# Patient Record
Sex: MALE | Race: WHITE | NOT HISPANIC OR LATINO | Employment: OTHER | URBAN - METROPOLITAN AREA
[De-identification: names, ages, dates, MRNs, and addresses within clinical notes are randomized per-mention and may not be internally consistent; named-entity substitution may affect disease eponyms.]

---

## 2019-03-22 ENCOUNTER — HISTORICAL (OUTPATIENT)
Dept: LAB | Facility: HOSPITAL | Age: 59
End: 2019-03-22

## 2020-03-13 ENCOUNTER — HISTORICAL (OUTPATIENT)
Dept: LAB | Facility: HOSPITAL | Age: 60
End: 2020-03-13

## 2020-03-13 ENCOUNTER — HISTORICAL (OUTPATIENT)
Dept: ADMINISTRATIVE | Facility: HOSPITAL | Age: 60
End: 2020-03-13

## 2020-03-17 ENCOUNTER — HISTORICAL (OUTPATIENT)
Dept: ADMINISTRATIVE | Facility: HOSPITAL | Age: 60
End: 2020-03-17

## 2020-03-23 ENCOUNTER — HISTORICAL (OUTPATIENT)
Dept: HEMATOLOGY/ONCOLOGY | Facility: CLINIC | Age: 60
End: 2020-03-23

## 2020-03-23 ENCOUNTER — HISTORICAL (OUTPATIENT)
Dept: ADMINISTRATIVE | Facility: HOSPITAL | Age: 60
End: 2020-03-23

## 2020-03-26 ENCOUNTER — HISTORICAL (OUTPATIENT)
Dept: LAB | Facility: HOSPITAL | Age: 60
End: 2020-03-26

## 2020-03-26 ENCOUNTER — TELEPHONE (OUTPATIENT)
Dept: HEMATOLOGY/ONCOLOGY | Facility: CLINIC | Age: 60
End: 2020-03-26

## 2020-03-26 NOTE — TELEPHONE ENCOUNTER
From: Nehemias Christianson <[email or web address]>   Sent: Wednesday, April 1, 2020 2:55 PM  To: Kaylee Greco     Keanu is treating at [facility] we just found out he is inpatient and has received 2 or 3 rounds of treatment.    Nehemias Christianson LCSW, [facility]-CG   Patient Nurse Navigator  Tel: [phone]      ==================================  From: Nehemias Christianson <[email or web address]>   Sent: Wednesday, April 1, 2020 10:50 AM  To: Kaylee Page  I tried to call as well as Dr. Carrillo and we are unsuccessful in getting him also.    Nehemias Christianson LCSW, [facility]-   Patient Nurse Navigator  Tel: [phone]    =====================================    From: Kaylee Greco   Sent: Wednesday, April 01, 2020 8:48 AM  To: Nehemias Christianson <[email or web address]>    Update:  I left messages for Mr Porter Friday, yesterday and today.   No callback.          -  Kaylee    =====================================  From: Kaylee Greco   Sent: Friday, March 27, 2020 10:25 AM  To: Nehemias Christianson <[email or web address]>    Nehemias, I got them via the fax.  Thanks!        -  Kaylee      =====================================  From: Nehemias Christianson <[email or web address]>   Sent: Friday, March 27, 2020 10:02 AM  To: Kaylee Greco <[email or web address]>    Please let me know if you received my email with labs and bone marrow results.  I am having trouble with some of my faxes going through.    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 He is stable to be seen as outpatient.    Can we try to get records  and a virtual consult set up ASAP?     Patient info:  Kvng Porter  : 1960  Phone: [phone]

## 2020-03-26 NOTE — LETTER
April 1, 2020    Rigobertohao MORALES Robert  [address]  [facility] [address]             [facility] Hematology Oncology  [address]  [place] [address]  Phone: [phone] Dear  Mr. Jones:    We have been unable to reach you by phone.  Please contact us so we can schedule your appointment.  This is per request of your physician, Dr Carrillo.      Sincerely,        Kaylee Greco RN   Ph: [phone]  Email: [email or web address]

## 2020-04-23 ENCOUNTER — TELEPHONE (OUTPATIENT)
Dept: HEMATOLOGY/ONCOLOGY | Facility: CLINIC | Age: 60
End: 2020-04-23

## 2020-04-23 DIAGNOSIS — C92.10 CML (CHRONIC MYELOCYTIC LEUKEMIA): Primary | ICD-10-CM

## 2020-04-23 PROCEDURE — 88325 CONSLTJ COMPRE RVW REC REPRT: CPT | Mod: 59 | Performed by: PATHOLOGY

## 2020-04-23 PROCEDURE — 88321 CONSLTJ&REPRT SLD PREP ELSWR: CPT | Performed by: PATHOLOGY

## 2020-04-23 PROCEDURE — 88323 CONSLTJ&REPRT MATRL PREP SLD: CPT | Performed by: PATHOLOGY

## 2020-04-23 NOTE — TELEPHONE ENCOUNTER
Pt had switched care to Dr Richard JUNG.  Per Care Everywhere, pt admitted on 3/30 to start chemo.  Started daily ponatinib on 4/4.  Had surveillance BMBx on 4/14/20.  Currently in hospital. CML in blast phase.  Developed pancytopenia & infections.    Dr Hodges has been in discussion with Dr Ramos. Received request to schedule virtual visit on 5/4.  Will contact wife.    ----- from Elaine Gunn sent at 4/3/2020  7:11AM-----  Regarding: RE: BMT transplant benefit  Morning     MEDICAL AND TRANSPLANT BENEFITS VERIFIED     ----- Message -----  From: Kaylee Greco RN  Sent: 4/1/2020   3:45 PM CDT  To: Elaine Gunn  Subject: BMT transplant benefit                           Please complete benefit check for patient. Medical benefit check and transplant benefit check for Chucky Zurita only.    Thanks,  Kaylee

## 2020-04-23 NOTE — LETTER
Fax Transmission                                                                                                                                                       Date: 2020    - - Slide Request- - for Dr Hodges   To:          PGL From:   Kaylee Greco RN               BMT Navigator   Fax:       [phone] Fax:         [phone]   Phone:  [phone] Phone:   [phone] (Navigator)                 [phone] (Pathology lab)     Patient:     SONA ABEL.O.B.   1960    Specimen:  Bone Marrow Biopsy  Date Collected:  2020         Please send blocks/slides, pathology reports (to include results of cytogenetic, flow cytometry, FISH and other tests, if any) to:     [facility]     Pathology Department, 4th [place] [address]     Fed Acct code:  [id number]                               Thank you        IF THERE ARE ANY PROBLEMS WITH THIS TRANSMISSION, PLEASE CALL IMMEDIATELY. THANK YOU    CONFIDENTIALITY NOTICE: The accompanying facsimile is intended solely for the use of the recipient designated above. Document(s) transmitted herewith may contain information that is confidential and privileged. Delivery, distribution of dissemination of this communication other than to the intended recipient is strictly prohibited. If you are another healthcare provider and have received this facsimile in error, please properly dispose and notify the sender. If you are NOT a healthcare provider and have received this facsimile in error, please notify [facility]s Compliance & Privacy Department immediately by email at [email or web address]

## 2020-04-23 NOTE — LETTER
Fax Transmission                                                                                                                                                       Date: 2020    - - Slide Request - - for Dr Hodges     To:                 PAPS From:   Kaylee Greco RN               BMT  Navigator    Fax:                              [phone] Fax:      [phone]   Phone:                         [phone] Phone:  [phone] (Navigator)               [phone] (Pathology lab)         Patient:     SONA JONESO.B.   1960    Specimen ID: [id number]   Date Collected:  3-         Please send blocks/slides, pathology reports (to include results of cytogenetic, flow cytometry, FISH and other tests, if any) to:     [facility]     Pathology Department, 4th Floor     [address]  [address]     FedEx Acct code:  [id number]                               Thank you        IF THERE ARE ANY PROBLEMS WITH THIS TRANSMISSION, PLEASE CALL IMMEDIATELY. THANK YOU    CONFIDENTIALITY NOTICE: The accompanying facsimile is intended solely for the use of the recipient designated above. Document(s) transmitted herewith may contain information that is confidential and privileged. Delivery, distribution of dissemination of this communication other than to the intended recipient is strictly prohibited. If you are another healthcare provider and have received this facsimile in error, please properly dispose and notify the sender. If you are NOT a healthcare provider and have received this facsimile in error, please notify [facility]s Compliance & Privacy Department immediately by email at [email or web address]

## 2020-05-01 ENCOUNTER — TELEPHONE (OUTPATIENT)
Dept: HEMATOLOGY/ONCOLOGY | Facility: CLINIC | Age: 60
End: 2020-05-01

## 2020-05-04 ENCOUNTER — OFFICE VISIT (OUTPATIENT)
Dept: HEMATOLOGY/ONCOLOGY | Facility: CLINIC | Age: 60
End: 2020-05-04
Payer: COMMERCIAL

## 2020-05-04 DIAGNOSIS — C92.10 CML (CHRONIC MYELOCYTIC LEUKEMIA): Primary | ICD-10-CM

## 2020-05-04 PROCEDURE — 99205 PR OFFICE/OUTPT VISIT, NEW, LEVL V, 60-74 MIN: ICD-10-PCS | Mod: 95,,, | Performed by: INTERNAL MEDICINE

## 2020-05-04 PROCEDURE — 99205 OFFICE O/P NEW HI 60 MIN: CPT | Mod: 95,,, | Performed by: INTERNAL MEDICINE

## 2020-05-04 NOTE — LETTER
May 4, 2020      Chase Ramos MD  [address]  [address]  [facility] [address]           [facility]  [address] [facility] [address]  Phone: [phone]          Patient: Kvng Jones   MR Number: [id number]   YOB: 1960   Date of Visit: 5/4/2020       Dear Dr. Chase Ramos:    Thank you for referring Kvng Jones to me for evaluation. Attached you will find relevant portions of my assessment and plan of care.    If you have questions, please do not hesitate to call me. I look forward to following Kvng Jones along with you.    Sincerely,    Cheryl Hodges MD    Enclosure  CC:  No Recipients    If you would like to receive this communication electronically, please contact [email or web address] or [phone] to request more information on [employer] Link access.    For providers and/or their staff who would like to refer a patient to [facility], please contact us through our one-stop-shop provider referral line, [facility], at [phone].    If you feel you have received this communication in error or would no longer like to receive these types of communications, please e-mail [email or web address]

## 2020-05-04 NOTE — PROGRESS NOTES
Subjective:   The patient location is: home  The chief complaint leading to consultation is: CML, Blast Crisis  Visit type: audiovisual  Total time spent with patient: 35 minutes  Each patient to whom he or she provides medical services by telemedicine is:  (1) informed of the relationship between the physician and patient and the respective role of any other health care provider with respect to management of the patient; and (2) notified that he or she may decline to receive medical services by telemedicine and may withdraw from such care at any time.    Notes: See Below       Patient ID: Kvng Jones is a 59 y.o. male.    Chief Complaint: No chief complaint on file.    Referring Physician: Chase Ramos MD    QUITA Colunga was recently admitted to [facility] for CML presenting with blast crisis and lewis disease. He was admitted for evaluation and induction chemotherapy with FLAG-Addis. Complications of therapy include epididymal orchitis with negative testicular ultrasound, neutropenic fever, dyspnea, and GGO of bilateral lungs on CT chest. Fever and chest findings were covered with broad spectrum antimicrobials. He did have hallucinations and vivid dreams with posaconazole. Chemotherapy was administered by left arm PICC line that was removed during hospital stay for MSSA infection treated with vancomycin. Hospital admission was 3/30/2020 to 5/1/2020 achieving morphologic CR with induction chemotherapy. He is now on Ponatinib 30mg daily.     Studies performed during his hospital stay include pre-chemotherapy ECHO with normal ejection fraction of 60-65%. HIV, HBV, and HCV tests were negative. CBC at admission was WBC 38.6, Hgb 14.6, and platelet 416K. CT of the head without contrast was normal 4/20/2020. US of abdomen performed for abdominal distention and neutropenic fever had hepatic steatosis, liver 19cm, and spleen 11 cm. He does have a history of alcohol use and pancreatitis.    Review of  Systems   Constitutional: Positive for fatigue.   HENT: Negative.    Eyes: Negative.    Respiratory: Negative.    Cardiovascular: Negative.    Gastrointestinal: Negative.    Endocrine: Negative.    Genitourinary: Negative.    Skin: Negative.    Allergic/Immunologic: Negative for environmental allergies, food allergies and immunocompromised state.   Neurological: Negative.    Hematological: Negative for adenopathy. Does not bruise/bleed easily.   Psychiatric/Behavioral: Negative.        Objective:    No exam due to telehealth visit secondary to COVID19  Physical Exam    Assessment:       1. CML (chronic myelocytic leukemia)        Plan:       CML presenting in blast crisis, with lewis disease.  Recommendation for curative allogeneic stem cell transplant for high risk CML (NCCN Guidelines version 3.2020; advanced CML)    Keanu has 3 siblings available for allogeneic stem cell transplant; all three sisters have been contacted by transplant coordinators to arrange donor testing.  We discussed additional donor options are children who will be haploidentical matches and international donor bank searches.  Once a donor is identified and availability confirmed we will initiated the patient evaluation starting with pre-transplant bone marrow biopsy, vital organ testing, and transplant team consultations.    We discussed the process of allogeneic stem cell transplant as a procedure to remove the patients bone marrow with high dose chemotherapy that requires replacement with a donor's stem cells to recover the immune and blood system and to create a graft versus cancer effect. We discussed that this process is cure for CML and Acute Leukemias and may induce long-term remission or disease control. We discussed use of intense chemotherapy, total body irradiation, inpatient hospital stay for about 3-4  Weeks. We discussed pre-transplant staging and vital organ evaluation. We also discussed consultation with all supportive  services. We discussed that a caregiver will be needed 24/7 for the 2 months after hospital discharge and they will be required to stay in the vicinity of [facility] during these 2 months granted this stay in [place] is dependent on the coronavirus pandemic situation. We also have the ability to follow-up by virtual visits or at the [facility] in [place]. Our transplant education book will be mailed to the patient. The patient will be contacted by transplant coordinators, currently Ro Hanson RN.

## 2020-05-07 ENCOUNTER — TELEPHONE (OUTPATIENT)
Dept: HEMATOLOGY/ONCOLOGY | Facility: CLINIC | Age: 60
End: 2020-05-07

## 2020-05-07 NOTE — TELEPHONE ENCOUNTER
Patient returning call to Ro, transplant coordinator, to discuss having labs drawn for transplant. Call forwarded to Ro.

## 2020-05-07 NOTE — TELEPHONE ENCOUNTER
"----- Message from Juan Dre sent at 5/7/2020 10:34 AM CDT -----  Contact: Marimar Hatfield   Consult/Advisory:    Name Of Caller: Marimar   Provider Name: Dr. Hodges   Does patient feel the need to be seen today? No   Relationship to the Pt?: Self   Contact Preference?: [phone]  What is the nature of the call?:  Patient daughter Marimar request a callback to discuss testing    Additional Notes:  "Thank you for all that you do for our patients'"      "

## 2020-05-11 DIAGNOSIS — C92.10 CML (CHRONIC MYELOCYTIC LEUKEMIA): ICD-10-CM

## 2020-05-11 DIAGNOSIS — Z76.82 STEM CELL TRANSPLANT CANDIDATE: Primary | ICD-10-CM

## 2020-05-13 ENCOUNTER — LAB VISIT (OUTPATIENT)
Dept: LAB | Facility: HOSPITAL | Age: 60
End: 2020-05-13
Attending: INTERNAL MEDICINE
Payer: COMMERCIAL

## 2020-05-13 DIAGNOSIS — C92.10 CML (CHRONIC MYELOCYTIC LEUKEMIA): ICD-10-CM

## 2020-05-13 DIAGNOSIS — Z76.82 STEM CELL TRANSPLANT CANDIDATE: ICD-10-CM

## 2020-05-13 PROCEDURE — 81376 HLA II TYPING 1 LOCUS LR: CPT | Mod: 59

## 2020-05-13 PROCEDURE — 81373 HLA I TYPING 1 LOCUS LR: CPT | Mod: 91

## 2020-05-13 PROCEDURE — 81376 HLA II TYPING 1 LOCUS LR: CPT

## 2020-05-13 PROCEDURE — 81373 HLA I TYPING 1 LOCUS LR: CPT

## 2020-05-13 PROCEDURE — 81376 HLA II TYPING 1 LOCUS LR: CPT | Mod: 91

## 2020-05-22 LAB
HLA DQA1 1: NORMAL
HLA DQA1 2: NORMAL
HLA DRB4 1: NORMAL
HLA-A 1 SERO. EQUIV: 2
HLA-A 1: NORMAL
HLA-A 2 SERO. EQUIV: 32
HLA-A 2: NORMAL
HLA-B 1 SERO. EQUIV: 18
HLA-B 1: NORMAL
HLA-B 2 SERO. EQUIV: 35
HLA-B 2: NORMAL
HLA-BW 1 SERO. EQUIV: 6
HLA-BW 2 SERO. EQUIV: NORMAL
HLA-C 1: NORMAL
HLA-C 2: NORMAL
HLA-CW 1 SERO. EQUIV: 4
HLA-CW 2 SERO. EQUIV: 5
HLA-DPA1 1: NORMAL
HLA-DPA1 2: NORMAL
HLA-DPB1 1: NORMAL
HLA-DPB1 2: NORMAL
HLA-DQ 1 SERO. EQUIV: 2
HLA-DQ 2 SERO. EQUIV: 5
HLA-DQB1 1: NORMAL
HLA-DQB1 2: NORMAL
HLA-DRB1 1 SERO. EQUIV: 17
HLA-DRB1 1: NORMAL
HLA-DRB1 2 SERO. EQUIV: 14
HLA-DRB1 2: NORMAL
HLA-DRB3 1: NORMAL
HLA-DRB3 2: NORMAL
HLA-DRB345 1 SERO. EQUIV: 52
HLA-DRB345 2 SERO. EQUIV: NORMAL
HLA-DRB4 2: NORMAL
HLA-DRB5 1: NORMAL
HLA-DRB5 2: NORMAL
SSALL INTERPRETATION: NORMAL
SSALL TESTING DATE: NORMAL
SSDPA TESTING DATE: NORMAL
SSDPB TESTING DATE: NORMAL
SSDQA TESTING DATE: NORMAL
SSDQB TESTING DATE: NORMAL
SSDRB TESTING DATE: NORMAL
SSOA TESTING DATE: NORMAL
SSOB TESTING DATE: NORMAL
SSOC TESTING DATE: NORMAL
SSODR TESTING DATE: NORMAL

## 2020-05-29 LAB — HLATY INTERPRETATION: NORMAL

## 2020-06-04 ENCOUNTER — TELEPHONE (OUTPATIENT)
Dept: HEMATOLOGY/ONCOLOGY | Facility: CLINIC | Age: 60
End: 2020-06-04

## 2020-06-04 NOTE — TELEPHONE ENCOUNTER
----- Message from Hunter Marroquin sent at 6/4/2020 12:51 PM CDT -----  Contact: pt (wife)  Calling to speak with Dr or nurse regarding pt      Call back: [phone]

## 2020-06-04 NOTE — TELEPHONE ENCOUNTER
Spoke to patient.  Informed him we would like to proceed with scheduling his eval for transplant.  States would like to see local oncologist prior to scheduling.  Will call back after 6/10 to make appointments.

## 2020-06-10 ENCOUNTER — TELEPHONE (OUTPATIENT)
Dept: HEMATOLOGY/ONCOLOGY | Facility: CLINIC | Age: 60
End: 2020-06-10

## 2020-06-10 NOTE — TELEPHONE ENCOUNTER
"----- Message from Radha Finnegan sent at 6/10/2020  3:17 PM CDT -----  Contact: Son/ Donor   Patient Assist    Name of caller: Keanu   Provider name: Carroll Luna   Contact Preference:  [phone]  Is this regarding current patient or new patient?: current   What is the nature of the call?    - marrow donor asking to speak with RN regarding results.   The other donor, will have to make arrangements and discuss   the plan moving forward. Please call to address the concern   and other questions.    Additional Notes:   "Thank you for all that you do for our patients'"     "

## 2020-06-16 ENCOUNTER — DOCUMENTATION ONLY (OUTPATIENT)
Dept: TRANSFUSION MEDICINE | Facility: HOSPITAL | Age: 60
End: 2020-06-16
Payer: COMMERCIAL

## 2020-06-16 DIAGNOSIS — Z76.82 BONE MARROW TRANSPLANT CANDIDATE: ICD-10-CM

## 2020-06-16 PROCEDURE — 80502 PR  LAB PATHOLOGY CONSULT-COMPLETE: CPT | Mod: ,,, | Performed by: PATHOLOGY

## 2020-06-16 PROCEDURE — 80502 PR  LAB PATHOLOGY CONSULT-COMPLETE: ICD-10-PCS | Mod: ,,, | Performed by: PATHOLOGY

## 2020-06-16 NOTE — PROGRESS NOTES
The HLA typing for Kvng Jones Jr ([id number]) have been completed (potential related donor for Kvng Jones, Sr [id number]). They are haplomatched.  The low and high resolution results for the donor are concordant and the samples were collected at different times, as required.    AILEEN Davis MD, TANVI  Histocompatability and Immunogenetics Lab  Section of Transfusion Medicine & Histocompatibility  Department of Pathology and Laboratory Medicine  [facility]  06/16/2020

## 2020-06-19 DIAGNOSIS — Z76.82 STEM CELL TRANSPLANT CANDIDATE: Primary | ICD-10-CM

## 2020-06-19 DIAGNOSIS — C92.10 CML (CHRONIC MYELOCYTIC LEUKEMIA): ICD-10-CM

## 2020-06-22 DIAGNOSIS — Z76.82 STEM CELL TRANSPLANT CANDIDATE: Primary | ICD-10-CM

## 2020-06-22 DIAGNOSIS — C92.10 CML (CHRONIC MYELOCYTIC LEUKEMIA): ICD-10-CM

## 2020-06-23 DIAGNOSIS — C92.10 CML (CHRONIC MYELOCYTIC LEUKEMIA): ICD-10-CM

## 2020-06-23 DIAGNOSIS — Z76.82 STEM CELL TRANSPLANT CANDIDATE: Primary | ICD-10-CM

## 2020-06-24 DIAGNOSIS — Z76.82 STEM CELL TRANSPLANT CANDIDATE: Primary | ICD-10-CM

## 2020-06-24 DIAGNOSIS — C92.10 CML (CHRONIC MYELOCYTIC LEUKEMIA): ICD-10-CM

## 2020-06-26 NOTE — PROGRESS NOTES
Date: 07/01/2020    Referring provider: Cheryl Hodges MD    Radiation Oncology Consultation    Reason: Consideration of TBI for high risk CML     HPI: Mr. Jones is a 59 year old man with high risk CML, presenting today for initial consultation regarding TBI. He was diagnosed in 3/20 after noting rapid development of cervical and submental LN. Additional work-up including bone marrow aspirate and biopsy demonstrated CML, high risk, BCR/ABL positive. He subsequently was admitted for induction systemic therapy (FLAG-Addis) at [facility] in late March with post-therapy course complicated by neutropenic fever.     Post-induction therapy bone marrow biopsy on 4/14/20 demonstrated complete response and he was discharged on 5/1/20. He was started on consolidation ponatinib on 5/1/20. He was then referred to Dr. Hodges at [facility] who recommended allogeneic SCT. The patient was subsequently referred to our department for additional discussion regarding conditioning TBI prior to SCT.    Today, he is overall doing well, noting mild nausea, intermittent headaches, and neck pain. He denies fevers, chills, vision changes, diarrhea, or weakness.    Prior Radiation History: Denies    Past Medical History:  CML  Depression    Past Surgical History:  Cholecystectomy  Hemorrhoid surgery 1993  Hernia repair  Multiple bone marrow biopsies 2020  Spine fusion    Social History     Tobacco Use    Smoking status: Not on file   Substance Use Topics    Alcohol use: Not on file    Drug use: Not on file       Cancer-related family history is not on file.    No current outpatient medications on file prior to visit.     No current facility-administered medications on file prior to visit.        Review of patient's allergies indicates:  No Known Allergies    Review of Systems   Constitutional: Negative for activity change, chills, fatigue and fever.   HENT: Negative for congestion, nosebleeds and trouble swallowing.    Eyes: Negative for visual disturbance.    Respiratory: Negative for apnea, cough, shortness of breath and wheezing.    Cardiovascular: Negative for chest pain.   Gastrointestinal: Positive for nausea. Negative for diarrhea and vomiting.   Genitourinary: Negative for difficulty urinating, hematuria and urgency.   Musculoskeletal: Positive for neck pain. Negative for back pain.   Neurological: Positive for headaches. Negative for weakness.   Hematological: Negative for adenopathy.   Psychiatric/Behavioral: Negative for decreased concentration. The patient is not nervous/anxious.         Vital Signs:   Vitals:    07/01/20 1313   BP: (!) 203/97   Pulse: 91   Resp: 18   Temp: 98.1 °F (36.7 °C)       ECOG Performance Status: 0 - Fully Active    Physical exam:    Constitutional:  Well-developed, well-nourished and in no acute distress.  Head: Normocephalic, atraumatic.    Eyes: Sclerae are non-icteric.  Pupils are equal and round.  Extraocular movements are intact.  ENMT: Oral cavity and oropharynx are without lesions. Mucous membranes are moist.  Neck: Supple.  No palpable cervical or supraclavicular adenopathy.   Pulmonary: Clear to auscultation, bilaterally.  No rhonchi, rales or wheezes.  Cardiovascular: Regular rate and rhythm.   No murmurs heard.  No edema.  GI: Soft, nontender and nondistended.  No palpable masses or hepatosplenomegaly.  Musculoskeletal: No palpable spinous or paraspinous tenderness.  Range of motion appears normal in all 4 extremities.  Lymphatics: No palpable cervical, supraclavicular, axillary adenopathy.  Extremities: No clubbing, cyanosis, or edema.    Skin: No visible lesions.  Neurologic: CN II-XII are grossly intact.  Motor strength is 5/5 in bilateral upper and lower extremities and symmetric.    Sensory exam is grossly intact to touch.  Gait is normal.    Psychiatric: Patient is alert and oriented x 3.  Normal mood and affect.    Labs: I have personally reviewed the patient's available labs and reports and summarized pertinent  findings above in HPI.     Imaging: I have personally reviewed the patient's available images and reports and summarized pertinent findings above in HPI.     Pathology: I have personally reviewed the patient's available pathology and summarized pertinent findings above in HPI.     Assessment: Mr. Jones is a 59 year old man with high risk CML with complete response after induction systemic therapy, now on consolidation ponatinib, presenting today for consideration of conditioning TBI prior to stem cell transplantation.    Plan:  I agree with Dr. Hodges's recommendations for conditioning TBI (prior to allogeneic stem cell transplantation. His CT simulation will be scheduled soon and single TBI treatment schedule will be coordinated with medical oncology.    I reviewed the rationale and goal of the proposed treatment course. The radiotherapeutic procedure with associated side effects and potential complications were explained. Kvng Jones had the opportunity to ask questions which were answered to the best of my knowledge. The patient voiced understanding of the above and has elected to proceed with treatment. Consent form was signed.    I spent approximately 60 minutes reviewing the available records and evaluating the patient, out of which over 50% of the time was spent face to face with the patient in counseling and coordinating this patient's care.    Thank you for allowing me to participate in the care of this patient. Please feel free to contact me with any questions or concerns.    Hermes Fernandes MD  Radiation Oncology  [facility]

## 2020-06-28 NOTE — PROGRESS NOTES
59 y.o. CML Patient presented at BMT clinic for pre transplant staging bone marrow biopsy. Tolerated procedure well. Not in any distress.  Patient may need antianxiety medication prior procedure.    Aundrea Jackson NP  Hematology/Oncology/BMT

## 2020-07-01 ENCOUNTER — INITIAL CONSULT (OUTPATIENT)
Dept: RADIATION ONCOLOGY | Facility: CLINIC | Age: 60
End: 2020-07-01
Payer: COMMERCIAL

## 2020-07-01 ENCOUNTER — INITIAL CONSULT (OUTPATIENT)
Dept: PSYCHIATRY | Facility: CLINIC | Age: 60
End: 2020-07-01
Payer: COMMERCIAL

## 2020-07-01 ENCOUNTER — HOSPITAL ENCOUNTER (OUTPATIENT)
Dept: RADIATION THERAPY | Facility: HOSPITAL | Age: 60
Discharge: HOME | End: 2020-07-01
Attending: RADIOLOGY
Payer: COMMERCIAL

## 2020-07-01 ENCOUNTER — HOSPITAL ENCOUNTER (OUTPATIENT)
Dept: CARDIOLOGY | Facility: CLINIC | Age: 60
Discharge: HOME | End: 2020-07-01
Payer: COMMERCIAL

## 2020-07-01 ENCOUNTER — HOSPITAL ENCOUNTER (OUTPATIENT)
Dept: PULMONOLOGY | Facility: CLINIC | Age: 60
Discharge: HOME | End: 2020-07-01
Payer: COMMERCIAL

## 2020-07-01 ENCOUNTER — HOSPITAL ENCOUNTER (OUTPATIENT)
Dept: CARDIOLOGY | Facility: HOSPITAL | Age: 60
Discharge: HOME | End: 2020-07-01
Attending: INTERNAL MEDICINE
Payer: COMMERCIAL

## 2020-07-01 ENCOUNTER — CLINICAL SUPPORT (OUTPATIENT)
Dept: HEMATOLOGY/ONCOLOGY | Facility: CLINIC | Age: 60
End: 2020-07-01
Payer: COMMERCIAL

## 2020-07-01 ENCOUNTER — HOSPITAL ENCOUNTER (OUTPATIENT)
Dept: RADIOLOGY | Facility: HOSPITAL | Age: 60
Discharge: HOME | End: 2020-07-01
Attending: INTERNAL MEDICINE
Payer: COMMERCIAL

## 2020-07-01 ENCOUNTER — OFFICE VISIT (OUTPATIENT)
Dept: HEMATOLOGY/ONCOLOGY | Facility: CLINIC | Age: 60
End: 2020-07-01
Payer: COMMERCIAL

## 2020-07-01 VITALS
SYSTOLIC BLOOD PRESSURE: 175 MMHG | RESPIRATION RATE: 16 BRPM | HEART RATE: 90 BPM | OXYGEN SATURATION: 98 % | DIASTOLIC BLOOD PRESSURE: 81 MMHG | TEMPERATURE: 98 F

## 2020-07-01 VITALS
BODY MASS INDEX: 33.07 KG/M2 | HEART RATE: 91 BPM | HEIGHT: 66 IN | DIASTOLIC BLOOD PRESSURE: 97 MMHG | RESPIRATION RATE: 18 BRPM | WEIGHT: 205.81 LBS | TEMPERATURE: 98 F | SYSTOLIC BLOOD PRESSURE: 203 MMHG

## 2020-07-01 VITALS
DIASTOLIC BLOOD PRESSURE: 80 MMHG | HEIGHT: 66 IN | WEIGHT: 200 LBS | SYSTOLIC BLOOD PRESSURE: 150 MMHG | HEART RATE: 86 BPM | BODY MASS INDEX: 32.14 KG/M2

## 2020-07-01 DIAGNOSIS — Z76.82 STEM CELL TRANSPLANT CANDIDATE: ICD-10-CM

## 2020-07-01 DIAGNOSIS — C92.10 CML (CHRONIC MYELOCYTIC LEUKEMIA): ICD-10-CM

## 2020-07-01 DIAGNOSIS — C92.10 CML (CHRONIC MYELOCYTIC LEUKEMIA): Primary | ICD-10-CM

## 2020-07-01 DIAGNOSIS — Z01.818 PRE-TRANSPLANT EVALUATION FOR STEM CELL TRANSPLANT: Primary | ICD-10-CM

## 2020-07-01 DIAGNOSIS — Z76.82 STEM CELL TRANSPLANT CANDIDATE: Primary | ICD-10-CM

## 2020-07-01 DIAGNOSIS — Z13.9 SCREENING FOR UNSPECIFIED CONDITION: ICD-10-CM

## 2020-07-01 LAB
ASCENDING AORTA: 4.06 CM
AV INDEX (PROSTH): 0.75
AV MEAN GRADIENT: 4 MMHG
AV PEAK GRADIENT: 7 MMHG
AV VALVE AREA: 3.45 CM2
AV VELOCITY RATIO: 0.77
BSA FOR ECHO PROCEDURE: 2.06 M2
CV ECHO LV RWT: 0.41 CM
DLCO ADJ PRE: 24.01 ML/(MIN*MMHG) (ref 18.78–32.63)
DLCO SINGLE BREATH LLN: 18.78
DLCO SINGLE BREATH PRE REF: 93.6 %
DLCO SINGLE BREATH REF: 25.71
DLCOC SBVA LLN: 2.73
DLCOC SBVA PRE REF: 102.2 %
DLCOC SBVA REF: 4.05
DLCOC SINGLE BREATH LLN: 18.78
DLCOC SINGLE BREATH PRE REF: 93.4 %
DLCOC SINGLE BREATH REF: 25.71
DLCOCSBVAULN: 5.37
DLCOCSINGLEBREATHULN: 32.63
DLCOSINGLEBREATHULN: 32.63
DLCOVA LLN: 2.73
DLCOVA PRE REF: 102.5 %
DLCOVA PRE: 4.15 ML/(MIN*MMHG*L) (ref 2.73–5.37)
DLCOVA REF: 4.05
DLCOVAULN: 5.37
DLVAADJ PRE: 4.14 ML/(MIN*MMHG*L) (ref 2.73–5.37)
DOP CALC AO PEAK VEL: 1.34 M/S
DOP CALC AO VTI: 22.63 CM
DOP CALC LVOT AREA: 4.6 CM2
DOP CALC LVOT DIAMETER: 2.42 CM
DOP CALC LVOT PEAK VEL: 1.03 M/S
DOP CALC LVOT STROKE VOLUME: 77.97 CM3
DOP CALCLVOT PEAK VEL VTI: 16.96 CM
E WAVE DECELERATION TIME: 250.5 MSEC
E/A RATIO: 0.69
E/E' RATIO: 10.71 M/S
ECHO LV POSTERIOR WALL: 0.98 CM (ref 0.6–1.1)
FEF 25 75 LLN: 1.26
FEF 25 75 PRE REF: 43.6 %
FEF 25 75 REF: 2.61
FEV05 LLN: 1.3
FEV05 REF: 2.43
FEV1 FVC LLN: 66
FEV1 FVC PRE REF: 84.5 %
FEV1 FVC REF: 78
FEV1 LLN: 2.32
FEV1 PRE REF: 72.2 %
FEV1 REF: 3.1
FRACTIONAL SHORTENING: 28 % (ref 28–44)
FVC LLN: 3.02
FVC PRE REF: 85.4 %
FVC REF: 3.98
INTERVENTRICULAR SEPTUM: 0.99 CM (ref 0.6–1.1)
IVC PRE: 3.33 L (ref 3.02–4.94)
IVC SINGLE BREATH LLN: 3.02
IVC SINGLE BREATH PRE REF: 83.7 %
IVC SINGLE BREATH REF: 3.98
IVCSINGLEBREATHULN: 4.94
LA MAJOR: 5.5 CM
LA MINOR: 5.83 CM
LA WIDTH: 3.95 CM
LEFT ATRIUM SIZE: 3.27 CM
LEFT ATRIUM VOLUME INDEX MOD: 20.5 ML/M2
LEFT ATRIUM VOLUME INDEX: 31.1 ML/M2
LEFT ATRIUM VOLUME MOD: 41 CM3
LEFT ATRIUM VOLUME: 62.14 CM3
LEFT INTERNAL DIMENSION IN SYSTOLE: 3.44 CM (ref 2.1–4)
LEFT VENTRICLE DIASTOLIC VOLUME INDEX: 54.09 ML/M2
LEFT VENTRICLE DIASTOLIC VOLUME: 108.18 ML
LEFT VENTRICLE MASS INDEX: 84 G/M2
LEFT VENTRICLE SYSTOLIC VOLUME INDEX: 24.4 ML/M2
LEFT VENTRICLE SYSTOLIC VOLUME: 48.83 ML
LEFT VENTRICULAR INTERNAL DIMENSION IN DIASTOLE: 4.81 CM (ref 3.5–6)
LEFT VENTRICULAR MASS: 167.32 G
LV LATERAL E/E' RATIO: 10.71 M/S
LV SEPTAL E/E' RATIO: 10.71 M/S
MV PEAK A VEL: 1.08 M/S
MV PEAK E VEL: 0.75 M/S
MV STENOSIS PRESSURE HALF TIME: 72.64 MS
MV VALVE AREA P 1/2 METHOD: 3.03 CM2
PEF LLN: 6.23
PEF PRE REF: 78.8 %
PEF REF: 8.3
PHYSICIAN COMMENT: ABNORMAL
PISA TR MAX VEL: 2.37 M/S
PRE DLCO: 24.07 ML/(MIN*MMHG) (ref 18.78–32.63)
PRE FEF 25 75: 1.14 L/S (ref 1.26–3.97)
PRE FET 100: 7.21 SEC
PRE FEV05 REF: 68.6 %
PRE FEV1 FVC: 65.82 % (ref 65.52–90.25)
PRE FEV1: 2.24 L (ref 2.32–3.88)
PRE FEV5: 1.67 L (ref 1.3–3.57)
PRE FVC: 3.4 L (ref 3.02–4.94)
PRE PEF: 6.54 L/S (ref 6.23–10.37)
RA MAJOR: 4.31 CM
RA PRESSURE: 3 MMHG
RA WIDTH: 3.1 CM
RETIRED EF AND QEF - SEE NOTES: 56 %
RIGHT VENTRICULAR END-DIASTOLIC DIMENSION: 3.44 CM
SARS-COV-2 RDRP RESP QL NAA+PROBE: NEGATIVE
SINUS: 3.88 CM
STJ: 3.49 CM
TDI LATERAL: 0.07 M/S
TDI SEPTAL: 0.07 M/S
TDI: 0.07 M/S
TR MAX PG: 22 MMHG
TRICUSPID ANNULAR PLANE SYSTOLIC EXCURSION: 2.05 CM
TV REST PULMONARY ARTERY PRESSURE: 25 MMHG
VA PRE: 5.8 L (ref 6.19–6.19)
VA SINGLE BREATH LLN: 6.19
VA SINGLE BREATH PRE REF: 93.6 %
VA SINGLE BREATH REF: 6.19
VASINGLEBREATHULN: 6.19

## 2020-07-01 PROCEDURE — 88264 CHROMOSOME ANALYSIS 20-25: CPT

## 2020-07-01 PROCEDURE — 99999 PR PBB SHADOW E&M-EST. PATIENT-LVL IV: CPT | Mod: PBBFAC,,, | Performed by: RADIOLOGY

## 2020-07-01 PROCEDURE — 85097 BONE MARROW INTERPRETATION: CPT | Mod: ,,, | Performed by: PATHOLOGY

## 2020-07-01 PROCEDURE — 99499 UNLISTED E&M SERVICE: CPT | Mod: S$GLB,,, | Performed by: NURSE PRACTITIONER

## 2020-07-01 PROCEDURE — 3008F BODY MASS INDEX DOCD: CPT | Mod: CPTII,S$GLB,, | Performed by: RADIOLOGY

## 2020-07-01 PROCEDURE — 99205 PR OFFICE/OUTPT VISIT, NEW, LEVL V, 60-74 MIN: ICD-10-PCS | Mod: S$GLB,,, | Performed by: RADIOLOGY

## 2020-07-01 PROCEDURE — 88189 PR  FLOWCYTOMETRY/READ, 16 & > MARKERS: ICD-10-PCS | Mod: ,,, | Performed by: PATHOLOGY

## 2020-07-01 PROCEDURE — 88313 PR  SPECIAL STAINS,GROUP II: ICD-10-PCS | Mod: 26,,, | Performed by: PATHOLOGY

## 2020-07-01 PROCEDURE — 94010 BREATHING CAPACITY TEST: ICD-10-PCS | Mod: S$GLB,,, | Performed by: INTERNAL MEDICINE

## 2020-07-01 PROCEDURE — 93010 ELECTROCARDIOGRAM REPORT: CPT | Mod: S$GLB,,, | Performed by: INTERNAL MEDICINE

## 2020-07-01 PROCEDURE — 94010 BREATHING CAPACITY TEST: CPT | Mod: S$GLB,,, | Performed by: INTERNAL MEDICINE

## 2020-07-01 PROCEDURE — U0002 COVID-19 LAB TEST NON-CDC: HCPCS

## 2020-07-01 PROCEDURE — 99205 OFFICE O/P NEW HI 60 MIN: CPT | Mod: S$GLB,,, | Performed by: RADIOLOGY

## 2020-07-01 PROCEDURE — 88185 FLOWCYTOMETRY/TC ADD-ON: CPT | Mod: 59 | Performed by: PATHOLOGY

## 2020-07-01 PROCEDURE — 85097 PR  BONE MARROW,SMEAR INTERPRETATION: ICD-10-PCS | Mod: ,,, | Performed by: PATHOLOGY

## 2020-07-01 PROCEDURE — 88311 PR  DECALCIFY TISSUE: ICD-10-PCS | Mod: 26,,, | Performed by: PATHOLOGY

## 2020-07-01 PROCEDURE — 88299 UNLISTED CYTOGENETIC STUDY: CPT

## 2020-07-01 PROCEDURE — 88341 IMHCHEM/IMCYTCHM EA ADD ANTB: CPT | Mod: 26,59,, | Performed by: PATHOLOGY

## 2020-07-01 PROCEDURE — 88342 IMHCHEM/IMCYTCHM 1ST ANTB: CPT | Mod: 26,59,, | Performed by: PATHOLOGY

## 2020-07-01 PROCEDURE — 88313 SPECIAL STAINS GROUP 2: CPT | Mod: 26,,, | Performed by: PATHOLOGY

## 2020-07-01 PROCEDURE — 93005 ELECTROCARDIOGRAM TRACING: CPT | Mod: S$GLB,,, | Performed by: INTERNAL MEDICINE

## 2020-07-01 PROCEDURE — 88311 DECALCIFY TISSUE: CPT | Mod: 26,,, | Performed by: PATHOLOGY

## 2020-07-01 PROCEDURE — 93306 TTE W/DOPPLER COMPLETE: CPT

## 2020-07-01 PROCEDURE — 88305 TISSUE EXAM BY PATHOLOGIST: CPT | Mod: 26,,, | Performed by: PATHOLOGY

## 2020-07-01 PROCEDURE — 90791 PSYCH DIAGNOSTIC EVALUATION: CPT | Mod: S$GLB,ICN,, | Performed by: PSYCHOLOGIST

## 2020-07-01 PROCEDURE — 88237 TISSUE CULTURE BONE MARROW: CPT

## 2020-07-01 PROCEDURE — 99999 PR PBB SHADOW E&M-EST. PATIENT-LVL IV: ICD-10-PCS | Mod: PBBFAC,,, | Performed by: RADIOLOGY

## 2020-07-01 PROCEDURE — 93005 EKG 12-LEAD: ICD-10-PCS | Mod: S$GLB,,, | Performed by: INTERNAL MEDICINE

## 2020-07-01 PROCEDURE — 3008F PR BODY MASS INDEX (BMI) DOCUMENTED: ICD-10-PCS | Mod: CPTII,S$GLB,, | Performed by: RADIOLOGY

## 2020-07-01 PROCEDURE — 88189 FLOWCYTOMETRY/READ 16 & >: CPT | Mod: ,,, | Performed by: PATHOLOGY

## 2020-07-01 PROCEDURE — 99999 PR PBB SHADOW E&M-EST. PATIENT-LVL II: ICD-10-PCS | Mod: PBBFAC,,, | Performed by: PSYCHOLOGIST

## 2020-07-01 PROCEDURE — 88342 IMHCHEM/IMCYTCHM 1ST ANTB: CPT | Mod: 59 | Performed by: PATHOLOGY

## 2020-07-01 PROCEDURE — 90791 PR PSYCHIATRIC DIAGNOSTIC EVALUATION: ICD-10-PCS | Mod: S$GLB,ICN,, | Performed by: PSYCHOLOGIST

## 2020-07-01 PROCEDURE — 38222 DX BONE MARROW BX & ASPIR: CPT | Mod: LT,S$GLB,, | Performed by: NURSE PRACTITIONER

## 2020-07-01 PROCEDURE — 88342 CHG IMMUNOCYTOCHEMISTRY: ICD-10-PCS | Mod: 26,59,, | Performed by: PATHOLOGY

## 2020-07-01 PROCEDURE — 71046 X-RAY EXAM CHEST 2 VIEWS: CPT | Mod: 26,,, | Performed by: RADIOLOGY

## 2020-07-01 PROCEDURE — 88341 PR IHC OR ICC EACH ADD'L SINGLE ANTIBODY  STAINPR: ICD-10-PCS | Mod: 26,59,, | Performed by: PATHOLOGY

## 2020-07-01 PROCEDURE — 88305 TISSUE EXAM BY PATHOLOGIST: CPT | Performed by: PATHOLOGY

## 2020-07-01 PROCEDURE — 93010 EKG 12-LEAD: ICD-10-PCS | Mod: S$GLB,,, | Performed by: INTERNAL MEDICINE

## 2020-07-01 PROCEDURE — 88305 TISSUE EXAM BY PATHOLOGIST: ICD-10-PCS | Mod: 26,,, | Performed by: PATHOLOGY

## 2020-07-01 PROCEDURE — 71046 X-RAY EXAM CHEST 2 VIEWS: CPT | Mod: TC,FY

## 2020-07-01 PROCEDURE — 93306 TTE W/DOPPLER COMPLETE: CPT | Mod: 26,,, | Performed by: INTERNAL MEDICINE

## 2020-07-01 PROCEDURE — 88184 FLOWCYTOMETRY/ TC 1 MARKER: CPT | Performed by: PATHOLOGY

## 2020-07-01 PROCEDURE — 96136 PR PSYCH/NEUROPSYCH TEST ADMIN/SCORING, 2+ TESTS, 1ST 30 MIN: ICD-10-PCS | Mod: S$GLB,,, | Performed by: PSYCHOLOGIST

## 2020-07-01 PROCEDURE — 93306 ECHO (CUPID ONLY): ICD-10-PCS | Mod: 26,,, | Performed by: INTERNAL MEDICINE

## 2020-07-01 PROCEDURE — 99499 NO LOS: ICD-10-PCS | Mod: S$GLB,,, | Performed by: NURSE PRACTITIONER

## 2020-07-01 PROCEDURE — 99999 PR PBB SHADOW E&M-EST. PATIENT-LVL II: CPT | Mod: PBBFAC,,, | Performed by: PSYCHOLOGIST

## 2020-07-01 PROCEDURE — 96136 PSYCL/NRPSYC TST PHY/QHP 1ST: CPT | Mod: S$GLB,,, | Performed by: PSYCHOLOGIST

## 2020-07-01 PROCEDURE — 99999 PR PBB SHADOW E&M-EST. PATIENT-LVL I: ICD-10-PCS | Mod: PBBFAC,,,

## 2020-07-01 PROCEDURE — 94729 PR C02/MEMBANE DIFFUSE CAPACITY: ICD-10-PCS | Mod: S$GLB,,, | Performed by: INTERNAL MEDICINE

## 2020-07-01 PROCEDURE — 38222 PR BONE MARROW BIOPSY(IES) W/ASPIRATION(S); DIAGNOSTIC: ICD-10-PCS | Mod: LT,S$GLB,, | Performed by: NURSE PRACTITIONER

## 2020-07-01 PROCEDURE — 88313 SPECIAL STAINS GROUP 2: CPT | Mod: 59 | Performed by: PATHOLOGY

## 2020-07-01 PROCEDURE — 99999 PR PBB SHADOW E&M-EST. PATIENT-LVL III: ICD-10-PCS | Mod: PBBFAC,,, | Performed by: NURSE PRACTITIONER

## 2020-07-01 PROCEDURE — 99999 PR PBB SHADOW E&M-EST. PATIENT-LVL III: CPT | Mod: PBBFAC,,, | Performed by: NURSE PRACTITIONER

## 2020-07-01 PROCEDURE — 88341 IMHCHEM/IMCYTCHM EA ADD ANTB: CPT | Performed by: PATHOLOGY

## 2020-07-01 PROCEDURE — 99999 PR PBB SHADOW E&M-EST. PATIENT-LVL I: CPT | Mod: PBBFAC,,,

## 2020-07-01 PROCEDURE — 71046 XR CHEST PA AND LATERAL: ICD-10-PCS | Mod: 26,,, | Performed by: RADIOLOGY

## 2020-07-01 PROCEDURE — 88311 DECALCIFY TISSUE: CPT | Performed by: PATHOLOGY

## 2020-07-01 PROCEDURE — 94729 DIFFUSING CAPACITY: CPT | Mod: S$GLB,,, | Performed by: INTERNAL MEDICINE

## 2020-07-01 PROCEDURE — 81206 BCR/ABL1 GENE MAJOR BP: CPT

## 2020-07-01 RX ORDER — OXYCODONE HYDROCHLORIDE AND ACETAMINOPHEN 10; 325 MG/1; MG/1
1 TABLET ORAL EVERY 6 HOURS PRN
COMMUNITY
Start: 2020-03-27 | End: 2020-07-21

## 2020-07-01 RX ORDER — VARENICLINE TARTRATE 0.5 (11)-1
KIT ORAL
COMMUNITY
Start: 2020-05-04 | End: 2020-07-01 | Stop reason: SDUPTHER

## 2020-07-01 RX ORDER — ONDANSETRON 4 MG/1
4 TABLET, FILM COATED ORAL EVERY 8 HOURS PRN
Status: ON HOLD | COMMUNITY
Start: 2020-06-06 | End: 2020-08-12 | Stop reason: HOSPADM

## 2020-07-01 RX ORDER — TRAMADOL HYDROCHLORIDE 50 MG/1
1 TABLET ORAL EVERY 6 HOURS PRN
COMMUNITY
End: 2020-08-14

## 2020-07-01 RX ORDER — PONATINIB HYDROCHLORIDE 15 MG/1
30 TABLET, FILM COATED ORAL DAILY
COMMUNITY
Start: 2020-06-29 | End: 2020-07-21

## 2020-07-01 RX ORDER — LEVOFLOXACIN 500 MG/1
500 TABLET, FILM COATED ORAL DAILY
Status: ON HOLD | COMMUNITY
Start: 2020-06-26 | End: 2020-08-12 | Stop reason: HOSPADM

## 2020-07-01 RX ORDER — FLUCONAZOLE 200 MG/1
200 TABLET ORAL DAILY
Status: ON HOLD | COMMUNITY
Start: 2020-06-26 | End: 2020-08-11 | Stop reason: HOSPADM

## 2020-07-01 RX ORDER — DIPHENHYDRAMINE HCL 25 MG
25 TABLET ORAL
COMMUNITY
Start: 2020-03-20 | End: 2020-07-06 | Stop reason: SDUPTHER

## 2020-07-01 RX ORDER — VARENICLINE TARTRATE 1 MG/1
TABLET, FILM COATED ORAL
COMMUNITY
Start: 2020-06-25 | End: 2020-07-01 | Stop reason: SDUPTHER

## 2020-07-01 RX ORDER — DIAZEPAM 5 MG/1
TABLET ORAL
COMMUNITY
Start: 2020-03-10 | End: 2020-07-06

## 2020-07-01 RX ORDER — VALACYCLOVIR HYDROCHLORIDE 500 MG/1
500 TABLET, FILM COATED ORAL 2 TIMES DAILY
Status: ON HOLD | COMMUNITY
Start: 2020-06-26 | End: 2020-08-12 | Stop reason: HOSPADM

## 2020-07-01 RX ORDER — CYCLOBENZAPRINE HCL 10 MG
10 TABLET ORAL 3 TIMES DAILY PRN
Status: ON HOLD | COMMUNITY
Start: 2020-05-26 | End: 2020-08-12 | Stop reason: HOSPADM

## 2020-07-01 RX ORDER — VARENICLINE TARTRATE 1 MG/1
1 TABLET, FILM COATED ORAL 2 TIMES DAILY
Status: ON HOLD | COMMUNITY
Start: 2020-06-08 | End: 2020-08-12 | Stop reason: HOSPADM

## 2020-07-01 NOTE — LETTER
July 1, 2020        Cheryl Hodges MD  [address]  [facility] [address]             [facility]  [address] [facility] [address]  Phone: [phone]  Fax: [phone]   Patient: Kvng Jones Sr.   MR Number: [id number]   YOB: 1960   Date of Visit: 7/1/2020       Dear Dr. Hodges:    Thank you for referring Kvng Jones to me for evaluation. Below are the relevant portions of my assessment and plan of care.       Kvng Jones Sr. is a  59 y.o. male referred by Dr. Cheryl Hodges for psychological evaluation prior to stem cell transplantation.   The patient appears absent of disabling psychopathology or disabilities which would prevent understanding and compliance with medical treatment.  There is no evidence of suicidality.    The patient has adequate knowledge about HSCT, but unclear expectations for health and illness following transplantation, inadequate understanding of the possible risks and complications of this treatment option, and minimal knowledge about lifestyle changes and health adaptations which will be required of him. Basic information was provided to him today, as he had not yet met with all team members. He wishes to have additional discussion with Dr. Hodges about outcomes and prognosis.   He is aware of the 100 day 1 hour residence requirement, but thought that closeness to [facility] would suffice. He wishes to discuss the suitability of his son's residence in [facility] with team members.    He reports adequate compliance with previous medical treatment.   Kvng Jones Sr. has excellent social support from his wife and other family members. Caregivers are engaged and aware of post-HSCT demands.   The patient exhibits a high degree of social stability.   The patient acknowledges no stressors expected to limit his ability to cope with the demands of HSCT and recovery, other than post-treatment lodging  decisions.   The patient reports recent discontinuation of smoking, moderate to excessive caffeine intake, and daily use of medical marijuana tincture.  He uses more ETOH than is generally accepted as safe and moderate, but he and his wife deny any social, occupational or functional impairment. He has never had withdrawal symptoms and went 30 days without ETOH during his initial hospitalization without difficulty.   He demonstrates adequate health literacy.        Impressions:  Kvng Jones Sr. will be an acceptable HSCT candidate from a psychological perspective once he is provided with additional education. He wishes to discuss with Dr. Hodges his procedure-related mortality risk, his post-transplant prognosis, and his risks without treatment.  He does not fully understand procedural risks other than infection. He wishes to discuss his post-transplant living arrangements with the BMT team (patient wishes to stay with his son in [place]).      There are no overt psychological contraindications for proceeding with the procedure.He has no significant mental health history, and reports no current psychiatric problems or major adjustment issues. The patient has reasonable expectations for the procedure, good social support, and has already begun making appropriate life plans in anticipation of the procedure. The patient has verbalized appropriate awareness and commitment to the necessary behavioral changes associated with HSCTand appears willing to adjust to long-term lifestyle challenges and medical follow-up. There are no recommendations for psychological treatment at this time. The patient and family are aware of resources available should their psychological needs change in the future.     Due to Mr. Jones's ETOH consumption patterns, he should be monitored for potential withdrawal symptoms upon admission.     If you have questions, please do not hesitate to call me. I look forward to following  Kvng along with you.    Sincerely,      Orlando Velarde, PhD           CC  Ro Hanson, BREANNE Clark, [facility]

## 2020-07-01 NOTE — LETTER
July 1, 2020      Cheryl Hodges MD  [address]  [facility] [address]           [facility]  [address]  [facility] [address]  Phone: [phone]          Patient: Kvng Jones Sr.   MR Number: [id number]   YOB: 1960   Date of Visit: 7/1/2020       Dear Dr. Cheryl Hodges:    Thank you for referring Kvng Jones to me for evaluation. Attached you will find relevant portions of my assessment and plan of care.    If you have questions, please do not hesitate to call me. I look forward to following Kvng Jones along with you.    Sincerely,    Alex Fernandes MD    Enclosure  CC:  No Recipients    If you would like to receive this communication electronically, please contact [email or web address] or [phone] to request more information on [employer] access.    For providers and/or their staff who would like to refer a patient to [facility], please contact us through our one-stop-shop provider referral line, [facility], at [phone].    If you feel you have received this communication in error or would no longer like to receive these types of communications, please e-mail [email or web address]

## 2020-07-01 NOTE — PROCEDURES
Bone marrow    Date/Time: 7/1/2020 2:00 PM  Performed by: Aundrea Jackson NP  Authorized by: Aundrea Jackson NP     Aspiration?: Yes    Biopsy?: Yes      PROCEDURE NOTE:  Date of Procedure: 07/01/2020  Bone Marrow Biopsy and Aspiration  Indication: CML  Consent: Informed consent was obtained from patient.  Timeout: Done and documented.  Position: Prone  Site: Left posterior illiac crest.  Prep: Betadine.  Needle used: 11 gauge Jamshidi needle.  Anesthetic: 2% lidocaine 15 cc.  Biopsy: The biopsy needle was introduced into the marrow cavity,P210,DNA/RNA hold and an aspirate was obtained without complications and sent for flow cytometry and cytogenetics. Core biopsy obtained without difficulty and sent for routine histologic examination.  Complications: None.  Disposition: Left patient with primary nurse,instructed to lay on back for 20 minutes. Advised not to take shower and keep dressing clean, dry & intact for 24 hours.  Blood loss: Minimal.     Aundrea Jackson NP  Hematology/Oncology/BMT

## 2020-07-01 NOTE — LETTER
July 1, 2020      Aundrea Jackson NP  [address] [facility] [address]           [facility]  [address]  [place] [address]  Phone: [phone]          Patient: Kvng Jones Sr.   MR Number: [id number]   YOB: 1960   Date of Visit: 7/1/2020       Dear Aundrae Jackson:    Thank you for referring Kvng Jones to me for evaluation. Attached you will find relevant portions of my assessment and plan of care.    If you have questions, please do not hesitate to call me. I look forward to following Kvng Jones along with you.    Sincerely,    Marimar Brothers NP    Enclosure  CC:  No Recipients    If you would like to receive this communication electronically, please contact [email or web address] or [phone] to request more information on [employer] Link access.    For providers and/or their staff who would like to refer a patient to [facility], please contact us through our one-stop-shop provider referral line, [facility], at [phone].    If you feel you have received this communication in error or would no longer like to receive these types of communications, please e-mail [email or web address]

## 2020-07-02 ENCOUNTER — SOCIAL WORK (OUTPATIENT)
Dept: HEMATOLOGY/ONCOLOGY | Facility: CLINIC | Age: 60
End: 2020-07-02

## 2020-07-02 ENCOUNTER — CLINICAL SUPPORT (OUTPATIENT)
Dept: HEMATOLOGY/ONCOLOGY | Facility: CLINIC | Age: 60
End: 2020-07-02
Payer: COMMERCIAL

## 2020-07-02 VITALS — HEIGHT: 66 IN | WEIGHT: 205 LBS | BODY MASS INDEX: 32.95 KG/M2

## 2020-07-02 DIAGNOSIS — Z71.3 NUTRITIONAL COUNSELING: Primary | ICD-10-CM

## 2020-07-02 DIAGNOSIS — C92.10 CML (CHRONIC MYELOCYTIC LEUKEMIA): ICD-10-CM

## 2020-07-02 DIAGNOSIS — Z71.3 NUTRITIONAL COUNSELING: ICD-10-CM

## 2020-07-02 DIAGNOSIS — Z76.82 STEM CELL TRANSPLANT CANDIDATE: ICD-10-CM

## 2020-07-02 DIAGNOSIS — Z76.82 STEM CELL TRANSPLANT CANDIDATE: Primary | ICD-10-CM

## 2020-07-02 PROCEDURE — 97802 PR MED NUTR THER, 1ST, INDIV, EA 15 MIN: ICD-10-PCS | Mod: 95,,, | Performed by: DIETITIAN, REGISTERED

## 2020-07-02 PROCEDURE — 97802 MEDICAL NUTRITION INDIV IN: CPT | Mod: 95,,, | Performed by: DIETITIAN, REGISTERED
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
## 2020-07-02 NOTE — PROGRESS NOTES
"The patient location is: home  The chief complaint leading to consultation is: SCT eval     Visit type: audiovisual    Face to Face time with patient: 23 minutes   35 minutes of total time spent on the encounter, which includes face to face time and non-face to face time preparing to see the patient (eg, review of tests), Obtaining and/or reviewing separately obtained history, Documenting clinical information in the electronic or other health record, Independently interpreting results (not separately reported) and communicating results to the patient/family/caregiver, or Care coordination (not separately reported).     Each patient to whom he or she provides medical services by telemedicine is:  (1) informed of the relationship between the physician and patient and the respective role of any other health care provider with respect to management of the patient; and (2) notified that he or she may decline to receive medical services by telemedicine and may withdraw from such care at any time.    Oncology Nutrition Assessment for Medical Nutrition Therapy  Initial Visit    Kvng Jones Sr.   1960    Referring Provider:  No ref. provider found      Reason for Visit: Pt in for education and nutrition counseling     PMHx:   Past Medical History:   Diagnosis Date    CML (chronic myelocytic leukemia)     Hx of psychiatric care     valium, ativan    Melanoma in situ of external ear, right        Nutrition Assessment    This is a 59 y.o.male with a medical diagnosis of CML. Referred to nutrition for SCT eval and education.   He reports he lost 20-25lb initially during chemotherapy. Gained it back plus 5 additional pounds. Currently has no problems with appetite or PO intake.   He takes a multivitamin but denies any other OTC vitamin or herbal supplements. His wife does a lot of cooking and will be his primary caregiver.     Weight:93 kg (205 lb)  Height:5' 6" (1.676 m)  BMI:Body mass index is 33.09 kg/m².   IBW: " Ideal body weight: 63.8 kg (140 lb 10.5 oz)  Adjusted ideal body weight: 75.5 kg (166 lb 6.3 oz)    Usual BW: 200lb  Weight Change:increased 5lb     Nutrition focused physical findings: unable to assess    Allergies: Codeine, Iodine, and Iodinated contrast media    Current Medications:    Current Outpatient Medications:     cyclobenzaprine (FLEXERIL) 10 MG tablet, 10 mg., Disp: , Rfl:     diazePAM (VALIUM) 5 MG tablet, TK 1 T PO TID PRA, Disp: , Rfl:     diphenhydrAMINE (BANOPHEN) 50 MG capsule, TK 1 C PO 1 HOUR PRIOR TO SCAN., Disp: , Rfl:     fluconazole (DIFLUCAN) 200 MG Tab, , Disp: , Rfl:     ICLUSIG 15 mg Tab, , Disp: , Rfl:     levoFLOXacin (LEVAQUIN) 500 MG tablet, , Disp: , Rfl:     multivit-min-FA-lycopen-lutein 300-600-300 mcg Tab, , Disp: , Rfl:     ondansetron (ZOFRAN) 4 MG tablet, , Disp: , Rfl:     PERCOCET  mg per tablet, , Disp: , Rfl:     traMADoL 300 mg MP17, 1 tablet as needed, Disp: , Rfl:     valACYclovir (VALTREX) 500 MG tablet, , Disp: , Rfl:     varenicline (CHANTIX) 1 mg Tab, , Disp: , Rfl:     Food/medication interactions noted: none    Vitamins/Supplements: centrum multivitamin     Labs: Reviewed -no concerning nutrition labs     Nutrition Diagnosis    Problem: nutrition related knowledge deficit   Etiology (related to): lack of prior need for nutrition education  Signs/Symptoms (as evidenced by): referral for BMT evaluation     Nutrition Intervention    Nutrition Prescription   2325 Kcals (25kcal/kg)  93 g protein (1g/kg)   2325 mL fluid (25mL/kg)    Recommendations: Educated patient on food safety and healthy diet pre and post transplant.  Answered all nutrition related questions.    Handouts provided & reviewed: [facility] Nutrition Therapy for Bone Marrow Transplant Patients.     Nutrition Monitoring and Evaluation    Monitor: diet education needs     Goals: maintain weight within 5-10% following transplant     Motivation to change/anticipated barriers: no barriers  identified     Follow up Patient provided with dietitian contact number and advised to call with questions or make future appointment if further intervention is needed.    Communication to referring provider/care team: note available in chart     Cheryl Damian, MPH, RD, , LDN, FAND   [phone]

## 2020-07-04 LAB
BODY SITE - BONE MARROW: NORMAL
CLINICAL DIAGNOSIS - BONE MARROW: NORMAL
FLOW CYTOMETRY ANTIBODIES ANALYZED - BONE MARROW: NORMAL
FLOW CYTOMETRY COMMENT - BONE MARROW: NORMAL
FLOW CYTOMETRY INTERPRETATION - BONE MARROW: NORMAL

## 2020-07-06 ENCOUNTER — CLINICAL SUPPORT (OUTPATIENT)
Dept: HEMATOLOGY/ONCOLOGY | Facility: CLINIC | Age: 60
End: 2020-07-06
Payer: COMMERCIAL

## 2020-07-06 DIAGNOSIS — Z76.82 STEM CELL TRANSPLANT CANDIDATE: Primary | ICD-10-CM

## 2020-07-06 DIAGNOSIS — C92.10 CML (CHRONIC MYELOCYTIC LEUKEMIA): ICD-10-CM

## 2020-07-06 LAB
DNA/RNA EXTRACT AND HOLD RESULT: NORMAL
DNA/RNA EXTRACTION: NORMAL
EXHR SPECIMEN TYPE: NORMAL

## 2020-07-06 RX ORDER — HYDROGEN PEROXIDE 3 %
20 SOLUTION, NON-ORAL MISCELLANEOUS
COMMUNITY
End: 2020-10-05 | Stop reason: SDUPTHER

## 2020-07-06 RX ORDER — LORATADINE 10 MG/1
10 TABLET ORAL DAILY
COMMUNITY
End: 2022-08-02

## 2020-07-06 NOTE — PROGRESS NOTES
BMT Pharmacist Evaluation      Current Outpatient Medications:     cyclobenzaprine (FLEXERIL) 10 MG tablet, Take 10 mg by mouth 3 (three) times daily as needed for Muscle spasms. , Disp: , Rfl:     esomeprazole (NEXIUM) 20 MG capsule, Take 20 mg by mouth before breakfast., Disp: , Rfl:     fluconazole (DIFLUCAN) 200 MG Tab, Take 200 mg by mouth once daily. , Disp: , Rfl:     ICLUSIG 15 mg Tab, Take 30 mg by mouth once daily ., Disp: , Rfl:     levoFLOXacin (LEVAQUIN) 500 MG tablet, Take 500 mg by mouth once daily. , Disp: , Rfl:     loratadine (CLARITIN) 10 mg tablet, Take 10 mg by mouth once daily., Disp: , Rfl:     multivit-min-FA-lycopen-lutein 300-600-300 mcg Tab, Take 1 tablet by mouth once daily. , Disp: , Rfl:     ondansetron (ZOFRAN) 4 MG tablet, Take 4 mg by mouth every 8 (eight) hours as needed for Nausea. , Disp: , Rfl:     valACYclovir (VALTREX) 500 MG tablet, Take 500 mg by mouth 2 (two) times daily. , Disp: , Rfl:     varenicline (CHANTIX) 1 mg Tab, Take 1 mg by mouth 2 (two) times daily. , Disp: , Rfl:     PERCOCET  mg per tablet, Take 1 tablet by mouth every 6 (six) hours as needed for Pain. , Disp: , Rfl:     traMADoL (ULTRAM) 50 mg tablet, Take 1 tablet by mouth every 6 (six) hours as needed (pain). , Disp: , Rfl:   THC 300mg tincture twice a day as needed for nausea    Review of patient's allergies indicates:   Allergen Reactions    Codeine Hives    Iodine Hives and Rash    Iodinated contrast media Hives         Estimated Creatinine Clearance: 103.6 mL/min (based on SCr of 0.8 mg/dL).       Medication adherence: good; memory  Medication-related problems:   - vancomycin caused drug fever/riggors  - had hallucinations with an antifungal previously (not fluconazole)  - excess fluids gave scrotal edema   - gets wired when given benadryl   - oxycodone/hydrocodone taken more than once a day gives him hives     Planned conditioning regimen:  Fludarabine on Day -6, -5, -4, -3, and  -2  Cyclophosphamide on Day -6 and -5  Total Body Irradiation on Day -1     Planned  GVHD Prophylaxis:  Cyclophosphamide on Day +3 and +4  Tacrolimus starting on Day +5  Mycophenolate starting on Day +5    Antimicrobial Prophylaxis:  Acyclovir starting on Day -6  Levofloxacin starting on Day -1  Fluconazole starting on Day -1  Bactrim starting on Day +30    SOS/VOD Prophylaxis:  Ursodiol on Day -6 through Day +30    Growth Factor Support:  Neupogen starting on Day +7      Caregiver: wife  Post-transplant discharge plans[facility]     Notes:  Reviewed and reconciled the medication list with the patient. Patient was able to confirm all medications he currently takes including schedule and indication. Patient demonstrates excellent medication adherence and understands the importance of this through the transplant process.    - He takes flexeril every night for muscle relaxation to help him sleep  - He rarely takes oxycodone because pf the risk of hives. Also states that tramadol does not work well for him.  - He is currently on Chantix but plans to not continue while in the hospital  - He takes THC tincture to help with nausea that he attributes to the ponatinib     Reviewed the planned high-dose chemotherapy regimen, including schedule and possible side effects. Provided the patient with drug information handouts for chemotherapy and GVHD prophylaxis. Reviewed possible side effects of transplant including: neutropenia, thrombocytopenia, anemia, infection, infusion reactions, rash, nausea/vomiting, mucositis, loss of appetite, diarrhea, neuropathy, hair loss, cough, bladder irritation, liver and/or renal dysfunction. Reviewed prophylactic antimicrobials, as well as prophylactic and as needed antiemetics. Encouraged the patient to report all possible side effects/new symptoms and to ask for supportive care medications if needed. Patient verbalized understanding and all questions were answered.     Proposed  recommendations:  - Due to his reaction to benadryl, the patient requests to avoid benadryl when possible and understands the need to prevent reaction to stem cells and ok to use it to treat hives.   - Consider a lower IV fluid maintenance rate to prevent making his scrotal edema worse.  - No need to continue chantix while in the hospital per patient request.  - We discussed that his THC tincture is not allowed to be used while he is in the hospital and he agrees to follow this policy.    The patient demonstrates good medication adherence and understanding of the chemotherapy and transplant plan. BMT/Hematology Oncology PharmD will continue to follow the patient while admitted to the inpatient unit.     Allie Soliman, PharmD, BCPS, BCOP  Clinical Pharmacy Specialist   BMT/Hematology Oncology  SpectraLink: [id number]

## 2020-07-07 LAB
BCR/ABL,P210 RESULT: NORMAL
PATH REPORT.FINAL DX SPEC: NORMAL
SPECIMEN TYPE: NORMAL

## 2020-07-08 LAB
CHROM BANDING METHOD: NORMAL
CHROMOSOME ANALYSIS BM ADDITIONAL INFORMATION: NORMAL
CHROMOSOME ANALYSIS BM RELEASED BY: NORMAL
CHROMOSOME ANALYSIS BM RESULT SUMMARY: NORMAL
CLINICAL CYTOGENETICIST REVIEW: NORMAL
KARYOTYP MAR: NORMAL
REASON FOR REFERRAL (NARRATIVE): NORMAL
REF LAB TEST METHOD: NORMAL
SPECIMEN SOURCE: NORMAL
SPECIMEN: NORMAL

## 2020-07-09 ENCOUNTER — OFFICE VISIT (OUTPATIENT)
Dept: HEMATOLOGY/ONCOLOGY | Facility: CLINIC | Age: 60
End: 2020-07-09
Payer: COMMERCIAL

## 2020-07-09 DIAGNOSIS — C92.10 CML (CHRONIC MYELOCYTIC LEUKEMIA): Primary | ICD-10-CM

## 2020-07-09 DIAGNOSIS — Z76.82 STEM CELL TRANSPLANT CANDIDATE: ICD-10-CM

## 2020-07-09 LAB
COMMENT: NORMAL
FINAL PATHOLOGIC DIAGNOSIS: NORMAL
GROSS: NORMAL
Lab: NORMAL
MICROSCOPIC EXAM: NORMAL
SUPPLEMENTAL DIAGNOSIS: NORMAL

## 2020-07-09 PROCEDURE — 99999 PR PBB SHADOW E&M-EST. PATIENT-LVL II: ICD-10-PCS | Mod: PBBFAC,,, | Performed by: INTERNAL MEDICINE

## 2020-07-09 PROCEDURE — 77290 THER RAD SIMULAJ FIELD CPLX: CPT | Mod: TC | Performed by: RADIOLOGY

## 2020-07-09 PROCEDURE — 99215 PR OFFICE/OUTPT VISIT, EST, LEVL V, 40-54 MIN: ICD-10-PCS | Mod: S$GLB,,, | Performed by: INTERNAL MEDICINE

## 2020-07-09 PROCEDURE — 99215 OFFICE O/P EST HI 40 MIN: CPT | Mod: S$GLB,,, | Performed by: INTERNAL MEDICINE

## 2020-07-09 PROCEDURE — 77332 RADIATION TREATMENT AID(S): CPT | Mod: TC | Performed by: RADIOLOGY

## 2020-07-09 PROCEDURE — 99999 PR PBB SHADOW E&M-EST. PATIENT-LVL II: CPT | Mod: PBBFAC,,, | Performed by: INTERNAL MEDICINE

## 2020-07-09 NOTE — PROGRESS NOTES
Subjective:    Patient ID: Kvng Jones Sr. is a 59 y.o. male.    Chief Complaint: Leukemia    Referring Physician: Chase Ramos MD    HPI   Keanu was recently admitted [facility] for CML presenting with blast crisis and lewis disease. He was admitted for evaluation and induction chemotherapy with FLAG-Addis. Complications of therapy include epididymal orchitis with negative testicular ultrasound, neutropenic fever, dyspnea, and GGO of bilateral lungs on CT chest. Fever and chest findings were covered with broad spectrum antimicrobials. He did have hallucinations and vivid dreams with posaconazole. Chemotherapy was administered by left arm PICC line that was removed during hospital stay for MSSA infection treated with vancomycin. Hospital admission was 3/30/2020 to 5/1/2020 achieving morphologic CR with induction chemotherapy. He is now on Ponatinib 30mg daily.     Studies performed during his hospital stay include pre-chemotherapy ECHO with normal ejection fraction of 60-65%. HIV, HBV, and HCV tests were negative. CBC at admission was WBC 38.6, Hgb 14.6, and platelet 416K. CT of the head without contrast was normal 4/20/2020. US of abdomen performed for abdominal distention and neutropenic fever had hepatic steatosis, liver 19cm, and spleen 11 cm. He does have a history of alcohol use and pancreatitis.    Keanu is a . He is  to wife Guillermina who will be a caregiver post transplant. He has a 45 pack year smoking history. He quit 6/1/2020, now on Chantix. He drinks 3-4 alcohol beverages nightly. He has 3 children. He has several family members in the [facility] area.    Return visit 7/9/2020  Consent signing  Discussion today regarding transplant process, side effects  Long discussion regarding prognosis   Long discussion regarding graft versus host disease  Long discussion regarding side effects of transplant    Review of Systems   Constitutional: Positive for fatigue.    HENT: Negative.    Eyes: Negative.    Respiratory: Negative.    Cardiovascular: Negative.    Gastrointestinal: Negative.    Endocrine: Negative.    Genitourinary: Negative.    Skin: Negative.    Allergic/Immunologic: Negative for environmental allergies, food allergies and immunocompromised state.   Neurological: Negative.    Hematological: Negative for adenopathy. Does not bruise/bleed easily.   Psychiatric/Behavioral: Negative.        Objective:    No exam due to telehealth visit secondary to COVID19  Physical Exam    Assessment:       No diagnosis found.    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  Recommendation for curative allogeneic stem cell transplant for high risk CML (NCCN Guidelines version 3.2020; advanced CML)    Consent signing for haploidentical allogeneic stem cell transplant with fludarabine/cytoxan/TBI chemotherapy completed today. Consent signing was witnessed by the patient's wife and our transplant coordinator Ro Hanson. We discussed the risk of chemotherapy and prolonged pancytopenia including anemia, bleeding and infection, alopecia, mucositis and diarrhea, fevers, skin changes, neuropathy, and vital organ injury. We discussed the risk and signs/symptoms of graft versus host disease. We discussed potential late effects of heart failure and skin cancers. We discussed treatment with blood and platelet transfusions as needed and transfusion consent was completed. We discussed the chemotherapy side effects and risk of death is about 30% and chemotherapy consent was completed. We discussed the transplant process in its entirety including the risks and benefits including the potential cure of AML. Cure may be seen in 30% without transplant complications and about 35% with short or long term transplant complications. We discussed the 3-4 week hospital stay and 3 month minimal outpatient follow-up. The allogeneic transplant consent was completed and  signed. We then discussed and completed the storage agreement and [facility] research consents. The patient was also made aware of his donor's satisfactory eligibility for donation.    Post-visit review of lab/imaging results notes pulmonary lung nodule on chest x-ray  Will request non-contrast CT of chest for prior smoker.    Visit 1 hour, >50% counseling

## 2020-07-10 DIAGNOSIS — I10 HYPERTENSION, UNSPECIFIED TYPE: Primary | ICD-10-CM

## 2020-07-10 DIAGNOSIS — Z76.82 STEM CELL TRANSPLANT CANDIDATE: ICD-10-CM

## 2020-07-10 DIAGNOSIS — C92.10 CML (CHRONIC MYELOCYTIC LEUKEMIA): Primary | ICD-10-CM

## 2020-07-10 DIAGNOSIS — R91.1 SOLITARY PULMONARY NODULE: ICD-10-CM

## 2020-07-13 RX ORDER — AMLODIPINE BESYLATE 5 MG/1
5 TABLET ORAL DAILY
Qty: 60 TABLET | Refills: 5 | Status: ON HOLD | OUTPATIENT
Start: 2020-07-13 | End: 2020-08-12 | Stop reason: HOSPADM

## 2020-07-14 DIAGNOSIS — Z76.82 STEM CELL TRANSPLANT CANDIDATE: ICD-10-CM

## 2020-07-14 DIAGNOSIS — C92.10 CML (CHRONIC MYELOCYTIC LEUKEMIA): Primary | ICD-10-CM

## 2020-07-14 PROCEDURE — 80502 PR  LAB PATHOLOGY CONSULT-COMPLETE: CPT | Mod: ,,, | Performed by: PATHOLOGY

## 2020-07-14 PROCEDURE — 80502 PR  LAB PATHOLOGY CONSULT-COMPLETE: ICD-10-PCS | Mod: ,,, | Performed by: PATHOLOGY

## 2020-07-14 NOTE — PROGRESS NOTES
The HLA typing for Kvng Jones Sr ([id number])   and his potential donor, Kvng Jones Jr ([id number]) has been completed.  The low and high resolution results are concordant and the samples were collected at different times, as required. The mismatch at DP is permissive.    AILEEN Davis MD, TANVI  Histocompatability and Immunogenetics Lab  Section of Transfusion Medicine & Histocompatibility  Department of Pathology and Laboratory Medicine  [facility]  07/14/2020

## 2020-07-15 PROCEDURE — 77334 RADIATION TREATMENT AID(S): CPT | Mod: TC | Performed by: RADIOLOGY

## 2020-07-15 PROCEDURE — 77321 SPECIAL TELETX PORT PLAN: CPT | Mod: TC | Performed by: RADIOLOGY

## 2020-07-15 PROCEDURE — 77321 SPECIAL TELETX PORT PLAN: CPT | Mod: 26,BMT,, | Performed by: RADIOLOGY

## 2020-07-15 PROCEDURE — 77321 PR  TELETHER ISO-PORT PLAN: ICD-10-PCS | Mod: 26,BMT,, | Performed by: RADIOLOGY

## 2020-07-15 PROCEDURE — 77334 RADIATION TREATMENT AID(S): CPT | Mod: 26,BMT,, | Performed by: RADIOLOGY

## 2020-07-15 PROCEDURE — 77334 PR  RADN TREATMENT AID(S) COMPLX: ICD-10-PCS | Mod: 26,BMT,, | Performed by: RADIOLOGY

## 2020-07-20 ENCOUNTER — ANESTHESIA EVENT (OUTPATIENT)
Dept: SURGERY | Facility: HOSPITAL | Age: 60
DRG: 014 | End: 2020-07-20
Payer: COMMERCIAL

## 2020-07-20 ENCOUNTER — TELEPHONE (OUTPATIENT)
Dept: SURGERY | Facility: CLINIC | Age: 60
End: 2020-07-20

## 2020-07-20 NOTE — ANESTHESIA PREPROCEDURE EVALUATION
[facility]  Anesthesia Pre-Operative Evaluation         Patient Name: Kvng Jones Sr.  YOB: 1960  MRN: [id number]    SUBJECTIVE:     Pre-operative evaluation for Procedure(s) (LRB):  Insertion,catheter,tunneled without port, TRIPLE LUMEN, right poss left (Right)     07/20/2020    Kvng Jones Sr. is a 59 y.o. male w/ a significant PMHx of CML. Patient is a BMT candidate and Stem cell transplant candidate     Patient now presents for the above procedure(s).      LDA: None documented.       Prev airway: None documented.    Drips: None documented.      Patient Active Problem List   Diagnosis    Bone marrow transplant candidate    Stem cell transplant candidate    CML (chronic myelocytic leukemia)       Review of patient's allergies indicates:   Allergen Reactions    Unclassified drug Other (See Comments)     Pt reports that he has an allergic reaction to an Antifungal medication, but is unsure of the name of the drug. Will obtain name prior to arrival in am and notify Pre-op RN.    Vancomycin analogues Other (See Comments)     Pt reports he had rigors    Codeine Hives    Iodine Hives and Rash    Iodinated contrast media Hives       Current Inpatient Medications:      No current facility-administered medications on file prior to encounter.      Current Outpatient Medications on File Prior to Encounter   Medication Sig Dispense Refill    cyclobenzaprine (FLEXERIL) 10 MG tablet Take 10 mg by mouth 3 (three) times daily as needed for Muscle spasms.       esomeprazole (NEXIUM) 20 MG capsule Take 20 mg by mouth before breakfast.      ICLUSIG 15 mg Tab Take 30 mg by mouth once daily .      levoFLOXacin (LEVAQUIN) 500 MG tablet Take 500 mg by mouth once daily.       loratadine (CLARITIN) 10 mg tablet Take 10 mg by mouth once daily.      multivit-min-FA-lycopen-lutein 300-600-300 mcg Tab Take 1 tablet by mouth once daily.       tamsulosin (FLOMAX) 0.4 mg Cap 1 capsule  30 minutes after the same meal each day      traMADoL (ULTRAM) 50 mg tablet Take 1 tablet by mouth every 6 (six) hours as needed (pain).       valACYclovir (VALTREX) 500 MG tablet Take 500 mg by mouth 2 (two) times daily.       varenicline (CHANTIX) 1 mg Tab Take 1 mg by mouth 2 (two) times daily.       chlorhexidine (PERIDEX) 0.12 % solution RINSE WITH 15 MLS PO FOR 30 SECONDS BID      fluconazole (DIFLUCAN) 200 MG Tab Take 200 mg by mouth once daily.       HYDROcodone-acetaminophen (NORCO) 7.5-325 mg per tablet       ondansetron (ZOFRAN) 4 MG tablet Take 4 mg by mouth every 8 (eight) hours as needed for Nausea.       ondansetron (ZOFRAN-ODT) 4 MG TbDL       PERCOCET  mg per tablet Take 1 tablet by mouth every 6 (six) hours as needed for Pain.          Past Surgical History:   Procedure Laterality Date    CHOLECYSTECTOMY      NECK SURGERY  2015    TONSILLECTOMY         Social History     Socioeconomic History    Marital status:      Spouse name: Lauren    Number of children: 3    Years of education: Not on file    Highest education level: Not on file   Occupational History    Not on file   Social Needs    Financial resource strain: Not on file    Food insecurity     Worry: Not on file     Inability: Not on file    Transportation needs     Medical: Not on file     Non-medical: Not on file   Tobacco Use    Smoking status: Former Smoker     Packs/day: 1.00     Years: 35.00     Pack years: 35.00     Start date: 1985     Quit date: 2020     Years since quittin.1    Smokeless tobacco: Never Used   Substance and Sexual Activity    Alcohol use: Yes     Alcohol/week: 12.0 standard drinks     Types: 4 Glasses of wine, 4 Cans of beer, 4 Shots of liquor per week    Drug use: Yes     Types: Marijuana     Comment: medical marijuana    Sexual activity: Yes     Partners: Female   Lifestyle    Physical activity     Days per week: Not on file     Minutes per session: Not on file     Stress: Not on file   Relationships    Social connections     Talks on phone: Not on file     Gets together: Not on file     Attends Baptism service: Not on file     Active member of club or organization: Not on file     Attends meetings of clubs or organizations: Not on file     Relationship status: Not on file   Other Topics Concern    Patient feels they ought to cut down on drinking/drug use Not Asked    Patient annoyed by others criticizing their drinking/drug use Not Asked    Patient has felt bad or guilty about drinking/drug use Not Asked    Patient has had a drink/used drugs as an eye opener in the AM Not Asked   Social History Narrative     (Guillermina)    3 children (Michelle Estrada, Qasim Estrada, [facility])           OBJECTIVE:     Vital Signs Range (Last 24H):         Significant Labs:  Lab Results   Component Value Date    WBC 6.36 07/01/2020    HGB 14.7 07/01/2020    HCT 43.3 07/01/2020     07/01/2020    ALT 33 07/01/2020    AST 23 07/01/2020     07/01/2020    K 4.0 07/01/2020     07/01/2020    CREATININE 0.8 07/01/2020    BUN 17 07/01/2020    CO2 23 07/01/2020    PSA 0.96 07/01/2020    INR 0.9 07/01/2020       Diagnostic Studies: No relevant studies.    EKG:   Results for orders placed or performed during the hospital encounter of 07/01/20   EKG 12-lead    Collection Time: 07/01/20 11:46 AM    Narrative    Test Reason : Z76.82,C92.10,    Vent. Rate : 085 BPM     Atrial Rate : 085 BPM     P-R Int : 132 ms          QRS Dur : 096 ms      QT Int : 366 ms       P-R-T Axes : -12 -46 037 degrees     QTc Int : 435 ms    Sinus rhythm with occasional Premature ventricular complexes  Left axis deviation  Right ventricular conduction delay    No previous ECGs available  Confirmed by RAJINDER JOHNSON MD ([id number]) on 7/1/2020 12:03:30 PM    Referred By: KIN MCDONALD           Confirmed By:RAJINDER JOHNSON MD       2D ECHO:  TTE:  Results for orders placed or performed during the  hospital encounter of 07/01/20   Echo Color Flow Doppler? Yes   Result Value Ref Range    BSA 2.06 m2    TDI SEPTAL 0.07 m/s    LV LATERAL E/E' RATIO 10.71 m/s    LV SEPTAL E/E' RATIO 10.71 m/s    LA WIDTH 3.95 cm    TDI LATERAL 0.07 m/s    LVIDD 4.81 3.5 - 6.0 cm    IVS 0.99 0.6 - 1.1 cm    PW 0.98 0.6 - 1.1 cm    LVIDS 3.44 2.1 - 4.0 cm    FS 28 28 - 44 %    LA volume 62.14 cm3    Sinus 3.88 cm    STJ 3.49 cm    Ascending aorta 4.06 cm    LV mass 167.32 g    LA size 3.27 cm    RVDD 3.44 cm    TAPSE 2.05 cm    Left Ventricle Relative Wall Thickness 0.41 cm    AV mean gradient 4 mmHg    AV valve area 3.45 cm2    AV Velocity Ratio 0.77     AV index (prosthetic) 0.75     MV valve area p 1/2 method 3.03 cm2    E/A ratio 0.69     Mean e' 0.07 m/s    E wave decelartion time 250.50 msec    LVOT diameter 2.42 cm    LVOT area 4.6 cm2    LVOT peak jb 1.03 m/s    LVOT peak VTI 16.96 cm    Ao peak jb 1.34 m/s    Ao VTI 22.63 cm    LVOT stroke volume 77.97 cm3    AV peak gradient 7 mmHg    E/E' ratio 10.71 m/s    MV Peak E Jb 0.75 m/s    TR Max Jb 2.37 m/s    MV stenosis pressure 1/2 time 72.64 ms    MV Peak A Jb 1.08 m/s    LV Systolic Volume 48.83 mL    LV Systolic Volume Index 24.4 mL/m2    LV Diastolic Volume 108.18 mL    LV Diastolic Volume Index 54.09 mL/m2    LA Volume Index 31.1 mL/m2    LV Mass Index 84 g/m2    RA Major Axis 4.31 cm    Left Atrium Minor Axis 5.83 cm    Left Atrium Major Axis 5.50 cm    Triscuspid Valve Regurgitation Peak Gradient 22 mmHg    RA Width 3.10 cm    Right Atrial Pressure (from IVC) 3 mmHg    QEF 56 %    TV rest pulmonary artery pressure 25 mmHg    LA Volume Index (Mod) 20.5 mL/m2    LA volume (mod) 41.00 cm3    Narrative    · Normal left ventricular systolic function. The estimated ejection   fraction is 60%. The quantitatively dervived ejection fraction is 56%.  · Normal LV diastolic function.  · Normal right ventricular systolic function.  · The estimated PA systolic pressure is 25  mmHg.  · Normal central venous pressure (3 mmHg).  · The ascending aorta is mildly dilated (4.1 cm)          BRITTANI:  No results found for this or any previous visit.    ASSESSMENT/PLAN:   \      Anesthesia Evaluation    I have reviewed the Patient Summary Reports.    I have reviewed the Nursing Notes.    I have reviewed the Medications.     Review of Systems  Anesthesia Hx:  No problems with previous Anesthesia  History of prior surgery of interest to airway management or planning: Denies Family Hx of Anesthesia complications.   Denies Personal Hx of Anesthesia complications.   Social:  Former Smoker    Hematology/Oncology:  Hematology Normal      Current/Recent Cancer. Other (see Oncology comments)   EENT/Dental:EENT/Dental Normal   Cardiovascular:  Cardiovascular Normal Exercise tolerance: good   Denies Angina.    Pulmonary:  Pulmonary Normal    Renal/:  Renal/ Normal     Hepatic/GI:   Nausea due to chemo, no vomiting, had ondansetron this morning and feeling better   Musculoskeletal:  Musculoskeletal Normal S/p C3-C6 neck fusion, somewhat limited range of motion   Neurological:  Neurology Normal    Endocrine:  Endocrine Normal    Psych:  Psychiatric Normal           Physical Exam  General:  Well nourished    Airway/Jaw/Neck:  Airway Findings: Mouth Opening: Normal Tongue: Normal  General Airway Assessment: Adult  Mallampati: II  TM Distance: Normal, at least 6 cm      Dental:  Dental Findings:    Chest/Lungs:  Chest/Lungs Findings: Normal Respiratory Rate     Heart/Vascular:  Heart Findings: Rate: Normal        Mental Status:  Mental Status Findings:  Alert and Oriented         Anesthesia Plan  Type of Anesthesia, risks & benefits discussed:  Anesthesia Type:  MAC, general  Patient's Preference: MAC  Intra-op Monitoring Plan: standard ASA monitors  Intra-op Monitoring Plan Comments:   Post Op Pain Control Plan: multimodal analgesia, IV/PO Opioids PRN and per primary service following discharge from PACU  Post  Op Pain Control Plan Comments:   Induction:   IV  Beta Blocker:         Informed Consent:    ASA Score: 3     Day of Surgery Review of History & Physical: I have interviewed and examined the patient. I have reviewed the patient's H&P dated:    H&P update referred to the surgeon.         Ready For Surgery From Anesthesia Perspective.

## 2020-07-21 ENCOUNTER — ANESTHESIA (OUTPATIENT)
Dept: SURGERY | Facility: HOSPITAL | Age: 60
DRG: 014 | End: 2020-07-21
Payer: COMMERCIAL

## 2020-07-21 ENCOUNTER — OFFICE VISIT (OUTPATIENT)
Dept: HEMATOLOGY/ONCOLOGY | Facility: CLINIC | Age: 60
End: 2020-07-21
Payer: COMMERCIAL

## 2020-07-21 ENCOUNTER — CLINICAL SUPPORT (OUTPATIENT)
Dept: HEMATOLOGY/ONCOLOGY | Facility: CLINIC | Age: 60
End: 2020-07-21
Payer: COMMERCIAL

## 2020-07-21 ENCOUNTER — HOSPITAL ENCOUNTER (INPATIENT)
Facility: HOSPITAL | Age: 60
LOS: 22 days | Discharge: HOME | DRG: 014 | End: 2020-08-12
Attending: INTERNAL MEDICINE | Admitting: INTERNAL MEDICINE
Payer: COMMERCIAL

## 2020-07-21 VITALS
RESPIRATION RATE: 18 BRPM | OXYGEN SATURATION: 97 % | BODY MASS INDEX: 32.47 KG/M2 | DIASTOLIC BLOOD PRESSURE: 94 MMHG | TEMPERATURE: 99 F | WEIGHT: 202 LBS | HEIGHT: 66 IN | HEART RATE: 99 BPM | SYSTOLIC BLOOD PRESSURE: 159 MMHG

## 2020-07-21 DIAGNOSIS — C92.10 CML (CHRONIC MYELOCYTIC LEUKEMIA): ICD-10-CM

## 2020-07-21 DIAGNOSIS — Z94.84 HISTORY OF ALLOGENEIC STEM CELL TRANSPLANT: ICD-10-CM

## 2020-07-21 DIAGNOSIS — T45.1X5A CINV (CHEMOTHERAPY-INDUCED NAUSEA AND VOMITING): ICD-10-CM

## 2020-07-21 DIAGNOSIS — I10 ESSENTIAL HYPERTENSION: ICD-10-CM

## 2020-07-21 DIAGNOSIS — Z76.82 STEM CELL TRANSPLANT CANDIDATE: ICD-10-CM

## 2020-07-21 DIAGNOSIS — R11.2 CINV (CHEMOTHERAPY-INDUCED NAUSEA AND VOMITING): ICD-10-CM

## 2020-07-21 DIAGNOSIS — G89.29 CHRONIC INTRACTABLE HEADACHE, UNSPECIFIED HEADACHE TYPE: ICD-10-CM

## 2020-07-21 DIAGNOSIS — Z76.82 STEM CELL TRANSPLANT CANDIDATE: Primary | ICD-10-CM

## 2020-07-21 DIAGNOSIS — C92.10 CML (CHRONIC MYELOCYTIC LEUKEMIA): Primary | ICD-10-CM

## 2020-07-21 DIAGNOSIS — C92.01 ACUTE MYELOID LEUKEMIA IN REMISSION: ICD-10-CM

## 2020-07-21 DIAGNOSIS — M54.2 NECK PAIN: ICD-10-CM

## 2020-07-21 DIAGNOSIS — R07.9 CHEST PAIN: ICD-10-CM

## 2020-07-21 DIAGNOSIS — K21.9 GASTROESOPHAGEAL REFLUX DISEASE WITHOUT ESOPHAGITIS: ICD-10-CM

## 2020-07-21 DIAGNOSIS — M48.02 CERVICAL STENOSIS OF SPINE: ICD-10-CM

## 2020-07-21 DIAGNOSIS — Z76.82 BONE MARROW TRANSPLANT CANDIDATE: ICD-10-CM

## 2020-07-21 DIAGNOSIS — C92.11 CML IN REMISSION: Primary | ICD-10-CM

## 2020-07-21 DIAGNOSIS — N40.0 BENIGN PROSTATIC HYPERPLASIA WITHOUT LOWER URINARY TRACT SYMPTOMS: ICD-10-CM

## 2020-07-21 DIAGNOSIS — R51.9 CHRONIC INTRACTABLE HEADACHE, UNSPECIFIED HEADACHE TYPE: ICD-10-CM

## 2020-07-21 DIAGNOSIS — Z87.891 FORMER SMOKER: ICD-10-CM

## 2020-07-21 DIAGNOSIS — Z78.9 DAILY CONSUMPTION OF ALCOHOL: ICD-10-CM

## 2020-07-21 PROBLEM — M62.838 MUSCLE SPASM: Status: ACTIVE | Noted: 2020-07-21

## 2020-07-21 PROBLEM — F17.201 TOBACCO ABUSE, IN REMISSION: Status: ACTIVE | Noted: 2020-07-21

## 2020-07-21 LAB — SARS-COV-2 RDRP RESP QL NAA+PROBE: NEGATIVE

## 2020-07-21 PROCEDURE — 63600175 PHARM REV CODE 636 W HCPCS: Performed by: STUDENT IN AN ORGANIZED HEALTH CARE EDUCATION/TRAINING PROGRAM

## 2020-07-21 PROCEDURE — 99215 OFFICE O/P EST HI 40 MIN: CPT | Mod: BMT,S$GLB,, | Performed by: NURSE PRACTITIONER

## 2020-07-21 PROCEDURE — 99999 PR PBB SHADOW E&M-EST. PATIENT-LVL V: ICD-10-PCS | Mod: PBBFAC,,, | Performed by: NURSE PRACTITIONER

## 2020-07-21 PROCEDURE — 25000003 PHARM REV CODE 250: Performed by: STUDENT IN AN ORGANIZED HEALTH CARE EDUCATION/TRAINING PROGRAM

## 2020-07-21 PROCEDURE — 20600001 HC STEP DOWN PRIVATE ROOM

## 2020-07-21 PROCEDURE — 25000003 PHARM REV CODE 250: Performed by: SURGERY

## 2020-07-21 PROCEDURE — U0002 COVID-19 LAB TEST NON-CDC: HCPCS

## 2020-07-21 PROCEDURE — 99223 PR INITIAL HOSPITAL CARE,LEVL III: ICD-10-PCS | Mod: BMT,,, | Performed by: INTERNAL MEDICINE

## 2020-07-21 PROCEDURE — 3008F BODY MASS INDEX DOCD: CPT | Mod: BMT,CPTII,S$GLB, | Performed by: NURSE PRACTITIONER

## 2020-07-21 PROCEDURE — D9220A PRA ANESTHESIA: Mod: BMT,,, | Performed by: ANESTHESIOLOGY

## 2020-07-21 PROCEDURE — D9220A PRA ANESTHESIA: ICD-10-PCS | Mod: BMT,,, | Performed by: ANESTHESIOLOGY

## 2020-07-21 PROCEDURE — 99223 1ST HOSP IP/OBS HIGH 75: CPT | Mod: BMT,,, | Performed by: INTERNAL MEDICINE

## 2020-07-21 PROCEDURE — 63600175 PHARM REV CODE 636 W HCPCS: Performed by: INTERNAL MEDICINE

## 2020-07-21 PROCEDURE — 3008F PR BODY MASS INDEX (BMI) DOCUMENTED: ICD-10-PCS | Mod: BMT,CPTII,S$GLB, | Performed by: NURSE PRACTITIONER

## 2020-07-21 PROCEDURE — 99999 PR PBB SHADOW E&M-EST. PATIENT-LVL V: CPT | Mod: PBBFAC,,, | Performed by: NURSE PRACTITIONER

## 2020-07-21 PROCEDURE — 99215 PR OFFICE/OUTPT VISIT, EST, LEVL V, 40-54 MIN: ICD-10-PCS | Mod: BMT,S$GLB,, | Performed by: NURSE PRACTITIONER

## 2020-07-21 RX ORDER — ONDANSETRON 4 MG/1
8 TABLET, ORALLY DISINTEGRATING ORAL EVERY 8 HOURS PRN
Status: CANCELLED | OUTPATIENT
Start: 2020-07-21

## 2020-07-21 RX ORDER — ONDANSETRON 8 MG/1
16 TABLET, ORALLY DISINTEGRATING ORAL
Status: COMPLETED | OUTPATIENT
Start: 2020-07-22 | End: 2020-07-28

## 2020-07-21 RX ORDER — URSODIOL 300 MG/1
300 CAPSULE ORAL 2 TIMES DAILY
Status: DISCONTINUED | OUTPATIENT
Start: 2020-07-22 | End: 2020-08-12 | Stop reason: HOSPADM

## 2020-07-21 RX ORDER — HEPARIN 100 UNIT/ML
300 SYRINGE INTRAVENOUS
Status: CANCELLED | OUTPATIENT
Start: 2020-07-21

## 2020-07-21 RX ORDER — LEVOFLOXACIN 500 MG/1
500 TABLET, FILM COATED ORAL DAILY
Status: DISCONTINUED | OUTPATIENT
Start: 2020-07-27 | End: 2020-08-10

## 2020-07-21 RX ORDER — TACROLIMUS 1 MG/1
2 CAPSULE ORAL 2 TIMES DAILY
Status: DISCONTINUED | OUTPATIENT
Start: 2020-08-02 | End: 2020-08-05

## 2020-07-21 RX ORDER — SODIUM CHLORIDE 0.9 % (FLUSH) 0.9 %
10 SYRINGE (ML) INJECTION
Status: DISCONTINUED | OUTPATIENT
Start: 2020-07-21 | End: 2020-08-12 | Stop reason: HOSPADM

## 2020-07-21 RX ORDER — PROPOFOL 10 MG/ML
VIAL (ML) INTRAVENOUS CONTINUOUS PRN
Status: DISCONTINUED | OUTPATIENT
Start: 2020-07-21 | End: 2020-07-21

## 2020-07-21 RX ORDER — LANOLIN ALCOHOL/MO/W.PET/CERES
400 CREAM (GRAM) TOPICAL EVERY 4 HOURS PRN
Status: CANCELLED | OUTPATIENT
Start: 2020-07-21

## 2020-07-21 RX ORDER — FLUCONAZOLE 200 MG/1
400 TABLET ORAL DAILY
Status: DISCONTINUED | OUTPATIENT
Start: 2020-07-27 | End: 2020-08-12 | Stop reason: HOSPADM

## 2020-07-21 RX ORDER — LORAZEPAM 0.5 MG/1
1 TABLET ORAL
Status: COMPLETED | OUTPATIENT
Start: 2020-07-27 | End: 2020-07-27

## 2020-07-21 RX ORDER — LORAZEPAM 2 MG/ML
1 INJECTION INTRAMUSCULAR EVERY 6 HOURS PRN
Status: DISCONTINUED | OUTPATIENT
Start: 2020-07-21 | End: 2020-08-11

## 2020-07-21 RX ORDER — POTASSIUM CHLORIDE 20 MEQ/1
20 TABLET, EXTENDED RELEASE ORAL
Status: CANCELLED | OUTPATIENT
Start: 2020-07-21

## 2020-07-21 RX ORDER — SODIUM,POTASSIUM PHOSPHATES 280-250MG
2 POWDER IN PACKET (EA) ORAL EVERY 4 HOURS PRN
Status: CANCELLED | OUTPATIENT
Start: 2020-07-21

## 2020-07-21 RX ORDER — FENTANYL CITRATE 50 UG/ML
INJECTION, SOLUTION INTRAMUSCULAR; INTRAVENOUS
Status: DISCONTINUED | OUTPATIENT
Start: 2020-07-21 | End: 2020-07-21

## 2020-07-21 RX ORDER — FLUCONAZOLE 50 MG/1
400 TABLET ORAL DAILY
Status: CANCELLED | OUTPATIENT
Start: 2020-07-27

## 2020-07-21 RX ORDER — MYCOPHENOLATE MOFETIL 250 MG/1
1000 CAPSULE ORAL 3 TIMES DAILY
Status: CANCELLED | OUTPATIENT
Start: 2020-08-02

## 2020-07-21 RX ORDER — DIPHENHYDRAMINE HYDROCHLORIDE 50 MG/ML
50 INJECTION INTRAMUSCULAR; INTRAVENOUS
Status: CANCELLED | OUTPATIENT
Start: 2020-07-21

## 2020-07-21 RX ORDER — SULFAMETHOXAZOLE AND TRIMETHOPRIM 800; 160 MG/1; MG/1
1 TABLET ORAL
Status: CANCELLED | OUTPATIENT
Start: 2020-08-27

## 2020-07-21 RX ORDER — SODIUM CHLORIDE 9 MG/ML
INJECTION, SOLUTION INTRAVENOUS CONTINUOUS PRN
Status: DISCONTINUED | OUTPATIENT
Start: 2020-07-21 | End: 2020-07-21

## 2020-07-21 RX ORDER — LORAZEPAM 1 MG/1
1 TABLET ORAL
Status: CANCELLED | OUTPATIENT
Start: 2020-07-27

## 2020-07-21 RX ORDER — MYCOPHENOLATE MOFETIL 250 MG/1
1000 CAPSULE ORAL 3 TIMES DAILY
Status: DISCONTINUED | OUTPATIENT
Start: 2020-08-02 | End: 2020-08-12 | Stop reason: HOSPADM

## 2020-07-21 RX ORDER — PROCHLORPERAZINE EDISYLATE 5 MG/ML
10 INJECTION INTRAMUSCULAR; INTRAVENOUS EVERY 6 HOURS PRN
Status: DISCONTINUED | OUTPATIENT
Start: 2020-07-21 | End: 2020-07-24

## 2020-07-21 RX ORDER — LANOLIN ALCOHOL/MO/W.PET/CERES
800 CREAM (GRAM) TOPICAL EVERY 4 HOURS PRN
Status: CANCELLED | OUTPATIENT
Start: 2020-07-21

## 2020-07-21 RX ORDER — LEVOFLOXACIN 500 MG/1
500 TABLET, FILM COATED ORAL DAILY
Status: CANCELLED | OUTPATIENT
Start: 2020-07-27

## 2020-07-21 RX ORDER — SULFAMETHOXAZOLE AND TRIMETHOPRIM 800; 160 MG/1; MG/1
1 TABLET ORAL
Status: DISCONTINUED | OUTPATIENT
Start: 2020-08-27 | End: 2020-08-12 | Stop reason: HOSPADM

## 2020-07-21 RX ORDER — MIDAZOLAM HYDROCHLORIDE 1 MG/ML
INJECTION, SOLUTION INTRAMUSCULAR; INTRAVENOUS
Status: DISCONTINUED | OUTPATIENT
Start: 2020-07-21 | End: 2020-07-21

## 2020-07-21 RX ORDER — ACYCLOVIR 800 MG/1
800 TABLET ORAL 2 TIMES DAILY
Status: CANCELLED | OUTPATIENT
Start: 2020-07-22

## 2020-07-21 RX ORDER — PROCHLORPERAZINE EDISYLATE 5 MG/ML
10 INJECTION INTRAMUSCULAR; INTRAVENOUS EVERY 6 HOURS PRN
Status: CANCELLED | OUTPATIENT
Start: 2020-07-21

## 2020-07-21 RX ORDER — OXYCODONE HYDROCHLORIDE 5 MG/1
5 TABLET ORAL EVERY 6 HOURS PRN
Status: DISCONTINUED | OUTPATIENT
Start: 2020-07-21 | End: 2020-08-01

## 2020-07-21 RX ORDER — LOPERAMIDE HYDROCHLORIDE 2 MG/1
2 CAPSULE ORAL EVERY 4 HOURS PRN
Status: CANCELLED | OUTPATIENT
Start: 2020-07-21

## 2020-07-21 RX ORDER — ONDANSETRON 8 MG/1
8 TABLET, ORALLY DISINTEGRATING ORAL EVERY 8 HOURS PRN
Status: DISCONTINUED | OUTPATIENT
Start: 2020-07-21 | End: 2020-08-01

## 2020-07-21 RX ORDER — SODIUM CHLORIDE 0.9 % (FLUSH) 0.9 %
10 SYRINGE (ML) INJECTION
Status: CANCELLED | OUTPATIENT
Start: 2020-07-21

## 2020-07-21 RX ORDER — ONDANSETRON HCL IN 0.9 % NACL 8 MG/50 ML
8 INTRAVENOUS SOLUTION, PIGGYBACK (ML) INTRAVENOUS
Status: COMPLETED | OUTPATIENT
Start: 2020-07-31 | End: 2020-08-03

## 2020-07-21 RX ORDER — ACETAMINOPHEN 500 MG
1000 TABLET ORAL EVERY 8 HOURS PRN
Status: DISCONTINUED | OUTPATIENT
Start: 2020-07-21 | End: 2020-07-23

## 2020-07-21 RX ORDER — LORAZEPAM 2 MG/ML
1 INJECTION INTRAMUSCULAR EVERY 6 HOURS PRN
Status: CANCELLED | OUTPATIENT
Start: 2020-07-21

## 2020-07-21 RX ORDER — SODIUM,POTASSIUM PHOSPHATES 280-250MG
1 POWDER IN PACKET (EA) ORAL EVERY 4 HOURS PRN
Status: CANCELLED | OUTPATIENT
Start: 2020-07-21

## 2020-07-21 RX ORDER — HEPARIN 100 UNIT/ML
300 SYRINGE INTRAVENOUS
Status: DISCONTINUED | OUTPATIENT
Start: 2020-07-21 | End: 2020-08-12 | Stop reason: HOSPADM

## 2020-07-21 RX ORDER — URSODIOL 300 MG/1
300 CAPSULE ORAL 2 TIMES DAILY
Status: CANCELLED | OUTPATIENT
Start: 2020-07-22

## 2020-07-21 RX ORDER — KETAMINE HCL IN 0.9 % NACL 50 MG/5 ML
SYRINGE (ML) INTRAVENOUS
Status: DISCONTINUED | OUTPATIENT
Start: 2020-07-21 | End: 2020-07-21

## 2020-07-21 RX ORDER — DEXAMETHASONE 4 MG/1
12 TABLET ORAL
Status: CANCELLED | OUTPATIENT
Start: 2020-07-22

## 2020-07-21 RX ORDER — ACYCLOVIR 800 MG/1
800 TABLET ORAL 2 TIMES DAILY
Status: DISCONTINUED | OUTPATIENT
Start: 2020-07-22 | End: 2020-08-12 | Stop reason: HOSPADM

## 2020-07-21 RX ORDER — SODIUM CHLORIDE AND POTASSIUM CHLORIDE 150; 900 MG/100ML; MG/100ML
INJECTION, SOLUTION INTRAVENOUS CONTINUOUS
Status: DISCONTINUED | OUTPATIENT
Start: 2020-07-21 | End: 2020-07-24

## 2020-07-21 RX ORDER — ACETAMINOPHEN 325 MG/1
650 TABLET ORAL EVERY 4 HOURS PRN
Status: CANCELLED | OUTPATIENT
Start: 2020-07-21

## 2020-07-21 RX ORDER — SODIUM CHLORIDE AND POTASSIUM CHLORIDE 150; 900 MG/100ML; MG/100ML
INJECTION, SOLUTION INTRAVENOUS CONTINUOUS
Status: CANCELLED | OUTPATIENT
Start: 2020-07-21

## 2020-07-21 RX ORDER — DEXAMETHASONE 4 MG/1
12 TABLET ORAL
Status: COMPLETED | OUTPATIENT
Start: 2020-07-22 | End: 2020-07-23

## 2020-07-21 RX ORDER — TACROLIMUS 1 MG/1
2 CAPSULE ORAL 2 TIMES DAILY
Status: CANCELLED | OUTPATIENT
Start: 2020-08-02

## 2020-07-21 RX ORDER — ONDANSETRON 2 MG/ML
INJECTION INTRAMUSCULAR; INTRAVENOUS
Status: DISCONTINUED | OUTPATIENT
Start: 2020-07-21 | End: 2020-07-21

## 2020-07-21 RX ORDER — DEXMEDETOMIDINE HYDROCHLORIDE 100 UG/ML
INJECTION, SOLUTION INTRAVENOUS
Status: DISCONTINUED | OUTPATIENT
Start: 2020-07-21 | End: 2020-07-21

## 2020-07-21 RX ORDER — ONDANSETRON 4 MG/1
16 TABLET, ORALLY DISINTEGRATING ORAL
Status: CANCELLED | OUTPATIENT
Start: 2020-07-22

## 2020-07-21 RX ORDER — PANTOPRAZOLE SODIUM 40 MG/1
40 TABLET, DELAYED RELEASE ORAL DAILY
Status: DISCONTINUED | OUTPATIENT
Start: 2020-07-22 | End: 2020-07-21

## 2020-07-21 RX ADMIN — FENTANYL CITRATE 50 MCG: 50 INJECTION, SOLUTION INTRAMUSCULAR; INTRAVENOUS at 12:07

## 2020-07-21 RX ADMIN — DEXMEDETOMIDINE HYDROCHLORIDE 4 MCG: 100 INJECTION, SOLUTION INTRAVENOUS at 01:07

## 2020-07-21 RX ADMIN — POTASSIUM CHLORIDE AND SODIUM CHLORIDE: 900; 150 INJECTION, SOLUTION INTRAVENOUS at 10:07

## 2020-07-21 RX ADMIN — ONDANSETRON 4 MG: 2 INJECTION, SOLUTION INTRAMUSCULAR; INTRAVENOUS at 01:07

## 2020-07-21 RX ADMIN — Medication 20 MG: at 01:07

## 2020-07-21 RX ADMIN — PROPOFOL 150 MCG/KG/MIN: 10 INJECTION, EMULSION INTRAVENOUS at 12:07

## 2020-07-21 RX ADMIN — SODIUM CHLORIDE: 9 INJECTION, SOLUTION INTRAVENOUS at 12:07

## 2020-07-21 RX ADMIN — Medication 10 MG: at 01:07

## 2020-07-21 RX ADMIN — CEFAZOLIN 2 G: 330 INJECTION, POWDER, FOR SOLUTION INTRAMUSCULAR; INTRAVENOUS at 01:07

## 2020-07-21 RX ADMIN — MIDAZOLAM HYDROCHLORIDE 2 MG: 1 INJECTION, SOLUTION INTRAMUSCULAR; INTRAVENOUS at 12:07

## 2020-07-21 RX ADMIN — FENTANYL CITRATE 50 MCG: 50 INJECTION, SOLUTION INTRAMUSCULAR; INTRAVENOUS at 01:07

## 2020-07-21 RX ADMIN — SODIUM CHLORIDE: 0.9 INJECTION, SOLUTION INTRAVENOUS at 12:07

## 2020-07-21 NOTE — TRANSFER OF CARE
"Anesthesia Transfer of Care Note    Patient: Kvng Jones Sr.    Procedure(s) Performed: Procedure(s) (LRB):  Insertion,catheter,tunneled without port, TRIPLE LUMEN, right poss left (Right)    Patient location: PACU    Anesthesia Type: MAC    Transport from OR: Transported from OR on 6-10 L/min O2 by face mask with adequate spontaneous ventilation    Post pain: adequate analgesia    Post assessment: no apparent anesthetic complications    Post vital signs: stable    Level of consciousness: awake    Nausea/Vomiting: no nausea/vomiting    Complications: none    Transfer of care protocol was followed      Last vitals:   Visit Vitals  BP (!) 146/98 (BP Location: Left arm, Patient Position: Lying)   Pulse 86   Temp 37.3 °C (99.1 °F) (Oral)   Resp 18   Ht 5' 6" (1.676 m)   Wt 93 kg (205 lb)   SpO2 99%   BMI 33.09 kg/m²     "

## 2020-07-21 NOTE — HPI
"Mr. Kvng Jones Sr. is a 60-y-o male patient of Dr. 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 His son will be his donor, and his wife will be his caregiver post transplant. A Dalal catheter was placed per general surgery today,  prior to admission. On initial bedside interview, he reports isolated soreness at the catheter with intermittent radiation up the right lateral neck, associated with tenderness to palpation. However, he denies associated pain with inspiration, SOB, or cough. He reports on-going intermittent nausea and "tingling" of the bilateral feet since initiating chemotherapy that has not progressed. Of note, he does reports daily consumption of alcohol. His last drink was on evening of 07/20 and he denies history of requiring treatment for ETOH withdrawal and current cravings, tremors, diaphoresis, or hallucinations. He also reports a prior history of smoking with estimated quit date on 06/01/2020; he is currently using CHANTIX and is not interested in use of nicotine patches.   "

## 2020-07-21 NOTE — ASSESSMENT & PLAN NOTE
Presented to [facility] with CMP in blast crisis and with nodular disease on 3/30/2020  Induced with FLAG-Addis. Achieved morphologic CR  Started on daily Ponatinib 30 mg daily, which he continues to take outpatient. Will hold Ponatinib on admission.  Referred to Dr. Hodges by Dr. Ramos for SCT consideration  [place] [facility] [facility] [place] showing no morphologic or immunophenotypic evidence of CML  Admitted 7/21/20 for planned Flu/Cy/TBI haplo allo SCT  See SCT candidate

## 2020-07-21 NOTE — ASSESSMENT & PLAN NOTE
35 pack year history  Quit smoking 6/1/2020 using Chantix  Patient does not want to continue Chantix inpatient and declined Nicotine patch.

## 2020-07-21 NOTE — SUBJECTIVE & OBJECTIVE
Patient information was obtained from patient and past medical records.     Oncology History: Keanu was recently admitted to [facility] for CML presenting with blast crisis and lewis disease. He was admitted for evaluation and induction chemotherapy with FLAG-Addis. Complications of therapy include epididymal orchitis with negative testicular ultrasound, neutropenic fever, dyspnea, and GGO of bilateral lungs on CT chest. Fever and chest findings were covered with broad spectrum antimicrobials. He did have hallucinations and vivid dreams with posaconazole. Chemotherapy was administered by left arm PICC line that was removed during hospital stay for MSSA infection treated with vancomycin. Hospital admission was 3/30/2020 to 5/1/2020 achieving morphologic CR with induction chemotherapy. He is now on Ponatinib 30mg daily.      Studies performed during his hospital stay include pre-chemotherapy ECHO with normal ejection fraction of 60-65%. HIV, HBV, and HCV tests were negative. CBC at admission was WBC 38.6, Hgb 14.6, and platelet 416K. CT of the head without contrast was normal 4/20/2020. US of abdomen performed for abdominal distention and neutropenic fever had hepatic steatosis, liver 19cm, and spleen 11 cm. He does have a history of alcohol use and pancreatitis.     Keanu is a . He is  to wife Guillermina who will be a caregiver post transplant. He has a 45 pack year smoking history. He quit 6/1/2020, now on Chantix. He drinks 3-4 alcohol beverages nightly. He has 3 children. He has several family members in the [facility] area.     Medications Prior to Admission   Medication Sig Dispense Refill Last Dose    amLODIPine (NORVASC) 5 MG tablet Take 1 tablet (5 mg total) by mouth once daily. 60 tablet 5     chlorhexidine (PERIDEX) 0.12 % solution RINSE WITH 15 MLS PO FOR 30 SECONDS BID       cyclobenzaprine (FLEXERIL) 10 MG tablet Take 10 mg by mouth 3 (three) times daily as needed for  Muscle spasms.        esomeprazole (NEXIUM) 20 MG capsule Take 20 mg by mouth before breakfast.       fluconazole (DIFLUCAN) 200 MG Tab Take 200 mg by mouth once daily.        HYDROcodone-acetaminophen (NORCO) 7.5-325 mg per tablet        ICLUSIG 15 mg Tab Take 30 mg by mouth once daily .       levoFLOXacin (LEVAQUIN) 500 MG tablet Take 500 mg by mouth once daily.        loratadine (CLARITIN) 10 mg tablet Take 10 mg by mouth once daily.       multivit-min-FA-lycopen-lutein 300-600-300 mcg Tab Take 1 tablet by mouth once daily.        ondansetron (ZOFRAN) 4 MG tablet Take 4 mg by mouth every 8 (eight) hours as needed for Nausea.        tamsulosin (FLOMAX) 0.4 mg Cap 1 capsule 30 minutes after the same meal each day       traMADoL (ULTRAM) 50 mg tablet Take 1 tablet by mouth every 6 (six) hours as needed (pain).        valACYclovir (VALTREX) 500 MG tablet Take 500 mg by mouth 2 (two) times daily.        varenicline (CHANTIX) 1 mg Tab Take 1 mg by mouth 2 (two) times daily.        ondansetron (ZOFRAN-ODT) 4 MG TbDL        PERCOCET  mg per tablet Take 1 tablet by mouth every 6 (six) hours as needed for Pain.           Posaconazole, Unclassified drug, Vancomycin analogues, Codeine, Iodine, and Iodinated contrast media     Past Medical History:   Diagnosis Date    CML (chronic myelocytic leukemia)     Hx of psychiatric care     valium, ativan    Melanoma in situ of external ear, right      Past Surgical History:   Procedure Laterality Date    CHOLECYSTECTOMY      NECK SURGERY  2015    TONSILLECTOMY       Family History     None        Tobacco Use    Smoking status: Former Smoker     Packs/day: 1.00     Years: 35.00     Pack years: 35.00     Start date: 1985     Quit date: 2020     Years since quittin.1    Smokeless tobacco: Never Used   Substance and Sexual Activity    Alcohol use: Yes     Alcohol/week: 12.0 standard drinks     Types: 4 Glasses of wine, 4 Cans of beer, 4 Shots of  liquor per week    Drug use: Yes     Types: Marijuana     Comment: medical marijuana    Sexual activity: Yes     Partners: Female       Review of Systems   Constitutional: Negative for activity change, appetite change, chills, fatigue and fever.   HENT: Negative for congestion, mouth sores, nosebleeds, rhinorrhea, sinus pressure, sinus pain, sneezing and sore throat.    Eyes: Negative for photophobia, pain, discharge, redness, itching and visual disturbance.   Respiratory: Negative for cough, chest tightness, shortness of breath and wheezing.    Cardiovascular: Negative for chest pain, palpitations and leg swelling.   Gastrointestinal: Positive for nausea. Negative for abdominal distention, abdominal pain, blood in stool, constipation, diarrhea and vomiting.   Endocrine: Negative for cold intolerance, heat intolerance, polydipsia, polyphagia and polyuria.   Genitourinary: Negative for difficulty urinating, frequency, hematuria and urgency.   Musculoskeletal: Positive for myalgias. Negative for arthralgias, back pain, neck pain and neck stiffness.   Skin: Negative for pallor, rash and wound.   Allergic/Immunologic: Negative for environmental allergies, food allergies and immunocompromised state.   Neurological: Positive for numbness. Negative for dizziness, tremors, seizures, syncope, speech difficulty, weakness, light-headedness and headaches.   Hematological: Negative for adenopathy. Does not bruise/bleed easily.   Psychiatric/Behavioral: Negative for agitation, confusion, hallucinations, sleep disturbance and suicidal ideas. The patient is not nervous/anxious.      Objective:     Vital Signs (Most Recent):    Vital Signs (24h Range):  Temp:  [98.1 °F (36.7 °C)-99.1 °F (37.3 °C)] 98.1 °F (36.7 °C)  Pulse:  [77-86] 77  Resp:  [17-19] 19  SpO2:  [98 %-99 %] 99 %  BP: (146-151)/(78-98) 150/81        There is no height or weight on file to calculate BMI.  There is no height or weight on file to calculate BSA.    ECOG  SCORE         [unfilled]    Lines/Drains/Airways     Central Venous Catheter Line            Tunneled Central Line Insertion/Assessment - Triple Lumen  07/21/20 1332 right subclavian less than 1 day          Peripheral Intravenous Line                 Peripheral IV - Single Lumen 07/21/20 1017 20 G Left Hand less than 1 day                Physical Exam  Constitutional:       Appearance: He is well-developed.   HENT:      Head: Normocephalic and atraumatic.      Mouth/Throat:      Pharynx: No oropharyngeal exudate.   Eyes:      General: No scleral icterus.        Right eye: No discharge.         Left eye: No discharge.      Conjunctiva/sclera: Conjunctivae normal.      Pupils: Pupils are equal, round, and reactive to light.   Neck:      Musculoskeletal: Normal range of motion and neck supple.   Cardiovascular:      Rate and Rhythm: Normal rate and regular rhythm.      Heart sounds: Normal heart sounds. No murmur.   Pulmonary:      Effort: Pulmonary effort is normal. No respiratory distress.      Breath sounds: Normal breath sounds. No wheezing or rales.   Chest:      Comments: Catheter inserted in right upper chest wall with clean, dry, and intact dressing and minimal tenderness to palpation.   Abdominal:      General: Bowel sounds are normal. There is no distension.      Palpations: Abdomen is soft.      Tenderness: There is no abdominal tenderness.   Musculoskeletal: Normal range of motion.         General: No deformity.   Skin:     General: Skin is warm and dry.      Findings: No erythema or rash.      Comments: Right chest Dalal. Dressing is c/d/i with no sign of infection noted.   Neurological:      Mental Status: He is alert and oriented to person, place, and time.   Psychiatric:         Behavior: Behavior normal.         Thought Content: Thought content normal.         Judgment: Judgment normal.       Significant Labs:   CBC:   Recent Labs   Lab 07/21/20  1017   WBC 6.79   HGB 14.7   HCT 43.1       and  CMP:   Recent Labs   Lab 07/21/20  1017      K 4.3      CO2 19*   GLU 91   BUN 17   CREATININE 0.8   CALCIUM 9.7   PROT 7.7   ALBUMIN 4.5   BILITOT 0.2   ALKPHOS 63   AST 30   ALT 36   ANIONGAP 15   EGFRNONAA >60.0       Diagnostic Results:  I have reviewed all pertinent imaging results/findings within the past 24 hours.

## 2020-07-21 NOTE — ASSESSMENT & PLAN NOTE
Takes Nexium at home  Will give Protonix while inpatient as Nexium is not on hospital formulary  Can resume Nexium at discharge

## 2020-07-21 NOTE — PROGRESS NOTES
Subjective:    Patient ID: Kvng Jones Sr. is a 60 y.o. male.    Chief Complaint: CML (chronic myelocytic leukemia)      HPI   60 year old male admitted to [facility] for CML presenting with blast crisis and lewis disease. He was admitted for evaluation and induction chemotherapy with FLAG-Addis. Complications of therapy include epididymal orchitis with negative testicular ultrasound, neutropenic fever, dyspnea, and GGO of bilateral lungs on CT chest. Fever and chest findings were covered with broad spectrum antimicrobials. He did have hallucinations and vivid dreams with posaconazole. Chemotherapy was administered by left arm PICC line that was removed during hospital stay for MSSA infection treated with vancomycin. Hospital admission was 3/30/2020 to 5/1/2020 achieving morphologic CR with induction chemotherapy. He then started Ponatinib 30mg daily.     Studies performed during his hospital stay include pre-chemotherapy ECHO with normal ejection fraction of 60-65%. HIV, HBV, and HCV tests were negative. CBC at admission was WBC 38.6, Hgb 14.6, and platelet 416K. CT of the head without contrast was normal 4/20/2020. US of abdomen performed for abdominal distention and neutropenic fever had hepatic steatosis, liver 19cm, and spleen 11 cm. He does have a history of alcohol use and pancreatitis.    Patient is a . He is  to wife Guillermina who will be a caregiver post transplant. He has a 45 pack year smoking history. He quit 6/1/2020, now on Chantix. He drinks 3-4 alcohol beverages nightly. He has 3 children. He has several family members in the [facility] area.    Today:  Patient presents to clinic today for admission for Flu/Cy/TBI allogeneic stem cell transplant. He had a Dalal line placed today. His main complaint today is pain to the right neck from the Dalal placement. He reports he has been having bad headaches for the past 10 days, relieved with Ultram. He denies  vision changes or dizziness. He reports nausea with the headaches. Today he denies CP, SOB, nausea, diarrhea, fever, chills or cough. COVID test today was negative.     Review of Systems   Constitutional: Negative for activity change, appetite change, chills, diaphoresis, fatigue, fever and unexpected weight change.   HENT: Negative.  Negative for congestion, dental problem, mouth sores, nosebleeds, postnasal drip, rhinorrhea, sinus pressure, sore throat and trouble swallowing.    Eyes: Negative.  Negative for photophobia and visual disturbance.   Respiratory: Negative.  Negative for cough and shortness of breath.    Cardiovascular: Negative.  Negative for chest pain, palpitations and leg swelling.   Gastrointestinal: Positive for nausea. Negative for abdominal distention, abdominal pain, blood in stool, constipation, diarrhea and vomiting.   Endocrine: Negative.    Genitourinary: Negative.  Negative for dysuria, frequency, hematuria and urgency.   Musculoskeletal: Positive for neck pain. Negative for arthralgias, back pain and myalgias.        From line placement, history of cervical spinal stenosis   Skin: Negative.  Negative for pallor and rash.   Allergic/Immunologic: Negative for environmental allergies, food allergies and immunocompromised state.   Neurological: Positive for headaches. Negative for dizziness, syncope, weakness and numbness.   Hematological: Negative for adenopathy. Does not bruise/bleed easily.   Psychiatric/Behavioral: Negative.  Negative for confusion. The patient is not nervous/anxious.        Objective:     Physical Exam  Vitals signs reviewed.   Constitutional:       General: He is not in acute distress.     Appearance: He is well-developed.   HENT:      Head: Normocephalic.      Mouth/Throat:      Pharynx: No oropharyngeal exudate.   Eyes:      General: No scleral icterus.     Conjunctiva/sclera: Conjunctivae normal.      Pupils: Pupils are equal, round, and reactive to light.   Neck:       Musculoskeletal: Normal range of motion and neck supple. Normal range of motion.      Thyroid: No thyromegaly.   Cardiovascular:      Rate and Rhythm: Normal rate and regular rhythm.      Heart sounds: Normal heart sounds.   Pulmonary:      Effort: Pulmonary effort is normal. No respiratory distress.      Breath sounds: Normal breath sounds.   Abdominal:      General: Bowel sounds are normal. There is no distension.      Palpations: Abdomen is soft.      Tenderness: There is no abdominal tenderness.   Musculoskeletal: Normal range of motion.         General: No tenderness.   Skin:     General: Skin is warm and dry.      Coloration: Skin is not pale.      Findings: No petechiae or rash.      Comments: Dalal intact to right CW with no redness or drainage, steri strips intact to right neck   Neurological:      Mental Status: He is alert and oriented to person, place, and time.      Cranial Nerves: No cranial nerve deficit.      Coordination: Coordination normal.   Psychiatric:         Thought Content: Thought content normal.         Assessment:       1. Stem cell transplant candidate    2. CML (chronic myelocytic leukemia)    3. Essential hypertension    4. Gastroesophageal reflux disease without esophagitis    5. Benign prostatic hyperplasia without lower urinary tract symptoms    6. Former smoker    7. Daily consumption of alcohol    8. Cervical stenosis of spine        Plan:       Admitted 7/21/20 for planned Flu/Cy/TBI haplo allo SCT. Son is the donor. Today is Day -7.    Stem cell transplant candidate  Planned conditioning regimen:  Fludarabine on Day -6, -5, -4, -3, and -2  Cyclophosphamide on Day -6 and -5  Total Body Irradiation on Day -1     Planned  GVHD Prophylaxis:  Cyclophosphamide on Day +3 and +4  Tacrolimus starting on Day +5  Mycophenolate starting on Day +5     Antimicrobial Prophylaxis:  Acyclovir starting on Day -6  Levofloxacin starting on Day -1  Fluconazole starting on Day -1  Bactrim  starting on Day +30     SOS/VOD Prophylaxis:  Ursodiol on Day -6 through Day +30     Growth Factor Support:  Neupogen starting on Day +7     CML  Presented to [facility] with CMP in blast crisis and with nodular disease on 3/30/2020  Induced with FLAG-Addis. Achieved morphologic CR  Started on daily Ponatinib 30 mg daily, which he continues to take outpatient. Will hold Ponatinib on admission.  Referred to Dr. Hodges by Dr. Ramos for SCT consideration  [place] [facility] showing no morphologic or immunophenotypic evidence of CML  Admit today for for planned Flu/Cy/TBI haplo allo SCT  See SCT candidate    HTN  Continue home amlodipine    GERD  Takes Nexium at home  Will give Protonix while inpatient as Nexium is not on hospital formulary  Can resume Nexium at discharge    BPH  Continue home Flomax    Daily consumption of alcohol  Drinks 3-4 alcoholic beverages nightly  Will need to monitor closely for s/s of alcohol withdrawal while inpatient  Will consider consulting addiction medicine    Former smoker  35 pack year history  Quit smoking 6/1/2020 using Chantix  Patient does not want to continue Chantix inpatient   Will offer nicotine patch  Post-visit review of lab/imaging results notes pulmonary lung nodule on chest x-ray  Serial CT chest done at OSH shows improvement and repeat CXR from today with no mention of possible RUL pulmonary nodule.     Cervical stenosis  Continue Flexeril  States he is no longer taking Norco or Percocet  Currently talking Ultram    Disposition: admit to inpatient    Yomaira Little NP  Hematology/BMT

## 2020-07-21 NOTE — ASSESSMENT & PLAN NOTE
Admitted 7/21/20 for planned Flu/Cy/TBI haplo allo SCT. Son is the donor. Today is Day -7.    Planned conditioning regimen:  Fludarabine on Day -6, -5, -4, -3, and -2  Cyclophosphamide on Day -6 and -5  Total Body Irradiation on Day -1     Planned  GVHD Prophylaxis:  Cyclophosphamide on Day +3 and +4  Tacrolimus starting on Day +5  Mycophenolate starting on Day +5     Antimicrobial Prophylaxis:  Acyclovir starting on Day -6  Levofloxacin starting on Day -1  Fluconazole starting on Day -1  Bactrim starting on Day +30     SOS/VOD Prophylaxis:  Ursodiol on Day -6 through Day +30     Growth Factor Support:  Neupogen starting on Day +7

## 2020-07-21 NOTE — HOSPITAL COURSE
07/21/2020: Mr. Jones is admitted for a Flu/Cy/TBI haplo allo SCT. Today is Day -7. He is a daily drinker. Monitor closely for s/s of withdrawal.  07/22/2020: Day - 6 from Flu/Cy/TBI haplo allo SCT for CML. Afebrile. BP elevated as high as 180s/110s. Increased amlodipine dose to 10 mg daily. Patient reporting headaches. May be r/t hypertension, but unsure of whether it is causing headaches or if headaches are causing hypertension. Questioning whether headaches are 2/2 nicotine withdrawal. Onset seems to correspond to time that patient quit smoking. Patient not thrombocytopenic. Ibuprofen 800 mg x 1 dose ordered. Had candid discussion with patient about home drinking behavior. Explained that withdrawal could cause seizures. He states that he has 3-4 beers, 3-4 glasses of wine, or 3-4 whiskeys nightly. Was drinking nightly up to night prior to admission. States that when he was in [facility] from 3/20-5/20, he did not experience withdrawal symptoms. Will monitor very closely for s/s of withdrawal. Dalal placed to right chest wall yesterday prior to admission. Reports tenderness to insertion site. No sign of infection noted. Likely 2/2 trauma.  07/23/2020: Day -5 from Flu/Cy/TBI haplo allo SCT for CML. Continues to have elevated BP and headaches. HTN and headaches do not seem to co-occur. Amlodipine dose increased and prn hydralazine ordered. Headache onset starts during sleep and lasts for a few hours after awakening. Although not described as unilateral, questioning whether may be cluster headaches--especially given the fact that he is male, a previous smoker, and a current drinker. Headache onset occurred several weeks ago and seems to coincide with quitting smoking and initiation of ponatinib. Has been off ponatinib for a few days now, so less likely to be causing headaches. Will try O2 with next headache. Will also consider trying imitrex. BMT Pharm D checked and determined that imitrex is not contraindicated with  chemo regimen. If headaches persist, will consider imaging.  07/24/2020: Day - 4 from Flu/Cy/TBI haplo allo SCT for CML. BP improving with increased amlodipine dose and prn hydralazine. Decreased IVF rate as patient is no longer receiving compazine. Weight up from discharge and net + of I/O. +1 edema to feet/ankles. 20 mg IV lasix ordered C/o chest pain this morning. Troponin normal and EKG essentially normal. Cxr neg. Patient believed that pain was GERD. Takes nexium daily at home and had not yet received protonix. TUMs prn ordered. Received tramadol for headache over night with mild relief. Reports nausea. States compazine provides best relief. Compazine scheduled. Reports loose stool x 1 this morning. Will r/o c-diff with increased frequency.   07/25/2020: Day - 3 from Flu/Cy/TBI haplo allo SCT for CMP. Continues to complain of headache. Yesterday around noon felt great until around 11:30PM after taking some Hydralazine. At that point his same headache returned which he has had throughout the night. Received Ultram, oxycodone, flexeril which did not provide relief. Felt the oxygen may have helped but developed hot flash and removed it. Denies visual complaints, focal weakness, confusion. Denies any other complaints this morning.  07/26/2020: Day - 2 from Flu/Cy/TBI haplo allo SCT for CMP. Headache significantly improved after starting imitrex and has completely resolved this morning. Pt unsure if the imitrex is what helped, he also had a large bowel movement last night which seemed to provide significant relief. His only concern this morning is a slight swelling in his left lower frontal gums which isn't bothering him. He's not sure if it is normal or not, just noticed because he was told to remain aware of mouth sores. Deneis any other complaints.  07/27/2020: Day -1 from Flu/Cy/TBI haplo allo SCT for CML. Will receive TBI today. Continues to be afebrile. VSS. BP improved. Headaches still recurrent but seem to  "have responded to imitrex. Patient states that he feel that headaches my be sinus in nature. Maxillary tenderness noted. Takes Claritin at home. Not on hospital formulary. Zyrtec on formulary, but patient states that he cannot take Zyrtec due to SE and "addiction" to it. Will start Flonase. Okay to take home Claritin if someone can bring it from home. Reports increased stool frequency, but denies liquid stools.   07/28/2020: Day 0 from Flu/Cy/TBI haplo allo SCT for CML. Received 2 bags and a total CD34 dose of 3.10 x10^6/kg today. Tolerated well. Afebrile. VSS. Blood pressure improving. No headaches over night.  07/29/2020 Today is Day +1 s/p Flu/Cy/TBI Haplo SCT for CML. Tolerated transplant yesterday without incident. Pt with headache again this morning. Also with increased nausea. Reports gas pain and a new rash to his upper back and chest with pruritis.   07/30/2020: Day +2 from Flu/Cy/TBI haplo SCT for CML. Continues to experience headaches. Headaches seem to respond to imitrex and caffeine. Concerned for rebound headaches 2/2 Imitrex. C/o blurry vision today. Ophtho consulted. Pruritic rash to chest and bilat shoulders. Do not suspect GVHD this early. Started topical hydrocortisone. ANC down to 1600 today.  07/31/2020: Day +3 from Flu/Cy/TBI haplo SCT for CML. Continues to be afebrile. VSS. Headache persist. Mild relief with current treatments. Neuro consulted for recs and will see patient today. 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 Rash to chest and bilat shoulder improving with hydrocortisone cream. Seen by ophtho yesterday regarding right blurry vision. Both eyes dilated and examined. Fundal exam neg. Patient had been on laptop for ~ 2 hours prior to experiencing blurry vision. Blurry vision thought to be due to dry eyes. Artificial tears and warm compresses recommended and ordered. Can escalate art tears to ointment if insufficient relief. ANC down to 1200 today.   08/01/2020: Day +4 from " "Flu/Cy/TBI haplo SCT for CML. Complaining of refractory HA and nausea, no emesis. Continues to have OK appetite. Has mild skin rash on chest, last day of Cytoxan today.   08/02/2020: Day +5 from Flu/Cy/TBI haplo SCT for CML. Says HA persistent but nausea improved with IV mag o/n. Had flushing this morning which he said is a recurrent problem. Neuro on board, recommend IV mag for HA and s/o. Overall pt feels better now that Cytoxan is done.   08/03/2020 Day +6 from Flu/Cy/TBI haplo identical SCT for CML. Continues to complain of a HA though increased dose of oxy make it "bearable". Positive fluid status (+11.6L). Still pancytopenic, . Electrolytes wnl. Diarrhea improving.   08/04/2020 Day +7 from Flu/Cy/TBI haplo alloSCT for AML/CML. NEON. Improvement in HA symptoms. No signs of aGVHD. Continues to be pancytopenic. Scheduled antiemetics working. No diarrhea.   08/05/2020 Day +8 from Flu/Cy/TBI haplo alloSCT for AML/CML. NEON. Appears well this AM. Refused scheduled antiemetics and will make them prn today. Has very mild mucositis and will add duke's. Neutropenic and thrombocytopenic. No signs of aGVHD. Still markedly positive fluid balance.   08/06/2020 Day +9 from Flu/Cy/TBI haplo alloSCT for CML. NEON. HA this morning that kept him up overnight. Tac 2 this AM. No signs of aGVHD. Denies NVDC.   08/07/2020 Day +10 from funmilayo/Cy/TBI haplo allo SCT for AML/CML. Has neck pain this morning that is MSK in nature. HA improved after 2g IV mag yesterday though returned some hours later. Has ongoing nausea and would like zofran scheduled today. Tac pending this AM after subtherapeutic last 2 mornings requiring dose increases. No evidence of aGVHD.   8/8/2020 Day +11 Flu/Cy/TBI haplo alloSCT for AML/CML. Complaining of mild headache, and itching over the hands. Tac 4.5 today.    8/9/2020 Day +12 Flu/Cy/TBI haplo alloSCT for AML/CML. Had some chills yesterday after platelets transfusion. Complainng of back pain. His Tacro " levels 4.5 yesterday. Tacro increased to 4 mg BID   08/10/2020 Day +13 Flu/Cy/TBI haplo alloSCT for CML. HDS/VSS overnight. Neck and back pain this morning. Also complaining of palm itching though no rash present and thinks it is from his opioid pain medication. Tac within limits this AM. Mag 1.5 and will get 2g IV. ANC 1281 this AM.   08/11/2020 Day +14 Flu/Cuy/TBI haplo alloSCT for AML. Stable. Ongoing neck pain, barely relieved by dilaudid. No other complaints and improving appetite. Engrafted with ANC of 3606. Tac 6.6.  Itching in hands has improved.    08/12/2020 Day +15 Flu/Cy/TBI haplo allo SCT for AML/CML. NEON. Has ongoing neck pain, not completely improved with current regimen of pain control with 300 mg TID of gabapentin, 10 mg TID of flexeril and 15 mg q4 hr prn of morphine. Out patient consult to pain management, PT and OT placed. No signs of GVHD though does have some miild hand itching for which he will take triamcinolone cream. Has f/u with Ivon CALDERON on 8/14 with labs prior.

## 2020-07-21 NOTE — ASSESSMENT & PLAN NOTE
Drinks 3-4 alcoholic beverages nightly  Will need to monitor closely for s/s of alcohol withdrawal while inpatient  Will consider consulting addiction medicine

## 2020-07-22 PROBLEM — C92.11 CML IN REMISSION: Status: RESOLVED | Noted: 2020-07-21 | Resolved: 2020-07-22

## 2020-07-22 PROBLEM — M48.02 CERVICAL STENOSIS OF SPINE: Status: ACTIVE | Noted: 2020-07-21

## 2020-07-22 LAB
ABO + RH BLD: NORMAL
ALBUMIN SERPL BCP-MCNC: 4.1 G/DL (ref 3.5–5.2)
ALP SERPL-CCNC: 61 U/L (ref 55–135)
ALT SERPL W/O P-5'-P-CCNC: 25 U/L (ref 10–44)
ANION GAP SERPL CALC-SCNC: 10 MMOL/L (ref 8–16)
AST SERPL-CCNC: 18 U/L (ref 10–40)
BASOPHILS # BLD AUTO: 0.06 K/UL (ref 0–0.2)
BASOPHILS NFR BLD: 0.9 % (ref 0–1.9)
BILIRUB SERPL-MCNC: 0.2 MG/DL (ref 0.1–1)
BLD GP AB SCN CELLS X3 SERPL QL: NORMAL
BUN SERPL-MCNC: 16 MG/DL (ref 8–23)
CALCIUM SERPL-MCNC: 9.3 MG/DL (ref 8.7–10.5)
CHLORIDE SERPL-SCNC: 106 MMOL/L (ref 95–110)
CO2 SERPL-SCNC: 25 MMOL/L (ref 23–29)
CREAT SERPL-MCNC: 0.8 MG/DL (ref 0.5–1.4)
DIFFERENTIAL METHOD: ABNORMAL
EOSINOPHIL # BLD AUTO: 0.2 K/UL (ref 0–0.5)
EOSINOPHIL NFR BLD: 2.9 % (ref 0–8)
ERYTHROCYTE [DISTWIDTH] IN BLOOD BY AUTOMATED COUNT: 13 % (ref 11.5–14.5)
EST. GFR  (AFRICAN AMERICAN): >60 ML/MIN/1.73 M^2
EST. GFR  (NON AFRICAN AMERICAN): >60 ML/MIN/1.73 M^2
GLUCOSE SERPL-MCNC: 98 MG/DL (ref 70–110)
HCT VFR BLD AUTO: 40.2 % (ref 40–54)
HGB BLD-MCNC: 13.4 G/DL (ref 14–18)
IMM GRANULOCYTES # BLD AUTO: 0.03 K/UL (ref 0–0.04)
IMM GRANULOCYTES NFR BLD AUTO: 0.4 % (ref 0–0.5)
LYMPHOCYTES # BLD AUTO: 2.2 K/UL (ref 1–4.8)
LYMPHOCYTES NFR BLD: 31.9 % (ref 18–48)
MAGNESIUM SERPL-MCNC: 1.9 MG/DL (ref 1.6–2.6)
MCH RBC QN AUTO: 33.6 PG (ref 27–31)
MCHC RBC AUTO-ENTMCNC: 33.3 G/DL (ref 32–36)
MCV RBC AUTO: 101 FL (ref 82–98)
MONOCYTES # BLD AUTO: 0.8 K/UL (ref 0.3–1)
MONOCYTES NFR BLD: 12.1 % (ref 4–15)
NEUTROPHILS # BLD AUTO: 3.6 K/UL (ref 1.8–7.7)
NEUTROPHILS NFR BLD: 51.8 % (ref 38–73)
NRBC BLD-RTO: 0 /100 WBC
PHOSPHATE SERPL-MCNC: 4.3 MG/DL (ref 2.7–4.5)
PLATELET # BLD AUTO: 213 K/UL (ref 150–350)
PMV BLD AUTO: 9.4 FL (ref 9.2–12.9)
POTASSIUM SERPL-SCNC: 3.8 MMOL/L (ref 3.5–5.1)
PROT SERPL-MCNC: 6.9 G/DL (ref 6–8.4)
RBC # BLD AUTO: 3.99 M/UL (ref 4.6–6.2)
SODIUM SERPL-SCNC: 141 MMOL/L (ref 136–145)
WBC # BLD AUTO: 6.95 K/UL (ref 3.9–12.7)

## 2020-07-22 PROCEDURE — 25000003 PHARM REV CODE 250: Performed by: STUDENT IN AN ORGANIZED HEALTH CARE EDUCATION/TRAINING PROGRAM

## 2020-07-22 PROCEDURE — 99233 SBSQ HOSP IP/OBS HIGH 50: CPT | Mod: BMT,,, | Performed by: INTERNAL MEDICINE

## 2020-07-22 PROCEDURE — 86850 RBC ANTIBODY SCREEN: CPT

## 2020-07-22 PROCEDURE — 94761 N-INVAS EAR/PLS OXIMETRY MLT: CPT

## 2020-07-22 PROCEDURE — 84100 ASSAY OF PHOSPHORUS: CPT

## 2020-07-22 PROCEDURE — 80053 COMPREHEN METABOLIC PANEL: CPT

## 2020-07-22 PROCEDURE — 20600001 HC STEP DOWN PRIVATE ROOM

## 2020-07-22 PROCEDURE — 25000003 PHARM REV CODE 250: Performed by: NURSE PRACTITIONER

## 2020-07-22 PROCEDURE — 83735 ASSAY OF MAGNESIUM: CPT

## 2020-07-22 PROCEDURE — 99233 PR SUBSEQUENT HOSPITAL CARE,LEVL III: ICD-10-PCS | Mod: BMT,,, | Performed by: INTERNAL MEDICINE

## 2020-07-22 PROCEDURE — A9155 ARTIFICIAL SALIVA: HCPCS | Performed by: INTERNAL MEDICINE

## 2020-07-22 PROCEDURE — 25000003 PHARM REV CODE 250: Performed by: INTERNAL MEDICINE

## 2020-07-22 PROCEDURE — 85025 COMPLETE CBC W/AUTO DIFF WBC: CPT

## 2020-07-22 PROCEDURE — 63600175 PHARM REV CODE 636 W HCPCS: Performed by: INTERNAL MEDICINE

## 2020-07-22 RX ORDER — AMLODIPINE BESYLATE 5 MG/1
5 TABLET ORAL DAILY
Status: DISCONTINUED | OUTPATIENT
Start: 2020-07-22 | End: 2020-07-22

## 2020-07-22 RX ORDER — AMLODIPINE BESYLATE 10 MG/1
10 TABLET ORAL DAILY
Status: DISCONTINUED | OUTPATIENT
Start: 2020-07-23 | End: 2020-08-12 | Stop reason: HOSPADM

## 2020-07-22 RX ORDER — TRAMADOL HYDROCHLORIDE 50 MG/1
50 TABLET ORAL EVERY 6 HOURS PRN
Status: DISCONTINUED | OUTPATIENT
Start: 2020-07-22 | End: 2020-08-11

## 2020-07-22 RX ORDER — CETIRIZINE HYDROCHLORIDE 5 MG/1
10 TABLET ORAL DAILY
Status: DISCONTINUED | OUTPATIENT
Start: 2020-07-22 | End: 2020-07-27

## 2020-07-22 RX ORDER — HYDRALAZINE HYDROCHLORIDE 25 MG/1
25 TABLET, FILM COATED ORAL EVERY 8 HOURS PRN
Status: DISCONTINUED | OUTPATIENT
Start: 2020-07-22 | End: 2020-07-23

## 2020-07-22 RX ORDER — TAMSULOSIN HYDROCHLORIDE 0.4 MG/1
0.4 CAPSULE ORAL DAILY
Status: DISCONTINUED | OUTPATIENT
Start: 2020-07-22 | End: 2020-08-12 | Stop reason: HOSPADM

## 2020-07-22 RX ORDER — IBUPROFEN 400 MG/1
800 TABLET ORAL ONCE
Status: COMPLETED | OUTPATIENT
Start: 2020-07-22 | End: 2020-07-22

## 2020-07-22 RX ORDER — PANTOPRAZOLE SODIUM 40 MG/1
40 TABLET, DELAYED RELEASE ORAL DAILY
Status: DISCONTINUED | OUTPATIENT
Start: 2020-07-22 | End: 2020-08-12 | Stop reason: HOSPADM

## 2020-07-22 RX ORDER — VALACYCLOVIR HYDROCHLORIDE 500 MG/1
500 TABLET, FILM COATED ORAL 2 TIMES DAILY
Status: DISCONTINUED | OUTPATIENT
Start: 2020-07-22 | End: 2020-07-22

## 2020-07-22 RX ORDER — CYCLOBENZAPRINE HCL 5 MG
10 TABLET ORAL 3 TIMES DAILY PRN
Status: DISCONTINUED | OUTPATIENT
Start: 2020-07-22 | End: 2020-08-10

## 2020-07-22 RX ADMIN — HYDRALAZINE HYDROCHLORIDE 25 MG: 25 TABLET, FILM COATED ORAL at 05:07

## 2020-07-22 RX ADMIN — Medication 30 ML: at 07:07

## 2020-07-22 RX ADMIN — Medication 30 ML: at 04:07

## 2020-07-22 RX ADMIN — AMLODIPINE BESYLATE 5 MG: 5 TABLET ORAL at 07:07

## 2020-07-22 RX ADMIN — TRAMADOL HYDROCHLORIDE 50 MG: 50 TABLET, FILM COATED ORAL at 04:07

## 2020-07-22 RX ADMIN — ONDANSETRON 16 MG: 8 TABLET, ORALLY DISINTEGRATING ORAL at 07:07

## 2020-07-22 RX ADMIN — TAMSULOSIN HYDROCHLORIDE 0.4 MG: 0.4 CAPSULE ORAL at 09:07

## 2020-07-22 RX ADMIN — Medication 30 ML: at 01:07

## 2020-07-22 RX ADMIN — ACYCLOVIR 800 MG: 800 TABLET ORAL at 09:07

## 2020-07-22 RX ADMIN — PROCHLORPERAZINE EDISYLATE 10 MG: 5 INJECTION INTRAMUSCULAR; INTRAVENOUS at 09:07

## 2020-07-22 RX ADMIN — PANTOPRAZOLE SODIUM 40 MG: 40 TABLET, DELAYED RELEASE ORAL at 09:07

## 2020-07-22 RX ADMIN — Medication 30 ML: at 08:07

## 2020-07-22 RX ADMIN — DEXAMETHASONE 12 MG: 4 TABLET ORAL at 07:07

## 2020-07-22 RX ADMIN — OXYCODONE HYDROCHLORIDE 5 MG: 5 TABLET ORAL at 04:07

## 2020-07-22 RX ADMIN — ACYCLOVIR 800 MG: 800 TABLET ORAL at 05:07

## 2020-07-22 RX ADMIN — LORAZEPAM 1 MG: 2 INJECTION INTRAMUSCULAR; INTRAVENOUS at 09:07

## 2020-07-22 RX ADMIN — POTASSIUM CHLORIDE AND SODIUM CHLORIDE: 900; 150 INJECTION, SOLUTION INTRAVENOUS at 04:07

## 2020-07-22 RX ADMIN — PROCHLORPERAZINE EDISYLATE 10 MG: 5 INJECTION INTRAMUSCULAR; INTRAVENOUS at 04:07

## 2020-07-22 RX ADMIN — URSODIOL 300 MG: 300 CAPSULE ORAL at 09:07

## 2020-07-22 RX ADMIN — IBUPROFEN 800 MG: 400 TABLET, FILM COATED ORAL at 09:07

## 2020-07-22 RX ADMIN — URSODIOL 300 MG: 300 CAPSULE ORAL at 08:07

## 2020-07-22 RX ADMIN — FLUDARABINE PHOSPHATE 60 MG: 25 INJECTION, SOLUTION INTRAVENOUS at 11:07

## 2020-07-22 RX ADMIN — OXYCODONE HYDROCHLORIDE 5 MG: 5 TABLET ORAL at 09:07

## 2020-07-22 RX ADMIN — CETIRIZINE HYDROCHLORIDE 10 MG: 5 TABLET ORAL at 07:07

## 2020-07-22 RX ADMIN — POTASSIUM CHLORIDE AND SODIUM CHLORIDE: 900; 150 INJECTION, SOLUTION INTRAVENOUS at 07:07

## 2020-07-22 RX ADMIN — POTASSIUM CHLORIDE AND SODIUM CHLORIDE: 900; 150 INJECTION, SOLUTION INTRAVENOUS at 01:07

## 2020-07-22 NOTE — PROGRESS NOTES
07/22/20 1624   Vital Signs   Temp 98.3 °F (36.8 °C)   Temp src Oral   Pulse 103   Heart Rate Source Monitor;Intermittent   Resp 17   SpO2 97 %   O2 Device (Oxygen Therapy) room air   BP (!) 197/94   MAP (mmHg) 132   BP Location Right arm   BP Method Automatic   Patient Position Lying     BP elevated and pt c/o headache and nausea.  PRN compazine and tramadol admin at this time; will continue to monitor and recheck BP.

## 2020-07-22 NOTE — H&P
"[facility]  Hematology  Bone Marrow Transplant  H&P    Subjective:     Principal Problem: Stem cell transplant candidate    HPI: Mr. Kvng Jones Sr. is a 60-y-o male patient of Dr. 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 His son will be his donor, and his wife will be his caregiver post transplant. A Dalal catheter was placed per general surgery today,  prior to admission. On initial bedside interview, he reports isolated soreness at the catheter with intermittent radiation up the right lateral neck, associated with tenderness to palpation. However, he denies associated pain with inspiration, SOB, or cough. He reports on-going intermittent nausea and "tingling" of the bilateral feet since initiating chemotherapy that has not progressed. Of note, he does reports daily consumption of alcohol. His last drink was on evening of 07/20 and he denies history of requiring treatment for ETOH withdrawal and current cravings, tremors, diaphoresis, or hallucinations. He also reports a prior history of smoking with estimated quit date on 06/01/2020; he is currently using CHANTIX and is not interested in use of nicotine patches.     Patient information was obtained from patient and past medical records.     Oncology History: Keanu was recently admitted to [facility] for CML presenting with blast crisis and lewis disease. He was admitted for evaluation and induction chemotherapy with FLAG-Addis. Complications of therapy include epididymal orchitis with negative testicular ultrasound, neutropenic fever, dyspnea, and GGO of bilateral lungs on CT chest. Fever and chest findings were covered with broad spectrum antimicrobials. He did have hallucinations and vivid dreams with posaconazole. Chemotherapy was administered by left arm PICC line that was removed during hospital stay for MSSA infection treated with vancomycin. Hospital " admission was 3/30/2020 to 5/1/2020 achieving morphologic CR with induction chemotherapy. He is now on Ponatinib 30mg daily.      Studies performed during his hospital stay include pre-chemotherapy ECHO with normal ejection fraction of 60-65%. HIV, HBV, and HCV tests were negative. CBC at admission was WBC 38.6, Hgb 14.6, and platelet 416K. CT of the head without contrast was normal 4/20/2020. US of abdomen performed for abdominal distention and neutropenic fever had hepatic steatosis, liver 19cm, and spleen 11 cm. He does have a history of alcohol use and pancreatitis.     Keanu is a . He is  to wife Guillermina who will be a caregiver post transplant. He has a 45 pack year smoking history. He quit 6/1/2020, now on Chantix. He drinks 3-4 alcohol beverages nightly. He has 3 children. He has several family members in the [facility] area.     Medications Prior to Admission   Medication Sig Dispense Refill Last Dose    amLODIPine (NORVASC) 5 MG tablet Take 1 tablet (5 mg total) by mouth once daily. 60 tablet 5     chlorhexidine (PERIDEX) 0.12 % solution RINSE WITH 15 MLS PO FOR 30 SECONDS BID       cyclobenzaprine (FLEXERIL) 10 MG tablet Take 10 mg by mouth 3 (three) times daily as needed for Muscle spasms.        esomeprazole (NEXIUM) 20 MG capsule Take 20 mg by mouth before breakfast.       fluconazole (DIFLUCAN) 200 MG Tab Take 200 mg by mouth once daily.        HYDROcodone-acetaminophen (NORCO) 7.5-325 mg per tablet        ICLUSIG 15 mg Tab Take 30 mg by mouth once daily .       levoFLOXacin (LEVAQUIN) 500 MG tablet Take 500 mg by mouth once daily.        loratadine (CLARITIN) 10 mg tablet Take 10 mg by mouth once daily.       multivit-min-FA-lycopen-lutein 300-600-300 mcg Tab Take 1 tablet by mouth once daily.        ondansetron (ZOFRAN) 4 MG tablet Take 4 mg by mouth every 8 (eight) hours as needed for Nausea.        tamsulosin (FLOMAX) 0.4 mg Cap 1 capsule 30 minutes after the same  meal each day       traMADoL (ULTRAM) 50 mg tablet Take 1 tablet by mouth every 6 (six) hours as needed (pain).        valACYclovir (VALTREX) 500 MG tablet Take 500 mg by mouth 2 (two) times daily.        varenicline (CHANTIX) 1 mg Tab Take 1 mg by mouth 2 (two) times daily.        ondansetron (ZOFRAN-ODT) 4 MG TbDL        PERCOCET  mg per tablet Take 1 tablet by mouth every 6 (six) hours as needed for Pain.           Posaconazole, Unclassified drug, Vancomycin analogues, Codeine, Iodine, and Iodinated contrast media     Past Medical History:   Diagnosis Date    CML (chronic myelocytic leukemia)     Hx of psychiatric care     valium, ativan    Melanoma in situ of external ear, right      Past Surgical History:   Procedure Laterality Date    CHOLECYSTECTOMY      NECK SURGERY  2015    TONSILLECTOMY       Family History     None        Tobacco Use    Smoking status: Former Smoker     Packs/day: 1.00     Years: 35.00     Pack years: 35.00     Start date: 1985     Quit date: 2020     Years since quittin.1    Smokeless tobacco: Never Used   Substance and Sexual Activity    Alcohol use: Yes     Alcohol/week: 12.0 standard drinks     Types: 4 Glasses of wine, 4 Cans of beer, 4 Shots of liquor per week    Drug use: Yes     Types: Marijuana     Comment: medical marijuana    Sexual activity: Yes     Partners: Female       Review of Systems   Constitutional: Negative for activity change, appetite change, chills, fatigue and fever.   HENT: Negative for congestion, mouth sores, nosebleeds, rhinorrhea, sinus pressure, sinus pain, sneezing and sore throat.    Eyes: Negative for photophobia, pain, discharge, redness, itching and visual disturbance.   Respiratory: Negative for cough, chest tightness, shortness of breath and wheezing.    Cardiovascular: Negative for chest pain, palpitations and leg swelling.   Gastrointestinal: Positive for nausea. Negative for abdominal distention, abdominal pain,  blood in stool, constipation, diarrhea and vomiting.   Endocrine: Negative for cold intolerance, heat intolerance, polydipsia, polyphagia and polyuria.   Genitourinary: Negative for difficulty urinating, frequency, hematuria and urgency.   Musculoskeletal: Positive for myalgias. Negative for arthralgias, back pain, neck pain and neck stiffness.   Skin: Negative for pallor, rash and wound.   Allergic/Immunologic: Negative for environmental allergies, food allergies and immunocompromised state.   Neurological: Positive for numbness. Negative for dizziness, tremors, seizures, syncope, speech difficulty, weakness, light-headedness and headaches.   Hematological: Negative for adenopathy. Does not bruise/bleed easily.   Psychiatric/Behavioral: Negative for agitation, confusion, hallucinations, sleep disturbance and suicidal ideas. The patient is not nervous/anxious.      Objective:     Vital Signs (Most Recent):    Vital Signs (24h Range):  Temp:  [98.1 °F (36.7 °C)-99.1 °F (37.3 °C)] 98.1 °F (36.7 °C)  Pulse:  [77-86] 77  Resp:  [17-19] 19  SpO2:  [98 %-99 %] 99 %  BP: (146-151)/(78-98) 150/81        There is no height or weight on file to calculate BMI.  There is no height or weight on file to calculate BSA.    ECOG SCORE         Lines/Drains/Airways     Central Venous Catheter Line            Tunneled Central Line Insertion/Assessment - Triple Lumen  07/21/20 1332 right subclavian less than 1 day          Peripheral Intravenous Line                 Peripheral IV - Single Lumen 07/21/20 1017 20 G Left Hand less than 1 day                Physical Exam  Constitutional:       Appearance: He is well-developed.   HENT:      Head: Normocephalic and atraumatic.      Mouth/Throat:      Pharynx: No oropharyngeal exudate.   Eyes:      General: No scleral icterus.        Right eye: No discharge.         Left eye: No discharge.      Conjunctiva/sclera: Conjunctivae normal.      Pupils: Pupils are equal, round, and reactive to  light.   Neck:      Musculoskeletal: Normal range of motion and neck supple.   Cardiovascular:      Rate and Rhythm: Normal rate and regular rhythm.      Heart sounds: Normal heart sounds. No murmur.   Pulmonary:      Effort: Pulmonary effort is normal. No respiratory distress.      Breath sounds: Normal breath sounds. No wheezing or rales.   Chest:      Comments: Catheter inserted in right upper chest wall with clean, dry, and intact dressing and minimal tenderness to palpation.   Abdominal:      General: Bowel sounds are normal. There is no distension.      Palpations: Abdomen is soft.      Tenderness: There is no abdominal tenderness.   Musculoskeletal: Normal range of motion.         General: No deformity.   Skin:     General: Skin is warm and dry.      Findings: No erythema or rash.      Comments: Right chest Dalal. Dressing is c/d/i with no sign of infection noted.   Neurological:      Mental Status: He is alert and oriented to person, place, and time.   Psychiatric:         Behavior: Behavior normal.         Thought Content: Thought content normal.         Judgment: Judgment normal.       Significant Labs:   CBC:   Recent Labs   Lab 07/21/20  1017   WBC 6.79   HGB 14.7   HCT 43.1       and CMP:   Recent Labs   Lab 07/21/20  1017      K 4.3      CO2 19*   GLU 91   BUN 17   CREATININE 0.8   CALCIUM 9.7   PROT 7.7   ALBUMIN 4.5   BILITOT 0.2   ALKPHOS 63   AST 30   ALT 36   ANIONGAP 15   EGFRNONAA >60.0       Diagnostic Results:  I have reviewed all pertinent imaging results/findings within the past 24 hours.    Assessment/Plan:     * Stem cell transplant candidate  Admitted 7/21/20 for planned Flu/Cy/TBI haplo allo SCT. Son is the donor. Today is Day -7.    Planned conditioning regimen:  Fludarabine on Day -6, -5, -4, -3, and -2  Cyclophosphamide on Day -6 and -5  Total Body Irradiation on Day -1     Planned  GVHD Prophylaxis:  Cyclophosphamide on Day +3 and +4  Tacrolimus starting on Day  +5  Mycophenolate starting on Day +5     Antimicrobial Prophylaxis:  Acyclovir starting on Day -6  Levofloxacin starting on Day -1  Fluconazole starting on Day -1  Bactrim starting on Day +30     SOS/VOD Prophylaxis:  Ursodiol on Day -6 through Day +30     Growth Factor Support:  Neupogen starting on Day +7     Muscle spasm  Continue home Flexeril    Tobacco abuse, in remission  35 pack year history  Quit smoking 6/1/2020 using Chantix  Patient does not want to continue Chantix inpatient and declined Nicotine patch.     Daily consumption of alcohol  Drinks 3-4 alcoholic beverages nightly  Will need to monitor closely for s/s of alcohol withdrawal while inpatient  Will consider consulting addiction medicine    BPH (benign prostatic hyperplasia)  Continue home Flomax    GERD (gastroesophageal reflux disease)  Takes Nexium at home  Will give Protonix while inpatient as Nexium is not on hospital formulary  Can resume Nexium at discharge    Essential hypertension  Continue home amlodipine    CML (chronic myelocytic leukemia)  Presented to [facility] with CMP in blast crisis and with nodular disease on 3/30/2020  Induced with FLAG-Addis. Achieved morphologic CR  Started on daily Ponatinib 30 mg daily, which he continues to take outpatient. Will hold Ponatinib on admission.  Referred to Dr. Hodges by Dr. Ramos for SCT consideration  [place] [facility] [place] showing no morphologic or immunophenotypic evidence of CML  Admitted 7/21/20 for planned Flu/Cy/TBI haplo allo SCT  See SCT candidate        VTE Risk Mitigation (From admission, onward)         Ordered     heparin, porcine (PF) 100 unit/mL injection flush 300 Units  As needed (PRN)      07/21/20 1920                Disposition: RANULFO Altamirano MD  Bone Marrow Transplant  Hematology  [facility]

## 2020-07-22 NOTE — ASSESSMENT & PLAN NOTE
Drinks 3-4 alcoholic beverages nightly  Will need to monitor closely for s/s of alcohol withdrawal while inpatient  Will consider consulting addiction medicine if indicated

## 2020-07-22 NOTE — ASSESSMENT & PLAN NOTE
Continued home amlodipine 5 mg daily at admission  BPs as high as 180s/110s  Increased amlodipine to 10 mg daily

## 2020-07-22 NOTE — SUBJECTIVE & OBJECTIVE
Subjective:     Interval History: Day - 6 from Flu/Cy/TBI haplo allo SCT for CML. Afebrile. BP elevated as high as 180s/110s. Increased amlodipine dose to 10 mg daily. Patient reporting headaches. May be r/t hypertension, but unsure of whether it is causing headaches or if headaches are causing hypertension. Questioning whether headaches are 2/2 nicotine withdrawal. Onset seems to correspond to time that patient quit smoking. Patient not thrombocytopenic. Ibuprofen 800 mg x 1 dose ordered. Had candid discussion with patient about home drinking behavior. Explained that withdrawal could cause seizures. He states that he has 3-4 beers, 3-4 glasses of wine, or 3-4 whiskeys nightly. Was drinking nightly up to night prior to admission. States that when he was in [facility] from 3/20-5/20, he did not experience withdrawal symptoms. Will monitor very closely for s/s of withdrawal. Dalal placed to right chest wall yesterday prior to admission. Reports tenderness to insertion site. No sign of infection noted. Likely 2/2 trauma.    Objective:     Vital Signs (Most Recent):  Temp: 97.8 °F (36.6 °C) (07/22/20 1055)  Pulse: 87 (07/22/20 1055)  Resp: 15 (07/22/20 1055)  BP: (!) 145/98 (07/22/20 1055)  SpO2: 98 % (07/22/20 1055) Vital Signs (24h Range):  Temp:  [97.6 °F (36.4 °C)-98.7 °F (37.1 °C)] 97.8 °F (36.6 °C)  Pulse:  [77-99] 87  Resp:  [15-19] 15  SpO2:  [91 %-99 %] 98 %  BP: (131-185)/() 145/98     Weight: 90.5 kg (199 lb 8.3 oz)  Body mass index is 32.2 kg/m².  Body surface area is 2.05 meters squared.    ECOG SCORE         [unfilled]    Intake/Output - Last 3 Shifts       07/20 0700 - 07/21 0659 07/21 0700 - 07/22 0659 07/22 0700 - 07/23 0659    P.O.   480    I.V. (mL/kg)  887 (9.8)     IV Piggyback   350    Total Intake(mL/kg)  887 (9.8) 830 (9.2)    Urine (mL/kg/hr)  450 650 (1.3)    Total Output  450 650    Net  +437 +180                 Physical Exam  Constitutional:       Appearance: He is well-developed.   HENT:       Head: Normocephalic and atraumatic.      Mouth/Throat:      Pharynx: No oropharyngeal exudate.   Eyes:      General:         Right eye: No discharge.         Left eye: No discharge.      Conjunctiva/sclera: Conjunctivae normal.      Pupils: Pupils are equal, round, and reactive to light.   Neck:      Musculoskeletal: Normal range of motion and neck supple.   Cardiovascular:      Rate and Rhythm: Normal rate and regular rhythm.      Heart sounds: Normal heart sounds. No murmur.   Pulmonary:      Effort: Pulmonary effort is normal. No respiratory distress.      Breath sounds: Normal breath sounds. No wheezing or rales.   Abdominal:      General: Bowel sounds are normal. There is no distension.      Palpations: Abdomen is soft.      Tenderness: There is no abdominal tenderness.   Musculoskeletal: Normal range of motion.         General: No deformity.   Skin:     General: Skin is warm and dry.      Findings: No erythema or rash.      Comments: Dalal to right chest wall. Dressing c/d/i with no sign of infection noted.   Neurological:      Mental Status: He is alert and oriented to person, place, and time.   Psychiatric:         Behavior: Behavior normal.         Thought Content: Thought content normal.         Judgment: Judgment normal.         Significant Labs:   CBC:   Recent Labs   Lab 07/21/20  1017 07/22/20  0325   WBC 6.79 6.95   HGB 14.7 13.4*   HCT 43.1 40.2    213    and CMP:   Recent Labs   Lab 07/21/20  1017 07/22/20  0325    141   K 4.3 3.8    106   CO2 19* 25   GLU 91 98   BUN 17 16   CREATININE 0.8 0.8   CALCIUM 9.7 9.3   PROT 7.7 6.9   ALBUMIN 4.5 4.1   BILITOT 0.2 0.2   ALKPHOS 63 61   AST 30 18   ALT 36 25   ANIONGAP 15 10   EGFRNONAA >60.0 >60.0       Diagnostic Results:  I have reviewed all pertinent imaging results/findings within the past 24 hours.

## 2020-07-22 NOTE — PLAN OF CARE
Pt is day - 6 for allo transplant. POC discussed with pt and all questions answered. Pt started on IVF. pt hypertensive, and call placed to Dr. Hodges.  C/o headache, and oxy 5mg given. Plan for pt to receive Fludarabine and cytarabine this am.

## 2020-07-22 NOTE — ASSESSMENT & PLAN NOTE
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 Achieved morphologic CR  Started on daily Ponatinib 30 mg daily, which he continues to take outpatient. Will hold Ponatinib on admission.  Referred to Dr. Hodges by Dr. Ramos for SCT consideration  Had BMBx at [facility] on 7/1/2020 showing no morphologic or immunophenotypic evidence of CML  Admitted 7/21/20 for planned Flu/Cy/TBI haplo allo SCT  See SCT candidate

## 2020-07-22 NOTE — PLAN OF CARE
Pt involved in plan of care and communicating needs throughout shift.  Wife at bedside and involved in care.  Day -6 for Flu/Cy/TBI haplo allo SCT.  Pt up in room independently with steady gait.  Pt c/o headache this am; 1 x dose ibuprofen ordered and admin with relief noted.  Pt c/o nausea; scheduled zofran admin prior to chemo; pt stated no relief; prn compazine admin with good relief noted.  Pt is tolerating regular diet, voiding without difficulty.  IVF infusing throughout shift as ordered.  IV cyclophosphamide and fludarabine infused this am as ordered; pt tolerated without complication.  All VSS; no acute events so far this shift.  Pt remaining free from falls or injury throughout shift; bed in lowest position; call light within reach.  Pt instructed to call for assistance as needed.  Q1H rounding done on pt.

## 2020-07-22 NOTE — PROGRESS NOTES
[facility]  Hematology  Bone Marrow Transplant  Progress Note    Patient Name: Kvng Jones Sr.  Admission Date: 7/21/2020  Hospital Length of Stay: 1 days  Code Status: Full Code    Subjective:     Interval History: Day - 6 from Flu/Cy/TBI haplo allo SCT for CML. Afebrile. BP elevated as high as 180s/110s. Increased amlodipine dose to 10 mg daily. Patient reporting headaches. May be r/t hypertension, but unsure of whether it is causing headaches or if headaches are causing hypertension. Questioning whether headaches are 2/2 nicotine withdrawal. Onset seems to correspond to time that patient quit smoking. Patient not thrombocytopenic. Ibuprofen 800 mg x 1 dose ordered. Had candid discussion with patient about home drinking behavior. Explained that withdrawal could cause seizures. He states that he has 3-4 beers, 3-4 glasses of wine, or 3-4 whiskeys nightly. Was drinking nightly up to night prior to admission. States that when he was in [facility] from 3/20-5/20, he did not experience withdrawal symptoms. Will monitor very closely for s/s of withdrawal. Dalal placed to right chest wall yesterday prior to admission. Reports tenderness to insertion site. No sign of infection noted. Likely 2/2 trauma.    Objective:     Vital Signs (Most Recent):  Temp: 97.8 °F (36.6 °C) (07/22/20 1055)  Pulse: 87 (07/22/20 1055)  Resp: 15 (07/22/20 1055)  BP: (!) 145/98 (07/22/20 1055)  SpO2: 98 % (07/22/20 1055) Vital Signs (24h Range):  Temp:  [97.6 °F (36.4 °C)-98.7 °F (37.1 °C)] 97.8 °F (36.6 °C)  Pulse:  [77-99] 87  Resp:  [15-19] 15  SpO2:  [91 %-99 %] 98 %  BP: (131-185)/() 145/98     Weight: 90.5 kg (199 lb 8.3 oz)  Body mass index is 32.2 kg/m².  Body surface area is 2.05 meters squared.    ECOG SCORE         [unfilled]    Intake/Output - Last 3 Shifts       07/20 0700 - 07/21 0659 07/21 0700 - 07/22 0659 07/22 0700 - 07/23 0659    P.O.   480    I.V. (mL/kg)  887 (9.8)     IV Piggyback   350    Total  Intake(mL/kg)  887 (9.8) 830 (9.2)    Urine (mL/kg/hr)  450 650 (1.3)    Total Output  450 650    Net  +437 +180                 Physical Exam  Constitutional:       Appearance: He is well-developed.   HENT:      Head: Normocephalic and atraumatic.      Mouth/Throat:      Pharynx: No oropharyngeal exudate.   Eyes:      General:         Right eye: No discharge.         Left eye: No discharge.      Conjunctiva/sclera: Conjunctivae normal.      Pupils: Pupils are equal, round, and reactive to light.   Neck:      Musculoskeletal: Normal range of motion and neck supple.   Cardiovascular:      Rate and Rhythm: Normal rate and regular rhythm.      Heart sounds: Normal heart sounds. No murmur.   Pulmonary:      Effort: Pulmonary effort is normal. No respiratory distress.      Breath sounds: Normal breath sounds. No wheezing or rales.   Abdominal:      General: Bowel sounds are normal. There is no distension.      Palpations: Abdomen is soft.      Tenderness: There is no abdominal tenderness.   Musculoskeletal: Normal range of motion.         General: No deformity.   Skin:     General: Skin is warm and dry.      Findings: No erythema or rash.      Comments: Dalal to right chest wall. Dressing c/d/i with no sign of infection noted.   Neurological:      Mental Status: He is alert and oriented to person, place, and time.   Psychiatric:         Behavior: Behavior normal.         Thought Content: Thought content normal.         Judgment: Judgment normal.         Significant Labs:   CBC:   Recent Labs   Lab 07/21/20  1017 07/22/20  0325   WBC 6.79 6.95   HGB 14.7 13.4*   HCT 43.1 40.2    213    and CMP:   Recent Labs   Lab 07/21/20  1017 07/22/20  0325    141   K 4.3 3.8    106   CO2 19* 25   GLU 91 98   BUN 17 16   CREATININE 0.8 0.8   CALCIUM 9.7 9.3   PROT 7.7 6.9   ALBUMIN 4.5 4.1   BILITOT 0.2 0.2   ALKPHOS 63 61   AST 30 18   ALT 36 25   ANIONGAP 15 10   EGFRNONAA >60.0 >60.0       Diagnostic Results:  I  have reviewed all pertinent imaging results/findings within the past 24 hours.    Assessment/Plan:     * Stem cell transplant candidate  Admitted 7/21/20 for planned Flu/Cy/TBI haplo allo SCT. Son is the donor. Today is Day - 6.  Will receive fludarabine and cyclophosphamide today.    Planned conditioning regimen:  Fludarabine on Day -6, -5, -4, -3, and -2  Cyclophosphamide on Day -6 and -5  Total Body Irradiation on Day -1     Planned  GVHD Prophylaxis:  Cyclophosphamide on Day +3 and +4  Tacrolimus starting on Day +5  Mycophenolate starting on Day +5     Antimicrobial Prophylaxis:  Acyclovir starting on Day -6  Levofloxacin starting on Day -1  Fluconazole starting on Day -1  Bactrim starting on Day +30     SOS/VOD Prophylaxis:  Ursodiol on Day -6 through Day +30     Growth Factor Support:  Neupogen starting on Day +7     CML in remission  Presented to [facility] with CMP in blast crisis and with nodular disease on 3/30/2020  Induced with FLAG-Addis. Achieved morphologic CR  Started on daily Ponatinib 30 mg daily, which he continues to take outpatient. Will hold Ponatinib on admission.  Referred to Dr. Hodges by Dr. Ramos for SCT consideration  Had [place] [facility] on 7/1/2020 showing no morphologic or immunophenotypic evidence of CML  Admitted 7/21/20 for planned Flu/Cy/TBI haplo allo SCT  See SCT candidate    Cervical stenosis of spine  Continue home Flexeril and ultram  PRN oxy IR added for additional relief    Tobacco abuse, in remission  35 pack year history  Quit smoking 6/1/2020 using Chantix  Patient completed course of Chantix and denies need for Nicotine patch.     Daily consumption of alcohol  Drinks 3-4 alcoholic beverages nightly  Will need to monitor closely for s/s of alcohol withdrawal while inpatient  Will consider consulting addiction medicine if indicated    BPH (benign prostatic hyperplasia)  Continue home Flomax    GERD (gastroesophageal reflux disease)  Takes Nexium at home  Will give Protonix while  inpatient as Nexium is not on hospital formulary  Can resume Nexium at discharge    Essential hypertension  Continued home amlodipine 5 mg daily at admission  BPs as high as 180s/110s  Increased amlodipine to 10 mg daily        VTE Risk Mitigation (From admission, onward)         Ordered     heparin, porcine (PF) 100 unit/mL injection flush 300 Units  As needed (PRN)      07/21/20 1920                Disposition: Inpatient for allo SCT.    Ivon Morrow, NP  Bone Marrow Transplant  [facility]

## 2020-07-22 NOTE — PROGRESS NOTES
cyclophosphamide (CYTOXAN) 1,000 mg in sodium chloride 0.9% 250 mL chemo infusion       Cytoxan infusing as ordered at this time.  Chemo consent and CAR in chart; dose and BSA independently verified by 2 chemo-certified RNs per protocol.  Pt premedicated with zofran and dexamethasone prior to start of chemo infusion.  R CW Dalal flushed with normal saline prior to start of chemo infusion with good blood return noted.  Cytoxan checked against order and patient at bedside by 2 RNs per protocol.  Pt tolerating well; no distress noted.  Will continue to monitor; chemotherapy precautions maintained.

## 2020-07-22 NOTE — ASSESSMENT & PLAN NOTE
35 pack year history  Quit smoking 6/1/2020 using Chantix  Patient completed course of Chantix and denies need for Nicotine patch.

## 2020-07-22 NOTE — PROGRESS NOTES
07/22/20 1720   Vital Signs   Pulse 104   Heart Rate Source Monitor   SpO2 96 %   O2 Device (Oxygen Therapy) room air   BP (!) 196/84   MAP (mmHg) 121   BP Location Right arm   BP Method Automatic   Patient Position Lying       BP remains elevated; pt states nausea has resolved; headache improved after tramadol but still present.  Ivon Morrow, NP aware; PRN hydralazine ordered and admin at this time.  Will continue to monitor; will recheck BP 1 hour post hydralazine.

## 2020-07-22 NOTE — ASSESSMENT & PLAN NOTE
Admitted 7/21/20 for planned Flu/Cy/TBI haplo allo SCT. Son is the donor. Today is Day - 6.  Will receive fludarabine and cyclophosphamide today.    Planned conditioning regimen:  Fludarabine on Day -6, -5, -4, -3, and -2  Cyclophosphamide on Day -6 and -5  Total Body Irradiation on Day -1     Planned  GVHD Prophylaxis:  Cyclophosphamide on Day +3 and +4  Tacrolimus starting on Day +5  Mycophenolate starting on Day +5     Antimicrobial Prophylaxis:  Acyclovir starting on Day -6  Levofloxacin starting on Day -1  Fluconazole starting on Day -1  Bactrim starting on Day +30     SOS/VOD Prophylaxis:  Ursodiol on Day -6 through Day +30     Growth Factor Support:  Neupogen starting on Day +7

## 2020-07-22 NOTE — PROGRESS NOTES
[facility]   Bone Marrow Transplant Psychosocial Assessment   Date: 2020       Demographic Information     Name: Kvng Jones    : 1960    Age: 60 y.o.    Sex: male    Race: White    Marital Status:    SS #:     Phone Number(s): [phone] (home)     Home Address: [address] [place] [address]    Mailing Address: [address] [place] [address]    Are you a [place] Citizen? Yes   If no, please explain: n/a       Contact Information     Next of Kin: Guillermina Jones   Relationship: Spouse   Phone Number(s): [phone]   Emergency Contact: Extended Emergency Contact Information  Primary Emergency Contact: RobertGuillermina Morelos  Mobile Phone: [phone]  Relation: Spouse      Living Arrangements   Household Composition:  Patient currently resides with: Spouse   If patient resides with spouse, please explain the marital relationship: Very good.  Report a supportive relationship and 9 year legal marriage.   Does the patient currently own his/her own home? Yes   Does the patient currently rent home/apartment: No   Are current living arrangements permanent? Yes   If no, please explain: n/a     Children's Names  Adult children 6 (3 biological and  3 step children from marriage)     Name Sex Age                              Who will be the primary caregiver for the children when patient is admitted to the hospital? n/a   Name: n/a   Phone Number:  n/a     Support System   Primary Caregiver:   Name: Guillermina Jones   Relationship: Spouse   Cell #: [phone]   Address: [address]   Home #: n/a   City: [place]   Street: [place]   ZIP: [id number]     Secondary Caregiver:   Name: Nando Jones   Relationship: Son   Cell #: [phone]   Address: [address]   Home #: n/a   City: [place]   Street: [place]   ZIP: [id number]     Will patient's caregiver be available full-time? Yes   What is the patient's Pentecostal? Sikh   Is patient currently practicing or  non-practicing? Non-practicing       Does patient have any other sources for support? Yes      If yes, please explain: Spiritual, prayer, Family, friends     Post BMT Plans      Does patient have full understanding of recovery from BMT? Yes   Does patient understand risk associated with BMT? Yes   What are patient's housing plans post BMT? Relatives home   Does patient have a Living Will? No   Does patient have a Power of ?  No   If yes, please give name of POA:  Name: n/a    Phone #: n/a     Employment Information     Is patient currently employed? Yes   Employer: [employer]   Phone #: [phone]   Position:    Full Time/Part Time? Full Time   YRS: 17     Secondary Employment Information     Employer: n/a   Phone #: n/a   Position: n/a   Full Time/Part Time? n/a   YRS: n/a     Significant Other Employment Information     Employer: n/a   Phone #: n/a   Position: n/a   Full Time/Part Time? n/a   YRS: n/a     Financial Information     Monthly Income: $12.083   Yearly Income: $145,000   Source of Income:  Salary   Do you have any financial concerns? No   If yes, please explain:  N/a     Insurance Information      Do you have health insurance?  Yes   Insurance [facility] [employer]   Policy #: [id number]   Group #: [id number]   Policy [place]: Self   Medicare: No   Medicare Part D: No   Medicaid: No   Do you have Disability Insurance? No      Do you have a Cancer Policy? No   Do you have medication/prescription coverage? Yes   Are you a ? No     Medical Information     Diagnosis: Chronic Myelocytic Leukemia   Date of Diagnosis: 03/27/2020   Is this a new diagnosis? Yes         If no, please explain: n/a   Past Medical History: Past Medical History:   Diagnosis Date    CML (chronic myelocytic leukemia)     Hx of psychiatric care     valium ativan    Melanoma in situ of external ear, right       Infusion Services: None   Home Health: None   Durable Medical Equipment: Crutches and a  walker   Activities of Daily Living: Independent some fatigue   Patient's Family Cancer History: Two sisters having breast cancer *personal history of melanoma.     Cognitive Functioning     Cognitive State: alert oriented   Does patient have any concerns that may affect medical follow up and full understanding of treatment? No   Does patient have any concerns that may impact medication compliance? No   Education Level: college graduate   Does the patient have any learning disabilities? No   If yes, please explain: n/a   Can the patient read English? Yes   Can the patient write in English? Yes      Is the patient Literate? Yes   What is the patient's primary language? English   Does the patient need interpretation services? No        Psychosocial History     Does patient have any emotional issues? Denies   If, yes please explain:  n/a   Does patient have a psychiatric history? Yes   If, yes please explain:  Anxiety   Is patient currently taking any psychiatric medication? Yes   If yes, please list medications: Ativan, Valium   Is patient currently in therapy or attending support groups? No   Where is the patient currently in therapy? n/a   Therapist/Counselor Name: n/a   Therapist/Counselor Phone #: n/a       Alcohol/Drug Use/Abuse History     Alcohol Use: Social History     Substance and Sexual Activity   Alcohol Use Yes    Alcohol/week: 12.0 standard drinks    Types: 4 Glasses of wine, 4 Cans of beer, 4 Shots of liquor per week      Tobacco Use: Social History     Tobacco Use   Smoking Status Former Smoker    Packs/day: 1.00    Years: 35.00    Pack years: 35.00    Start date: 1985    Quit date: 2020    Years since quittin.1   Smokeless Tobacco Never Used      Drug Use: Social History     Substance and Sexual Activity   Drug Use Yes    Types: Marijuana    Comment: medical marijuana          Coping Skills     How is the patient currently coping with their diagnosis? Appropriately.  Reports that  he enjoys and relaxes when reading, listening to audio books and while watching movies. Participates in exercise by walking.  He also enjoys spending time with his family and friends.   Is the patient open/receptive to psychosocial intervention? Yes   Has the patient experienced any significant losses in his/her life? No      If yes, please explain: n/a   What are the patient's identified needs? Good support from medical team and support of family/friends.     Goals: Successful SCT and resumption of prior activities/lifestyle.   Interview Behavior: Appropriate   Suitability for Transplant: Appears to be an adequate SCT candidate.   Additional Comments: Mr. Jones presented for this telephonic previously scheduled visit with his spouse also present during the call.  Mr. Jones appears to be an adequate candidate for SCT. He has identified caregiver support as his wife who will be available per SCT guidelines will his primary caregiver.  He does have additional caregivers identified if needed.  Mr. Jones also has a significant lodging and travel benefit under his health plan and he has chosen to utilize the [facility] for his post SCT hospital stay.  He reports an adequate income, health insurance coverage and stable housing.  He does report a positive history of alcohol use.  He notes a history of mental health problems and a history of actively seeking positive ways of coping when needed.  He does take antianxiety medication if warranted.  He is also open and receptive to psychosocial intervention.

## 2020-07-22 NOTE — PROGRESS NOTES
fludarabine (FLUDARA) 60 mg in sodium chloride 0.9% 100 mL chemo infusion       Fludarabine infusing as ordered at this time.  Chemo consent and CAR in chart; dose and BSA independently verified by 2 chemo-certified RNs per protocol.  Pt premedicated with zofran and  dexamethasone prior to start of prior chemo infusion.  R CW Dalal flushed with normal saline prior to start of chemo infusion with good blood return noted.  Fludarabine checked against order and patient at bedside by 2 RNs per protocol.  Pt tolerating well; no distress noted.  Will continue to monitor; chemotherapy precautions maintained.

## 2020-07-23 PROBLEM — R51.9 HEADACHE: Status: ACTIVE | Noted: 2020-07-23

## 2020-07-23 LAB
ALBUMIN SERPL BCP-MCNC: 3.8 G/DL (ref 3.5–5.2)
ALP SERPL-CCNC: 54 U/L (ref 55–135)
ALT SERPL W/O P-5'-P-CCNC: 21 U/L (ref 10–44)
ANION GAP SERPL CALC-SCNC: 11 MMOL/L (ref 8–16)
AST SERPL-CCNC: 17 U/L (ref 10–40)
BASOPHILS # BLD AUTO: 0.01 K/UL (ref 0–0.2)
BASOPHILS NFR BLD: 0.1 % (ref 0–1.9)
BILIRUB SERPL-MCNC: 0.2 MG/DL (ref 0.1–1)
BUN SERPL-MCNC: 9 MG/DL (ref 8–23)
CALCIUM SERPL-MCNC: 9.1 MG/DL (ref 8.7–10.5)
CHLORIDE SERPL-SCNC: 110 MMOL/L (ref 95–110)
CO2 SERPL-SCNC: 20 MMOL/L (ref 23–29)
CREAT SERPL-MCNC: 0.7 MG/DL (ref 0.5–1.4)
DIFFERENTIAL METHOD: ABNORMAL
EOSINOPHIL # BLD AUTO: 0 K/UL (ref 0–0.5)
EOSINOPHIL NFR BLD: 0.1 % (ref 0–8)
ERYTHROCYTE [DISTWIDTH] IN BLOOD BY AUTOMATED COUNT: 12.9 % (ref 11.5–14.5)
EST. GFR  (AFRICAN AMERICAN): >60 ML/MIN/1.73 M^2
EST. GFR  (NON AFRICAN AMERICAN): >60 ML/MIN/1.73 M^2
GLUCOSE SERPL-MCNC: 125 MG/DL (ref 70–110)
HCT VFR BLD AUTO: 35.9 % (ref 40–54)
HGB BLD-MCNC: 12.2 G/DL (ref 14–18)
IMM GRANULOCYTES # BLD AUTO: 0.05 K/UL (ref 0–0.04)
IMM GRANULOCYTES NFR BLD AUTO: 0.6 % (ref 0–0.5)
LYMPHOCYTES # BLD AUTO: 1.2 K/UL (ref 1–4.8)
LYMPHOCYTES NFR BLD: 13.6 % (ref 18–48)
MAGNESIUM SERPL-MCNC: 1.6 MG/DL (ref 1.6–2.6)
MCH RBC QN AUTO: 33.7 PG (ref 27–31)
MCHC RBC AUTO-ENTMCNC: 34 G/DL (ref 32–36)
MCV RBC AUTO: 99 FL (ref 82–98)
MONOCYTES # BLD AUTO: 0.9 K/UL (ref 0.3–1)
MONOCYTES NFR BLD: 10.2 % (ref 4–15)
NEUTROPHILS # BLD AUTO: 6.4 K/UL (ref 1.8–7.7)
NEUTROPHILS NFR BLD: 75.4 % (ref 38–73)
NRBC BLD-RTO: 0 /100 WBC
PHOSPHATE SERPL-MCNC: 3 MG/DL (ref 2.7–4.5)
PLATELET # BLD AUTO: 212 K/UL (ref 150–350)
PMV BLD AUTO: 9.8 FL (ref 9.2–12.9)
POTASSIUM SERPL-SCNC: 3.8 MMOL/L (ref 3.5–5.1)
PROT SERPL-MCNC: 6.6 G/DL (ref 6–8.4)
RBC # BLD AUTO: 3.62 M/UL (ref 4.6–6.2)
SODIUM SERPL-SCNC: 141 MMOL/L (ref 136–145)
WBC # BLD AUTO: 8.51 K/UL (ref 3.9–12.7)

## 2020-07-23 PROCEDURE — 83735 ASSAY OF MAGNESIUM: CPT

## 2020-07-23 PROCEDURE — 94761 N-INVAS EAR/PLS OXIMETRY MLT: CPT

## 2020-07-23 PROCEDURE — 80053 COMPREHEN METABOLIC PANEL: CPT

## 2020-07-23 PROCEDURE — 20600001 HC STEP DOWN PRIVATE ROOM

## 2020-07-23 PROCEDURE — 99233 SBSQ HOSP IP/OBS HIGH 50: CPT | Mod: BMT,,, | Performed by: INTERNAL MEDICINE

## 2020-07-23 PROCEDURE — 25000003 PHARM REV CODE 250: Performed by: STUDENT IN AN ORGANIZED HEALTH CARE EDUCATION/TRAINING PROGRAM

## 2020-07-23 PROCEDURE — 99233 PR SUBSEQUENT HOSPITAL CARE,LEVL III: ICD-10-PCS | Mod: BMT,,, | Performed by: INTERNAL MEDICINE

## 2020-07-23 PROCEDURE — 84100 ASSAY OF PHOSPHORUS: CPT

## 2020-07-23 PROCEDURE — 25000003 PHARM REV CODE 250: Performed by: INTERNAL MEDICINE

## 2020-07-23 PROCEDURE — 25000003 PHARM REV CODE 250: Performed by: NURSE PRACTITIONER

## 2020-07-23 PROCEDURE — A9155 ARTIFICIAL SALIVA: HCPCS | Performed by: INTERNAL MEDICINE

## 2020-07-23 PROCEDURE — 63600175 PHARM REV CODE 636 W HCPCS: Performed by: INTERNAL MEDICINE

## 2020-07-23 PROCEDURE — 85025 COMPLETE CBC W/AUTO DIFF WBC: CPT

## 2020-07-23 RX ORDER — LANOLIN ALCOHOL/MO/W.PET/CERES
800 CREAM (GRAM) TOPICAL EVERY 4 HOURS PRN
Status: DISCONTINUED | OUTPATIENT
Start: 2020-07-23 | End: 2020-08-12 | Stop reason: HOSPADM

## 2020-07-23 RX ORDER — POTASSIUM CHLORIDE 20 MEQ/1
20 TABLET, EXTENDED RELEASE ORAL
Status: DISCONTINUED | OUTPATIENT
Start: 2020-07-23 | End: 2020-08-12 | Stop reason: HOSPADM

## 2020-07-23 RX ORDER — ACETAMINOPHEN 325 MG/1
650 TABLET ORAL EVERY 6 HOURS PRN
Status: DISCONTINUED | OUTPATIENT
Start: 2020-07-23 | End: 2020-08-12 | Stop reason: HOSPADM

## 2020-07-23 RX ORDER — SODIUM,POTASSIUM PHOSPHATES 280-250MG
2 POWDER IN PACKET (EA) ORAL EVERY 4 HOURS PRN
Status: DISCONTINUED | OUTPATIENT
Start: 2020-07-23 | End: 2020-08-12 | Stop reason: HOSPADM

## 2020-07-23 RX ORDER — HYDRALAZINE HYDROCHLORIDE 25 MG/1
50 TABLET, FILM COATED ORAL EVERY 6 HOURS PRN
Status: DISCONTINUED | OUTPATIENT
Start: 2020-07-23 | End: 2020-08-12 | Stop reason: HOSPADM

## 2020-07-23 RX ORDER — SODIUM,POTASSIUM PHOSPHATES 280-250MG
1 POWDER IN PACKET (EA) ORAL EVERY 4 HOURS PRN
Status: DISCONTINUED | OUTPATIENT
Start: 2020-07-23 | End: 2020-08-12 | Stop reason: HOSPADM

## 2020-07-23 RX ORDER — LANOLIN ALCOHOL/MO/W.PET/CERES
400 CREAM (GRAM) TOPICAL EVERY 4 HOURS PRN
Status: DISCONTINUED | OUTPATIENT
Start: 2020-07-23 | End: 2020-08-12 | Stop reason: HOSPADM

## 2020-07-23 RX ADMIN — LORAZEPAM 1 MG: 2 INJECTION INTRAMUSCULAR; INTRAVENOUS at 10:07

## 2020-07-23 RX ADMIN — Medication 400 MG: at 08:07

## 2020-07-23 RX ADMIN — URSODIOL 300 MG: 300 CAPSULE ORAL at 09:07

## 2020-07-23 RX ADMIN — POTASSIUM CHLORIDE AND SODIUM CHLORIDE: 900; 150 INJECTION, SOLUTION INTRAVENOUS at 08:07

## 2020-07-23 RX ADMIN — Medication 30 ML: at 01:07

## 2020-07-23 RX ADMIN — POTASSIUM CHLORIDE AND SODIUM CHLORIDE: 900; 150 INJECTION, SOLUTION INTRAVENOUS at 05:07

## 2020-07-23 RX ADMIN — Medication 400 MG: at 05:07

## 2020-07-23 RX ADMIN — HYDRALAZINE HYDROCHLORIDE 25 MG: 25 TABLET, FILM COATED ORAL at 01:07

## 2020-07-23 RX ADMIN — PROCHLORPERAZINE EDISYLATE 10 MG: 5 INJECTION INTRAMUSCULAR; INTRAVENOUS at 08:07

## 2020-07-23 RX ADMIN — Medication 30 ML: at 08:07

## 2020-07-23 RX ADMIN — ONDANSETRON 16 MG: 8 TABLET, ORALLY DISINTEGRATING ORAL at 09:07

## 2020-07-23 RX ADMIN — Medication 30 ML: at 09:07

## 2020-07-23 RX ADMIN — HYDRALAZINE HYDROCHLORIDE 50 MG: 25 TABLET, FILM COATED ORAL at 05:07

## 2020-07-23 RX ADMIN — AMLODIPINE BESYLATE 10 MG: 10 TABLET ORAL at 09:07

## 2020-07-23 RX ADMIN — POTASSIUM CHLORIDE 20 MEQ: 1500 TABLET, EXTENDED RELEASE ORAL at 12:07

## 2020-07-23 RX ADMIN — ACYCLOVIR 800 MG: 800 TABLET ORAL at 05:07

## 2020-07-23 RX ADMIN — CETIRIZINE HYDROCHLORIDE 10 MG: 5 TABLET ORAL at 09:07

## 2020-07-23 RX ADMIN — ACYCLOVIR 800 MG: 800 TABLET ORAL at 09:07

## 2020-07-23 RX ADMIN — DEXAMETHASONE 12 MG: 4 TABLET ORAL at 09:07

## 2020-07-23 RX ADMIN — OXYCODONE HYDROCHLORIDE 5 MG: 5 TABLET ORAL at 08:07

## 2020-07-23 RX ADMIN — FLUDARABINE PHOSPHATE 60 MG: 25 INJECTION, SOLUTION INTRAVENOUS at 11:07

## 2020-07-23 RX ADMIN — Medication 30 ML: at 05:07

## 2020-07-23 RX ADMIN — POTASSIUM CHLORIDE AND SODIUM CHLORIDE: 900; 150 INJECTION, SOLUTION INTRAVENOUS at 10:07

## 2020-07-23 RX ADMIN — CYCLOPHOSPHAMIDE 1000 MG: 1 INJECTION, POWDER, FOR SOLUTION INTRAVENOUS; ORAL at 10:07

## 2020-07-23 RX ADMIN — LORAZEPAM 1 MG: 2 INJECTION INTRAMUSCULAR; INTRAVENOUS at 11:07

## 2020-07-23 RX ADMIN — PANTOPRAZOLE SODIUM 40 MG: 40 TABLET, DELAYED RELEASE ORAL at 09:07

## 2020-07-23 RX ADMIN — Medication 400 MG: at 12:07

## 2020-07-23 RX ADMIN — TAMSULOSIN HYDROCHLORIDE 0.4 MG: 0.4 CAPSULE ORAL at 09:07

## 2020-07-23 RX ADMIN — URSODIOL 300 MG: 300 CAPSULE ORAL at 08:07

## 2020-07-23 RX ADMIN — TRAMADOL HYDROCHLORIDE 50 MG: 50 TABLET, FILM COATED ORAL at 05:07

## 2020-07-23 NOTE — PLAN OF CARE
Pt involved in plan of care and communicating needs throughout shift.  Wife at bedside and involved in care.  Day -5 for Flu/Cy/TBI haplo allo SCT.  Pt up in room independently with steady gait.  Pt c/o headache and nausea this am; prn oxycodone and compazine admin with some relief noted.  Nausea returned this afternoon; prn ativan admin with relief noted.  Pt is tolerating regular diet, voiding without difficulty.  IVF infusing throughout shift as ordered.  IV cyclophosphamide and fludarabine infused this am as ordered; pt tolerated without complication.  Pt has remained hypertensive throughout shift (see VS flowsheet); scheduled amlodipine and prn hydralazine admin with temporary effect.  PRN hydralazine dose increased.  No acute events so far this shift.  Pt remaining free from falls or injury throughout shift; bed in lowest position; call light within reach.  Pt instructed to call for assistance as needed.  Q1H rounding done on pt.

## 2020-07-23 NOTE — SUBJECTIVE & OBJECTIVE
Subjective:     Interval History: Day -5 from Flu/Cy/TBI haplo allo SCT for CML. Continues to have elevated BP and headaches. HTN and headaches do not seem to co-occur. Amlodipine dose increased and prn hydralazine ordered. Headache onset starts during sleep and lasts for a few hours after awakening. Although not described as unilateral, questioning whether may be cluster headaches--especially given the fact that he is male, a previous smoker, and a current drinker. Headache onset occurred several weeks ago and seems to coincide with quitting smoking and initiation of ponatinib. Has been off ponatinib for a few days now, so less likely to be causing headaches. Will try O2 with next headache. Will also consider trying imitrex. BMT Pharm D checked and determined that imitrex is not contraindicated with chemo regimen. If headaches persist, will image.    Objective:     Vital Signs (Most Recent):  Temp: 97.6 °F (36.4 °C) (07/23/20 1128)  Pulse: 88 (07/23/20 1128)  Resp: 18 (07/23/20 1128)  BP: (!) 167/81 (07/23/20 1258)  SpO2: 96 % (07/23/20 1128) Vital Signs (24h Range):  Temp:  [97.6 °F (36.4 °C)-98.4 °F (36.9 °C)] 97.6 °F (36.4 °C)  Pulse:  [] 88  Resp:  [16-19] 18  SpO2:  [93 %-97 %] 96 %  BP: (141-197)/(75-94) 167/81     Weight: 92.1 kg (203 lb 2.5 oz)  Body mass index is 32.79 kg/m².  Body surface area is 2.07 meters squared.    ECOG SCORE         [unfilled]    Intake/Output - Last 3 Shifts       07/21 0700 - 07/22 0659 07/22 0700 - 07/23 0659 07/23 0700 - 07/24 0659    P.O.  1320 480    I.V. (mL/kg) 887 (9.8) 3203.5 (34.8) 1045 (11.3)    IV Piggyback  350 350    Total Intake(mL/kg) 887 (9.8) 4873.5 (52.9) 1875 (20.4)    Urine (mL/kg/hr) 450 2575 (1.2) 575 (0.9)    Total Output 450 2575 575    Net +437 +2298.5 +1300                 Physical Exam  Constitutional:       Appearance: He is well-developed.   HENT:      Head: Normocephalic and atraumatic.      Mouth/Throat:      Pharynx: No oropharyngeal exudate.    Eyes:      General:         Right eye: No discharge.         Left eye: No discharge.      Conjunctiva/sclera: Conjunctivae normal.      Pupils: Pupils are equal, round, and reactive to light.   Neck:      Musculoskeletal: Normal range of motion and neck supple.   Cardiovascular:      Rate and Rhythm: Normal rate and regular rhythm.      Heart sounds: Normal heart sounds. No murmur.   Pulmonary:      Effort: Pulmonary effort is normal. No respiratory distress.      Breath sounds: Normal breath sounds. No wheezing or rales.   Abdominal:      General: Bowel sounds are normal. There is no distension.      Palpations: Abdomen is soft.      Tenderness: There is no abdominal tenderness.   Musculoskeletal: Normal range of motion.         General: No deformity.   Skin:     General: Skin is warm and dry.      Findings: No erythema or rash.      Comments: Dalal to right chest wall. Dressing c/d/i with no sign of infection noted.   Neurological:      Mental Status: He is alert and oriented to person, place, and time.   Psychiatric:         Behavior: Behavior normal.         Thought Content: Thought content normal.         Judgment: Judgment normal.         Significant Labs:   CBC:   Recent Labs   Lab 07/22/20  0325 07/23/20  0357   WBC 6.95 8.51   HGB 13.4* 12.2*   HCT 40.2 35.9*    212    and CMP:   Recent Labs   Lab 07/22/20  0325 07/23/20  0357    141   K 3.8 3.8    110   CO2 25 20*   GLU 98 125*   BUN 16 9   CREATININE 0.8 0.7   CALCIUM 9.3 9.1   PROT 6.9 6.6   ALBUMIN 4.1 3.8   BILITOT 0.2 0.2   ALKPHOS 61 54*   AST 18 17   ALT 25 21   ANIONGAP 10 11   EGFRNONAA >60.0 >60.0       Diagnostic Results:  I have reviewed all pertinent imaging results/findings within the past 24 hours.

## 2020-07-23 NOTE — ASSESSMENT & PLAN NOTE
Drinks 3-4 alcoholic beverages nightly  Will need to monitor closely for s/s of alcohol withdrawal while inpatient  Will consider consulting addiction medicine if indicated  Prn ativan ordered

## 2020-07-23 NOTE — ASSESSMENT & PLAN NOTE
Started having headaches several weeks ago. Onset seems to correspond to when patient quit smoking and when he started ponatinib  Has been off of ponatinib for a few days but fevers have persisted  Pattern is like cluster headaches (onset during sleep), but not unilateral.  Will try O2 via non-rebreather at 12 L over 15 minutes with next headache  Can try imitrex if O2 is ineffective. Not contraindicated per pharm D.  Description of headache sounds like pressure headache--throbbing with pressure across crown of head  Has flexiril ordered. Can also try this

## 2020-07-23 NOTE — ASSESSMENT & PLAN NOTE
Admitted 7/21/20 for planned Flu/Cy/TBI haplo allo SCT. Son is the donor. Today is Day - 5.  Will receive fludarabine and cyclophosphamide today.    Planned conditioning regimen:  Fludarabine on Day -6, -5, -4, -3, and -2  Cyclophosphamide on Day -6 and -5  Total Body Irradiation on Day -1     Planned  GVHD Prophylaxis:  Cyclophosphamide on Day +3 and +4  Tacrolimus starting on Day +5  Mycophenolate starting on Day +5     Antimicrobial Prophylaxis:  Acyclovir starting on Day -6  Levofloxacin starting on Day -1  Fluconazole starting on Day -1  Bactrim starting on Day +30     SOS/VOD Prophylaxis:  Ursodiol on Day -6 through Day +30     Growth Factor Support:  Neupogen starting on Day +7

## 2020-07-23 NOTE — ASSESSMENT & PLAN NOTE
Continued home amlodipine 5 mg daily at admission  BPs as high as 180s/110s  Increased amlodipine to 10 mg daily\  Started prn hydralazine

## 2020-07-23 NOTE — PROGRESS NOTES
[facility]  Hematology  Bone Marrow Transplant  Progress Note    Patient Name: Kvng Jones Sr.  Admission Date: 7/21/2020  Hospital Length of Stay: 2 days  Code Status: Full Code    Subjective:     Interval History: Day -5 from Flu/Cy/TBI haplo allo SCT for CML. Continues to have elevated BP and headaches. HTN and headaches do not seem to co-occur. Amlodipine dose increased and prn hydralazine ordered. Headache onset starts during sleep and lasts for a few hours after awakening. Although not described as unilateral, questioning whether may be cluster headaches--especially given the fact that he is male, a previous smoker, and a current drinker. Headache onset occurred several weeks ago and seems to coincide with quitting smoking and initiation of ponatinib. Has been off ponatinib for a few days now, so less likely to be causing headaches. Will try O2 with next headache. Will also consider trying imitrex. BMT Pharm D checked and determined that imitrex is not contraindicated with chemo regimen. If headaches persist, will consider imaging.    Objective:     Vital Signs (Most Recent):  Temp: 97.6 °F (36.4 °C) (07/23/20 1128)  Pulse: 88 (07/23/20 1128)  Resp: 18 (07/23/20 1128)  BP: (!) 167/81 (07/23/20 1258)  SpO2: 96 % (07/23/20 1128) Vital Signs (24h Range):  Temp:  [97.6 °F (36.4 °C)-98.4 °F (36.9 °C)] 97.6 °F (36.4 °C)  Pulse:  [] 88  Resp:  [16-19] 18  SpO2:  [93 %-97 %] 96 %  BP: (141-197)/(75-94) 167/81     Weight: 92.1 kg (203 lb 2.5 oz)  Body mass index is 32.79 kg/m².  Body surface area is 2.07 meters squared.    ECOG SCORE         [unfilled]    Intake/Output - Last 3 Shifts       07/21 0700 - 07/22 0659 07/22 0700 - 07/23 0659 07/23 0700 - 07/24 0659    P.O.  1320 480    I.V. (mL/kg) 887 (9.8) 3203.5 (34.8) 1045 (11.3)    IV Piggyback  350 350    Total Intake(mL/kg) 887 (9.8) 4873.5 (52.9) 1875 (20.4)    Urine (mL/kg/hr) 450 2575 (1.2) 575 (0.9)    Total Output 450 2575 575     Net +437 +2298.5 +1300                 Physical Exam  Constitutional:       Appearance: He is well-developed.   HENT:      Head: Normocephalic and atraumatic.      Mouth/Throat:      Pharynx: No oropharyngeal exudate.   Eyes:      General:         Right eye: No discharge.         Left eye: No discharge.      Conjunctiva/sclera: Conjunctivae normal.      Pupils: Pupils are equal, round, and reactive to light.   Neck:      Musculoskeletal: Normal range of motion and neck supple.   Cardiovascular:      Rate and Rhythm: Normal rate and regular rhythm.      Heart sounds: Normal heart sounds. No murmur.   Pulmonary:      Effort: Pulmonary effort is normal. No respiratory distress.      Breath sounds: Normal breath sounds. No wheezing or rales.   Abdominal:      General: Bowel sounds are normal. There is no distension.      Palpations: Abdomen is soft.      Tenderness: There is no abdominal tenderness.   Musculoskeletal: Normal range of motion.         General: No deformity.   Skin:     General: Skin is warm and dry.      Findings: No erythema or rash.      Comments: Dalal to right chest wall. Dressing c/d/i with no sign of infection noted.   Neurological:      Mental Status: He is alert and oriented to person, place, and time.   Psychiatric:         Behavior: Behavior normal.         Thought Content: Thought content normal.         Judgment: Judgment normal.         Significant Labs:   CBC:   Recent Labs   Lab 07/22/20  0325 07/23/20  0357   WBC 6.95 8.51   HGB 13.4* 12.2*   HCT 40.2 35.9*    212    and CMP:   Recent Labs   Lab 07/22/20  0325 07/23/20  0357    141   K 3.8 3.8    110   CO2 25 20*   GLU 98 125*   BUN 16 9   CREATININE 0.8 0.7   CALCIUM 9.3 9.1   PROT 6.9 6.6   ALBUMIN 4.1 3.8   BILITOT 0.2 0.2   ALKPHOS 61 54*   AST 18 17   ALT 25 21   ANIONGAP 10 11   EGFRNONAA >60.0 >60.0       Diagnostic Results:  I have reviewed all pertinent imaging results/findings within the past 24  hours.    Assessment/Plan:     * Stem cell transplant candidate  Admitted 7/21/20 for planned Flu/Cy/TBI haplo allo SCT. Son is the donor. Today is Day - 5.  Will receive fludarabine and cyclophosphamide today.    Planned conditioning regimen:  Fludarabine on Day -6, -5, -4, -3, and -2  Cyclophosphamide on Day -6 and -5  Total Body Irradiation on Day -1     Planned  GVHD Prophylaxis:  Cyclophosphamide on Day +3 and +4  Tacrolimus starting on Day +5  Mycophenolate starting on Day +5     Antimicrobial Prophylaxis:  Acyclovir starting on Day -6  Levofloxacin starting on Day -1  Fluconazole starting on Day -1  Bactrim starting on Day +30     SOS/VOD Prophylaxis:  Ursodiol on Day -6 through Day +30     Growth Factor Support:  Neupogen starting on Day +7     CML in remission  Presented to [facility] with CMP in blast crisis and with nodular disease on 3/30/2020  Induced with FLAG-Addis. Achieved morphologic CR  Started on daily Ponatinib 30 mg daily, which he continues to take outpatient. Will hold Ponatinib on admission.  Referred to Dr. Hodges by Dr. Ramos for SCT consideration  [place] [facility] [place] showing no morphologic or immunophenotypic evidence of CML  Admitted 7/21/20 for planned Flu/Cy/TBI haplo allo SCT  See SCT candidate    Headache  Started having headaches several weeks ago. Onset seems to correspond to when patient quit smoking and when he started ponatinib  Has been off of ponatinib for a few days but fevers have persisted  Pattern is like cluster headaches (onset during sleep), but not unilateral.  Will try O2 via non-rebreather at 12 L over 15 minutes with next headache  Can try imitrex if O2 is ineffective. Not contraindicated per pharm D.  Description of headache sounds like pressure headache--throbbing with pressure across crown of head  Has flexiril ordered. Can also try this    Cervical stenosis of spine  Continue home Flexeril and ultram  PRN oxy IR added for additional relief    Tobacco abuse, in  remission  35 pack year history  Quit smoking 6/1/2020 using Chantix  Patient completed course of Chantix and denies need for Nicotine patch.     Daily consumption of alcohol  Drinks 3-4 alcoholic beverages nightly  Will need to monitor closely for s/s of alcohol withdrawal while inpatient  Will consider consulting addiction medicine if indicated  Prn ativan ordered    BPH (benign prostatic hyperplasia)  Continue home Flomax    GERD (gastroesophageal reflux disease)  Takes Nexium at home  Will give Protonix while inpatient as Nexium is not on hospital formulary  Can resume Nexium at discharge    Essential hypertension  Continued home amlodipine 5 mg daily at admission  BPs as high as 180s/110s  Increased amlodipine to 10 mg daily\  Started prn hydralazine        VTE Risk Mitigation (From admission, onward)         Ordered     heparin, porcine (PF) 100 unit/mL injection flush 300 Units  As needed (PRN)      07/21/20 1920                Disposition: Inpatient for allo SCT.    Ivon Morrow, NP  Bone Marrow Transplant  [facility]

## 2020-07-23 NOTE — ASSESSMENT & PLAN NOTE
Started having headaches several weeks ago. Onset seems to correspond to when patient quit smoking and when he started ponatinib  Has been off of ponatinib for a few days but fevers have persisted  Pattern is like cluster headaches (onset during sleep), but not unilateral.  Will try O2 via non-rebreather at 12 L over 15 minutes with next headache  Can try imitrex if O2 is ineffective. Not contraindicated per pharm D.  Description of headache sounds like tension headache--throbbing with pressure across crown of head  Has flexiril ordered. Can also try this

## 2020-07-23 NOTE — PROGRESS NOTES
Admit Assessment    Patient Identification  Kvng Jones Sr.   :  1960  Admit Date:  2020  Attending Provider:  Cheryl Hodges MD              Referral:   Pt was admitted to  with a diagnosis of Stem cell transplant candidate, and was admitted this hospital stay due to Chronic myeloid leukemia, BCR/ABL-positive, not having achieved remission [C92.10]  CML in remission [C92.11].   is involved was referred to the Social Work Department via (Referral).  Patient presents as a 60 y.o. year old  male.    Living Situation:      Resides at [address] [place] [address], phone: [phone] (home).      Current or Past Agencies and Description of Services/Supplies    DME  Agency Name:   Agency Phone Number:     Home Health  Agency Name:   Agency Phone Number:   Services:   Denies history of home health services.    IV Infusion  Agency Name:   Agency Phone Number:     Nutrition: Oral    Outpatient Pharmacy:     [id number] [place], [place] - [address] AT [facility] [address]  [address] [facility] [address]  Phone: [phone] Fax: [phone]    Patient Preference of agencies include To be determined as appropriate.    Patient/Caregiver informed of right to choose providers or agencies.  Patient provides permission to release any necessary information to [facility] and to [facility] agencies as needed to facilitate patient care, treatment planning, and patient discharge planning.  Written and verbal resources provided.    Coping  Appropriate.    Pt not feeling well today experiencing n/v  Nurse at bedside assisting.  Pt has the support  Of his wife and family.  HL  coupon left bedside    Adjustment to Diagnosis and Treatment  Ongoing process.  Reports actively seeking   Support from spouse and family as needed.    Emotional/Behavioral/Cognitive Issues  No deficits noted during visit.  Pt does  report  A history of anxiety/depression.    History/Current Symptoms of Anxiety/Depression: Yes  History/Current Substance Use:   Social History     Tobacco Use    Smoking status: Former Smoker     Packs/day: 1.00     Years: 35.00     Pack years: 35.00     Start date: 1985     Quit date: 2020     Years since quittin.1    Smokeless tobacco: Never Used   Substance and Sexual Activity    Alcohol use: Yes     Alcohol/week: 12.0 standard drinks     Types: 4 Glasses of wine, 4 Cans of beer, 4 Shots of liquor per week    Drug use: Yes     Types: Marijuana     Comment: medical marijuana    Sexual activity: Yes     Partners: Female       Indications of Abuse/Neglect: No:   Abuse Screen (yes response referral indicated)  Feels Unsafe at Home or Work/School: no    Financial:  Payor/Plan Subscr  Sex Relation Sub. Ins. ID Effective Group Num   1. [facility]* SONA SCHWARTZ* 1960 Male  [id number] 20 [id number]                                   [address]   2. [facility]* SONA SCHWARTZ* 1960 Male Self [id number] 20 [id number]                                   [address] [address]        Other identified concerns/needs: Provided HL coupon for  lodging stay per pt request.  Business card including contact information for the SWer provided.  Pt agreed to contact SW as needed for support.      Plan:    Interventions/Referrals: TBD  Patient/caregiver engaged in treatment planning process.     providing psychosocial and supportive counseling, resources, education, assistance and discharge planning as appropriate.  Patient/caregiver state understanding of  available resources,  following, remains available.

## 2020-07-23 NOTE — ASSESSMENT & PLAN NOTE
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 Achieved morphologic CR  Started on daily Ponatinib 30 mg daily, which he continues to take outpatient. Will hold Ponatinib on admission.  Referred to Dr. Hodges by Dr. Ramos for SCT consideration  Had [facility] at [facility] on 7/1/2020 showing no morphologic or immunophenotypic evidence of CML  Admitted 7/21/20 for planned Flu/Cy/TBI haplo allo SCT  See SCT candidate

## 2020-07-23 NOTE — PLAN OF CARE
Plan of care reviewed with patient. Day -5 of Allo Tx. Afebrile. Free from falls or injury. Oxy IR given once for headache pain. Ativan IV given at bedtime. NS20K infusing at 150. Up independently. Bed locked in lowest position, non skid socks on, call light within reach. Pt instructed to call if any assistance is needed. Vitals stable. Will cont to colton pt.

## 2020-07-23 NOTE — ANESTHESIA POSTPROCEDURE EVALUATION
Anesthesia Post Evaluation    Patient: Kvng Jones SrCamron    Procedure(s) Performed: Procedure(s) (LRB):  Insertion,catheter,tunneled without port, TRIPLE LUMEN, right poss left (Right)    Final Anesthesia Type: general    Patient location during evaluation: PACU  Patient participation: Yes- Able to Participate  Level of consciousness: awake and alert  Post-procedure vital signs: reviewed and stable  Pain management: adequate  Airway patency: patent    PONV status at discharge: No PONV  Anesthetic complications: no      Cardiovascular status: blood pressure returned to baseline  Respiratory status: unassisted, room air and spontaneous ventilation  Hydration status: euvolemic  Follow-up not needed.          Vitals Value Taken Time   /75 07/21/20 1530   Temp 36.6 °C (97.9 °F) 07/21/20 1530   Pulse 83 07/21/20 1530   Resp 18 07/21/20 1500   SpO2 96 % 07/21/20 1530         Event Time   Out of Recovery 14:15:00         Pain/Kenyon Score: Pain Rating Prior to Med Admin: 7 (7/23/2020  8:26 AM)  Pain Rating Post Med Admin: 3 (7/23/2020  9:26 AM)

## 2020-07-24 LAB
ALBUMIN SERPL BCP-MCNC: 3.9 G/DL (ref 3.5–5.2)
ALP SERPL-CCNC: 47 U/L (ref 55–135)
ALT SERPL W/O P-5'-P-CCNC: 23 U/L (ref 10–44)
ANION GAP SERPL CALC-SCNC: 9 MMOL/L (ref 8–16)
AST SERPL-CCNC: 20 U/L (ref 10–40)
BASOPHILS # BLD AUTO: 0.01 K/UL (ref 0–0.2)
BASOPHILS NFR BLD: 0.1 % (ref 0–1.9)
BILIRUB SERPL-MCNC: 0.2 MG/DL (ref 0.1–1)
BUN SERPL-MCNC: 8 MG/DL (ref 8–23)
CALCIUM SERPL-MCNC: 8.8 MG/DL (ref 8.7–10.5)
CHLORIDE SERPL-SCNC: 109 MMOL/L (ref 95–110)
CO2 SERPL-SCNC: 22 MMOL/L (ref 23–29)
CREAT SERPL-MCNC: 0.7 MG/DL (ref 0.5–1.4)
DIFFERENTIAL METHOD: ABNORMAL
EOSINOPHIL # BLD AUTO: 0 K/UL (ref 0–0.5)
EOSINOPHIL NFR BLD: 0.1 % (ref 0–8)
ERYTHROCYTE [DISTWIDTH] IN BLOOD BY AUTOMATED COUNT: 13.1 % (ref 11.5–14.5)
EST. GFR  (AFRICAN AMERICAN): >60 ML/MIN/1.73 M^2
EST. GFR  (NON AFRICAN AMERICAN): >60 ML/MIN/1.73 M^2
GLUCOSE SERPL-MCNC: 120 MG/DL (ref 70–110)
HCT VFR BLD AUTO: 36 % (ref 40–54)
HGB BLD-MCNC: 12 G/DL (ref 14–18)
IMM GRANULOCYTES # BLD AUTO: 0.03 K/UL (ref 0–0.04)
IMM GRANULOCYTES NFR BLD AUTO: 0.4 % (ref 0–0.5)
LYMPHOCYTES # BLD AUTO: 0.7 K/UL (ref 1–4.8)
LYMPHOCYTES NFR BLD: 9.9 % (ref 18–48)
MAGNESIUM SERPL-MCNC: 1.8 MG/DL (ref 1.6–2.6)
MCH RBC QN AUTO: 33.4 PG (ref 27–31)
MCHC RBC AUTO-ENTMCNC: 33.3 G/DL (ref 32–36)
MCV RBC AUTO: 100 FL (ref 82–98)
MONOCYTES # BLD AUTO: 0.5 K/UL (ref 0.3–1)
MONOCYTES NFR BLD: 7.2 % (ref 4–15)
NEUTROPHILS # BLD AUTO: 6.2 K/UL (ref 1.8–7.7)
NEUTROPHILS NFR BLD: 82.3 % (ref 38–73)
NRBC BLD-RTO: 0 /100 WBC
PHOSPHATE SERPL-MCNC: 3.2 MG/DL (ref 2.7–4.5)
PLATELET # BLD AUTO: 211 K/UL (ref 150–350)
PMV BLD AUTO: 9.6 FL (ref 9.2–12.9)
POTASSIUM SERPL-SCNC: 3.6 MMOL/L (ref 3.5–5.1)
PROT SERPL-MCNC: 6.6 G/DL (ref 6–8.4)
RBC # BLD AUTO: 3.59 M/UL (ref 4.6–6.2)
SODIUM SERPL-SCNC: 140 MMOL/L (ref 136–145)
TROPONIN I SERPL DL<=0.01 NG/ML-MCNC: <0.006 NG/ML (ref 0–0.03)
WBC # BLD AUTO: 7.51 K/UL (ref 3.9–12.7)

## 2020-07-24 PROCEDURE — 93010 EKG 12-LEAD: ICD-10-PCS | Mod: BMT,,, | Performed by: INTERNAL MEDICINE

## 2020-07-24 PROCEDURE — 63600175 PHARM REV CODE 636 W HCPCS: Performed by: NURSE PRACTITIONER

## 2020-07-24 PROCEDURE — 84100 ASSAY OF PHOSPHORUS: CPT

## 2020-07-24 PROCEDURE — 84484 ASSAY OF TROPONIN QUANT: CPT

## 2020-07-24 PROCEDURE — 63600175 PHARM REV CODE 636 W HCPCS: Performed by: INTERNAL MEDICINE

## 2020-07-24 PROCEDURE — 93010 ELECTROCARDIOGRAM REPORT: CPT | Mod: BMT,,, | Performed by: INTERNAL MEDICINE

## 2020-07-24 PROCEDURE — 80053 COMPREHEN METABOLIC PANEL: CPT

## 2020-07-24 PROCEDURE — 85025 COMPLETE CBC W/AUTO DIFF WBC: CPT

## 2020-07-24 PROCEDURE — 83735 ASSAY OF MAGNESIUM: CPT

## 2020-07-24 PROCEDURE — A9155 ARTIFICIAL SALIVA: HCPCS | Performed by: INTERNAL MEDICINE

## 2020-07-24 PROCEDURE — 25000003 PHARM REV CODE 250: Performed by: NURSE PRACTITIONER

## 2020-07-24 PROCEDURE — 25000003 PHARM REV CODE 250: Performed by: STUDENT IN AN ORGANIZED HEALTH CARE EDUCATION/TRAINING PROGRAM

## 2020-07-24 PROCEDURE — 94761 N-INVAS EAR/PLS OXIMETRY MLT: CPT

## 2020-07-24 PROCEDURE — 99233 PR SUBSEQUENT HOSPITAL CARE,LEVL III: ICD-10-PCS | Mod: BMT,,, | Performed by: INTERNAL MEDICINE

## 2020-07-24 PROCEDURE — 20600001 HC STEP DOWN PRIVATE ROOM

## 2020-07-24 PROCEDURE — 99233 SBSQ HOSP IP/OBS HIGH 50: CPT | Mod: BMT,,, | Performed by: INTERNAL MEDICINE

## 2020-07-24 PROCEDURE — 25000003 PHARM REV CODE 250: Performed by: INTERNAL MEDICINE

## 2020-07-24 PROCEDURE — 93005 ELECTROCARDIOGRAM TRACING: CPT

## 2020-07-24 RX ORDER — DIPHENHYDRAMINE HYDROCHLORIDE 50 MG/ML
50 INJECTION INTRAMUSCULAR; INTRAVENOUS ONCE
Status: COMPLETED | OUTPATIENT
Start: 2020-07-28 | End: 2020-07-28

## 2020-07-24 RX ORDER — EPINEPHRINE 1 MG/ML
0.5 INJECTION, SOLUTION INTRACARDIAC; INTRAMUSCULAR; INTRAVENOUS; SUBCUTANEOUS ONCE AS NEEDED
Status: COMPLETED | OUTPATIENT
Start: 2020-07-28 | End: 2020-07-28

## 2020-07-24 RX ORDER — FUROSEMIDE 10 MG/ML
20 INJECTION INTRAMUSCULAR; INTRAVENOUS ONCE
Status: COMPLETED | OUTPATIENT
Start: 2020-07-24 | End: 2020-07-24

## 2020-07-24 RX ORDER — NITROGLYCERIN 0.4 MG/1
0.4 TABLET SUBLINGUAL ONCE AS NEEDED
Status: COMPLETED | OUTPATIENT
Start: 2020-07-28 | End: 2020-07-28

## 2020-07-24 RX ORDER — CALCIUM CARBONATE 200(500)MG
500 TABLET,CHEWABLE ORAL DAILY PRN
Status: DISCONTINUED | OUTPATIENT
Start: 2020-07-24 | End: 2020-08-12 | Stop reason: HOSPADM

## 2020-07-24 RX ORDER — SODIUM CHLORIDE AND POTASSIUM CHLORIDE 150; 900 MG/100ML; MG/100ML
INJECTION, SOLUTION INTRAVENOUS CONTINUOUS
Status: DISCONTINUED | OUTPATIENT
Start: 2020-07-24 | End: 2020-08-04

## 2020-07-24 RX ORDER — CLONIDINE HYDROCHLORIDE 0.1 MG/1
0.1 TABLET ORAL ONCE AS NEEDED
Status: COMPLETED | OUTPATIENT
Start: 2020-07-28 | End: 2020-07-28

## 2020-07-24 RX ORDER — GABAPENTIN 300 MG/1
300 CAPSULE ORAL NIGHTLY
Status: DISCONTINUED | OUTPATIENT
Start: 2020-07-24 | End: 2020-08-10

## 2020-07-24 RX ORDER — PROCHLORPERAZINE MALEATE 5 MG
10 TABLET ORAL 4 TIMES DAILY
Status: DISCONTINUED | OUTPATIENT
Start: 2020-07-24 | End: 2020-08-05

## 2020-07-24 RX ORDER — FUROSEMIDE 10 MG/ML
100 INJECTION INTRAMUSCULAR; INTRAVENOUS ONCE AS NEEDED
Status: COMPLETED | OUTPATIENT
Start: 2020-07-28 | End: 2020-07-28

## 2020-07-24 RX ORDER — LORAZEPAM 2 MG/ML
1 INJECTION INTRAMUSCULAR ONCE
Status: COMPLETED | OUTPATIENT
Start: 2020-07-28 | End: 2020-07-28

## 2020-07-24 RX ORDER — HYDROCODONE BITARTRATE AND ACETAMINOPHEN 500; 5 MG/1; MG/1
TABLET ORAL
Status: DISCONTINUED | OUTPATIENT
Start: 2020-07-28 | End: 2020-07-26

## 2020-07-24 RX ORDER — SODIUM CHLORIDE AND POTASSIUM CHLORIDE 150; 900 MG/100ML; MG/100ML
INJECTION, SOLUTION INTRAVENOUS CONTINUOUS
Status: DISCONTINUED | OUTPATIENT
Start: 2020-07-28 | End: 2020-07-28

## 2020-07-24 RX ORDER — MEPERIDINE HYDROCHLORIDE 50 MG/ML
50 INJECTION INTRAMUSCULAR; INTRAVENOUS; SUBCUTANEOUS ONCE AS NEEDED
Status: COMPLETED | OUTPATIENT
Start: 2020-07-28 | End: 2020-07-28

## 2020-07-24 RX ORDER — DIPHENHYDRAMINE HYDROCHLORIDE 50 MG/ML
50 INJECTION INTRAMUSCULAR; INTRAVENOUS ONCE AS NEEDED
Status: COMPLETED | OUTPATIENT
Start: 2020-07-28 | End: 2020-07-28

## 2020-07-24 RX ADMIN — ACYCLOVIR 800 MG: 800 TABLET ORAL at 05:07

## 2020-07-24 RX ADMIN — Medication 30 ML: at 12:07

## 2020-07-24 RX ADMIN — CETIRIZINE HYDROCHLORIDE 10 MG: 5 TABLET ORAL at 08:07

## 2020-07-24 RX ADMIN — OXYCODONE HYDROCHLORIDE 5 MG: 5 TABLET ORAL at 09:07

## 2020-07-24 RX ADMIN — Medication 30 ML: at 05:07

## 2020-07-24 RX ADMIN — URSODIOL 300 MG: 300 CAPSULE ORAL at 09:07

## 2020-07-24 RX ADMIN — HYDRALAZINE HYDROCHLORIDE 50 MG: 25 TABLET, FILM COATED ORAL at 09:07

## 2020-07-24 RX ADMIN — PROCHLORPERAZINE MALEATE 10 MG: 5 TABLET, FILM COATED ORAL at 05:07

## 2020-07-24 RX ADMIN — PROCHLORPERAZINE MALEATE 10 MG: 5 TABLET, FILM COATED ORAL at 09:07

## 2020-07-24 RX ADMIN — POTASSIUM CHLORIDE 20 MEQ: 1500 TABLET, EXTENDED RELEASE ORAL at 08:07

## 2020-07-24 RX ADMIN — Medication 400 MG: at 08:07

## 2020-07-24 RX ADMIN — ONDANSETRON 16 MG: 8 TABLET, ORALLY DISINTEGRATING ORAL at 08:07

## 2020-07-24 RX ADMIN — Medication 30 ML: at 09:07

## 2020-07-24 RX ADMIN — GABAPENTIN 300 MG: 300 CAPSULE ORAL at 09:07

## 2020-07-24 RX ADMIN — AMLODIPINE BESYLATE 10 MG: 10 TABLET ORAL at 08:07

## 2020-07-24 RX ADMIN — SODIUM CHLORIDE AND POTASSIUM CHLORIDE: 9; 1.49 INJECTION, SOLUTION INTRAVENOUS at 09:07

## 2020-07-24 RX ADMIN — SODIUM CHLORIDE AND POTASSIUM CHLORIDE: 9; 1.49 INJECTION, SOLUTION INTRAVENOUS at 11:07

## 2020-07-24 RX ADMIN — PROCHLORPERAZINE MALEATE 10 MG: 5 TABLET, FILM COATED ORAL at 12:07

## 2020-07-24 RX ADMIN — URSODIOL 300 MG: 300 CAPSULE ORAL at 08:07

## 2020-07-24 RX ADMIN — ACYCLOVIR 800 MG: 800 TABLET ORAL at 08:07

## 2020-07-24 RX ADMIN — Medication 30 ML: at 08:07

## 2020-07-24 RX ADMIN — FUROSEMIDE 20 MG: 10 INJECTION, SOLUTION INTRAMUSCULAR; INTRAVENOUS at 09:07

## 2020-07-24 RX ADMIN — TRAMADOL HYDROCHLORIDE 50 MG: 50 TABLET, FILM COATED ORAL at 03:07

## 2020-07-24 RX ADMIN — TAMSULOSIN HYDROCHLORIDE 0.4 MG: 0.4 CAPSULE ORAL at 08:07

## 2020-07-24 RX ADMIN — PANTOPRAZOLE SODIUM 40 MG: 40 TABLET, DELAYED RELEASE ORAL at 08:07

## 2020-07-24 RX ADMIN — OXYCODONE HYDROCHLORIDE 5 MG: 5 TABLET ORAL at 12:07

## 2020-07-24 RX ADMIN — HYDRALAZINE HYDROCHLORIDE 50 MG: 25 TABLET, FILM COATED ORAL at 11:07

## 2020-07-24 RX ADMIN — FLUDARABINE PHOSPHATE 60 MG: 25 INJECTION, SOLUTION INTRAVENOUS at 10:07

## 2020-07-24 RX ADMIN — Medication 400 MG: at 12:07

## 2020-07-24 NOTE — ASSESSMENT & PLAN NOTE
increased home amlodipine from 5 mg to 10 mg daily  BPs as high as 180s/110s  Started prn hydralazine  Decreased IVF rate

## 2020-07-24 NOTE — ASSESSMENT & PLAN NOTE
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 Achieved morphologic CR  Started on daily Ponatinib 30 mg daily, which he continues to take outpatient. Will hold Ponatinib on admission.  Referred to Dr. Hodges by Dr. Ramos for SCT consideration  [facility] showing no morphologic or immunophenotypic evidence of CML  Admitted 7/21/20 for planned Flu/Cy/TBI haplo allo SCT  See SCT candidate

## 2020-07-24 NOTE — PROGRESS NOTES
[facility]  Hematology  Bone Marrow Transplant  Progress Note    Patient Name: Kvng Jones Sr.  Admission Date: 7/21/2020  Hospital Length of Stay: 3 days  Code Status: Full Code    Subjective:     Interval History: Day - 4 from Flu/Cy/TBI haplo allo SCT for CMP. BP improving with increased amlodipine dose and prn hydralazine. Decreased IVF rate as patient is no longer receiving compazine. Weight up from discharge and net + of I/O. +1 edema to feet/ankles. 20 mg IV lasix ordered C/o chest pain this morning. Troponin normal and EKG essentially normal. Cxr neg. Patient believed that pain was GERD. Takes nexium daily at home and had not yet received protonix. TUMs prn ordered. Received tramadol for headache over night with mild relief. Reports nausea. States compazine provides best relief. Compazine scheduled. Reports loose stool x 1 this morning. Will r/o c-diff with increased frequency.     Objective:     Vital Signs (Most Recent):  Temp: 97.6 °F (36.4 °C) (07/24/20 1126)  Pulse: 82 (07/24/20 1126)  Resp: 18 (07/24/20 1237)  BP: (!) 167/89(nurse was notified) (07/24/20 1126)  SpO2: 97 % (07/24/20 1126) Vital Signs (24h Range):  Temp:  [97.6 °F (36.4 °C)-98.5 °F (36.9 °C)] 97.6 °F (36.4 °C)  Pulse:  [71-96] 82  Resp:  [17-20] 18  SpO2:  [96 %-99 %] 97 %  BP: (133-188)/(76-91) 167/89     Weight: 92 kg (202 lb 11.4 oz)  Body mass index is 32.72 kg/m².  Body surface area is 2.07 meters squared.    ECOG SCORE         [unfilled]    Intake/Output - Last 3 Shifts       07/22 0700 - 07/23 0659 07/23 0700 - 07/24 0659 07/24 0700 - 07/25 0659    P.O. 1320 1680 360    I.V. (mL/kg) 3203.5 (34.8) 3540 (38.5) 383.8 (4.2)    IV Piggyback 350 350 100    Total Intake(mL/kg) 4873.5 (52.9) 5570 (60.6) 843.8 (9.2)    Urine (mL/kg/hr) 2575 (1.2) 3400 (1.5) 2175 (2.9)    Stool  0     Total Output 2575 3400 2175    Net +2298.5 +2170 -1331.3           Stool Occurrence  0 x           Physical Exam  Constitutional:        Appearance: He is well-developed.   HENT:      Head: Normocephalic and atraumatic.      Mouth/Throat:      Pharynx: No oropharyngeal exudate.   Eyes:      General:         Right eye: No discharge.         Left eye: No discharge.      Conjunctiva/sclera: Conjunctivae normal.      Pupils: Pupils are equal, round, and reactive to light.   Neck:      Musculoskeletal: Normal range of motion and neck supple.   Cardiovascular:      Rate and Rhythm: Normal rate and regular rhythm.      Heart sounds: Normal heart sounds. No murmur.   Pulmonary:      Effort: Pulmonary effort is normal. No respiratory distress.      Breath sounds: Normal breath sounds. No wheezing or rales.   Abdominal:      General: Bowel sounds are normal. There is no distension.      Palpations: Abdomen is soft.      Tenderness: There is no abdominal tenderness.   Musculoskeletal: Normal range of motion.         General: No deformity.   Skin:     General: Skin is warm and dry.      Findings: No erythema or rash.      Comments: Dalal to right chest wall. Dressing c/d/i with no sign of infection noted.   Neurological:      Mental Status: He is alert and oriented to person, place, and time.   Psychiatric:         Behavior: Behavior normal.         Thought Content: Thought content normal.         Judgment: Judgment normal.         Significant Labs:   CBC:   Recent Labs   Lab 07/23/20  0357 07/24/20  0332   WBC 8.51 7.51   HGB 12.2* 12.0*   HCT 35.9* 36.0*    211    and CMP:   Recent Labs   Lab 07/23/20  0357 07/24/20  0332    140   K 3.8 3.6    109   CO2 20* 22*   * 120*   BUN 9 8   CREATININE 0.7 0.7   CALCIUM 9.1 8.8   PROT 6.6 6.6   ALBUMIN 3.8 3.9   BILITOT 0.2 0.2   ALKPHOS 54* 47*   AST 17 20   ALT 21 23   ANIONGAP 11 9   EGFRNONAA >60.0 >60.0       Diagnostic Results:  I have reviewed all pertinent imaging results/findings within the past 24 hours.    Assessment/Plan:     * Stem cell transplant candidate  Admitted 7/21/20  for planned Flu/Cy/TBI haplo allo SCT. Son is the donor. Today is Day - 4.  Will receive fludarabine today.    Planned conditioning regimen:  Fludarabine on Day -6, -5, -4, -3, and -2  Cyclophosphamide on Day -6 and -5  Total Body Irradiation on Day -1     Planned  GVHD Prophylaxis:  Cyclophosphamide on Day +3 and +4  Tacrolimus starting on Day +5  Mycophenolate starting on Day +5     Antimicrobial Prophylaxis:  Acyclovir starting on Day -6  Levofloxacin starting on Day -1  Fluconazole starting on Day -1  Bactrim starting on Day +30     SOS/VOD Prophylaxis:  Ursodiol on Day -6 through Day +30     Growth Factor Support:  Neupogen starting on Day +7     CML in remission  Presented to [facility] with CMP in blast crisis and with nodular disease on 3/30/2020  Induced with FLAG-Addis. Achieved morphologic CR  Started on daily Ponatinib 30 mg daily, which he continues to take outpatient. Will hold Ponatinib on admission.  Referred to Dr. Hodges by Dr. Ramos for SCT consideration  Had [facility]x at [facility] on 7/1/2020 showing no morphologic or immunophenotypic evidence of CML  Admitted 7/21/20 for planned Flu/Cy/TBI haplo allo SCT  See SCT candidate    Headache  Started having headaches several weeks ago. Onset seems to correspond to when patient quit smoking and when he started ponatinib  Has been off of ponatinib for a few days but fevers have persisted  Pattern is like cluster headaches (onset during sleep), but not unilateral.  Will try O2 via non-rebreather at 12 L over 15 minutes with next headache  Can try imitrex if O2 is ineffective. Not contraindicated per pharm D.  Description of headache sounds like tension headache--throbbing with pressure across crown of head  Has flexiril ordered. Can also try this    Cervical stenosis of spine  Continue home Flexeril and ultram  PRN oxy IR added for additional relief    Tobacco abuse, in remission  35 pack year history  Quit smoking 6/1/2020 using Chantix  Patient completed course of Chantix  and denies need for Nicotine patch.     Daily consumption of alcohol  Drinks 3-4 alcoholic beverages nightly  Will need to monitor closely for s/s of alcohol withdrawal while inpatient  Will consider consulting addiction medicine if indicated  Prn ativan ordered    BPH (benign prostatic hyperplasia)  Continue home Flomax    GERD (gastroesophageal reflux disease)  Takes Nexium at home  Giving Protonix while inpatient as Nexium is not on hospital formulary  Can resume Nexium at discharge  Had CP this am that is believed to be due to GERD (troponin and EKG unremarkable). Relieved after protonix administration  PRN TUMs ordered.    Essential hypertension  increased home amlodipine from 5 mg to 10 mg daily  BPs as high as 180s/110s  Started prn hydralazine  Decreased IVF rate        VTE Risk Mitigation (From admission, onward)         Ordered     heparin, porcine (PF) 100 unit/mL injection flush 300 Units  As needed (PRN)      07/21/20 1920                Disposition: Inpatient for allo SCT.    Ivon Morrow, NP  Bone Marrow Transplant  [facility]

## 2020-07-24 NOTE — PROGRESS NOTES
07/24/20 0840   Vital Signs   Pulse 71   SpO2 99 %   O2 Device (Oxygen Therapy) room air   BP (!) 188/91   MAP (mmHg) 127   Called to patient's room. Patient reported sudden onset of right sided chest pain. YUMIKO Underwood notified. NP to order 12-lead. Placed on cardiac monitoring at bedside.

## 2020-07-24 NOTE — ASSESSMENT & PLAN NOTE
Takes Nexium at home  Giving Protonix while inpatient as Nexium is not on hospital formulary  Can resume Nexium at discharge  Had CP this am that is believed to be due to GERD (troponin and EKG unremarkable). Relieved after protonix administration  PRN TUMs ordered.

## 2020-07-24 NOTE — ASSESSMENT & PLAN NOTE
Admitted 7/21/20 for planned Flu/Cy/TBI haplo allo SCT. Son is the donor. Today is Day - 4.  Will receive fludarabine today.    Planned conditioning regimen:  Fludarabine on Day -6, -5, -4, -3, and -2  Cyclophosphamide on Day -6 and -5  Total Body Irradiation on Day -1     Planned  GVHD Prophylaxis:  Cyclophosphamide on Day +3 and +4  Tacrolimus starting on Day +5  Mycophenolate starting on Day +5     Antimicrobial Prophylaxis:  Acyclovir starting on Day -6  Levofloxacin starting on Day -1  Fluconazole starting on Day -1  Bactrim starting on Day +30     SOS/VOD Prophylaxis:  Ursodiol on Day -6 through Day +30     Growth Factor Support:  Neupogen starting on Day +7

## 2020-07-24 NOTE — PLAN OF CARE
Day -4 allo SCT. Plan of care reviewed with patient this shift. Fludarabine administered this AM. Troponin and EKG done after new onset of chest pain. Patient reported relief of pain after receiving protonix. 20mg IV lasix administered. VS stable. Maintaining saturations on room air. K and Mg replaced. All questions and concerns addressed. Will continue to monitor.

## 2020-07-24 NOTE — PLAN OF CARE
Plan of care reviewed with patient. Day -4 of Allo Tx. Afebrile. Free from falls or injury. Oxy IR and Tramadol given once for a headache. Compazine given once for nausea. Ativan IV given at bedtime. NS20K infusing at 150. Up independently. Bed locked in lowest position, non skid socks on, call light within reach. Pt instructed to call if any assistance is needed. Vitals stable. Will cont to colton pt.

## 2020-07-24 NOTE — SUBJECTIVE & OBJECTIVE
Subjective:     Interval History: Day - 4 from Flu/Cy/TBI haplo allo SCT for CMP. BP improving with increased amlodipine dose and prn hydralazine. Decreased IVF rate as patient is no longer receiving compazine. Weight up from discharge and net + of I/O. +1 edema to feet/ankles. 20 mg IV lasix ordered C/o chest pain this morning. Troponin normal and EKG essentially normal. Cxr neg. Patient believed that pain was GERD. Takes nexium daily at home and had not yet received protonix. TUMs prn ordered. Received tramadol for headache over night with mild relief. Reports nausea. States compazine provides best relief. Compazine scheduled.     Objective:     Vital Signs (Most Recent):  Temp: 97.6 °F (36.4 °C) (07/24/20 1126)  Pulse: 82 (07/24/20 1126)  Resp: 18 (07/24/20 1237)  BP: (!) 167/89(nurse was notified) (07/24/20 1126)  SpO2: 97 % (07/24/20 1126) Vital Signs (24h Range):  Temp:  [97.6 °F (36.4 °C)-98.5 °F (36.9 °C)] 97.6 °F (36.4 °C)  Pulse:  [71-96] 82  Resp:  [17-20] 18  SpO2:  [96 %-99 %] 97 %  BP: (133-188)/(76-91) 167/89     Weight: 92 kg (202 lb 11.4 oz)  Body mass index is 32.72 kg/m².  Body surface area is 2.07 meters squared.    ECOG SCORE         [unfilled]    Intake/Output - Last 3 Shifts       07/22 0700 - 07/23 0659 07/23 0700 - 07/24 0659 07/24 0700 - 07/25 0659    P.O. 1320 1680 360    I.V. (mL/kg) 3203.5 (34.8) 3540 (38.5) 383.8 (4.2)    IV Piggyback 350 350 100    Total Intake(mL/kg) 4873.5 (52.9) 5570 (60.6) 843.8 (9.2)    Urine (mL/kg/hr) 2575 (1.2) 3400 (1.5) 2175 (2.9)    Stool  0     Total Output 2575 3400 2175    Net +2298.5 +2170 -1331.3           Stool Occurrence  0 x           Physical Exam  Constitutional:       Appearance: He is well-developed.   HENT:      Head: Normocephalic and atraumatic.      Mouth/Throat:      Pharynx: No oropharyngeal exudate.   Eyes:      General:         Right eye: No discharge.         Left eye: No discharge.      Conjunctiva/sclera: Conjunctivae normal.       Pupils: Pupils are equal, round, and reactive to light.   Neck:      Musculoskeletal: Normal range of motion and neck supple.   Cardiovascular:      Rate and Rhythm: Normal rate and regular rhythm.      Heart sounds: Normal heart sounds. No murmur.   Pulmonary:      Effort: Pulmonary effort is normal. No respiratory distress.      Breath sounds: Normal breath sounds. No wheezing or rales.   Abdominal:      General: Bowel sounds are normal. There is no distension.      Palpations: Abdomen is soft.      Tenderness: There is no abdominal tenderness.   Musculoskeletal: Normal range of motion.         General: No deformity.   Skin:     General: Skin is warm and dry.      Findings: No erythema or rash.      Comments: Dalal to right chest wall. Dressing c/d/i with no sign of infection noted.   Neurological:      Mental Status: He is alert and oriented to person, place, and time.   Psychiatric:         Behavior: Behavior normal.         Thought Content: Thought content normal.         Judgment: Judgment normal.         Significant Labs:   CBC:   Recent Labs   Lab 07/23/20  0357 07/24/20  0332   WBC 8.51 7.51   HGB 12.2* 12.0*   HCT 35.9* 36.0*    211    and CMP:   Recent Labs   Lab 07/23/20  0357 07/24/20  0332    140   K 3.8 3.6    109   CO2 20* 22*   * 120*   BUN 9 8   CREATININE 0.7 0.7   CALCIUM 9.1 8.8   PROT 6.6 6.6   ALBUMIN 3.8 3.9   BILITOT 0.2 0.2   ALKPHOS 54* 47*   AST 17 20   ALT 21 23   ANIONGAP 11 9   EGFRNONAA >60.0 >60.0       Diagnostic Results:  I have reviewed all pertinent imaging results/findings within the past 24 hours.

## 2020-07-25 LAB
ABO + RH BLD: NORMAL
ALBUMIN SERPL BCP-MCNC: 4.5 G/DL (ref 3.5–5.2)
ALP SERPL-CCNC: 58 U/L (ref 55–135)
ALT SERPL W/O P-5'-P-CCNC: 33 U/L (ref 10–44)
ANION GAP SERPL CALC-SCNC: 8 MMOL/L (ref 8–16)
AST SERPL-CCNC: 26 U/L (ref 10–40)
BASOPHILS # BLD AUTO: 0.05 K/UL (ref 0–0.2)
BASOPHILS NFR BLD: 1.1 % (ref 0–1.9)
BILIRUB SERPL-MCNC: 0.4 MG/DL (ref 0.1–1)
BLD GP AB SCN CELLS X3 SERPL QL: NORMAL
BUN SERPL-MCNC: 12 MG/DL (ref 8–23)
CALCIUM SERPL-MCNC: 9.4 MG/DL (ref 8.7–10.5)
CHLORIDE SERPL-SCNC: 104 MMOL/L (ref 95–110)
CO2 SERPL-SCNC: 26 MMOL/L (ref 23–29)
CREAT SERPL-MCNC: 0.8 MG/DL (ref 0.5–1.4)
DIFFERENTIAL METHOD: ABNORMAL
EOSINOPHIL # BLD AUTO: 0.1 K/UL (ref 0–0.5)
EOSINOPHIL NFR BLD: 1.8 % (ref 0–8)
ERYTHROCYTE [DISTWIDTH] IN BLOOD BY AUTOMATED COUNT: 13.1 % (ref 11.5–14.5)
EST. GFR  (AFRICAN AMERICAN): >60 ML/MIN/1.73 M^2
EST. GFR  (NON AFRICAN AMERICAN): >60 ML/MIN/1.73 M^2
GLUCOSE SERPL-MCNC: 99 MG/DL (ref 70–110)
HCT VFR BLD AUTO: 42.5 % (ref 40–54)
HGB BLD-MCNC: 14.3 G/DL (ref 14–18)
IMM GRANULOCYTES # BLD AUTO: 0.01 K/UL (ref 0–0.04)
IMM GRANULOCYTES NFR BLD AUTO: 0.2 % (ref 0–0.5)
LYMPHOCYTES # BLD AUTO: 0.4 K/UL (ref 1–4.8)
LYMPHOCYTES NFR BLD: 9.1 % (ref 18–48)
MAGNESIUM SERPL-MCNC: 1.9 MG/DL (ref 1.6–2.6)
MCH RBC QN AUTO: 33.9 PG (ref 27–31)
MCHC RBC AUTO-ENTMCNC: 33.6 G/DL (ref 32–36)
MCV RBC AUTO: 101 FL (ref 82–98)
MONOCYTES # BLD AUTO: 0.3 K/UL (ref 0.3–1)
MONOCYTES NFR BLD: 7.6 % (ref 4–15)
NEUTROPHILS # BLD AUTO: 3.6 K/UL (ref 1.8–7.7)
NEUTROPHILS NFR BLD: 80.2 % (ref 38–73)
NRBC BLD-RTO: 0 /100 WBC
PHOSPHATE SERPL-MCNC: 4.1 MG/DL (ref 2.7–4.5)
PLATELET # BLD AUTO: 238 K/UL (ref 150–350)
PMV BLD AUTO: 9.1 FL (ref 9.2–12.9)
POTASSIUM SERPL-SCNC: 3.8 MMOL/L (ref 3.5–5.1)
PROT SERPL-MCNC: 7.5 G/DL (ref 6–8.4)
RBC # BLD AUTO: 4.22 M/UL (ref 4.6–6.2)
SODIUM SERPL-SCNC: 138 MMOL/L (ref 136–145)
WBC # BLD AUTO: 4.5 K/UL (ref 3.9–12.7)

## 2020-07-25 PROCEDURE — 25000003 PHARM REV CODE 250: Performed by: NURSE PRACTITIONER

## 2020-07-25 PROCEDURE — 25000003 PHARM REV CODE 250: Performed by: INTERNAL MEDICINE

## 2020-07-25 PROCEDURE — 99233 PR SUBSEQUENT HOSPITAL CARE,LEVL III: ICD-10-PCS | Mod: BMT,,, | Performed by: INTERNAL MEDICINE

## 2020-07-25 PROCEDURE — 83735 ASSAY OF MAGNESIUM: CPT

## 2020-07-25 PROCEDURE — 84100 ASSAY OF PHOSPHORUS: CPT

## 2020-07-25 PROCEDURE — 63600175 PHARM REV CODE 636 W HCPCS: Performed by: NURSE PRACTITIONER

## 2020-07-25 PROCEDURE — 80053 COMPREHEN METABOLIC PANEL: CPT

## 2020-07-25 PROCEDURE — 20600001 HC STEP DOWN PRIVATE ROOM

## 2020-07-25 PROCEDURE — 86850 RBC ANTIBODY SCREEN: CPT

## 2020-07-25 PROCEDURE — 99233 SBSQ HOSP IP/OBS HIGH 50: CPT | Mod: BMT,,, | Performed by: INTERNAL MEDICINE

## 2020-07-25 PROCEDURE — 85025 COMPLETE CBC W/AUTO DIFF WBC: CPT

## 2020-07-25 PROCEDURE — A9155 ARTIFICIAL SALIVA: HCPCS | Performed by: INTERNAL MEDICINE

## 2020-07-25 PROCEDURE — 25000003 PHARM REV CODE 250: Performed by: STUDENT IN AN ORGANIZED HEALTH CARE EDUCATION/TRAINING PROGRAM

## 2020-07-25 PROCEDURE — 63600175 PHARM REV CODE 636 W HCPCS: Performed by: INTERNAL MEDICINE

## 2020-07-25 RX ORDER — SUMATRIPTAN 50 MG/1
50 TABLET, FILM COATED ORAL
Status: DISCONTINUED | OUTPATIENT
Start: 2020-07-25 | End: 2020-08-12 | Stop reason: HOSPADM

## 2020-07-25 RX ADMIN — Medication 400 MG: at 09:07

## 2020-07-25 RX ADMIN — PROCHLORPERAZINE MALEATE 10 MG: 5 TABLET, FILM COATED ORAL at 08:07

## 2020-07-25 RX ADMIN — Medication 30 ML: at 09:07

## 2020-07-25 RX ADMIN — ACYCLOVIR 800 MG: 800 TABLET ORAL at 05:07

## 2020-07-25 RX ADMIN — SUMATRIPTAN SUCCINATE 50 MG: 50 TABLET ORAL at 10:07

## 2020-07-25 RX ADMIN — PROCHLORPERAZINE MALEATE 10 MG: 5 TABLET, FILM COATED ORAL at 12:07

## 2020-07-25 RX ADMIN — CETIRIZINE HYDROCHLORIDE 10 MG: 5 TABLET ORAL at 09:07

## 2020-07-25 RX ADMIN — TAMSULOSIN HYDROCHLORIDE 0.4 MG: 0.4 CAPSULE ORAL at 09:07

## 2020-07-25 RX ADMIN — PROCHLORPERAZINE MALEATE 10 MG: 5 TABLET, FILM COATED ORAL at 05:07

## 2020-07-25 RX ADMIN — TRAMADOL HYDROCHLORIDE 50 MG: 50 TABLET, FILM COATED ORAL at 02:07

## 2020-07-25 RX ADMIN — PROCHLORPERAZINE MALEATE 10 MG: 5 TABLET, FILM COATED ORAL at 09:07

## 2020-07-25 RX ADMIN — LORAZEPAM 1 MG: 2 INJECTION INTRAMUSCULAR; INTRAVENOUS at 12:07

## 2020-07-25 RX ADMIN — ONDANSETRON 16 MG: 8 TABLET, ORALLY DISINTEGRATING ORAL at 08:07

## 2020-07-25 RX ADMIN — GABAPENTIN 300 MG: 300 CAPSULE ORAL at 09:07

## 2020-07-25 RX ADMIN — SODIUM CHLORIDE AND POTASSIUM CHLORIDE: 9; 1.49 INJECTION, SOLUTION INTRAVENOUS at 12:07

## 2020-07-25 RX ADMIN — SODIUM CHLORIDE AND POTASSIUM CHLORIDE: 9; 1.49 INJECTION, SOLUTION INTRAVENOUS at 09:07

## 2020-07-25 RX ADMIN — PANTOPRAZOLE SODIUM 40 MG: 40 TABLET, DELAYED RELEASE ORAL at 09:07

## 2020-07-25 RX ADMIN — LORAZEPAM 1 MG: 2 INJECTION INTRAMUSCULAR; INTRAVENOUS at 10:07

## 2020-07-25 RX ADMIN — CYCLOBENZAPRINE HYDROCHLORIDE 10 MG: 5 TABLET, FILM COATED ORAL at 05:07

## 2020-07-25 RX ADMIN — ACYCLOVIR 800 MG: 800 TABLET ORAL at 09:07

## 2020-07-25 RX ADMIN — URSODIOL 300 MG: 300 CAPSULE ORAL at 09:07

## 2020-07-25 RX ADMIN — POTASSIUM CHLORIDE 20 MEQ: 1500 TABLET, EXTENDED RELEASE ORAL at 09:07

## 2020-07-25 RX ADMIN — Medication 400 MG: at 12:07

## 2020-07-25 RX ADMIN — FLUDARABINE PHOSPHATE 60 MG: 25 INJECTION, SOLUTION INTRAVENOUS at 10:07

## 2020-07-25 RX ADMIN — Medication 30 ML: at 05:07

## 2020-07-25 RX ADMIN — Medication 30 ML: at 12:07

## 2020-07-25 RX ADMIN — SUMATRIPTAN SUCCINATE 50 MG: 50 TABLET ORAL at 12:07

## 2020-07-25 RX ADMIN — AMLODIPINE BESYLATE 10 MG: 10 TABLET ORAL at 09:07

## 2020-07-25 RX ADMIN — OXYCODONE HYDROCHLORIDE 5 MG: 5 TABLET ORAL at 04:07

## 2020-07-25 NOTE — SUBJECTIVE & OBJECTIVE
Subjective:     Interval History: Day - 3 from Flu/Cy/TBI haplo allo SCT for CMP. Continues to complain of headache. Yesterday around noon felt great until around 11:30PM after taking some Hydralazine. At that point his same headache returned which he has had throughout the night. Received Ultram, oxycodone, flexeril which did not provide relief. Felt the oxygen may have helped but developed hot flash and removed it. Denies visual complaints, focal weakness, confusion. Denies any other complaints this morning.    Objective:     Vital Signs (Most Recent):  Temp: 98 °F (36.7 °C) (07/25/20 0742)  Pulse: 75 (07/25/20 0742)  Resp: 18 (07/25/20 0742)  BP: (!) 162/88 (07/25/20 0847)  SpO2: 97 % (07/25/20 0742) Vital Signs (24h Range):  Temp:  [97.6 °F (36.4 °C)-98.7 °F (37.1 °C)] 98 °F (36.7 °C)  Pulse:  [75-91] 75  Resp:  [16-20] 18  SpO2:  [95 %-98 %] 97 %  BP: (150-185)/() 162/88     Weight: 89.8 kg (197 lb 15.6 oz)  Body mass index is 31.95 kg/m².  Body surface area is 2.04 meters squared.    ECOG SCORE         [unfilled]    Intake/Output - Last 3 Shifts       07/23 0700 - 07/24 0659 07/24 0700 - 07/25 0659 07/25 0700 - 07/26 0659    P.O. 1680 360 240    I.V. (mL/kg) 3540 (38.5) 1441.3 (16) 420 (4.7)    IV Piggyback 350 100 100    Total Intake(mL/kg) 5570 (60.6) 1901.3 (21.2) 760 (8.5)    Urine (mL/kg/hr) 3400 (1.5) 4325 (2) 425 (1.1)    Stool 0 0 0    Total Output 3400 4325 425    Net +2170 -2423.8 +335           Stool Occurrence 0 x 1 x 1 x          Physical Exam  Constitutional:       Appearance: He is well-developed.   HENT:      Head: Normocephalic and atraumatic.      Mouth/Throat:      Pharynx: No oropharyngeal exudate.   Eyes:      General:         Right eye: No discharge.         Left eye: No discharge.      Extraocular Movements: Extraocular movements intact.      Conjunctiva/sclera: Conjunctivae normal.      Pupils: Pupils are equal, round, and reactive to light.   Neck:      Musculoskeletal: Normal  range of motion and neck supple.   Cardiovascular:      Rate and Rhythm: Normal rate and regular rhythm.      Heart sounds: Normal heart sounds. No murmur.   Pulmonary:      Effort: Pulmonary effort is normal. No respiratory distress.      Breath sounds: Normal breath sounds. No wheezing or rales.   Abdominal:      General: Bowel sounds are normal. There is no distension.      Palpations: Abdomen is soft.      Tenderness: There is no abdominal tenderness.   Musculoskeletal: Normal range of motion.         General: No deformity.   Skin:     General: Skin is warm and dry.      Findings: No erythema or rash.      Comments: Dalal to right chest wall. Dressing c/d/i with no sign of infection noted.   Neurological:      Mental Status: He is alert and oriented to person, place, and time.   Psychiatric:         Behavior: Behavior normal.         Thought Content: Thought content normal.         Judgment: Judgment normal.         Significant Labs:   CBC:   Recent Labs   Lab 07/24/20 0332 07/25/20 0429   WBC 7.51 4.50   HGB 12.0* 14.3   HCT 36.0* 42.5    238    and CMP:   Recent Labs   Lab 07/24/20 0332 07/25/20  0429    138   K 3.6 3.8    104   CO2 22* 26   * 99   BUN 8 12   CREATININE 0.7 0.8   CALCIUM 8.8 9.4   PROT 6.6 7.5   ALBUMIN 3.9 4.5   BILITOT 0.2 0.4   ALKPHOS 47* 58   AST 20 26   ALT 23 33   ANIONGAP 9 8   EGFRNONAA >60.0 >60.0       Diagnostic Results:  I have reviewed all pertinent imaging results/findings within the past 24 hours.

## 2020-07-25 NOTE — ASSESSMENT & PLAN NOTE
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 Achieved morphologic CR  Started on daily Ponatinib 30 mg daily, which he continues to take outpatient. Will hold Ponatinib on admission.  Referred to Dr. Hodges by Dr. Ramos for SCT consideration  [facility] on 7/1/2020 showing no morphologic or immunophenotypic evidence of CML  Admitted 7/21/20 for planned Flu/Cy/TBI haplo allo SCT  See SCT candidate

## 2020-07-25 NOTE — ASSESSMENT & PLAN NOTE
increased home amlodipine from 5 mg to 10 mg daily  BPs as high as 180s/110s  Started prn hydralazine - possibly worsened headache  Decreased IVF rate  Will hold off on further BP meds for the time being as relative hypotension may be contributing to headaches

## 2020-07-25 NOTE — PROGRESS NOTES
[facility]  Hematology  Bone Marrow Transplant  Progress Note    Patient Name: Kvng Jones Sr.  Admission Date: 7/21/2020  Hospital Length of Stay: 4 days  Code Status: Full Code    Subjective:     Interval History: Day - 3 from Flu/Cy/TBI haplo allo SCT for CMP. Continues to complain of headache. Yesterday around noon felt great until around 11:30PM after taking some Hydralazine. At that point his same headache returned which he has had throughout the night. Received Ultram, oxycodone, flexeril which did not provide relief. Felt the oxygen may have helped but developed hot flash and removed it. Denies visual complaints, focal weakness, confusion. Denies any other complaints this morning.    Objective:     Vital Signs (Most Recent):  Temp: 98 °F (36.7 °C) (07/25/20 0742)  Pulse: 75 (07/25/20 0742)  Resp: 18 (07/25/20 0742)  BP: (!) 162/88 (07/25/20 0847)  SpO2: 97 % (07/25/20 0742) Vital Signs (24h Range):  Temp:  [97.6 °F (36.4 °C)-98.7 °F (37.1 °C)] 98 °F (36.7 °C)  Pulse:  [75-91] 75  Resp:  [16-20] 18  SpO2:  [95 %-98 %] 97 %  BP: (150-185)/() 162/88     Weight: 89.8 kg (197 lb 15.6 oz)  Body mass index is 31.95 kg/m².  Body surface area is 2.04 meters squared.      Intake/Output - Last 3 Shifts       07/23 0700 - 07/24 0659 07/24 0700 - 07/25 0659 07/25 0700 - 07/26 0659    P.O. 1680 360 240    I.V. (mL/kg) 3540 (38.5) 1441.3 (16) 420 (4.7)    IV Piggyback 350 100 100    Total Intake(mL/kg) 5570 (60.6) 1901.3 (21.2) 760 (8.5)    Urine (mL/kg/hr) 3400 (1.5) 4325 (2) 425 (1.1)    Stool 0 0 0    Total Output 3400 4325 425    Net +2170 -2423.8 +335           Stool Occurrence 0 x 1 x 1 x          Physical Exam  Constitutional:       Appearance: He is well-developed.   HENT:      Head: Normocephalic and atraumatic.      Mouth/Throat:      Pharynx: No oropharyngeal exudate.   Eyes:      General:         Right eye: No discharge.         Left eye: No discharge.      Extraocular  Movements: Extraocular movements intact.      Conjunctiva/sclera: Conjunctivae normal.      Pupils: Pupils are equal, round, and reactive to light.   Neck:      Musculoskeletal: Normal range of motion and neck supple.   Cardiovascular:      Rate and Rhythm: Normal rate and regular rhythm.      Heart sounds: Normal heart sounds. No murmur.   Pulmonary:      Effort: Pulmonary effort is normal. No respiratory distress.      Breath sounds: Normal breath sounds. No wheezing or rales.   Abdominal:      General: Bowel sounds are normal. There is no distension.      Palpations: Abdomen is soft.      Tenderness: There is no abdominal tenderness.   Musculoskeletal: Normal range of motion.         General: No deformity.   Skin:     General: Skin is warm and dry.      Findings: No erythema or rash.      Comments: Dalal to right chest wall. Dressing c/d/i with no sign of infection noted.   Neurological:      Mental Status: He is alert and oriented to person, place, and time.   Psychiatric:         Behavior: Behavior normal.         Thought Content: Thought content normal.         Judgment: Judgment normal.         Significant Labs:   CBC:   Recent Labs   Lab 07/24/20  0332 07/25/20  0429   WBC 7.51 4.50   HGB 12.0* 14.3   HCT 36.0* 42.5    238    and CMP:   Recent Labs   Lab 07/24/20  0332 07/25/20  0429    138   K 3.6 3.8    104   CO2 22* 26   * 99   BUN 8 12   CREATININE 0.7 0.8   CALCIUM 8.8 9.4   PROT 6.6 7.5   ALBUMIN 3.9 4.5   BILITOT 0.2 0.4   ALKPHOS 47* 58   AST 20 26   ALT 23 33   ANIONGAP 9 8   EGFRNONAA >60.0 >60.0       Diagnostic Results:  I have reviewed all pertinent imaging results/findings within the past 24 hours.    Assessment/Plan:     * Stem cell transplant candidate  Admitted 7/21/20 for planned Flu/Cy/TBI haplo allo SCT. Son is the donor. Today is Day - 3.  Will receive fludarabine today.    Planned conditioning regimen:  Fludarabine on Day -6, -5, -4, -3, and  -2  Cyclophosphamide on Day -6 and -5  Total Body Irradiation on Day -1     Planned  GVHD Prophylaxis:  Cyclophosphamide on Day +3 and +4  Tacrolimus starting on Day +5  Mycophenolate starting on Day +5     Antimicrobial Prophylaxis:  Acyclovir starting on Day -6  Levofloxacin starting on Day -1  Fluconazole starting on Day -1  Bactrim starting on Day +30     SOS/VOD Prophylaxis:  Ursodiol on Day -6 through Day +30     Growth Factor Support:  Neupogen starting on Day +7     CML in remission  Presented to [facility] with CMP in blast crisis and with nodular disease on 3/30/2020  Induced with FLAG-Addis. Achieved morphologic CR  Started on daily Ponatinib 30 mg daily, which he continues to take outpatient. Will hold Ponatinib on admission.  Referred to Dr. Hodges by Dr. Ramos for SCT consideration  Had BMBx at [facility] on 7/1/2020 showing no morphologic or immunophenotypic evidence of CML  Admitted 7/21/20 for planned Flu/Cy/TBI haplo allo SCT  See SCT candidate    Headache  Started having headaches several weeks ago. Onset seems to correspond to when patient quit smoking and when he started ponatinib  Has been off of ponatinib for a few days but fevers have persisted  Pattern is like cluster headaches (onset during sleep), but not unilateral.  O2 via non-rebreather at 12 L over 15 minutes seemed to help but developed hot flush so pt stopped  Ultram, Flexeril, Oxycodone not helping  Imitrex PRN q2hr (max 200mg in 24hrs). Not contraindicated per pharm D.  Description of headache sounds like tension headache--throbbing with pressure across crown of head  No focal neurological deficits      Cervical stenosis of spine  Continue home Flexeril and ultram  PRN oxy IR added for additional relief    Essential hypertension  increased home amlodipine from 5 mg to 10 mg daily  BPs as high as 180s/110s  Started prn hydralazine - possibly worsened headache  Decreased IVF rate  Will hold off on further BP meds for the time being as relative  hypotension may be contributing to headaches    Tobacco abuse, in remission  35 pack year history  Quit smoking 6/1/2020 using Chantix  Patient completed course of Chantix and denies need for Nicotine patch.     Daily consumption of alcohol  Drinks 3-4 alcoholic beverages nightly  Will need to monitor closely for s/s of alcohol withdrawal while inpatient  Will consider consulting addiction medicine if indicated  Prn ativan ordered    BPH (benign prostatic hyperplasia)  Continue home Flomax    GERD (gastroesophageal reflux disease)  Takes Nexium at home  Giving Protonix while inpatient as Nexium is not on hospital formulary  Can resume Nexium at discharge  Had CP this am that is believed to be due to GERD (troponin and EKG unremarkable). Relieved after protonix administration  PRN TUMs ordered.        VTE Risk Mitigation (From admission, onward)         Ordered     heparin, porcine (PF) 100 unit/mL injection flush 300 Units  As needed (PRN)      07/21/20 1920                Disposition: Continue with inpatient care    Eric Apple MD  Bone Marrow Transplant  [facility]

## 2020-07-25 NOTE — PLAN OF CARE
Pt. Day -3 for HaploSCT.  Pt. with nonskid footwear on with bed in lowest position and locked with bed rails up x2.  Pt. ambulates independently and instructed to call if assistance is needed.  Pt. with call light within reach.  Pt. With electrolytes replaced PRN.  Pt. with c/o a headache with imitrex started PRN.  Pt. With c/o nausea with compazine and zofran given scheduled and ativan PRN.  Pt. With a decreased appetite.  Pt. with wife at bedside.  Will continue to monitor pt.

## 2020-07-25 NOTE — PROGRESS NOTES
Chemo Note: Chemotherapy consent in chart. Chemo verified with Pérez JONES RN.  Zofran 16 mg ODT given prior to administration.  Right chest tricath patent and positive for blood return. Fludarabine (FLUDARA) 60 mg in sodium chloride 0.9% 100 mL started @ 200 mL/h to infuse over 30 minutes. Chemo precautions in place. Will continue to monitor pt.

## 2020-07-25 NOTE — PLAN OF CARE
Pt is day -3 for a allo tx. On ivf at 75ml/hr. Hypertensive, with BP in the / range. Hydralazine x 1 given. Pt stated that after hydralazine administered, he got a headache. Ultram given, followed by oxy, then flexeril with oxygen therapy. On scheduled meds for nausea/vomiting. Up ad maddi and independent.    Problem: Adult Inpatient Plan of Care  Goal: Absence of Hospital-Acquired Illness or Injury  Intervention: Identify and Manage Fall Risk  Flowsheets (Taken 7/25/2020 0529)  Safety Promotion/Fall Prevention:   assistive device/personal item within reach   side rails raised x 2   lighting adjusted     Problem: Adult Inpatient Plan of Care  Goal: Absence of Hospital-Acquired Illness or Injury  Intervention: Prevent VTE (venous thromboembolism)  Flowsheets (Taken 7/25/2020 0529)  VTE Prevention/Management:   ambulation promoted   bleeding risk factor(s) identified, provider notified     Problem: Adult Inpatient Plan of Care  Goal: Optimal Comfort and Wellbeing  7/25/2020 0529 by Shivani Encarnacion RN  Outcome: Ongoing, Progressing  7/25/2020 0529 by Shivani Encarnacion RN  Outcome: Ongoing, Progressing  Intervention: Provide Person-Centered Care  Flowsheets (Taken 7/25/2020 0529)  Trust Relationship/Rapport:   care explained   choices provided   questions answered     Problem: Infection  Goal: Infection Symptom Resolution  7/25/2020 0529 by Shivani Encarnacion RN  Outcome: Ongoing, Progressing  7/25/2020 0529 by Shivani Encarnacion RN  Outcome: Ongoing, Progressing  Intervention: Prevent or Manage Infection  Flowsheets (Taken 7/25/2020 0529)  Fever Reduction/Comfort Measures:   lightweight clothing   lightweight bedding  Infection Management: aseptic technique maintained  Isolation Precautions: protective environment maintained     Problem: Adult Inpatient Plan of Care  Goal: Plan of Care Review  7/25/2020 0529 by Shivani Encarnacion RN  Outcome: Ongoing, Progressing  Flowsheets (Taken 7/25/2020 0529)  Plan of Care Reviewed  With: patient  7/25/2020 0529 by Shivani Encarnacion RN  Outcome: Ongoing, Progressing  Goal: Patient-Specific Goal (Individualization)  Outcome: Ongoing, Progressing  Goal: Absence of Hospital-Acquired Illness or Injury  7/25/2020 0529 by Shivani Encarnacion RN  Outcome: Ongoing, Progressing  7/25/2020 0529 by Shivani Encarnacoin RN  Outcome: Ongoing, Progressing  Intervention: Identify and Manage Fall Risk  Flowsheets (Taken 7/25/2020 0529)  Safety Promotion/Fall Prevention:   assistive device/personal item within reach   side rails raised x 2   lighting adjusted  Intervention: Prevent VTE (venous thromboembolism)  Flowsheets (Taken 7/25/2020 0529)  VTE Prevention/Management:   ambulation promoted   bleeding risk factor(s) identified, provider notified  Goal: Optimal Comfort and Wellbeing  7/25/2020 0529 by Shivani Encarnacion RN  Outcome: Ongoing, Progressing  7/25/2020 0529 by Shivani Encarnacion RN  Outcome: Ongoing, Progressing  Intervention: Provide Person-Centered Care  Flowsheets (Taken 7/25/2020 0529)  Trust Relationship/Rapport:   care explained   choices provided   questions answered  Goal: Readiness for Transition of Care  Outcome: Ongoing, Progressing  Goal: Rounds/Family Conference  Outcome: Ongoing, Progressing

## 2020-07-25 NOTE — ASSESSMENT & PLAN NOTE
Admitted 7/21/20 for planned Flu/Cy/TBI haplo allo SCT. Son is the donor. Today is Day - 3.  Will receive fludarabine today.    Planned conditioning regimen:  Fludarabine on Day -6, -5, -4, -3, and -2  Cyclophosphamide on Day -6 and -5  Total Body Irradiation on Day -1     Planned  GVHD Prophylaxis:  Cyclophosphamide on Day +3 and +4  Tacrolimus starting on Day +5  Mycophenolate starting on Day +5     Antimicrobial Prophylaxis:  Acyclovir starting on Day -6  Levofloxacin starting on Day -1  Fluconazole starting on Day -1  Bactrim starting on Day +30     SOS/VOD Prophylaxis:  Ursodiol on Day -6 through Day +30     Growth Factor Support:  Neupogen starting on Day +7

## 2020-07-25 NOTE — ASSESSMENT & PLAN NOTE
Started having headaches several weeks ago. Onset seems to correspond to when patient quit smoking and when he started ponatinib  Has been off of ponatinib for a few days but fevers have persisted  Pattern is like cluster headaches (onset during sleep), but not unilateral.  O2 via non-rebreather at 12 L over 15 minutes seemed to help but developed hot flush so pt stopped  Ultram, Flexeril, Oxycodone not helping  Imitrex PRN q2hr (max 200mg in 24hrs). Not contraindicated per pharm D.  Description of headache sounds like tension headache--throbbing with pressure across crown of head  No focal neurological deficits

## 2020-07-26 LAB
ALBUMIN SERPL BCP-MCNC: 4.2 G/DL (ref 3.5–5.2)
ALP SERPL-CCNC: 57 U/L (ref 55–135)
ALT SERPL W/O P-5'-P-CCNC: 34 U/L (ref 10–44)
ANION GAP SERPL CALC-SCNC: 8 MMOL/L (ref 8–16)
AST SERPL-CCNC: 25 U/L (ref 10–40)
BASOPHILS # BLD AUTO: 0.02 K/UL (ref 0–0.2)
BASOPHILS NFR BLD: 0.6 % (ref 0–1.9)
BILIRUB SERPL-MCNC: 0.5 MG/DL (ref 0.1–1)
BUN SERPL-MCNC: 11 MG/DL (ref 8–23)
CALCIUM SERPL-MCNC: 9.7 MG/DL (ref 8.7–10.5)
CHLORIDE SERPL-SCNC: 107 MMOL/L (ref 95–110)
CO2 SERPL-SCNC: 24 MMOL/L (ref 23–29)
CREAT SERPL-MCNC: 0.7 MG/DL (ref 0.5–1.4)
DIFFERENTIAL METHOD: ABNORMAL
EOSINOPHIL # BLD AUTO: 0.1 K/UL (ref 0–0.5)
EOSINOPHIL NFR BLD: 3.9 % (ref 0–8)
ERYTHROCYTE [DISTWIDTH] IN BLOOD BY AUTOMATED COUNT: 12.8 % (ref 11.5–14.5)
EST. GFR  (AFRICAN AMERICAN): >60 ML/MIN/1.73 M^2
EST. GFR  (NON AFRICAN AMERICAN): >60 ML/MIN/1.73 M^2
GLUCOSE SERPL-MCNC: 100 MG/DL (ref 70–110)
HCT VFR BLD AUTO: 44.5 % (ref 40–54)
HGB BLD-MCNC: 15 G/DL (ref 14–18)
IMM GRANULOCYTES # BLD AUTO: 0.02 K/UL (ref 0–0.04)
IMM GRANULOCYTES NFR BLD AUTO: 0.6 % (ref 0–0.5)
LYMPHOCYTES # BLD AUTO: 0.3 K/UL (ref 1–4.8)
LYMPHOCYTES NFR BLD: 7.8 % (ref 18–48)
MAGNESIUM SERPL-MCNC: 2 MG/DL (ref 1.6–2.6)
MCH RBC QN AUTO: 33.5 PG (ref 27–31)
MCHC RBC AUTO-ENTMCNC: 33.7 G/DL (ref 32–36)
MCV RBC AUTO: 99 FL (ref 82–98)
MONOCYTES # BLD AUTO: 0.2 K/UL (ref 0.3–1)
MONOCYTES NFR BLD: 5.9 % (ref 4–15)
NEUTROPHILS # BLD AUTO: 2.9 K/UL (ref 1.8–7.7)
NEUTROPHILS NFR BLD: 81.2 % (ref 38–73)
NRBC BLD-RTO: 0 /100 WBC
PHOSPHATE SERPL-MCNC: 4 MG/DL (ref 2.7–4.5)
PLATELET # BLD AUTO: 255 K/UL (ref 150–350)
PMV BLD AUTO: 9.1 FL (ref 9.2–12.9)
POTASSIUM SERPL-SCNC: 4.1 MMOL/L (ref 3.5–5.1)
PROT SERPL-MCNC: 7.3 G/DL (ref 6–8.4)
RBC # BLD AUTO: 4.48 M/UL (ref 4.6–6.2)
SODIUM SERPL-SCNC: 139 MMOL/L (ref 136–145)
WBC # BLD AUTO: 3.57 K/UL (ref 3.9–12.7)

## 2020-07-26 PROCEDURE — 63600175 PHARM REV CODE 636 W HCPCS: Performed by: INTERNAL MEDICINE

## 2020-07-26 PROCEDURE — 84100 ASSAY OF PHOSPHORUS: CPT

## 2020-07-26 PROCEDURE — 25000003 PHARM REV CODE 250: Performed by: INTERNAL MEDICINE

## 2020-07-26 PROCEDURE — 63600175 PHARM REV CODE 636 W HCPCS: Performed by: NURSE PRACTITIONER

## 2020-07-26 PROCEDURE — 99233 PR SUBSEQUENT HOSPITAL CARE,LEVL III: ICD-10-PCS | Mod: BMT,,, | Performed by: INTERNAL MEDICINE

## 2020-07-26 PROCEDURE — 85025 COMPLETE CBC W/AUTO DIFF WBC: CPT

## 2020-07-26 PROCEDURE — 25000003 PHARM REV CODE 250: Performed by: NURSE PRACTITIONER

## 2020-07-26 PROCEDURE — 20600001 HC STEP DOWN PRIVATE ROOM

## 2020-07-26 PROCEDURE — 80053 COMPREHEN METABOLIC PANEL: CPT

## 2020-07-26 PROCEDURE — 83735 ASSAY OF MAGNESIUM: CPT

## 2020-07-26 PROCEDURE — A9155 ARTIFICIAL SALIVA: HCPCS | Performed by: INTERNAL MEDICINE

## 2020-07-26 PROCEDURE — 99233 SBSQ HOSP IP/OBS HIGH 50: CPT | Mod: BMT,,, | Performed by: INTERNAL MEDICINE

## 2020-07-26 RX ORDER — GUAIFENESIN 600 MG/1
600 TABLET, EXTENDED RELEASE ORAL EVERY 12 HOURS PRN
Status: DISCONTINUED | OUTPATIENT
Start: 2020-07-26 | End: 2020-08-12 | Stop reason: HOSPADM

## 2020-07-26 RX ADMIN — PANTOPRAZOLE SODIUM 40 MG: 40 TABLET, DELAYED RELEASE ORAL at 09:07

## 2020-07-26 RX ADMIN — PROCHLORPERAZINE MALEATE 10 MG: 5 TABLET, FILM COATED ORAL at 09:07

## 2020-07-26 RX ADMIN — Medication 30 ML: at 09:07

## 2020-07-26 RX ADMIN — URSODIOL 300 MG: 300 CAPSULE ORAL at 09:07

## 2020-07-26 RX ADMIN — LORAZEPAM 1 MG: 2 INJECTION INTRAMUSCULAR; INTRAVENOUS at 09:07

## 2020-07-26 RX ADMIN — SUMATRIPTAN SUCCINATE 50 MG: 50 TABLET ORAL at 04:07

## 2020-07-26 RX ADMIN — ACYCLOVIR 800 MG: 800 TABLET ORAL at 08:07

## 2020-07-26 RX ADMIN — CETIRIZINE HYDROCHLORIDE 10 MG: 5 TABLET ORAL at 09:07

## 2020-07-26 RX ADMIN — Medication 30 ML: at 05:07

## 2020-07-26 RX ADMIN — ONDANSETRON 8 MG: 8 TABLET, ORALLY DISINTEGRATING ORAL at 04:07

## 2020-07-26 RX ADMIN — PROCHLORPERAZINE MALEATE 10 MG: 5 TABLET, FILM COATED ORAL at 12:07

## 2020-07-26 RX ADMIN — PROCHLORPERAZINE MALEATE 10 MG: 5 TABLET, FILM COATED ORAL at 05:07

## 2020-07-26 RX ADMIN — ONDANSETRON 16 MG: 8 TABLET, ORALLY DISINTEGRATING ORAL at 08:07

## 2020-07-26 RX ADMIN — GABAPENTIN 300 MG: 300 CAPSULE ORAL at 09:07

## 2020-07-26 RX ADMIN — FLUDARABINE PHOSPHATE 60 MG: 25 INJECTION, SOLUTION INTRAVENOUS at 10:07

## 2020-07-26 RX ADMIN — GUAIFENESIN 600 MG: 600 TABLET, EXTENDED RELEASE ORAL at 05:07

## 2020-07-26 RX ADMIN — Medication 30 ML: at 12:07

## 2020-07-26 RX ADMIN — ACYCLOVIR 800 MG: 800 TABLET ORAL at 05:07

## 2020-07-26 RX ADMIN — SUMATRIPTAN SUCCINATE 50 MG: 50 TABLET ORAL at 05:07

## 2020-07-26 RX ADMIN — SODIUM CHLORIDE AND POTASSIUM CHLORIDE: 9; 1.49 INJECTION, SOLUTION INTRAVENOUS at 12:07

## 2020-07-26 RX ADMIN — AMLODIPINE BESYLATE 10 MG: 10 TABLET ORAL at 09:07

## 2020-07-26 RX ADMIN — TAMSULOSIN HYDROCHLORIDE 0.4 MG: 0.4 CAPSULE ORAL at 09:07

## 2020-07-26 NOTE — ASSESSMENT & PLAN NOTE
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 Achieved morphologic CR  Started on daily Ponatinib 30 mg daily, which he continues to take outpatient. Will hold Ponatinib on admission.  Referred to Dr. Hodges by Dr. Ramos for SCT consideration  Had [place]MBx at [facility] on 7/1/2020 showing no morphologic or immunophenotypic evidence of CML  Admitted 7/21/20 for planned Flu/Cy/TBI haplo allo SCT  See SCT candidate

## 2020-07-26 NOTE — PROGRESS NOTES
[facility]  Hematology  Bone Marrow Transplant  Progress Note    Patient Name: Kvng Jones Sr.  Admission Date: 7/21/2020  Hospital Length of Stay: 5 days  Code Status: Full Code    Subjective:     Interval History:  Day - 2 from Flu/Cy/TBI haplo allo SCT for CMP. Headache significantly improved after starting imitrex and has completely resolved this morning. Pt unsure if the imitrex is what helped, he also had a large bowel movement last night which seemed to provide significant relief. His only concern this morning is a slight swelling in his left lower frontal gums which isn't bothering him. He's not sure if it is normal or not, just noticed because he was told to remain aware of mouth sores. Deneis any other complaints.    Objective:     Vital Signs (Most Recent):  Temp: 97.7 °F (36.5 °C) (07/26/20 1221)  Pulse: 86 (07/26/20 1221)  Resp: 18 (07/26/20 0729)  BP: (!) 144/95 (07/26/20 1221)  SpO2: 97 % (07/26/20 1221) Vital Signs (24h Range):  Temp:  [97.5 °F (36.4 °C)-98.7 °F (37.1 °C)] 97.7 °F (36.5 °C)  Pulse:  [84-88] 86  Resp:  [16-20] 18  SpO2:  [96 %-98 %] 97 %  BP: (136-173)/() 144/95     Weight: 88.7 kg (195 lb 10.5 oz)  Body mass index is 31.58 kg/m².  Body surface area is 2.03 meters squared.    ECOG SCORE           Intake/Output - Last 3 Shifts       07/24 0700 - 07/25 0659 07/25 0700 - 07/26 0659 07/26 0700 - 07/27 0659    P.O. 360 480 360    I.V. (mL/kg) 1441.3 (16) 1666 (18.8) 405 (4.6)    IV Piggyback 100 100 100    Total Intake(mL/kg) 1901.3 (21.2) 2246 (25.3) 865 (9.8)    Urine (mL/kg/hr) 4325 (2) 2325 (1.1)     Stool 0 0     Total Output 4325 2325     Net -2423.8 -79 +865           Urine Occurrence  1 x 3 x    Stool Occurrence 1 x 2 x 2 x          Physical Exam  Constitutional:       Appearance: He is well-developed.   HENT:      Head: Normocephalic and atraumatic.      Mouth/Throat:      Pharynx: No oropharyngeal exudate.   Eyes:      General:         Right eye:  No discharge.         Left eye: No discharge.      Extraocular Movements: Extraocular movements intact.      Conjunctiva/sclera: Conjunctivae normal.      Pupils: Pupils are equal, round, and reactive to light.   Neck:      Musculoskeletal: Normal range of motion and neck supple.   Cardiovascular:      Rate and Rhythm: Normal rate and regular rhythm.      Heart sounds: Normal heart sounds. No murmur.   Pulmonary:      Effort: Pulmonary effort is normal. No respiratory distress.      Breath sounds: Normal breath sounds. No wheezing or rales.   Abdominal:      General: Bowel sounds are normal. There is no distension.      Palpations: Abdomen is soft.      Tenderness: There is no abdominal tenderness.   Musculoskeletal: Normal range of motion.         General: No deformity.   Skin:     General: Skin is warm and dry.      Findings: No erythema or rash.      Comments: Dalal to right chest wall. Dressing c/d/i with no sign of infection noted.   Neurological:      Mental Status: He is alert and oriented to person, place, and time.   Psychiatric:         Behavior: Behavior normal.         Thought Content: Thought content normal.         Judgment: Judgment normal.         Significant Labs:   CBC:   Recent Labs   Lab 07/25/20 0429 07/26/20  0459   WBC 4.50 3.57*   HGB 14.3 15.0   HCT 42.5 44.5    255    and CMP:   Recent Labs   Lab 07/25/20 0429 07/26/20  0459    139   K 3.8 4.1    107   CO2 26 24   GLU 99 100   BUN 12 11   CREATININE 0.8 0.7   CALCIUM 9.4 9.7   PROT 7.5 7.3   ALBUMIN 4.5 4.2   BILITOT 0.4 0.5   ALKPHOS 58 57   AST 26 25   ALT 33 34   ANIONGAP 8 8   EGFRNONAA >60.0 >60.0       Diagnostic Results:  I have reviewed all pertinent imaging results/findings within the past 24 hours.    Assessment/Plan:     * Stem cell transplant candidate  Admitted 7/21/20 for planned Flu/Cy/TBI haplo allo SCT. Son is the donor. Today is Day - 2.  Will receive fludarabine today.    Planned conditioning  regimen:  Fludarabine on Day -6, -5, -4, -3, and -2  Cyclophosphamide on Day -6 and -5  Total Body Irradiation on Day -1     Planned  GVHD Prophylaxis:  Cyclophosphamide on Day +3 and +4  Tacrolimus starting on Day +5  Mycophenolate starting on Day +5     Antimicrobial Prophylaxis:  Acyclovir starting on Day -6  Levofloxacin starting on Day -1  Fluconazole starting on Day -1  Bactrim starting on Day +30     SOS/VOD Prophylaxis:  Ursodiol on Day -6 through Day +30     Growth Factor Support:  Neupogen starting on Day +7     CML in remission  Presented to [facility] with CMP in blast crisis and with nodular disease on 3/30/2020  Induced with FLAG-Addis. Achieved morphologic CR  Started on daily Ponatinib 30 mg daily, which he continues to take outpatient. Will hold Ponatinib on admission.  Referred to Dr. Hodges by Dr. Ramos for SCT consideration  Had BMBx at [facility] on 7/1/2020 showing no morphologic or immunophenotypic evidence of CML  Admitted 7/21/20 for planned Flu/Cy/TBI haplo allo SCT  See SCT candidate    Headache  Started having headaches several weeks ago. Onset seems to correspond to when patient quit smoking and when he started ponatinib  Has been off of ponatinib for a few days but fevers have persisted  Pattern is like cluster headaches (onset during sleep), but not unilateral.  O2 via non-rebreather at 12 L over 15 minutes seemed to help but developed hot flush so pt stopped  Ultram, Flexeril, Oxycodone not helping  Imitrex PRN q2hr (max 200mg in 24hrs). Not contraindicated per pharm D.  Description of headache sounds like tension headache--throbbing with pressure across crown of head  No focal neurological deficits  Headache has completely resolved this morning, will continue to monitor      Cervical stenosis of spine  Continue home Flexeril and ultram  PRN oxy IR added for additional relief    Essential hypertension  increased home amlodipine from 5 mg to 10 mg daily  BPs as high as 180s/110s  Started prn  hydralazine - possibly worsened headache  Decreased IVF rate  Will hold off on further BP meds for the time being as relative hypotension may be contributing to headaches    Tobacco abuse, in remission  35 pack year history  Quit smoking 6/1/2020 using Chantix  Patient completed course of Chantix and denies need for Nicotine patch.     Daily consumption of alcohol  Drinks 3-4 alcoholic beverages nightly  Will need to monitor closely for s/s of alcohol withdrawal while inpatient  Will consider consulting addiction medicine if indicated  Prn ativan ordered    BPH (benign prostatic hyperplasia)  Continue home Flomax    GERD (gastroesophageal reflux disease)  Takes Nexium at home  Giving Protonix while inpatient as Nexium is not on hospital formulary  Can resume Nexium at discharge  Had CP this am that is believed to be due to GERD (troponin and EKG unremarkable). Relieved after protonix administration  PRN TUMs ordered.        VTE Risk Mitigation (From admission, onward)         Ordered     heparin, porcine (PF) 100 unit/mL injection flush 300 Units  As needed (PRN)      07/21/20 1920                Disposition: Continue with inpatient management    Eric Apple MD  Bone Marrow Transplant  [facility]

## 2020-07-26 NOTE — ASSESSMENT & PLAN NOTE
Started having headaches several weeks ago. Onset seems to correspond to when patient quit smoking and when he started ponatinib  Has been off of ponatinib for a few days but fevers have persisted  Pattern is like cluster headaches (onset during sleep), but not unilateral.  O2 via non-rebreather at 12 L over 15 minutes seemed to help but developed hot flush so pt stopped  Ultram, Flexeril, Oxycodone not helping  Imitrex PRN q2hr (max 200mg in 24hrs). Not contraindicated per pharm D.  Description of headache sounds like tension headache--throbbing with pressure across crown of head  No focal neurological deficits  Headache has completely resolved this morning, will continue to monitor

## 2020-07-26 NOTE — PLAN OF CARE
Pt. Day -2 for HaploSCT.  Pt. with nonskid footwear on with bed in lowest position and locked with bed rails up x2.  Pt. ambulates independently and instructed to call if assistance is needed.  Pt. with call light within reach.  Pt. with c/o a headache this afternoon with imitrex given PRN and started mucinex PRN as well.  Pt. With a decreased appetite.  Pt. with son at bedside.  Will continue to monitor pt.

## 2020-07-26 NOTE — ASSESSMENT & PLAN NOTE
Admitted 7/21/20 for planned Flu/Cy/TBI haplo allo SCT. Son is the donor. Today is Day - 2.  Will receive fludarabine today.    Planned conditioning regimen:  Fludarabine on Day -6, -5, -4, -3, and -2  Cyclophosphamide on Day -6 and -5  Total Body Irradiation on Day -1     Planned  GVHD Prophylaxis:  Cyclophosphamide on Day +3 and +4  Tacrolimus starting on Day +5  Mycophenolate starting on Day +5     Antimicrobial Prophylaxis:  Acyclovir starting on Day -6  Levofloxacin starting on Day -1  Fluconazole starting on Day -1  Bactrim starting on Day +30     SOS/VOD Prophylaxis:  Ursodiol on Day -6 through Day +30     Growth Factor Support:  Neupogen starting on Day +7

## 2020-07-26 NOTE — SUBJECTIVE & OBJECTIVE
Subjective:     Interval History:  Day - 2 from Flu/Cy/TBI haplo allo SCT for CMP. Headache significantly improved after starting imitrex and has completely resolved this morning. Pt unsure if the imitrex is what helped, he also had a large bowel movement last night which seemed to provide significant relief. His only concern this morning is a slight swelling in his left lower frontal gums which isn't bothering him. He's not sure if it is normal or not, just noticed because he was told to remain aware of mouth sores. Deneis any other complaints.    Objective:     Vital Signs (Most Recent):  Temp: 97.7 °F (36.5 °C) (07/26/20 1221)  Pulse: 86 (07/26/20 1221)  Resp: 18 (07/26/20 0729)  BP: (!) 144/95 (07/26/20 1221)  SpO2: 97 % (07/26/20 1221) Vital Signs (24h Range):  Temp:  [97.5 °F (36.4 °C)-98.7 °F (37.1 °C)] 97.7 °F (36.5 °C)  Pulse:  [84-88] 86  Resp:  [16-20] 18  SpO2:  [96 %-98 %] 97 %  BP: (136-173)/() 144/95     Weight: 88.7 kg (195 lb 10.5 oz)  Body mass index is 31.58 kg/m².  Body surface area is 2.03 meters squared.    ECOG SCORE           Intake/Output - Last 3 Shifts       07/24 0700 - 07/25 0659 07/25 0700 - 07/26 0659 07/26 0700 - 07/27 0659    P.O. 360 480 360    I.V. (mL/kg) 1441.3 (16) 1666 (18.8) 405 (4.6)    IV Piggyback 100 100 100    Total Intake(mL/kg) 1901.3 (21.2) 2246 (25.3) 865 (9.8)    Urine (mL/kg/hr) 4325 (2) 2325 (1.1)     Stool 0 0     Total Output 4325 2325     Net -2423.8 -79 +865           Urine Occurrence  1 x 3 x    Stool Occurrence 1 x 2 x 2 x          Physical Exam  Constitutional:       Appearance: He is well-developed.   HENT:      Head: Normocephalic and atraumatic.      Mouth/Throat:      Pharynx: No oropharyngeal exudate.   Eyes:      General:         Right eye: No discharge.         Left eye: No discharge.      Extraocular Movements: Extraocular movements intact.      Conjunctiva/sclera: Conjunctivae normal.      Pupils: Pupils are equal, round, and reactive to  light.   Neck:      Musculoskeletal: Normal range of motion and neck supple.   Cardiovascular:      Rate and Rhythm: Normal rate and regular rhythm.      Heart sounds: Normal heart sounds. No murmur.   Pulmonary:      Effort: Pulmonary effort is normal. No respiratory distress.      Breath sounds: Normal breath sounds. No wheezing or rales.   Abdominal:      General: Bowel sounds are normal. There is no distension.      Palpations: Abdomen is soft.      Tenderness: There is no abdominal tenderness.   Musculoskeletal: Normal range of motion.         General: No deformity.   Skin:     General: Skin is warm and dry.      Findings: No erythema or rash.      Comments: Dalal to right chest wall. Dressing c/d/i with no sign of infection noted.   Neurological:      Mental Status: He is alert and oriented to person, place, and time.   Psychiatric:         Behavior: Behavior normal.         Thought Content: Thought content normal.         Judgment: Judgment normal.         Significant Labs:   CBC:   Recent Labs   Lab 07/25/20 0429 07/26/20 0459   WBC 4.50 3.57*   HGB 14.3 15.0   HCT 42.5 44.5    255    and CMP:   Recent Labs   Lab 07/25/20 0429 07/26/20 0459    139   K 3.8 4.1    107   CO2 26 24   GLU 99 100   BUN 12 11   CREATININE 0.8 0.7   CALCIUM 9.4 9.7   PROT 7.5 7.3   ALBUMIN 4.5 4.2   BILITOT 0.4 0.5   ALKPHOS 58 57   AST 26 25   ALT 33 34   ANIONGAP 8 8   EGFRNONAA >60.0 >60.0       Diagnostic Results:  I have reviewed all pertinent imaging results/findings within the past 24 hours.

## 2020-07-27 LAB
ALBUMIN SERPL BCP-MCNC: 4.1 G/DL (ref 3.5–5.2)
ALP SERPL-CCNC: 54 U/L (ref 55–135)
ALT SERPL W/O P-5'-P-CCNC: 34 U/L (ref 10–44)
ANION GAP SERPL CALC-SCNC: 9 MMOL/L (ref 8–16)
AST SERPL-CCNC: 21 U/L (ref 10–40)
BASOPHILS # BLD AUTO: 0.02 K/UL (ref 0–0.2)
BASOPHILS NFR BLD: 0.7 % (ref 0–1.9)
BILIRUB SERPL-MCNC: 0.4 MG/DL (ref 0.1–1)
BUN SERPL-MCNC: 9 MG/DL (ref 8–23)
CALCIUM SERPL-MCNC: 9 MG/DL (ref 8.7–10.5)
CHLORIDE SERPL-SCNC: 108 MMOL/L (ref 95–110)
CO2 SERPL-SCNC: 22 MMOL/L (ref 23–29)
CREAT SERPL-MCNC: 0.8 MG/DL (ref 0.5–1.4)
DIFFERENTIAL METHOD: ABNORMAL
EOSINOPHIL # BLD AUTO: 0.2 K/UL (ref 0–0.5)
EOSINOPHIL NFR BLD: 5.9 % (ref 0–8)
ERYTHROCYTE [DISTWIDTH] IN BLOOD BY AUTOMATED COUNT: 12.7 % (ref 11.5–14.5)
EST. GFR  (AFRICAN AMERICAN): >60 ML/MIN/1.73 M^2
EST. GFR  (NON AFRICAN AMERICAN): >60 ML/MIN/1.73 M^2
GLUCOSE SERPL-MCNC: 106 MG/DL (ref 70–110)
HCT VFR BLD AUTO: 43.6 % (ref 40–54)
HGB BLD-MCNC: 14.6 G/DL (ref 14–18)
IMM GRANULOCYTES # BLD AUTO: 0.01 K/UL (ref 0–0.04)
IMM GRANULOCYTES NFR BLD AUTO: 0.4 % (ref 0–0.5)
LYMPHOCYTES # BLD AUTO: 0.3 K/UL (ref 1–4.8)
LYMPHOCYTES NFR BLD: 9.7 % (ref 18–48)
MAGNESIUM SERPL-MCNC: 1.9 MG/DL (ref 1.6–2.6)
MCH RBC QN AUTO: 33.4 PG (ref 27–31)
MCHC RBC AUTO-ENTMCNC: 33.5 G/DL (ref 32–36)
MCV RBC AUTO: 100 FL (ref 82–98)
MONOCYTES # BLD AUTO: 0.1 K/UL (ref 0.3–1)
MONOCYTES NFR BLD: 4.1 % (ref 4–15)
NEUTROPHILS # BLD AUTO: 2.1 K/UL (ref 1.8–7.7)
NEUTROPHILS NFR BLD: 79.2 % (ref 38–73)
NRBC BLD-RTO: 0 /100 WBC
PHOSPHATE SERPL-MCNC: 3.9 MG/DL (ref 2.7–4.5)
PLATELET # BLD AUTO: 242 K/UL (ref 150–350)
PMV BLD AUTO: 9.3 FL (ref 9.2–12.9)
POTASSIUM SERPL-SCNC: 4 MMOL/L (ref 3.5–5.1)
PROT SERPL-MCNC: 7 G/DL (ref 6–8.4)
RBC # BLD AUTO: 4.37 M/UL (ref 4.6–6.2)
SODIUM SERPL-SCNC: 139 MMOL/L (ref 136–145)
WBC # BLD AUTO: 2.69 K/UL (ref 3.9–12.7)

## 2020-07-27 PROCEDURE — 77370 RADIATION PHYSICS CONSULT: CPT | Performed by: RADIOLOGY

## 2020-07-27 PROCEDURE — 77470 SPECIAL RADIATION TREATMENT: CPT | Mod: 26,22,BMT, | Performed by: RADIOLOGY

## 2020-07-27 PROCEDURE — 84100 ASSAY OF PHOSPHORUS: CPT

## 2020-07-27 PROCEDURE — 63600175 PHARM REV CODE 636 W HCPCS: Performed by: NURSE PRACTITIONER

## 2020-07-27 PROCEDURE — 85025 COMPLETE CBC W/AUTO DIFF WBC: CPT

## 2020-07-27 PROCEDURE — 77470 PR  SPECIAL RADIATION TREATMENT: ICD-10-PCS | Mod: 26,22,BMT, | Performed by: RADIOLOGY

## 2020-07-27 PROCEDURE — 77336 RADIATION PHYSICS CONSULT: CPT | Performed by: RADIOLOGY

## 2020-07-27 PROCEDURE — 99233 SBSQ HOSP IP/OBS HIGH 50: CPT | Mod: BMT,,, | Performed by: INTERNAL MEDICINE

## 2020-07-27 PROCEDURE — 25000003 PHARM REV CODE 250: Performed by: NURSE PRACTITIONER

## 2020-07-27 PROCEDURE — 80053 COMPREHEN METABOLIC PANEL: CPT

## 2020-07-27 PROCEDURE — 25000003 PHARM REV CODE 250: Performed by: INTERNAL MEDICINE

## 2020-07-27 PROCEDURE — 63600175 PHARM REV CODE 636 W HCPCS: Performed by: INTERNAL MEDICINE

## 2020-07-27 PROCEDURE — 20600001 HC STEP DOWN PRIVATE ROOM

## 2020-07-27 PROCEDURE — A9155 ARTIFICIAL SALIVA: HCPCS | Performed by: INTERNAL MEDICINE

## 2020-07-27 PROCEDURE — 99233 PR SUBSEQUENT HOSPITAL CARE,LEVL III: ICD-10-PCS | Mod: BMT,,, | Performed by: INTERNAL MEDICINE

## 2020-07-27 PROCEDURE — 77470 SPECIAL RADIATION TREATMENT: CPT | Mod: 22,TC | Performed by: RADIOLOGY

## 2020-07-27 PROCEDURE — 83735 ASSAY OF MAGNESIUM: CPT

## 2020-07-27 PROCEDURE — 77412 RADIATION TX DELIVERY LVL 3: CPT | Performed by: RADIOLOGY

## 2020-07-27 PROCEDURE — 25000242 PHARM REV CODE 250 ALT 637 W/ HCPCS: Performed by: NURSE PRACTITIONER

## 2020-07-27 RX ORDER — FLUTICASONE PROPIONATE 50 MCG
2 SPRAY, SUSPENSION (ML) NASAL DAILY
Status: DISCONTINUED | OUTPATIENT
Start: 2020-07-27 | End: 2020-08-12 | Stop reason: HOSPADM

## 2020-07-27 RX ADMIN — Medication 30 ML: at 08:07

## 2020-07-27 RX ADMIN — FLUTICASONE PROPIONATE 100 MCG: 50 SPRAY, METERED NASAL at 12:07

## 2020-07-27 RX ADMIN — ACYCLOVIR 800 MG: 800 TABLET ORAL at 05:07

## 2020-07-27 RX ADMIN — PANTOPRAZOLE SODIUM 40 MG: 40 TABLET, DELAYED RELEASE ORAL at 08:07

## 2020-07-27 RX ADMIN — PROCHLORPERAZINE MALEATE 10 MG: 5 TABLET, FILM COATED ORAL at 08:07

## 2020-07-27 RX ADMIN — LORAZEPAM 1 MG: 2 INJECTION INTRAMUSCULAR; INTRAVENOUS at 03:07

## 2020-07-27 RX ADMIN — URSODIOL 300 MG: 300 CAPSULE ORAL at 08:07

## 2020-07-27 RX ADMIN — Medication 400 MG: at 06:07

## 2020-07-27 RX ADMIN — TAMSULOSIN HYDROCHLORIDE 0.4 MG: 0.4 CAPSULE ORAL at 08:07

## 2020-07-27 RX ADMIN — Medication 30 ML: at 05:07

## 2020-07-27 RX ADMIN — LORAZEPAM 1 MG: 0.5 TABLET ORAL at 11:07

## 2020-07-27 RX ADMIN — LORAZEPAM 1 MG: 2 INJECTION INTRAMUSCULAR; INTRAVENOUS at 08:07

## 2020-07-27 RX ADMIN — GABAPENTIN 300 MG: 300 CAPSULE ORAL at 08:07

## 2020-07-27 RX ADMIN — ONDANSETRON 16 MG: 8 TABLET, ORALLY DISINTEGRATING ORAL at 11:07

## 2020-07-27 RX ADMIN — FLUCONAZOLE 400 MG: 200 TABLET ORAL at 08:07

## 2020-07-27 RX ADMIN — ACYCLOVIR 800 MG: 800 TABLET ORAL at 08:07

## 2020-07-27 RX ADMIN — LEVOFLOXACIN 500 MG: 500 TABLET, FILM COATED ORAL at 08:07

## 2020-07-27 RX ADMIN — PROCHLORPERAZINE MALEATE 10 MG: 5 TABLET, FILM COATED ORAL at 12:07

## 2020-07-27 RX ADMIN — AMLODIPINE BESYLATE 10 MG: 10 TABLET ORAL at 08:07

## 2020-07-27 RX ADMIN — SODIUM CHLORIDE AND POTASSIUM CHLORIDE: 9; 1.49 INJECTION, SOLUTION INTRAVENOUS at 12:07

## 2020-07-27 RX ADMIN — Medication 30 ML: at 12:07

## 2020-07-27 RX ADMIN — PROCHLORPERAZINE MALEATE 10 MG: 5 TABLET, FILM COATED ORAL at 05:07

## 2020-07-27 RX ADMIN — LORAZEPAM 1 MG: 2 INJECTION INTRAMUSCULAR; INTRAVENOUS at 04:07

## 2020-07-27 RX ADMIN — Medication 400 MG: at 11:07

## 2020-07-27 NOTE — PROGRESS NOTES
"[facility]  Hematology  Bone Marrow Transplant  Progress Note    Patient Name: Kvng Jones Sr.  Admission Date: 7/21/2020  Hospital Length of Stay: 6 days  Code Status: Full Code    Subjective:     Interval History: Day -1 from Flu/Cy/TBI haplo allo SCT for CML. Will receive TBI today. Continues to be afebrile. VSS. BP improved. Headaches still recurrent but seem to have responded to imitrex. Patient states that he feel that headaches my be sinus in nature. Maxillary tenderness noted. Takes Claritin at home. Not on hospital formulary. Zyrtec on formulary, but patient states that he cannot take Zyrtec due to SE and "addiction" to it. Will start Flonase. Okay to take home Claritin if someone can bring it from home. Reports increased stool frequency, but denies liquid stools.     Objective:     Vital Signs (Most Recent):  Temp: 98.3 °F (36.8 °C) (07/27/20 0802)  Pulse: 99 (07/27/20 0802)  Resp: 17 (07/27/20 0802)  BP: (!) 158/92 (07/27/20 0802)  SpO2: 97 % (07/27/20 0802) Vital Signs (24h Range):  Temp:  [97.7 °F (36.5 °C)-98.5 °F (36.9 °C)] 98.3 °F (36.8 °C)  Pulse:  [81-99] 99  Resp:  [16-18] 17  SpO2:  [95 %-99 %] 97 %  BP: (131-158)/(73-95) 158/92     Weight: 88 kg (194 lb 1.8 oz)  Body mass index is 31.33 kg/m².  Body surface area is 2.02 meters squared.    ECOG SCORE         [unfilled]    Intake/Output - Last 3 Shifts       07/25 0700 - 07/26 0659 07/26 0700 - 07/27 0659 07/27 0700 - 07/28 0659    P.O. 480 2520 480    I.V. (mL/kg) 1666 (18.8) 1691.8 (19.2) 493.8 (5.6)    IV Piggyback 100 100     Total Intake(mL/kg) 2246 (25.3) 4311.8 (49) 973.8 (11.1)    Urine (mL/kg/hr) 2325 (1.1) 1200 (0.6)     Stool 0 0     Total Output 2325 1200     Net -79 +3111.8 +973.8           Urine Occurrence 1 x 5 x     Stool Occurrence 2 x 5 x           Physical Exam  Constitutional:       Appearance: He is well-developed.   HENT:      Head: Normocephalic and atraumatic.      Mouth/Throat:      Pharynx: No " oropharyngeal exudate.   Eyes:      General:         Right eye: No discharge.         Left eye: No discharge.      Conjunctiva/sclera: Conjunctivae normal.      Pupils: Pupils are equal, round, and reactive to light.   Neck:      Musculoskeletal: Normal range of motion and neck supple.   Cardiovascular:      Rate and Rhythm: Normal rate and regular rhythm.      Heart sounds: Normal heart sounds. No murmur.   Pulmonary:      Effort: Pulmonary effort is normal. No respiratory distress.      Breath sounds: Normal breath sounds. No wheezing or rales.   Abdominal:      General: Bowel sounds are normal. There is no distension.      Palpations: Abdomen is soft.      Tenderness: There is no abdominal tenderness.   Musculoskeletal: Normal range of motion.         General: No deformity.   Skin:     General: Skin is warm and dry.      Findings: No erythema or rash.      Comments: Dalal to right chest wall. Dressing c/d/i with no sign of infection noted.   Neurological:      Mental Status: He is alert and oriented to person, place, and time.   Psychiatric:         Behavior: Behavior normal.         Thought Content: Thought content normal.         Judgment: Judgment normal.         Significant Labs:   CBC:   Recent Labs   Lab 07/26/20  0459 07/27/20  0328   WBC 3.57* 2.69*   HGB 15.0 14.6   HCT 44.5 43.6    242    and CMP:   Recent Labs   Lab 07/26/20  0459 07/27/20  0328    139   K 4.1 4.0    108   CO2 24 22*    106   BUN 11 9   CREATININE 0.7 0.8   CALCIUM 9.7 9.0   PROT 7.3 7.0   ALBUMIN 4.2 4.1   BILITOT 0.5 0.4   ALKPHOS 57 54*   AST 25 21   ALT 34 34   ANIONGAP 8 9   EGFRNONAA >60.0 >60.0       Diagnostic Results:  I have reviewed all pertinent imaging results/findings within the past 24 hours.    Assessment/Plan:     * Stem cell transplant candidate  Admitted 7/21/20 for planned Flu/Cy/TBI haplo allo SCT. Son is the donor. Today is Day - 1.  Will receiveTBI today.  Will receive 2 bags and a  "total CD34 dose of 3.10 x10^6/kg on 7/28/20    Planned conditioning regimen:  Fludarabine on Day -6, -5, -4, -3, and -2  Cyclophosphamide on Day -6 and -5  Total Body Irradiation on Day -1     Planned  GVHD Prophylaxis:  Cyclophosphamide on Day +3 and +4  Tacrolimus starting on Day +5  Mycophenolate starting on Day +5     Antimicrobial Prophylaxis:  Acyclovir starting on Day -6  Levofloxacin starting on Day -1  Fluconazole starting on Day -1  Bactrim starting on Day +30     SOS/VOD Prophylaxis:  Ursodiol on Day -6 through Day +30     Growth Factor Support:  Neupogen starting on Day +7     CML in remission  Presented to [facility] with CMP in blast crisis and with nodular disease on 3/30/2020  Induced with FLAG-Addis. Achieved morphologic CR  Started on daily Ponatinib 30 mg daily, which he continues to take outpatient. Will hold Ponatinib on admission.  Referred to Dr. Hodges by Dr. Ramos for SCT consideration  Had BMBx at [facility] on 7/1/2020 showing no morphologic or immunophenotypic evidence of CML  Admitted 7/21/20 for planned Flu/Cy/TBI haplo allo SCT  See SCT candidate    Headache  Started having headaches several weeks ago. Onset seems to correspond to when patient quit smoking and when he started ponatinib  Has been off of ponatinib for a few days but fevers have persisted  Pattern is like cluster headaches (onset during sleep), but not unilateral.  O2 via non-rebreather at 12 L over 15 minutes seemed to help but developed hot flush so pt stopped  Ultram, Flexeril, Oxycodone not helping  Imitrex PRN q2hr (max 200mg in 24hrs). Not contraindicated per pharm D.  Description of headache sounds like tension headache--throbbing with pressure across crown of head  No focal neurological deficits  Patient reporting that headache may be sinus in nature. Maxillary tenderness noted.  Patient takes Claritin at home. States that he cannot take Zyrtec (which is on formulary) due to SE and "addiction". States that it was hard for him to " ween himself off of Zyrtec.  Starting Flonase. Okay to take home Claritin if someone can bring it to him. Pharmacy will have to verify med.        Essential hypertension  increased home amlodipine from 5 mg to 10 mg daily  BPs as high as 180s/110s  Started prn hydralazine - possibly worsened headache  Decreased IVF rate  BP improving     Cervical stenosis of spine  Continue home Flexeril and ultram  PRN oxy IR added for additional relief    Tobacco abuse, in remission  35 pack year history  Quit smoking 6/1/2020 using Chantix  Patient completed course of Chantix and denies need for Nicotine patch.     Daily consumption of alcohol  Drinks 3-4 alcoholic beverages nightly  Will need to monitor closely for s/s of alcohol withdrawal while inpatient  Will consider consulting addiction medicine if indicated  Prn ativan ordered    BPH (benign prostatic hyperplasia)  Continue home Flomax    GERD (gastroesophageal reflux disease)  Takes Nexium at home  Giving Protonix while inpatient as Nexium is not on hospital formulary  Can resume Nexium at discharge  Had CP this am that is believed to be due to GERD (troponin and EKG unremarkable). Relieved after protonix administration  PRN TUMs ordered.        VTE Risk Mitigation (From admission, onward)         Ordered     heparin, porcine (PF) 100 unit/mL injection flush 300 Units  As needed (PRN)      07/21/20 1920                Disposition: Inpatient for allo SCT.    Ivon Morrow, NP  Bone Marrow Transplant  [facility]

## 2020-07-27 NOTE — ASSESSMENT & PLAN NOTE
increased home amlodipine from 5 mg to 10 mg daily  BPs as high as 180s/110s  Started prn hydralazine - possibly worsened headache  Decreased IVF rate  BP improving

## 2020-07-27 NOTE — SUBJECTIVE & OBJECTIVE
"  Subjective:     Interval History: Day -1 from Flu/Cy/TBI haplo allo SCT for CML. Will receive TBI today. Continues to be afebrile. VSS. BP improved. Headaches still recurrent but seem to have responded to imitrex. Patient states that he feel that headaches my be sinus in nature. Maxillary tenderness noted. Takes Claritin at home. Not on hospital formulary. Zyrtec on formulary, but patient states that he cannot take Zyrtec due to SE and "addiction" to it. Will start Flonase. Okay to take home Claritin if someone can bring it from home. Reports increased stool frequency, but denies liquid stools.     Objective:     Vital Signs (Most Recent):  Temp: 98.3 °F (36.8 °C) (07/27/20 0802)  Pulse: 99 (07/27/20 0802)  Resp: 17 (07/27/20 0802)  BP: (!) 158/92 (07/27/20 0802)  SpO2: 97 % (07/27/20 0802) Vital Signs (24h Range):  Temp:  [97.7 °F (36.5 °C)-98.5 °F (36.9 °C)] 98.3 °F (36.8 °C)  Pulse:  [81-99] 99  Resp:  [16-18] 17  SpO2:  [95 %-99 %] 97 %  BP: (131-158)/(73-95) 158/92     Weight: 88 kg (194 lb 1.8 oz)  Body mass index is 31.33 kg/m².  Body surface area is 2.02 meters squared.    ECOG SCORE         [unfilled]    Intake/Output - Last 3 Shifts       07/25 0700 - 07/26 0659 07/26 0700 - 07/27 0659 07/27 0700 - 07/28 0659    P.O. 480 2520 480    I.V. (mL/kg) 1666 (18.8) 1691.8 (19.2) 493.8 (5.6)    IV Piggyback 100 100     Total Intake(mL/kg) 2246 (25.3) 4311.8 (49) 973.8 (11.1)    Urine (mL/kg/hr) 2325 (1.1) 1200 (0.6)     Stool 0 0     Total Output 2325 1200     Net -79 +3111.8 +973.8           Urine Occurrence 1 x 5 x     Stool Occurrence 2 x 5 x           Physical Exam  Constitutional:       Appearance: He is well-developed.   HENT:      Head: Normocephalic and atraumatic.      Mouth/Throat:      Pharynx: No oropharyngeal exudate.   Eyes:      General:         Right eye: No discharge.         Left eye: No discharge.      Conjunctiva/sclera: Conjunctivae normal.      Pupils: Pupils are equal, round, and reactive to " light.   Neck:      Musculoskeletal: Normal range of motion and neck supple.   Cardiovascular:      Rate and Rhythm: Normal rate and regular rhythm.      Heart sounds: Normal heart sounds. No murmur.   Pulmonary:      Effort: Pulmonary effort is normal. No respiratory distress.      Breath sounds: Normal breath sounds. No wheezing or rales.   Abdominal:      General: Bowel sounds are normal. There is no distension.      Palpations: Abdomen is soft.      Tenderness: There is no abdominal tenderness.   Musculoskeletal: Normal range of motion.         General: No deformity.   Skin:     General: Skin is warm and dry.      Findings: No erythema or rash.      Comments: Dalal to right chest wall. Dressing c/d/i with no sign of infection noted.   Neurological:      Mental Status: He is alert and oriented to person, place, and time.   Psychiatric:         Behavior: Behavior normal.         Thought Content: Thought content normal.         Judgment: Judgment normal.         Significant Labs:   CBC:   Recent Labs   Lab 07/26/20 0459 07/27/20  0328   WBC 3.57* 2.69*   HGB 15.0 14.6   HCT 44.5 43.6    242    and CMP:   Recent Labs   Lab 07/26/20 0459 07/27/20  0328    139   K 4.1 4.0    108   CO2 24 22*    106   BUN 11 9   CREATININE 0.7 0.8   CALCIUM 9.7 9.0   PROT 7.3 7.0   ALBUMIN 4.2 4.1   BILITOT 0.5 0.4   ALKPHOS 57 54*   AST 25 21   ALT 34 34   ANIONGAP 8 9   EGFRNONAA >60.0 >60.0       Diagnostic Results:  I have reviewed all pertinent imaging results/findings within the past 24 hours.

## 2020-07-27 NOTE — ASSESSMENT & PLAN NOTE
"Started having headaches several weeks ago. Onset seems to correspond to when patient quit smoking and when he started ponatinib  Has been off of ponatinib for a few days but fevers have persisted  Pattern is like cluster headaches (onset during sleep), but not unilateral.  O2 via non-rebreather at 12 L over 15 minutes seemed to help but developed hot flush so pt stopped  Ultram, Flexeril, Oxycodone not helping  Imitrex PRN q2hr (max 200mg in 24hrs). Not contraindicated per pharm D.  Description of headache sounds like tension headache--throbbing with pressure across crown of head  No focal neurological deficits  Patient reporting that headache may be sinus in nature. Maxillary tenderness noted.  Patient takes Claritin at home. States that he cannot take Zyrtec (which is on formulary) due to SE and "addiction". States that it was hard for him to ween himself off of Zyrtec.  Starting Flonase. Okay to take home Claritin if someone can bring it to him. Pharmacy will have to verify med.      "

## 2020-07-27 NOTE — ASSESSMENT & PLAN NOTE
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 Achieved morphologic CR  Started on daily Ponatinib 30 mg daily, which he continues to take outpatient. Will hold Ponatinib on admission.  Referred to Dr. Hodges by Dr. Ramos for SCT consideration  Had BMBx at [facility] on 7/1/2020 showing no morphologic or immunophenotypic evidence of CML  Admitted 7/21/20 for planned Flu/Cy/TBI haplo allo SCT  See SCT candidate

## 2020-07-27 NOTE — PLAN OF CARE
Plan of care reviewed with the patient at the beginning of the shift. Pt is day -1 of Haplo. Pt complains of a headache, Ativan given prn. Nausea managed with PO Compazine. Multiple Bms yesterday but pt states he does not have diarrhea. IVF infusing. PO intake encouraged. Fall precautions maintained. Pt remained free from falls and injury this shift. Bed locked in lowest position, side rails up x2, call light within reach. Instructed pt to call for assistance as needed. Pt verbalized understanding. Vitals stable. Pt afebrile overnight. Neutropenic precautions maintained. No acute issues overnight. Will continue to monitor.

## 2020-07-27 NOTE — ASSESSMENT & PLAN NOTE
Admitted 7/21/20 for planned Flu/Cy/TBI haplo allo SCT. Son is the donor. Today is Day - 1.  Will receiveTBI today.  Will receive 2 bags and a total CD34 dose of 3.10 x10^6/kg on 7/28/20    Planned conditioning regimen:  Fludarabine on Day -6, -5, -4, -3, and -2  Cyclophosphamide on Day -6 and -5  Total Body Irradiation on Day -1     Planned  GVHD Prophylaxis:  Cyclophosphamide on Day +3 and +4  Tacrolimus starting on Day +5  Mycophenolate starting on Day +5     Antimicrobial Prophylaxis:  Acyclovir starting on Day -6  Levofloxacin starting on Day -1  Fluconazole starting on Day -1  Bactrim starting on Day +30     SOS/VOD Prophylaxis:  Ursodiol on Day -6 through Day +30     Growth Factor Support:  Neupogen starting on Day +7

## 2020-07-27 NOTE — PLAN OF CARE
Pt involved in plan of care and communicating needs throughout shift.  Wife at bedside and involved in care.  Day -1 for haplo allo stem cell transplant. Pt up in room independently and ambulating with steady gait.  No c/o pain today so far; pt denies headache so far this shift.  Pt continues to c/o intermittent nausea; scheduled compazine admin as ordered with relief noted.  Pt had TBI this am as ordered; tolerated without complication. Tolerating regular diet, voiding without difficulty.  IVF infusing throughout shift as ordered.  All VSS; no acute events so far this shift.  Pt remaining free from falls or injury throughout shift; bed in lowest position; call light within reach.  Pt instructed to call for assistance as needed.  Q1H rounding done on pt.

## 2020-07-28 LAB
ALBUMIN SERPL BCP-MCNC: 3.9 G/DL (ref 3.5–5.2)
ALP SERPL-CCNC: 48 U/L (ref 55–135)
ALT SERPL W/O P-5'-P-CCNC: 29 U/L (ref 10–44)
ANION GAP SERPL CALC-SCNC: 10 MMOL/L (ref 8–16)
AST SERPL-CCNC: 18 U/L (ref 10–40)
BASOPHILS # BLD AUTO: 0.01 K/UL (ref 0–0.2)
BASOPHILS NFR BLD: 0.5 % (ref 0–1.9)
BILIRUB SERPL-MCNC: 0.4 MG/DL (ref 0.1–1)
BLD PROD TYP BPU: NORMAL
BLD PROD TYP BPU: NORMAL
BLOOD UNIT EXPIRATION DATE: NORMAL
BLOOD UNIT EXPIRATION DATE: NORMAL
BLOOD UNIT TYPE CODE: 600
BLOOD UNIT TYPE CODE: 600
BLOOD UNIT TYPE: NORMAL
BLOOD UNIT TYPE: NORMAL
BUN SERPL-MCNC: 11 MG/DL (ref 8–23)
CALCIUM SERPL-MCNC: 9 MG/DL (ref 8.7–10.5)
CHLORIDE SERPL-SCNC: 110 MMOL/L (ref 95–110)
CO2 SERPL-SCNC: 21 MMOL/L (ref 23–29)
CODING SYSTEM: NORMAL
CODING SYSTEM: NORMAL
CREAT SERPL-MCNC: 0.7 MG/DL (ref 0.5–1.4)
DIFFERENTIAL METHOD: ABNORMAL
DISPENSE STATUS: NORMAL
DISPENSE STATUS: NORMAL
EOSINOPHIL # BLD AUTO: 0.1 K/UL (ref 0–0.5)
EOSINOPHIL NFR BLD: 5.2 % (ref 0–8)
ERYTHROCYTE [DISTWIDTH] IN BLOOD BY AUTOMATED COUNT: 12.7 % (ref 11.5–14.5)
EST. GFR  (AFRICAN AMERICAN): >60 ML/MIN/1.73 M^2
EST. GFR  (NON AFRICAN AMERICAN): >60 ML/MIN/1.73 M^2
GLUCOSE SERPL-MCNC: 100 MG/DL (ref 70–110)
HCT VFR BLD AUTO: 39.8 % (ref 40–54)
HGB BLD-MCNC: 13.4 G/DL (ref 14–18)
IMM GRANULOCYTES # BLD AUTO: 0.01 K/UL (ref 0–0.04)
IMM GRANULOCYTES NFR BLD AUTO: 0.5 % (ref 0–0.5)
LYMPHOCYTES # BLD AUTO: 0.1 K/UL (ref 1–4.8)
LYMPHOCYTES NFR BLD: 6.2 % (ref 18–48)
MAGNESIUM SERPL-MCNC: 1.8 MG/DL (ref 1.6–2.6)
MCH RBC QN AUTO: 33.8 PG (ref 27–31)
MCHC RBC AUTO-ENTMCNC: 33.7 G/DL (ref 32–36)
MCV RBC AUTO: 101 FL (ref 82–98)
MONOCYTES # BLD AUTO: 0.1 K/UL (ref 0.3–1)
MONOCYTES NFR BLD: 4.8 % (ref 4–15)
NEUTROPHILS # BLD AUTO: 1.7 K/UL (ref 1.8–7.7)
NEUTROPHILS NFR BLD: 82.8 % (ref 38–73)
NRBC BLD-RTO: 0 /100 WBC
PHOSPHATE SERPL-MCNC: 4.4 MG/DL (ref 2.7–4.5)
PLATELET # BLD AUTO: 198 K/UL (ref 150–350)
PMV BLD AUTO: 8.9 FL (ref 9.2–12.9)
POTASSIUM SERPL-SCNC: 4 MMOL/L (ref 3.5–5.1)
PROT SERPL-MCNC: 6.4 G/DL (ref 6–8.4)
RBC # BLD AUTO: 3.96 M/UL (ref 4.6–6.2)
SODIUM SERPL-SCNC: 141 MMOL/L (ref 136–145)
UNIT NUMBER: NORMAL
UNIT NUMBER: NORMAL
WBC # BLD AUTO: 2.1 K/UL (ref 3.9–12.7)

## 2020-07-28 PROCEDURE — 38208 THAW PRESERVED STEM CELLS: CPT

## 2020-07-28 PROCEDURE — 85025 COMPLETE CBC W/AUTO DIFF WBC: CPT

## 2020-07-28 PROCEDURE — 25000003 PHARM REV CODE 250: Performed by: NURSE PRACTITIONER

## 2020-07-28 PROCEDURE — 80053 COMPREHEN METABOLIC PANEL: CPT

## 2020-07-28 PROCEDURE — 38241 TRANSPLT AUTOL HCT/DONOR: CPT

## 2020-07-28 PROCEDURE — 25000003 PHARM REV CODE 250: Performed by: INTERNAL MEDICINE

## 2020-07-28 PROCEDURE — 63600175 PHARM REV CODE 636 W HCPCS: Performed by: NURSE PRACTITIONER

## 2020-07-28 PROCEDURE — A9155 ARTIFICIAL SALIVA: HCPCS | Performed by: INTERNAL MEDICINE

## 2020-07-28 PROCEDURE — 38240 TRANSPLT ALLO HCT/DONOR: CPT

## 2020-07-28 PROCEDURE — 99233 SBSQ HOSP IP/OBS HIGH 50: CPT | Mod: BMT,,, | Performed by: INTERNAL MEDICINE

## 2020-07-28 PROCEDURE — 63600175 PHARM REV CODE 636 W HCPCS: Performed by: INTERNAL MEDICINE

## 2020-07-28 PROCEDURE — 99233 PR SUBSEQUENT HOSPITAL CARE,LEVL III: ICD-10-PCS | Mod: BMT,,, | Performed by: INTERNAL MEDICINE

## 2020-07-28 PROCEDURE — 84100 ASSAY OF PHOSPHORUS: CPT

## 2020-07-28 PROCEDURE — 83735 ASSAY OF MAGNESIUM: CPT

## 2020-07-28 PROCEDURE — 77336 RADIATION PHYSICS CONSULT: CPT | Performed by: RADIOLOGY

## 2020-07-28 PROCEDURE — 20600001 HC STEP DOWN PRIVATE ROOM

## 2020-07-28 RX ADMIN — POTASSIUM CHLORIDE AND SODIUM CHLORIDE: 900; 150 INJECTION, SOLUTION INTRAVENOUS at 10:07

## 2020-07-28 RX ADMIN — Medication 30 ML: at 12:07

## 2020-07-28 RX ADMIN — PROCHLORPERAZINE MALEATE 10 MG: 5 TABLET, FILM COATED ORAL at 08:07

## 2020-07-28 RX ADMIN — LORAZEPAM 1 MG: 2 INJECTION INTRAMUSCULAR; INTRAVENOUS at 08:07

## 2020-07-28 RX ADMIN — TAMSULOSIN HYDROCHLORIDE 0.4 MG: 0.4 CAPSULE ORAL at 12:07

## 2020-07-28 RX ADMIN — LORAZEPAM 1 MG: 2 INJECTION INTRAMUSCULAR; INTRAVENOUS at 10:07

## 2020-07-28 RX ADMIN — Medication 30 ML: at 08:07

## 2020-07-28 RX ADMIN — ONDANSETRON 16 MG: 8 TABLET, ORALLY DISINTEGRATING ORAL at 07:07

## 2020-07-28 RX ADMIN — FLUTICASONE PROPIONATE 100 MCG: 50 SPRAY, METERED NASAL at 08:07

## 2020-07-28 RX ADMIN — SUMATRIPTAN SUCCINATE 50 MG: 50 TABLET ORAL at 04:07

## 2020-07-28 RX ADMIN — AMLODIPINE BESYLATE 10 MG: 10 TABLET ORAL at 12:07

## 2020-07-28 RX ADMIN — DIPHENHYDRAMINE HYDROCHLORIDE 50 MG: 50 INJECTION, SOLUTION INTRAMUSCULAR; INTRAVENOUS at 10:07

## 2020-07-28 RX ADMIN — GABAPENTIN 300 MG: 300 CAPSULE ORAL at 08:07

## 2020-07-28 RX ADMIN — Medication 400 MG: at 08:07

## 2020-07-28 RX ADMIN — PANTOPRAZOLE SODIUM 40 MG: 40 TABLET, DELAYED RELEASE ORAL at 08:07

## 2020-07-28 RX ADMIN — SODIUM CHLORIDE AND POTASSIUM CHLORIDE: 9; 1.49 INJECTION, SOLUTION INTRAVENOUS at 06:07

## 2020-07-28 RX ADMIN — Medication 400 MG: at 05:07

## 2020-07-28 RX ADMIN — FLUCONAZOLE 400 MG: 200 TABLET ORAL at 08:07

## 2020-07-28 RX ADMIN — PROCHLORPERAZINE MALEATE 10 MG: 5 TABLET, FILM COATED ORAL at 12:07

## 2020-07-28 RX ADMIN — LORAZEPAM 1 MG: 2 INJECTION INTRAMUSCULAR; INTRAVENOUS at 05:07

## 2020-07-28 RX ADMIN — SODIUM CHLORIDE AND POTASSIUM CHLORIDE: 9; 1.49 INJECTION, SOLUTION INTRAVENOUS at 05:07

## 2020-07-28 RX ADMIN — POTASSIUM CHLORIDE AND SODIUM CHLORIDE: 900; 150 INJECTION, SOLUTION INTRAVENOUS at 08:07

## 2020-07-28 RX ADMIN — URSODIOL 300 MG: 300 CAPSULE ORAL at 08:07

## 2020-07-28 RX ADMIN — ACYCLOVIR 800 MG: 800 TABLET ORAL at 08:07

## 2020-07-28 RX ADMIN — SUMATRIPTAN SUCCINATE 50 MG: 50 TABLET ORAL at 08:07

## 2020-07-28 RX ADMIN — LEVOFLOXACIN 500 MG: 500 TABLET, FILM COATED ORAL at 08:07

## 2020-07-28 RX ADMIN — Medication 30 ML: at 05:07

## 2020-07-28 RX ADMIN — Medication 400 MG: at 12:07

## 2020-07-28 RX ADMIN — PROCHLORPERAZINE MALEATE 10 MG: 5 TABLET, FILM COATED ORAL at 05:07

## 2020-07-28 RX ADMIN — ACYCLOVIR 800 MG: 800 TABLET ORAL at 05:07

## 2020-07-28 NOTE — SUBJECTIVE & OBJECTIVE
Subjective:     Interval History: Day 0 from Flu/Cy/TBI haplo allo SCT for CML. Received 2 bags and a total CD34 dose of 3.10 x10^6/kg today. Tolerated well. Afebrile. VSS. Blood pressure improving. No headaches over night.    Objective:     Vital Signs (Most Recent):  Temp: 97.5 °F (36.4 °C) (07/28/20 1100)  Pulse: 82 (07/28/20 1100)  Resp: (!) 23 (07/28/20 1100)  BP: (!) 147/89 (07/28/20 1100)  SpO2: 97 % (07/28/20 1100) Vital Signs (24h Range):  Temp:  [97.5 °F (36.4 °C)-98.4 °F (36.9 °C)] 97.5 °F (36.4 °C)  Pulse:  [81-99] 82  Resp:  [15-23] 23  SpO2:  [96 %-99 %] 97 %  BP: (115-148)/(66-97) 147/89     Weight: 88.3 kg (194 lb 10.7 oz)  Body mass index is 31.42 kg/m².  Body surface area is 2.03 meters squared.    ECOG SCORE         [unfilled]    Intake/Output - Last 3 Shifts       07/26 0700 - 07/27 0659 07/27 0700 - 07/28 0659 07/28 0700 - 07/29 0659    P.O. 2520 2180 360    I.V. (mL/kg) 1691.8 (19.2) 1835 (20.8) 839.2 (9.5)    Blood   120    IV Piggyback 100      Total Intake(mL/kg) 4311.8 (49) 4015 (45.5) 1319.2 (14.9)    Urine (mL/kg/hr) 1200 (0.6) 2175 (1) 100 (0.2)    Stool 0      Total Output 1200 2175 100    Net +3111.8 +1840 +1219.2           Urine Occurrence 5 x      Stool Occurrence 5 x            Physical Exam  Constitutional:       Appearance: He is well-developed.   HENT:      Head: Normocephalic and atraumatic.      Mouth/Throat:      Pharynx: No oropharyngeal exudate.   Eyes:      General:         Right eye: No discharge.         Left eye: No discharge.      Conjunctiva/sclera: Conjunctivae normal.      Pupils: Pupils are equal, round, and reactive to light.   Neck:      Musculoskeletal: Normal range of motion and neck supple.   Cardiovascular:      Rate and Rhythm: Normal rate and regular rhythm.      Heart sounds: Normal heart sounds. No murmur.   Pulmonary:      Effort: Pulmonary effort is normal. No respiratory distress.      Breath sounds: Normal breath sounds. No wheezing or rales.    Abdominal:      General: Bowel sounds are normal. There is no distension.      Palpations: Abdomen is soft.      Tenderness: There is no abdominal tenderness.   Musculoskeletal: Normal range of motion.         General: No deformity.   Skin:     General: Skin is warm and dry.      Findings: No erythema or rash.      Comments: Dalal to right chest wall. Dressing c/d/i with no sign of infection noted.   Neurological:      Mental Status: He is alert and oriented to person, place, and time.   Psychiatric:         Behavior: Behavior normal.         Thought Content: Thought content normal.         Judgment: Judgment normal.         Significant Labs:   CBC:   Recent Labs   Lab 07/27/20 0328 07/28/20  0347   WBC 2.69* 2.10*   HGB 14.6 13.4*   HCT 43.6 39.8*    198    and CMP:   Recent Labs   Lab 07/27/20 0328 07/28/20 0347    141   K 4.0 4.0    110   CO2 22* 21*    100   BUN 9 11   CREATININE 0.8 0.7   CALCIUM 9.0 9.0   PROT 7.0 6.4   ALBUMIN 4.1 3.9   BILITOT 0.4 0.4   ALKPHOS 54* 48*   AST 21 18   ALT 34 29   ANIONGAP 9 10   EGFRNONAA >60.0 >60.0       Diagnostic Results:  I have reviewed all pertinent imaging results/findings within the past 24 hours.

## 2020-07-28 NOTE — ASSESSMENT & PLAN NOTE
"Started having headaches several weeks ago. Onset seems to correspond to when patient quit smoking and when he started ponatinib  Has been off of ponatinib for a few days but fevers have persisted  Pattern is like cluster headaches (onset during sleep), but not unilateral.  O2 via non-rebreather at 12 L over 15 minutes seemed to help but developed hot flush so pt stopped  Ultram, Flexeril, Oxycodone not helping  Imitrex PRN q2hr (max 200mg in 24hrs). Not contraindicated per pharm D.  Description of headache sounds like tension headache--throbbing with pressure across crown of head  No focal neurological deficits  Patient reporting that headache may be sinus in nature. Maxillary tenderness noted.  Patient takes Claritin at home. States that he cannot take Zyrtec (which is on formulary) due to SE and "addiction". States that it was hard for him to ween himself off of Zyrtec.  Stared Flonase 7/27/20. Started home Claritin 7/27/20b (not on formulary)  No headaches over night      "

## 2020-07-28 NOTE — PLAN OF CARE
Pt involved in plan of care and communicating needs throughout shift.  Wife at bedside and involved in care.  Day 0 of haplo allo stem cell transplant. Pt up in room independently and ambulating with steady gait.  No c/o pain today so far; pt denies headache so far this shift.  Pt continues to c/o intermittent nausea; scheduled compazine admin as ordered with relief noted.  Stem cell transplant done this am and pt tolerated without complication. Pt is tolerating regular diet, voiding without difficulty.  IVF infusing throughout shift as ordered.  All VSS; no acute events so far this shift.  Pt remaining free from falls or injury throughout shift; bed in lowest position; call light within reach.  Pt instructed to call for assistance as needed.  Q1H rounding done on pt.

## 2020-07-28 NOTE — PROGRESS NOTES
[facility]  Hematology  Bone Marrow Transplant  Progress Note    Patient Name: Kvng Jones Sr.  Admission Date: 7/21/2020  Hospital Length of Stay: 7 days  Code Status: Full Code    Subjective:     Interval History: Day 0 from Flu/Cy/TBI haplo allo SCT for CML. Received 2 bags and a total CD34 dose of 3.10 x10^6/kg today. Tolerated well. Afebrile. VSS. Blood pressure improving. No headaches over night.    Objective:     Vital Signs (Most Recent):  Temp: 97.5 °F (36.4 °C) (07/28/20 1100)  Pulse: 82 (07/28/20 1100)  Resp: (!) 23 (07/28/20 1100)  BP: (!) 147/89 (07/28/20 1100)  SpO2: 97 % (07/28/20 1100) Vital Signs (24h Range):  Temp:  [97.5 °F (36.4 °C)-98.4 °F (36.9 °C)] 97.5 °F (36.4 °C)  Pulse:  [81-99] 82  Resp:  [15-23] 23  SpO2:  [96 %-99 %] 97 %  BP: (115-148)/(66-97) 147/89     Weight: 88.3 kg (194 lb 10.7 oz)  Body mass index is 31.42 kg/m².  Body surface area is 2.03 meters squared.    ECOG SCORE         [unfilled]    Intake/Output - Last 3 Shifts       07/26 0700 - 07/27 0659 07/27 0700 - 07/28 0659 07/28 0700 - 07/29 0659    P.O. 2520 2180 360    I.V. (mL/kg) 1691.8 (19.2) 1835 (20.8) 839.2 (9.5)    Blood   120    IV Piggyback 100      Total Intake(mL/kg) 4311.8 (49) 4015 (45.5) 1319.2 (14.9)    Urine (mL/kg/hr) 1200 (0.6) 2175 (1) 100 (0.2)    Stool 0      Total Output 1200 2175 100    Net +3111.8 +1840 +1219.2           Urine Occurrence 5 x      Stool Occurrence 5 x            Physical Exam  Constitutional:       Appearance: He is well-developed.   HENT:      Head: Normocephalic and atraumatic.      Mouth/Throat:      Pharynx: No oropharyngeal exudate.   Eyes:      General:         Right eye: No discharge.         Left eye: No discharge.      Conjunctiva/sclera: Conjunctivae normal.      Pupils: Pupils are equal, round, and reactive to light.   Neck:      Musculoskeletal: Normal range of motion and neck supple.   Cardiovascular:      Rate and Rhythm: Normal rate and regular  rhythm.      Heart sounds: Normal heart sounds. No murmur.   Pulmonary:      Effort: Pulmonary effort is normal. No respiratory distress.      Breath sounds: Normal breath sounds. No wheezing or rales.   Abdominal:      General: Bowel sounds are normal. There is no distension.      Palpations: Abdomen is soft.      Tenderness: There is no abdominal tenderness.   Musculoskeletal: Normal range of motion.         General: No deformity.   Skin:     General: Skin is warm and dry.      Findings: No erythema or rash.      Comments: Dalal to right chest wall. Dressing c/d/i with no sign of infection noted.   Neurological:      Mental Status: He is alert and oriented to person, place, and time.   Psychiatric:         Behavior: Behavior normal.         Thought Content: Thought content normal.         Judgment: Judgment normal.         Significant Labs:   CBC:   Recent Labs   Lab 07/27/20 0328 07/28/20  0347   WBC 2.69* 2.10*   HGB 14.6 13.4*   HCT 43.6 39.8*    198    and CMP:   Recent Labs   Lab 07/27/20 0328 07/28/20 0347    141   K 4.0 4.0    110   CO2 22* 21*    100   BUN 9 11   CREATININE 0.8 0.7   CALCIUM 9.0 9.0   PROT 7.0 6.4   ALBUMIN 4.1 3.9   BILITOT 0.4 0.4   ALKPHOS 54* 48*   AST 21 18   ALT 34 29   ANIONGAP 9 10   EGFRNONAA >60.0 >60.0       Diagnostic Results:  I have reviewed all pertinent imaging results/findings within the past 24 hours.    Assessment/Plan:     * Stem cell transplant candidate  Admitted 7/21/20 for planned Flu/Cy/TBI haplo allo SCT. Son is the donor. Today is Day 0.  Receive 2 bags and a total CD34 dose of 3.10 x10^6/kg today (7/28/20)    Planned conditioning regimen:  Fludarabine on Day -6, -5, -4, -3, and -2  Cyclophosphamide on Day -6 and -5  Total Body Irradiation on Day -1     Planned  GVHD Prophylaxis:  Cyclophosphamide on Day +3 and +4  Tacrolimus starting on Day +5  Mycophenolate starting on Day +5     Antimicrobial Prophylaxis:  Acyclovir starting on  "Day -6  Levofloxacin starting on Day -1  Fluconazole starting on Day -1  Bactrim starting on Day +30     SOS/VOD Prophylaxis:  Ursodiol on Day -6 through Day +30     Growth Factor Support:  Neupogen starting on Day +7     CML in remission  Presented to [facility] with CMP in blast crisis and with nodular disease on 3/30/2020  Induced with FLAG-Addis. Achieved morphologic CR  Started on daily Ponatinib 30 mg daily, which he continues to take outpatient. Will hold Ponatinib on admission.  Referred to Dr. Hodges by Dr. Ramos for SCT consideration  [place] [facility] [place] on [place] showing no morphologic or immunophenotypic evidence of CML  Admitted 7/21/20 for planned Flu/Cy/TBI haplo allo SCT  See SCT candidate    Headache  Started having headaches several weeks ago. Onset seems to correspond to when patient quit smoking and when he started ponatinib  Has been off of ponatinib for a few days but fevers have persisted  Pattern is like cluster headaches (onset during sleep), but not unilateral.  O2 via non-rebreather at 12 L over 15 minutes seemed to help but developed hot flush so pt stopped  Ultram, Flexeril, Oxycodone not helping  Imitrex PRN q2hr (max 200mg in 24hrs). Not contraindicated per pharm D.  Description of headache sounds like tension headache--throbbing with pressure across crown of head  No focal neurological deficits  Patient reporting that headache may be sinus in nature. Maxillary tenderness noted.  Patient takes Claritin at home. States that he cannot take Zyrtec (which is on formulary) due to SE and "addiction". States that it was hard for him to ween himself off of Zyrtec.  Stared Flonase 7/27/20. Started home Claritin 7/27/20b (not on formulary)  No headaches over night        Essential hypertension  increased home amlodipine from 5 mg to 10 mg daily  BPs as high as 180s/110s  Started prn hydralazine - possibly worsened headache  Decreased IVF rate  BP improving     Cervical stenosis of spine  Continue home " Flexeril and ultram  PRN oxy IR added for additional relief    Tobacco abuse, in remission  35 pack year history  Quit smoking 6/1/2020 using Chantix  Patient completed course of Chantix and denies need for Nicotine patch.     Daily consumption of alcohol  Drinks 3-4 alcoholic beverages nightly  Will need to monitor closely for s/s of alcohol withdrawal while inpatient  Will consider consulting addiction medicine if indicated  Prn ativan ordered    BPH (benign prostatic hyperplasia)  Continue home Flomax    GERD (gastroesophageal reflux disease)  Takes Nexium at home  Giving Protonix while inpatient as Nexium is not on hospital formulary  Can resume Nexium at discharge  Had CP this am that is believed to be due to GERD (troponin and EKG unremarkable). Relieved after protonix administration  PRN TUMs ordered.        VTE Risk Mitigation (From admission, onward)         Ordered     heparin, porcine (PF) 100 unit/mL injection flush 300 Units  As needed (PRN)      07/21/20 1920                Disposition: Inpatient for allo SCT.    Ivon Morrow, NP  Bone Marrow Transplant  [facility]

## 2020-07-28 NOTE — PROGRESS NOTES
Patient received allogenic stem cell transplant without complication. Premedicated the patient with 50mg benadryl and 1mg ativan as ordered.  Patient received 2 bags of stem cells. Consents in chart.  Stem cells checked off with Rosita Field RN. Vitals taken prior to start and at completion of each infusion; all VSS throughout. Pre and post IV hydration as ordered.  Will continue to monitor vital signs every thirty minutes for 2 hours as ordered; continue to monitor the patient.

## 2020-07-29 PROBLEM — R21 RASH AND NONSPECIFIC SKIN ERUPTION: Status: ACTIVE | Noted: 2020-07-29

## 2020-07-29 PROBLEM — R14.1 ABDOMINAL GAS PAIN: Status: ACTIVE | Noted: 2020-07-29

## 2020-07-29 PROBLEM — T45.1X5A LEUKOPENIA DUE TO ANTINEOPLASTIC CHEMOTHERAPY: Status: ACTIVE | Noted: 2020-07-29

## 2020-07-29 PROBLEM — D70.1 LEUKOPENIA DUE TO ANTINEOPLASTIC CHEMOTHERAPY: Status: ACTIVE | Noted: 2020-07-29

## 2020-07-29 LAB
ABO + RH BLD: NORMAL
ALBUMIN SERPL BCP-MCNC: 4 G/DL (ref 3.5–5.2)
ALP SERPL-CCNC: 56 U/L (ref 55–135)
ALT SERPL W/O P-5'-P-CCNC: 27 U/L (ref 10–44)
ANION GAP SERPL CALC-SCNC: 7 MMOL/L (ref 8–16)
ANISOCYTOSIS BLD QL SMEAR: SLIGHT
AST SERPL-CCNC: 21 U/L (ref 10–40)
BASOPHILS # BLD AUTO: ABNORMAL K/UL (ref 0–0.2)
BASOPHILS NFR BLD: 0 % (ref 0–1.9)
BILIRUB SERPL-MCNC: 0.3 MG/DL (ref 0.1–1)
BLD GP AB SCN CELLS X3 SERPL QL: NORMAL
BUN SERPL-MCNC: 10 MG/DL (ref 8–23)
CALCIUM SERPL-MCNC: 9.2 MG/DL (ref 8.7–10.5)
CHLORIDE SERPL-SCNC: 108 MMOL/L (ref 95–110)
CO2 SERPL-SCNC: 24 MMOL/L (ref 23–29)
CREAT SERPL-MCNC: 0.7 MG/DL (ref 0.5–1.4)
DIFFERENTIAL METHOD: ABNORMAL
EOSINOPHIL # BLD AUTO: ABNORMAL K/UL (ref 0–0.5)
EOSINOPHIL NFR BLD: 6 % (ref 0–8)
ERYTHROCYTE [DISTWIDTH] IN BLOOD BY AUTOMATED COUNT: 12.7 % (ref 11.5–14.5)
EST. GFR  (AFRICAN AMERICAN): >60 ML/MIN/1.73 M^2
EST. GFR  (NON AFRICAN AMERICAN): >60 ML/MIN/1.73 M^2
GLUCOSE SERPL-MCNC: 105 MG/DL (ref 70–110)
HCT VFR BLD AUTO: 38.7 % (ref 40–54)
HGB BLD-MCNC: 12.9 G/DL (ref 14–18)
IMM GRANULOCYTES # BLD AUTO: ABNORMAL K/UL (ref 0–0.04)
IMM GRANULOCYTES NFR BLD AUTO: ABNORMAL % (ref 0–0.5)
LYMPHOCYTES # BLD AUTO: ABNORMAL K/UL (ref 1–4.8)
LYMPHOCYTES NFR BLD: 3 % (ref 18–48)
MAGNESIUM SERPL-MCNC: 1.8 MG/DL (ref 1.6–2.6)
MCH RBC QN AUTO: 33.3 PG (ref 27–31)
MCHC RBC AUTO-ENTMCNC: 33.3 G/DL (ref 32–36)
MCV RBC AUTO: 100 FL (ref 82–98)
MONOCYTES # BLD AUTO: ABNORMAL K/UL (ref 0.3–1)
MONOCYTES NFR BLD: 5 % (ref 4–15)
NEUTROPHILS NFR BLD: 86 % (ref 38–73)
NRBC BLD-RTO: 0 /100 WBC
OVALOCYTES BLD QL SMEAR: ABNORMAL
PHOSPHATE SERPL-MCNC: 4 MG/DL (ref 2.7–4.5)
PLATELET # BLD AUTO: 179 K/UL (ref 150–350)
PLATELET BLD QL SMEAR: ABNORMAL
PMV BLD AUTO: 9.2 FL (ref 9.2–12.9)
POIKILOCYTOSIS BLD QL SMEAR: SLIGHT
POTASSIUM SERPL-SCNC: 4 MMOL/L (ref 3.5–5.1)
PROT SERPL-MCNC: 6.8 G/DL (ref 6–8.4)
RBC # BLD AUTO: 3.87 M/UL (ref 4.6–6.2)
SODIUM SERPL-SCNC: 139 MMOL/L (ref 136–145)
WBC # BLD AUTO: 1.89 K/UL (ref 3.9–12.7)

## 2020-07-29 PROCEDURE — 94761 N-INVAS EAR/PLS OXIMETRY MLT: CPT

## 2020-07-29 PROCEDURE — 99233 SBSQ HOSP IP/OBS HIGH 50: CPT | Mod: ,,, | Performed by: INTERNAL MEDICINE

## 2020-07-29 PROCEDURE — 84100 ASSAY OF PHOSPHORUS: CPT

## 2020-07-29 PROCEDURE — 25000003 PHARM REV CODE 250: Performed by: NURSE PRACTITIONER

## 2020-07-29 PROCEDURE — 63600175 PHARM REV CODE 636 W HCPCS: Performed by: INTERNAL MEDICINE

## 2020-07-29 PROCEDURE — 80053 COMPREHEN METABOLIC PANEL: CPT

## 2020-07-29 PROCEDURE — 86901 BLOOD TYPING SEROLOGIC RH(D): CPT

## 2020-07-29 PROCEDURE — 25000003 PHARM REV CODE 250: Performed by: INTERNAL MEDICINE

## 2020-07-29 PROCEDURE — 99233 PR SUBSEQUENT HOSPITAL CARE,LEVL III: ICD-10-PCS | Mod: ,,, | Performed by: INTERNAL MEDICINE

## 2020-07-29 PROCEDURE — 85007 BL SMEAR W/DIFF WBC COUNT: CPT

## 2020-07-29 PROCEDURE — 63600175 PHARM REV CODE 636 W HCPCS: Performed by: NURSE PRACTITIONER

## 2020-07-29 PROCEDURE — 85027 COMPLETE CBC AUTOMATED: CPT

## 2020-07-29 PROCEDURE — 83735 ASSAY OF MAGNESIUM: CPT

## 2020-07-29 PROCEDURE — A9155 ARTIFICIAL SALIVA: HCPCS | Performed by: INTERNAL MEDICINE

## 2020-07-29 PROCEDURE — 20600001 HC STEP DOWN PRIVATE ROOM

## 2020-07-29 RX ORDER — DIPHENHYDRAMINE HCL 25 MG
25 CAPSULE ORAL EVERY 6 HOURS PRN
Status: DISCONTINUED | OUTPATIENT
Start: 2020-07-29 | End: 2020-08-12 | Stop reason: HOSPADM

## 2020-07-29 RX ORDER — SIMETHICONE 80 MG
1 TABLET,CHEWABLE ORAL 3 TIMES DAILY PRN
Status: DISCONTINUED | OUTPATIENT
Start: 2020-07-29 | End: 2020-08-12 | Stop reason: HOSPADM

## 2020-07-29 RX ADMIN — FLUTICASONE PROPIONATE 100 MCG: 50 SPRAY, METERED NASAL at 08:07

## 2020-07-29 RX ADMIN — SUMATRIPTAN SUCCINATE 50 MG: 50 TABLET ORAL at 08:07

## 2020-07-29 RX ADMIN — PROCHLORPERAZINE MALEATE 10 MG: 5 TABLET, FILM COATED ORAL at 04:07

## 2020-07-29 RX ADMIN — TAMSULOSIN HYDROCHLORIDE 0.4 MG: 0.4 CAPSULE ORAL at 08:07

## 2020-07-29 RX ADMIN — LORAZEPAM 1 MG: 2 INJECTION INTRAMUSCULAR; INTRAVENOUS at 02:07

## 2020-07-29 RX ADMIN — Medication 30 ML: at 05:07

## 2020-07-29 RX ADMIN — LORAZEPAM 1 MG: 2 INJECTION INTRAMUSCULAR; INTRAVENOUS at 09:07

## 2020-07-29 RX ADMIN — FLUCONAZOLE 400 MG: 200 TABLET ORAL at 08:07

## 2020-07-29 RX ADMIN — PANTOPRAZOLE SODIUM 40 MG: 40 TABLET, DELAYED RELEASE ORAL at 08:07

## 2020-07-29 RX ADMIN — PROCHLORPERAZINE MALEATE 10 MG: 5 TABLET, FILM COATED ORAL at 08:07

## 2020-07-29 RX ADMIN — SIMETHICONE CHEW TAB 80 MG 80 MG: 80 TABLET ORAL at 11:07

## 2020-07-29 RX ADMIN — Medication 400 MG: at 06:07

## 2020-07-29 RX ADMIN — DIPHENHYDRAMINE HYDROCHLORIDE 25 MG: 25 CAPSULE ORAL at 09:07

## 2020-07-29 RX ADMIN — PROCHLORPERAZINE MALEATE 10 MG: 5 TABLET, FILM COATED ORAL at 12:07

## 2020-07-29 RX ADMIN — ACYCLOVIR 800 MG: 800 TABLET ORAL at 05:07

## 2020-07-29 RX ADMIN — URSODIOL 300 MG: 300 CAPSULE ORAL at 08:07

## 2020-07-29 RX ADMIN — Medication 30 ML: at 12:07

## 2020-07-29 RX ADMIN — AMLODIPINE BESYLATE 10 MG: 10 TABLET ORAL at 08:07

## 2020-07-29 RX ADMIN — Medication 400 MG: at 11:07

## 2020-07-29 RX ADMIN — ACYCLOVIR 800 MG: 800 TABLET ORAL at 08:07

## 2020-07-29 RX ADMIN — Medication 30 ML: at 08:07

## 2020-07-29 RX ADMIN — SODIUM CHLORIDE AND POTASSIUM CHLORIDE: 9; 1.49 INJECTION, SOLUTION INTRAVENOUS at 06:07

## 2020-07-29 RX ADMIN — DIPHENHYDRAMINE HYDROCHLORIDE 25 MG: 25 CAPSULE ORAL at 03:07

## 2020-07-29 RX ADMIN — LEVOFLOXACIN 500 MG: 500 TABLET, FILM COATED ORAL at 08:07

## 2020-07-29 RX ADMIN — ONDANSETRON 8 MG: 8 TABLET, ORALLY DISINTEGRATING ORAL at 06:07

## 2020-07-29 RX ADMIN — DIPHENHYDRAMINE HYDROCHLORIDE, ZINC ACETATE: 2; .1 CREAM TOPICAL at 12:07

## 2020-07-29 RX ADMIN — GABAPENTIN 300 MG: 300 CAPSULE ORAL at 08:07

## 2020-07-29 NOTE — SUBJECTIVE & OBJECTIVE
Subjective:     Interval History: Today is Day +1 s/p Flu/Cy/TBI Haplo SCT for CML. Tolerated transplant yesterday without incident. Pt with headache again this morning. Also with increased nausea. Reports gas pain and a new rash to his upper back and chest with pruritis.     Objective:     Vital Signs (Most Recent):  Temp: 98.7 °F (37.1 °C) (07/29/20 0801)  Pulse: 84 (07/29/20 0801)  Resp: 17 (07/29/20 0801)  BP: 122/76 (07/29/20 0801)  SpO2: 97 % (07/29/20 0801) Vital Signs (24h Range):  Temp:  [97.5 °F (36.4 °C)-98.7 °F (37.1 °C)] 98.7 °F (37.1 °C)  Pulse:  [81-90] 84  Resp:  [16-23] 17  SpO2:  [96 %-99 %] 97 %  BP: (122-147)/(60-89) 122/76     Weight: 88.3 kg (194 lb 10.7 oz)  Body mass index is 31.42 kg/m².  Body surface area is 2.03 meters squared.      Intake/Output - Last 3 Shifts       07/27 0700 - 07/28 0659 07/28 0700 - 07/29 0659 07/29 0700 - 07/30 0659    P.O. 2180 1320 240    I.V. (mL/kg) 1835 (20.8) 2563.3 (29) 258.8 (2.9)    Blood  120     IV Piggyback       Total Intake(mL/kg) 4015 (45.5) 4003.3 (45.3) 498.8 (5.6)    Urine (mL/kg/hr) 2175 (1) 3350 (1.6)     Stool       Total Output 2175 3350     Net +1840 +653.3 +498.8           Stool Occurrence   3 x          Physical Exam  Vitals signs and nursing note reviewed.   Constitutional:       Appearance: He is well-developed.   HENT:      Head: Normocephalic and atraumatic.      Mouth/Throat:      Mouth: Mucous membranes are dry.      Pharynx: Oropharynx is clear. No oropharyngeal exudate.   Eyes:      General:         Right eye: No discharge.         Left eye: No discharge.      Extraocular Movements: Extraocular movements intact.      Conjunctiva/sclera: Conjunctivae normal.   Neck:      Musculoskeletal: Normal range of motion and neck supple.   Cardiovascular:      Rate and Rhythm: Normal rate and regular rhythm.      Heart sounds: Normal heart sounds. No murmur.   Pulmonary:      Effort: Pulmonary effort is normal. No respiratory distress.       Breath sounds: Normal breath sounds. No wheezing or rales.   Abdominal:      General: Bowel sounds are normal. There is no distension.      Palpations: Abdomen is soft.      Tenderness: There is no abdominal tenderness.   Musculoskeletal: Normal range of motion.         General: No deformity.   Skin:     General: Skin is warm and dry.      Findings: Rash (upper back and chest) present. No erythema.      Comments: Dalal to right chest wall. Dressing c/d/i with no sign of infection noted.   Neurological:      Mental Status: He is alert and oriented to person, place, and time.   Psychiatric:         Mood and Affect: Mood normal.         Behavior: Behavior normal.         Thought Content: Thought content normal.         Judgment: Judgment normal.         Significant Labs:   CBC:   Recent Labs   Lab 07/28/20 0347 07/29/20  0308   WBC 2.10* 1.89*   HGB 13.4* 12.9*   HCT 39.8* 38.7*    179    and CMP:   Recent Labs   Lab 07/28/20 0347 07/29/20  0308    139   K 4.0 4.0    108   CO2 21* 24    105   BUN 11 10   CREATININE 0.7 0.7   CALCIUM 9.0 9.2   PROT 6.4 6.8   ALBUMIN 3.9 4.0   BILITOT 0.4 0.3   ALKPHOS 48* 56   AST 18 21   ALT 29 27   ANIONGAP 10 7*   EGFRNONAA >60.0 >60.0       Diagnostic Results:  None

## 2020-07-29 NOTE — ASSESSMENT & PLAN NOTE
Newly noted macular rash to upper back and chest on 7/29  Intermittently pruritic  Likely drug reaction, thought maybe to be from DMSO from stem cell transplant  Continue to monitor closely  Started on PRN PO benadryl and topical benadryl cream

## 2020-07-29 NOTE — PROGRESS NOTES
[facility]  Hematology  Bone Marrow Transplant  Progress Note    Patient Name: Kvng Jones SrCamron  Admission Date: 7/21/2020  Hospital Length of Stay: 8 days  Code Status: Full Code    Subjective:     Interval History: Today is Day +1 s/p Flu/Cy/TBI Haplo SCT for CML. Tolerated transplant yesterday without incident. Pt with headache again this morning. Also with increased nausea. Reports gas pain and a new rash to his upper back and chest with pruritis.     Objective:     Vital Signs (Most Recent):  Temp: 98.7 °F (37.1 °C) (07/29/20 0801)  Pulse: 84 (07/29/20 0801)  Resp: 17 (07/29/20 0801)  BP: 122/76 (07/29/20 0801)  SpO2: 97 % (07/29/20 0801) Vital Signs (24h Range):  Temp:  [97.5 °F (36.4 °C)-98.7 °F (37.1 °C)] 98.7 °F (37.1 °C)  Pulse:  [81-90] 84  Resp:  [16-23] 17  SpO2:  [96 %-99 %] 97 %  BP: (122-147)/(60-89) 122/76     Weight: 88.3 kg (194 lb 10.7 oz)  Body mass index is 31.42 kg/m².  Body surface area is 2.03 meters squared.      Intake/Output - Last 3 Shifts       07/27 0700 - 07/28 0659 07/28 0700 - 07/29 0659 07/29 0700 - 07/30 0659    P.O. 2180 1320 240    I.V. (mL/kg) 1835 (20.8) 2563.3 (29) 258.8 (2.9)    Blood  120     IV Piggyback       Total Intake(mL/kg) 4015 (45.5) 4003.3 (45.3) 498.8 (5.6)    Urine (mL/kg/hr) 2175 (1) 3350 (1.6)     Stool       Total Output 2175 3350     Net +1840 +653.3 +498.8           Stool Occurrence   3 x          Physical Exam  Vitals signs and nursing note reviewed.   Constitutional:       Appearance: He is well-developed.   HENT:      Head: Normocephalic and atraumatic.      Mouth/Throat:      Mouth: Mucous membranes are dry.      Pharynx: Oropharynx is clear. No oropharyngeal exudate.   Eyes:      General:         Right eye: No discharge.         Left eye: No discharge.      Extraocular Movements: Extraocular movements intact.      Conjunctiva/sclera: Conjunctivae normal.   Neck:      Musculoskeletal: Normal range of motion and neck supple.    Cardiovascular:      Rate and Rhythm: Normal rate and regular rhythm.      Heart sounds: Normal heart sounds. No murmur.   Pulmonary:      Effort: Pulmonary effort is normal. No respiratory distress.      Breath sounds: Normal breath sounds. No wheezing or rales.   Abdominal:      General: Bowel sounds are normal. There is no distension.      Palpations: Abdomen is soft.      Tenderness: There is no abdominal tenderness.   Musculoskeletal: Normal range of motion.         General: No deformity.   Skin:     General: Skin is warm and dry.      Findings: Rash (upper back and chest) present. No erythema.      Comments: Dalal to right chest wall. Dressing c/d/i with no sign of infection noted.   Neurological:      Mental Status: He is alert and oriented to person, place, and time.   Psychiatric:         Mood and Affect: Mood normal.         Behavior: Behavior normal.         Thought Content: Thought content normal.         Judgment: Judgment normal.         Significant Labs:   CBC:   Recent Labs   Lab 07/28/20  0347 07/29/20  0308   WBC 2.10* 1.89*   HGB 13.4* 12.9*   HCT 39.8* 38.7*    179    and CMP:   Recent Labs   Lab 07/28/20  0347 07/29/20  0308    139   K 4.0 4.0    108   CO2 21* 24    105   BUN 11 10   CREATININE 0.7 0.7   CALCIUM 9.0 9.2   PROT 6.4 6.8   ALBUMIN 3.9 4.0   BILITOT 0.4 0.3   ALKPHOS 48* 56   AST 18 21   ALT 29 27   ANIONGAP 10 7*   EGFRNONAA >60.0 >60.0       Diagnostic Results:  None    Assessment/Plan:     * History of allogeneic stem cell transplant  Admitted 7/21/20 for planned Flu/Cy/TBI haplo allo SCT. Son is the donor. Today is Day +1.  Receive 2 bags and a total CD34 dose of 3.10 x10^6/kg on 7/28/20  Will receive post transplant cyclophosphamide on Day +3 and +4  Of note, patient is to not receive any steroids until 24hrs after completion of post tx CTX. This will be 8/5 @1130    Planned conditioning regimen:  Fludarabine on Day -6, -5, -4, -3, and  -2  Cyclophosphamide on Day -6 and -5  Total Body Irradiation on Day -1     Planned  GVHD Prophylaxis:  Cyclophosphamide on Day +3 and +4  Tacrolimus starting on Day +5  Mycophenolate starting on Day +5     Antimicrobial Prophylaxis:  Acyclovir starting on Day -6  Levofloxacin starting on Day -1  Fluconazole starting on Day -1  Bactrim starting on Day +30     SOS/VOD Prophylaxis:  Ursodiol on Day -6 through Day +30     Growth Factor Support:  Neupogen starting on Day +7     CML in remission  Presented to [facility] with CMP in blast crisis and with nodular disease on 3/30/2020  Induced with FLAG-Addis. Achieved morphologic CR  Started on daily Ponatinib 30 mg daily, which he continues to take outpatient. Will hold Ponatinib on admission.  Referred to Dr. Hodges by Dr. Ramos for SCT consideration  Had BMBx at [facility] [place] on 7/1/2020 showing no morphologic or immunophenotypic evidence of CML  Admitted 7/21/20 for planned Flu/Cy/TBI haplo allo SCT  See SCT candidate    Leukopenia due to antineoplastic chemotherapy  Expected with ongoing chemotherapy  Patient will likely have pancytopenia in the next few days  Continue to monitor daily CBC  Transfusing for Hgb <7, Plt <10K    Rash and nonspecific skin eruption  Newly noted macular rash to upper back and chest on 7/29  Intermittently pruritic  Likely drug reaction, thought maybe to be from DMSO from stem cell transplant  Continue to monitor closely  Started on PRN PO benadryl and topical benadryl cream    Abdominal gas pain  Started on PRN simethicone    Headache  Started having headaches several weeks ago. Onset seems to correspond to when patient quit smoking and when he started ponatinib  Has been off of ponatinib for a few days but fevers have persisted  Pattern is like cluster headaches (onset during sleep), but not unilateral.  O2 via non-rebreather at 12 L over 15 minutes seemed to help but developed hot flush so pt stopped  Ultram, Flexeril, Oxycodone not helping  Imitrex PRN  "q2hr (max 200mg in 24hrs). Not contraindicated per pharm D.  Description of headache sounds like tension headache--throbbing with pressure across crown of head  No focal neurological deficits  Patient reporting that headache may be sinus in nature. Maxillary tenderness noted.  Patient takes Claritin at home. States that he cannot take Zyrtec (which is on formulary) due to SE and "addiction". States that it was hard for him to ween himself off of Zyrtec.  Stared Flonase 7/27/20. Started home Claritin 7/27/20b (not on formulary)  Continue to monitor closely; especially with expected thrombocytopenia        Cervical stenosis of spine  Continue home Flexeril and ultram  PRN oxy IR added for additional relief    Tobacco abuse, in remission  35 pack year history  Quit smoking 6/1/2020 using Chantix  Patient completed course of Chantix and denies need for Nicotine patch.     Daily consumption of alcohol  Drinks 3-4 alcoholic beverages nightly  Will need to monitor closely for s/s of alcohol withdrawal while inpatient  Will consider consulting addiction medicine if indicated  Prn ativan ordered    BPH (benign prostatic hyperplasia)  Continue home Flomax    GERD (gastroesophageal reflux disease)  Takes Nexium at home  Giving Protonix while inpatient as Nexium is not on hospital formulary  Can resume Nexium at discharge  Had CP this am that is believed to be due to GERD (troponin and EKG unremarkable). Relieved after protonix administration  PRN TUMs ordered.    Essential hypertension  increased home amlodipine from 5 mg to 10 mg daily  BPs as high as 180s/110s  Started prn hydralazine - possibly worsened headache  Decreased IVF rate  BP improving         VTE Risk Mitigation (From admission, onward)         Ordered     heparin, porcine (PF) 100 unit/mL injection flush 300 Units  As needed (PRN)      07/21/20 1920                Disposition: Remains inpatient following SCT    Marimar Brothers NP  Bone Marrow " Transplant  [facility]

## 2020-07-29 NOTE — ASSESSMENT & PLAN NOTE
Admitted 7/21/20 for planned Flu/Cy/TBI haplo allo SCT. Son is the donor. Today is Day +1.  Receive 2 bags and a total CD34 dose of 3.10 x10^6/kg on 7/28/20  Will receive post transplant cyclophosphamide on Day +3 and +4  Of note, patient is to not receive any steroids until 24hrs after completion of post tx CTX. This will be 8/5 @1130    Planned conditioning regimen:  Fludarabine on Day -6, -5, -4, -3, and -2  Cyclophosphamide on Day -6 and -5  Total Body Irradiation on Day -1     Planned  GVHD Prophylaxis:  Cyclophosphamide on Day +3 and +4  Tacrolimus starting on Day +5  Mycophenolate starting on Day +5     Antimicrobial Prophylaxis:  Acyclovir starting on Day -6  Levofloxacin starting on Day -1  Fluconazole starting on Day -1  Bactrim starting on Day +30     SOS/VOD Prophylaxis:  Ursodiol on Day -6 through Day +30     Growth Factor Support:  Neupogen starting on Day +7

## 2020-07-29 NOTE — ASSESSMENT & PLAN NOTE
Expected with ongoing chemotherapy  Patient will likely have pancytopenia in the next few days  Continue to monitor daily CBC  Transfusing for Hgb <7, Plt <10K

## 2020-07-29 NOTE — ASSESSMENT & PLAN NOTE
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 Achieved morphologic CR  Started on daily Ponatinib 30 mg daily, which he continues to take outpatient. Will hold Ponatinib on admission.  Referred to Dr. Hodges by Dr. Ramos for SCT consideration  Had BMBx at [facility] [place] [place] on 7/1/2020 showing no morphologic or immunophenotypic evidence of CML  Admitted 7/21/20 for planned Flu/Cy/TBI haplo allo SCT  See SCT candidate

## 2020-07-29 NOTE — PLAN OF CARE
Transfer of care given to BREANNE Valenzuela. Report given prior to transfer of care.    Pt involved in plan of care and communicating needs throughout shift. Day +1 Flu/Cy/TBI Haplo SCT for CML.  Pt c/o of some nausea which has been well controlled with scheduled compazine. Pt continues to c/o of 2/10 headache; pt refused PRN. Continuous fluids @ 75 ml/hr. Pt also c/o of some abdominal cramps/gas with mild relief from PRNs. Pt noticed rash to upper chest and shoulders; PRN Benadryl cream administered. Linens changed and pt showered this afternoon. Up in room and to bathroom independently. Tolerating diet, voiding without difficulty. VSS; no acute events so far this shift.  Pt remaining free from falls or injury throughout shift; bed locked and in lowest position; call light within reach.  Pt instructed to call for assistance as needed.  Q1H rounding done on pt.

## 2020-07-29 NOTE — PLAN OF CARE
Plan of care reviewed with the pt at the beginning of the shift. Pt has remained free from injury and falls. Pt ambulating independently without difficulty. Pt reports have generalized fatigue but does not require assistance with ambulation. Fall precautions maintained. Bed locked in lowest position, side rails up x2, non skid footwear on, personal belongings and call light within reach. Instructed pt to call for assistance as needed. Pt has remained afebrile at this time.  Pt c/o itching to neck, face mask remove and lotion applied with relief.

## 2020-07-29 NOTE — ASSESSMENT & PLAN NOTE
"Started having headaches several weeks ago. Onset seems to correspond to when patient quit smoking and when he started ponatinib  Has been off of ponatinib for a few days but fevers have persisted  Pattern is like cluster headaches (onset during sleep), but not unilateral.  O2 via non-rebreather at 12 L over 15 minutes seemed to help but developed hot flush so pt stopped  Ultram, Flexeril, Oxycodone not helping  Imitrex PRN q2hr (max 200mg in 24hrs). Not contraindicated per pharm D.  Description of headache sounds like tension headache--throbbing with pressure across crown of head  No focal neurological deficits  Patient reporting that headache may be sinus in nature. Maxillary tenderness noted.  Patient takes Claritin at home. States that he cannot take Zyrtec (which is on formulary) due to SE and "addiction". States that it was hard for him to ween himself off of Zyrtec.  Stared Flonase 7/27/20. Started home Claritin 7/27/20b (not on formulary)  Continue to monitor closely; especially with expected thrombocytopenia      "

## 2020-07-30 PROBLEM — H53.8 BLURRY VISION: Status: ACTIVE | Noted: 2020-07-30

## 2020-07-30 PROBLEM — T45.1X5A ANTINEOPLASTIC CHEMOTHERAPY INDUCED ANEMIA: Status: ACTIVE | Noted: 2020-07-30

## 2020-07-30 PROBLEM — D64.81 ANTINEOPLASTIC CHEMOTHERAPY INDUCED ANEMIA: Status: ACTIVE | Noted: 2020-07-30

## 2020-07-30 LAB
ALBUMIN SERPL BCP-MCNC: 3.7 G/DL (ref 3.5–5.2)
ALP SERPL-CCNC: 51 U/L (ref 55–135)
ALT SERPL W/O P-5'-P-CCNC: 20 U/L (ref 10–44)
ANION GAP SERPL CALC-SCNC: 9 MMOL/L (ref 8–16)
ANISOCYTOSIS BLD QL SMEAR: SLIGHT
AST SERPL-CCNC: 14 U/L (ref 10–40)
BASOPHILS # BLD AUTO: 0 K/UL (ref 0–0.2)
BASOPHILS NFR BLD: 0 % (ref 0–1.9)
BILIRUB SERPL-MCNC: 0.3 MG/DL (ref 0.1–1)
BUN SERPL-MCNC: 10 MG/DL (ref 8–23)
CALCIUM SERPL-MCNC: 8.5 MG/DL (ref 8.7–10.5)
CHLORIDE SERPL-SCNC: 108 MMOL/L (ref 95–110)
CO2 SERPL-SCNC: 22 MMOL/L (ref 23–29)
CREAT SERPL-MCNC: 0.7 MG/DL (ref 0.5–1.4)
DIFFERENTIAL METHOD: ABNORMAL
EOSINOPHIL # BLD AUTO: 0.1 K/UL (ref 0–0.5)
EOSINOPHIL NFR BLD: 4.2 % (ref 0–8)
ERYTHROCYTE [DISTWIDTH] IN BLOOD BY AUTOMATED COUNT: 12.9 % (ref 11.5–14.5)
EST. GFR  (AFRICAN AMERICAN): >60 ML/MIN/1.73 M^2
EST. GFR  (NON AFRICAN AMERICAN): >60 ML/MIN/1.73 M^2
GLUCOSE SERPL-MCNC: 102 MG/DL (ref 70–110)
HCT VFR BLD AUTO: 35 % (ref 40–54)
HGB BLD-MCNC: 12.2 G/DL (ref 14–18)
IMM GRANULOCYTES # BLD AUTO: 0.02 K/UL (ref 0–0.04)
IMM GRANULOCYTES NFR BLD AUTO: 1 % (ref 0–0.5)
LYMPHOCYTES # BLD AUTO: 0.1 K/UL (ref 1–4.8)
LYMPHOCYTES NFR BLD: 3.1 % (ref 18–48)
MAGNESIUM SERPL-MCNC: 1.8 MG/DL (ref 1.6–2.6)
MCH RBC QN AUTO: 34 PG (ref 27–31)
MCHC RBC AUTO-ENTMCNC: 34.9 G/DL (ref 32–36)
MCV RBC AUTO: 98 FL (ref 82–98)
MONOCYTES # BLD AUTO: 0.1 K/UL (ref 0.3–1)
MONOCYTES NFR BLD: 5.8 % (ref 4–15)
NEUTROPHILS # BLD AUTO: 1.6 K/UL (ref 1.8–7.7)
NEUTROPHILS NFR BLD: 85.9 % (ref 38–73)
NRBC BLD-RTO: 0 /100 WBC
PHOSPHATE SERPL-MCNC: 3.6 MG/DL (ref 2.7–4.5)
PLATELET # BLD AUTO: 157 K/UL (ref 150–350)
PLATELET BLD QL SMEAR: ABNORMAL
PMV BLD AUTO: 9.6 FL (ref 9.2–12.9)
POIKILOCYTOSIS BLD QL SMEAR: SLIGHT
POTASSIUM SERPL-SCNC: 4 MMOL/L (ref 3.5–5.1)
PROT SERPL-MCNC: 6.4 G/DL (ref 6–8.4)
RBC # BLD AUTO: 3.59 M/UL (ref 4.6–6.2)
SODIUM SERPL-SCNC: 139 MMOL/L (ref 136–145)
WBC # BLD AUTO: 1.91 K/UL (ref 3.9–12.7)

## 2020-07-30 PROCEDURE — 80053 COMPREHEN METABOLIC PANEL: CPT

## 2020-07-30 PROCEDURE — 20600001 HC STEP DOWN PRIVATE ROOM

## 2020-07-30 PROCEDURE — 63600175 PHARM REV CODE 636 W HCPCS: Performed by: INTERNAL MEDICINE

## 2020-07-30 PROCEDURE — 25000003 PHARM REV CODE 250: Performed by: NURSE PRACTITIONER

## 2020-07-30 PROCEDURE — 85025 COMPLETE CBC W/AUTO DIFF WBC: CPT

## 2020-07-30 PROCEDURE — 25000003 PHARM REV CODE 250: Performed by: INTERNAL MEDICINE

## 2020-07-30 PROCEDURE — 63600175 PHARM REV CODE 636 W HCPCS: Performed by: NURSE PRACTITIONER

## 2020-07-30 PROCEDURE — 99233 PR SUBSEQUENT HOSPITAL CARE,LEVL III: ICD-10-PCS | Mod: ,,, | Performed by: INTERNAL MEDICINE

## 2020-07-30 PROCEDURE — 99233 SBSQ HOSP IP/OBS HIGH 50: CPT | Mod: ,,, | Performed by: INTERNAL MEDICINE

## 2020-07-30 PROCEDURE — 84100 ASSAY OF PHOSPHORUS: CPT

## 2020-07-30 PROCEDURE — 83735 ASSAY OF MAGNESIUM: CPT

## 2020-07-30 PROCEDURE — 25000003 PHARM REV CODE 250: Performed by: STUDENT IN AN ORGANIZED HEALTH CARE EDUCATION/TRAINING PROGRAM

## 2020-07-30 PROCEDURE — A9155 ARTIFICIAL SALIVA: HCPCS | Performed by: INTERNAL MEDICINE

## 2020-07-30 RX ORDER — HYDROCORTISONE 1 %
CREAM (GRAM) TOPICAL 2 TIMES DAILY
Status: DISCONTINUED | OUTPATIENT
Start: 2020-07-30 | End: 2020-08-12 | Stop reason: HOSPADM

## 2020-07-30 RX ORDER — SODIUM CHLORIDE 0.9 % (FLUSH) 0.9 %
10 SYRINGE (ML) INJECTION EVERY 6 HOURS
Status: DISCONTINUED | OUTPATIENT
Start: 2020-07-31 | End: 2020-08-12 | Stop reason: HOSPADM

## 2020-07-30 RX ORDER — HYDROXYZINE HYDROCHLORIDE 25 MG/1
25 TABLET, FILM COATED ORAL 3 TIMES DAILY PRN
Status: DISCONTINUED | OUTPATIENT
Start: 2020-07-30 | End: 2020-08-12 | Stop reason: HOSPADM

## 2020-07-30 RX ADMIN — URSODIOL 300 MG: 300 CAPSULE ORAL at 09:07

## 2020-07-30 RX ADMIN — Medication 30 ML: at 01:07

## 2020-07-30 RX ADMIN — LORAZEPAM 1 MG: 2 INJECTION INTRAMUSCULAR; INTRAVENOUS at 10:07

## 2020-07-30 RX ADMIN — TAMSULOSIN HYDROCHLORIDE 0.4 MG: 0.4 CAPSULE ORAL at 09:07

## 2020-07-30 RX ADMIN — FLUCONAZOLE 400 MG: 200 TABLET ORAL at 09:07

## 2020-07-30 RX ADMIN — LORAZEPAM 1 MG: 2 INJECTION INTRAMUSCULAR; INTRAVENOUS at 01:07

## 2020-07-30 RX ADMIN — SODIUM CHLORIDE AND POTASSIUM CHLORIDE: 9; 1.49 INJECTION, SOLUTION INTRAVENOUS at 10:07

## 2020-07-30 RX ADMIN — SUMATRIPTAN SUCCINATE 50 MG: 50 TABLET ORAL at 03:07

## 2020-07-30 RX ADMIN — LEVOFLOXACIN 500 MG: 500 TABLET, FILM COATED ORAL at 09:07

## 2020-07-30 RX ADMIN — Medication 30 ML: at 09:07

## 2020-07-30 RX ADMIN — SODIUM CHLORIDE AND POTASSIUM CHLORIDE: 9; 1.49 INJECTION, SOLUTION INTRAVENOUS at 09:07

## 2020-07-30 RX ADMIN — HYDROCORTISONE: 10 CREAM TOPICAL at 10:07

## 2020-07-30 RX ADMIN — HYDROCORTISONE: 10 CREAM TOPICAL at 09:07

## 2020-07-30 RX ADMIN — DIPHENHYDRAMINE HYDROCHLORIDE, ZINC ACETATE: 2; .1 CREAM TOPICAL at 09:07

## 2020-07-30 RX ADMIN — DIPHENHYDRAMINE HYDROCHLORIDE 25 MG: 25 CAPSULE ORAL at 03:07

## 2020-07-30 RX ADMIN — PROCHLORPERAZINE MALEATE 10 MG: 5 TABLET, FILM COATED ORAL at 09:07

## 2020-07-30 RX ADMIN — ACYCLOVIR 800 MG: 800 TABLET ORAL at 05:07

## 2020-07-30 RX ADMIN — OXYCODONE HYDROCHLORIDE 5 MG: 5 TABLET ORAL at 06:07

## 2020-07-30 RX ADMIN — SUMATRIPTAN SUCCINATE 50 MG: 50 TABLET ORAL at 01:07

## 2020-07-30 RX ADMIN — Medication 400 MG: at 09:07

## 2020-07-30 RX ADMIN — LORAZEPAM 1 MG: 2 INJECTION INTRAMUSCULAR; INTRAVENOUS at 04:07

## 2020-07-30 RX ADMIN — PROCHLORPERAZINE MALEATE 10 MG: 5 TABLET, FILM COATED ORAL at 05:07

## 2020-07-30 RX ADMIN — PROCHLORPERAZINE MALEATE 10 MG: 5 TABLET, FILM COATED ORAL at 01:07

## 2020-07-30 RX ADMIN — Medication 30 ML: at 05:07

## 2020-07-30 RX ADMIN — GABAPENTIN 300 MG: 300 CAPSULE ORAL at 09:07

## 2020-07-30 RX ADMIN — PANTOPRAZOLE SODIUM 40 MG: 40 TABLET, DELAYED RELEASE ORAL at 09:07

## 2020-07-30 RX ADMIN — SUMATRIPTAN SUCCINATE 50 MG: 50 TABLET ORAL at 10:07

## 2020-07-30 RX ADMIN — AMLODIPINE BESYLATE 10 MG: 10 TABLET ORAL at 09:07

## 2020-07-30 RX ADMIN — Medication 400 MG: at 06:07

## 2020-07-30 RX ADMIN — HYDROXYZINE HYDROCHLORIDE 25 MG: 25 TABLET, FILM COATED ORAL at 09:07

## 2020-07-30 RX ADMIN — ACYCLOVIR 800 MG: 800 TABLET ORAL at 09:07

## 2020-07-30 RX ADMIN — FLUTICASONE PROPIONATE 100 MCG: 50 SPRAY, METERED NASAL at 09:07

## 2020-07-30 NOTE — PROGRESS NOTES
[facility]  Hematology  Bone Marrow Transplant  Progress Note    Patient Name: Kvng Jones Sr.  Admission Date: 7/21/2020  Hospital Length of Stay: 9 days  Code Status: Full Code    Subjective:     Interval History: Day +2 from Flu/Cy/TBI haplo SCT for CMP. Continues to experience headaches. Headaches seem to respond to imitrex and caffeine. Concerned for rebound headaches 2/2 Imitrex. C/o blurry vision today. Ophtho consulted. Pruritic rash to chest and bilat shoulders. Do not suspect GVHD this early. Started topical hydrocortisone. ANC down to 1600 today.    Objective:     Vital Signs (Most Recent):  Temp: 97.7 °F (36.5 °C) (07/30/20 1113)  Pulse: 89 (07/30/20 1113)  Resp: 18 (07/30/20 1113)  BP: 134/67 (07/30/20 1113)  SpO2: 97 % (07/30/20 1113) Vital Signs (24h Range):  Temp:  [97.7 °F (36.5 °C)-99 °F (37.2 °C)] 97.7 °F (36.5 °C)  Pulse:  [88-98] 89  Resp:  [16-18] 18  SpO2:  [94 %-97 %] 97 %  BP: (129-140)/(58-82) 134/67     Weight: 89.8 kg (197 lb 15.6 oz)  Body mass index is 31.95 kg/m².  Body surface area is 2.04 meters squared.    ECOG SCORE         [unfilled]    Intake/Output - Last 3 Shifts       07/28 0700 - 07/29 0659 07/29 0700 - 07/30 0659 07/30 0700 - 07/31 0659    P.O. 1320 1100 360    I.V. (mL/kg) 2563.3 (29) 1558.3 (17.4) 461.5 (5.1)    Blood 120      Total Intake(mL/kg) 4003.3 (45.3) 2658.3 (29.6) 821.5 (9.1)    Urine (mL/kg/hr) 3350 (1.6) 2225 (1)     Stool  0     Total Output 3350 2225     Net +653.3 +433.3 +821.5           Stool Occurrence  3 x           Physical Exam  Constitutional:       Appearance: He is well-developed.   HENT:      Head: Normocephalic and atraumatic.      Mouth/Throat:      Pharynx: No oropharyngeal exudate.   Eyes:      General:         Right eye: No discharge.         Left eye: No discharge.      Conjunctiva/sclera: Conjunctivae normal.      Pupils: Pupils are equal, round, and reactive to light.   Neck:      Musculoskeletal: Normal range of  motion and neck supple.   Cardiovascular:      Rate and Rhythm: Normal rate and regular rhythm.      Heart sounds: Normal heart sounds. No murmur.   Pulmonary:      Effort: Pulmonary effort is normal. No respiratory distress.      Breath sounds: Normal breath sounds. No wheezing or rales.   Abdominal:      General: Bowel sounds are normal. There is no distension.      Palpations: Abdomen is soft.      Tenderness: There is no abdominal tenderness.   Musculoskeletal: Normal range of motion.         General: No deformity.   Skin:     General: Skin is warm and dry.      Findings: No erythema or rash.      Comments: Dalal to right chest wall. Dressing c/d/i with no sign of infection noted.  Pruritic rash to chest and bilat shoulders.   Neurological:      Mental Status: He is alert and oriented to person, place, and time.   Psychiatric:         Behavior: Behavior normal.         Thought Content: Thought content normal.         Judgment: Judgment normal.         Significant Labs:   CBC:   Recent Labs   Lab 07/29/20  0308 07/30/20  0326   WBC 1.89* 1.91*   HGB 12.9* 12.2*   HCT 38.7* 35.0*    157    and CMP:   Recent Labs   Lab 07/29/20  0308 07/30/20  0326    139   K 4.0 4.0    108   CO2 24 22*    102   BUN 10 10   CREATININE 0.7 0.7   CALCIUM 9.2 8.5*   PROT 6.8 6.4   ALBUMIN 4.0 3.7   BILITOT 0.3 0.3   ALKPHOS 56 51*   AST 21 14   ALT 27 20   ANIONGAP 7* 9   EGFRNONAA >60.0 >60.0       Diagnostic Results:  I have reviewed all pertinent imaging results/findings within the past 24 hours.    Assessment/Plan:     * History of allogeneic stem cell transplant  Admitted 7/21/20 for planned Flu/Cy/TBI haplo allo SCT. Son is the donor. Today is Day +2.  Receive 2 bags and a total CD34 dose of 3.10 x10^6/kg on 7/28/20  Will receive post transplant cyclophosphamide on Day +3 and +4  Of note, patient is to not receive any steroids until 24hrs after completion of post tx CTX. This will be 8/5  "@1130    Planned conditioning regimen:  Fludarabine on Day -6, -5, -4, -3, and -2  Cyclophosphamide on Day -6 and -5  Total Body Irradiation on Day -1     Planned  GVHD Prophylaxis:  Cyclophosphamide on Day +3 and +4  Tacrolimus starting on Day +5  Mycophenolate starting on Day +5     Antimicrobial Prophylaxis:  Acyclovir starting on Day -6  Levofloxacin starting on Day -1  Fluconazole starting on Day -1  Bactrim starting on Day +30     SOS/VOD Prophylaxis:  Ursodiol on Day -6 through Day +30     Growth Factor Support:  Neupogen starting on Day +7     CML in remission  Presented to [facility] with CMP in blast crisis and with nodular disease on 3/30/2020  Induced with FLAG-Addis. Achieved morphologic CR  Started on daily Ponatinib 30 mg daily, which he continues to take outpatient. Will hold Ponatinib on admission.  Referred to Dr. Hodges by Dr. Ramos for SCT consideration  Had BMBx at [facility] on 7/1/2020 showing no morphologic or immunophenotypic evidence of CML  Admitted 7/21/20 for planned Flu/Cy/TBI haplo allo SCT  See SCT candidate    Headache  Started having headaches several weeks ago. Onset seems to correspond to when patient quit smoking and when he started ponatinib  Has been off of ponatinib for a few days but fevers have persisted  Pattern is like cluster headaches (onset during sleep), but not unilateral.  O2 via non-rebreather at 12 L over 15 minutes seemed to help but developed hot flush so pt stopped  Ultram, Flexeril, Oxycodone not helping  Imitrex PRN q2hr (max 200mg in 24hrs). Not contraindicated per pharm D.  Description of headache sounds like tension headache--throbbing with pressure across crown of head  No focal neurological deficits  Patient reporting that headache may be sinus in nature. Maxillary tenderness noted.  Patient takes Claritin at home. States that he cannot take Zyrtec (which is on formulary) due to SE and "addiction". States that it was hard for him to ween himself off of Zyrtec.  Stared " Flonase 7/27/20. Started home Claritin 7/27/20b (not on formulary)  Responding to caffeine and Imitrex  Now c/o blurry vision, which may also be contributing to headaches  Continue to monitor closely; especially with expected thrombocytopenia        Essential hypertension  increased home amlodipine from 5 mg to 10 mg daily  BPs as high as 180s/110s  Started prn hydralazine - possibly worsened headache  Decreased IVF rate  BP stable at this time    Antineoplastic chemotherapy induced anemia  Expected due to chemo  Transfuse for hgb < 7  Daily CBC  hgb 12.2 today      Blurry vision  C/o worsening blurry vision 7/30/20  May be contributing to headaches  Ophtho consulted    Leukopenia due to antineoplastic chemotherapy  Expected with ongoing chemotherapy  Patient will likely have pancytopenia in the next few days  Continue to monitor daily CBC  Transfusing for Hgb <7, Plt <10K    Rash and nonspecific skin eruption  Newly noted macular rash to upper back and chest on 7/29  Intermittently pruritic  GVHD no suspected this soon after transplant  Likely drug reaction, thought maybe to be from DMSO from stem cell transplant  Continue to monitor closely  Started on PRN PO benadryl, topical benadryl cream, and topical hydrocortisone     Abdominal gas pain  Started on PRN simethicone    Cervical stenosis of spine  Continue home Flexeril and ultram  PRN oxy IR added for additional relief    Tobacco abuse, in remission  35 pack year history  Quit smoking 6/1/2020 using Chantix  Patient completed course of Chantix and denies need for Nicotine patch.     Daily consumption of alcohol  Drinks 3-4 alcoholic beverages nightly  Will need to monitor closely for s/s of alcohol withdrawal while inpatient  Will consider consulting addiction medicine if indicated  Prn ativan available    BPH (benign prostatic hyperplasia)  Continue home Flomax    GERD (gastroesophageal reflux disease)  Takes Nexium at home  Giving Protonix while inpatient as  Nexium is not on hospital formulary  Can resume Nexium at discharge  Had CP this am that is believed to be due to GERD (troponin and EKG unremarkable). Relieved after protonix administration  PRN TUMs ordered.        VTE Risk Mitigation (From admission, onward)         Ordered     heparin, porcine (PF) 100 unit/mL injection flush 300 Units  As needed (PRN)      07/21/20 1920                Disposition: Inpatient for SCT. Discharge pending engraftment and count recovery.    Ivon Morrow, NP  Bone Marrow Transplant  [facility]

## 2020-07-30 NOTE — ASSESSMENT & PLAN NOTE
increased home amlodipine from 5 mg to 10 mg daily  BPs as high as 180s/110s  Started prn hydralazine - possibly worsened headache  Decreased IVF rate  BP stable at this time

## 2020-07-30 NOTE — SUBJECTIVE & OBJECTIVE
Subjective:     Interval History: Day +2 from Flu/Cy/TBI haplo SCT for CMP. Continues to experience headaches. Headaches seem to respond to imitrex and caffeine. Concerned for rebound headaches 2/2 Imitrex. C/o blurry vision today. Ophtho consulted. Pruritic rash to chest and bilat shoulders. Do not suspect GVHD this early. Started topical hydrocortisone. ANC down to 1600 today.    Objective:     Vital Signs (Most Recent):  Temp: 97.7 °F (36.5 °C) (07/30/20 1113)  Pulse: 89 (07/30/20 1113)  Resp: 18 (07/30/20 1113)  BP: 134/67 (07/30/20 1113)  SpO2: 97 % (07/30/20 1113) Vital Signs (24h Range):  Temp:  [97.7 °F (36.5 °C)-99 °F (37.2 °C)] 97.7 °F (36.5 °C)  Pulse:  [88-98] 89  Resp:  [16-18] 18  SpO2:  [94 %-97 %] 97 %  BP: (129-140)/(58-82) 134/67     Weight: 89.8 kg (197 lb 15.6 oz)  Body mass index is 31.95 kg/m².  Body surface area is 2.04 meters squared.    ECOG SCORE         [unfilled]    Intake/Output - Last 3 Shifts       07/28 0700 - 07/29 0659 07/29 0700 - 07/30 0659 07/30 0700 - 07/31 0659    P.O. 1320 1100 360    I.V. (mL/kg) 2563.3 (29) 1558.3 (17.4) 461.5 (5.1)    Blood 120      Total Intake(mL/kg) 4003.3 (45.3) 2658.3 (29.6) 821.5 (9.1)    Urine (mL/kg/hr) 3350 (1.6) 2225 (1)     Stool  0     Total Output 3350 2225     Net +653.3 +433.3 +821.5           Stool Occurrence  3 x           Physical Exam  Constitutional:       Appearance: He is well-developed.   HENT:      Head: Normocephalic and atraumatic.      Mouth/Throat:      Pharynx: No oropharyngeal exudate.   Eyes:      General:         Right eye: No discharge.         Left eye: No discharge.      Conjunctiva/sclera: Conjunctivae normal.      Pupils: Pupils are equal, round, and reactive to light.   Neck:      Musculoskeletal: Normal range of motion and neck supple.   Cardiovascular:      Rate and Rhythm: Normal rate and regular rhythm.      Heart sounds: Normal heart sounds. No murmur.   Pulmonary:      Effort: Pulmonary effort is normal. No  respiratory distress.      Breath sounds: Normal breath sounds. No wheezing or rales.   Abdominal:      General: Bowel sounds are normal. There is no distension.      Palpations: Abdomen is soft.      Tenderness: There is no abdominal tenderness.   Musculoskeletal: Normal range of motion.         General: No deformity.   Skin:     General: Skin is warm and dry.      Findings: No erythema or rash.      Comments: Dalal to right chest wall. Dressing c/d/i with no sign of infection noted.  Pruritic rash to chest and bilat shoulders.   Neurological:      Mental Status: He is alert and oriented to person, place, and time.   Psychiatric:         Behavior: Behavior normal.         Thought Content: Thought content normal.         Judgment: Judgment normal.         Significant Labs:   CBC:   Recent Labs   Lab 07/29/20 0308 07/30/20  0326   WBC 1.89* 1.91*   HGB 12.9* 12.2*   HCT 38.7* 35.0*    157    and CMP:   Recent Labs   Lab 07/29/20 0308 07/30/20  0326    139   K 4.0 4.0    108   CO2 24 22*    102   BUN 10 10   CREATININE 0.7 0.7   CALCIUM 9.2 8.5*   PROT 6.8 6.4   ALBUMIN 4.0 3.7   BILITOT 0.3 0.3   ALKPHOS 56 51*   AST 21 14   ALT 27 20   ANIONGAP 7* 9   EGFRNONAA >60.0 >60.0       Diagnostic Results:  I have reviewed all pertinent imaging results/findings within the past 24 hours.

## 2020-07-30 NOTE — ASSESSMENT & PLAN NOTE
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 Achieved morphologic CR  Started on daily Ponatinib 30 mg daily, which he continues to take outpatient. Will hold Ponatinib on admission.  Referred to Dr. Hodges by Dr. Ramos for SCT consideration  Had [place] [facility] on 7/1/2020 showing no morphologic or immunophenotypic evidence of CML  Admitted 7/21/20 for planned Flu/Cy/TBI haplo allo SCT  See SCT candidate

## 2020-07-30 NOTE — CONSULTS
Chief complaint/Reason for Consult: Blurry vision, right eye     History of Present Illness: Kvng Jones Sr. is a 60 y.o. male with history of CML who is admitted for stem cell transplant. He is now post-transplant day +2. He states he had blurry vision of right eye for 2 hours last night. It started while he was working on the computer. He also reports a right sided headache at the same time with associated tearing of the right eye that he thought may be due to straining his eyes. He went to sleep and woke up this morning with the blurriness and headache resolved.     Per primary team, patient started having HA several weeks ago with pressure sensation across crown of head.    Patient has no acute visual complaints on exam today.  Patient denies eye pain, redness, itchiness, discharge, blurry vision, visual disturbances, No new floaters.  No flashes.  No curtain or veil across vision.      Past Ocular Hx: no past ocular surgeries, wears glasses  Follows with Dr. Isaac in [place] - last seen 2 years ago. No family history of blindness.    Current eye gtts: none      PMHx:  has a past medical history of CML (chronic myelocytic leukemia), psychiatric care, and Melanoma in situ of external ear, right.     PSurgHx:  has a past surgical history that includes Tonsillectomy; Neck surgery (2015); and Cholecystectomy.     Home Medications:   Prior to Admission medications    Medication Sig Start Date End Date Taking? Authorizing Provider   amLODIPine (NORVASC) 5 MG tablet Take 1 tablet (5 mg total) by mouth once daily. 7/13/20 7/13/21 Yes Cheryl Hodges MD   cyclobenzaprine (FLEXERIL) 10 MG tablet Take 10 mg by mouth 3 (three) times daily as needed for Muscle spasms.  5/26/20  Yes Historical Provider, MD   esomeprazole (NEXIUM) 20 MG capsule Take 20 mg by mouth before breakfast.   Yes Historical Provider, MD   levoFLOXacin (LEVAQUIN) 500 MG tablet Take 500 mg by mouth once daily.  6/26/20  Yes Historical Provider,  MD   loratadine (CLARITIN) 10 mg tablet Take 10 mg by mouth once daily.   Yes Historical Provider, MD   multivit-min-FA-lycopen-lutein 300-600-300 mcg Tab Take 1 tablet by mouth once daily.  5/3/20  Yes Historical Provider, MD   ondansetron (ZOFRAN) 4 MG tablet Take 4 mg by mouth every 8 (eight) hours as needed for Nausea.  6/6/20  Yes Historical Provider, MD   ondansetron (ZOFRAN-ODT) 4 MG TbDL  7/6/20  Yes Historical Provider, MD   tamsulosin (FLOMAX) 0.4 mg Cap 1 capsule 30 minutes after the same meal each day 6/25/15  Yes Historical Provider, MD   valACYclovir (VALTREX) 500 MG tablet Take 500 mg by mouth 2 (two) times daily.  6/26/20  Yes Historical Provider, MD   varenicline (CHANTIX) 1 mg Tab Take 1 mg by mouth 2 (two) times daily.  6/8/20  Yes Historical Provider, MD   chlorhexidine (PERIDEX) 0.12 % solution RINSE WITH 15 MLS PO FOR 30 SECONDS BID 7/6/20   Historical Provider, MD   fluconazole (DIFLUCAN) 200 MG Tab Take 200 mg by mouth once daily.  6/26/20   Historical Provider, MD   traMADoL (ULTRAM) 50 mg tablet Take 1 tablet by mouth every 6 (six) hours as needed (pain).     Historical Provider, MD        Medications this encounter:    acyclovir  800 mg Oral BID    amLODIPine  10 mg Oral Daily    [START ON 7/31/2020] aprepitant  130 mg Intravenous Q24H    [START ON 7/31/2020] cyclophosphamide (CYTOXAN) chemo infusion  3,500 mg Intravenous Q24H    [START ON 8/2/2020] filgrastim  480 mcg Subcutaneous Daily    fluconazole  400 mg Oral Daily    fluticasone propionate  2 spray Each Nostril Daily    gabapentin  300 mg Oral QHS    hydrocortisone   Topical (Top) BID    levoFLOXacin  500 mg Oral Daily    Loratidine 10 mg tab  10 mg Oral Daily PM    [START ON 7/31/2020] mesna (MESNEX) infusion  10 mg/kg (Order-Specific) Intravenous Q24H    [START ON 7/31/2020] mesna (MESNEX) infusion  10 mg/kg (Order-Specific) Intravenous Q24H    [START ON 7/31/2020] mesna (MESNEX) infusion  10 mg/kg (Order-Specific)  Intravenous Q24H    [START ON 7/31/2020] mesna (MESNEX) infusion  10 mg/kg (Order-Specific) Intravenous Q24H    [START ON 8/2/2020] mycophenolate  1,000 mg Oral TID    [START ON 7/31/2020] ondansetron (ZOFRAN) IVPB  8 mg Intravenous Q24H    pantoprazole  40 mg Oral Daily    prochlorperazine  10 mg Oral QID    saliva substitute combo no.2  30 mL Swish & Spit QID    [START ON 7/31/2020] sodium chloride 0.9%  500 mL Intravenous Q24H    [START ON 7/31/2020] sodium chloride 0.9%  500 mL Intravenous Q24H    [START ON 8/27/2020] sulfamethoxazole-trimethoprim 800-160mg  1 tablet Oral Every Mon, Wed, Fri    [START ON 8/2/2020] tacrolimus  2 mg Oral BID    tamsulosin  0.4 mg Oral Daily    ursodioL  300 mg Oral BID       Allergies: is allergic to corticosteroids (glucocorticoids); posaconazole; unclassified drug; vancomycin analogues; codeine; iodine; and iodinated contrast media.     Social Hx:  reports that he quit smoking about 8 weeks ago. He started smoking about 35 years ago. He has a 35.00 pack-year smoking history. He has never used smokeless tobacco. He reports current alcohol use of about 12.0 standard drinks of alcohol per week. He reports current drug use. Drug: Marijuana.     Family Hx: No family history of glaucoma. family history is not on file.     ROS: Negative x 10 except for complaints as described in HPI; negative for fever, chills, weight loss, nausea, vomiting, diarrhea, shortness of breath, nasal discharge, cough, abdominal pain, dyspnea, difficulty moving arms and legs, confusion, dysuria, palpitations, or chest pain     Ocular examination/Dilated fundus examination:  Base Eye Exam     Visual Acuity (Snellen - Linear)       Right Left    Near cc 20/20 20/20    Correction: Glasses          Tonometry (Applanation, 10:38 AM)       Right Left    Pressure 15 14          Pupils       Pupils Dark Light Shape React APD    Right PERRL 4 2 Round Brisk None    Left PERRL 4 2 Round Brisk None           Visual Fields       Right Left     Full Full          Extraocular Movement       Right Left     Full, Ortho Full, Ortho          Neuro/Psych     Oriented x3: Yes          Dilation     Both eyes: 1% Mydriacyl, 2.5% Phenylephrine @ 10:38 AM            Slit Lamp and Fundus Exam     External Exam       Right Left    External Normal Normal          Pen Light Exam       Right Left    Lids/Lashes Normal Normal    Conjunctiva/Sclera White and quiet White and quiet    Cornea Clear Clear    Anterior Chamber Deep and formed Deep and formed    Iris Round and reactive Round and reactive    Lens NSC NSC    Vitreous Normal Normal          Fundus Exam       Right Left    Disc Normal, pink, sharp Normal, pink, sharp    C/D Ratio 0.35 0.35    Macula Normal Normal    Vessels Normal Normal    Periphery Normal, few small drusen Normal, few small drusen            Mild tenderness along left temporal region of scalp with palpable vessel.       Assessment/Plan:   1. Blurry Vision, OD - resolved  - episode lasted approximately 2 hours last night while working on the computer, with mild to moderate blurriness diffusely, no blacking or diana out of vision. Fundus exam with no evidence of disc edema or hemorrhage. No vitritis. No retinal tears, holes or breaks.  - suspect etiology is dry eye    2. BILL OU  - Recommended ATs 4x/day and PRN  - Can try regular ATs, but switch to preservative free if this is causing irritation  - Warm compresses and lid scrubs BID and PRN  - If still symptomatic, start artificial tear ointment nightly      Discussed patient's history, physical, assessment and plan with the attending.      Patrice Crump MD  [employer] Ophthalmology PGY-2

## 2020-07-30 NOTE — ASSESSMENT & PLAN NOTE
"Started having headaches several weeks ago. Onset seems to correspond to when patient quit smoking and when he started ponatinib  Has been off of ponatinib for a few days but fevers have persisted  Pattern is like cluster headaches (onset during sleep), but not unilateral.  O2 via non-rebreather at 12 L over 15 minutes seemed to help but developed hot flush so pt stopped  Ultram, Flexeril, Oxycodone not helping  Imitrex PRN q2hr (max 200mg in 24hrs). Not contraindicated per pharm D.  Description of headache sounds like tension headache--throbbing with pressure across crown of head  No focal neurological deficits  Patient reporting that headache may be sinus in nature. Maxillary tenderness noted.  Patient takes Claritin at home. States that he cannot take Zyrtec (which is on formulary) due to SE and "addiction". States that it was hard for him to ween himself off of Zyrtec.  Stared Flonase 7/27/20. Started home Claritin 7/27/20b (not on formulary)  Responding to caffeine and Imitrex  Now c/o blurry vision, which may also be contributing to headaches  Continue to monitor closely; especially with expected thrombocytopenia      "

## 2020-07-30 NOTE — ASSESSMENT & PLAN NOTE
Admitted 7/21/20 for planned Flu/Cy/TBI haplo allo SCT. Son is the donor. Today is Day +2.  Receive 2 bags and a total CD34 dose of 3.10 x10^6/kg on 7/28/20  Will receive post transplant cyclophosphamide on Day +3 and +4  Of note, patient is to not receive any steroids until 24hrs after completion of post tx CTX. This will be 8/5 @1130    Planned conditioning regimen:  Fludarabine on Day -6, -5, -4, -3, and -2  Cyclophosphamide on Day -6 and -5  Total Body Irradiation on Day -1     Planned  GVHD Prophylaxis:  Cyclophosphamide on Day +3 and +4  Tacrolimus starting on Day +5  Mycophenolate starting on Day +5     Antimicrobial Prophylaxis:  Acyclovir starting on Day -6  Levofloxacin starting on Day -1  Fluconazole starting on Day -1  Bactrim starting on Day +30     SOS/VOD Prophylaxis:  Ursodiol on Day -6 through Day +30     Growth Factor Support:  Neupogen starting on Day +7

## 2020-07-30 NOTE — PLAN OF CARE
Plan of care reviewed with pt at the beginning of shift. Verbalized understanding. Did not have any questions or concerns at this time. Pt remained free of falls today. Fall precautions maintained. Decrease PO intake - encouraged intake of boost. Nausea this afternoon- promethazine ATC administered and ativan PRN with full relief. Imitrex given for migraine. Up to sofa x1. Ambulated without any difficulty. IVF maintained. Steroid cream ordered for rash and atarax PRN. Wife at bedside to visit patient. Magnesium replaced.

## 2020-07-30 NOTE — PLAN OF CARE
Pt is day +2 post haplo transplant. POC discussed with pt all all questions answered. Pt complained of recurring headache, itching on his chest, and nausea. Imitrex, benadryl, and ativan given x 2 overnight. IVF continued at 75ml/hr. Pt up ad maddi and independent. VSS, afebrile.

## 2020-07-30 NOTE — ASSESSMENT & PLAN NOTE
Newly noted macular rash to upper back and chest on 7/29  Intermittently pruritic  GVHD no suspected this soon after transplant  Likely drug reaction, thought maybe to be from DMSO from stem cell transplant  Continue to monitor closely  Started on PRN PO benadryl, topical benadryl cream, and topical hydrocortisone   Patient states that benadryl causes him to be jittery. Ordered prn atarax instead.

## 2020-07-30 NOTE — ASSESSMENT & PLAN NOTE
Drinks 3-4 alcoholic beverages nightly  Will need to monitor closely for s/s of alcohol withdrawal while inpatient  Will consider consulting addiction medicine if indicated  Prn ativan available

## 2020-07-30 NOTE — ASSESSMENT & PLAN NOTE
Newly noted macular rash to upper back and chest on 7/29  Intermittently pruritic  GVHD no suspected this soon after transplant  Likely drug reaction, thought maybe to be from DMSO from stem cell transplant  Continue to monitor closely  Started on PRN PO benadryl, topical benadryl cream, and topical hydrocortisone

## 2020-07-31 PROBLEM — D64.81 ANTINEOPLASTIC CHEMOTHERAPY INDUCED ANEMIA: Status: RESOLVED | Noted: 2020-07-30 | Resolved: 2020-07-31

## 2020-07-31 PROBLEM — D61.818 PANCYTOPENIA: Status: ACTIVE | Noted: 2020-07-29

## 2020-07-31 PROBLEM — D61.810 ANTINEOPLASTIC CHEMOTHERAPY INDUCED PANCYTOPENIA: Status: ACTIVE | Noted: 2020-07-29

## 2020-07-31 PROBLEM — T45.1X5A ANTINEOPLASTIC CHEMOTHERAPY INDUCED ANEMIA: Status: RESOLVED | Noted: 2020-07-30 | Resolved: 2020-07-31

## 2020-07-31 LAB
ALBUMIN SERPL BCP-MCNC: 3.7 G/DL (ref 3.5–5.2)
ALP SERPL-CCNC: 50 U/L (ref 55–135)
ALT SERPL W/O P-5'-P-CCNC: 21 U/L (ref 10–44)
ANION GAP SERPL CALC-SCNC: 7 MMOL/L (ref 8–16)
ANISOCYTOSIS BLD QL SMEAR: SLIGHT
AST SERPL-CCNC: 16 U/L (ref 10–40)
BASOPHILS # BLD AUTO: 0.01 K/UL (ref 0–0.2)
BASOPHILS NFR BLD: 0.7 % (ref 0–1.9)
BILIRUB SERPL-MCNC: 0.3 MG/DL (ref 0.1–1)
BUN SERPL-MCNC: 8 MG/DL (ref 8–23)
CALCIUM SERPL-MCNC: 9 MG/DL (ref 8.7–10.5)
CHLORIDE SERPL-SCNC: 108 MMOL/L (ref 95–110)
CO2 SERPL-SCNC: 23 MMOL/L (ref 23–29)
CREAT SERPL-MCNC: 0.7 MG/DL (ref 0.5–1.4)
DIFFERENTIAL METHOD: ABNORMAL
EOSINOPHIL # BLD AUTO: 0.1 K/UL (ref 0–0.5)
EOSINOPHIL NFR BLD: 4.8 % (ref 0–8)
ERYTHROCYTE [DISTWIDTH] IN BLOOD BY AUTOMATED COUNT: 12.6 % (ref 11.5–14.5)
EST. GFR  (AFRICAN AMERICAN): >60 ML/MIN/1.73 M^2
EST. GFR  (NON AFRICAN AMERICAN): >60 ML/MIN/1.73 M^2
GLUCOSE SERPL-MCNC: 100 MG/DL (ref 70–110)
HCT VFR BLD AUTO: 36.6 % (ref 40–54)
HGB BLD-MCNC: 12.3 G/DL (ref 14–18)
IMM GRANULOCYTES # BLD AUTO: 0.02 K/UL (ref 0–0.04)
IMM GRANULOCYTES NFR BLD AUTO: 1.4 % (ref 0–0.5)
LYMPHOCYTES # BLD AUTO: 0.1 K/UL (ref 1–4.8)
LYMPHOCYTES NFR BLD: 3.4 % (ref 18–48)
MAGNESIUM SERPL-MCNC: 1.8 MG/DL (ref 1.6–2.6)
MCH RBC QN AUTO: 33.9 PG (ref 27–31)
MCHC RBC AUTO-ENTMCNC: 33.6 G/DL (ref 32–36)
MCV RBC AUTO: 101 FL (ref 82–98)
MONOCYTES # BLD AUTO: 0.1 K/UL (ref 0.3–1)
MONOCYTES NFR BLD: 5.4 % (ref 4–15)
NEUTROPHILS # BLD AUTO: 1.2 K/UL (ref 1.8–7.7)
NEUTROPHILS NFR BLD: 84.3 % (ref 38–73)
NRBC BLD-RTO: 0 /100 WBC
PHOSPHATE SERPL-MCNC: 3.8 MG/DL (ref 2.7–4.5)
PLATELET # BLD AUTO: 127 K/UL (ref 150–350)
PLATELET BLD QL SMEAR: ABNORMAL
PMV BLD AUTO: 8.7 FL (ref 9.2–12.9)
POTASSIUM SERPL-SCNC: 3.9 MMOL/L (ref 3.5–5.1)
PROT SERPL-MCNC: 6.5 G/DL (ref 6–8.4)
RBC # BLD AUTO: 3.63 M/UL (ref 4.6–6.2)
SODIUM SERPL-SCNC: 138 MMOL/L (ref 136–145)
WBC # BLD AUTO: 1.47 K/UL (ref 3.9–12.7)

## 2020-07-31 PROCEDURE — 25000003 PHARM REV CODE 250: Performed by: STUDENT IN AN ORGANIZED HEALTH CARE EDUCATION/TRAINING PROGRAM

## 2020-07-31 PROCEDURE — 80053 COMPREHEN METABOLIC PANEL: CPT

## 2020-07-31 PROCEDURE — A9155 ARTIFICIAL SALIVA: HCPCS | Performed by: INTERNAL MEDICINE

## 2020-07-31 PROCEDURE — 85025 COMPLETE CBC W/AUTO DIFF WBC: CPT

## 2020-07-31 PROCEDURE — 63600175 PHARM REV CODE 636 W HCPCS: Performed by: INTERNAL MEDICINE

## 2020-07-31 PROCEDURE — 25000003 PHARM REV CODE 250: Performed by: INTERNAL MEDICINE

## 2020-07-31 PROCEDURE — 99233 SBSQ HOSP IP/OBS HIGH 50: CPT | Mod: ,,, | Performed by: INTERNAL MEDICINE

## 2020-07-31 PROCEDURE — 83735 ASSAY OF MAGNESIUM: CPT

## 2020-07-31 PROCEDURE — A4216 STERILE WATER/SALINE, 10 ML: HCPCS | Performed by: NURSE PRACTITIONER

## 2020-07-31 PROCEDURE — 99233 PR SUBSEQUENT HOSPITAL CARE,LEVL III: ICD-10-PCS | Mod: ,,, | Performed by: INTERNAL MEDICINE

## 2020-07-31 PROCEDURE — 84100 ASSAY OF PHOSPHORUS: CPT

## 2020-07-31 PROCEDURE — 25000003 PHARM REV CODE 250: Performed by: NURSE PRACTITIONER

## 2020-07-31 PROCEDURE — 97802 MEDICAL NUTRITION INDIV IN: CPT

## 2020-07-31 PROCEDURE — 94761 N-INVAS EAR/PLS OXIMETRY MLT: CPT

## 2020-07-31 PROCEDURE — 63600175 PHARM REV CODE 636 W HCPCS: Performed by: NURSE PRACTITIONER

## 2020-07-31 PROCEDURE — 20600001 HC STEP DOWN PRIVATE ROOM

## 2020-07-31 RX ADMIN — FLUTICASONE PROPIONATE 100 MCG: 50 SPRAY, METERED NASAL at 09:07

## 2020-07-31 RX ADMIN — HYDROCORTISONE: 10 CREAM TOPICAL at 09:07

## 2020-07-31 RX ADMIN — HYDROXYZINE HYDROCHLORIDE 25 MG: 25 TABLET, FILM COATED ORAL at 05:07

## 2020-07-31 RX ADMIN — Medication 30 ML: at 09:07

## 2020-07-31 RX ADMIN — Medication 10 ML: at 12:07

## 2020-07-31 RX ADMIN — MESNA 708 MG: 100 INJECTION, SOLUTION INTRAVENOUS at 01:07

## 2020-07-31 RX ADMIN — Medication 30 ML: at 04:07

## 2020-07-31 RX ADMIN — PROCHLORPERAZINE MALEATE 10 MG: 5 TABLET, FILM COATED ORAL at 04:07

## 2020-07-31 RX ADMIN — Medication 10 ML: at 06:07

## 2020-07-31 RX ADMIN — MESNA 708 MG: 100 INJECTION, SOLUTION INTRAVENOUS at 10:07

## 2020-07-31 RX ADMIN — ONDANSETRON HYDROCHLORIDE 8 MG: 2 INJECTION INTRAMUSCULAR; INTRAVENOUS at 10:07

## 2020-07-31 RX ADMIN — Medication 400 MG: at 05:07

## 2020-07-31 RX ADMIN — APREPITANT 130 MG: 130 INJECTION, EMULSION INTRAVENOUS at 10:07

## 2020-07-31 RX ADMIN — HYDROXYZINE HYDROCHLORIDE 25 MG: 25 TABLET, FILM COATED ORAL at 01:07

## 2020-07-31 RX ADMIN — Medication 30 ML: at 12:07

## 2020-07-31 RX ADMIN — TAMSULOSIN HYDROCHLORIDE 0.4 MG: 0.4 CAPSULE ORAL at 09:07

## 2020-07-31 RX ADMIN — SODIUM CHLORIDE 500 ML: 0.9 INJECTION, SOLUTION INTRAVENOUS at 08:07

## 2020-07-31 RX ADMIN — LORAZEPAM 1 MG: 2 INJECTION INTRAMUSCULAR; INTRAVENOUS at 11:07

## 2020-07-31 RX ADMIN — AMLODIPINE BESYLATE 10 MG: 10 TABLET ORAL at 09:07

## 2020-07-31 RX ADMIN — SODIUM CHLORIDE AND POTASSIUM CHLORIDE: 9; 1.49 INJECTION, SOLUTION INTRAVENOUS at 09:07

## 2020-07-31 RX ADMIN — MESNA 708 MG: 100 INJECTION, SOLUTION INTRAVENOUS at 04:07

## 2020-07-31 RX ADMIN — ACYCLOVIR 800 MG: 800 TABLET ORAL at 08:07

## 2020-07-31 RX ADMIN — LORAZEPAM 1 MG: 2 INJECTION INTRAMUSCULAR; INTRAVENOUS at 05:07

## 2020-07-31 RX ADMIN — CYCLOPHOSPHAMIDE 3500 MG: 2 INJECTION, POWDER, FOR SOLUTION INTRAVENOUS; ORAL at 11:07

## 2020-07-31 RX ADMIN — GABAPENTIN 300 MG: 300 CAPSULE ORAL at 09:07

## 2020-07-31 RX ADMIN — SODIUM CHLORIDE AND POTASSIUM CHLORIDE: 9; 1.49 INJECTION, SOLUTION INTRAVENOUS at 10:07

## 2020-07-31 RX ADMIN — FLUCONAZOLE 400 MG: 200 TABLET ORAL at 09:07

## 2020-07-31 RX ADMIN — OXYCODONE HYDROCHLORIDE 5 MG: 5 TABLET ORAL at 09:07

## 2020-07-31 RX ADMIN — PROCHLORPERAZINE MALEATE 10 MG: 5 TABLET, FILM COATED ORAL at 09:07

## 2020-07-31 RX ADMIN — TRAMADOL HYDROCHLORIDE 50 MG: 50 TABLET, FILM COATED ORAL at 04:07

## 2020-07-31 RX ADMIN — PANTOPRAZOLE SODIUM 40 MG: 40 TABLET, DELAYED RELEASE ORAL at 09:07

## 2020-07-31 RX ADMIN — LEVOFLOXACIN 500 MG: 500 TABLET, FILM COATED ORAL at 09:07

## 2020-07-31 RX ADMIN — Medication 400 MG: at 10:07

## 2020-07-31 RX ADMIN — URSODIOL 300 MG: 300 CAPSULE ORAL at 09:07

## 2020-07-31 RX ADMIN — URSODIOL 300 MG: 300 CAPSULE ORAL at 10:07

## 2020-07-31 RX ADMIN — GUAIFENESIN 600 MG: 600 TABLET, EXTENDED RELEASE ORAL at 12:07

## 2020-07-31 RX ADMIN — HYDROXYZINE HYDROCHLORIDE 25 MG: 25 TABLET, FILM COATED ORAL at 09:07

## 2020-07-31 RX ADMIN — PROCHLORPERAZINE MALEATE 10 MG: 5 TABLET, FILM COATED ORAL at 12:07

## 2020-07-31 RX ADMIN — SUMATRIPTAN SUCCINATE 50 MG: 50 TABLET ORAL at 11:07

## 2020-07-31 RX ADMIN — ACYCLOVIR 800 MG: 800 TABLET ORAL at 06:07

## 2020-07-31 RX ADMIN — Medication 10 ML: at 08:07

## 2020-07-31 RX ADMIN — SODIUM CHLORIDE 500 ML: 0.9 INJECTION, SOLUTION INTRAVENOUS at 12:07

## 2020-07-31 RX ADMIN — OXYCODONE HYDROCHLORIDE 5 MG: 5 TABLET ORAL at 12:07

## 2020-07-31 RX ADMIN — MESNA 708 MG: 100 INJECTION, SOLUTION INTRAVENOUS at 06:07

## 2020-07-31 NOTE — PLAN OF CARE
Side rails up x2; call bell in place; bed in lowest, locked position; skid proof socks on; no evidence of skin breakdown; care plan explained to patient; pt remains free of injury.  Pt is day +3 of a Haplo SCT. Pt received premedications, hydration fluid, mesna and cyclophosphamide without incident.Chemo verified with 2 certified nurses, uri with good blood return, all connection secured, chemo precautions maintained.  Pt with c/o HA, oxy IR and tramalol given. Pt tolerated po, voids, BM x 1, ambulates, denies n/v or diarrhea. Pt with c/o  HA. Pt also given mucinex for congestion and atarex for itching. VSS and afebrile. Pt instructed to call with any concerns or needs.

## 2020-07-31 NOTE — PROGRESS NOTES
"[facility]  Adult Nutrition  Progress Note    SUMMARY       Recommendations    Recommendation:   1. Continue regular diet, encourage good PO intake at meals  2. Add boost plus TID to increase intake   3. Diet education completed   RD to monitor and follow up    Goals: pt to tolerate >85% of EEN/EPN by RD follow up  Nutrition Goal Status: new  Communication of RD Recs: other (comment)(POC)    Reason for Assessment    Reason For Assessment: length of stay  Diagnosis: (CML in remission)  Relevant Medical History: CML; melanoma of external ear  Interdisciplinary Rounds: attended  General Information Comments: Pt day +3 Allo SCT. Pt and family in room, reported intake fair at this time, PO ~25-50% of bfst and lunch, 0% of dinner meals. Drinking boost high protein daily. Encouraged ONS TID, pt agrees and snacks between meals. Discussed high calorie high protein and BMT nutrition therapy. Pt with some wt loss since admit, UBW ~200 lbs per pt. Unable to complete NFPE at this time, will complete on follow up. Pt at risk for malnutrition due to decreased PO and wt loss.  Nutrition Discharge Planning: adequate PO intake    Nutrition Risk Screen    Nutrition Risk Screen: no indicators present    Nutrition/Diet History    Food Allergies: NKFA  Factors Affecting Nutritional Intake: decreased appetite, nausea/vomiting, early satiety    Anthropometrics    Temp: 97.5 °F (36.4 °C)  Height Method: Stated  Height: 5' 6" (167.6 cm)  Height (inches): 66 in  Weight Method: Standard Scale  Weight: 89.1 kg (196 lb 8.6 oz)  Weight (lb): 196.54 lb  Ideal Body Weight (IBW), Male: 142 lb  % Ideal Body Weight, Male (lb): 140.51 %  BMI (Calculated): 31.7  BMI Grade: 30 - 34.9- obesity - grade I       Lab/Procedures/Meds    Pertinent Labs Reviewed: reviewed  Pertinent Labs Comments: WNL  Pertinent Medications Reviewed: reviewed  Pertinent Medications Comments: acyclovir; filgrastim; gabapentin; tacrolimus; " pantoprazole    Estimated/Assessed Needs    Weight Used For Calorie Calculations: 89.1 kg (196 lb 6.9 oz)  Energy Calorie Requirements (kcal): 6987-2285 kcal/d  Energy Need Method: Kcal/kg(25-30 kcal/d)  Protein Requirements:  g/day(1.0-1.4 g/kg)  Weight Used For Protein Calculations: 89.1 kg (196 lb 6.9 oz)  Fluid Requirements (mL): 1 mL/kcal or per MD  RDA Method (mL): 2227    Nutrition Prescription Ordered    Current Diet Order: Regular diet  Oral Nutrition Supplement: boost plus    Evaluation of Received Nutrient/Fluid Intake    I/O: +8.7 L since admit  Energy Calories Required: not meeting needs  Protein Required: not meeting needs  Fluid Required: meeting needs  Comments: LBM 7/30  Tolerance: tolerating  % Intake of Estimated Energy Needs: 25 - 50 %  % Meal Intake: 25 - 50 %    Nutrition Risk    Level of Risk/Frequency of Follow-up: low     Assessment and Plan    Nutrition Problem  increased nutrient needs    Related to (etiology):   Physiological needs 2/2 CML    Signs and Symptoms (as evidenced by):   Pt s/p Allo SCT     Interventions (treatment strategy):  Collaboration of care with other providers  Commercial beverage    Nutrition Diagnosis Status:   New    Monitor and Evaluation    Food and Nutrient Intake: energy intake, food and beverage intake  Food and Nutrient Adminstration: diet order  Knowledge/Beliefs/Attitudes: food and nutrition knowledge/skill  Anthropometric Measurements: weight, weight change  Biochemical Data, Medical Tests and Procedures: electrolyte and renal panel, gastrointestinal profile, glucose/endocrine profile, inflammatory profile, lipid profile  Nutrition-Focused Physical Findings: overall appearance     Nutrition Follow-Up    RD Follow-up?: Yes

## 2020-07-31 NOTE — ASSESSMENT & PLAN NOTE
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 Achieved morphologic CR  Started on daily Ponatinib 30 mg daily, which he continues to take outpatient. Will hold Ponatinib on admission.  Referred to Dr. Hodges by Dr. Ramos for SCT consideration  Had [place] [facility] on [place]1/2020 showing no morphologic or immunophenotypic evidence of CML  Admitted 7/21/20 for planned Flu/Cy/TBI haplo allo SCT  See SCT candidate

## 2020-07-31 NOTE — ASSESSMENT & PLAN NOTE
"Started having headaches several weeks ago. Onset seems to correspond to when patient quit smoking and when he started ponatinib  Has been off of ponatinib for a few days but fevers have persisted  Pattern is like cluster headaches (onset during sleep), but not unilateral.  O2 via non-rebreather at 12 L over 15 minutes seemed to help but developed hot flush so pt stopped  Ultram, Flexeril, Oxycodone not helping  Imitrex PRN q2hr (max 200mg in 24hrs). Not contraindicated per pharm D.  Description of headache sounds like tension headache--throbbing with pressure across crown of head  No focal neurological deficits  Patient reporting that headache may be sinus in nature. Maxillary tenderness noted.  Patient takes Claritin at home. States that he cannot take Zyrtec (which is on formulary) due to SE and "addiction". States that it was hard for him to ween himself off of Zyrtec.  Stared Flonase 7/27/20. Started home Claritin 7/27/20b (not on formulary)  Somewhat responding to caffeine, Imitrex, tramadol, oxy, and ativan  Now c/o blurry vision, which may also be contributing to headaches. Seen by ophtho for this  Continue to monitor closely; especially with expected thrombocytopenia      "

## 2020-07-31 NOTE — ASSESSMENT & PLAN NOTE
Admitted 7/21/20 for planned Flu/Cy/TBI haplo allo SCT. Son is the donor. Today is Day +3.  Receive 2 bags and a total CD34 dose of 3.10 x10^6/kg on 7/28/20  Will receive post transplant cyclophosphamide on Day +3 and +4.  Of note, patient is to not receive any steroids until 24hrs after completion of post tx CTX. This will be 8/5 @1130    Planned conditioning regimen:  Fludarabine on Day -6, -5, -4, -3, and -2  Cyclophosphamide on Day -6 and -5  Total Body Irradiation on Day -1     Planned  GVHD Prophylaxis:  Cyclophosphamide on Day +3 and +4  Tacrolimus starting on Day +5  Mycophenolate starting on Day +5     Antimicrobial Prophylaxis:  Acyclovir starting on Day -6  Levofloxacin starting on Day -1  Fluconazole starting on Day -1  Bactrim starting on Day +30     SOS/VOD Prophylaxis:  Ursodiol on Day -6 through Day +30     Growth Factor Support:  Neupogen starting on Day +7

## 2020-07-31 NOTE — PLAN OF CARE
Problem: Oral Intake Inadequate  Goal: Improved Oral Intake  Outcome: Ongoing, Progressing   Recommendations    Recommendation:   1. Continue regular diet, encourage good PO intake at meals  2. Add boost plus TID to increase intake   3. Diet education completed   RD to monitor and follow up    Goals: pt to tolerate >85% of EEN/EPN by RD follow up  Nutrition Goal Status: new  Communication of RD Recs: other (comment)(POC)

## 2020-07-31 NOTE — ASSESSMENT & PLAN NOTE
Expected with ongoing chemotherapy  Continue to monitor daily CBC  WBC 1.47; ANC 1200; hgb 12.3; hgb 127K  Transfuse for Hgb <7, Plt <10K

## 2020-07-31 NOTE — ASSESSMENT & PLAN NOTE
C/o worsening blurry vision 7/30/20  May be contributing to headaches  Ophtho consulted and saw patient 7/30  Ophtho dilated pupils and performed fundal exam. Fundal exam neg.  Patient had been viewing laptop screen for ~ 2 hours prior to experiencing blurry vision  Per ophtho, blurry vision due to dry eyes. rec'd artificial tears QID prn and warm compresses BID prn. Can escalate artificial tears to ointment if needed.

## 2020-07-31 NOTE — SUBJECTIVE & OBJECTIVE
Subjective:     Interval History: Day +3 from Flu/Cy/TBI haplo SCT for CML. Continues to be afebrile. VSS. Headache persist. Mild relief with current treatments. Neuro consulted for recs and will see patient today. 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 Rash to chest and bilat shoulder improving with hydrocortisone cream. Seen by ophtho yesterday regarding right blurry vision. Both eyes dilated and examined. Fundal exam neg. Patient had been on laptop for ~ 2 hours prior to experiencing blurry vision. Blurry vision thought to be due to dry eyes. Artificial tears and warm compresses recommended and ordered. Can escalate art tears to ointment if insufficient relief. ANC down to 1200 today.    Objective:     Vital Signs (Most Recent):  Temp: 97.5 °F (36.4 °C) (07/31/20 1245)  Pulse: 90 (07/31/20 1245)  Resp: 16 (07/31/20 1252)  BP: 128/72 (07/31/20 1245)  SpO2: 98 % (07/31/20 1245) Vital Signs (24h Range):  Temp:  [97.5 °F (36.4 °C)-98.8 °F (37.1 °C)] 97.5 °F (36.4 °C)  Pulse:  [78-93] 90  Resp:  [14-16] 16  SpO2:  [95 %-99 %] 98 %  BP: (114-143)/(60-85) 128/72     Weight: 89.1 kg (196 lb 8.6 oz)  Body mass index is 31.72 kg/m².  Body surface area is 2.04 meters squared.    ECOG SCORE         [unfilled]    Intake/Output - Last 3 Shifts       07/29 0700 - 07/30 0659 07/30 0700 - 07/31 0659 07/31 0700 - 08/01 0659    P.O. 1100 1080 1260    I.V. (mL/kg) 1558.3 (17.4) 1880.8 (21.1) 1000 (11.2)    Blood       Total Intake(mL/kg) 2658.3 (29.6) 2960.8 (33.2) 2260 (25.4)    Urine (mL/kg/hr) 2225 (1) 2675 (1.3) 645 (1)    Stool 0  0    Total Output 2225 2675 645    Net +433.3 +285.8 +1615           Stool Occurrence 3 x  1 x          Physical Exam  Constitutional:       Appearance: He is well-developed.   HENT:      Head: Normocephalic and atraumatic.      Mouth/Throat:      Pharynx: No oropharyngeal exudate.   Eyes:      General:         Right eye: No discharge.         Left eye: No discharge.       Conjunctiva/sclera: Conjunctivae normal.      Pupils: Pupils are equal, round, and reactive to light.   Neck:      Musculoskeletal: Normal range of motion and neck supple.   Cardiovascular:      Rate and Rhythm: Normal rate and regular rhythm.      Heart sounds: Normal heart sounds. No murmur.   Pulmonary:      Effort: Pulmonary effort is normal. No respiratory distress.      Breath sounds: Normal breath sounds. No wheezing or rales.   Abdominal:      General: Bowel sounds are normal. There is no distension.      Palpations: Abdomen is soft.      Tenderness: There is no abdominal tenderness.   Musculoskeletal: Normal range of motion.         General: No deformity.   Skin:     General: Skin is warm and dry.      Findings: No erythema or rash.      Comments: Dalal to right chest wall. Dressing c/d/i with no sign of infection noted.  Pruritic rash to chest and bilat shoulders.   Neurological:      Mental Status: He is alert and oriented to person, place, and time.   Psychiatric:         Behavior: Behavior normal.         Thought Content: Thought content normal.         Judgment: Judgment normal.         Significant Labs:   CBC:   Recent Labs   Lab 07/30/20  0326 07/31/20  0436   WBC 1.91* 1.47*   HGB 12.2* 12.3*   HCT 35.0* 36.6*    127*    and CMP:   Recent Labs   Lab 07/30/20  0326 07/31/20  0436    138   K 4.0 3.9    108   CO2 22* 23    100   BUN 10 8   CREATININE 0.7 0.7   CALCIUM 8.5* 9.0   PROT 6.4 6.5   ALBUMIN 3.7 3.7   BILITOT 0.3 0.3   ALKPHOS 51* 50*   AST 14 16   ALT 20 21   ANIONGAP 9 7*   EGFRNONAA >60.0 >60.0       Diagnostic Results:  I have reviewed all pertinent imaging results/findings within the past 24 hours.

## 2020-07-31 NOTE — PROGRESS NOTES
[facility]  Hematology  Bone Marrow Transplant  Progress Note    Patient Name: Kvng Jones Sr.  Admission Date: 7/21/2020  Hospital Length of Stay: 10 days  Code Status: Full Code    Subjective:     Interval History: Day +3 from Flu/Cy/TBI haplo SCT for CML. Continues to be afebrile. VSS. Headache persist. Mild relief with current treatments. Neuro consulted for recs and will see patient today. 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 Rash to chest and bilat shoulder improving with hydrocortisone cream. Seen by ophtho yesterday regarding right blurry vision. Both eyes dilated and examined. Fundal exam neg. Patient had been on laptop for ~ 2 hours prior to experiencing blurry vision. Blurry vision thought to be due to dry eyes. Artificial tears and warm compresses recommended and ordered. Can escalate art tears to ointment if insufficient relief. ANC down to 1200 today.    Objective:     Vital Signs (Most Recent):  Temp: 97.5 °F (36.4 °C) (07/31/20 1245)  Pulse: 90 (07/31/20 1245)  Resp: 16 (07/31/20 1252)  BP: 128/72 (07/31/20 1245)  SpO2: 98 % (07/31/20 1245) Vital Signs (24h Range):  Temp:  [97.5 °F (36.4 °C)-98.8 °F (37.1 °C)] 97.5 °F (36.4 °C)  Pulse:  [78-93] 90  Resp:  [14-16] 16  SpO2:  [95 %-99 %] 98 %  BP: (114-143)/(60-85) 128/72     Weight: 89.1 kg (196 lb 8.6 oz)  Body mass index is 31.72 kg/m².  Body surface area is 2.04 meters squared.    ECOG SCORE         [unfilled]    Intake/Output - Last 3 Shifts       07/29 0700 - 07/30 0659 07/30 0700 - 07/31 0659 07/31 0700 - 08/01 0659    P.O. 1100 1080 1260    I.V. (mL/kg) 1558.3 (17.4) 1880.8 (21.1) 1000 (11.2)    Blood       Total Intake(mL/kg) 2658.3 (29.6) 2960.8 (33.2) 2260 (25.4)    Urine (mL/kg/hr) 2225 (1) 2675 (1.3) 645 (1)    Stool 0  0    Total Output 2225 2675 645    Net +433.3 +285.8 +1615           Stool Occurrence 3 x  1 x          Physical Exam  Constitutional:       Appearance: He is well-developed.    HENT:      Head: Normocephalic and atraumatic.      Mouth/Throat:      Pharynx: No oropharyngeal exudate.   Eyes:      General:         Right eye: No discharge.         Left eye: No discharge.      Conjunctiva/sclera: Conjunctivae normal.      Pupils: Pupils are equal, round, and reactive to light.   Neck:      Musculoskeletal: Normal range of motion and neck supple.   Cardiovascular:      Rate and Rhythm: Normal rate and regular rhythm.      Heart sounds: Normal heart sounds. No murmur.   Pulmonary:      Effort: Pulmonary effort is normal. No respiratory distress.      Breath sounds: Normal breath sounds. No wheezing or rales.   Abdominal:      General: Bowel sounds are normal. There is no distension.      Palpations: Abdomen is soft.      Tenderness: There is no abdominal tenderness.   Musculoskeletal: Normal range of motion.         General: No deformity.   Skin:     General: Skin is warm and dry.      Findings: No erythema or rash.      Comments: Dalal to right chest wall. Dressing c/d/i with no sign of infection noted.  Pruritic rash to chest and bilat shoulders.   Neurological:      Mental Status: He is alert and oriented to person, place, and time.   Psychiatric:         Behavior: Behavior normal.         Thought Content: Thought content normal.         Judgment: Judgment normal.         Significant Labs:   CBC:   Recent Labs   Lab 07/30/20  0326 07/31/20  0436   WBC 1.91* 1.47*   HGB 12.2* 12.3*   HCT 35.0* 36.6*    127*    and CMP:   Recent Labs   Lab 07/30/20  0326 07/31/20  0436    138   K 4.0 3.9    108   CO2 22* 23    100   BUN 10 8   CREATININE 0.7 0.7   CALCIUM 8.5* 9.0   PROT 6.4 6.5   ALBUMIN 3.7 3.7   BILITOT 0.3 0.3   ALKPHOS 51* 50*   AST 14 16   ALT 20 21   ANIONGAP 9 7*   EGFRNONAA >60.0 >60.0       Diagnostic Results:  I have reviewed all pertinent imaging results/findings within the past 24 hours.    Assessment/Plan:     * History of allogeneic stem cell  transplant  Admitted 7/21/20 for planned Flu/Cy/TBI haplo allo SCT. Son is the donor. Today is Day +3.  Receive 2 bags and a total CD34 dose of 3.10 x10^6/kg on 7/28/20  Will receive post transplant cyclophosphamide on Day +3 and +4.  Of note, patient is to not receive any steroids until 24hrs after completion of post tx CTX. This will be 8/5 @1130    Planned conditioning regimen:  Fludarabine on Day -6, -5, -4, -3, and -2  Cyclophosphamide on Day -6 and -5  Total Body Irradiation on Day -1     Planned  GVHD Prophylaxis:  Cyclophosphamide on Day +3 and +4  Tacrolimus starting on Day +5  Mycophenolate starting on Day +5     Antimicrobial Prophylaxis:  Acyclovir starting on Day -6  Levofloxacin starting on Day -1  Fluconazole starting on Day -1  Bactrim starting on Day +30     SOS/VOD Prophylaxis:  Ursodiol on Day -6 through Day +30     Growth Factor Support:  Neupogen starting on Day +7     CML in remission  Presented to [facility] with CMP in blast crisis and with nodular disease on 3/30/2020  Induced with FLAG-Addis. Achieved morphologic CR  Started on daily Ponatinib 30 mg daily, which he continues to take outpatient. Will hold Ponatinib on admission.  Referred to Dr. Hodges by Dr. Ramos for SCT consideration  Had BMBx at [facility] on 7/1/2020 showing no morphologic or immunophenotypic evidence of CML  Admitted 7/21/20 for planned Flu/Cy/TBI haplo allo SCT  See SCT candidate    Headache  Started having headaches several weeks ago. Onset seems to correspond to when patient quit smoking and when he started ponatinib  Has been off of ponatinib for a few days but fevers have persisted  Pattern is like cluster headaches (onset during sleep), but not unilateral.  O2 via non-rebreather at 12 L over 15 minutes seemed to help but developed hot flush so pt stopped  Ultram, Flexeril, Oxycodone not helping  Imitrex PRN q2hr (max 200mg in 24hrs). Not contraindicated per pharm D.  Description of headache sounds like tension  "headache--throbbing with pressure across crown of head  No focal neurological deficits  Patient reporting that headache may be sinus in nature. Maxillary tenderness noted.  Patient takes Claritin at home. States that he cannot take Zyrtec (which is on formulary) due to SE and "addiction". States that it was hard for him to ween himself off of Zyrtec.  Stared Flonase 7/27/20. Started home Claritin 7/27/20b (not on formulary)  Somewhat responding to caffeine, Imitrex, tramadol, oxy, and ativan  Now c/o blurry vision, which may also be contributing to headaches. Seen by ophtho for this  Continue to monitor closely; especially with expected thrombocytopenia        Essential hypertension  increased home amlodipine from 5 mg to 10 mg daily  BPs as high as 180s/110s  Started prn hydralazine - possibly worsened headache  Decreased IVF rate  BP stable at this time    Antineoplastic chemotherapy induced pancytopenia  Expected with ongoing chemotherapy  Continue to monitor daily CBC  WBC 1.47; ANC 1200; hgb 12.3; hgb 127K  Transfuse for Hgb <7, Plt <10K    Acute myeloid leukemia in remission  See CML    Blurry vision  C/o worsening blurry vision 7/30/20  May be contributing to headaches  Ophtho consulted and saw patient 7/30  Ophtho dilated pupils and performed fundal exam. Fundal exam neg.  Patient had been viewing laptop screen for ~ 2 hours prior to experiencing blurry vision  Per ophtho, blurry vision due to dry eyes. rec'd artificial tears QID prn and warm compresses BID prn. Can escalate artificial tears to ointment if needed.    Rash and nonspecific skin eruption  Newly noted macular rash to upper back and chest on 7/29  Intermittently pruritic  GVHD no suspected this soon after transplant  Likely drug reaction, thought maybe to be from DMSO from stem cell transplant  Continue to monitor closely  Started on PRN PO benadryl, topical benadryl cream, and topical hydrocortisone   Patient states that benadryl causes him to " be jittery. Ordered prn atarax instead.    Abdominal gas pain  Started on PRN simethicone    Cervical stenosis of spine  Continue home Flexeril and ultram  PRN oxy IR added for additional relief    Tobacco abuse, in remission  35 pack year history  Quit smoking 6/1/2020 using Chantix  Patient completed course of Chantix and denies need for Nicotine patch.     Daily consumption of alcohol  Drinks 3-4 alcoholic beverages nightly  Will need to monitor closely for s/s of alcohol withdrawal while inpatient  Will consider consulting addiction medicine if indicated  Prn ativan available    BPH (benign prostatic hyperplasia)  Continue home Flomax    GERD (gastroesophageal reflux disease)  Takes Nexium at home  Giving Protonix while inpatient as Nexium is not on hospital formulary  Can resume Nexium at discharge  Had CP this am that is believed to be due to GERD (troponin and EKG unremarkable). Relieved after protonix administration  PRN TUMs ordered.        VTE Risk Mitigation (From admission, onward)         Ordered     heparin, porcine (PF) 100 unit/mL injection flush 300 Units  As needed (PRN)      07/21/20 1920                Disposition: Inpatient for haplo SCT. Discharge pending engraftment and count recovery.    Ivon Morrow, NP  Bone Marrow Transplant  [facility]

## 2020-08-01 LAB
ABO + RH BLD: NORMAL
ALBUMIN SERPL BCP-MCNC: 3.5 G/DL (ref 3.5–5.2)
ALP SERPL-CCNC: 49 U/L (ref 55–135)
ALT SERPL W/O P-5'-P-CCNC: 23 U/L (ref 10–44)
ANION GAP SERPL CALC-SCNC: 7 MMOL/L (ref 8–16)
ANISOCYTOSIS BLD QL SMEAR: SLIGHT
AST SERPL-CCNC: 15 U/L (ref 10–40)
BASOPHILS # BLD AUTO: 0 K/UL (ref 0–0.2)
BASOPHILS NFR BLD: 0 % (ref 0–1.9)
BILIRUB SERPL-MCNC: 0.3 MG/DL (ref 0.1–1)
BLD GP AB SCN CELLS X3 SERPL QL: NORMAL
BUN SERPL-MCNC: 7 MG/DL (ref 8–23)
CALCIUM SERPL-MCNC: 9.1 MG/DL (ref 8.7–10.5)
CHLORIDE SERPL-SCNC: 104 MMOL/L (ref 95–110)
CO2 SERPL-SCNC: 24 MMOL/L (ref 23–29)
CREAT SERPL-MCNC: 0.7 MG/DL (ref 0.5–1.4)
DIFFERENTIAL METHOD: ABNORMAL
EOSINOPHIL # BLD AUTO: 0.1 K/UL (ref 0–0.5)
EOSINOPHIL NFR BLD: 5.8 % (ref 0–8)
ERYTHROCYTE [DISTWIDTH] IN BLOOD BY AUTOMATED COUNT: 12.6 % (ref 11.5–14.5)
EST. GFR  (AFRICAN AMERICAN): >60 ML/MIN/1.73 M^2
EST. GFR  (NON AFRICAN AMERICAN): >60 ML/MIN/1.73 M^2
GLUCOSE SERPL-MCNC: 105 MG/DL (ref 70–110)
HCT VFR BLD AUTO: 34.4 % (ref 40–54)
HGB BLD-MCNC: 11.5 G/DL (ref 14–18)
IMM GRANULOCYTES # BLD AUTO: 0.01 K/UL (ref 0–0.04)
IMM GRANULOCYTES NFR BLD AUTO: 1 % (ref 0–0.5)
LYMPHOCYTES # BLD AUTO: 0.1 K/UL (ref 1–4.8)
LYMPHOCYTES NFR BLD: 4.8 % (ref 18–48)
MAGNESIUM SERPL-MCNC: 1.6 MG/DL (ref 1.6–2.6)
MCH RBC QN AUTO: 33.1 PG (ref 27–31)
MCHC RBC AUTO-ENTMCNC: 33.4 G/DL (ref 32–36)
MCV RBC AUTO: 99 FL (ref 82–98)
MONOCYTES # BLD AUTO: 0.1 K/UL (ref 0.3–1)
MONOCYTES NFR BLD: 9.6 % (ref 4–15)
NEUTROPHILS # BLD AUTO: 0.8 K/UL (ref 1.8–7.7)
NEUTROPHILS NFR BLD: 78.8 % (ref 38–73)
NRBC BLD-RTO: 0 /100 WBC
OVALOCYTES BLD QL SMEAR: ABNORMAL
PHOSPHATE SERPL-MCNC: 3.5 MG/DL (ref 2.7–4.5)
PLATELET # BLD AUTO: 108 K/UL (ref 150–350)
PLATELET BLD QL SMEAR: ABNORMAL
PMV BLD AUTO: 9 FL (ref 9.2–12.9)
POIKILOCYTOSIS BLD QL SMEAR: SLIGHT
POTASSIUM SERPL-SCNC: 4 MMOL/L (ref 3.5–5.1)
PROT SERPL-MCNC: 6.4 G/DL (ref 6–8.4)
RBC # BLD AUTO: 3.47 M/UL (ref 4.6–6.2)
SODIUM SERPL-SCNC: 135 MMOL/L (ref 136–145)
WBC # BLD AUTO: 1.04 K/UL (ref 3.9–12.7)

## 2020-08-01 PROCEDURE — 99223 1ST HOSP IP/OBS HIGH 75: CPT | Mod: ,,, | Performed by: PSYCHIATRY & NEUROLOGY

## 2020-08-01 PROCEDURE — 25000003 PHARM REV CODE 250: Performed by: INTERNAL MEDICINE

## 2020-08-01 PROCEDURE — 86901 BLOOD TYPING SEROLOGIC RH(D): CPT

## 2020-08-01 PROCEDURE — 63600175 PHARM REV CODE 636 W HCPCS: Performed by: STUDENT IN AN ORGANIZED HEALTH CARE EDUCATION/TRAINING PROGRAM

## 2020-08-01 PROCEDURE — 99233 SBSQ HOSP IP/OBS HIGH 50: CPT | Mod: ,,, | Performed by: INTERNAL MEDICINE

## 2020-08-01 PROCEDURE — 25000003 PHARM REV CODE 250: Performed by: NURSE PRACTITIONER

## 2020-08-01 PROCEDURE — 94761 N-INVAS EAR/PLS OXIMETRY MLT: CPT

## 2020-08-01 PROCEDURE — 25000003 PHARM REV CODE 250: Performed by: STUDENT IN AN ORGANIZED HEALTH CARE EDUCATION/TRAINING PROGRAM

## 2020-08-01 PROCEDURE — 99223 PR INITIAL HOSPITAL CARE,LEVL III: ICD-10-PCS | Mod: ,,, | Performed by: PSYCHIATRY & NEUROLOGY

## 2020-08-01 PROCEDURE — 63600175 PHARM REV CODE 636 W HCPCS: Performed by: NURSE PRACTITIONER

## 2020-08-01 PROCEDURE — 85025 COMPLETE CBC W/AUTO DIFF WBC: CPT

## 2020-08-01 PROCEDURE — 84100 ASSAY OF PHOSPHORUS: CPT

## 2020-08-01 PROCEDURE — 99233 PR SUBSEQUENT HOSPITAL CARE,LEVL III: ICD-10-PCS | Mod: ,,, | Performed by: INTERNAL MEDICINE

## 2020-08-01 PROCEDURE — A9155 ARTIFICIAL SALIVA: HCPCS | Performed by: INTERNAL MEDICINE

## 2020-08-01 PROCEDURE — 83735 ASSAY OF MAGNESIUM: CPT

## 2020-08-01 PROCEDURE — 20600001 HC STEP DOWN PRIVATE ROOM

## 2020-08-01 PROCEDURE — 63600175 PHARM REV CODE 636 W HCPCS: Mod: JG | Performed by: INTERNAL MEDICINE

## 2020-08-01 PROCEDURE — A4216 STERILE WATER/SALINE, 10 ML: HCPCS | Performed by: NURSE PRACTITIONER

## 2020-08-01 PROCEDURE — 80053 COMPREHEN METABOLIC PANEL: CPT

## 2020-08-01 RX ORDER — OXYCODONE HYDROCHLORIDE 5 MG/1
10 TABLET ORAL EVERY 6 HOURS PRN
Status: DISCONTINUED | OUTPATIENT
Start: 2020-08-01 | End: 2020-08-11

## 2020-08-01 RX ORDER — ONDANSETRON 4 MG/1
8 TABLET, FILM COATED ORAL EVERY 6 HOURS
Status: DISCONTINUED | OUTPATIENT
Start: 2020-08-01 | End: 2020-08-03

## 2020-08-01 RX ORDER — MAGNESIUM SULFATE HEPTAHYDRATE 40 MG/ML
2 INJECTION, SOLUTION INTRAVENOUS ONCE
Status: COMPLETED | OUTPATIENT
Start: 2020-08-01 | End: 2020-08-01

## 2020-08-01 RX ADMIN — ACYCLOVIR 800 MG: 800 TABLET ORAL at 05:08

## 2020-08-01 RX ADMIN — MAGNESIUM SULFATE IN WATER 2 G: 40 INJECTION, SOLUTION INTRAVENOUS at 08:08

## 2020-08-01 RX ADMIN — HYDROCORTISONE: 10 CREAM TOPICAL at 08:08

## 2020-08-01 RX ADMIN — MESNA 708 MG: 100 INJECTION, SOLUTION INTRAVENOUS at 04:08

## 2020-08-01 RX ADMIN — CYCLOPHOSPHAMIDE 3500 MG: 2 INJECTION, POWDER, FOR SOLUTION INTRAVENOUS; ORAL at 10:08

## 2020-08-01 RX ADMIN — PROCHLORPERAZINE MALEATE 10 MG: 5 TABLET, FILM COATED ORAL at 08:08

## 2020-08-01 RX ADMIN — LORAZEPAM 1 MG: 2 INJECTION INTRAMUSCULAR; INTRAVENOUS at 06:08

## 2020-08-01 RX ADMIN — Medication 10 ML: at 05:08

## 2020-08-01 RX ADMIN — PROCHLORPERAZINE MALEATE 10 MG: 5 TABLET, FILM COATED ORAL at 05:08

## 2020-08-01 RX ADMIN — GABAPENTIN 300 MG: 300 CAPSULE ORAL at 08:08

## 2020-08-01 RX ADMIN — Medication 400 MG: at 02:08

## 2020-08-01 RX ADMIN — OXYCODONE HYDROCHLORIDE 10 MG: 5 TABLET ORAL at 08:08

## 2020-08-01 RX ADMIN — FLUTICASONE PROPIONATE 100 MCG: 50 SPRAY, METERED NASAL at 08:08

## 2020-08-01 RX ADMIN — OXYCODONE HYDROCHLORIDE 10 MG: 5 TABLET ORAL at 01:08

## 2020-08-01 RX ADMIN — ONDANSETRON HYDROCHLORIDE 8 MG: 4 TABLET, FILM COATED ORAL at 02:08

## 2020-08-01 RX ADMIN — TAMSULOSIN HYDROCHLORIDE 0.4 MG: 0.4 CAPSULE ORAL at 08:08

## 2020-08-01 RX ADMIN — SODIUM CHLORIDE AND POTASSIUM CHLORIDE: 9; 1.49 INJECTION, SOLUTION INTRAVENOUS at 02:08

## 2020-08-01 RX ADMIN — FLUCONAZOLE 400 MG: 200 TABLET ORAL at 08:08

## 2020-08-01 RX ADMIN — ONDANSETRON HYDROCHLORIDE 8 MG: 4 TABLET, FILM COATED ORAL at 11:08

## 2020-08-01 RX ADMIN — Medication 400 MG: at 06:08

## 2020-08-01 RX ADMIN — PROCHLORPERAZINE MALEATE 10 MG: 5 TABLET, FILM COATED ORAL at 01:08

## 2020-08-01 RX ADMIN — URSODIOL 300 MG: 300 CAPSULE ORAL at 08:08

## 2020-08-01 RX ADMIN — AMLODIPINE BESYLATE 10 MG: 10 TABLET ORAL at 08:08

## 2020-08-01 RX ADMIN — SODIUM CHLORIDE 500 ML: 0.9 INJECTION, SOLUTION INTRAVENOUS at 12:08

## 2020-08-01 RX ADMIN — MESNA 708 MG: 100 INJECTION, SOLUTION INTRAVENOUS at 01:08

## 2020-08-01 RX ADMIN — PANTOPRAZOLE SODIUM 40 MG: 40 TABLET, DELAYED RELEASE ORAL at 08:08

## 2020-08-01 RX ADMIN — HYDROXYZINE HYDROCHLORIDE 25 MG: 25 TABLET, FILM COATED ORAL at 01:08

## 2020-08-01 RX ADMIN — Medication 30 ML: at 05:08

## 2020-08-01 RX ADMIN — Medication 30 ML: at 01:08

## 2020-08-01 RX ADMIN — Medication 10 ML: at 12:08

## 2020-08-01 RX ADMIN — ACYCLOVIR 800 MG: 800 TABLET ORAL at 08:08

## 2020-08-01 RX ADMIN — MESNA 708 MG: 100 INJECTION, SOLUTION INTRAVENOUS at 06:08

## 2020-08-01 RX ADMIN — LORAZEPAM 1 MG: 2 INJECTION INTRAMUSCULAR; INTRAVENOUS at 09:08

## 2020-08-01 RX ADMIN — LEVOFLOXACIN 500 MG: 500 TABLET, FILM COATED ORAL at 08:08

## 2020-08-01 RX ADMIN — Medication 30 ML: at 08:08

## 2020-08-01 RX ADMIN — MESNA 708 MG: 100 INJECTION, SOLUTION INTRAVENOUS at 09:08

## 2020-08-01 RX ADMIN — SODIUM CHLORIDE 500 ML: 0.9 INJECTION, SOLUTION INTRAVENOUS at 08:08

## 2020-08-01 RX ADMIN — OXYCODONE HYDROCHLORIDE 5 MG: 5 TABLET ORAL at 06:08

## 2020-08-01 RX ADMIN — ONDANSETRON HYDROCHLORIDE 8 MG: 2 INJECTION INTRAMUSCULAR; INTRAVENOUS at 08:08

## 2020-08-01 RX ADMIN — HYDROXYZINE HYDROCHLORIDE 25 MG: 25 TABLET, FILM COATED ORAL at 08:08

## 2020-08-01 RX ADMIN — Medication 10 ML: at 06:08

## 2020-08-01 RX ADMIN — Medication 400 MG: at 10:08

## 2020-08-01 NOTE — ASSESSMENT & PLAN NOTE
Admitted 7/21/20 for planned Flu/Cy/TBI haplo allo SCT. Son is the donor. Today is Day +4.  Receive 2 bags and a total CD34 dose of 3.10 x10^6/kg on 7/28/20  Will receive post transplant cyclophosphamide on Day +3 and +4.  Of note, patient is to not receive any steroids until 24hrs after completion of post tx CTX. This will be 8/5 @1130    Planned conditioning regimen:  Fludarabine on Day -6, -5, -4, -3, and -2  Cyclophosphamide on Day -6 and -5  Total Body Irradiation on Day -1     Planned  GVHD Prophylaxis:  Cyclophosphamide on Day +3 and +4  Tacrolimus starting on Day +5  Mycophenolate starting on Day +5     Antimicrobial Prophylaxis:  Acyclovir starting on Day -6  Levofloxacin starting on Day -1  Fluconazole starting on Day -1  Bactrim starting on Day +30     SOS/VOD Prophylaxis:  Ursodiol on Day -6 through Day +30     Growth Factor Support:  Neupogen starting on Day +7

## 2020-08-01 NOTE — PLAN OF CARE
POC discussed with pt and all questions answered. Pt is day +4 for haplo allo transplant. Complained of headache, nausea, and itching: oxycodone, imitrex, ativan, and atarax given. Continues with blurred vision. On IVF at 75ml/hr. Up ad maddi and ambulatory.

## 2020-08-01 NOTE — ASSESSMENT & PLAN NOTE
Kvng Jones Sr. is a 60 y.o. 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 He is now post-transplant day +4. We were consulted for headaches. Patient refers he has been having headaches since end of May but yesterday they got worse. Headaches are usually 4/10, bifrontal, last around 8-10 hours and are not accompanied by any other symptoms (patient denied auras, palpitations, SOB, chest pain). He usually takes Tylenol, tramadol or oxycodone but non seem to really help. Yesterdays headache was different because intensity went up to 7/10 and there was apparently some blurry vision too. Ophthalmology was consulted but no abnormalities were seen and they attributed more to dry eyes. Today we checked on the patient and confirmed his headaches characteristics, doesnt sound like migraines (sister has history of migraines). There were no focal or abnormal findings on examination. We are thinking this more to be a drug related adverse effect considering the patient is on chemo and multiple drug regimens. We spoke with primary team and for now our recommendation is IV magnesium.     Assessment and Plan:  -- Possible drug related adverse effect considering the patient is on chemo and multiple drug regimens.  -- no imaging for now since neuro exam is normal   -- IV magnesium for headaches   -- Neurology will continue to follow up  -- thank you for your consult

## 2020-08-01 NOTE — HPI
Kvng Jones Sr. is a 60 y.o. 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 He is now post-transplant day +4. We were consulted for headaches. Patient refers he has been having headaches since end of May but yesterday they got worse. Headaches are usually 4/10, bifrontal, last around 8-10 hours and are not accompanied by any other symptoms (patient denied auras, palpitations, SOB, chest pain). He usually takes Tylenol, tramadol or oxycodone but non seem to really help. Yesterdays headache was different because intensity went up to 7/10 and there was apparently some blurry vision too. Ophthalmology was consulted but no abnormalities were seen and they attributed more to dry eyes. Today we checked on the patient and confirmed his headaches characteristics, doesnt sound like migraines (sister has history of migraines). There were no focal or abnormal findings on examination. We are thinking this more to be a drug related adverse effect considering the patient is on chemo and multiple drug regimens. We spoke with primary team and for now our recommendation is IV magnesium.

## 2020-08-01 NOTE — ASSESSMENT & PLAN NOTE
"Started having headaches several weeks ago. Onset seems to correspond to when patient quit smoking and when he started ponatinib  Has been off of ponatinib for a few days but fevers have persisted  Pattern is like cluster headaches (onset during sleep), but not unilateral.  O2 via non-rebreather at 12 L over 15 minutes seemed to help but developed hot flush so pt stopped  Ultram, Flexeril, Oxycodone not helping  Imitrex PRN q2hr (max 200mg in 24hrs). Not contraindicated per pharm D.  Description of headache sounds like tension headache--throbbing with pressure across crown of head  No focal neurological deficits  Patient reporting that headache may be sinus in nature. Maxillary tenderness noted.  Patient takes Claritin at home. States that he cannot take Zyrtec (which is on formulary) due to SE and "addiction". States that it was hard for him to ween himself off of Zyrtec.  Stared Flonase 7/27/20. Started home Claritin 7/27/20b (not on formulary)  Somewhat responding to caffeine, Imitrex, tramadol, oxy, and ativan  Now c/o blurry vision, which may also be contributing to headaches. Seen by ophtho for this  Continue to monitor closely; especially with expected thrombocytopenia  Neuro on consult - trial IV mag. Appreciate input    "

## 2020-08-01 NOTE — CONSULTS
[facility]  Neurology  Consult Note    Patient Name: Kvng Jones Sr.  MRN: [id number]  Admission Date: 7/21/2020  Hospital Length of Stay: 11 days  Code Status: Full Code   Attending Provider: Cheryl Hodges MD   Consulting Provider: Esdras Bentley MD  Primary Care Physician: Primary Doctor No  Principal Problem:History of allogeneic stem cell transplant    Inpatient consult to Neurology  Consult performed by: Esdras Bentley MD  Consult ordered by: Viv Veronica MD         Subjective:     Chief Complaint:  headaches     HPI:   Kvng Jones Sr. is a 60 y.o. 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 He is now post-transplant day +4. We were consulted for headaches. Patient refers he has been having headaches since end of May but yesterday they got worse. Headaches are usually 4/10, bifrontal, last around 8-10 hours and are not accompanied by any other symptoms (patient denied auras, palpitations, SOB, chest pain). He usually takes Tylenol, tramadol or oxycodone but non seem to really help. Yesterdays headache was different because intensity went up to 7/10 and there was apparently some blurry vision too. Ophthalmology was consulted but no abnormalities were seen and they attributed more to dry eyes. Today we checked on the patient and confirmed his headaches characteristics, doesnt sound like migraines (sister has history of migraines). There were no focal or abnormal findings on examination. We are thinking this more to be a drug related adverse effect considering the patient is on chemo and multiple drug regimens. We spoke with primary team and for now our recommendation is IV magnesium.      Past Medical History:   Diagnosis Date    CML (chronic myelocytic leukemia)     Hx of psychiatric care     valium, ativan    Melanoma in situ of external ear, right        Past Surgical History:   Procedure Laterality Date    CHOLECYSTECTOMY      NECK SURGERY  2015     TONSILLECTOMY         Review of patient's allergies indicates:   Allergen Reactions    Corticosteroids (glucocorticoids)      Do not give any steroids until Day +5 (must be 24hours after completion of post transplant cyclophosphamide; okay to give after 8/2 @1130)    Posaconazole Hallucinations    Unclassified drug Other (See Comments)     Pt reports that he has an allergic reaction to an Antifungal medication, but is unsure of the name of the drug. Will obtain name prior to arrival in am and notify Pre-op RN.    Vancomycin analogues Other (See Comments)     Pt reports he had rigors    Codeine Hives    Iodine Hives and Rash    Iodinated contrast media Hives       Current Neurological Medications: na    No current facility-administered medications on file prior to encounter.      Current Outpatient Medications on File Prior to Encounter   Medication Sig    cyclobenzaprine (FLEXERIL) 10 MG tablet Take 10 mg by mouth 3 (three) times daily as needed for Muscle spasms.     esomeprazole (NEXIUM) 20 MG capsule Take 20 mg by mouth before breakfast.    levoFLOXacin (LEVAQUIN) 500 MG tablet Take 500 mg by mouth once daily.     loratadine (CLARITIN) 10 mg tablet Take 10 mg by mouth once daily.    multivit-min-FA-lycopen-lutein 300-600-300 mcg Tab Take 1 tablet by mouth once daily.     ondansetron (ZOFRAN) 4 MG tablet Take 4 mg by mouth every 8 (eight) hours as needed for Nausea.     valACYclovir (VALTREX) 500 MG tablet Take 500 mg by mouth 2 (two) times daily.     varenicline (CHANTIX) 1 mg Tab Take 1 mg by mouth 2 (two) times daily.     fluconazole (DIFLUCAN) 200 MG Tab Take 200 mg by mouth once daily.     traMADoL (ULTRAM) 50 mg tablet Take 1 tablet by mouth every 6 (six) hours as needed (pain).      Family History     None        Tobacco Use    Smoking status: Former Smoker     Packs/day: 1.00     Years: 35.00     Pack years: 35.00     Start date: 7/1/1985     Quit date: 6/1/2020     Years since  quittin.1    Smokeless tobacco: Never Used   Substance and Sexual Activity    Alcohol use: Yes     Alcohol/week: 12.0 standard drinks     Types: 4 Glasses of wine, 4 Cans of beer, 4 Shots of liquor per week    Drug use: Yes     Types: Marijuana     Comment: medical marijuana    Sexual activity: Yes     Partners: Female     Review of Systems   Constitutional: Negative for activity change, appetite change, chills, fatigue and fever.   HENT: Negative for congestion, mouth sores, nosebleeds, rhinorrhea, sinus pressure, sinus pain, sneezing and sore throat.    Eyes: Negative for photophobia, pain, discharge, redness, itching and visual disturbance.   Respiratory: Negative for cough, chest tightness, shortness of breath and wheezing.    Cardiovascular: Negative for chest pain, palpitations and leg swelling.   Gastrointestinal: Positive for nausea. Negative for abdominal distention, abdominal pain, blood in stool, constipation, diarrhea and vomiting.   Endocrine: Negative for cold intolerance, heat intolerance, polydipsia, polyphagia and polyuria.   Genitourinary: Negative for difficulty urinating, frequency, hematuria and urgency.   Musculoskeletal: Positive for myalgias. Negative for arthralgias, back pain, neck pain and neck stiffness.   Skin: Negative for pallor, rash and wound.   Allergic/Immunologic: Negative for environmental allergies, food allergies and immunocompromised state.   Neurological: Negative for dizziness, tremors, seizures, syncope, speech difficulty, weakness, light-headedness and headaches.   Hematological: Negative for adenopathy. Does not bruise/bleed easily.   Psychiatric/Behavioral: Negative for agitation, confusion, hallucinations, sleep disturbance and suicidal ideas. The patient is not nervous/anxious.      Objective:     Vital Signs (Most Recent):  Temp: 98.2 °F (36.8 °C) (20 1200)  Pulse: 88 (20 1200)  Resp: 16 (20 1308)  BP: (!) 148/82 (20 1200)  SpO2: 96 %  (08/01/20 1200) Vital Signs (24h Range):  Temp:  [97.6 °F (36.4 °C)-99.1 °F (37.3 °C)] 98.2 °F (36.8 °C)  Pulse:  [88-99] 88  Resp:  [16-18] 16  SpO2:  [94 %-96 %] 96 %  BP: (114-169)/(68-82) 148/82     Weight: 90.5 kg (199 lb 8.3 oz)  Body mass index is 32.2 kg/m².    Physical Exam  Constitutional:       Appearance: He is well-developed.   HENT:      Head: Normocephalic and atraumatic.      Mouth/Throat:      Mouth: Mucous membranes are moist.      Pharynx: No oropharyngeal exudate.   Eyes:      General:         Right eye: No discharge.         Left eye: No discharge.      Extraocular Movements: EOM normal.      Conjunctiva/sclera: Conjunctivae normal.      Pupils: Pupils are equal, round, and reactive to light.   Neck:      Musculoskeletal: Normal range of motion and neck supple.   Cardiovascular:      Rate and Rhythm: Normal rate and regular rhythm.      Heart sounds: Normal heart sounds. No murmur.   Pulmonary:      Effort: Pulmonary effort is normal. No respiratory distress.      Breath sounds: Normal breath sounds. No wheezing or rales.   Abdominal:      General: Bowel sounds are normal. There is no distension.      Palpations: Abdomen is soft.      Tenderness: There is no abdominal tenderness.   Musculoskeletal: Normal range of motion.         General: No deformity.   Skin:     General: Skin is dry.      Findings: No erythema or rash.      Comments: Dalal to right chest wall. Dressing c/d/i with no sign of infection noted.  Pruritic rash to chest and bilat shoulders.   Neurological:      Mental Status: He is alert and oriented to person, place, and time.      Deep Tendon Reflexes:      Reflex Scores:       Tricep reflexes are 2+ on the right side and 2+ on the left side.       Bicep reflexes are 2+ on the right side and 2+ on the left side.       Brachioradialis reflexes are 2+ on the right side and 2+ on the left side.       Patellar reflexes are 2+ on the right side and 2+ on the left side.       Achilles  reflexes are 2+ on the right side and 2+ on the left side.  Psychiatric:         Behavior: Behavior normal.         Thought Content: Thought content normal.         Judgment: Judgment normal.         NEUROLOGICAL EXAMINATION:     MENTAL STATUS   Oriented to person, place, and time.   Registration: recalls 1 of 3 objects. Recall at 5 minutes: recalls 1 of 3 objects. Follows 2 step commands.   Attention: normal. Concentration: normal.   Level of consciousness: alert  Knowledge: good.   Able to name object.     CRANIAL NERVES     CN II   Visual fields full to confrontation.     CN III, IV, VI   Pupils are equal, round, and reactive to light.  Extraocular motions are normal.     CN V   Facial sensation intact.     CN VII   Facial expression full, symmetric.     CN VIII   CN VIII normal.     CN IX, X   CN IX normal.     CN XI   CN XI normal.     CN XII   CN XII normal.     MOTOR EXAM   Muscle bulk: normal  Overall muscle tone: normal  Right arm tone: normal  Left arm tone: normal  Right arm pronator drift: absent  Left arm pronator drift: absent  Right leg tone: normal  Left leg tone: normal    Strength   Right neck flexion: 5/5  Left neck flexion: 5/5  Right neck extension: 5/5  Left neck extension: 5/5  Right deltoid: 5/5  Left deltoid: 5/5  Right biceps: 5/5  Left biceps: 5/5  Right triceps: 5/5  Left triceps: 5/5  Right wrist flexion: 5/5  Left wrist flexion: 5/5  Right wrist extension: 5/5  Left wrist extension: 5/5  Right interossei: 5/5  Left interossei: 5/5  Right abdominals: 5/5  Left abdominals: 5/5  Right iliopsoas: 5/5  Left iliopsoas: 5/5  Right quadriceps: 5/5  Left quadriceps: 5/5  Right hamstrin/5  Left hamstrin/5  Right glutei: 5/5  Left glutei: 5/5  Right anterior tibial: 5/5  Left anterior tibial: 5/5  Right posterior tibial: 5/5  Left posterior tibial: 5/5  Right peroneal: 5/5  Left peroneal: 5/5  Right gastroc: 5/5  Left gastroc: 5/5    REFLEXES     Reflexes   Right brachioradialis: 2+  Left  brachioradialis: 2+  Right biceps: 2+  Left biceps: 2+  Right triceps: 2+  Left triceps: 2+  Right patellar: 2+  Left patellar: 2+  Right achilles: 2+  Left achilles: 2+  Right : 2+  Left : 2+    SENSORY EXAM   Light touch normal.   Vibration normal.   Proprioception normal.   Pinprick normal.       Significant Labs: All pertinent lab results from the past 24 hours have been reviewed.    Significant Imaging: I have reviewed and interpreted all pertinent imaging results/findings within the past 24 hours.    Assessment and Plan:     Joe Jones Sr. is a 60 y.o. 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 He is now post-transplant day +4. We were consulted for headaches. Patient refers he has been having headaches since end of May but yesterday they got worse. Headaches are usually 4/10, bifrontal, last around 8-10 hours and are not accompanied by any other symptoms (patient denied auras, palpitations, SOB, chest pain). He usually takes Tylenol, tramadol or oxycodone but non seem to really help. Yesterdays headache was different because intensity went up to 7/10 and there was apparently some blurry vision too. Ophthalmology was consulted but no abnormalities were seen and they attributed more to dry eyes. Today we checked on the patient and confirmed his headaches characteristics, doesnt sound like migraines (sister has history of migraines). There were no focal or abnormal findings on examination. We are thinking this more to be a drug related adverse effect considering the patient is on chemo and multiple drug regimens. We spoke with primary team and for now our recommendation is IV magnesium.     Assessment and Plan:  -- Possible drug related adverse effect considering the patient is on chemo and multiple drug regimens.  -- no imaging for now since neuro exam is normal   -- IV magnesium for headaches   -- Neurology will continue to follow up  -- thank you for your  consult           VTE Risk Mitigation (From admission, onward)         Ordered     heparin, porcine (PF) 100 unit/mL injection flush 300 Units  As needed (PRN)      07/21/20 1920                Thank you for your consult. I will follow-up with patient. Please contact us if you have any additional questions.    Esdras Bentley MD  Neurology  [facility]

## 2020-08-01 NOTE — SUBJECTIVE & OBJECTIVE
Past Medical History:   Diagnosis Date    CML (chronic myelocytic leukemia)     Hx of psychiatric care     valium, ativan    Melanoma in situ of external ear, right        Past Surgical History:   Procedure Laterality Date    CHOLECYSTECTOMY      NECK SURGERY  2015    TONSILLECTOMY         Review of patient's allergies indicates:   Allergen Reactions    Corticosteroids (glucocorticoids)      Do not give any steroids until Day +5 (must be 24hours after completion of post transplant cyclophosphamide; okay to give after 8/2 @1130)    Posaconazole Hallucinations    Unclassified drug Other (See Comments)     Pt reports that he has an allergic reaction to an Antifungal medication, but is unsure of the name of the drug. Will obtain name prior to arrival in am and notify Pre-op RN.    Vancomycin analogues Other (See Comments)     Pt reports he had rigors    Codeine Hives    Iodine Hives and Rash    Iodinated contrast media Hives       Current Neurological Medications: na    No current facility-administered medications on file prior to encounter.      Current Outpatient Medications on File Prior to Encounter   Medication Sig    cyclobenzaprine (FLEXERIL) 10 MG tablet Take 10 mg by mouth 3 (three) times daily as needed for Muscle spasms.     esomeprazole (NEXIUM) 20 MG capsule Take 20 mg by mouth before breakfast.    levoFLOXacin (LEVAQUIN) 500 MG tablet Take 500 mg by mouth once daily.     loratadine (CLARITIN) 10 mg tablet Take 10 mg by mouth once daily.    multivit-min-FA-lycopen-lutein 300-600-300 mcg Tab Take 1 tablet by mouth once daily.     ondansetron (ZOFRAN) 4 MG tablet Take 4 mg by mouth every 8 (eight) hours as needed for Nausea.     valACYclovir (VALTREX) 500 MG tablet Take 500 mg by mouth 2 (two) times daily.     varenicline (CHANTIX) 1 mg Tab Take 1 mg by mouth 2 (two) times daily.     fluconazole (DIFLUCAN) 200 MG Tab Take 200 mg by mouth once daily.     traMADoL (ULTRAM) 50 mg tablet  Take 1 tablet by mouth every 6 (six) hours as needed (pain).      Family History     None        Tobacco Use    Smoking status: Former Smoker     Packs/day: 1.00     Years: 35.00     Pack years: 35.00     Start date: 1985     Quit date: 2020     Years since quittin.1    Smokeless tobacco: Never Used   Substance and Sexual Activity    Alcohol use: Yes     Alcohol/week: 12.0 standard drinks     Types: 4 Glasses of wine, 4 Cans of beer, 4 Shots of liquor per week    Drug use: Yes     Types: Marijuana     Comment: medical marijuana    Sexual activity: Yes     Partners: Female     Review of Systems   Constitutional: Negative for activity change, appetite change, chills, fatigue and fever.   HENT: Negative for congestion, mouth sores, nosebleeds, rhinorrhea, sinus pressure, sinus pain, sneezing and sore throat.    Eyes: Negative for photophobia, pain, discharge, redness, itching and visual disturbance.   Respiratory: Negative for cough, chest tightness, shortness of breath and wheezing.    Cardiovascular: Negative for chest pain, palpitations and leg swelling.   Gastrointestinal: Positive for nausea. Negative for abdominal distention, abdominal pain, blood in stool, constipation, diarrhea and vomiting.   Endocrine: Negative for cold intolerance, heat intolerance, polydipsia, polyphagia and polyuria.   Genitourinary: Negative for difficulty urinating, frequency, hematuria and urgency.   Musculoskeletal: Positive for myalgias. Negative for arthralgias, back pain, neck pain and neck stiffness.   Skin: Negative for pallor, rash and wound.   Allergic/Immunologic: Negative for environmental allergies, food allergies and immunocompromised state.   Neurological: Negative for dizziness, tremors, seizures, syncope, speech difficulty, weakness, light-headedness and headaches.   Hematological: Negative for adenopathy. Does not bruise/bleed easily.   Psychiatric/Behavioral: Negative for agitation, confusion,  hallucinations, sleep disturbance and suicidal ideas. The patient is not nervous/anxious.      Objective:     Vital Signs (Most Recent):  Temp: 98.2 °F (36.8 °C) (08/01/20 1200)  Pulse: 88 (08/01/20 1200)  Resp: 16 (08/01/20 1308)  BP: (!) 148/82 (08/01/20 1200)  SpO2: 96 % (08/01/20 1200) Vital Signs (24h Range):  Temp:  [97.6 °F (36.4 °C)-99.1 °F (37.3 °C)] 98.2 °F (36.8 °C)  Pulse:  [88-99] 88  Resp:  [16-18] 16  SpO2:  [94 %-96 %] 96 %  BP: (114-169)/(68-82) 148/82     Weight: 90.5 kg (199 lb 8.3 oz)  Body mass index is 32.2 kg/m².    Physical Exam  Constitutional:       Appearance: He is well-developed.   HENT:      Head: Normocephalic and atraumatic.      Mouth/Throat:      Mouth: Mucous membranes are moist.      Pharynx: No oropharyngeal exudate.   Eyes:      General:         Right eye: No discharge.         Left eye: No discharge.      Extraocular Movements: EOM normal.      Conjunctiva/sclera: Conjunctivae normal.      Pupils: Pupils are equal, round, and reactive to light.   Neck:      Musculoskeletal: Normal range of motion and neck supple.   Cardiovascular:      Rate and Rhythm: Normal rate and regular rhythm.      Heart sounds: Normal heart sounds. No murmur.   Pulmonary:      Effort: Pulmonary effort is normal. No respiratory distress.      Breath sounds: Normal breath sounds. No wheezing or rales.   Abdominal:      General: Bowel sounds are normal. There is no distension.      Palpations: Abdomen is soft.      Tenderness: There is no abdominal tenderness.   Musculoskeletal: Normal range of motion.         General: No deformity.   Skin:     General: Skin is dry.      Findings: No erythema or rash.      Comments: Dalal to right chest wall. Dressing c/d/i with no sign of infection noted.  Pruritic rash to chest and bilat shoulders.   Neurological:      Mental Status: He is alert and oriented to person, place, and time.      Deep Tendon Reflexes:      Reflex Scores:       Tricep reflexes are 2+ on the  right side and 2+ on the left side.       Bicep reflexes are 2+ on the right side and 2+ on the left side.       Brachioradialis reflexes are 2+ on the right side and 2+ on the left side.       Patellar reflexes are 2+ on the right side and 2+ on the left side.       Achilles reflexes are 2+ on the right side and 2+ on the left side.  Psychiatric:         Behavior: Behavior normal.         Thought Content: Thought content normal.         Judgment: Judgment normal.         NEUROLOGICAL EXAMINATION:     MENTAL STATUS   Oriented to person, place, and time.   Registration: recalls 1 of 3 objects. Recall at 5 minutes: recalls 1 of 3 objects. Follows 2 step commands.   Attention: normal. Concentration: normal.   Level of consciousness: alert  Knowledge: good.   Able to name object.     CRANIAL NERVES     CN II   Visual fields full to confrontation.     CN III, IV, VI   Pupils are equal, round, and reactive to light.  Extraocular motions are normal.     CN V   Facial sensation intact.     CN VII   Facial expression full, symmetric.     CN VIII   CN VIII normal.     CN IX, X   CN IX normal.     CN XI   CN XI normal.     CN XII   CN XII normal.     MOTOR EXAM   Muscle bulk: normal  Overall muscle tone: normal  Right arm tone: normal  Left arm tone: normal  Right arm pronator drift: absent  Left arm pronator drift: absent  Right leg tone: normal  Left leg tone: normal    Strength   Right neck flexion: 5/5  Left neck flexion: 5/5  Right neck extension: 5/5  Left neck extension: 5/5  Right deltoid: 5/5  Left deltoid: 5/5  Right biceps: 5/5  Left biceps: 5/5  Right triceps: 5/5  Left triceps: 5/5  Right wrist flexion: 5/5  Left wrist flexion: 5/5  Right wrist extension: 5/5  Left wrist extension: 5/5  Right interossei: 5/5  Left interossei: 5/5  Right abdominals: 5/5  Left abdominals: 5/5  Right iliopsoas: 5/5  Left iliopsoas: 5/5  Right quadriceps: 5/5  Left quadriceps: 5/5  Right hamstrin/5  Left hamstrin/5  Right  glutei: 5/5  Left glutei: 5/5  Right anterior tibial: 5/5  Left anterior tibial: 5/5  Right posterior tibial: 5/5  Left posterior tibial: 5/5  Right peroneal: 5/5  Left peroneal: 5/5  Right gastroc: 5/5  Left gastroc: 5/5    REFLEXES     Reflexes   Right brachioradialis: 2+  Left brachioradialis: 2+  Right biceps: 2+  Left biceps: 2+  Right triceps: 2+  Left triceps: 2+  Right patellar: 2+  Left patellar: 2+  Right achilles: 2+  Left achilles: 2+  Right : 2+  Left : 2+    SENSORY EXAM   Light touch normal.   Vibration normal.   Proprioception normal.   Pinprick normal.       Significant Labs: All pertinent lab results from the past 24 hours have been reviewed.    Significant Imaging: I have reviewed and interpreted all pertinent imaging results/findings within the past 24 hours.

## 2020-08-01 NOTE — SUBJECTIVE & OBJECTIVE
Subjective:     Interval History: 08/01/2020: Day +4 from Flu/Cy/TBI haplo SCT for CML. Complaining of refractory HA and nausea, no emesis. Continues to have OK appetite. Has mild skin rash on chest, last day of Cytoxan today.     Objective:     Vital Signs (Most Recent):  Temp: 98.3 °F (36.8 °C) (08/01/20 1620)  Pulse: 90 (08/01/20 1626)  Resp: 16 (08/01/20 1626)  BP: 131/76 (08/01/20 1626)  SpO2: (!) 93 % (08/01/20 1626) Vital Signs (24h Range):  Temp:  [98.2 °F (36.8 °C)-99.1 °F (37.3 °C)] 98.3 °F (36.8 °C)  Pulse:  [88-99] 90  Resp:  [16-18] 16  SpO2:  [93 %-96 %] 93 %  BP: (122-169)/(73-82) 131/76     Weight: 90.5 kg (199 lb 8.3 oz)  Body mass index is 32.2 kg/m².  Body surface area is 2.05 meters squared.    ECOG SCORE         [unfilled]    Intake/Output - Last 3 Shifts       07/30 0700 - 07/31 0659 07/31 0700 - 08/01 0659 08/01 0700 - 08/02 0659    P.O. 1080 2340 720    I.V. (mL/kg) 1880.8 (21.1) 2699.3 (29.8)     Total Intake(mL/kg) 2960.8 (33.2) 5039.3 (55.7) 720 (8)    Urine (mL/kg/hr) 2675 (1.3) 2975 (1.4) 675 (0.6)    Stool  0 0    Total Output 2675 2975 675    Net +285.8 +2064.3 +45           Urine Occurrence   3 x    Stool Occurrence  1 x 3 x          Physical Exam  Constitutional:       Appearance: He is well-developed.   HENT:      Head: Normocephalic and atraumatic.      Mouth/Throat:      Pharynx: No oropharyngeal exudate.   Eyes:      General:         Right eye: No discharge.         Left eye: No discharge.      Conjunctiva/sclera: Conjunctivae normal.      Pupils: Pupils are equal, round, and reactive to light.   Neck:      Musculoskeletal: Normal range of motion and neck supple.   Cardiovascular:      Rate and Rhythm: Normal rate and regular rhythm.      Heart sounds: Normal heart sounds. No murmur.   Pulmonary:      Effort: Pulmonary effort is normal. No respiratory distress.      Breath sounds: Normal breath sounds. No wheezing or rales.   Abdominal:      General: Bowel sounds are normal.  There is no distension.      Palpations: Abdomen is soft.      Tenderness: There is no abdominal tenderness.   Musculoskeletal: Normal range of motion.         General: No deformity.   Skin:     General: Skin is warm and dry.      Findings: No erythema or rash.      Comments: Dalal to right chest wall. Dressing c/d/i with no sign of infection noted.  Pruritic rash to chest and bilat shoulders.   Neurological:      Mental Status: He is alert and oriented to person, place, and time.   Psychiatric:         Behavior: Behavior normal.         Thought Content: Thought content normal.         Judgment: Judgment normal.         Significant Labs:   CBC:   Recent Labs   Lab 07/31/20 0436 08/01/20 0246   WBC 1.47* 1.04*   HGB 12.3* 11.5*   HCT 36.6* 34.4*   * 108*    and CMP:   Recent Labs   Lab 07/31/20 0436 08/01/20 0246    135*   K 3.9 4.0    104   CO2 23 24    105   BUN 8 7*   CREATININE 0.7 0.7   CALCIUM 9.0 9.1   PROT 6.5 6.4   ALBUMIN 3.7 3.5   BILITOT 0.3 0.3   ALKPHOS 50* 49*   AST 16 15   ALT 21 23   ANIONGAP 7* 7*   EGFRNONAA >60.0 >60.0       Diagnostic Results:  I have reviewed all pertinent imaging results/findings within the past 24 hours.

## 2020-08-01 NOTE — PLAN OF CARE
Side rails up x2; call bell in place; bed in lowest, locked position; skid proof socks on; no evidence of skin breakdown; care plan explained to patient; pt remains free of injury.  Pt is day +4 of a Haplo SCT. Pt received premedications, hydration fluid, mesna and cyclophosphamide without incident.Chemo verified with 2 certified nurses, uri with good blood return, all connection secured, chemo precautions maintained.  Pt with c/o HA, oxy IR given. Pt tolerated po, voids, BM x 1, ambulates, c/o nausea compazine, zofrran given. Pt with c/o itching atarex given. Pt stated he had relief. VSS and afebrile. Pt instructed to call with any concerns or needs. Dr Aponte and team aware of pt's c/o HA and nausea. New order for neurology consult.

## 2020-08-01 NOTE — PROGRESS NOTES
[facility]  Hematology  Bone Marrow Transplant  Progress Note    Patient Name: Kvng Jones Sr.  Admission Date: 7/21/2020  Hospital Length of Stay: 11 days  Code Status: Full Code    Subjective:     Interval History: 08/01/2020: Day +4 from Flu/Cy/TBI haplo SCT for CML. Complaining of refractory HA and nausea, no emesis. Continues to have OK appetite. Has mild skin rash on chest, last day of Cytoxan today.     Objective:     Vital Signs (Most Recent):  Temp: 98.3 °F (36.8 °C) (08/01/20 1620)  Pulse: 90 (08/01/20 1626)  Resp: 16 (08/01/20 1626)  BP: 131/76 (08/01/20 1626)  SpO2: (!) 93 % (08/01/20 1626) Vital Signs (24h Range):  Temp:  [98.2 °F (36.8 °C)-99.1 °F (37.3 °C)] 98.3 °F (36.8 °C)  Pulse:  [88-99] 90  Resp:  [16-18] 16  SpO2:  [93 %-96 %] 93 %  BP: (122-169)/(73-82) 131/76     Weight: 90.5 kg (199 lb 8.3 oz)  Body mass index is 32.2 kg/m².  Body surface area is 2.05 meters squared.    ECOG SCORE         [unfilled]    Intake/Output - Last 3 Shifts       07/30 0700 - 07/31 0659 07/31 0700 - 08/01 0659 08/01 0700 - 08/02 0659    P.O. 1080 2340 720    I.V. (mL/kg) 1880.8 (21.1) 2699.3 (29.8)     Total Intake(mL/kg) 2960.8 (33.2) 5039.3 (55.7) 720 (8)    Urine (mL/kg/hr) 2675 (1.3) 2975 (1.4) 675 (0.6)    Stool  0 0    Total Output 2675 2975 675    Net +285.8 +2064.3 +45           Urine Occurrence   3 x    Stool Occurrence  1 x 3 x          Physical Exam  Constitutional:       Appearance: He is well-developed.   HENT:      Head: Normocephalic and atraumatic.      Mouth/Throat:      Pharynx: No oropharyngeal exudate.   Eyes:      General:         Right eye: No discharge.         Left eye: No discharge.      Conjunctiva/sclera: Conjunctivae normal.      Pupils: Pupils are equal, round, and reactive to light.   Neck:      Musculoskeletal: Normal range of motion and neck supple.   Cardiovascular:      Rate and Rhythm: Normal rate and regular rhythm.      Heart sounds: Normal heart sounds. No  murmur.   Pulmonary:      Effort: Pulmonary effort is normal. No respiratory distress.      Breath sounds: Normal breath sounds. No wheezing or rales.   Abdominal:      General: Bowel sounds are normal. There is no distension.      Palpations: Abdomen is soft.      Tenderness: There is no abdominal tenderness.   Musculoskeletal: Normal range of motion.         General: No deformity.   Skin:     General: Skin is warm and dry.      Findings: No erythema or rash.      Comments: Dalal to right chest wall. Dressing c/d/i with no sign of infection noted.  Pruritic rash to chest and bilat shoulders.   Neurological:      Mental Status: He is alert and oriented to person, place, and time.   Psychiatric:         Behavior: Behavior normal.         Thought Content: Thought content normal.         Judgment: Judgment normal.         Significant Labs:   CBC:   Recent Labs   Lab 07/31/20 0436 08/01/20  0246   WBC 1.47* 1.04*   HGB 12.3* 11.5*   HCT 36.6* 34.4*   * 108*    and CMP:   Recent Labs   Lab 07/31/20 0436 08/01/20  0246    135*   K 3.9 4.0    104   CO2 23 24    105   BUN 8 7*   CREATININE 0.7 0.7   CALCIUM 9.0 9.1   PROT 6.5 6.4   ALBUMIN 3.7 3.5   BILITOT 0.3 0.3   ALKPHOS 50* 49*   AST 16 15   ALT 21 23   ANIONGAP 7* 7*   EGFRNONAA >60.0 >60.0       Diagnostic Results:  I have reviewed all pertinent imaging results/findings within the past 24 hours.    Assessment/Plan:     * History of allogeneic stem cell transplant  Admitted 7/21/20 for planned Flu/Cy/TBI haplo allo SCT. Son is the donor. Today is Day +4.  Receive 2 bags and a total CD34 dose of 3.10 x10^6/kg on 7/28/20  Will receive post transplant cyclophosphamide on Day +3 and +4.  Of note, patient is to not receive any steroids until 24hrs after completion of post tx CTX. This will be 8/5 @1130    Planned conditioning regimen:  Fludarabine on Day -6, -5, -4, -3, and -2  Cyclophosphamide on Day -6 and -5  Total Body Irradiation on Day  -1     Planned  GVHD Prophylaxis:  Cyclophosphamide on Day +3 and +4  Tacrolimus starting on Day +5  Mycophenolate starting on Day +5     Antimicrobial Prophylaxis:  Acyclovir starting on Day -6  Levofloxacin starting on Day -1  Fluconazole starting on Day -1  Bactrim starting on Day +30     SOS/VOD Prophylaxis:  Ursodiol on Day -6 through Day +30     Growth Factor Support:  Neupogen starting on Day +7     Acute myeloid leukemia in remission  See CML    Blurry vision  C/o worsening blurry vision 7/30/20  May be contributing to headaches  Ophtho consulted and saw patient 7/30  Ophtho dilated pupils and performed fundal exam. Fundal exam neg.  Patient had been viewing laptop screen for ~ 2 hours prior to experiencing blurry vision  Per ophtho, blurry vision due to dry eyes. rec'd artificial tears QID prn and warm compresses BID prn. Can escalate artificial tears to ointment if needed.    Antineoplastic chemotherapy induced pancytopenia  Expected with ongoing chemotherapy  Continue to monitor daily CBC  WBC 1.47; ANC 1200; hgb 12.3; hgb 127K  Transfuse for Hgb <7, Plt <10K    Rash and nonspecific skin eruption  Newly noted macular rash to upper back and chest on 7/29  Intermittently pruritic  GVHD no suspected this soon after transplant  Likely drug reaction, thought maybe to be from DMSO from stem cell transplant  Continue to monitor closely  Started on PRN PO benadryl, topical benadryl cream, and topical hydrocortisone   Patient states that benadryl causes him to be jittery. Ordered prn atarax instead.    Abdominal gas pain  Started on PRN simethicone    Headache  Started having headaches several weeks ago. Onset seems to correspond to when patient quit smoking and when he started ponatinib  Has been off of ponatinib for a few days but fevers have persisted  Pattern is like cluster headaches (onset during sleep), but not unilateral.  O2 via non-rebreather at 12 L over 15 minutes seemed to help but developed hot flush  "so pt stopped  Ultram, Flexeril, Oxycodone not helping  Imitrex PRN q2hr (max 200mg in 24hrs). Not contraindicated per pharm D.  Description of headache sounds like tension headache--throbbing with pressure across crown of head  No focal neurological deficits  Patient reporting that headache may be sinus in nature. Maxillary tenderness noted.  Patient takes Claritin at home. States that he cannot take Zyrtec (which is on formulary) due to SE and "addiction". States that it was hard for him to ween himself off of Zyrtec.  Stared Flonase 7/27/20. Started home Claritin 7/27/20b (not on formulary)  Somewhat responding to caffeine, Imitrex, tramadol, oxy, and ativan  Now c/o blurry vision, which may also be contributing to headaches. Seen by ophtho for this  Continue to monitor closely; especially with expected thrombocytopenia  Neuro on consult - trial IV mag. Appreciate input      Cervical stenosis of spine  Continue home Flexeril and ultram  PRN oxy IR added for additional relief    Tobacco abuse, in remission  35 pack year history  Quit smoking 6/1/2020 using Chantix  Patient completed course of Chantix and denies need for Nicotine patch.     Daily consumption of alcohol  Drinks 3-4 alcoholic beverages nightly  Will need to monitor closely for s/s of alcohol withdrawal while inpatient  Will consider consulting addiction medicine if indicated  Prn ativan available    BPH (benign prostatic hyperplasia)  Continue home Flomax    GERD (gastroesophageal reflux disease)  Takes Nexium at home  Giving Protonix while inpatient as Nexium is not on hospital formulary  Can resume Nexium at discharge  Had CP this am that is believed to be due to GERD (troponin and EKG unremarkable). Relieved after protonix administration  PRN TUMs ordered.    Essential hypertension  increased home amlodipine from 5 mg to 10 mg daily  BPs as high as 180s/110s  Started prn hydralazine - possibly worsened headache  Decreased IVF rate  BP stable at " this time    CML in remission  Presented to [facility] with CMP in blast crisis and with nodular disease on 3/30/2020  Induced with FLAG-Addis. Achieved morphologic CR  Started on daily Ponatinib 30 mg daily, which he continues to take outpatient. Will hold Ponatinib on admission.  Referred to Dr. Hodges by Dr. Ramos for SCT consideration  Had [facility]x at [facility] on 7/1/2020 showing no morphologic or immunophenotypic evidence of CML  Admitted 7/21/20 for planned Flu/Cy/TBI haplo allo SCT  See SCT candidate        VTE Risk Mitigation (From admission, onward)         Ordered     heparin, porcine (PF) 100 unit/mL injection flush 300 Units  As needed (PRN)      07/21/20 1920                Disposition: Inpatient    Viv Veronica MD  Bone Marrow Transplant  [facility]

## 2020-08-02 LAB
ALBUMIN SERPL BCP-MCNC: 3.8 G/DL (ref 3.5–5.2)
ALP SERPL-CCNC: 67 U/L (ref 55–135)
ALT SERPL W/O P-5'-P-CCNC: 31 U/L (ref 10–44)
ANION GAP SERPL CALC-SCNC: 9 MMOL/L (ref 8–16)
AST SERPL-CCNC: 21 U/L (ref 10–40)
BASOPHILS # BLD AUTO: 0 K/UL (ref 0–0.2)
BASOPHILS NFR BLD: 0 % (ref 0–1.9)
BILIRUB SERPL-MCNC: 0.3 MG/DL (ref 0.1–1)
BUN SERPL-MCNC: 9 MG/DL (ref 8–23)
C DIFF GDH STL QL: NEGATIVE
C DIFF TOX A+B STL QL IA: NEGATIVE
CALCIUM SERPL-MCNC: 8.8 MG/DL (ref 8.7–10.5)
CHLORIDE SERPL-SCNC: 105 MMOL/L (ref 95–110)
CO2 SERPL-SCNC: 23 MMOL/L (ref 23–29)
CREAT SERPL-MCNC: 0.7 MG/DL (ref 0.5–1.4)
DIFFERENTIAL METHOD: ABNORMAL
EOSINOPHIL # BLD AUTO: 0 K/UL (ref 0–0.5)
EOSINOPHIL NFR BLD: 8.5 % (ref 0–8)
ERYTHROCYTE [DISTWIDTH] IN BLOOD BY AUTOMATED COUNT: 12.7 % (ref 11.5–14.5)
EST. GFR  (AFRICAN AMERICAN): >60 ML/MIN/1.73 M^2
EST. GFR  (NON AFRICAN AMERICAN): >60 ML/MIN/1.73 M^2
GLUCOSE SERPL-MCNC: 113 MG/DL (ref 70–110)
HCT VFR BLD AUTO: 35.9 % (ref 40–54)
HGB BLD-MCNC: 12.1 G/DL (ref 14–18)
IMM GRANULOCYTES # BLD AUTO: 0.01 K/UL (ref 0–0.04)
IMM GRANULOCYTES NFR BLD AUTO: 2.1 % (ref 0–0.5)
LYMPHOCYTES # BLD AUTO: 0.1 K/UL (ref 1–4.8)
LYMPHOCYTES NFR BLD: 12.8 % (ref 18–48)
MAGNESIUM SERPL-MCNC: 2.3 MG/DL (ref 1.6–2.6)
MCH RBC QN AUTO: 33.2 PG (ref 27–31)
MCHC RBC AUTO-ENTMCNC: 33.7 G/DL (ref 32–36)
MCV RBC AUTO: 99 FL (ref 82–98)
MONOCYTES # BLD AUTO: 0.1 K/UL (ref 0.3–1)
MONOCYTES NFR BLD: 17 % (ref 4–15)
NEUTROPHILS # BLD AUTO: 0.3 K/UL (ref 1.8–7.7)
NEUTROPHILS NFR BLD: 59.6 % (ref 38–73)
NRBC BLD-RTO: 0 /100 WBC
PHOSPHATE SERPL-MCNC: 3 MG/DL (ref 2.7–4.5)
PLATELET # BLD AUTO: 98 K/UL (ref 150–350)
PLATELET BLD QL SMEAR: ABNORMAL
PMV BLD AUTO: 9.1 FL (ref 9.2–12.9)
POTASSIUM SERPL-SCNC: 4.1 MMOL/L (ref 3.5–5.1)
PROT SERPL-MCNC: 6.9 G/DL (ref 6–8.4)
RBC # BLD AUTO: 3.64 M/UL (ref 4.6–6.2)
SODIUM SERPL-SCNC: 137 MMOL/L (ref 136–145)
WBC # BLD AUTO: 0.47 K/UL (ref 3.9–12.7)

## 2020-08-02 PROCEDURE — 25000003 PHARM REV CODE 250: Performed by: NURSE PRACTITIONER

## 2020-08-02 PROCEDURE — 84100 ASSAY OF PHOSPHORUS: CPT

## 2020-08-02 PROCEDURE — 25000003 PHARM REV CODE 250: Performed by: STUDENT IN AN ORGANIZED HEALTH CARE EDUCATION/TRAINING PROGRAM

## 2020-08-02 PROCEDURE — A4216 STERILE WATER/SALINE, 10 ML: HCPCS | Performed by: NURSE PRACTITIONER

## 2020-08-02 PROCEDURE — 63600175 PHARM REV CODE 636 W HCPCS: Performed by: NURSE PRACTITIONER

## 2020-08-02 PROCEDURE — 83735 ASSAY OF MAGNESIUM: CPT

## 2020-08-02 PROCEDURE — 25000003 PHARM REV CODE 250: Performed by: INTERNAL MEDICINE

## 2020-08-02 PROCEDURE — 99233 SBSQ HOSP IP/OBS HIGH 50: CPT | Mod: ,,, | Performed by: INTERNAL MEDICINE

## 2020-08-02 PROCEDURE — 99233 PR SUBSEQUENT HOSPITAL CARE,LEVL III: ICD-10-PCS | Mod: ,,, | Performed by: INTERNAL MEDICINE

## 2020-08-02 PROCEDURE — 63600175 PHARM REV CODE 636 W HCPCS: Performed by: INTERNAL MEDICINE

## 2020-08-02 PROCEDURE — 80053 COMPREHEN METABOLIC PANEL: CPT

## 2020-08-02 PROCEDURE — A9155 ARTIFICIAL SALIVA: HCPCS | Performed by: INTERNAL MEDICINE

## 2020-08-02 PROCEDURE — 20600001 HC STEP DOWN PRIVATE ROOM

## 2020-08-02 PROCEDURE — 85025 COMPLETE CBC W/AUTO DIFF WBC: CPT

## 2020-08-02 PROCEDURE — 87324 CLOSTRIDIUM AG IA: CPT

## 2020-08-02 PROCEDURE — 87449 NOS EACH ORGANISM AG IA: CPT

## 2020-08-02 RX ORDER — LOPERAMIDE HYDROCHLORIDE 2 MG/1
2 CAPSULE ORAL 4 TIMES DAILY PRN
Status: DISCONTINUED | OUTPATIENT
Start: 2020-08-02 | End: 2020-08-12 | Stop reason: HOSPADM

## 2020-08-02 RX ADMIN — PROCHLORPERAZINE MALEATE 10 MG: 5 TABLET, FILM COATED ORAL at 08:08

## 2020-08-02 RX ADMIN — Medication 10 ML: at 05:08

## 2020-08-02 RX ADMIN — PROCHLORPERAZINE MALEATE 10 MG: 5 TABLET, FILM COATED ORAL at 12:08

## 2020-08-02 RX ADMIN — TACROLIMUS 2 MG: 1 CAPSULE ORAL at 10:08

## 2020-08-02 RX ADMIN — TACROLIMUS 2 MG: 1 CAPSULE ORAL at 05:08

## 2020-08-02 RX ADMIN — URSODIOL 300 MG: 300 CAPSULE ORAL at 08:08

## 2020-08-02 RX ADMIN — Medication 30 ML: at 08:08

## 2020-08-02 RX ADMIN — LOPERAMIDE HYDROCHLORIDE 2 MG: 2 CAPSULE ORAL at 05:08

## 2020-08-02 RX ADMIN — OXYCODONE HYDROCHLORIDE 10 MG: 5 TABLET ORAL at 08:08

## 2020-08-02 RX ADMIN — Medication 10 ML: at 12:08

## 2020-08-02 RX ADMIN — FLUTICASONE PROPIONATE 100 MCG: 50 SPRAY, METERED NASAL at 08:08

## 2020-08-02 RX ADMIN — Medication 30 ML: at 05:08

## 2020-08-02 RX ADMIN — GABAPENTIN 300 MG: 300 CAPSULE ORAL at 08:08

## 2020-08-02 RX ADMIN — Medication 30 ML: at 09:08

## 2020-08-02 RX ADMIN — FLUCONAZOLE 400 MG: 200 TABLET ORAL at 08:08

## 2020-08-02 RX ADMIN — MYCOPHENOLATE MOFETIL 1000 MG: 250 CAPSULE ORAL at 08:08

## 2020-08-02 RX ADMIN — ACYCLOVIR 800 MG: 800 TABLET ORAL at 05:08

## 2020-08-02 RX ADMIN — ONDANSETRON HYDROCHLORIDE 8 MG: 2 INJECTION INTRAMUSCULAR; INTRAVENOUS at 09:08

## 2020-08-02 RX ADMIN — MYCOPHENOLATE MOFETIL 1000 MG: 250 CAPSULE ORAL at 10:08

## 2020-08-02 RX ADMIN — AMLODIPINE BESYLATE 10 MG: 10 TABLET ORAL at 08:08

## 2020-08-02 RX ADMIN — FILGRASTIM 480 MCG: 480 INJECTION, SOLUTION INTRAVENOUS; SUBCUTANEOUS at 02:08

## 2020-08-02 RX ADMIN — HYDROXYZINE HYDROCHLORIDE 25 MG: 25 TABLET, FILM COATED ORAL at 02:08

## 2020-08-02 RX ADMIN — SODIUM CHLORIDE AND POTASSIUM CHLORIDE: 9; 1.49 INJECTION, SOLUTION INTRAVENOUS at 10:08

## 2020-08-02 RX ADMIN — TAMSULOSIN HYDROCHLORIDE 0.4 MG: 0.4 CAPSULE ORAL at 09:08

## 2020-08-02 RX ADMIN — PANTOPRAZOLE SODIUM 40 MG: 40 TABLET, DELAYED RELEASE ORAL at 08:08

## 2020-08-02 RX ADMIN — ONDANSETRON HYDROCHLORIDE 8 MG: 4 TABLET, FILM COATED ORAL at 12:08

## 2020-08-02 RX ADMIN — MYCOPHENOLATE MOFETIL 1000 MG: 250 CAPSULE ORAL at 02:08

## 2020-08-02 RX ADMIN — LORAZEPAM 1 MG: 2 INJECTION INTRAMUSCULAR; INTRAVENOUS at 08:08

## 2020-08-02 RX ADMIN — ONDANSETRON HYDROCHLORIDE 8 MG: 4 TABLET, FILM COATED ORAL at 05:08

## 2020-08-02 RX ADMIN — PROCHLORPERAZINE MALEATE 10 MG: 5 TABLET, FILM COATED ORAL at 05:08

## 2020-08-02 RX ADMIN — OXYCODONE HYDROCHLORIDE 10 MG: 5 TABLET ORAL at 02:08

## 2020-08-02 RX ADMIN — LEVOFLOXACIN 500 MG: 500 TABLET, FILM COATED ORAL at 08:08

## 2020-08-02 RX ADMIN — Medication 30 ML: at 12:08

## 2020-08-02 RX ADMIN — SODIUM CHLORIDE AND POTASSIUM CHLORIDE: 9; 1.49 INJECTION, SOLUTION INTRAVENOUS at 05:08

## 2020-08-02 RX ADMIN — ACYCLOVIR 800 MG: 800 TABLET ORAL at 07:08

## 2020-08-02 NOTE — SUBJECTIVE & OBJECTIVE
Subjective:     Interval History: Patient states improvement of HA overnight, improving from a 9/10 to a 3/10 with administration of magnesium and oxycodone.     Current Neurological Medications: NA    Current Facility-Administered Medications   Medication Dose Route Frequency Provider Last Rate Last Dose    0.9 % NaCl with KCl 20 mEq infusion   Intravenous Continuous Ivon Morrow NP 75 mL/hr at 08/02/20 0536      acetaminophen tablet 650 mg  650 mg Oral Q6H PRN Cheryl Hodges MD        acyclovir tablet 800 mg  800 mg Oral BID Cheryl Hodges MD   800 mg at 08/02/20 0755    alteplase injection 2 mg  2 mg Intra-Catheter PRN Cheryl Hodges MD        amLODIPine tablet 10 mg  10 mg Oral Daily Ivon Morrow NP   10 mg at 08/02/20 0820    artificial tears 0.5 % ophthalmic solution 1 drop  1 drop Both Eyes QID PRN Ivon oMrrow NP        calcium carbonate 200 mg calcium (500 mg) chewable tablet 500 mg  500 mg Oral Daily PRN Ivon Morrow NP        cyclobenzaprine tablet 10 mg  10 mg Oral TID PRN Ivon Morrow NP   10 mg at 07/25/20 0520    diphenhydrAMINE capsule 25 mg  25 mg Oral Q6H PRN Marimar Brothers NP   25 mg at 07/30/20 0322    diphenhydrAMINE-zinc acetate 2-0.1% cream   Topical (Top) TID PRN Marimar Brothers NP        filgrastim (NEUPOGEN) injection 480 mcg/1.6 ml  480 mcg Subcutaneous Daily Cheryl Hodges MD        fluconazole tablet 400 mg  400 mg Oral Daily Cheryl Hodges MD   400 mg at 08/02/20 0819    fluticasone propionate 50 mcg/actuation nasal spray 100 mcg  2 spray Each Nostril Daily Ivon Morrow NP   100 mcg at 08/02/20 0820    gabapentin capsule 300 mg  300 mg Oral QHS Yasmine Goldsmith NP   300 mg at 08/01/20 2004    guaiFENesin 12 hr tablet 600 mg  600 mg Oral Q12H PRN Eric Apple MD   600 mg at 07/31/20 1253    heparin, porcine (PF) 100 unit/mL injection flush 300 Units  300 Units Intravenous PRN Cheryl Hodges MD        hydrALAZINE tablet 50 mg  50 mg Oral Q6H PRN Ivon JOLLEY  Odalys, NP   50 mg at 07/24/20 2358    hydrocortisone 1 % cream   Topical (Top) BID Ivon Morrow NP        hydrOXYzine HCL tablet 25 mg  25 mg Oral TID PRN Ivon Morrow NP   25 mg at 08/01/20 2011    levoFLOXacin tablet 500 mg  500 mg Oral Daily Cheryl Hodges MD   500 mg at 08/02/20 0819    Loratidine 10 mg tab  10 mg Oral Daily PM Ivon Morrow NP   10 mg at 08/01/20 1743    lorazepam injection 1 mg  1 mg Intravenous Q6H PRN Cheryl Hodges MD   1 mg at 08/01/20 2138    magnesium oxide tablet 400 mg  400 mg Oral Q4H PRN Cheryl Hodges MD   400 mg at 07/31/20 1029    magnesium oxide tablet 400 mg  400 mg Oral Q4H PRN Cheryl Hodges MD   400 mg at 08/01/20 1446    magnesium oxide tablet 800 mg  800 mg Oral Q4H PRN Cheryl Hodges MD        mycophenolate capsule 1,000 mg  1,000 mg Oral TID Cheryl Hodges MD   1,000 mg at 08/02/20 1019    ondansetron IVPB 8 mg  8 mg Intravenous Q24H Cheryl Hodges  mL/hr at 08/02/20 0955 8 mg at 08/02/20 0955    ondansetron tablet 8 mg  8 mg Oral Q6H Viv Veronica MD   8 mg at 08/02/20 1214    oxyCODONE immediate release tablet 10 mg  10 mg Oral Q6H PRN Brooks Sales DO   10 mg at 08/02/20 0817    pantoprazole EC tablet 40 mg  40 mg Oral Daily Ivon Morrow NP   40 mg at 08/02/20 0819    potassium chloride SA CR tablet 20 mEq  20 mEq Oral PRN Cheryl Hodges MD   20 mEq at 07/25/20 0917    potassium chloride SA CR tablet 20 mEq  20 mEq Oral Q2H PRN Cheryl Hodges MD        potassium chloride SA CR tablet 20 mEq  20 mEq Oral Q2H PRN Cheryl Hodges MD        potassium, sodium phosphates 280-160-250 mg packet 1 packet  1 packet Oral Q4H PRN Cheryl Hodges MD        potassium, sodium phosphates 280-160-250 mg packet 2 packet  2 packet Oral Q4H PRN Cheryl Hodges MD        prochlorperazine tablet 10 mg  10 mg Oral QID Ivon Morrow NP   10 mg at 08/02/20 1252    saliva substitute combo no.2 solution 30 mL  30 mL Swish & Spit QID Cheryl Hodges MD   30 mL at 08/02/20 1252    saliva  substitute combo no.2 solution 30 mL  30 mL Swish & Spit Q4H PRN Cheryl Hodges MD   30 mL at 08/02/20 0819    simethicone chewable tablet 80 mg  1 tablet Oral TID PRN Marimar Brothers NP   80 mg at 07/29/20 1121    sodium chloride 0.9% flush 10 mL  10 mL Intravenous PRN Cheryl Hodges MD        sodium chloride 0.9% flush 10 mL  10 mL Intravenous Q6H Yasmine Goldsmith NP   10 mL at 08/02/20 1256    [START ON 8/27/2020] sulfamethoxazole-trimethoprim 800-160mg per tablet 1 tablet  1 tablet Oral Every Mon, Wed, Fri Cheryl Hodges MD        sumatriptan tablet 50 mg  50 mg Oral Q2H PRN Eric Apple MD   50 mg at 07/31/20 2346    tacrolimus capsule 2 mg  2 mg Oral BID Cheryl Hodges MD   2 mg at 08/02/20 1019    tamsulosin 24 hr capsule 0.4 mg  0.4 mg Oral Daily Ivon Morrow NP   0.4 mg at 08/02/20 0900    traMADoL tablet 50 mg  50 mg Oral Q6H PRN Ivon Morrow NP   50 mg at 07/31/20 1643    ursodioL capsule 300 mg  300 mg Oral BID Cheryl Hodges MD   300 mg at 08/02/20 0819     Facility-Administered Medications Ordered in Other Encounters   Medication Dose Route Frequency Provider Last Rate Last Dose    artificial tears 0.5 % ophthalmic solution 2 drop  2 drop Both Eyes PRN Ivon Morrow NP        diphenhydrAMINE injection 50 mg  50 mg Intravenous PRN Ivon Morrow NP        loperamide capsule 2 mg  2 mg Oral Q4H PRN Ivon Morrow NP           Review of Systems   Constitutional: Negative for activity change, appetite change, chills, fatigue and fever.   HENT: Negative for congestion, mouth sores, nosebleeds, rhinorrhea, sinus pressure, sinus pain, sneezing and sore throat.    Eyes: Negative for photophobia, pain, discharge, redness, itching and visual disturbance.   Respiratory: Negative for cough, chest tightness, shortness of breath and wheezing.    Cardiovascular: Negative for chest pain, palpitations and leg swelling.   Gastrointestinal: Positive for nausea. Negative for abdominal distention,  abdominal pain, blood in stool, constipation, diarrhea and vomiting.   Endocrine: Negative for cold intolerance, heat intolerance, polydipsia, polyphagia and polyuria.   Genitourinary: Negative for difficulty urinating, frequency, hematuria and urgency.   Musculoskeletal: Positive for myalgias. Negative for arthralgias, back pain, neck pain and neck stiffness.   Skin: Negative for pallor, rash and wound.   Allergic/Immunologic: Negative for environmental allergies, food allergies and immunocompromised state.   Neurological: Positive for headaches. Negative for dizziness, tremors, seizures, syncope, speech difficulty, weakness and light-headedness.   Hematological: Negative for adenopathy. Does not bruise/bleed easily.   Psychiatric/Behavioral: Negative for agitation, confusion, hallucinations, sleep disturbance and suicidal ideas. The patient is not nervous/anxious.      Objective:     Vital Signs (Most Recent):  Temp: 96.6 °F (35.9 °C) (08/02/20 1300)  Pulse: 94 (08/02/20 1300)  Resp: 18 (08/02/20 1300)  BP: 125/81 (08/02/20 1300)  SpO2: 97 % (08/02/20 1300) Vital Signs (24h Range):  Temp:  [96.6 °F (35.9 °C)-99.1 °F (37.3 °C)] 96.6 °F (35.9 °C)  Pulse:  [88-97] 94  Resp:  [16-18] 18  SpO2:  [93 %-98 %] 97 %  BP: (125-163)/(76-88) 125/81     Weight: 90.5 kg (199 lb 8.3 oz)  Body mass index is 32.2 kg/m².    Physical Exam  Constitutional:       Appearance: He is well-developed.   HENT:      Head: Normocephalic and atraumatic.      Mouth/Throat:      Mouth: Mucous membranes are moist.      Pharynx: No oropharyngeal exudate.   Eyes:      General:         Right eye: No discharge.         Left eye: No discharge.      Extraocular Movements: EOM normal.      Conjunctiva/sclera: Conjunctivae normal.      Pupils: Pupils are equal, round, and reactive to light.   Neck:      Musculoskeletal: Normal range of motion and neck supple.   Cardiovascular:      Rate and Rhythm: Normal rate and regular rhythm.      Heart sounds:  Normal heart sounds. No murmur.   Pulmonary:      Effort: Pulmonary effort is normal. No respiratory distress.      Breath sounds: Normal breath sounds. No wheezing or rales.   Abdominal:      General: Bowel sounds are normal. There is no distension.      Palpations: Abdomen is soft.      Tenderness: There is no abdominal tenderness.   Musculoskeletal: Normal range of motion.         General: No deformity.   Skin:     General: Skin is dry.      Findings: No erythema or rash.      Comments: Dalal to right chest wall. Dressing c/d/i with no sign of infection noted.  Pruritic rash to chest and bilat shoulders.   Neurological:      Mental Status: He is alert and oriented to person, place, and time.      Deep Tendon Reflexes:      Reflex Scores:       Tricep reflexes are 2+ on the right side and 2+ on the left side.       Bicep reflexes are 2+ on the right side and 2+ on the left side.       Brachioradialis reflexes are 2+ on the right side and 2+ on the left side.       Patellar reflexes are 2+ on the right side and 2+ on the left side.       Achilles reflexes are 2+ on the right side and 2+ on the left side.  Psychiatric:         Behavior: Behavior normal.         Thought Content: Thought content normal.         Judgment: Judgment normal.         NEUROLOGICAL EXAMINATION:     MENTAL STATUS   Oriented to person, place, and time.   Registration: recalls 1 of 3 objects. Recall at 5 minutes: recalls 1 of 3 objects. Follows 2 step commands.   Attention: normal. Concentration: normal.   Level of consciousness: alert  Knowledge: good.   Able to name object.     CRANIAL NERVES     CN II   Visual fields full to confrontation.     CN III, IV, VI   Pupils are equal, round, and reactive to light.  Extraocular motions are normal.     CN V   Facial sensation intact.     CN VII   Facial expression full, symmetric.     CN VIII   CN VIII normal.     CN IX, X   CN IX normal.     CN XI   CN XI normal.     CN XII   CN XII normal.      MOTOR EXAM   Muscle bulk: normal  Overall muscle tone: normal  Right arm tone: normal  Left arm tone: normal  Right arm pronator drift: absent  Left arm pronator drift: absent  Right leg tone: normal  Left leg tone: normal    Strength   Right neck flexion: 5/5  Left neck flexion: 5/5  Right neck extension: 5/5  Left neck extension: 5/5  Right deltoid: 5/5  Left deltoid: 5/5  Right biceps: 5/5  Left biceps: 5/5  Right triceps: 5/5  Left triceps: 5/5  Right wrist flexion: 5/5  Left wrist flexion: 5/5  Right wrist extension: 5/5  Left wrist extension: 5/5  Right interossei: 5/5  Left interossei: 5/5  Right abdominals: 5/5  Left abdominals: 5/5  Right iliopsoas: 5/5  Left iliopsoas: 5/5  Right quadriceps: 5/5  Left quadriceps: 5/5  Right hamstrin/5  Left hamstrin/5  Right glutei: 5/5  Left glutei: 5/5  Right anterior tibial: 5/5  Left anterior tibial: 5/5  Right posterior tibial: 5/5  Left posterior tibial: 5/5  Right peroneal: 5/5  Left peroneal: 5/5  Right gastroc: 5/5  Left gastroc: 5/5    REFLEXES     Reflexes   Right brachioradialis: 2+  Left brachioradialis: 2+  Right biceps: 2+  Left biceps: 2+  Right triceps: 2+  Left triceps: 2+  Right patellar: 2+  Left patellar: 2+  Right achilles: 2+  Left achilles: 2+  Right : 2+  Left : 2+    SENSORY EXAM   Light touch normal.   Vibration normal.   Proprioception normal.   Pinprick normal.       Significant Labs: All pertinent lab results from the past 24 hours have been reviewed.    Significant Imaging: I have reviewed and interpreted all pertinent imaging results/findings within the past 24 hours.

## 2020-08-02 NOTE — ASSESSMENT & PLAN NOTE
Admitted 7/21/20 for planned Flu/Cy/TBI haplo allo SCT. Son is the donor. Today is Day +5.  Receive 2 bags and a total CD34 dose of 3.10 x10^6/kg on 7/28/20  Will receive post transplant cyclophosphamide on Day +3 and +4.  Of note, patient is to not receive any steroids until 24hrs after completion of post tx CTX. This will be 8/5 @1130    Planned conditioning regimen:  Fludarabine on Day -6, -5, -4, -3, and -2  Cyclophosphamide on Day -6 and -5  Total Body Irradiation on Day -1     Planned  GVHD Prophylaxis:  Cyclophosphamide on Day +3 and +4  Tacrolimus starting on Day +5  Mycophenolate starting on Day +5     Antimicrobial Prophylaxis:  Acyclovir starting on Day -6  Levofloxacin starting on Day -1  Fluconazole starting on Day -1  Bactrim starting on Day +30     SOS/VOD Prophylaxis:  Ursodiol on Day -6 through Day +30     Growth Factor Support:  Neupogen starting on Day +7

## 2020-08-02 NOTE — PLAN OF CARE
Plan of care discussed with patient at start of shift. Free from falls and injuries. Resting quietly with eyes closed. Respirations even, unlabored. Skin warm and dry. Pain managed with PRN narcotics. Nausea managed with scheduled antiemetics and PRN ativan. TMAX 99.1 this shift. Frequent checks for pain and safety maintained. Bed in lowest position, wheels locked, side rails up x' 2, call light in reach. Instructed to call for assistance as needed, verbalizes understanding. Will continue to monitor.

## 2020-08-02 NOTE — PLAN OF CARE
Plan of care reviewed with patient .  Fall precautions maintained,side rails up x2, call light in reach, bed in low position and locked, nonskid socks.  Getting iv fluids infusing without difficulty.  C-diff negative today.  Complained of nausea today and getting scheduled nausea medication during the shift.  Son visited today.

## 2020-08-02 NOTE — ASSESSMENT & PLAN NOTE
Kvng Jones Sr. is a 60 y.o. 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 He is now post-transplant day +4. We were consulted for headaches. Patient refers he has been having headaches since end of May but yesterday they got worse. Headaches are usually 4/10, bifrontal, last around 8-10 hours and are not accompanied by any other symptoms (patient denied auras, palpitations, SOB, chest pain). He usually takes Tylenol, tramadol or oxycodone but non seem to really help. Yesterdays headache was different because intensity went up to 7/10 and there was apparently some blurry vision too. Ophthalmology was consulted but no abnormalities were seen and they attributed more to dry eyes. Today we checked on the patient and confirmed his headaches characteristics, doesnt sound like migraines (sister has history of migraines). There were no focal or abnormal findings on examination. We are thinking this more to be a drug related adverse effect considering the patient is on chemo and multiple drug regimens. Continue magnesium as needed and pain medication per primary team.    Assessment and Plan:  -- Possible drug related adverse effect considering the patient is on chemo and multiple drug regimens.  -- No imaging for now since neuro exam is normal   -- 2 g IV magnesium for headaches   -- Continue pain medication per primary team  -- Neurology will sign off

## 2020-08-02 NOTE — PROGRESS NOTES
[facility]  Hematology  Bone Marrow Transplant  Progress Note    Patient Name: Kvng Jones Sr.  Admission Date: 7/21/2020  Hospital Length of Stay: 12 days  Code Status: Full Code    Subjective:     Interval History: Day +5 from Flu/Cy/TBI haplo SCT for CML. Says HA persistent but nausea improved with IV mag o/n. Had flushing this morning which he said is a recurrent problem. Neuro on board, recommend IV mag for HA and s/o. Overall pt feels better now that Cytoxan is done.     Objective:     Vital Signs (Most Recent):  Temp: 97.7 °F (36.5 °C) (08/02/20 1523)  Pulse: 89 (08/02/20 1523)  Resp: 17 (08/02/20 1523)  BP: 130/79 (08/02/20 1523)  SpO2: 96 % (08/02/20 1523) Vital Signs (24h Range):  Temp:  [96.6 °F (35.9 °C)-99.1 °F (37.3 °C)] 97.7 °F (36.5 °C)  Pulse:  [88-97] 89  Resp:  [16-18] 17  SpO2:  [95 %-98 %] 96 %  BP: (125-163)/(76-88) 130/79     Weight: 90.5 kg (199 lb 8.3 oz)  Body mass index is 32.2 kg/m².  Body surface area is 2.05 meters squared.    ECOG SCORE         [unfilled]    Intake/Output - Last 3 Shifts       07/31 0700 - 08/01 0659 08/01 0700 - 08/02 0659 08/02 0700 - 08/03 0659    P.O. 2340 1320 720    I.V. (mL/kg) 2699.3 (29.8) 1842.5 (20.4) 577 (6.4)    Total Intake(mL/kg) 5039.3 (55.7) 3162.5 (34.9) 1297 (14.3)    Urine (mL/kg/hr) 2975 (1.4) 2400 (1.1) 1100 (1.1)    Stool 0 0 0    Total Output 2975 2400 1100    Net +2064.3 +762.5 +197           Urine Occurrence  3 x     Stool Occurrence 1 x 6 x 6 x          Physical Exam  Constitutional:       Appearance: He is well-developed.   HENT:      Head: Normocephalic and atraumatic.      Mouth/Throat:      Pharynx: No oropharyngeal exudate.   Eyes:      General:         Right eye: No discharge.         Left eye: No discharge.      Conjunctiva/sclera: Conjunctivae normal.      Pupils: Pupils are equal, round, and reactive to light.   Neck:      Musculoskeletal: Normal range of motion and neck supple.   Cardiovascular:      Rate  and Rhythm: Normal rate and regular rhythm.      Heart sounds: Normal heart sounds. No murmur.   Pulmonary:      Effort: Pulmonary effort is normal. No respiratory distress.      Breath sounds: Normal breath sounds. No wheezing or rales.   Abdominal:      General: Bowel sounds are normal. There is no distension.      Palpations: Abdomen is soft.      Tenderness: There is no abdominal tenderness.   Musculoskeletal: Normal range of motion.         General: No deformity.   Skin:     General: Skin is warm and dry.      Findings: No erythema or rash.      Comments: Dalal to right chest wall. Dressing c/d/i with no sign of infection noted.  Pruritic rash to chest and bilat shoulders.   Neurological:      Mental Status: He is alert and oriented to person, place, and time.   Psychiatric:         Behavior: Behavior normal.         Thought Content: Thought content normal.         Judgment: Judgment normal.         Significant Labs:   CBC:   Recent Labs   Lab 08/01/20  0246 08/02/20  0409   WBC 1.04* 0.47*   HGB 11.5* 12.1*   HCT 34.4* 35.9*   * 98*    and CMP:   Recent Labs   Lab 08/01/20  0246 08/02/20  0409   * 137   K 4.0 4.1    105   CO2 24 23    113*   BUN 7* 9   CREATININE 0.7 0.7   CALCIUM 9.1 8.8   PROT 6.4 6.9   ALBUMIN 3.5 3.8   BILITOT 0.3 0.3   ALKPHOS 49* 67   AST 15 21   ALT 23 31   ANIONGAP 7* 9   EGFRNONAA >60.0 >60.0       Diagnostic Results:  I have reviewed all pertinent imaging results/findings within the past 24 hours.    Assessment/Plan:     * History of allogeneic stem cell transplant  Admitted 7/21/20 for planned Flu/Cy/TBI haplo allo SCT. Son is the donor. Today is Day +5.  Receive 2 bags and a total CD34 dose of 3.10 x10^6/kg on 7/28/20  Will receive post transplant cyclophosphamide on Day +3 and +4.  Of note, patient is to not receive any steroids until 24hrs after completion of post tx CTX. This will be 8/5 @1130    Planned conditioning regimen:  Fludarabine on Day -6,  -5, -4, -3, and -2  Cyclophosphamide on Day -6 and -5  Total Body Irradiation on Day -1     Planned  GVHD Prophylaxis:  Cyclophosphamide on Day +3 and +4  Tacrolimus starting on Day +5  Mycophenolate starting on Day +5     Antimicrobial Prophylaxis:  Acyclovir starting on Day -6  Levofloxacin starting on Day -1  Fluconazole starting on Day -1  Bactrim starting on Day +30     SOS/VOD Prophylaxis:  Ursodiol on Day -6 through Day +30     Growth Factor Support:  Neupogen starting on Day +7     Acute myeloid leukemia in remission  See CML    Blurry vision  C/o worsening blurry vision 7/30/20  May be contributing to headaches  Ophtho consulted and saw patient 7/30  Ophtho dilated pupils and performed fundal exam. Fundal exam neg.  Patient had been viewing laptop screen for ~ 2 hours prior to experiencing blurry vision  Per ophtho, blurry vision due to dry eyes. rec'd artificial tears QID prn and warm compresses BID prn. Can escalate artificial tears to ointment if needed.    Antineoplastic chemotherapy induced pancytopenia  Expected with ongoing chemotherapy  Continue to monitor daily CBC      Rash and nonspecific skin eruption  Newly noted macular rash to upper back and chest on 7/29  Intermittently pruritic  GVHD no suspected this soon after transplant  Likely drug reaction, thought maybe to be from DMSO from stem cell transplant  Continue to monitor closely  Started on PRN PO benadryl, topical benadryl cream, and topical hydrocortisone   Patient states that benadryl causes him to be jittery. Ordered prn atarax instead.    Abdominal gas pain  Started on PRN simethicone    Headache  Started having headaches several weeks ago. Onset seems to correspond to when patient quit smoking and when he started ponatinib  Has been off of ponatinib for a few days but fevers have persisted  Pattern is like cluster headaches (onset during sleep), but not unilateral.  O2 via non-rebreather at 12 L over 15 minutes seemed to help but  "developed hot flush so pt stopped  Ultram, Flexeril, Oxycodone not helping  Imitrex PRN q2hr (max 200mg in 24hrs). Not contraindicated per pharm D.  Description of headache sounds like tension headache--throbbing with pressure across crown of head  No focal neurological deficits  Patient reporting that headache may be sinus in nature. Maxillary tenderness noted.  Patient takes Claritin at home. States that he cannot take Zyrtec (which is on formulary) due to SE and "addiction". States that it was hard for him to ween himself off of Zyrtec.  Stared Flonase 7/27/20. Started home Claritin 7/27/20b (not on formulary)  Somewhat responding to caffeine, Imitrex, tramadol, oxy, and ativan  Now c/o blurry vision, which may also be contributing to headaches. Seen by ophtho for this  Continue to monitor closely; especially with expected thrombocytopenia  Neuro on consult - trial IV mag. Appreciate input      Cervical stenosis of spine  Continue home Flexeril and ultram  PRN oxy IR added for additional relief    Tobacco abuse, in remission  35 pack year history  Quit smoking 6/1/2020 using Chantix  Patient completed course of Chantix and denies need for Nicotine patch.     Daily consumption of alcohol  Drinks 3-4 alcoholic beverages nightly  Will need to monitor closely for s/s of alcohol withdrawal while inpatient  Will consider consulting addiction medicine if indicated  Prn ativan available    BPH (benign prostatic hyperplasia)  Continue home Flomax    GERD (gastroesophageal reflux disease)  Takes Nexium at home  Giving Protonix while inpatient as Nexium is not on hospital formulary  Can resume Nexium at discharge  Had CP this am that is believed to be due to GERD (troponin and EKG unremarkable). Relieved after protonix administration  PRN TUMs ordered.    Essential hypertension  increased home amlodipine from 5 mg to 10 mg daily  BPs as high as 180s/110s  Started prn hydralazine - possibly worsened headache  Decreased IVF " rate  BP stable at this time    CML in remission  Presented to [facility] with CMP in blast crisis and with nodular disease on 3/30/2020  Induced with FLAG-Addis. Achieved morphologic CR  Started on daily Ponatinib 30 mg daily, which he continues to take outpatient. Will hold Ponatinib on admission.  Referred to Dr. Hodges by Dr. Ramos for SCT consideration  Had [facility]x at [facility] on 7/1/2020 showing no morphologic or immunophenotypic evidence of CML  Admitted 7/21/20 for planned Flu/Cy/TBI haplo allo SCT  See SCT candidate        VTE Risk Mitigation (From admission, onward)         Ordered     heparin, porcine (PF) 100 unit/mL injection flush 300 Units  As needed (PRN)      07/21/20 1920                Disposition: Inpatient    Viv Veronica MD  Bone Marrow Transplant  [facility]

## 2020-08-02 NOTE — PROGRESS NOTES
[facility]  Neurology  Progress Note    Patient Name: Kvng Jones Sr.  MRN: [id number]  Admission Date: 7/21/2020  Hospital Length of Stay: 12 days  Code Status: Full Code   Attending Provider: Cristhian Aponte MD  Primary Care Physician: Primary Doctor No   Principal Problem:History of allogeneic stem cell transplant    HPI:   Kvng Jones Sr. is a 60 y.o. 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 He is now post-transplant day +4. We were consulted for headaches. Patient refers he has been having headaches since end of May but yesterday they got worse. Headaches are usually 4/10, bifrontal, last around 8-10 hours and are not accompanied by any other symptoms (patient denied auras, palpitations, SOB, chest pain). He usually takes Tylenol, tramadol or oxycodone but non seem to really help. Yesterdays headache was different because intensity went up to 7/10 and there was apparently some blurry vision too. Ophthalmology was consulted but no abnormalities were seen and they attributed more to dry eyes. Today we checked on the patient and confirmed his headaches characteristics, doesnt sound like migraines (sister has history of migraines). There were no focal or abnormal findings on examination. We are thinking this more to be a drug related adverse effect considering the patient is on chemo and multiple drug regimens. We spoke with primary team and for now our recommendation is IV magnesium.     Overview/Hospital Course:  No notes on file      Subjective:     Interval History: Patient states improvement of HA overnight, improving from a 9/10 to a 3/10 with administration of magnesium and oxycodone.     Current Neurological Medications: NA    Current Facility-Administered Medications   Medication Dose Route Frequency Provider Last Rate Last Dose    0.9 % NaCl with KCl 20 mEq infusion   Intravenous Continuous Ivon Morrow NP 75 mL/hr at 08/02/20 6107       acetaminophen tablet 650 mg  650 mg Oral Q6H PRN Cheryl Hodges MD        acyclovir tablet 800 mg  800 mg Oral BID Cheryl Hodges MD   800 mg at 08/02/20 0755    alteplase injection 2 mg  2 mg Intra-Catheter PRN Cheryl Hodges MD        amLODIPine tablet 10 mg  10 mg Oral Daily Ivon Morrow NP   10 mg at 08/02/20 0820    artificial tears 0.5 % ophthalmic solution 1 drop  1 drop Both Eyes QID PRN Ivon Morrow NP        calcium carbonate 200 mg calcium (500 mg) chewable tablet 500 mg  500 mg Oral Daily PRN Ivon Morrow NP        cyclobenzaprine tablet 10 mg  10 mg Oral TID PRN Ivon Morrow NP   10 mg at 07/25/20 0520    diphenhydrAMINE capsule 25 mg  25 mg Oral Q6H PRN Marimar Brothers NP   25 mg at 07/30/20 0322    diphenhydrAMINE-zinc acetate 2-0.1% cream   Topical (Top) TID PRN Marimar Brothers NP        filgrastim (NEUPOGEN) injection 480 mcg/1.6 ml  480 mcg Subcutaneous Daily Cheryl Hodges MD        fluconazole tablet 400 mg  400 mg Oral Daily Cheryl Hodges MD   400 mg at 08/02/20 0819    fluticasone propionate 50 mcg/actuation nasal spray 100 mcg  2 spray Each Nostril Daily Ivon Morrow NP   100 mcg at 08/02/20 0820    gabapentin capsule 300 mg  300 mg Oral QHS Yasmine Goldsmith NP   300 mg at 08/01/20 2004    guaiFENesin 12 hr tablet 600 mg  600 mg Oral Q12H PRN Eric Apple MD   600 mg at 07/31/20 1253    heparin, porcine (PF) 100 unit/mL injection flush 300 Units  300 Units Intravenous PRN Cheryl Hodges MD        hydrALAZINE tablet 50 mg  50 mg Oral Q6H PRN Ivon Morrow NP   50 mg at 07/24/20 2167    hydrocortisone 1 % cream   Topical (Top) BID Ivon Morrow NP        hydrOXYzine HCL tablet 25 mg  25 mg Oral TID PRN Ivon Morrow NP   25 mg at 08/01/20 2011    levoFLOXacin tablet 500 mg  500 mg Oral Daily Cheryl Hodges MD   500 mg at 08/02/20 0819    Loratidine 10 mg tab  10 mg Oral Daily PM Ivon Morrow NP   10 mg at 08/01/20 1743    lorazepam injection 1 mg  1 mg  Intravenous Q6H PRN Cheryl Hodges MD   1 mg at 08/01/20 2138    magnesium oxide tablet 400 mg  400 mg Oral Q4H PRN Cheryl Hodges MD   400 mg at 07/31/20 1029    magnesium oxide tablet 400 mg  400 mg Oral Q4H PRN Cheryl Hodges MD   400 mg at 08/01/20 1446    magnesium oxide tablet 800 mg  800 mg Oral Q4H PRN Cheryl Hodges MD        mycophenolate capsule 1,000 mg  1,000 mg Oral TID Chreyl Hodges MD   1,000 mg at 08/02/20 1019    ondansetron IVPB 8 mg  8 mg Intravenous Q24H Cheryl Hodges  mL/hr at 08/02/20 0955 8 mg at 08/02/20 0955    ondansetron tablet 8 mg  8 mg Oral Q6H Viv Veronica MD   8 mg at 08/02/20 1214    oxyCODONE immediate release tablet 10 mg  10 mg Oral Q6H PRN Brooks Sales DO   10 mg at 08/02/20 0817    pantoprazole EC tablet 40 mg  40 mg Oral Daily Ivon Morrow NP   40 mg at 08/02/20 0819    potassium chloride SA CR tablet 20 mEq  20 mEq Oral PRN Cheryl Hodges MD   20 mEq at 07/25/20 0917    potassium chloride SA CR tablet 20 mEq  20 mEq Oral Q2H PRN Cheryl Hodges MD        potassium chloride SA CR tablet 20 mEq  20 mEq Oral Q2H PRN Cheryl Hodges MD        potassium, sodium phosphates 280-160-250 mg packet 1 packet  1 packet Oral Q4H PRN Cheryl Hodges MD        potassium, sodium phosphates 280-160-250 mg packet 2 packet  2 packet Oral Q4H PRN Cheryl Hodges MD        prochlorperazine tablet 10 mg  10 mg Oral QID Ivon Morrow NP   10 mg at 08/02/20 1252    saliva substitute combo no.2 solution 30 mL  30 mL Swish & Spit QID Cheryl Hodges MD   30 mL at 08/02/20 1252    saliva substitute combo no.2 solution 30 mL  30 mL Swish & Spit Q4H PRN Cheryl Hodges MD   30 mL at 08/02/20 0819    simethicone chewable tablet 80 mg  1 tablet Oral TID PRN Marimar Brothers NP   80 mg at 07/29/20 1121    sodium chloride 0.9% flush 10 mL  10 mL Intravenous PRN Cheryl Hodges MD        sodium chloride 0.9% flush 10 mL  10 mL Intravenous Q6H Yasmine Goldsmith NP   10 mL at 08/02/20 1256    [START ON  8/27/2020] sulfamethoxazole-trimethoprim 800-160mg per tablet 1 tablet  1 tablet Oral Every Mon, Wed, Fri Cheryl Hodges MD        sumatriptan tablet 50 mg  50 mg Oral Q2H PRN Eric Apple MD   50 mg at 07/31/20 2346    tacrolimus capsule 2 mg  2 mg Oral BID Cheryl oHdges MD   2 mg at 08/02/20 1019    tamsulosin 24 hr capsule 0.4 mg  0.4 mg Oral Daily Ivon Morrow NP   0.4 mg at 08/02/20 0900    traMADoL tablet 50 mg  50 mg Oral Q6H PRN Ivon Morrow NP   50 mg at 07/31/20 1643    ursodioL capsule 300 mg  300 mg Oral BID Cheryl Hodges MD   300 mg at 08/02/20 0819     Facility-Administered Medications Ordered in Other Encounters   Medication Dose Route Frequency Provider Last Rate Last Dose    artificial tears 0.5 % ophthalmic solution 2 drop  2 drop Both Eyes PRN Ivon Morrow NP        diphenhydrAMINE injection 50 mg  50 mg Intravenous PRN Ivon Morrow NP        loperamide capsule 2 mg  2 mg Oral Q4H PRN Ivon Morrow NP           Review of Systems   Constitutional: Negative for activity change, appetite change, chills, fatigue and fever.   HENT: Negative for congestion, mouth sores, nosebleeds, rhinorrhea, sinus pressure, sinus pain, sneezing and sore throat.    Eyes: Negative for photophobia, pain, discharge, redness, itching and visual disturbance.   Respiratory: Negative for cough, chest tightness, shortness of breath and wheezing.    Cardiovascular: Negative for chest pain, palpitations and leg swelling.   Gastrointestinal: Positive for nausea. Negative for abdominal distention, abdominal pain, blood in stool, constipation, diarrhea and vomiting.   Endocrine: Negative for cold intolerance, heat intolerance, polydipsia, polyphagia and polyuria.   Genitourinary: Negative for difficulty urinating, frequency, hematuria and urgency.   Musculoskeletal: Positive for myalgias. Negative for arthralgias, back pain, neck pain and neck stiffness.   Skin: Negative for pallor, rash and wound.    Allergic/Immunologic: Negative for environmental allergies, food allergies and immunocompromised state.   Neurological: Positive for headaches. Negative for dizziness, tremors, seizures, syncope, speech difficulty, weakness and light-headedness.   Hematological: Negative for adenopathy. Does not bruise/bleed easily.   Psychiatric/Behavioral: Negative for agitation, confusion, hallucinations, sleep disturbance and suicidal ideas. The patient is not nervous/anxious.      Objective:     Vital Signs (Most Recent):  Temp: 96.6 °F (35.9 °C) (08/02/20 1300)  Pulse: 94 (08/02/20 1300)  Resp: 18 (08/02/20 1300)  BP: 125/81 (08/02/20 1300)  SpO2: 97 % (08/02/20 1300) Vital Signs (24h Range):  Temp:  [96.6 °F (35.9 °C)-99.1 °F (37.3 °C)] 96.6 °F (35.9 °C)  Pulse:  [88-97] 94  Resp:  [16-18] 18  SpO2:  [93 %-98 %] 97 %  BP: (125-163)/(76-88) 125/81     Weight: 90.5 kg (199 lb 8.3 oz)  Body mass index is 32.2 kg/m².    Physical Exam  Constitutional:       Appearance: He is well-developed.   HENT:      Head: Normocephalic and atraumatic.      Mouth/Throat:      Mouth: Mucous membranes are moist.      Pharynx: No oropharyngeal exudate.   Eyes:      General:         Right eye: No discharge.         Left eye: No discharge.      Extraocular Movements: EOM normal.      Conjunctiva/sclera: Conjunctivae normal.      Pupils: Pupils are equal, round, and reactive to light.   Neck:      Musculoskeletal: Normal range of motion and neck supple.   Cardiovascular:      Rate and Rhythm: Normal rate and regular rhythm.      Heart sounds: Normal heart sounds. No murmur.   Pulmonary:      Effort: Pulmonary effort is normal. No respiratory distress.      Breath sounds: Normal breath sounds. No wheezing or rales.   Abdominal:      General: Bowel sounds are normal. There is no distension.      Palpations: Abdomen is soft.      Tenderness: There is no abdominal tenderness.   Musculoskeletal: Normal range of motion.         General: No deformity.    Skin:     General: Skin is dry.      Findings: No erythema or rash.      Comments: Dalal to right chest wall. Dressing c/d/i with no sign of infection noted.  Pruritic rash to chest and bilat shoulders.   Neurological:      Mental Status: He is alert and oriented to person, place, and time.      Deep Tendon Reflexes:      Reflex Scores:       Tricep reflexes are 2+ on the right side and 2+ on the left side.       Bicep reflexes are 2+ on the right side and 2+ on the left side.       Brachioradialis reflexes are 2+ on the right side and 2+ on the left side.       Patellar reflexes are 2+ on the right side and 2+ on the left side.       Achilles reflexes are 2+ on the right side and 2+ on the left side.  Psychiatric:         Behavior: Behavior normal.         Thought Content: Thought content normal.         Judgment: Judgment normal.         NEUROLOGICAL EXAMINATION:     MENTAL STATUS   Oriented to person, place, and time.   Registration: recalls 1 of 3 objects. Recall at 5 minutes: recalls 1 of 3 objects. Follows 2 step commands.   Attention: normal. Concentration: normal.   Level of consciousness: alert  Knowledge: good.   Able to name object.     CRANIAL NERVES     CN II   Visual fields full to confrontation.     CN III, IV, VI   Pupils are equal, round, and reactive to light.  Extraocular motions are normal.     CN V   Facial sensation intact.     CN VII   Facial expression full, symmetric.     CN VIII   CN VIII normal.     CN IX, X   CN IX normal.     CN XI   CN XI normal.     CN XII   CN XII normal.     MOTOR EXAM   Muscle bulk: normal  Overall muscle tone: normal  Right arm tone: normal  Left arm tone: normal  Right arm pronator drift: absent  Left arm pronator drift: absent  Right leg tone: normal  Left leg tone: normal    Strength   Right neck flexion: 5/5  Left neck flexion: 5/5  Right neck extension: 5/5  Left neck extension: 5/5  Right deltoid: 5/5  Left deltoid: 5/5  Right biceps: 5/5  Left biceps:  5/5  Right triceps: 5/5  Left triceps: 5/5  Right wrist flexion: 5/5  Left wrist flexion: 5/5  Right wrist extension: 5/5  Left wrist extension: 5/5  Right interossei: 5/5  Left interossei: 5/5  Right abdominals: 5/5  Left abdominals: 5/5  Right iliopsoas: 5/5  Left iliopsoas: 5/5  Right quadriceps: 5/5  Left quadriceps: 5/5  Right hamstrin/5  Left hamstrin/5  Right glutei: 5/5  Left glutei: 5/5  Right anterior tibial: 5/5  Left anterior tibial: 5/5  Right posterior tibial: 5/5  Left posterior tibial: 5/5  Right peroneal: 5/5  Left peroneal: 5/5  Right gastroc: 5/5  Left gastroc: 5/5    REFLEXES     Reflexes   Right brachioradialis: 2+  Left brachioradialis: 2+  Right biceps: 2+  Left biceps: 2+  Right triceps: 2+  Left triceps: 2+  Right patellar: 2+  Left patellar: 2+  Right achilles: 2+  Left achilles: 2+  Right : 2+  Left : 2+    SENSORY EXAM   Light touch normal.   Vibration normal.   Proprioception normal.   Pinprick normal.       Significant Labs: All pertinent lab results from the past 24 hours have been reviewed.    Significant Imaging: I have reviewed and interpreted all pertinent imaging results/findings within the past 24 hours.    Assessment and Plan:     Headache  Kvng Jones Sr. is a 60 y.o. 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 He is now post-transplant day +4. We were consulted for headaches. Patient refers he has been having headaches since end of May but yesterday they got worse. Headaches are usually 4/10, bifrontal, last around 8-10 hours and are not accompanied by any other symptoms (patient denied auras, palpitations, SOB, chest pain). He usually takes Tylenol, tramadol or oxycodone but non seem to really help. Yesterdays headache was different because intensity went up to 7/10 and there was apparently some blurry vision too. Ophthalmology was consulted but no abnormalities were seen and they attributed more to dry eyes. Today we checked on the  patient and confirmed his headaches characteristics, doesnt sound like migraines (sister has history of migraines). There were no focal or abnormal findings on examination. We are thinking this more to be a drug related adverse effect considering the patient is on chemo and multiple drug regimens. Continue magnesium as needed and pain medication per primary team.    Assessment and Plan:  -- Possible drug related adverse effect considering the patient is on chemo and multiple drug regimens.  -- No imaging for now since neuro exam is normal   -- 2 g IV magnesium for headaches   -- Continue pain medication per primary team  -- Neurology will sign off           VTE Risk Mitigation (From admission, onward)         Ordered     heparin, porcine (PF) 100 unit/mL injection flush 300 Units  As needed (PRN)      07/21/20 1920                Donavan Sandhu MD  Neurology  [facility]

## 2020-08-02 NOTE — SUBJECTIVE & OBJECTIVE
Subjective:     Interval History: Day +5 from Flu/Cy/TBI haplo SCT for CML. Says HA persistent but nausea improved with IV mag o/n. Had flushing this morning which he said is a recurrent problem. Neuro on board, recommend IV mag for HA and s/o. Overall pt feels better now that Cytoxan is done.     Objective:     Vital Signs (Most Recent):  Temp: 97.7 °F (36.5 °C) (08/02/20 1523)  Pulse: 89 (08/02/20 1523)  Resp: 17 (08/02/20 1523)  BP: 130/79 (08/02/20 1523)  SpO2: 96 % (08/02/20 1523) Vital Signs (24h Range):  Temp:  [96.6 °F (35.9 °C)-99.1 °F (37.3 °C)] 97.7 °F (36.5 °C)  Pulse:  [88-97] 89  Resp:  [16-18] 17  SpO2:  [95 %-98 %] 96 %  BP: (125-163)/(76-88) 130/79     Weight: 90.5 kg (199 lb 8.3 oz)  Body mass index is 32.2 kg/m².  Body surface area is 2.05 meters squared.    ECOG SCORE         [unfilled]    Intake/Output - Last 3 Shifts       07/31 0700 - 08/01 0659 08/01 0700 - 08/02 0659 08/02 0700 - 08/03 0659    P.O. 2340 1320 720    I.V. (mL/kg) 2699.3 (29.8) 1842.5 (20.4) 577 (6.4)    Total Intake(mL/kg) 5039.3 (55.7) 3162.5 (34.9) 1297 (14.3)    Urine (mL/kg/hr) 2975 (1.4) 2400 (1.1) 1100 (1.1)    Stool 0 0 0    Total Output 2975 2400 1100    Net +2064.3 +762.5 +197           Urine Occurrence  3 x     Stool Occurrence 1 x 6 x 6 x          Physical Exam  Constitutional:       Appearance: He is well-developed.   HENT:      Head: Normocephalic and atraumatic.      Mouth/Throat:      Pharynx: No oropharyngeal exudate.   Eyes:      General:         Right eye: No discharge.         Left eye: No discharge.      Conjunctiva/sclera: Conjunctivae normal.      Pupils: Pupils are equal, round, and reactive to light.   Neck:      Musculoskeletal: Normal range of motion and neck supple.   Cardiovascular:      Rate and Rhythm: Normal rate and regular rhythm.      Heart sounds: Normal heart sounds. No murmur.   Pulmonary:      Effort: Pulmonary effort is normal. No respiratory distress.      Breath sounds: Normal breath  sounds. No wheezing or rales.   Abdominal:      General: Bowel sounds are normal. There is no distension.      Palpations: Abdomen is soft.      Tenderness: There is no abdominal tenderness.   Musculoskeletal: Normal range of motion.         General: No deformity.   Skin:     General: Skin is warm and dry.      Findings: No erythema or rash.      Comments: Dalal to right chest wall. Dressing c/d/i with no sign of infection noted.  Pruritic rash to chest and bilat shoulders.   Neurological:      Mental Status: He is alert and oriented to person, place, and time.   Psychiatric:         Behavior: Behavior normal.         Thought Content: Thought content normal.         Judgment: Judgment normal.         Significant Labs:   CBC:   Recent Labs   Lab 08/01/20  0246 08/02/20  0409   WBC 1.04* 0.47*   HGB 11.5* 12.1*   HCT 34.4* 35.9*   * 98*    and CMP:   Recent Labs   Lab 08/01/20 0246 08/02/20  0409   * 137   K 4.0 4.1    105   CO2 24 23    113*   BUN 7* 9   CREATININE 0.7 0.7   CALCIUM 9.1 8.8   PROT 6.4 6.9   ALBUMIN 3.5 3.8   BILITOT 0.3 0.3   ALKPHOS 49* 67   AST 15 21   ALT 23 31   ANIONGAP 7* 9   EGFRNONAA >60.0 >60.0       Diagnostic Results:  I have reviewed all pertinent imaging results/findings within the past 24 hours.

## 2020-08-02 NOTE — ASSESSMENT & PLAN NOTE
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 Achieved morphologic CR  Started on daily Ponatinib 30 mg daily, which he continues to take outpatient. Will hold Ponatinib on admission.  Referred to Dr. Hodges by Dr. Ramos for SCT consideration  Had BMBx at [facility] on 7/1/2020 showing no morphologic or immunophenotypic evidence of CML  Admitted 7/21/20 for planned Flu/Cy/TBI haplo allo SCT  See SCT candidate

## 2020-08-03 LAB
ALBUMIN SERPL BCP-MCNC: 3.6 G/DL (ref 3.5–5.2)
ALP SERPL-CCNC: 61 U/L (ref 55–135)
ALT SERPL W/O P-5'-P-CCNC: 23 U/L (ref 10–44)
ANION GAP SERPL CALC-SCNC: 8 MMOL/L (ref 8–16)
AST SERPL-CCNC: 14 U/L (ref 10–40)
BASOPHILS # BLD AUTO: 0 K/UL (ref 0–0.2)
BASOPHILS NFR BLD: 0 % (ref 0–1.9)
BILIRUB SERPL-MCNC: 0.3 MG/DL (ref 0.1–1)
BUN SERPL-MCNC: 11 MG/DL (ref 8–23)
CALCIUM SERPL-MCNC: 9 MG/DL (ref 8.7–10.5)
CHLORIDE SERPL-SCNC: 111 MMOL/L (ref 95–110)
CO2 SERPL-SCNC: 22 MMOL/L (ref 23–29)
CREAT SERPL-MCNC: 0.7 MG/DL (ref 0.5–1.4)
DIFFERENTIAL METHOD: ABNORMAL
EOSINOPHIL # BLD AUTO: 0 K/UL (ref 0–0.5)
EOSINOPHIL NFR BLD: 2.7 % (ref 0–8)
ERYTHROCYTE [DISTWIDTH] IN BLOOD BY AUTOMATED COUNT: 12.9 % (ref 11.5–14.5)
EST. GFR  (AFRICAN AMERICAN): >60 ML/MIN/1.73 M^2
EST. GFR  (NON AFRICAN AMERICAN): >60 ML/MIN/1.73 M^2
GLUCOSE SERPL-MCNC: 92 MG/DL (ref 70–110)
HCT VFR BLD AUTO: 35.2 % (ref 40–54)
HGB BLD-MCNC: 12 G/DL (ref 14–18)
IMM GRANULOCYTES # BLD AUTO: 0.02 K/UL (ref 0–0.04)
IMM GRANULOCYTES NFR BLD AUTO: 2.7 % (ref 0–0.5)
LYMPHOCYTES # BLD AUTO: 0.1 K/UL (ref 1–4.8)
LYMPHOCYTES NFR BLD: 10.7 % (ref 18–48)
MAGNESIUM SERPL-MCNC: 1.9 MG/DL (ref 1.6–2.6)
MCH RBC QN AUTO: 34 PG (ref 27–31)
MCHC RBC AUTO-ENTMCNC: 34.1 G/DL (ref 32–36)
MCV RBC AUTO: 100 FL (ref 82–98)
MONOCYTES # BLD AUTO: 0.1 K/UL (ref 0.3–1)
MONOCYTES NFR BLD: 6.7 % (ref 4–15)
NEUTROPHILS # BLD AUTO: 0.6 K/UL (ref 1.8–7.7)
NEUTROPHILS NFR BLD: 77.2 % (ref 38–73)
NRBC BLD-RTO: 0 /100 WBC
PHOSPHATE SERPL-MCNC: 3.6 MG/DL (ref 2.7–4.5)
PLATELET # BLD AUTO: 73 K/UL (ref 150–350)
PLATELET BLD QL SMEAR: ABNORMAL
PMV BLD AUTO: 9.2 FL (ref 9.2–12.9)
POTASSIUM SERPL-SCNC: 4.2 MMOL/L (ref 3.5–5.1)
PROT SERPL-MCNC: 6.8 G/DL (ref 6–8.4)
RBC # BLD AUTO: 3.53 M/UL (ref 4.6–6.2)
SODIUM SERPL-SCNC: 141 MMOL/L (ref 136–145)
WBC # BLD AUTO: 0.75 K/UL (ref 3.9–12.7)

## 2020-08-03 PROCEDURE — 84100 ASSAY OF PHOSPHORUS: CPT

## 2020-08-03 PROCEDURE — 63600175 PHARM REV CODE 636 W HCPCS: Performed by: NURSE PRACTITIONER

## 2020-08-03 PROCEDURE — A4216 STERILE WATER/SALINE, 10 ML: HCPCS | Performed by: NURSE PRACTITIONER

## 2020-08-03 PROCEDURE — 83735 ASSAY OF MAGNESIUM: CPT

## 2020-08-03 PROCEDURE — 99233 PR SUBSEQUENT HOSPITAL CARE,LEVL III: ICD-10-PCS | Mod: ,,, | Performed by: INTERNAL MEDICINE

## 2020-08-03 PROCEDURE — 25000003 PHARM REV CODE 250: Performed by: NURSE PRACTITIONER

## 2020-08-03 PROCEDURE — A9155 ARTIFICIAL SALIVA: HCPCS | Performed by: INTERNAL MEDICINE

## 2020-08-03 PROCEDURE — 80053 COMPREHEN METABOLIC PANEL: CPT

## 2020-08-03 PROCEDURE — 25000003 PHARM REV CODE 250: Performed by: INTERNAL MEDICINE

## 2020-08-03 PROCEDURE — 99233 SBSQ HOSP IP/OBS HIGH 50: CPT | Mod: ,,, | Performed by: INTERNAL MEDICINE

## 2020-08-03 PROCEDURE — 85025 COMPLETE CBC W/AUTO DIFF WBC: CPT

## 2020-08-03 PROCEDURE — 20600001 HC STEP DOWN PRIVATE ROOM

## 2020-08-03 PROCEDURE — 25000003 PHARM REV CODE 250: Performed by: STUDENT IN AN ORGANIZED HEALTH CARE EDUCATION/TRAINING PROGRAM

## 2020-08-03 PROCEDURE — 63600175 PHARM REV CODE 636 W HCPCS: Performed by: INTERNAL MEDICINE

## 2020-08-03 RX ORDER — ONDANSETRON 4 MG/1
8 TABLET, FILM COATED ORAL EVERY 6 HOURS PRN
Status: DISCONTINUED | OUTPATIENT
Start: 2020-08-03 | End: 2020-08-07

## 2020-08-03 RX ADMIN — Medication 30 ML: at 08:08

## 2020-08-03 RX ADMIN — Medication 30 ML: at 01:08

## 2020-08-03 RX ADMIN — TACROLIMUS 2 MG: 1 CAPSULE ORAL at 05:08

## 2020-08-03 RX ADMIN — Medication 400 MG: at 10:08

## 2020-08-03 RX ADMIN — LORAZEPAM 1 MG: 2 INJECTION INTRAMUSCULAR; INTRAVENOUS at 03:08

## 2020-08-03 RX ADMIN — FLUCONAZOLE 400 MG: 200 TABLET ORAL at 08:08

## 2020-08-03 RX ADMIN — Medication 30 ML: at 05:08

## 2020-08-03 RX ADMIN — GABAPENTIN 300 MG: 300 CAPSULE ORAL at 08:08

## 2020-08-03 RX ADMIN — PROCHLORPERAZINE MALEATE 10 MG: 5 TABLET, FILM COATED ORAL at 05:08

## 2020-08-03 RX ADMIN — FILGRASTIM 480 MCG: 480 INJECTION, SOLUTION INTRAVENOUS; SUBCUTANEOUS at 08:08

## 2020-08-03 RX ADMIN — Medication 10 ML: at 12:08

## 2020-08-03 RX ADMIN — TRAMADOL HYDROCHLORIDE 50 MG: 50 TABLET, FILM COATED ORAL at 08:08

## 2020-08-03 RX ADMIN — OXYCODONE HYDROCHLORIDE 10 MG: 5 TABLET ORAL at 06:08

## 2020-08-03 RX ADMIN — ONDANSETRON HYDROCHLORIDE 8 MG: 2 INJECTION INTRAMUSCULAR; INTRAVENOUS at 08:08

## 2020-08-03 RX ADMIN — PROCHLORPERAZINE MALEATE 10 MG: 5 TABLET, FILM COATED ORAL at 01:08

## 2020-08-03 RX ADMIN — LOPERAMIDE HYDROCHLORIDE 2 MG: 2 CAPSULE ORAL at 01:08

## 2020-08-03 RX ADMIN — Medication 10 ML: at 05:08

## 2020-08-03 RX ADMIN — PROCHLORPERAZINE MALEATE 10 MG: 5 TABLET, FILM COATED ORAL at 08:08

## 2020-08-03 RX ADMIN — URSODIOL 300 MG: 300 CAPSULE ORAL at 08:08

## 2020-08-03 RX ADMIN — MYCOPHENOLATE MOFETIL 1000 MG: 250 CAPSULE ORAL at 03:08

## 2020-08-03 RX ADMIN — AMLODIPINE BESYLATE 10 MG: 10 TABLET ORAL at 08:08

## 2020-08-03 RX ADMIN — ONDANSETRON HYDROCHLORIDE 8 MG: 4 TABLET, FILM COATED ORAL at 12:08

## 2020-08-03 RX ADMIN — TACROLIMUS 2 MG: 1 CAPSULE ORAL at 08:08

## 2020-08-03 RX ADMIN — TRAMADOL HYDROCHLORIDE 50 MG: 50 TABLET, FILM COATED ORAL at 01:08

## 2020-08-03 RX ADMIN — TAMSULOSIN HYDROCHLORIDE 0.4 MG: 0.4 CAPSULE ORAL at 08:08

## 2020-08-03 RX ADMIN — HYDROXYZINE HYDROCHLORIDE 25 MG: 25 TABLET, FILM COATED ORAL at 06:08

## 2020-08-03 RX ADMIN — MYCOPHENOLATE MOFETIL 1000 MG: 250 CAPSULE ORAL at 08:08

## 2020-08-03 RX ADMIN — SODIUM CHLORIDE AND POTASSIUM CHLORIDE: 9; 1.49 INJECTION, SOLUTION INTRAVENOUS at 11:08

## 2020-08-03 RX ADMIN — ONDANSETRON HYDROCHLORIDE 8 MG: 4 TABLET, FILM COATED ORAL at 06:08

## 2020-08-03 RX ADMIN — LEVOFLOXACIN 500 MG: 500 TABLET, FILM COATED ORAL at 08:08

## 2020-08-03 RX ADMIN — FLUTICASONE PROPIONATE 100 MCG: 50 SPRAY, METERED NASAL at 08:08

## 2020-08-03 RX ADMIN — Medication 10 ML: at 06:08

## 2020-08-03 RX ADMIN — LORAZEPAM 1 MG: 2 INJECTION INTRAMUSCULAR; INTRAVENOUS at 10:08

## 2020-08-03 RX ADMIN — HYDROXYZINE HYDROCHLORIDE 25 MG: 25 TABLET, FILM COATED ORAL at 09:08

## 2020-08-03 RX ADMIN — OXYCODONE HYDROCHLORIDE 5 MG: 5 TABLET ORAL at 10:08

## 2020-08-03 RX ADMIN — Medication 400 MG: at 06:08

## 2020-08-03 RX ADMIN — OXYCODONE HYDROCHLORIDE 10 MG: 5 TABLET ORAL at 12:08

## 2020-08-03 RX ADMIN — PANTOPRAZOLE SODIUM 40 MG: 40 TABLET, DELAYED RELEASE ORAL at 08:08

## 2020-08-03 RX ADMIN — ACYCLOVIR 800 MG: 800 TABLET ORAL at 08:08

## 2020-08-03 RX ADMIN — ACYCLOVIR 800 MG: 800 TABLET ORAL at 05:08

## 2020-08-03 NOTE — ASSESSMENT & PLAN NOTE
"- Started having headaches several weeks ago. Onset seems to correspond to when patient quit smoking and when he started ponatinib  - Has been off of ponatinib for a few days but fevers have persisted  - Pattern is like cluster headaches (onset during sleep), but not unilateral.  - O2 via non-rebreather at 12 L over 15 minutes seemed to help but developed hot flush so pt stopped  Ultram, Flexeril, Oxycodone not helping  Imitrex PRN q2hr (max 200mg in 24hrs). Not contraindicated per pharm D.  Description of headache sounds like tension headache--throbbing with pressure across crown of head  No focal neurological deficits  - Patient reporting that headache may be sinus in nature. Maxillary tenderness noted.  - Patient takes Claritin at home. States that he cannot take Zyrtec (which is on formulary) due to SE and "addiction"  - States that it was hard for him to ween himself off of Zyrtec.  - Stared Flonase 7/27/20. Started home Claritin 7/27/20 (not on formulary)  - Somewhat responding to caffeine, Imitrex, tramadol, oxy, and ativan  - Now c/o blurry vision, which may also be contributing to headaches. Seen by ophtho for this  - Continue to monitor closely; especially with expected thrombocytopenia  - Neuro on consult - trial IV mag. Appreciate input    "

## 2020-08-03 NOTE — PLAN OF CARE
Day +6 allo SCT. Pt AAOx4 and independent. Overnight Pt given prn ativan for nausea and prn oxycodone x2 for HA. Pt ate numerous popsicles and a yogurt this shift. No other complaints at this time. IVF @ 75. Diarrhea resolved. Voiding without difficulty. Remained afebrile throughout shift, VSS. Will continue to monitor.

## 2020-08-03 NOTE — ASSESSMENT & PLAN NOTE
- C/o worsening blurry vision 7/30/20  - May be contributing to headaches  - Ophtho consulted and saw patient 7/30  - Ophtho dilated pupils and performed fundal exam. Fundal exam neg.  - Patient had been viewing laptop screen for ~ 2 hours prior to experiencing blurry vision  - Per ophtho, blurry vision due to dry eyes. rec'd artificial tears QID prn and warm compresses BID prn. Can escalate artificial tears to ointment if needed.

## 2020-08-03 NOTE — SUBJECTIVE & OBJECTIVE
"  Subjective:     Interval History: Day +6 from Flu/Cy/TBI haplo identical SCT for CML. Continues to complain of a HA though increased dose of oxy make it "bearable". Positive fluid status (+11.6L). Still pancytopenic, . Electrolytes wnl. Diarrhea improving.     Objective:     Vital Signs (Most Recent):  Temp: 98.3 °F (36.8 °C) (08/03/20 0432)  Pulse: 83 (08/03/20 0432)  Resp: 18 (08/03/20 0607)  BP: 125/80 (08/03/20 0432)  SpO2: 98 % (08/03/20 0432) Vital Signs (24h Range):  Temp:  [96.6 °F (35.9 °C)-98.5 °F (36.9 °C)] 98.3 °F (36.8 °C)  Pulse:  [83-96] 83  Resp:  [17-19] 18  SpO2:  [94 %-98 %] 98 %  BP: (125-141)/(72-81) 125/80     Weight: 90.5 kg (199 lb 8.3 oz)  Body mass index is 32.2 kg/m².  Body surface area is 2.05 meters squared.    Intake/Output - Last 3 Shifts       08/01 0700 - 08/02 0659 08/02 0700 - 08/03 0659 08/03 0700 - 08/04 0659    P.O. 1320 720     I.V. (mL/kg) 1842.5 (20.4) 1912 (21.1)     Total Intake(mL/kg) 3162.5 (34.9) 2632 (29.1)     Urine (mL/kg/hr) 2400 (1.1) 2555 (1.2)     Stool 0 0     Total Output 2400 2555     Net +762.5 +77            Urine Occurrence 3 x      Stool Occurrence 6 x 7 x           Physical Exam  Constitutional:       General: He is not in acute distress.     Appearance: He is normal weight. He is not ill-appearing.   HENT:      Head: Normocephalic and atraumatic.      Mouth/Throat:      Mouth: Mucous membranes are dry.      Pharynx: No oropharyngeal exudate or posterior oropharyngeal erythema.   Eyes:      General: No scleral icterus.     Pupils: Pupils are equal, round, and reactive to light.   Neck:      Musculoskeletal: Normal range of motion. No neck rigidity.   Cardiovascular:      Rate and Rhythm: Normal rate and regular rhythm.      Pulses: Normal pulses.      Heart sounds: Normal heart sounds. No murmur.   Pulmonary:      Effort: Pulmonary effort is normal. No respiratory distress.      Breath sounds: Normal breath sounds.   Abdominal:      General: " Abdomen is flat. Bowel sounds are normal.      Palpations: Abdomen is soft.   Musculoskeletal: Normal range of motion.      Right lower leg: No edema.      Left lower leg: No edema.      Comments: RCW grewal CDI   Lymphadenopathy:      Cervical: No cervical adenopathy.   Skin:     General: Skin is warm.      Coloration: Skin is not jaundiced or pale.   Neurological:      General: No focal deficit present.      Mental Status: He is alert and oriented to person, place, and time. Mental status is at baseline.   Psychiatric:         Mood and Affect: Mood normal.         Thought Content: Thought content normal.         Significant Labs:   CBC:   Recent Labs   Lab 08/02/20  0409 08/03/20  0400   WBC 0.47* 0.75*   HGB 12.1* 12.0*   HCT 35.9* 35.2*   PLT 98* 73*    and CMP:   Recent Labs   Lab 08/02/20  0409 08/03/20  0400    141   K 4.1 4.2    111*   CO2 23 22*   * 92   BUN 9 11   CREATININE 0.7 0.7   CALCIUM 8.8 9.0   PROT 6.9 6.8   ALBUMIN 3.8 3.6   BILITOT 0.3 0.3   ALKPHOS 67 61   AST 21 14   ALT 31 23   ANIONGAP 9 8   EGFRNONAA >60.0 >60.0       Diagnostic Results:  I have reviewed all pertinent imaging results/findings within the past 24 hours.

## 2020-08-03 NOTE — ASSESSMENT & PLAN NOTE
- Takes Nexium at home  - Giving Protonix while inpatient as Nexium is not on hospital formulary  - Can resume Nexium at discharge  - Had CP this am that is believed to be due to GERD (troponin and EKG unremarkable). Relieved after protonix administration  - PRN TUMS ordered.

## 2020-08-03 NOTE — ASSESSMENT & PLAN NOTE
- Drinks 3-4 alcoholic beverages nightly  - Will need to monitor closely for s/s of alcohol withdrawal while inpatient  - Will consider consulting addiction medicine if indicated  - Prn ativan available

## 2020-08-03 NOTE — PLAN OF CARE
Pt involved in plan of care and communicating needs throughout shift.  Day +6 s/p Haplo Allo stem cell transplant.  Pt up in room independently and ambulating with steady gait.  Managing pt's headaches and nausea with prn tramadol or oxycodone, scheduled compazine and prn ativan; pt states regimen is helping.  Pt is tolerating regular diet, voiding without difficulty; had 1 episode of loose stool today; prn imodium admin.  Neutropenic precautions maintained for WBC=0.75.  IVF infusing throughout shift as ordered. All VSS; no acute events so far this shift.  Pt remaining free from falls or injury throughout shift; bed in lowest position; call light within reach.  Pt instructed to call for assistance as needed.  Q1H rounding done on pt.

## 2020-08-03 NOTE — ASSESSMENT & PLAN NOTE
- Presented to [facility] with CMP in blast crisis and with nodular disease on 3/30/2020  - Induced with FLAG-Addis. Achieved morphologic CR  - Started on daily Ponatinib 30 mg daily, which he continues to take outpatient. Will hold Ponatinib on admission.  - Referred to Dr. Hodges by Dr. Ramos for SCT consideration  - Had [place] [facility] [place] showing no morphologic or immunophenotypic evidence of CML  - Admitted 7/21/20 for planned Flu/Cy/TBI haplo allo SCT  - See SCT candidate

## 2020-08-03 NOTE — ASSESSMENT & PLAN NOTE
Admitted 7/21/20 for planned Flu/Cy/TBI haplo allo SCT. Son is the donor. Today is Day +6.  Receive 2 bags and a total CD34 dose of 3.10 x10^6/kg on 7/28/20  Will receive post transplant cyclophosphamide on Day +3 and +4.  Of note, patient is to not receive any steroids until 24hrs after completion of post tx CTX. This will be 8/5 @1130  No signs of GVHD    Planned conditioning regimen:  Fludarabine on Day -6, -5, -4, -3, and -2  Cyclophosphamide on Day -6 and -5  Total Body Irradiation on Day -1     Planned  GVHD Prophylaxis:  Cyclophosphamide on Day +3 and +4  Tacrolimus starting on Day +5  Mycophenolate starting on Day +5     Antimicrobial Prophylaxis:  Acyclovir starting on Day -6  Levofloxacin starting on Day -1  Fluconazole starting on Day -1  Bactrim starting on Day +30     SOS/VOD Prophylaxis:  Ursodiol on Day -6 through Day +30     Growth Factor Support:  Neupogen starting on Day +7

## 2020-08-03 NOTE — PROGRESS NOTES
"[facility]  Hematology  Bone Marrow Transplant  Progress Note    Patient Name: Kvng Jones Sr.  Admission Date: 7/21/2020  Hospital Length of Stay: 13 days  Code Status: Full Code    Subjective:     Interval History: Day +6 from Flu/Cy/TBI haplo identical SCT for CML. Continues to complain of a HA though increased dose of oxy make it "bearable". Positive fluid status (+11.6L). Still pancytopenic, . Electrolytes wnl. Diarrhea improving.     Objective:     Vital Signs (Most Recent):  Temp: 98.3 °F (36.8 °C) (08/03/20 0432)  Pulse: 83 (08/03/20 0432)  Resp: 18 (08/03/20 0607)  BP: 125/80 (08/03/20 0432)  SpO2: 98 % (08/03/20 0432) Vital Signs (24h Range):  Temp:  [96.6 °F (35.9 °C)-98.5 °F (36.9 °C)] 98.3 °F (36.8 °C)  Pulse:  [83-96] 83  Resp:  [17-19] 18  SpO2:  [94 %-98 %] 98 %  BP: (125-141)/(72-81) 125/80     Weight: 90.5 kg (199 lb 8.3 oz)  Body mass index is 32.2 kg/m².  Body surface area is 2.05 meters squared.    Intake/Output - Last 3 Shifts       08/01 0700 - 08/02 0659 08/02 0700 - 08/03 0659 08/03 0700 - 08/04 0659    P.O. 1320 720     I.V. (mL/kg) 1842.5 (20.4) 1912 (21.1)     Total Intake(mL/kg) 3162.5 (34.9) 2632 (29.1)     Urine (mL/kg/hr) 2400 (1.1) 2555 (1.2)     Stool 0 0     Total Output 2400 2555     Net +762.5 +77            Urine Occurrence 3 x      Stool Occurrence 6 x 7 x           Physical Exam  Constitutional:       General: He is not in acute distress.     Appearance: He is normal weight. He is not ill-appearing.   HENT:      Head: Normocephalic and atraumatic.      Mouth/Throat:      Mouth: Mucous membranes are dry.      Pharynx: No oropharyngeal exudate or posterior oropharyngeal erythema.   Eyes:      General: No scleral icterus.     Pupils: Pupils are equal, round, and reactive to light.   Neck:      Musculoskeletal: Normal range of motion. No neck rigidity.   Cardiovascular:      Rate and Rhythm: Normal rate and regular rhythm.      Pulses: Normal " pulses.      Heart sounds: Normal heart sounds. No murmur.   Pulmonary:      Effort: Pulmonary effort is normal. No respiratory distress.      Breath sounds: Normal breath sounds.   Abdominal:      General: Abdomen is flat. Bowel sounds are normal.      Palpations: Abdomen is soft.   Musculoskeletal: Normal range of motion.      Right lower leg: No edema.      Left lower leg: No edema.      Comments: RCW grewal CDI   Lymphadenopathy:      Cervical: No cervical adenopathy.   Skin:     General: Skin is warm.      Coloration: Skin is not jaundiced or pale.   Neurological:      General: No focal deficit present.      Mental Status: He is alert and oriented to person, place, and time. Mental status is at baseline.   Psychiatric:         Mood and Affect: Mood normal.         Thought Content: Thought content normal.         Significant Labs:   CBC:   Recent Labs   Lab 08/02/20  0409 08/03/20  0400   WBC 0.47* 0.75*   HGB 12.1* 12.0*   HCT 35.9* 35.2*   PLT 98* 73*    and CMP:   Recent Labs   Lab 08/02/20  0409 08/03/20  0400    141   K 4.1 4.2    111*   CO2 23 22*   * 92   BUN 9 11   CREATININE 0.7 0.7   CALCIUM 8.8 9.0   PROT 6.9 6.8   ALBUMIN 3.8 3.6   BILITOT 0.3 0.3   ALKPHOS 67 61   AST 21 14   ALT 31 23   ANIONGAP 9 8   EGFRNONAA >60.0 >60.0       Diagnostic Results:  I have reviewed all pertinent imaging results/findings within the past 24 hours.    Assessment/Plan:     * History of allogeneic stem cell transplant  Admitted 7/21/20 for planned Flu/Cy/TBI haplo allo SCT. Son is the donor. Today is Day +6.  Receive 2 bags and a total CD34 dose of 3.10 x10^6/kg on 7/28/20  Will receive post transplant cyclophosphamide on Day +3 and +4.  Of note, patient is to not receive any steroids until 24hrs after completion of post tx CTX. This will be 8/5 @1130  No signs of GVHD    Planned conditioning regimen:  Fludarabine on Day -6, -5, -4, -3, and -2  Cyclophosphamide on Day -6 and -5  Total Body  Irradiation on Day -1     Planned  GVHD Prophylaxis:  Cyclophosphamide on Day +3 and +4  Tacrolimus starting on Day +5  Mycophenolate starting on Day +5     Antimicrobial Prophylaxis:  Acyclovir starting on Day -6  Levofloxacin starting on Day -1  Fluconazole starting on Day -1  Bactrim starting on Day +30     SOS/VOD Prophylaxis:  Ursodiol on Day -6 through Day +30     Growth Factor Support:  Neupogen starting on Day +7     Acute myeloid leukemia in remission  - See CML    Blurry vision  - C/o worsening blurry vision 7/30/20  - May be contributing to headaches  - Ophtho consulted and saw patient 7/30  - Ophtho dilated pupils and performed fundal exam. Fundal exam neg.  - Patient had been viewing laptop screen for ~ 2 hours prior to experiencing blurry vision  - Per ophtho, blurry vision due to dry eyes. rec'd artificial tears QID prn and warm compresses BID prn. Can escalate artificial tears to ointment if needed.    Antineoplastic chemotherapy induced pancytopenia  - WBC .0.75, , Hgb 12, plt 73k  - transfuse Hgb <7g and plt <10k    Rash and nonspecific skin eruption  - Newly noted macular rash to upper back and chest on 7/29  - Intermittently pruritic  - GVHD not suspected this soon after transplant  - Likely drug reaction, thought maybe to be from DMSO from stem cell transplant  - Continue to monitor closely  - Started on PRN PO benadryl, topical benadryl cream, and topical hydrocortisone   - Patient states that benadryl causes him to be jittery. Ordered prn atarax instead.    Abdominal gas pain  - Started on PRN simethicone    Headache  - Started having headaches several weeks ago. Onset seems to correspond to when patient quit smoking and when he started ponatinib  - Has been off of ponatinib for a few days but fevers have persisted  - Pattern is like cluster headaches (onset during sleep), but not unilateral.  - O2 via non-rebreather at 12 L over 15 minutes seemed to help but developed hot flush so pt  "stopped  Ultram, Flexeril, Oxycodone not helping  Imitrex PRN q2hr (max 200mg in 24hrs). Not contraindicated per pharm D.  Description of headache sounds like tension headache--throbbing with pressure across crown of head  No focal neurological deficits  - Patient reporting that headache may be sinus in nature. Maxillary tenderness noted.  - Patient takes Claritin at home. States that he cannot take Zyrtec (which is on formulary) due to SE and "addiction"  - States that it was hard for him to ween himself off of Zyrtec.  - Stared Flonase 7/27/20. Started home Claritin 7/27/20 (not on formulary)  - Somewhat responding to caffeine, Imitrex, tramadol, oxy, and ativan  - Now c/o blurry vision, which may also be contributing to headaches. Seen by ophtho for this  - Continue to monitor closely; especially with expected thrombocytopenia  - Neuro on consult - trial IV mag. Appreciate input      Cervical stenosis of spine  - Continue home Flexeril and ultram  - PRN oxy IR added for additional relief    Tobacco abuse, in remission  - 35 pack year history  - Quit smoking 6/1/2020 using Chantix  - Patient completed course of Chantix and denies need for Nicotine patch.     Daily consumption of alcohol  - Drinks 3-4 alcoholic beverages nightly  - Will need to monitor closely for s/s of alcohol withdrawal while inpatient  - Will consider consulting addiction medicine if indicated  - Prn ativan available    BPH (benign prostatic hyperplasia)  - Continue home Flomax    GERD (gastroesophageal reflux disease)  - Takes Nexium at home  - Giving Protonix while inpatient as Nexium is not on hospital formulary  - Can resume Nexium at discharge  - Had CP this am that is believed to be due to GERD (troponin and EKG unremarkable). Relieved after protonix administration  - PRN TUMS ordered.    Essential hypertension  - increased home amlodipine from 5 mg to 10 mg daily  - BPs as high as 180s/110s  - Started prn hydralazine - possibly worsened " headache  - Decreased IVF rate  - BP normalized    CML in remission  - Presented to [facility] with CMP in blast crisis and with nodular disease on 3/30/2020  - Induced with FLAG-Addis. Achieved morphologic CR  - Started on daily Ponatinib 30 mg daily, which he continues to take outpatient. Will hold Ponatinib on admission.  - Referred to Dr. Hodges by Dr. Ramos for SCT consideration  - [place] [facility] [place] showing no morphologic or immunophenotypic evidence of CML  - Admitted 7/21/20 for planned Flu/Cy/TBI haplo allo SCT  - See SCT candidate      VTE Risk Mitigation (From admission, onward)         Ordered     heparin, porcine (PF) 100 unit/mL injection flush 300 Units  As needed (PRN)      07/21/20 1920                Disposition: pending cc of alloSCT for CML/AML.    Hunter Basurto MD  Bone Marrow Transplant  [facility]

## 2020-08-03 NOTE — ASSESSMENT & PLAN NOTE
- Newly noted macular rash to upper back and chest on 7/29  - Intermittently pruritic  - GVHD not suspected this soon after transplant  - Likely drug reaction, thought maybe to be from DMSO from stem cell transplant  - Continue to monitor closely  - Started on PRN PO benadryl, topical benadryl cream, and topical hydrocortisone   - Patient states that benadryl causes him to be jittery. Ordered prn atarax instead.

## 2020-08-03 NOTE — ASSESSMENT & PLAN NOTE
- increased home amlodipine from 5 mg to 10 mg daily  - BPs as high as 180s/110s  - Started prn hydralazine - possibly worsened headache  - Decreased IVF rate  - BP normalized

## 2020-08-04 PROBLEM — T45.1X5A CINV (CHEMOTHERAPY-INDUCED NAUSEA AND VOMITING): Status: ACTIVE | Noted: 2020-08-04

## 2020-08-04 PROBLEM — R11.2 CINV (CHEMOTHERAPY-INDUCED NAUSEA AND VOMITING): Status: ACTIVE | Noted: 2020-08-04

## 2020-08-04 LAB
ABO + RH BLD: NORMAL
ALBUMIN SERPL BCP-MCNC: 3.3 G/DL (ref 3.5–5.2)
ALP SERPL-CCNC: 54 U/L (ref 55–135)
ALT SERPL W/O P-5'-P-CCNC: 19 U/L (ref 10–44)
ANION GAP SERPL CALC-SCNC: 6 MMOL/L (ref 8–16)
ANISOCYTOSIS BLD QL SMEAR: SLIGHT
AST SERPL-CCNC: 11 U/L (ref 10–40)
BASOPHILS # BLD AUTO: 0 K/UL (ref 0–0.2)
BASOPHILS NFR BLD: 0 % (ref 0–1.9)
BILIRUB SERPL-MCNC: 0.4 MG/DL (ref 0.1–1)
BLD GP AB SCN CELLS X3 SERPL QL: NORMAL
BUN SERPL-MCNC: 12 MG/DL (ref 8–23)
CALCIUM SERPL-MCNC: 8 MG/DL (ref 8.7–10.5)
CHLORIDE SERPL-SCNC: 112 MMOL/L (ref 95–110)
CO2 SERPL-SCNC: 22 MMOL/L (ref 23–29)
CREAT SERPL-MCNC: 0.7 MG/DL (ref 0.5–1.4)
DIFFERENTIAL METHOD: ABNORMAL
EOSINOPHIL # BLD AUTO: 0 K/UL (ref 0–0.5)
EOSINOPHIL NFR BLD: 3.8 % (ref 0–8)
ERYTHROCYTE [DISTWIDTH] IN BLOOD BY AUTOMATED COUNT: 12.8 % (ref 11.5–14.5)
EST. GFR  (AFRICAN AMERICAN): >60 ML/MIN/1.73 M^2
EST. GFR  (NON AFRICAN AMERICAN): >60 ML/MIN/1.73 M^2
GLUCOSE SERPL-MCNC: 101 MG/DL (ref 70–110)
HCT VFR BLD AUTO: 32.3 % (ref 40–54)
HGB BLD-MCNC: 10.9 G/DL (ref 14–18)
IMM GRANULOCYTES # BLD AUTO: 0 K/UL (ref 0–0.04)
IMM GRANULOCYTES NFR BLD AUTO: 0 % (ref 0–0.5)
LYMPHOCYTES # BLD AUTO: 0.1 K/UL (ref 1–4.8)
LYMPHOCYTES NFR BLD: 17.3 % (ref 18–48)
MAGNESIUM SERPL-MCNC: 1.6 MG/DL (ref 1.6–2.6)
MCH RBC QN AUTO: 33.4 PG (ref 27–31)
MCHC RBC AUTO-ENTMCNC: 33.7 G/DL (ref 32–36)
MCV RBC AUTO: 99 FL (ref 82–98)
MONOCYTES # BLD AUTO: 0 K/UL (ref 0.3–1)
MONOCYTES NFR BLD: 3.8 % (ref 4–15)
NEUTROPHILS # BLD AUTO: 0.4 K/UL (ref 1.8–7.7)
NEUTROPHILS NFR BLD: 75.1 % (ref 38–73)
NRBC BLD-RTO: 0 /100 WBC
OVALOCYTES BLD QL SMEAR: ABNORMAL
PHOSPHATE SERPL-MCNC: 3.6 MG/DL (ref 2.7–4.5)
PLATELET # BLD AUTO: 40 K/UL (ref 150–350)
PLATELET BLD QL SMEAR: ABNORMAL
PMV BLD AUTO: 9.6 FL (ref 9.2–12.9)
POIKILOCYTOSIS BLD QL SMEAR: SLIGHT
POTASSIUM SERPL-SCNC: 4.2 MMOL/L (ref 3.5–5.1)
PROT SERPL-MCNC: 6.2 G/DL (ref 6–8.4)
RBC # BLD AUTO: 3.26 M/UL (ref 4.6–6.2)
SODIUM SERPL-SCNC: 140 MMOL/L (ref 136–145)
WBC # BLD AUTO: 0.52 K/UL (ref 3.9–12.7)

## 2020-08-04 PROCEDURE — 25000003 PHARM REV CODE 250: Performed by: NURSE PRACTITIONER

## 2020-08-04 PROCEDURE — 25000003 PHARM REV CODE 250: Performed by: STUDENT IN AN ORGANIZED HEALTH CARE EDUCATION/TRAINING PROGRAM

## 2020-08-04 PROCEDURE — A9155 ARTIFICIAL SALIVA: HCPCS | Performed by: INTERNAL MEDICINE

## 2020-08-04 PROCEDURE — 25000003 PHARM REV CODE 250

## 2020-08-04 PROCEDURE — 99233 SBSQ HOSP IP/OBS HIGH 50: CPT | Mod: ,,, | Performed by: INTERNAL MEDICINE

## 2020-08-04 PROCEDURE — 80053 COMPREHEN METABOLIC PANEL: CPT

## 2020-08-04 PROCEDURE — 86901 BLOOD TYPING SEROLOGIC RH(D): CPT

## 2020-08-04 PROCEDURE — 83735 ASSAY OF MAGNESIUM: CPT

## 2020-08-04 PROCEDURE — 84100 ASSAY OF PHOSPHORUS: CPT

## 2020-08-04 PROCEDURE — 99233 PR SUBSEQUENT HOSPITAL CARE,LEVL III: ICD-10-PCS | Mod: ,,, | Performed by: INTERNAL MEDICINE

## 2020-08-04 PROCEDURE — A4216 STERILE WATER/SALINE, 10 ML: HCPCS | Performed by: NURSE PRACTITIONER

## 2020-08-04 PROCEDURE — 25000003 PHARM REV CODE 250: Performed by: INTERNAL MEDICINE

## 2020-08-04 PROCEDURE — 63600175 PHARM REV CODE 636 W HCPCS: Performed by: NURSE PRACTITIONER

## 2020-08-04 PROCEDURE — 20600001 HC STEP DOWN PRIVATE ROOM

## 2020-08-04 PROCEDURE — 63600175 PHARM REV CODE 636 W HCPCS: Performed by: INTERNAL MEDICINE

## 2020-08-04 PROCEDURE — 97803 MED NUTRITION INDIV SUBSEQ: CPT

## 2020-08-04 PROCEDURE — 85025 COMPLETE CBC W/AUTO DIFF WBC: CPT

## 2020-08-04 PROCEDURE — 94761 N-INVAS EAR/PLS OXIMETRY MLT: CPT

## 2020-08-04 RX ORDER — OXYCODONE HYDROCHLORIDE 5 MG/1
5 TABLET ORAL EVERY 6 HOURS PRN
Status: DISCONTINUED | OUTPATIENT
Start: 2020-08-04 | End: 2020-08-11

## 2020-08-04 RX ADMIN — OXYCODONE HYDROCHLORIDE 10 MG: 5 TABLET ORAL at 04:08

## 2020-08-04 RX ADMIN — Medication 30 ML: at 09:08

## 2020-08-04 RX ADMIN — ONDANSETRON HYDROCHLORIDE 8 MG: 4 TABLET, FILM COATED ORAL at 02:08

## 2020-08-04 RX ADMIN — Medication 10 ML: at 12:08

## 2020-08-04 RX ADMIN — URSODIOL 300 MG: 300 CAPSULE ORAL at 09:08

## 2020-08-04 RX ADMIN — FLUTICASONE PROPIONATE 100 MCG: 50 SPRAY, METERED NASAL at 09:08

## 2020-08-04 RX ADMIN — ACYCLOVIR 800 MG: 800 TABLET ORAL at 05:08

## 2020-08-04 RX ADMIN — ACYCLOVIR 800 MG: 800 TABLET ORAL at 07:08

## 2020-08-04 RX ADMIN — SODIUM CHLORIDE AND POTASSIUM CHLORIDE: 9; 1.49 INJECTION, SOLUTION INTRAVENOUS at 02:08

## 2020-08-04 RX ADMIN — Medication 400 MG: at 09:08

## 2020-08-04 RX ADMIN — HYDROCORTISONE: 10 CREAM TOPICAL at 09:08

## 2020-08-04 RX ADMIN — HYDROXYZINE HYDROCHLORIDE 25 MG: 25 TABLET, FILM COATED ORAL at 09:08

## 2020-08-04 RX ADMIN — GABAPENTIN 300 MG: 300 CAPSULE ORAL at 09:08

## 2020-08-04 RX ADMIN — MYCOPHENOLATE MOFETIL 1000 MG: 250 CAPSULE ORAL at 09:08

## 2020-08-04 RX ADMIN — LORAZEPAM 1 MG: 2 INJECTION INTRAMUSCULAR; INTRAVENOUS at 09:08

## 2020-08-04 RX ADMIN — MYCOPHENOLATE MOFETIL 1000 MG: 250 CAPSULE ORAL at 03:08

## 2020-08-04 RX ADMIN — LEVOFLOXACIN 500 MG: 500 TABLET, FILM COATED ORAL at 09:08

## 2020-08-04 RX ADMIN — PROCHLORPERAZINE MALEATE 10 MG: 5 TABLET, FILM COATED ORAL at 09:08

## 2020-08-04 RX ADMIN — Medication 10 ML: at 05:08

## 2020-08-04 RX ADMIN — TAMSULOSIN HYDROCHLORIDE 0.4 MG: 0.4 CAPSULE ORAL at 09:08

## 2020-08-04 RX ADMIN — PROCHLORPERAZINE MALEATE 10 MG: 5 TABLET, FILM COATED ORAL at 04:08

## 2020-08-04 RX ADMIN — Medication 400 MG: at 07:08

## 2020-08-04 RX ADMIN — Medication 30 ML: at 05:08

## 2020-08-04 RX ADMIN — LORAZEPAM 1 MG: 2 INJECTION INTRAMUSCULAR; INTRAVENOUS at 12:08

## 2020-08-04 RX ADMIN — PROCHLORPERAZINE MALEATE 10 MG: 5 TABLET, FILM COATED ORAL at 08:08

## 2020-08-04 RX ADMIN — FLUCONAZOLE 400 MG: 200 TABLET ORAL at 09:08

## 2020-08-04 RX ADMIN — Medication 10 ML: at 06:08

## 2020-08-04 RX ADMIN — TACROLIMUS 2 MG: 1 CAPSULE ORAL at 05:08

## 2020-08-04 RX ADMIN — Medication 400 MG: at 03:08

## 2020-08-04 RX ADMIN — PANTOPRAZOLE SODIUM 40 MG: 40 TABLET, DELAYED RELEASE ORAL at 09:08

## 2020-08-04 RX ADMIN — Medication 30 ML: at 12:08

## 2020-08-04 RX ADMIN — FILGRASTIM 480 MCG: 480 INJECTION, SOLUTION INTRAVENOUS; SUBCUTANEOUS at 09:08

## 2020-08-04 RX ADMIN — HYDROXYZINE HYDROCHLORIDE 25 MG: 25 TABLET, FILM COATED ORAL at 07:08

## 2020-08-04 RX ADMIN — TACROLIMUS 2 MG: 1 CAPSULE ORAL at 07:08

## 2020-08-04 RX ADMIN — LORAZEPAM 1 MG: 2 INJECTION INTRAMUSCULAR; INTRAVENOUS at 04:08

## 2020-08-04 RX ADMIN — AMLODIPINE BESYLATE 10 MG: 10 TABLET ORAL at 09:08

## 2020-08-04 NOTE — ASSESSMENT & PLAN NOTE
Admitted 7/21/20 for planned Flu/Cy/TBI haplo allo SCT. Son is the donor. Today is Day +7.  Receive 2 bags and a total CD34 dose of 3.10 x10^6/kg on 7/28/20  Will receive post transplant cyclophosphamide on Day +3 and +4.  Of note, patient is to not receive any steroids until 24hrs after completion of post tx CTX. This will be 8/5 @1130  - No signs of GVHD  - Tac level starting tomorrow AM    Planned conditioning regimen:  Fludarabine on Day -6, -5, -4, -3, and -2  Cyclophosphamide on Day -6 and -5  Total Body Irradiation on Day -1     Planned  GVHD Prophylaxis:  Cyclophosphamide on Day +3 and +4  Tacrolimus starting on Day +5  Mycophenolate starting on Day +5     Antimicrobial Prophylaxis:  Acyclovir starting on Day -6  Levofloxacin starting on Day -1  Fluconazole starting on Day -1  Bactrim starting on Day +30     SOS/VOD Prophylaxis:  Ursodiol on Day -6 through Day +30     Growth Factor Support:  Neupogen starting on Day +7

## 2020-08-04 NOTE — PROGRESS NOTES
"[facility]  Adult Nutrition  Progress Note    SUMMARY       Recommendations    Recommendation:  1. Continue regular diet, encourage good PO intake at meals   2. Continue boost plus and add protein balls to increase intake   3. Diet education completed   RD to monitor and follow up    Goals: pt to tolerate >85% of EEN/EPN by RD follow up  Nutrition Goal Status: goal met  Communication of RD Recs: other (comment)(POC)    Reason for Assessment    Reason For Assessment: RD follow-up  Diagnosis: (CML in remission)  Relevant Medical History: CML; melanoma of external ear  Interdisciplinary Rounds: did not attend  General Information Comments: Day +7 allo SCT. Pt reported good PO intake at meals, able to tolerate most meals but states he is having trouble chewing and ordering the same meals. Going to recieve food from home and smoothies. Drinking boost BID to increase intake. Encouraged high calorie high protein foods. Pt with no recent wt loss, UBW ~200 lbs. No NFPE completed at this time, no indications of malnutrition.  Nutrition Discharge Planning: adequate PO intake    Nutrition Risk Screen    Nutrition Risk Screen: no indicators present    Nutrition/Diet History    Food Allergies: NKFA  Factors Affecting Nutritional Intake: None identified at this time    Anthropometrics    Temp: 98 °F (36.7 °C)  Height Method: Stated  Height: 5' 6" (167.6 cm)  Height (inches): 66 in  Weight Method: Standard Scale  Weight: 88.9 kg (195 lb 14.1 oz)  Weight (lb): 195.88 lb  Ideal Body Weight (IBW), Male: 142 lb  % Ideal Body Weight, Male (lb): 140.51 %  BMI (Calculated): 31.6  BMI Grade: 30 - 34.9- obesity - grade I    Lab/Procedures/Meds    Pertinent Labs Reviewed: reviewed  Pertinent Labs Comments: Ca 8.0   Pertinent Medications Reviewed: reviewed  Pertinent Medications Comments: pantoprazole; fluconazole    Estimated/Assessed Needs    Weight Used For Calorie Calculations: 89.1 kg (196 lb 6.9 oz)  Energy Calorie " Requirements (kcal): 6367-4045 kcal/d  Energy Need Method: Kcal/kg(25-30 kcal/d)  Protein Requirements:  g/day(1.0-1.4 g/kg)  Weight Used For Protein Calculations: 89.1 kg (196 lb 6.9 oz)  Fluid Requirements (mL): 1 mL/kcal or per MD  RDA Method (mL): 2227    Nutrition Prescription Ordered    Current Diet Order: Regular diet  Oral Nutrition Supplement: boost plus    Evaluation of Received Nutrient/Fluid Intake    I/O: +11.6 L since admit  Energy Calories Required: meeting needs  Protein Required: meeting needs  Fluid Required: meeting needs  Comments: LBM 8/4  Tolerance: tolerating  % Intake of Estimated Energy Needs: 75 - 100 %  % Meal Intake: 75 - 100 %    Nutrition Risk    Level of Risk/Frequency of Follow-up: low     Assessment and Plan    Nutrition Problem  increased nutrient needs     Related to (etiology):   Physiological needs 2/2 CML     Signs and Symptoms (as evidenced by):   Pt s/p Allo SCT      Interventions (treatment strategy):  Collaboration of care with other providers  Commercial beverage     Nutrition Diagnosis Status:   Continues     Monitor and Evaluation    Food and Nutrient Intake: energy intake, food and beverage intake  Food and Nutrient Adminstration: diet order  Knowledge/Beliefs/Attitudes: food and nutrition knowledge/skill  Anthropometric Measurements: weight, weight change  Biochemical Data, Medical Tests and Procedures: electrolyte and renal panel, gastrointestinal profile, glucose/endocrine profile, inflammatory profile, lipid profile  Nutrition-Focused Physical Findings: overall appearance     Nutrition Follow-Up    RD Follow-up?: Yes

## 2020-08-04 NOTE — ASSESSMENT & PLAN NOTE
"- Started having headaches several weeks ago. Onset seems to correspond to when patient quit smoking and when he started ponatinib  - Has been off of ponatinib for a few days but fevers have persisted  - Pattern is like cluster headaches (onset during sleep), but not unilateral.  - O2 via non-rebreather at 12 L over 15 minutes seemed to help but developed hot flush so pt stopped  Ultram, Flexeril, Oxycodone not helping  Imitrex PRN q2hr (max 200mg in 24hrs). Not contraindicated per pharm D.  Description of headache sounds like tension headache--throbbing with pressure across crown of head  No focal neurological deficits  - Patient reporting that headache may be sinus in nature. Maxillary tenderness noted.  - Patient takes Claritin at home. States that he cannot take Zyrtec (which is on formulary) due to SE and "addiction"  - States that it was hard for him to ween himself off of Zyrtec.  - Stared Flonase 7/27/20. Started home Claritin 7/27/20 (not on formulary)  - Somewhat responding to caffeine, Imitrex, tramadol, oxy, and ativan  - Now c/o blurry vision, which may also be contributing to headaches. Seen by ophtho for this  - Continue to monitor closely; especially with expected thrombocytopenia  - Neuro on consult - trial IV mag. Appreciate input  - improved this AM    "

## 2020-08-04 NOTE — PROGRESS NOTES
[facility]  Hematology  Bone Marrow Transplant  Progress Note    Patient Name: Kvng Jones Sr.  Admission Date: 7/21/2020  Hospital Length of Stay: 14 days  Code Status: Full Code    Subjective:     Interval History: Day +7 from Flu/Cy/TBI haplo alloSCT for AML/CML. NEON. Improvement in HA symptoms. No signs of aGVHD. Continues to be pancytopenic. Scheduled antiemetics working. No diarrhea.     Objective:     Vital Signs (Most Recent):  Temp: 98 °F (36.7 °C) (08/04/20 0416)  Pulse: 77 (08/04/20 0416)  Resp: 18 (08/04/20 0447)  BP: 127/74 (08/04/20 0416)  SpO2: 98 % (08/04/20 0416) Vital Signs (24h Range):  Temp:  [97.4 °F (36.3 °C)-98.6 °F (37 °C)] 98 °F (36.7 °C)  Pulse:  [] 77  Resp:  [17-19] 18  SpO2:  [94 %-98 %] 98 %  BP: (122-155)/(74-85) 127/74     Weight: 88.9 kg (195 lb 14.1 oz)  Body mass index is 31.62 kg/m².  Body surface area is 2.03 meters squared.    Intake/Output - Last 3 Shifts       08/02 0700 - 08/03 0659 08/03 0700 - 08/04 0659 08/04 0700 - 08/05 0659    P.O. 720 960     I.V. (mL/kg) 1912 (21.1) 1533.8 (17.3) 337.5 (3.8)    Total Intake(mL/kg) 2632 (29.1) 2493.8 (28.1) 337.5 (3.8)    Urine (mL/kg/hr) 2555 (1.2) 2800 (1.3)     Stool 0 0     Total Output 2555 2800     Net +77 -306.3 +337.5           Stool Occurrence 7 x 2 x         Physical Exam  Constitutional:       General: He is not in acute distress.     Appearance: He is normal weight. He is not ill-appearing.   HENT:      Head: Normocephalic and atraumatic.      Mouth/Throat:      Mouth: Mucous membranes are dry.      Pharynx: No oropharyngeal exudate or posterior oropharyngeal erythema.   Eyes:      General: No scleral icterus.     Pupils: Pupils are equal, round, and reactive to light.   Neck:      Musculoskeletal: Normal range of motion. No neck rigidity.   Cardiovascular:      Rate and Rhythm: Normal rate and regular rhythm.      Pulses: Normal pulses.      Heart sounds: Normal heart sounds. No murmur.    Pulmonary:      Effort: Pulmonary effort is normal. No respiratory distress.      Breath sounds: Normal breath sounds.   Abdominal:      General: Abdomen is flat. Bowel sounds are normal.      Palpations: Abdomen is soft.   Musculoskeletal: Normal range of motion.      Right lower leg: No edema.      Left lower leg: No edema.      Comments: RCW grewal CDI   Lymphadenopathy:      Cervical: No cervical adenopathy.   Skin:     General: Skin is warm.      Coloration: Skin is not jaundiced or pale.   Neurological:      General: No focal deficit present.      Mental Status: He is alert and oriented to person, place, and time. Mental status is at baseline.   Psychiatric:         Mood and Affect: Mood normal.         Thought Content: Thought content normal.     Significant Labs:   CBC:   Recent Labs   Lab 08/03/20  0400 08/04/20  0400   WBC 0.75* 0.52*   HGB 12.0* 10.9*   HCT 35.2* 32.3*   PLT 73* 40*    and CMP:   Recent Labs   Lab 08/03/20  0400 08/04/20  0400    140   K 4.2 4.2   * 112*   CO2 22* 22*   GLU 92 101   BUN 11 12   CREATININE 0.7 0.7   CALCIUM 9.0 8.0*   PROT 6.8 6.2   ALBUMIN 3.6 3.3*   BILITOT 0.3 0.4   ALKPHOS 61 54*   AST 14 11   ALT 23 19   ANIONGAP 8 6*   EGFRNONAA >60.0 >60.0       Diagnostic Results:  None    Assessment/Plan:     * History of allogeneic stem cell transplant  Admitted 7/21/20 for planned Flu/Cy/TBI haplo allo SCT. Son is the donor. Today is Day +7.  Receive 2 bags and a total CD34 dose of 3.10 x10^6/kg on 7/28/20  Will receive post transplant cyclophosphamide on Day +3 and +4.  Of note, patient is to not receive any steroids until 24hrs after completion of post tx CTX. This will be 8/5 @1130  - No signs of GVHD  - Tac level starting tomorrow AM    Planned conditioning regimen:  Fludarabine on Day -6, -5, -4, -3, and -2  Cyclophosphamide on Day -6 and -5  Total Body Irradiation on Day -1     Planned  GVHD Prophylaxis:  Cyclophosphamide on Day +3 and +4  Tacrolimus  starting on Day +5  Mycophenolate starting on Day +5     Antimicrobial Prophylaxis:  Acyclovir starting on Day -6  Levofloxacin starting on Day -1  Fluconazole starting on Day -1  Bactrim starting on Day +30     SOS/VOD Prophylaxis:  Ursodiol on Day -6 through Day +30     Growth Factor Support:  Neupogen starting on Day +7     CINV (chemotherapy-induced nausea and vomiting)  - improved with scheduled zofran and compazine  - continued with scheduled compazine yesterday and prn zofran  - no complaints this AM   - continue current regimen    Acute myeloid leukemia in remission  - See CML    Blurry vision  - C/o worsening blurry vision 7/30/20  - May be contributing to headaches  - Ophtho consulted and saw patient 7/30  - Ophtho dilated pupils and performed fundal exam. Fundal exam neg.  - Patient had been viewing laptop screen for ~ 2 hours prior to experiencing blurry vision  - Per ophtho, blurry vision due to dry eyes. rec'd artificial tears QID prn and warm compresses BID prn. Can escalate artificial tears to ointment if needed.    Antineoplastic chemotherapy induced pancytopenia  - WBC .0.52, , Hgb 10.9, plt 40k  - lovenox held   - transfuse Hgb <7g and plt <10k    Rash and nonspecific skin eruption  - Newly noted macular rash to upper back and chest on 7/29  - Intermittently pruritic  - GVHD not suspected this soon after transplant  - Likely drug reaction, thought maybe to be from DMSO from stem cell transplant  - Continue to monitor closely  - Started on PRN PO benadryl, topical benadryl cream, and topical hydrocortisone   - Patient states that benadryl causes him to be jittery. Ordered prn atarax instead.  - improved complaint and on exam    Abdominal gas pain  - Started on PRN simethicone    Headache  - Started having headaches several weeks ago. Onset seems to correspond to when patient quit smoking and when he started ponatinib  - Has been off of ponatinib for a few days but fevers have persisted  -  "Pattern is like cluster headaches (onset during sleep), but not unilateral.  - O2 via non-rebreather at 12 L over 15 minutes seemed to help but developed hot flush so pt stopped  Ultram, Flexeril, Oxycodone not helping  Imitrex PRN q2hr (max 200mg in 24hrs). Not contraindicated per pharm D.  Description of headache sounds like tension headache--throbbing with pressure across crown of head  No focal neurological deficits  - Patient reporting that headache may be sinus in nature. Maxillary tenderness noted.  - Patient takes Claritin at home. States that he cannot take Zyrtec (which is on formulary) due to SE and "addiction"  - States that it was hard for him to ween himself off of Zyrtec.  - Stared Flonase 7/27/20. Started home Claritin 7/27/20 (not on formulary)  - Somewhat responding to caffeine, Imitrex, tramadol, oxy, and ativan  - Now c/o blurry vision, which may also be contributing to headaches. Seen by ophtho for this  - Continue to monitor closely; especially with expected thrombocytopenia  - Neuro on consult - trial IV mag. Appreciate input  - improved this AM      Cervical stenosis of spine  - Continue home Flexeril and ultram  - PRN oxy IR added for additional relief    Tobacco abuse, in remission  - 35 pack year history  - Quit smoking 6/1/2020 using Chantix  - Patient completed course of Chantix and denies need for Nicotine patch.     Daily consumption of alcohol  - Drinks 3-4 alcoholic beverages nightly  - Will need to monitor closely for s/s of alcohol withdrawal while inpatient  - Will consider consulting addiction medicine if indicated  - Prn ativan available    BPH (benign prostatic hyperplasia)  - Continue home Flomax    GERD (gastroesophageal reflux disease)  - Takes Nexium at home  - Giving Protonix while inpatient as Nexium is not on hospital formulary  - Can resume Nexium at discharge  - Had CP this am that is believed to be due to GERD (troponin and EKG unremarkable). Relieved after protonix " administration  - PRN TUMS ordered.    Essential hypertension  - increased home amlodipine from 5 mg to 10 mg daily  - BPs as high as 180s/110s  - Started prn hydralazine - possibly worsened headache  - Decreased IVF rate  - BP normalized    CML in remission  - Presented to [facility] with CMP in blast crisis and with nodular disease on 3/30/2020  - Induced with FLAG-Addis. Achieved morphologic CR  - Started on daily Ponatinib 30 mg daily, which he continues to take outpatient. Will hold Ponatinib on admission.  - Referred to Dr. Hodges by Dr. Ramos for SCT consideration  - Had [facility]x at [facility] on 7/1/2020 showing no morphologic or immunophenotypic evidence of CML  - Admitted 7/21/20 for planned Flu/Cy/TBI haplo allo SCT  - See SCT candidate      VTE Risk Mitigation (From admission, onward)         Ordered     heparin, porcine (PF) 100 unit/mL injection flush 300 Units  As needed (PRN)      07/21/20 1920                Disposition: pending cc of alloSCT    Hunter Basurto MD  Bone Marrow Transplant  [facility]

## 2020-08-04 NOTE — ASSESSMENT & PLAN NOTE
- improved with scheduled zofran and compazine  - continued with scheduled compazine yesterday and prn zofran  - no complaints this AM, had been refusing scheduled meds  - back off to prn per patient request

## 2020-08-04 NOTE — PLAN OF CARE
Day +7 allo SCT. Pt AAOx4 and independent. Overnight Pt given prn ativan x2 and zofran for nausea, and prn oxycodone x2 for HA. IVF @ 75. Diarrhea improved, one formed stool this shift. Voiding without difficulty. Remained afebrile throughout shift, VSS. Magnesium replaced. Will continue to monitor

## 2020-08-04 NOTE — PLAN OF CARE
Pt. Day +7 for HaploSCT.  Pt. with nonskid footwear on with bed in lowest position and locked with bed rails up x2.  Pt. ambulates independently and instructed to call if assistance is needed.  Pt. with call light within reach.  Pt. With electrolytes replaced PRN.  Pt. With IVF's D/C'd this morning.  Pt. with c/o a tolerable headache.  Pt. Given atarax to help with itching from rash.  Pt. With c/o nausea with compazine given scheduled and ativan PRN.  Pt. With a good appetite.  Will continue to monitor pt.

## 2020-08-04 NOTE — SUBJECTIVE & OBJECTIVE
Subjective:     Interval History: Day +7 from Flu/Cy/TBI haplo alloSCT for AML/CML. NEON. Improvement in HA symptoms. No signs of aGVHD. Continues to be pancytopenic. Scheduled antiemetics working. No diarrhea.     Objective:     Vital Signs (Most Recent):  Temp: 98 °F (36.7 °C) (08/04/20 0416)  Pulse: 77 (08/04/20 0416)  Resp: 18 (08/04/20 0447)  BP: 127/74 (08/04/20 0416)  SpO2: 98 % (08/04/20 0416) Vital Signs (24h Range):  Temp:  [97.4 °F (36.3 °C)-98.6 °F (37 °C)] 98 °F (36.7 °C)  Pulse:  [] 77  Resp:  [17-19] 18  SpO2:  [94 %-98 %] 98 %  BP: (122-155)/(74-85) 127/74     Weight: 88.9 kg (195 lb 14.1 oz)  Body mass index is 31.62 kg/m².  Body surface area is 2.03 meters squared.    Intake/Output - Last 3 Shifts       08/02 0700 - 08/03 0659 08/03 0700 - 08/04 0659 08/04 0700 - 08/05 0659    P.O. 720 960     I.V. (mL/kg) 1912 (21.1) 1533.8 (17.3) 337.5 (3.8)    Total Intake(mL/kg) 2632 (29.1) 2493.8 (28.1) 337.5 (3.8)    Urine (mL/kg/hr) 2555 (1.2) 2800 (1.3)     Stool 0 0     Total Output 2555 2800     Net +77 -306.3 +337.5           Stool Occurrence 7 x 2 x         Physical Exam  Constitutional:       General: He is not in acute distress.     Appearance: He is normal weight. He is not ill-appearing.   HENT:      Head: Normocephalic and atraumatic.      Mouth/Throat:      Mouth: Mucous membranes are dry.      Pharynx: No oropharyngeal exudate or posterior oropharyngeal erythema.   Eyes:      General: No scleral icterus.     Pupils: Pupils are equal, round, and reactive to light.   Neck:      Musculoskeletal: Normal range of motion. No neck rigidity.   Cardiovascular:      Rate and Rhythm: Normal rate and regular rhythm.      Pulses: Normal pulses.      Heart sounds: Normal heart sounds. No murmur.   Pulmonary:      Effort: Pulmonary effort is normal. No respiratory distress.      Breath sounds: Normal breath sounds.   Abdominal:      General: Abdomen is flat. Bowel sounds are normal.      Palpations:  Abdomen is soft.   Musculoskeletal: Normal range of motion.      Right lower leg: No edema.      Left lower leg: No edema.      Comments: RCW grewal CDI   Lymphadenopathy:      Cervical: No cervical adenopathy.   Skin:     General: Skin is warm.      Coloration: Skin is not jaundiced or pale.   Neurological:      General: No focal deficit present.      Mental Status: He is alert and oriented to person, place, and time. Mental status is at baseline.   Psychiatric:         Mood and Affect: Mood normal.         Thought Content: Thought content normal.     Significant Labs:   CBC:   Recent Labs   Lab 08/03/20  0400 08/04/20  0400   WBC 0.75* 0.52*   HGB 12.0* 10.9*   HCT 35.2* 32.3*   PLT 73* 40*    and CMP:   Recent Labs   Lab 08/03/20  0400 08/04/20  0400    140   K 4.2 4.2   * 112*   CO2 22* 22*   GLU 92 101   BUN 11 12   CREATININE 0.7 0.7   CALCIUM 9.0 8.0*   PROT 6.8 6.2   ALBUMIN 3.6 3.3*   BILITOT 0.3 0.4   ALKPHOS 61 54*   AST 14 11   ALT 23 19   ANIONGAP 8 6*   EGFRNONAA >60.0 >60.0       Diagnostic Results:  None

## 2020-08-04 NOTE — ASSESSMENT & PLAN NOTE
- Newly noted macular rash to upper back and chest on 7/29  - Intermittently pruritic  - GVHD not suspected this soon after transplant  - Likely drug reaction, thought maybe to be from DMSO from stem cell transplant  - Continue to monitor closely  - Started on PRN PO benadryl, topical benadryl cream, and topical hydrocortisone   - Patient states that benadryl causes him to be jittery. Ordered prn atarax instead.  - improved complaint and on exam

## 2020-08-04 NOTE — PLAN OF CARE
Recommendations    Recommendation:  1. Continue regular diet, encourage good PO intake at meals   2. Continue boost plus and add protein balls to increase intake   3. Diet education completed   RD to monitor and follow up    Goals: pt to tolerate >85% of EEN/EPN by RD follow up  Nutrition Goal Status: goal met  Communication of RD Recs: other (comment)(POC)

## 2020-08-05 PROBLEM — K12.31 MUCOSITIS DUE TO CHEMOTHERAPY: Status: ACTIVE | Noted: 2020-08-05

## 2020-08-05 LAB
ALBUMIN SERPL BCP-MCNC: 3.7 G/DL (ref 3.5–5.2)
ALP SERPL-CCNC: 57 U/L (ref 55–135)
ALT SERPL W/O P-5'-P-CCNC: 21 U/L (ref 10–44)
ANION GAP SERPL CALC-SCNC: 6 MMOL/L (ref 8–16)
ANISOCYTOSIS BLD QL SMEAR: SLIGHT
AST SERPL-CCNC: 12 U/L (ref 10–40)
BASOPHILS # BLD AUTO: 0 K/UL (ref 0–0.2)
BASOPHILS NFR BLD: 0 % (ref 0–1.9)
BILIRUB SERPL-MCNC: 0.4 MG/DL (ref 0.1–1)
BUN SERPL-MCNC: 12 MG/DL (ref 8–23)
CALCIUM SERPL-MCNC: 9 MG/DL (ref 8.7–10.5)
CHLORIDE SERPL-SCNC: 109 MMOL/L (ref 95–110)
CO2 SERPL-SCNC: 25 MMOL/L (ref 23–29)
CREAT SERPL-MCNC: 0.7 MG/DL (ref 0.5–1.4)
DIFFERENTIAL METHOD: ABNORMAL
EOSINOPHIL # BLD AUTO: 0 K/UL (ref 0–0.5)
EOSINOPHIL NFR BLD: 8.7 % (ref 0–8)
ERYTHROCYTE [DISTWIDTH] IN BLOOD BY AUTOMATED COUNT: 12.8 % (ref 11.5–14.5)
EST. GFR  (AFRICAN AMERICAN): >60 ML/MIN/1.73 M^2
EST. GFR  (NON AFRICAN AMERICAN): >60 ML/MIN/1.73 M^2
GLUCOSE SERPL-MCNC: 100 MG/DL (ref 70–110)
HCT VFR BLD AUTO: 33.9 % (ref 40–54)
HGB BLD-MCNC: 11.5 G/DL (ref 14–18)
IMM GRANULOCYTES # BLD AUTO: 0.03 K/UL (ref 0–0.04)
IMM GRANULOCYTES NFR BLD AUTO: 13 % (ref 0–0.5)
LYMPHOCYTES # BLD AUTO: 0.1 K/UL (ref 1–4.8)
LYMPHOCYTES NFR BLD: 30.4 % (ref 18–48)
MAGNESIUM SERPL-MCNC: 1.7 MG/DL (ref 1.6–2.6)
MCH RBC QN AUTO: 33.4 PG (ref 27–31)
MCHC RBC AUTO-ENTMCNC: 33.9 G/DL (ref 32–36)
MCV RBC AUTO: 99 FL (ref 82–98)
MONOCYTES # BLD AUTO: 0 K/UL (ref 0.3–1)
MONOCYTES NFR BLD: 8.7 % (ref 4–15)
NEUTROPHILS # BLD AUTO: 0.1 K/UL (ref 1.8–7.7)
NEUTROPHILS NFR BLD: 39.2 % (ref 38–73)
NRBC BLD-RTO: 0 /100 WBC
OVALOCYTES BLD QL SMEAR: ABNORMAL
PHOSPHATE SERPL-MCNC: 4 MG/DL (ref 2.7–4.5)
PLATELET # BLD AUTO: 28 K/UL (ref 150–350)
PLATELET BLD QL SMEAR: ABNORMAL
PMV BLD AUTO: 11.1 FL (ref 9.2–12.9)
POIKILOCYTOSIS BLD QL SMEAR: SLIGHT
POTASSIUM SERPL-SCNC: 4 MMOL/L (ref 3.5–5.1)
PROT SERPL-MCNC: 6.8 G/DL (ref 6–8.4)
RBC # BLD AUTO: 3.44 M/UL (ref 4.6–6.2)
SODIUM SERPL-SCNC: 140 MMOL/L (ref 136–145)
TACROLIMUS BLD-MCNC: 2.7 NG/ML (ref 5–15)
WBC # BLD AUTO: 0.23 K/UL (ref 3.9–12.7)

## 2020-08-05 PROCEDURE — 80053 COMPREHEN METABOLIC PANEL: CPT

## 2020-08-05 PROCEDURE — 84100 ASSAY OF PHOSPHORUS: CPT

## 2020-08-05 PROCEDURE — 25000003 PHARM REV CODE 250

## 2020-08-05 PROCEDURE — 83735 ASSAY OF MAGNESIUM: CPT

## 2020-08-05 PROCEDURE — 85007 BL SMEAR W/DIFF WBC COUNT: CPT

## 2020-08-05 PROCEDURE — 25000003 PHARM REV CODE 250: Performed by: NURSE PRACTITIONER

## 2020-08-05 PROCEDURE — 80197 ASSAY OF TACROLIMUS: CPT

## 2020-08-05 PROCEDURE — 63600175 PHARM REV CODE 636 W HCPCS: Performed by: INTERNAL MEDICINE

## 2020-08-05 PROCEDURE — A9155 ARTIFICIAL SALIVA: HCPCS | Performed by: INTERNAL MEDICINE

## 2020-08-05 PROCEDURE — A4216 STERILE WATER/SALINE, 10 ML: HCPCS | Performed by: NURSE PRACTITIONER

## 2020-08-05 PROCEDURE — 99233 SBSQ HOSP IP/OBS HIGH 50: CPT | Mod: ,,, | Performed by: INTERNAL MEDICINE

## 2020-08-05 PROCEDURE — 63600175 PHARM REV CODE 636 W HCPCS

## 2020-08-05 PROCEDURE — 63600175 PHARM REV CODE 636 W HCPCS: Performed by: NURSE PRACTITIONER

## 2020-08-05 PROCEDURE — 25000003 PHARM REV CODE 250: Performed by: STUDENT IN AN ORGANIZED HEALTH CARE EDUCATION/TRAINING PROGRAM

## 2020-08-05 PROCEDURE — 94761 N-INVAS EAR/PLS OXIMETRY MLT: CPT

## 2020-08-05 PROCEDURE — 99233 PR SUBSEQUENT HOSPITAL CARE,LEVL III: ICD-10-PCS | Mod: ,,, | Performed by: INTERNAL MEDICINE

## 2020-08-05 PROCEDURE — 85027 COMPLETE CBC AUTOMATED: CPT

## 2020-08-05 PROCEDURE — 20600001 HC STEP DOWN PRIVATE ROOM

## 2020-08-05 PROCEDURE — 25000003 PHARM REV CODE 250: Performed by: INTERNAL MEDICINE

## 2020-08-05 RX ORDER — TACROLIMUS 0.5 MG/1
0.5 CAPSULE ORAL ONCE
Status: COMPLETED | OUTPATIENT
Start: 2020-08-05 | End: 2020-08-05

## 2020-08-05 RX ORDER — PROCHLORPERAZINE MALEATE 5 MG
10 TABLET ORAL 4 TIMES DAILY PRN
Status: DISCONTINUED | OUTPATIENT
Start: 2020-08-05 | End: 2020-08-12 | Stop reason: HOSPADM

## 2020-08-05 RX ADMIN — LEVOFLOXACIN 500 MG: 500 TABLET, FILM COATED ORAL at 08:08

## 2020-08-05 RX ADMIN — Medication 10 ML: at 06:08

## 2020-08-05 RX ADMIN — LORAZEPAM 1 MG: 2 INJECTION INTRAMUSCULAR; INTRAVENOUS at 04:08

## 2020-08-05 RX ADMIN — ACYCLOVIR 800 MG: 800 TABLET ORAL at 08:08

## 2020-08-05 RX ADMIN — HYDROXYZINE HYDROCHLORIDE 25 MG: 25 TABLET, FILM COATED ORAL at 10:08

## 2020-08-05 RX ADMIN — GABAPENTIN 300 MG: 300 CAPSULE ORAL at 10:08

## 2020-08-05 RX ADMIN — MYCOPHENOLATE MOFETIL 1000 MG: 250 CAPSULE ORAL at 08:08

## 2020-08-05 RX ADMIN — PANTOPRAZOLE SODIUM 40 MG: 40 TABLET, DELAYED RELEASE ORAL at 08:08

## 2020-08-05 RX ADMIN — TRAMADOL HYDROCHLORIDE 50 MG: 50 TABLET, FILM COATED ORAL at 04:08

## 2020-08-05 RX ADMIN — TACROLIMUS 2 MG: 1 CAPSULE ORAL at 08:08

## 2020-08-05 RX ADMIN — PROCHLORPERAZINE MALEATE 10 MG: 5 TABLET ORAL at 03:08

## 2020-08-05 RX ADMIN — Medication 30 ML: at 05:08

## 2020-08-05 RX ADMIN — URSODIOL 300 MG: 300 CAPSULE ORAL at 08:08

## 2020-08-05 RX ADMIN — LORAZEPAM 1 MG: 2 INJECTION INTRAMUSCULAR; INTRAVENOUS at 02:08

## 2020-08-05 RX ADMIN — ALUMINUM HYDROXIDE, MAGNESIUM HYDROXIDE, AND DIMETHICONE 10 ML: 400; 400; 40 SUSPENSION ORAL at 10:08

## 2020-08-05 RX ADMIN — Medication 30 ML: at 08:08

## 2020-08-05 RX ADMIN — URSODIOL 300 MG: 300 CAPSULE ORAL at 10:08

## 2020-08-05 RX ADMIN — Medication 30 ML: at 10:08

## 2020-08-05 RX ADMIN — TACROLIMUS 0.5 MG: 0.5 CAPSULE ORAL at 10:08

## 2020-08-05 RX ADMIN — TAMSULOSIN HYDROCHLORIDE 0.4 MG: 0.4 CAPSULE ORAL at 08:08

## 2020-08-05 RX ADMIN — Medication 30 ML: at 04:08

## 2020-08-05 RX ADMIN — TACROLIMUS 2.5 MG: 1 CAPSULE ORAL at 06:08

## 2020-08-05 RX ADMIN — LORAZEPAM 1 MG: 2 INJECTION INTRAMUSCULAR; INTRAVENOUS at 10:08

## 2020-08-05 RX ADMIN — ALUMINUM HYDROXIDE, MAGNESIUM HYDROXIDE, AND DIMETHICONE 10 ML: 400; 400; 40 SUSPENSION ORAL at 01:08

## 2020-08-05 RX ADMIN — ACYCLOVIR 800 MG: 800 TABLET ORAL at 06:08

## 2020-08-05 RX ADMIN — FLUTICASONE PROPIONATE 100 MCG: 50 SPRAY, METERED NASAL at 08:08

## 2020-08-05 RX ADMIN — OXYCODONE HYDROCHLORIDE 10 MG: 5 TABLET ORAL at 12:08

## 2020-08-05 RX ADMIN — FILGRASTIM 480 MCG: 480 INJECTION, SOLUTION INTRAVENOUS; SUBCUTANEOUS at 08:08

## 2020-08-05 RX ADMIN — OXYCODONE HYDROCHLORIDE 10 MG: 5 TABLET ORAL at 10:08

## 2020-08-05 RX ADMIN — Medication 400 MG: at 10:08

## 2020-08-05 RX ADMIN — Medication 10 ML: at 01:08

## 2020-08-05 RX ADMIN — HYDROCORTISONE: 10 CREAM TOPICAL at 10:08

## 2020-08-05 RX ADMIN — Medication 30 ML: at 01:08

## 2020-08-05 RX ADMIN — MYCOPHENOLATE MOFETIL 1000 MG: 250 CAPSULE ORAL at 10:08

## 2020-08-05 RX ADMIN — HYDROCORTISONE: 10 CREAM TOPICAL at 08:08

## 2020-08-05 RX ADMIN — FLUCONAZOLE 400 MG: 200 TABLET ORAL at 08:08

## 2020-08-05 RX ADMIN — AMLODIPINE BESYLATE 10 MG: 10 TABLET ORAL at 08:08

## 2020-08-05 RX ADMIN — ALUMINUM HYDROXIDE, MAGNESIUM HYDROXIDE, AND DIMETHICONE 10 ML: 400; 400; 40 SUSPENSION ORAL at 05:08

## 2020-08-05 RX ADMIN — Medication 400 MG: at 06:08

## 2020-08-05 RX ADMIN — MYCOPHENOLATE MOFETIL 1000 MG: 250 CAPSULE ORAL at 03:08

## 2020-08-05 NOTE — SUBJECTIVE & OBJECTIVE
Subjective:     Interval History: Day +8 from Flu/Cy/TBI haplo alloSCT for AML/CML. NEON. Appears well this AM. Refused scheduled antiemetics and will make them prn today. Has very mild mucositis and will add duke's. Neutropenic and thrombocytopenic. No signs of aGVHD. Still markedly positive fluid balance.     Objective:     Vital Signs (Most Recent):  Temp: 98.1 °F (36.7 °C) (08/05/20 0232)  Pulse: 88 (08/05/20 0232)  Resp: 17 (08/05/20 0232)  BP: 136/74 (08/05/20 0232)  SpO2: (!) 92 % (08/05/20 0232) Vital Signs (24h Range):  Temp:  [97.9 °F (36.6 °C)-98.2 °F (36.8 °C)] 98.1 °F (36.7 °C)  Pulse:  [79-95] 88  Resp:  [16-20] 17  SpO2:  [92 %-98 %] 92 %  BP: (121-160)/(71-83) 136/74     Weight: 88.9 kg (195 lb 14.1 oz)  Body mass index is 31.62 kg/m².  Body surface area is 2.03 meters squared.    Intake/Output - Last 3 Shifts       08/03 0700 - 08/04 0659 08/04 0700 - 08/05 0659 08/05 0700 - 08/06 0659    P.O. 960 2320     I.V. (mL/kg) 1533.8 (17.3) 555 (6.3)     Total Intake(mL/kg) 2493.8 (28.1) 2875 (32.4)     Urine (mL/kg/hr) 2800 (1.3) 1650 (0.8)     Stool 0      Total Output 2800 1650     Net -306.3 +1225            Stool Occurrence 2 x          Physical Exam  Constitutional:       General: He is not in acute distress.     Appearance: He is normal weight. He is not ill-appearing.   HENT:      Head: Normocephalic and atraumatic.      Mouth/Throat:      Mouth: Mucous membranes are dry. Mild evidence of mucositis on bottom gums.     Pharynx: No oropharyngeal exudate or posterior oropharyngeal erythema.   Eyes:      General: No scleral icterus.     Pupils: Pupils are equal, round, and reactive to light.   Neck:      Musculoskeletal: Normal range of motion. No neck rigidity.   Cardiovascular:      Rate and Rhythm: Normal rate and regular rhythm.      Pulses: Normal pulses.      Heart sounds: Normal heart sounds. No murmur.   Pulmonary:      Effort: Pulmonary effort is normal. No respiratory distress.      Breath  sounds: Normal breath sounds.   Abdominal:      General: Abdomen is flat. Bowel sounds are normal.      Palpations: Abdomen is soft.   Musculoskeletal: Normal range of motion.      Right lower leg: No edema.      Left lower leg: No edema.      Comments: RCW grewal CDI   Lymphadenopathy:      Cervical: No cervical adenopathy.   Skin:     General: Skin is warm.      Coloration: Skin is not jaundiced or pale.   Neurological:      General: No focal deficit present.      Mental Status: He is alert and oriented to person, place, and time. Mental status is at baseline.   Psychiatric:         Mood and Affect: Mood normal.         Thought Content: Thought content normal.     Significant Labs:   CBC:   Recent Labs   Lab 08/04/20  0400 08/05/20  0315   WBC 0.52* 0.23*   HGB 10.9* 11.5*   HCT 32.3* 33.9*   PLT 40* 28*    and CMP:   Recent Labs   Lab 08/04/20  0400 08/05/20  0315    140   K 4.2 4.0   * 109   CO2 22* 25    100   BUN 12 12   CREATININE 0.7 0.7   CALCIUM 8.0* 9.0   PROT 6.2 6.8   ALBUMIN 3.3* 3.7   BILITOT 0.4 0.4   ALKPHOS 54* 57   AST 11 12   ALT 19 21   ANIONGAP 6* 6*   EGFRNONAA >60.0 >60.0       Diagnostic Results:  None

## 2020-08-05 NOTE — ASSESSMENT & PLAN NOTE
Admitted 7/21/20 for planned Flu/Cy/TBI haplo allo SCT. Son is the donor. Today is Day +8.  Receive 2 bags and a total CD34 dose of 3.10 x10^6/kg on 7/28/20  Will receive post transplant cyclophosphamide on Day +3 and +4.  Of note, patient is to not receive any steroids until 24hrs after completion of post tx CTX. This will be 8/5 @1130  - No signs of GVHD  - Tac level starting tomorrow AM    Planned conditioning regimen:  Fludarabine on Day -6, -5, -4, -3, and -2  Cyclophosphamide on Day -6 and -5  Total Body Irradiation on Day -1     Planned  GVHD Prophylaxis:  Cyclophosphamide on Day +3 and +4  Tacrolimus starting on Day +5  Mycophenolate starting on Day +5     Antimicrobial Prophylaxis:  Acyclovir starting on Day -6  Levofloxacin starting on Day -1  Fluconazole starting on Day -1  Bactrim starting on Day +30     SOS/VOD Prophylaxis:  Ursodiol on Day -6 through Day +30     Growth Factor Support:  Neupogen starting on Day +7

## 2020-08-05 NOTE — PLAN OF CARE
Plan of care reviewed with patient .  Day +8 allo /haplo transplant.  Patient having diarrhea c diff negative.  FAll precautions maintained, side rails up x2, call light inreach, bed inlow position and locked.  Complains of headache, and nausea.  Getting tramadol , oxycodone, compazine, ativan and zofran.  Ambulating, voiding and tolerating a regular diet without difficulty.

## 2020-08-05 NOTE — ASSESSMENT & PLAN NOTE
- WBC 0.23, ANC 90, Hgb 11.5, plt 28k  - lovenox held; need to add back once plt >50K again  - transfuse Hgb <7g and plt <10k

## 2020-08-05 NOTE — PROGRESS NOTES
[facility]  Hematology  Bone Marrow Transplant  Progress Note    Patient Name: Kvng Jones Sr.  Admission Date: 7/21/2020  Hospital Length of Stay: 15 days  Code Status: Full Code    Subjective:     Interval History: Day +8 from Flu/Cy/TBI haplo alloSCT for AML/CML. NEON. Appears well this AM. Refused scheduled antiemetics and will make them prn today. Has very mild mucositis and will add duke's. Neutropenic and thrombocytopenic. No signs of aGVHD. Still markedly positive fluid balance.     Objective:     Vital Signs (Most Recent):  Temp: 98.1 °F (36.7 °C) (08/05/20 0232)  Pulse: 88 (08/05/20 0232)  Resp: 17 (08/05/20 0232)  BP: 136/74 (08/05/20 0232)  SpO2: (!) 92 % (08/05/20 0232) Vital Signs (24h Range):  Temp:  [97.9 °F (36.6 °C)-98.2 °F (36.8 °C)] 98.1 °F (36.7 °C)  Pulse:  [79-95] 88  Resp:  [16-20] 17  SpO2:  [92 %-98 %] 92 %  BP: (121-160)/(71-83) 136/74     Weight: 88.9 kg (195 lb 14.1 oz)  Body mass index is 31.62 kg/m².  Body surface area is 2.03 meters squared.    Intake/Output - Last 3 Shifts       08/03 0700 - 08/04 0659 08/04 0700 - 08/05 0659 08/05 0700 - 08/06 0659    P.O. 960 2320     I.V. (mL/kg) 1533.8 (17.3) 555 (6.3)     Total Intake(mL/kg) 2493.8 (28.1) 2875 (32.4)     Urine (mL/kg/hr) 2800 (1.3) 1650 (0.8)     Stool 0      Total Output 2800 1650     Net -306.3 +1225            Stool Occurrence 2 x          Physical Exam  Constitutional:       General: He is not in acute distress.     Appearance: He is normal weight. He is not ill-appearing.   HENT:      Head: Normocephalic and atraumatic.      Mouth/Throat:      Mouth: Mucous membranes are dry. Mild evidence of mucositis on bottom gums.     Pharynx: No oropharyngeal exudate or posterior oropharyngeal erythema.   Eyes:      General: No scleral icterus.     Pupils: Pupils are equal, round, and reactive to light.   Neck:      Musculoskeletal: Normal range of motion. No neck rigidity.   Cardiovascular:      Rate and  Rhythm: Normal rate and regular rhythm.      Pulses: Normal pulses.      Heart sounds: Normal heart sounds. No murmur.   Pulmonary:      Effort: Pulmonary effort is normal. No respiratory distress.      Breath sounds: Normal breath sounds.   Abdominal:      General: Abdomen is flat. Bowel sounds are normal.      Palpations: Abdomen is soft.   Musculoskeletal: Normal range of motion.      Right lower leg: No edema.      Left lower leg: No edema.      Comments: RCW grewal CDI   Lymphadenopathy:      Cervical: No cervical adenopathy.   Skin:     General: Skin is warm.      Coloration: Skin is not jaundiced or pale.   Neurological:      General: No focal deficit present.      Mental Status: He is alert and oriented to person, place, and time. Mental status is at baseline.   Psychiatric:         Mood and Affect: Mood normal.         Thought Content: Thought content normal.     Significant Labs:   CBC:   Recent Labs   Lab 08/04/20  0400 08/05/20  0315   WBC 0.52* 0.23*   HGB 10.9* 11.5*   HCT 32.3* 33.9*   PLT 40* 28*    and CMP:   Recent Labs   Lab 08/04/20  0400 08/05/20  0315    140   K 4.2 4.0   * 109   CO2 22* 25    100   BUN 12 12   CREATININE 0.7 0.7   CALCIUM 8.0* 9.0   PROT 6.2 6.8   ALBUMIN 3.3* 3.7   BILITOT 0.4 0.4   ALKPHOS 54* 57   AST 11 12   ALT 19 21   ANIONGAP 6* 6*   EGFRNONAA >60.0 >60.0       Diagnostic Results:  None    Assessment/Plan:     * History of allogeneic stem cell transplant  Admitted 7/21/20 for planned Flu/Cy/TBI haplo allo SCT. Son is the donor. Today is Day +8.  Receive 2 bags and a total CD34 dose of 3.10 x10^6/kg on 7/28/20  Will receive post transplant cyclophosphamide on Day +3 and +4.  Of note, patient is to not receive any steroids until 24hrs after completion of post tx CTX. This will be 8/5 @1130  - No signs of GVHD  - Tac level starting tomorrow AM    Planned conditioning regimen:  Fludarabine on Day -6, -5, -4, -3, and -2  Cyclophosphamide on Day -6 and  -5  Total Body Irradiation on Day -1     Planned  GVHD Prophylaxis:  Cyclophosphamide on Day +3 and +4  Tacrolimus starting on Day +5  Mycophenolate starting on Day +5     Antimicrobial Prophylaxis:  Acyclovir starting on Day -6  Levofloxacin starting on Day -1  Fluconazole starting on Day -1  Bactrim starting on Day +30     SOS/VOD Prophylaxis:  Ursodiol on Day -6 through Day +30     Growth Factor Support:  Neupogen starting on Day +7     Mucositis due to chemotherapy  - some evidence on exam and complaint this AM  - added walters's scheduled    CINV (chemotherapy-induced nausea and vomiting)  - improved with scheduled zofran and compazine  - continued with scheduled compazine yesterday and prn zofran  - no complaints this AM, had been refusing scheduled meds  - back off to prn per patient request    Acute myeloid leukemia in remission  - See CML    Blurry vision  - C/o worsening blurry vision 7/30/20  - May be contributing to headaches  - Ophtho consulted and saw patient 7/30  - Ophtho dilated pupils and performed fundal exam. Fundal exam neg.  - Patient had been viewing laptop screen for ~ 2 hours prior to experiencing blurry vision  - Per ophtho, blurry vision due to dry eyes. rec'd artificial tears QID prn and warm compresses BID prn. Can escalate artificial tears to ointment if needed.    Antineoplastic chemotherapy induced pancytopenia  - WBC 0.23, ANC 90, Hgb 11.5, plt 28k  - lovenox held; need to add back once plt >50K again  - transfuse Hgb <7g and plt <10k    Rash and nonspecific skin eruption  - Newly noted macular rash to upper back and chest on 7/29  - Intermittently pruritic  - GVHD not suspected this soon after transplant  - Likely drug reaction, thought maybe to be from DMSO from stem cell transplant  - Continue to monitor closely  - Started on PRN PO benadryl, topical benadryl cream, and topical hydrocortisone   - Patient states that benadryl causes him to be jittery. Ordered prn atarax instead.  -  "improved complaint and on exam    Abdominal gas pain  - Started on PRN simethicone    Headache  - Started having headaches several weeks ago. Onset seems to correspond to when patient quit smoking and when he started ponatinib  - Has been off of ponatinib for a few days but fevers have persisted  - Pattern is like cluster headaches (onset during sleep), but not unilateral.  - O2 via non-rebreather at 12 L over 15 minutes seemed to help but developed hot flush so pt stopped  Ultram, Flexeril, Oxycodone not helping  Imitrex PRN q2hr (max 200mg in 24hrs). Not contraindicated per pharm D.  Description of headache sounds like tension headache--throbbing with pressure across crown of head  No focal neurological deficits  - Patient reporting that headache may be sinus in nature. Maxillary tenderness noted.  - Patient takes Claritin at home. States that he cannot take Zyrtec (which is on formulary) due to SE and "addiction"  - States that it was hard for him to ween himself off of Zyrtec.  - Stared Flonase 7/27/20. Started home Claritin 7/27/20 (not on formulary)  - Somewhat responding to caffeine, Imitrex, tramadol, oxy, and ativan  - Now c/o blurry vision, which may also be contributing to headaches. Seen by ophtho for this  - Continue to monitor closely; especially with expected thrombocytopenia  - Neuro on consult - trial IV mag. Appreciate input  - improved    Cervical stenosis of spine  - Continue home Flexeril and ultram  - PRN oxy IR added for additional relief    Tobacco abuse, in remission  - 35 pack year history  - Quit smoking 6/1/2020 using Chantix  - Patient completed course of Chantix and denies need for Nicotine patch.     Daily consumption of alcohol  - Drinks 3-4 alcoholic beverages nightly  - Will need to monitor closely for s/s of alcohol withdrawal while inpatient  - Will consider consulting addiction medicine if indicated  - Prn ativan available    BPH (benign prostatic hyperplasia)  - Continue home " Flomax    GERD (gastroesophageal reflux disease)  - Takes Nexium at home  - Giving Protonix while inpatient as Nexium is not on hospital formulary  - Can resume Nexium at discharge  - Had CP this am that is believed to be due to GERD (troponin and EKG unremarkable). Relieved after protonix administration  - PRN TUMS ordered.    Essential hypertension  - increased home amlodipine from 5 mg to 10 mg daily  - BPs as high as 180s/110s  - Started prn hydralazine - possibly worsened headache  - Decreased IVF rate  - BP normalized    CML in remission  - Presented to [facility] with CMP in blast crisis and with nodular disease on 3/30/2020  - Induced with FLAG-Addis. Achieved morphologic CR  - Started on daily Ponatinib 30 mg daily, which he continues to take outpatient. Will hold Ponatinib on admission.  - Referred to Dr. Hodges by Dr. Ramos for SCT consideration  - Had BMBx at [facility] on 7/1/2020 showing no morphologic or immunophenotypic evidence of CML  - Admitted 7/21/20 for planned Flu/Cy/TBI haplo allo SCT  - See SCT candidate      VTE Risk Mitigation (From admission, onward)         Ordered     heparin, porcine (PF) 100 unit/mL injection flush 300 Units  As needed (PRN)      07/21/20 1920                Disposition: pending cc of alloSCT    Hunter Basurto MD  Bone Marrow Transplant  [facility]

## 2020-08-05 NOTE — PLAN OF CARE
Day +7 allo/haplo SCT. Patient is progressing and involved with plan of care. Frequent rounds made to assess pain and safety. Pt communicating needs throughout shift. Up ad maddi. Tolerating diet, voiding without difficulty. Pain controlled with PRN oxyX1. Pt c/o nausea. Pt refused Zofran and Compazine.Nausea controlled with AtivanX2.  All vitals stable; no acute events this shift. Pt. Remaining free from falls or injury throughout shift; bed in lowest position; side rails up X2; call light within reach; pt instructed to call for assistance as needed - verbalized understanding. Q2h rounding on patient. Will continue to monitor.

## 2020-08-05 NOTE — ASSESSMENT & PLAN NOTE
"- Started having headaches several weeks ago. Onset seems to correspond to when patient quit smoking and when he started ponatinib  - Has been off of ponatinib for a few days but fevers have persisted  - Pattern is like cluster headaches (onset during sleep), but not unilateral.  - O2 via non-rebreather at 12 L over 15 minutes seemed to help but developed hot flush so pt stopped  Ultram, Flexeril, Oxycodone not helping  Imitrex PRN q2hr (max 200mg in 24hrs). Not contraindicated per pharm D.  Description of headache sounds like tension headache--throbbing with pressure across crown of head  No focal neurological deficits  - Patient reporting that headache may be sinus in nature. Maxillary tenderness noted.  - Patient takes Claritin at home. States that he cannot take Zyrtec (which is on formulary) due to SE and "addiction"  - States that it was hard for him to ween himself off of Zyrtec.  - Stared Flonase 7/27/20. Started home Claritin 7/27/20 (not on formulary)  - Somewhat responding to caffeine, Imitrex, tramadol, oxy, and ativan  - Now c/o blurry vision, which may also be contributing to headaches. Seen by ophtho for this  - Continue to monitor closely; especially with expected thrombocytopenia  - Neuro on consult - trial IV mag. Appreciate input  - improved  "

## 2020-08-06 LAB
ALBUMIN SERPL BCP-MCNC: 3.6 G/DL (ref 3.5–5.2)
ALP SERPL-CCNC: 62 U/L (ref 55–135)
ALT SERPL W/O P-5'-P-CCNC: 24 U/L (ref 10–44)
ANION GAP SERPL CALC-SCNC: 10 MMOL/L (ref 8–16)
ANISOCYTOSIS BLD QL SMEAR: SLIGHT
AST SERPL-CCNC: 13 U/L (ref 10–40)
BASOPHILS # BLD AUTO: 0 K/UL (ref 0–0.2)
BASOPHILS NFR BLD: 0 % (ref 0–1.9)
BILIRUB SERPL-MCNC: 0.4 MG/DL (ref 0.1–1)
BUN SERPL-MCNC: 11 MG/DL (ref 8–23)
CALCIUM SERPL-MCNC: 8.6 MG/DL (ref 8.7–10.5)
CHLORIDE SERPL-SCNC: 108 MMOL/L (ref 95–110)
CO2 SERPL-SCNC: 21 MMOL/L (ref 23–29)
CREAT SERPL-MCNC: 0.7 MG/DL (ref 0.5–1.4)
DIFFERENTIAL METHOD: ABNORMAL
EOSINOPHIL # BLD AUTO: 0 K/UL (ref 0–0.5)
EOSINOPHIL NFR BLD: 14.3 % (ref 0–8)
ERYTHROCYTE [DISTWIDTH] IN BLOOD BY AUTOMATED COUNT: 12.5 % (ref 11.5–14.5)
EST. GFR  (AFRICAN AMERICAN): >60 ML/MIN/1.73 M^2
EST. GFR  (NON AFRICAN AMERICAN): >60 ML/MIN/1.73 M^2
GLUCOSE SERPL-MCNC: 135 MG/DL (ref 70–110)
HCT VFR BLD AUTO: 32.6 % (ref 40–54)
HGB BLD-MCNC: 11.2 G/DL (ref 14–18)
IMM GRANULOCYTES # BLD AUTO: 0 K/UL (ref 0–0.04)
IMM GRANULOCYTES NFR BLD AUTO: 0 % (ref 0–0.5)
LYMPHOCYTES # BLD AUTO: 0 K/UL (ref 1–4.8)
LYMPHOCYTES NFR BLD: 57.1 % (ref 18–48)
MAGNESIUM SERPL-MCNC: 1.6 MG/DL (ref 1.6–2.6)
MCH RBC QN AUTO: 33.4 PG (ref 27–31)
MCHC RBC AUTO-ENTMCNC: 34.4 G/DL (ref 32–36)
MCV RBC AUTO: 97 FL (ref 82–98)
MONOCYTES # BLD AUTO: 0 K/UL (ref 0.3–1)
MONOCYTES NFR BLD: 0 % (ref 4–15)
NEUTROPHILS # BLD AUTO: 0 K/UL (ref 1.8–7.7)
NEUTROPHILS NFR BLD: 28.6 % (ref 38–73)
NRBC BLD-RTO: 0 /100 WBC
OVALOCYTES BLD QL SMEAR: ABNORMAL
PHOSPHATE SERPL-MCNC: 3.6 MG/DL (ref 2.7–4.5)
PLATELET # BLD AUTO: 15 K/UL (ref 150–350)
PMV BLD AUTO: 12.2 FL (ref 9.2–12.9)
POIKILOCYTOSIS BLD QL SMEAR: SLIGHT
POLYCHROMASIA BLD QL SMEAR: ABNORMAL
POTASSIUM SERPL-SCNC: 3.8 MMOL/L (ref 3.5–5.1)
PROT SERPL-MCNC: 6.4 G/DL (ref 6–8.4)
RBC # BLD AUTO: 3.35 M/UL (ref 4.6–6.2)
SODIUM SERPL-SCNC: 139 MMOL/L (ref 136–145)
TACROLIMUS BLD-MCNC: 2 NG/ML (ref 5–15)
WBC # BLD AUTO: 0.07 K/UL (ref 3.9–12.7)

## 2020-08-06 PROCEDURE — 83735 ASSAY OF MAGNESIUM: CPT

## 2020-08-06 PROCEDURE — 94761 N-INVAS EAR/PLS OXIMETRY MLT: CPT

## 2020-08-06 PROCEDURE — 85025 COMPLETE CBC W/AUTO DIFF WBC: CPT

## 2020-08-06 PROCEDURE — 25000003 PHARM REV CODE 250: Performed by: NURSE PRACTITIONER

## 2020-08-06 PROCEDURE — A9155 ARTIFICIAL SALIVA: HCPCS | Performed by: INTERNAL MEDICINE

## 2020-08-06 PROCEDURE — 84100 ASSAY OF PHOSPHORUS: CPT

## 2020-08-06 PROCEDURE — A4216 STERILE WATER/SALINE, 10 ML: HCPCS | Performed by: NURSE PRACTITIONER

## 2020-08-06 PROCEDURE — 63600175 PHARM REV CODE 636 W HCPCS

## 2020-08-06 PROCEDURE — 25000003 PHARM REV CODE 250

## 2020-08-06 PROCEDURE — 80053 COMPREHEN METABOLIC PANEL: CPT

## 2020-08-06 PROCEDURE — 63600175 PHARM REV CODE 636 W HCPCS: Performed by: INTERNAL MEDICINE

## 2020-08-06 PROCEDURE — 99233 PR SUBSEQUENT HOSPITAL CARE,LEVL III: ICD-10-PCS | Mod: ,,, | Performed by: INTERNAL MEDICINE

## 2020-08-06 PROCEDURE — 20600001 HC STEP DOWN PRIVATE ROOM

## 2020-08-06 PROCEDURE — 80197 ASSAY OF TACROLIMUS: CPT

## 2020-08-06 PROCEDURE — 25000003 PHARM REV CODE 250: Performed by: INTERNAL MEDICINE

## 2020-08-06 PROCEDURE — 99233 SBSQ HOSP IP/OBS HIGH 50: CPT | Mod: ,,, | Performed by: INTERNAL MEDICINE

## 2020-08-06 PROCEDURE — 25000003 PHARM REV CODE 250: Performed by: STUDENT IN AN ORGANIZED HEALTH CARE EDUCATION/TRAINING PROGRAM

## 2020-08-06 PROCEDURE — 63600175 PHARM REV CODE 636 W HCPCS: Performed by: NURSE PRACTITIONER

## 2020-08-06 RX ORDER — TACROLIMUS 1 MG/1
3 CAPSULE ORAL EVERY MORNING
Status: DISCONTINUED | OUTPATIENT
Start: 2020-08-07 | End: 2020-08-08

## 2020-08-06 RX ORDER — TACROLIMUS 0.5 MG/1
0.5 CAPSULE ORAL ONCE
Status: COMPLETED | OUTPATIENT
Start: 2020-08-06 | End: 2020-08-06

## 2020-08-06 RX ORDER — MAGNESIUM SULFATE HEPTAHYDRATE 40 MG/ML
2 INJECTION, SOLUTION INTRAVENOUS ONCE
Status: COMPLETED | OUTPATIENT
Start: 2020-08-06 | End: 2020-08-06

## 2020-08-06 RX ADMIN — URSODIOL 300 MG: 300 CAPSULE ORAL at 08:08

## 2020-08-06 RX ADMIN — LORAZEPAM 1 MG: 2 INJECTION INTRAMUSCULAR; INTRAVENOUS at 06:08

## 2020-08-06 RX ADMIN — TACROLIMUS 2.5 MG: 1 CAPSULE ORAL at 08:08

## 2020-08-06 RX ADMIN — Medication 30 ML: at 09:08

## 2020-08-06 RX ADMIN — LEVOFLOXACIN 500 MG: 500 TABLET, FILM COATED ORAL at 09:08

## 2020-08-06 RX ADMIN — TAMSULOSIN HYDROCHLORIDE 0.4 MG: 0.4 CAPSULE ORAL at 09:08

## 2020-08-06 RX ADMIN — ALUMINUM HYDROXIDE, MAGNESIUM HYDROXIDE, AND DIMETHICONE 10 ML: 400; 400; 40 SUSPENSION ORAL at 04:08

## 2020-08-06 RX ADMIN — HYDROCORTISONE: 10 CREAM TOPICAL at 09:08

## 2020-08-06 RX ADMIN — FLUTICASONE PROPIONATE 100 MCG: 50 SPRAY, METERED NASAL at 09:08

## 2020-08-06 RX ADMIN — HYDROCORTISONE: 10 CREAM TOPICAL at 08:08

## 2020-08-06 RX ADMIN — Medication 400 MG: at 06:08

## 2020-08-06 RX ADMIN — ONDANSETRON HYDROCHLORIDE 8 MG: 4 TABLET, FILM COATED ORAL at 09:08

## 2020-08-06 RX ADMIN — Medication 10 ML: at 05:08

## 2020-08-06 RX ADMIN — OXYCODONE HYDROCHLORIDE 10 MG: 5 TABLET ORAL at 04:08

## 2020-08-06 RX ADMIN — Medication 10 ML: at 11:08

## 2020-08-06 RX ADMIN — Medication 30 ML: at 05:08

## 2020-08-06 RX ADMIN — Medication 10 ML: at 12:08

## 2020-08-06 RX ADMIN — PANTOPRAZOLE SODIUM 40 MG: 40 TABLET, DELAYED RELEASE ORAL at 09:08

## 2020-08-06 RX ADMIN — TACROLIMUS 3.5 MG: 1 CAPSULE ORAL at 05:08

## 2020-08-06 RX ADMIN — FLUCONAZOLE 400 MG: 200 TABLET ORAL at 09:08

## 2020-08-06 RX ADMIN — LORAZEPAM 1 MG: 2 INJECTION INTRAMUSCULAR; INTRAVENOUS at 04:08

## 2020-08-06 RX ADMIN — Medication 30 ML: at 08:08

## 2020-08-06 RX ADMIN — MYCOPHENOLATE MOFETIL 1000 MG: 250 CAPSULE ORAL at 04:08

## 2020-08-06 RX ADMIN — GABAPENTIN 300 MG: 300 CAPSULE ORAL at 08:08

## 2020-08-06 RX ADMIN — ALUMINUM HYDROXIDE, MAGNESIUM HYDROXIDE, AND DIMETHICONE 10 ML: 400; 400; 40 SUSPENSION ORAL at 08:08

## 2020-08-06 RX ADMIN — ACYCLOVIR 800 MG: 800 TABLET ORAL at 09:08

## 2020-08-06 RX ADMIN — AMLODIPINE BESYLATE 10 MG: 10 TABLET ORAL at 09:08

## 2020-08-06 RX ADMIN — Medication 30 ML: at 01:08

## 2020-08-06 RX ADMIN — TRAMADOL HYDROCHLORIDE 50 MG: 50 TABLET, FILM COATED ORAL at 08:08

## 2020-08-06 RX ADMIN — URSODIOL 300 MG: 300 CAPSULE ORAL at 09:08

## 2020-08-06 RX ADMIN — MAGNESIUM SULFATE IN WATER 2 G: 40 INJECTION, SOLUTION INTRAVENOUS at 10:08

## 2020-08-06 RX ADMIN — TACROLIMUS 0.5 MG: 0.5 CAPSULE ORAL at 11:08

## 2020-08-06 RX ADMIN — ALUMINUM HYDROXIDE, MAGNESIUM HYDROXIDE, AND DIMETHICONE 10 ML: 400; 400; 40 SUSPENSION ORAL at 01:08

## 2020-08-06 RX ADMIN — FILGRASTIM 480 MCG: 480 INJECTION, SOLUTION INTRAVENOUS; SUBCUTANEOUS at 09:08

## 2020-08-06 RX ADMIN — MYCOPHENOLATE MOFETIL 1000 MG: 250 CAPSULE ORAL at 09:08

## 2020-08-06 RX ADMIN — TRAMADOL HYDROCHLORIDE 50 MG: 50 TABLET, FILM COATED ORAL at 10:08

## 2020-08-06 RX ADMIN — HYDROXYZINE HYDROCHLORIDE 25 MG: 25 TABLET, FILM COATED ORAL at 10:08

## 2020-08-06 RX ADMIN — ACYCLOVIR 800 MG: 800 TABLET ORAL at 05:08

## 2020-08-06 RX ADMIN — ONDANSETRON HYDROCHLORIDE 8 MG: 4 TABLET, FILM COATED ORAL at 01:08

## 2020-08-06 RX ADMIN — ALUMINUM HYDROXIDE, MAGNESIUM HYDROXIDE, AND DIMETHICONE 10 ML: 400; 400; 40 SUSPENSION ORAL at 09:08

## 2020-08-06 RX ADMIN — HYDROXYZINE HYDROCHLORIDE 25 MG: 25 TABLET, FILM COATED ORAL at 08:08

## 2020-08-06 RX ADMIN — LORAZEPAM 1 MG: 2 INJECTION INTRAMUSCULAR; INTRAVENOUS at 09:08

## 2020-08-06 RX ADMIN — MYCOPHENOLATE MOFETIL 1000 MG: 250 CAPSULE ORAL at 08:08

## 2020-08-06 RX ADMIN — Medication 10 ML: at 06:08

## 2020-08-06 NOTE — PLAN OF CARE
Day +9 of Allo/Haplo SCT. Afebrile. Free from falls or injury. Oxy IR given for pain. Ativan IV given for nausea. Pt is up independently to restroom. Bed locked in lowest position, non skid socks on, call light within reach. Pt instructed to call if any assistance is needed. Vitals stable. Will cont to colton pt.

## 2020-08-06 NOTE — SUBJECTIVE & OBJECTIVE
Subjective:     Interval History: Day +9 from Flu/Cy/TBI haplo alloSCT for CML. NEON. HA this morning that kept him up overnight. Tac 2 this AM. No signs of aGVHD. Denies NVDC.     Objective:     Vital Signs (Most Recent):  Temp: 98.2 °F (36.8 °C) (08/06/20 0900)  Pulse: 96 (08/06/20 0900)  Resp: 18 (08/06/20 0900)  BP: 125/82 (08/06/20 0900)  SpO2: 98 % (08/06/20 0900) Vital Signs (24h Range):  Temp:  [98 °F (36.7 °C)-98.6 °F (37 °C)] 98.2 °F (36.8 °C)  Pulse:  [85-96] 96  Resp:  [18-20] 18  SpO2:  [96 %-99 %] 98 %  BP: (125-148)/(64-82) 125/82     Weight: 87.6 kg (193 lb 3.7 oz)  Body mass index is 31.19 kg/m².  Body surface area is 2.02 meters squared.    Intake/Output - Last 3 Shifts       08/04 0700 - 08/05 0659 08/05 0700 - 08/06 0659 08/06 0700 - 08/07 0659    P.O. 2320 1431 597    I.V. (mL/kg) 555 (6.3)      Total Intake(mL/kg) 2875 (32.4) 1431 (16.3) 597 (6.8)    Urine (mL/kg/hr) 1650 (0.8) 1450 (0.7)     Stool       Total Output 1650 1450     Net +1225 -19 +597               Physical Exam  Constitutional:       General: He is not in acute distress.     Appearance: He is normal weight. He is not ill-appearing.   HENT:      Head: Normocephalic and atraumatic.      Mouth/Throat:      Mouth: Mucous membranes are dry. Mild evidence of mucositis on bottom gums.     Pharynx: No oropharyngeal exudate or posterior oropharyngeal erythema.   Eyes:      General: No scleral icterus.     Pupils: Pupils are equal, round, and reactive to light.   Neck:      Musculoskeletal: Normal range of motion. No neck rigidity.   Cardiovascular:      Rate and Rhythm: Normal rate and regular rhythm.      Pulses: Normal pulses.      Heart sounds: Normal heart sounds. No murmur.   Pulmonary:      Effort: Pulmonary effort is normal. No respiratory distress.      Breath sounds: Normal breath sounds.   Abdominal:      General: Abdomen is flat. Bowel sounds are normal.      Palpations: Abdomen is soft.   Musculoskeletal: Normal range of  motion.      Right lower leg: No edema.      Left lower leg: No edema.      Comments: RCW grewal CDI   Lymphadenopathy:      Cervical: No cervical adenopathy.   Skin:     General: Skin is warm.      Coloration: Skin is not jaundiced or pale.   Neurological:      General: No focal deficit present.      Mental Status: He is alert and oriented to person, place, and time. Mental status is at baseline.   Psychiatric:         Mood and Affect: Mood normal.         Thought Content: Thought content normal.     Significant Labs:   CBC:   Recent Labs   Lab 08/05/20 0315 08/06/20  0243   WBC 0.23* 0.07*   HGB 11.5* 11.2*   HCT 33.9* 32.6*   PLT 28* 15*    and CMP:   Recent Labs   Lab 08/05/20 0315 08/06/20  0243    139   K 4.0 3.8    108   CO2 25 21*    135*   BUN 12 11   CREATININE 0.7 0.7   CALCIUM 9.0 8.6*   PROT 6.8 6.4   ALBUMIN 3.7 3.6   BILITOT 0.4 0.4   ALKPHOS 57 62   AST 12 13   ALT 21 24   ANIONGAP 6* 10   EGFRNONAA >60.0 >60.0       Diagnostic Results:  I have reviewed all pertinent imaging results/findings within the past 24 hours.

## 2020-08-06 NOTE — ASSESSMENT & PLAN NOTE
Admitted 7/21/20 for planned Flu/Cy/TBI haplo allo SCT. Son is the donor. Today is Day +9.  Receive 2 bags and a total CD34 dose of 3.10 x10^6/kg on 7/28/20  Will receive post transplant cyclophosphamide on Day +3 and +4.  Of note, patient is to not receive any steroids until 24hrs after completion of post tx CTX. This will be 8/5 @1130  - No signs of GVHD  - Tac level 2 this AM, will increase tac by ~25% 3mg AM /3.5 mg PM regimen (increased from 2.5 mg BID)    Planned conditioning regimen:  Fludarabine on Day -6, -5, -4, -3, and -2  Cyclophosphamide on Day -6 and -5  Total Body Irradiation on Day -1     Planned  GVHD Prophylaxis:  Cyclophosphamide on Day +3 and +4  Tacrolimus starting on Day +5  Mycophenolate starting on Day +5     Antimicrobial Prophylaxis:  Acyclovir starting on Day -6  Levofloxacin starting on Day -1  Fluconazole starting on Day -1  Bactrim starting on Day +30     SOS/VOD Prophylaxis:  Ursodiol on Day -6 through Day +30     Growth Factor Support:  Neupogen starting on Day +7

## 2020-08-06 NOTE — ASSESSMENT & PLAN NOTE
"- Started having headaches several weeks ago. Onset seems to correspond to when patient quit smoking and when he started ponatinib  - Has been off of ponatinib for a few days but fevers have persisted  - Pattern is like cluster headaches (onset during sleep), but not unilateral.  - O2 via non-rebreather at 12 L over 15 minutes seemed to help but developed hot flush so pt stopped  Ultram, Flexeril, Oxycodone not helping  Imitrex PRN q2hr (max 200mg in 24hrs). Not contraindicated per pharm D.  Description of headache sounds like tension headache--throbbing with pressure across crown of head  No focal neurological deficits  - Patient reporting that headache may be sinus in nature. Maxillary tenderness noted.  - Patient takes Claritin at home. States that he cannot take Zyrtec (which is on formulary) due to SE and "addiction"  - States that it was hard for him to ween himself off of Zyrtec.  - Stared Flonase 7/27/20. Started home Claritin 7/27/20 (not on formulary)  - Somewhat responding to caffeine, Imitrex, tramadol, oxy, and ativan  - Now c/o blurry vision, which may also be contributing to headaches. Seen by ophtho for this  - Continue to monitor closely; especially with expected thrombocytopenia  - Neuro on consult - trial IV mag. Appreciate input  - worsened this AM, mag 1.6  - will give 2g IV   "

## 2020-08-06 NOTE — PROGRESS NOTES
[facility]  Hematology  Bone Marrow Transplant  Progress Note    Patient Name: Kvng Jones SrCamron  Admission Date: 7/21/2020  Hospital Length of Stay: 16 days  Code Status: Full Code    Subjective:     Interval History: Day +9 from Flu/Cy/TBI haplo alloSCT for CML. NEON. HA this morning that kept him up overnight. Tac 2 this AM. No signs of aGVHD. Denies NVDC.     Objective:     Vital Signs (Most Recent):  Temp: 98.2 °F (36.8 °C) (08/06/20 0900)  Pulse: 96 (08/06/20 0900)  Resp: 18 (08/06/20 0900)  BP: 125/82 (08/06/20 0900)  SpO2: 98 % (08/06/20 0900) Vital Signs (24h Range):  Temp:  [98 °F (36.7 °C)-98.6 °F (37 °C)] 98.2 °F (36.8 °C)  Pulse:  [85-96] 96  Resp:  [18-20] 18  SpO2:  [96 %-99 %] 98 %  BP: (125-148)/(64-82) 125/82     Weight: 87.6 kg (193 lb 3.7 oz)  Body mass index is 31.19 kg/m².  Body surface area is 2.02 meters squared.    Intake/Output - Last 3 Shifts       08/04 0700 - 08/05 0659 08/05 0700 - 08/06 0659 08/06 0700 - 08/07 0659    P.O. 2320 1431 597    I.V. (mL/kg) 555 (6.3)      Total Intake(mL/kg) 2875 (32.4) 1431 (16.3) 597 (6.8)    Urine (mL/kg/hr) 1650 (0.8) 1450 (0.7)     Stool       Total Output 1650 1450     Net +1225 -19 +597               Physical Exam  Constitutional:       General: He is not in acute distress.     Appearance: He is normal weight. He is not ill-appearing.   HENT:      Head: Normocephalic and atraumatic.      Mouth/Throat:      Mouth: Mucous membranes are dry. Mild evidence of mucositis on bottom gums.     Pharynx: No oropharyngeal exudate or posterior oropharyngeal erythema.   Eyes:      General: No scleral icterus.     Pupils: Pupils are equal, round, and reactive to light.   Neck:      Musculoskeletal: Normal range of motion. No neck rigidity.   Cardiovascular:      Rate and Rhythm: Normal rate and regular rhythm.      Pulses: Normal pulses.      Heart sounds: Normal heart sounds. No murmur.   Pulmonary:      Effort: Pulmonary effort is normal.  No respiratory distress.      Breath sounds: Normal breath sounds.   Abdominal:      General: Abdomen is flat. Bowel sounds are normal.      Palpations: Abdomen is soft.   Musculoskeletal: Normal range of motion.      Right lower leg: No edema.      Left lower leg: No edema.      Comments: RCW uri CDI   Lymphadenopathy:      Cervical: No cervical adenopathy.   Skin:     General: Skin is warm.      Coloration: Skin is not jaundiced or pale.   Neurological:      General: No focal deficit present.      Mental Status: He is alert and oriented to person, place, and time. Mental status is at baseline.   Psychiatric:         Mood and Affect: Mood normal.         Thought Content: Thought content normal.     Significant Labs:   CBC:   Recent Labs   Lab 08/05/20 0315 08/06/20  0243   WBC 0.23* 0.07*   HGB 11.5* 11.2*   HCT 33.9* 32.6*   PLT 28* 15*    and CMP:   Recent Labs   Lab 08/05/20 0315 08/06/20  0243    139   K 4.0 3.8    108   CO2 25 21*    135*   BUN 12 11   CREATININE 0.7 0.7   CALCIUM 9.0 8.6*   PROT 6.8 6.4   ALBUMIN 3.7 3.6   BILITOT 0.4 0.4   ALKPHOS 57 62   AST 12 13   ALT 21 24   ANIONGAP 6* 10   EGFRNONAA >60.0 >60.0       Diagnostic Results:  I have reviewed all pertinent imaging results/findings within the past 24 hours.    Assessment/Plan:     * History of allogeneic stem cell transplant  Admitted 7/21/20 for planned Flu/Cy/TBI haplo allo SCT. Son is the donor. Today is Day +9.  Receive 2 bags and a total CD34 dose of 3.10 x10^6/kg on 7/28/20  Will receive post transplant cyclophosphamide on Day +3 and +4.  Of note, patient is to not receive any steroids until 24hrs after completion of post tx CTX. This will be 8/5 @1130  - No signs of GVHD  - Tac level 2 this AM, will increase tac by ~25% 3mg AM /3.5 mg PM regimen (increased from 2.5 mg BID)    Planned conditioning regimen:  Fludarabine on Day -6, -5, -4, -3, and -2  Cyclophosphamide on Day -6 and -5  Total Body Irradiation on  Day -1     Planned  GVHD Prophylaxis:  Cyclophosphamide on Day +3 and +4  Tacrolimus starting on Day +5  Mycophenolate starting on Day +5     Antimicrobial Prophylaxis:  Acyclovir starting on Day -6  Levofloxacin starting on Day -1  Fluconazole starting on Day -1  Bactrim starting on Day +30     SOS/VOD Prophylaxis:  Ursodiol on Day -6 through Day +30     Growth Factor Support:  Neupogen starting on Day +7     Mucositis due to chemotherapy  - mild evidence on exam and complaint this AM  - c/w [facility] scheduled    CINV (chemotherapy-induced nausea and vomiting)  - improved with scheduled zofran and compazine  - continued with scheduled compazine yesterday and prn zofran  - no complaints this AM, had been refusing scheduled meds  - controlled, continue with prn meds    Acute myeloid leukemia in remission  - See CML    Blurry vision  - C/o worsening blurry vision 7/30/20  - May be contributing to headaches  - Ophtho consulted and saw patient 7/30  - Ophtho dilated pupils and performed fundal exam. Fundal exam neg.  - Patient had been viewing laptop screen for ~ 2 hours prior to experiencing blurry vision  - Per ophtho, blurry vision due to dry eyes. rec'd artificial tears QID prn and warm compresses BID prn. Can escalate artificial tears to ointment if needed.    Antineoplastic chemotherapy induced pancytopenia  - WBC 0.07, ANC 20, Hgb 11.2, plt 15k  - lovenox held; need to add back once plt >50K again  - transfuse Hgb <7g and plt <10k    Rash and nonspecific skin eruption  - Newly noted macular rash to upper back and chest on 7/29  - Intermittently pruritic  - GVHD not suspected this soon after transplant  - Likely drug reaction, thought maybe to be from DMSO from stem cell transplant  - Continue to monitor closely  - Started on PRN PO benadryl, topical benadryl cream, and topical hydrocortisone   - Patient states that benadryl causes him to be jittery. Ordered prn atarax instead.  - improved complaint and on  "exam    Abdominal gas pain  - Started on PRN simethicone    Headache  - Started having headaches several weeks ago. Onset seems to correspond to when patient quit smoking and when he started ponatinib  - Has been off of ponatinib for a few days but fevers have persisted  - Pattern is like cluster headaches (onset during sleep), but not unilateral.  - O2 via non-rebreather at 12 L over 15 minutes seemed to help but developed hot flush so pt stopped  Ultram, Flexeril, Oxycodone not helping  Imitrex PRN q2hr (max 200mg in 24hrs). Not contraindicated per pharm D.  Description of headache sounds like tension headache--throbbing with pressure across crown of head  No focal neurological deficits  - Patient reporting that headache may be sinus in nature. Maxillary tenderness noted.  - Patient takes Claritin at home. States that he cannot take Zyrtec (which is on formulary) due to SE and "addiction"  - States that it was hard for him to ween himself off of Zyrtec.  - Stared Flonase 7/27/20. Started home Claritin 7/27/20 (not on formulary)  - Somewhat responding to caffeine, Imitrex, tramadol, oxy, and ativan  - Now c/o blurry vision, which may also be contributing to headaches. Seen by ophtho for this  - Continue to monitor closely; especially with expected thrombocytopenia  - Neuro on consult - trial IV mag. Appreciate input  - worsened this AM, mag 1.6  - will give 2g IV     Cervical stenosis of spine  - Continue home Flexeril and ultram  - PRN oxy IR added for additional relief    Tobacco abuse, in remission  - 35 pack year history  - Quit smoking 6/1/2020 using Chantix  - Patient completed course of Chantix and denies need for Nicotine patch.     Daily consumption of alcohol  - Drinks 3-4 alcoholic beverages nightly  - Will need to monitor closely for s/s of alcohol withdrawal while inpatient  - Will consider consulting addiction medicine if indicated  - Prn ativan available    BPH (benign prostatic hyperplasia)  - " Continue home Flomax    GERD (gastroesophageal reflux disease)  - Takes Nexium at home  - Giving Protonix while inpatient as Nexium is not on hospital formulary  - Can resume Nexium at discharge  - Had CP this am that is believed to be due to GERD (troponin and EKG unremarkable). Relieved after protonix administration  - PRN TUMS ordered.    Essential hypertension  - increased home amlodipine from 5 mg to 10 mg daily  - BPs as high as 180s/110s  - Started prn hydralazine - possibly worsened headache  - Decreased IVF rate  - BP normalized    CML in remission  - Presented to [facility] with CMP in blast crisis and with nodular disease on 3/30/2020  - Induced with FLAG-Addis. Achieved morphologic CR  - Started on daily Ponatinib 30 mg daily, which he continues to take outpatient. Will hold Ponatinib on admission.  - Referred to Dr. Hodges by Dr. Ramos for SCT consideration  - Had [facility]x [facility] on 7/1/2020 showing no morphologic or immunophenotypic evidence of CML  - Admitted 7/21/20 for planned Flu/Cy/TBI haplo allo SCT  - See SCT candidate        VTE Risk Mitigation (From admission, onward)         Ordered     heparin, porcine (PF) 100 unit/mL injection flush 300 Units  As needed (PRN)      07/21/20 1920                Disposition: pending cc of alloSCT    Hunter Basurto MD  Bone Marrow Transplant  [facility]

## 2020-08-06 NOTE — PLAN OF CARE
Plan of care reviewed with patient.  Fall precautions maintained,side rails up x2, call light  in reach, bed in low position and locked,. Day 9 of auto.  Encourage to eat and drink. Ambulating about in his room , voiding without difficulty.  Requesting pain and nausea medications during the shift and getting relief.  Patient received magnesium ivpb today for mag of 1.7.

## 2020-08-06 NOTE — ASSESSMENT & PLAN NOTE
- improved with scheduled zofran and compazine  - continued with scheduled compazine yesterday and prn zofran  - no complaints this AM, had been refusing scheduled meds  - controlled, continue with prn meds

## 2020-08-06 NOTE — ASSESSMENT & PLAN NOTE
- WBC 0.07, ANC 20, Hgb 11.2, plt 15k  - lovenox held; need to add back once plt >50K again  - transfuse Hgb <7g and plt <10k

## 2020-08-07 LAB
ABO + RH BLD: NORMAL
ALBUMIN SERPL BCP-MCNC: 3.9 G/DL (ref 3.5–5.2)
ALP SERPL-CCNC: 75 U/L (ref 55–135)
ALT SERPL W/O P-5'-P-CCNC: 25 U/L (ref 10–44)
ANION GAP SERPL CALC-SCNC: 8 MMOL/L (ref 8–16)
ANISOCYTOSIS BLD QL SMEAR: SLIGHT
AST SERPL-CCNC: 12 U/L (ref 10–40)
BASOPHILS # BLD AUTO: 0.01 K/UL (ref 0–0.2)
BASOPHILS NFR BLD: 16.7 % (ref 0–1.9)
BILIRUB SERPL-MCNC: 0.6 MG/DL (ref 0.1–1)
BLD GP AB SCN CELLS X3 SERPL QL: NORMAL
BUN SERPL-MCNC: 13 MG/DL (ref 8–23)
CALCIUM SERPL-MCNC: 9.5 MG/DL (ref 8.7–10.5)
CHLORIDE SERPL-SCNC: 104 MMOL/L (ref 95–110)
CO2 SERPL-SCNC: 26 MMOL/L (ref 23–29)
CREAT SERPL-MCNC: 0.7 MG/DL (ref 0.5–1.4)
DIFFERENTIAL METHOD: ABNORMAL
EOSINOPHIL # BLD AUTO: 0 K/UL (ref 0–0.5)
EOSINOPHIL NFR BLD: 16.7 % (ref 0–8)
ERYTHROCYTE [DISTWIDTH] IN BLOOD BY AUTOMATED COUNT: 12.3 % (ref 11.5–14.5)
EST. GFR  (AFRICAN AMERICAN): >60 ML/MIN/1.73 M^2
EST. GFR  (NON AFRICAN AMERICAN): >60 ML/MIN/1.73 M^2
GLUCOSE SERPL-MCNC: 98 MG/DL (ref 70–110)
HCT VFR BLD AUTO: 34.3 % (ref 40–54)
HGB BLD-MCNC: 11.7 G/DL (ref 14–18)
HYPOCHROMIA BLD QL SMEAR: ABNORMAL
IMM GRANULOCYTES # BLD AUTO: 0 K/UL (ref 0–0.04)
IMM GRANULOCYTES NFR BLD AUTO: 0 % (ref 0–0.5)
LYMPHOCYTES # BLD AUTO: 0 K/UL (ref 1–4.8)
LYMPHOCYTES NFR BLD: 49.9 % (ref 18–48)
MAGNESIUM SERPL-MCNC: 1.8 MG/DL (ref 1.6–2.6)
MCH RBC QN AUTO: 33.3 PG (ref 27–31)
MCHC RBC AUTO-ENTMCNC: 34.1 G/DL (ref 32–36)
MCV RBC AUTO: 98 FL (ref 82–98)
MONOCYTES # BLD AUTO: 0 K/UL (ref 0.3–1)
MONOCYTES NFR BLD: 16.7 % (ref 4–15)
NEUTROPHILS # BLD AUTO: 0 K/UL (ref 1.8–7.7)
NEUTROPHILS NFR BLD: 0 % (ref 38–73)
NRBC BLD-RTO: 0 /100 WBC
OVALOCYTES BLD QL SMEAR: ABNORMAL
PHOSPHATE SERPL-MCNC: 4.6 MG/DL (ref 2.7–4.5)
PLATELET # BLD AUTO: 10 K/UL (ref 150–350)
PMV BLD AUTO: 9.9 FL (ref 9.2–12.9)
POIKILOCYTOSIS BLD QL SMEAR: SLIGHT
POLYCHROMASIA BLD QL SMEAR: ABNORMAL
POTASSIUM SERPL-SCNC: 4.2 MMOL/L (ref 3.5–5.1)
PROT SERPL-MCNC: 7 G/DL (ref 6–8.4)
RBC # BLD AUTO: 3.51 M/UL (ref 4.6–6.2)
SODIUM SERPL-SCNC: 138 MMOL/L (ref 136–145)
TACROLIMUS BLD-MCNC: 4 NG/ML (ref 5–15)
WBC # BLD AUTO: 0.06 K/UL (ref 3.9–12.7)

## 2020-08-07 PROCEDURE — 25000003 PHARM REV CODE 250: Performed by: NURSE PRACTITIONER

## 2020-08-07 PROCEDURE — 84100 ASSAY OF PHOSPHORUS: CPT

## 2020-08-07 PROCEDURE — 25000003 PHARM REV CODE 250: Performed by: INTERNAL MEDICINE

## 2020-08-07 PROCEDURE — 80053 COMPREHEN METABOLIC PANEL: CPT

## 2020-08-07 PROCEDURE — 86901 BLOOD TYPING SEROLOGIC RH(D): CPT

## 2020-08-07 PROCEDURE — 63600175 PHARM REV CODE 636 W HCPCS

## 2020-08-07 PROCEDURE — 80197 ASSAY OF TACROLIMUS: CPT

## 2020-08-07 PROCEDURE — 63600175 PHARM REV CODE 636 W HCPCS: Performed by: INTERNAL MEDICINE

## 2020-08-07 PROCEDURE — 25000003 PHARM REV CODE 250

## 2020-08-07 PROCEDURE — 99233 PR SUBSEQUENT HOSPITAL CARE,LEVL III: ICD-10-PCS | Mod: ,,, | Performed by: INTERNAL MEDICINE

## 2020-08-07 PROCEDURE — A4216 STERILE WATER/SALINE, 10 ML: HCPCS | Performed by: NURSE PRACTITIONER

## 2020-08-07 PROCEDURE — 20600001 HC STEP DOWN PRIVATE ROOM

## 2020-08-07 PROCEDURE — 63600175 PHARM REV CODE 636 W HCPCS: Performed by: STUDENT IN AN ORGANIZED HEALTH CARE EDUCATION/TRAINING PROGRAM

## 2020-08-07 PROCEDURE — 99233 SBSQ HOSP IP/OBS HIGH 50: CPT | Mod: ,,, | Performed by: INTERNAL MEDICINE

## 2020-08-07 PROCEDURE — A9155 ARTIFICIAL SALIVA: HCPCS | Performed by: INTERNAL MEDICINE

## 2020-08-07 PROCEDURE — 83735 ASSAY OF MAGNESIUM: CPT

## 2020-08-07 PROCEDURE — 85025 COMPLETE CBC W/AUTO DIFF WBC: CPT

## 2020-08-07 PROCEDURE — 25000003 PHARM REV CODE 250: Performed by: STUDENT IN AN ORGANIZED HEALTH CARE EDUCATION/TRAINING PROGRAM

## 2020-08-07 RX ORDER — ONDANSETRON 4 MG/1
8 TABLET, FILM COATED ORAL EVERY 8 HOURS
Status: DISCONTINUED | OUTPATIENT
Start: 2020-08-07 | End: 2020-08-12 | Stop reason: HOSPADM

## 2020-08-07 RX ORDER — MAGNESIUM SULFATE HEPTAHYDRATE 40 MG/ML
2 INJECTION, SOLUTION INTRAVENOUS ONCE
Status: COMPLETED | OUTPATIENT
Start: 2020-08-07 | End: 2020-08-07

## 2020-08-07 RX ADMIN — URSODIOL 300 MG: 300 CAPSULE ORAL at 09:08

## 2020-08-07 RX ADMIN — HYDROCORTISONE: 10 CREAM TOPICAL at 08:08

## 2020-08-07 RX ADMIN — ACYCLOVIR 800 MG: 800 TABLET ORAL at 07:08

## 2020-08-07 RX ADMIN — ONDANSETRON HYDROCHLORIDE 8 MG: 4 TABLET, FILM COATED ORAL at 02:08

## 2020-08-07 RX ADMIN — PROCHLORPERAZINE MALEATE 10 MG: 5 TABLET ORAL at 12:08

## 2020-08-07 RX ADMIN — LORAZEPAM 1 MG: 2 INJECTION INTRAMUSCULAR; INTRAVENOUS at 06:08

## 2020-08-07 RX ADMIN — ALUMINUM HYDROXIDE, MAGNESIUM HYDROXIDE, AND DIMETHICONE 10 ML: 400; 400; 40 SUSPENSION ORAL at 12:08

## 2020-08-07 RX ADMIN — MAGNESIUM SULFATE HEPTAHYDRATE 2 G: 40 INJECTION, SOLUTION INTRAVENOUS at 12:08

## 2020-08-07 RX ADMIN — TAMSULOSIN HYDROCHLORIDE 0.4 MG: 0.4 CAPSULE ORAL at 09:08

## 2020-08-07 RX ADMIN — Medication 400 MG: at 12:08

## 2020-08-07 RX ADMIN — LORAZEPAM 1 MG: 2 INJECTION INTRAMUSCULAR; INTRAVENOUS at 07:08

## 2020-08-07 RX ADMIN — FILGRASTIM 480 MCG: 480 INJECTION, SOLUTION INTRAVENOUS; SUBCUTANEOUS at 09:08

## 2020-08-07 RX ADMIN — TRAMADOL HYDROCHLORIDE 50 MG: 50 TABLET, FILM COATED ORAL at 12:08

## 2020-08-07 RX ADMIN — HYDROCORTISONE: 10 CREAM TOPICAL at 09:08

## 2020-08-07 RX ADMIN — Medication 10 ML: at 06:08

## 2020-08-07 RX ADMIN — ALUMINUM HYDROXIDE, MAGNESIUM HYDROXIDE, AND DIMETHICONE 10 ML: 400; 400; 40 SUSPENSION ORAL at 09:08

## 2020-08-07 RX ADMIN — OXYCODONE HYDROCHLORIDE 10 MG: 5 TABLET ORAL at 11:08

## 2020-08-07 RX ADMIN — FLUCONAZOLE 400 MG: 200 TABLET ORAL at 09:08

## 2020-08-07 RX ADMIN — MYCOPHENOLATE MOFETIL 1000 MG: 250 CAPSULE ORAL at 02:08

## 2020-08-07 RX ADMIN — GABAPENTIN 300 MG: 300 CAPSULE ORAL at 08:08

## 2020-08-07 RX ADMIN — Medication 10 ML: at 11:08

## 2020-08-07 RX ADMIN — Medication 30 ML: at 09:08

## 2020-08-07 RX ADMIN — OXYCODONE HYDROCHLORIDE 10 MG: 5 TABLET ORAL at 06:08

## 2020-08-07 RX ADMIN — FLUTICASONE PROPIONATE 100 MCG: 50 SPRAY, METERED NASAL at 09:08

## 2020-08-07 RX ADMIN — HYDROXYZINE HYDROCHLORIDE 25 MG: 25 TABLET, FILM COATED ORAL at 11:08

## 2020-08-07 RX ADMIN — URSODIOL 300 MG: 300 CAPSULE ORAL at 08:08

## 2020-08-07 RX ADMIN — TACROLIMUS 3.5 MG: 1 CAPSULE ORAL at 07:08

## 2020-08-07 RX ADMIN — OXYCODONE HYDROCHLORIDE 10 MG: 5 TABLET ORAL at 12:08

## 2020-08-07 RX ADMIN — TRAMADOL HYDROCHLORIDE 50 MG: 50 TABLET, FILM COATED ORAL at 04:08

## 2020-08-07 RX ADMIN — Medication 30 ML: at 05:08

## 2020-08-07 RX ADMIN — Medication 10 ML: at 12:08

## 2020-08-07 RX ADMIN — ONDANSETRON HYDROCHLORIDE 8 MG: 4 TABLET, FILM COATED ORAL at 09:08

## 2020-08-07 RX ADMIN — AMLODIPINE BESYLATE 10 MG: 10 TABLET ORAL at 09:08

## 2020-08-07 RX ADMIN — OXYCODONE HYDROCHLORIDE 10 MG: 5 TABLET ORAL at 03:08

## 2020-08-07 RX ADMIN — MYCOPHENOLATE MOFETIL 1000 MG: 250 CAPSULE ORAL at 09:08

## 2020-08-07 RX ADMIN — LEVOFLOXACIN 500 MG: 500 TABLET, FILM COATED ORAL at 09:08

## 2020-08-07 RX ADMIN — TRAMADOL HYDROCHLORIDE 50 MG: 50 TABLET, FILM COATED ORAL at 07:08

## 2020-08-07 RX ADMIN — PROCHLORPERAZINE MALEATE 10 MG: 5 TABLET ORAL at 11:08

## 2020-08-07 RX ADMIN — Medication 30 ML: at 08:08

## 2020-08-07 RX ADMIN — LORAZEPAM 1 MG: 2 INJECTION INTRAMUSCULAR; INTRAVENOUS at 12:08

## 2020-08-07 RX ADMIN — MYCOPHENOLATE MOFETIL 1000 MG: 250 CAPSULE ORAL at 08:08

## 2020-08-07 RX ADMIN — PANTOPRAZOLE SODIUM 40 MG: 40 TABLET, DELAYED RELEASE ORAL at 09:08

## 2020-08-07 RX ADMIN — Medication 400 MG: at 09:08

## 2020-08-07 RX ADMIN — Medication 10 ML: at 07:08

## 2020-08-07 RX ADMIN — ACYCLOVIR 800 MG: 800 TABLET ORAL at 09:08

## 2020-08-07 RX ADMIN — ALUMINUM HYDROXIDE, MAGNESIUM HYDROXIDE, AND DIMETHICONE 10 ML: 400; 400; 40 SUSPENSION ORAL at 08:08

## 2020-08-07 RX ADMIN — ALUMINUM HYDROXIDE, MAGNESIUM HYDROXIDE, AND DIMETHICONE 10 ML: 400; 400; 40 SUSPENSION ORAL at 05:08

## 2020-08-07 RX ADMIN — TACROLIMUS 3 MG: 1 CAPSULE ORAL at 09:08

## 2020-08-07 RX ADMIN — Medication 30 ML: at 12:08

## 2020-08-07 NOTE — PROGRESS NOTES
[facility]  Hematology  Bone Marrow Transplant  Progress Note    Patient Name: Kvng Jones Sr.  Admission Date: 7/21/2020  Hospital Length of Stay: 17 days  Code Status: Full Code    Subjective:     Interval History: Day +10 from funmilayo/Cy/TBI haplo allo SCT for AML/CML. Has neck pain this morning that is MSK in nature. HA improved after 2g IV mag yesterday though returned some hours later. Has ongoing nausea and would like zofran scheduled today. Tac pending this AM after subtherapeutic last 2 mornings requiring dose increases. No evidence of aGVHD.    Objective:     Vital Signs (Most Recent):  Temp: 98.2 °F (36.8 °C) (08/07/20 0805)  Pulse: 95 (08/07/20 0805)  Resp: 18 (08/07/20 0805)  BP: 133/83 (08/07/20 0805)  SpO2: 96 % (08/07/20 0805) Vital Signs (24h Range):  Temp:  [97.5 °F (36.4 °C)-98.6 °F (37 °C)] 98.2 °F (36.8 °C)  Pulse:  [86-98] 95  Resp:  [17-18] 18  SpO2:  [94 %-98 %] 96 %  BP: (114-133)/(77-92) 133/83     Weight: 87.1 kg (192 lb 2.1 oz)  Body mass index is 31.01 kg/m².  Body surface area is 2.01 meters squared.    Intake/Output - Last 3 Shifts       08/05 0700 - 08/06 0659 08/06 0700 - 08/07 0659 08/07 0700 - 08/08 0659    P.O. 1431 1618     I.V. (mL/kg)       Total Intake(mL/kg) 1431 (16.3) 1618 (18.6)     Urine (mL/kg/hr) 1450 (0.7) 1375 (0.7)     Total Output 1450 1375     Net -19 +243                  Physical Exam  Constitutional:       General: He is not in acute distress.     Appearance: He is normal weight. He is not ill-appearing.   HENT:      Head: Normocephalic and atraumatic.      Mouth/Throat:      Mouth: MMM. Mild evidence of mucositis on bottom gums.     Pharynx: No oropharyngeal exudate or posterior oropharyngeal erythema.   Eyes:      General: No scleral icterus.     Pupils: Pupils are equal, round, and reactive to light.   Neck:      Musculoskeletal: Normal range of motion. No neck rigidity.   Cardiovascular:      Rate and Rhythm: Normal rate and regular  rhythm.      Pulses: Normal pulses.      Heart sounds: Normal heart sounds. No murmur.   Pulmonary:      Effort: Pulmonary effort is normal. No respiratory distress.      Breath sounds: Normal breath sounds.   Abdominal:      General: Abdomen is flat. Bowel sounds are normal.      Palpations: Abdomen is soft.   Musculoskeletal: Normal range of motion.      Right lower leg: No edema.      Left lower leg: No edema.      Comments: RCW grewal CDI, improving area of erythema   Lymphadenopathy:      Cervical: No cervical adenopathy.   Skin:     General: Skin is warm.      Coloration: Skin is not jaundiced or pale.   Neurological:      General: No focal deficit present.      Mental Status: He is alert and oriented to person, place, and time. Mental status is at baseline.   Psychiatric:         Mood and Affect: Mood normal.         Thought Content: Thought content normal.     Significant Labs:   CBC:   Recent Labs   Lab 08/06/20  0243 08/07/20  0312   WBC 0.07* 0.06*   HGB 11.2* 11.7*   HCT 32.6* 34.3*   PLT 15* 10*    and CMP:   Recent Labs   Lab 08/06/20  0243 08/07/20  0312    138   K 3.8 4.2    104   CO2 21* 26   * 98   BUN 11 13   CREATININE 0.7 0.7   CALCIUM 8.6* 9.5   PROT 6.4 7.0   ALBUMIN 3.6 3.9   BILITOT 0.4 0.6   ALKPHOS 62 75   AST 13 12   ALT 24 25   ANIONGAP 10 8   EGFRNONAA >60.0 >60.0       Diagnostic Results:  I have reviewed all pertinent imaging results/findings within the past 24 hours.    Assessment/Plan:     * History of allogeneic stem cell transplant  Admitted 7/21/20 for planned Flu/Cy/TBI haplo allo SCT. Son is the donor. Today is Day +10.  Receive 2 bags and a total CD34 dose of 3.10 x10^6/kg on 7/28/20  Will receive post transplant cyclophosphamide on Day +3 and +4.  Of note, patient is to not receive any steroids until 24hrs after completion of post tx CTX. This will be 8/5 @1130  - No signs of GVHD  - Tac level pending this AM  - current regimen 3mg AM/3.5mg PM  - will f/u  and adjust as needed    Planned conditioning regimen:  Fludarabine on Day -6, -5, -4, -3, and -2  Cyclophosphamide on Day -6 and -5  Total Body Irradiation on Day -1     Planned  GVHD Prophylaxis:  Cyclophosphamide on Day +3 and +4  Tacrolimus starting on Day +5  Mycophenolate starting on Day +5     Antimicrobial Prophylaxis:  Acyclovir starting on Day -6  Levofloxacin starting on Day -1  Fluconazole starting on Day -1  Bactrim starting on Day +30     SOS/VOD Prophylaxis:  Ursodiol on Day -6 through Day +30     Growth Factor Support:  Neupogen starting on Day +7     Mucositis due to chemotherapy  - MMM  - c/w [facility] scheduled    CINV (chemotherapy-induced nausea and vomiting)  - appears to be worse tomorrow  - will schedule zofran at his request this AM  - compazine and ativan as prns    Acute myeloid leukemia in remission  - See CML    Blurry vision  - C/o worsening blurry vision 7/30/20  - May be contributing to headaches  - Ophtho consulted and saw patient 7/30  - Ophtho dilated pupils and performed fundal exam. Fundal exam neg.  - Patient had been viewing laptop screen for ~ 2 hours prior to experiencing blurry vision  - Per ophtho, blurry vision due to dry eyes. rec'd artificial tears QID prn and warm compresses BID prn. Can escalate artificial tears to ointment if needed.    Antineoplastic chemotherapy induced pancytopenia  - WBC 0.06, ANC 0, Hgb 11.7, plt 10k  - lovenox held; need to add back once plt >50K again  - transfuse Hgb <7g and plt <10k    Rash and nonspecific skin eruption  - Newly noted macular rash to upper back and chest on 7/29  - Intermittently pruritic  - GVHD not suspected this soon after transplant  - Likely drug reaction, thought maybe to be from DMSO from stem cell transplant  - Continue to monitor closely  - Started on PRN PO benadryl, topical benadryl cream, and topical hydrocortisone   - Patient states that benadryl causes him to be jittery. Ordered prn atarax instead.  -  "resolved    Abdominal gas pain  - Started on PRN simethicone    Headache  - Started having headaches several weeks ago. Onset seems to correspond to when patient quit smoking and when he started ponatinib  - Has been off of ponatinib for a few days but fevers have persisted  - Pattern is like cluster headaches (onset during sleep), but not unilateral.  - O2 via non-rebreather at 12 L over 15 minutes seemed to help but developed hot flush so pt stopped  Ultram, Flexeril, Oxycodone not helping  Imitrex PRN q2hr (max 200mg in 24hrs). Not contraindicated per pharm D.  Description of headache sounds like tension headache--throbbing with pressure across crown of head  No focal neurological deficits  - Patient reporting that headache may be sinus in nature. Maxillary tenderness noted.  - Patient takes Claritin at home. States that he cannot take Zyrtec (which is on formulary) due to SE and "addiction"  - States that it was hard for him to ween himself off of Zyrtec.  - Stared Flonase 7/27/20. Started home Claritin 7/27/20 (not on formulary)  - Somewhat responding to caffeine, Imitrex, tramadol, oxy, and ativan  - Now c/o blurry vision, which may also be contributing to headaches. Seen by ophtho for this  - Continue to monitor closely; especially with expected thrombocytopenia  - Neuro on consult - trial IV mag. Appreciate input  - improved after IV mag yesterday     Cervical stenosis of spine  - Continue home Flexeril and ultram  - PRN oxy IR added for additional relief  - likely component of his neck pain this AM    Tobacco abuse, in remission  - 35 pack year history  - Quit smoking 6/1/2020 using Chantix  - Patient completed course of Chantix and denies need for Nicotine patch.     Daily consumption of alcohol  - Drinks 3-4 alcoholic beverages nightly  - Will need to monitor closely for s/s of alcohol withdrawal while inpatient  - Will consider consulting addiction medicine if indicated  - Prn ativan available    BPH " (benign prostatic hyperplasia)  - Continue home Flomax    GERD (gastroesophageal reflux disease)  - Takes Nexium at home  - Giving Protonix while inpatient as Nexium is not on hospital formulary  - Can resume Nexium at discharge  - Had CP this am that is believed to be due to GERD (troponin and EKG unremarkable). Relieved after protonix administration  - PRN TUMS ordered.    Essential hypertension  - increased home amlodipine from 5 mg to 10 mg daily  - BPs as high as 180s/110s  - Started prn hydralazine - possibly worsened headache  - Decreased IVF rate  - BP normalized    CML in remission  - Presented to [facility] with CMP in blast crisis and with nodular disease on 3/30/2020  - Induced with FLAG-Addis. Achieved morphologic CR  - Started on daily Ponatinib 30 mg daily, which he continues to take outpatient. Will hold Ponatinib on admission.  - Referred to Dr. Hodges by Dr. Ramos for SCT consideration  - Had BMBx at [facility] on 7/1/2020 showing no morphologic or immunophenotypic evidence of CML  - Admitted 7/21/20 for planned Flu/Cy/TBI haplo allo SCT  - See SCT candidate      VTE Risk Mitigation (From admission, onward)         Ordered     heparin, porcine (PF) 100 unit/mL injection flush 300 Units  As needed (PRN)      07/21/20 1920                Disposition: pending cc of allo SCT    Hunter Basurto MD  Bone Marrow Transplant  [facility]

## 2020-08-07 NOTE — PROGRESS NOTES
Follow up contact with patient.  Questions answered and will meet with patient next week for further discussion.  No other needs identified or voiced at this time.    Ro Marroquin, [facility]  Oncology Social Work  Hematology/BMT Services  [phone]

## 2020-08-07 NOTE — ASSESSMENT & PLAN NOTE
- WBC 0.06, ANC 0, Hgb 11.7, plt 10k  - lovenox held; need to add back once plt >50K again  - transfuse Hgb <7g and plt <10k

## 2020-08-07 NOTE — ASSESSMENT & PLAN NOTE
- Newly noted macular rash to upper back and chest on 7/29  - Intermittently pruritic  - GVHD not suspected this soon after transplant  - Likely drug reaction, thought maybe to be from DMSO from stem cell transplant  - Continue to monitor closely  - Started on PRN PO benadryl, topical benadryl cream, and topical hydrocortisone   - Patient states that benadryl causes him to be jittery. Ordered prn atarax instead.  - resolved

## 2020-08-07 NOTE — ASSESSMENT & PLAN NOTE
"- Started having headaches several weeks ago. Onset seems to correspond to when patient quit smoking and when he started ponatinib  - Has been off of ponatinib for a few days but fevers have persisted  - Pattern is like cluster headaches (onset during sleep), but not unilateral.  - O2 via non-rebreather at 12 L over 15 minutes seemed to help but developed hot flush so pt stopped  Ultram, Flexeril, Oxycodone not helping  Imitrex PRN q2hr (max 200mg in 24hrs). Not contraindicated per pharm D.  Description of headache sounds like tension headache--throbbing with pressure across crown of head  No focal neurological deficits  - Patient reporting that headache may be sinus in nature. Maxillary tenderness noted.  - Patient takes Claritin at home. States that he cannot take Zyrtec (which is on formulary) due to SE and "addiction"  - States that it was hard for him to ween himself off of Zyrtec.  - Stared Flonase 7/27/20. Started home Claritin 7/27/20 (not on formulary)  - Somewhat responding to caffeine, Imitrex, tramadol, oxy, and ativan  - Now c/o blurry vision, which may also be contributing to headaches. Seen by ophtho for this  - Continue to monitor closely; especially with expected thrombocytopenia  - Neuro on consult - trial IV mag. Appreciate input  - improved after IV mag yesterday   "

## 2020-08-07 NOTE — ASSESSMENT & PLAN NOTE
Admitted 7/21/20 for planned Flu/Cy/TBI haplo allo SCT. Son is the donor. Today is Day +10.  Receive 2 bags and a total CD34 dose of 3.10 x10^6/kg on 7/28/20  Will receive post transplant cyclophosphamide on Day +3 and +4.  Of note, patient is to not receive any steroids until 24hrs after completion of post tx CTX. This will be 8/5 @1130  - No signs of GVHD  - Tac level pending this AM  - current regimen 3mg AM/3.5mg PM  - will f/u and adjust as needed    Planned conditioning regimen:  Fludarabine on Day -6, -5, -4, -3, and -2  Cyclophosphamide on Day -6 and -5  Total Body Irradiation on Day -1     Planned  GVHD Prophylaxis:  Cyclophosphamide on Day +3 and +4  Tacrolimus starting on Day +5  Mycophenolate starting on Day +5     Antimicrobial Prophylaxis:  Acyclovir starting on Day -6  Levofloxacin starting on Day -1  Fluconazole starting on Day -1  Bactrim starting on Day +30     SOS/VOD Prophylaxis:  Ursodiol on Day -6 through Day +30     Growth Factor Support:  Neupogen starting on Day +7

## 2020-08-07 NOTE — ASSESSMENT & PLAN NOTE
- appears to be worse tomorrow  - will schedule zofran at his request this AM  - compazine and ativan as prns

## 2020-08-07 NOTE — SUBJECTIVE & OBJECTIVE
Subjective:     Interval History: Day +10 from funmilayo/Cy/TBI haplo allo SCT for AML/CML. Has neck pain this morning that is MSK in nature. HA improved after 2g IV mag yesterday though returned some hours later. Has ongoing nausea and would like zofran scheduled today. Tac pending this AM after subtherapeutic last 2 mornings requiring dose increases. No evidence of aGVHD.    Objective:     Vital Signs (Most Recent):  Temp: 98.2 °F (36.8 °C) (08/07/20 0805)  Pulse: 95 (08/07/20 0805)  Resp: 18 (08/07/20 0805)  BP: 133/83 (08/07/20 0805)  SpO2: 96 % (08/07/20 0805) Vital Signs (24h Range):  Temp:  [97.5 °F (36.4 °C)-98.6 °F (37 °C)] 98.2 °F (36.8 °C)  Pulse:  [86-98] 95  Resp:  [17-18] 18  SpO2:  [94 %-98 %] 96 %  BP: (114-133)/(77-92) 133/83     Weight: 87.1 kg (192 lb 2.1 oz)  Body mass index is 31.01 kg/m².  Body surface area is 2.01 meters squared.    Intake/Output - Last 3 Shifts       08/05 0700 - 08/06 0659 08/06 0700 - 08/07 0659 08/07 0700 - 08/08 0659    P.O. 1431 1618     I.V. (mL/kg)       Total Intake(mL/kg) 1431 (16.3) 1618 (18.6)     Urine (mL/kg/hr) 1450 (0.7) 1375 (0.7)     Total Output 1450 1375     Net -19 +243                  Physical Exam  Constitutional:       General: He is not in acute distress.     Appearance: He is normal weight. He is not ill-appearing.   HENT:      Head: Normocephalic and atraumatic.      Mouth/Throat:      Mouth: MMM. Mild evidence of mucositis on bottom gums.     Pharynx: No oropharyngeal exudate or posterior oropharyngeal erythema.   Eyes:      General: No scleral icterus.     Pupils: Pupils are equal, round, and reactive to light.   Neck:      Musculoskeletal: Normal range of motion. No neck rigidity.   Cardiovascular:      Rate and Rhythm: Normal rate and regular rhythm.      Pulses: Normal pulses.      Heart sounds: Normal heart sounds. No murmur.   Pulmonary:      Effort: Pulmonary effort is normal. No respiratory distress.      Breath sounds: Normal breath sounds.    Abdominal:      General: Abdomen is flat. Bowel sounds are normal.      Palpations: Abdomen is soft.   Musculoskeletal: Normal range of motion.      Right lower leg: No edema.      Left lower leg: No edema.      Comments: RCW grewal CDI, improving area of erythema   Lymphadenopathy:      Cervical: No cervical adenopathy.   Skin:     General: Skin is warm.      Coloration: Skin is not jaundiced or pale.   Neurological:      General: No focal deficit present.      Mental Status: He is alert and oriented to person, place, and time. Mental status is at baseline.   Psychiatric:         Mood and Affect: Mood normal.         Thought Content: Thought content normal.     Significant Labs:   CBC:   Recent Labs   Lab 08/06/20  0243 08/07/20  0312   WBC 0.07* 0.06*   HGB 11.2* 11.7*   HCT 32.6* 34.3*   PLT 15* 10*    and CMP:   Recent Labs   Lab 08/06/20  0243 08/07/20  0312    138   K 3.8 4.2    104   CO2 21* 26   * 98   BUN 11 13   CREATININE 0.7 0.7   CALCIUM 8.6* 9.5   PROT 6.4 7.0   ALBUMIN 3.6 3.9   BILITOT 0.4 0.6   ALKPHOS 62 75   AST 13 12   ALT 24 25   ANIONGAP 10 8   EGFRNONAA >60.0 >60.0       Diagnostic Results:  I have reviewed all pertinent imaging results/findings within the past 24 hours.

## 2020-08-07 NOTE — PLAN OF CARE
Day +10 of Allo/Haplo SCT. Afebrile. Free from falls or injury. Oxy IR and Tramadol given for pain. Ativan IV given for nausea. Pt is up independently to restroom. Pt complains of pain to his middle upper back. Bed locked in lowest position, non skid socks on, call light within reach. Pt instructed to call if any assistance is needed. Vitals stable. Will cont to colton pt.

## 2020-08-08 LAB
ALBUMIN SERPL BCP-MCNC: 3.7 G/DL (ref 3.5–5.2)
ALP SERPL-CCNC: 79 U/L (ref 55–135)
ALT SERPL W/O P-5'-P-CCNC: 21 U/L (ref 10–44)
ANION GAP SERPL CALC-SCNC: 7 MMOL/L (ref 8–16)
ANISOCYTOSIS BLD QL SMEAR: SLIGHT
AST SERPL-CCNC: 10 U/L (ref 10–40)
BASOPHILS # BLD AUTO: 0 K/UL (ref 0–0.2)
BASOPHILS NFR BLD: 0 % (ref 0–1.9)
BILIRUB SERPL-MCNC: 0.6 MG/DL (ref 0.1–1)
BLD PROD TYP BPU: NORMAL
BLOOD UNIT EXPIRATION DATE: NORMAL
BLOOD UNIT TYPE CODE: 6200
BLOOD UNIT TYPE: NORMAL
BUN SERPL-MCNC: 12 MG/DL (ref 8–23)
CALCIUM SERPL-MCNC: 9.3 MG/DL (ref 8.7–10.5)
CHLORIDE SERPL-SCNC: 101 MMOL/L (ref 95–110)
CO2 SERPL-SCNC: 27 MMOL/L (ref 23–29)
CODING SYSTEM: NORMAL
CREAT SERPL-MCNC: 0.8 MG/DL (ref 0.5–1.4)
DIFFERENTIAL METHOD: ABNORMAL
DISPENSE STATUS: NORMAL
EOSINOPHIL # BLD AUTO: 0 K/UL (ref 0–0.5)
EOSINOPHIL NFR BLD: 0 % (ref 0–8)
ERYTHROCYTE [DISTWIDTH] IN BLOOD BY AUTOMATED COUNT: 12.3 % (ref 11.5–14.5)
EST. GFR  (AFRICAN AMERICAN): >60 ML/MIN/1.73 M^2
EST. GFR  (NON AFRICAN AMERICAN): >60 ML/MIN/1.73 M^2
GLUCOSE SERPL-MCNC: 100 MG/DL (ref 70–110)
HCT VFR BLD AUTO: 31.2 % (ref 40–54)
HGB BLD-MCNC: 10.6 G/DL (ref 14–18)
HYPOCHROMIA BLD QL SMEAR: ABNORMAL
IMM GRANULOCYTES # BLD AUTO: 0 K/UL (ref 0–0.04)
IMM GRANULOCYTES NFR BLD AUTO: 0 % (ref 0–0.5)
LYMPHOCYTES # BLD AUTO: 0 K/UL (ref 1–4.8)
LYMPHOCYTES NFR BLD: 75 % (ref 18–48)
MAGNESIUM SERPL-MCNC: 1.7 MG/DL (ref 1.6–2.6)
MCH RBC QN AUTO: 32.4 PG (ref 27–31)
MCHC RBC AUTO-ENTMCNC: 34 G/DL (ref 32–36)
MCV RBC AUTO: 95 FL (ref 82–98)
MONOCYTES # BLD AUTO: 0 K/UL (ref 0.3–1)
MONOCYTES NFR BLD: 25 % (ref 4–15)
NEUTROPHILS # BLD AUTO: 0 K/UL (ref 1.8–7.7)
NEUTROPHILS NFR BLD: 0 % (ref 38–73)
NRBC BLD-RTO: 0 /100 WBC
NUM UNITS TRANS WBC-POOR PLATPHERESIS: NORMAL
PHOSPHATE SERPL-MCNC: 4.7 MG/DL (ref 2.7–4.5)
PLATELET # BLD AUTO: 8 K/UL (ref 150–350)
PLATELET BLD QL SMEAR: ABNORMAL
PMV BLD AUTO: 11.9 FL (ref 9.2–12.9)
POTASSIUM SERPL-SCNC: 4.2 MMOL/L (ref 3.5–5.1)
PROT SERPL-MCNC: 6.9 G/DL (ref 6–8.4)
RBC # BLD AUTO: 3.27 M/UL (ref 4.6–6.2)
SODIUM SERPL-SCNC: 135 MMOL/L (ref 136–145)
TACROLIMUS BLD-MCNC: 4.5 NG/ML (ref 5–15)
WBC # BLD AUTO: 0.04 K/UL (ref 3.9–12.7)

## 2020-08-08 PROCEDURE — 25000003 PHARM REV CODE 250: Performed by: STUDENT IN AN ORGANIZED HEALTH CARE EDUCATION/TRAINING PROGRAM

## 2020-08-08 PROCEDURE — P9037 PLATE PHERES LEUKOREDU IRRAD: HCPCS

## 2020-08-08 PROCEDURE — 63600175 PHARM REV CODE 636 W HCPCS: Performed by: INTERNAL MEDICINE

## 2020-08-08 PROCEDURE — S0028 INJECTION, FAMOTIDINE, 20 MG: HCPCS | Performed by: STUDENT IN AN ORGANIZED HEALTH CARE EDUCATION/TRAINING PROGRAM

## 2020-08-08 PROCEDURE — 94761 N-INVAS EAR/PLS OXIMETRY MLT: CPT

## 2020-08-08 PROCEDURE — 63600175 PHARM REV CODE 636 W HCPCS: Performed by: NURSE PRACTITIONER

## 2020-08-08 PROCEDURE — 25000003 PHARM REV CODE 250: Performed by: NURSE PRACTITIONER

## 2020-08-08 PROCEDURE — 86644 CMV ANTIBODY: CPT

## 2020-08-08 PROCEDURE — 83735 ASSAY OF MAGNESIUM: CPT

## 2020-08-08 PROCEDURE — 25000003 PHARM REV CODE 250

## 2020-08-08 PROCEDURE — 80053 COMPREHEN METABOLIC PANEL: CPT

## 2020-08-08 PROCEDURE — 36430 TRANSFUSION BLD/BLD COMPNT: CPT

## 2020-08-08 PROCEDURE — 80197 ASSAY OF TACROLIMUS: CPT

## 2020-08-08 PROCEDURE — 63600175 PHARM REV CODE 636 W HCPCS: Performed by: STUDENT IN AN ORGANIZED HEALTH CARE EDUCATION/TRAINING PROGRAM

## 2020-08-08 PROCEDURE — 25000003 PHARM REV CODE 250: Performed by: INTERNAL MEDICINE

## 2020-08-08 PROCEDURE — 99233 SBSQ HOSP IP/OBS HIGH 50: CPT | Mod: ,,, | Performed by: INTERNAL MEDICINE

## 2020-08-08 PROCEDURE — 20600001 HC STEP DOWN PRIVATE ROOM

## 2020-08-08 PROCEDURE — 84100 ASSAY OF PHOSPHORUS: CPT

## 2020-08-08 PROCEDURE — A4216 STERILE WATER/SALINE, 10 ML: HCPCS | Performed by: NURSE PRACTITIONER

## 2020-08-08 PROCEDURE — 85025 COMPLETE CBC W/AUTO DIFF WBC: CPT

## 2020-08-08 PROCEDURE — 99233 PR SUBSEQUENT HOSPITAL CARE,LEVL III: ICD-10-PCS | Mod: ,,, | Performed by: INTERNAL MEDICINE

## 2020-08-08 PROCEDURE — 63600175 PHARM REV CODE 636 W HCPCS

## 2020-08-08 PROCEDURE — A9155 ARTIFICIAL SALIVA: HCPCS | Performed by: INTERNAL MEDICINE

## 2020-08-08 RX ORDER — DIPHENHYDRAMINE HYDROCHLORIDE 50 MG/ML
50 INJECTION INTRAMUSCULAR; INTRAVENOUS ONCE
Status: COMPLETED | OUTPATIENT
Start: 2020-08-08 | End: 2020-08-08

## 2020-08-08 RX ORDER — MAGNESIUM SULFATE HEPTAHYDRATE 40 MG/ML
2 INJECTION, SOLUTION INTRAVENOUS ONCE
Status: COMPLETED | OUTPATIENT
Start: 2020-08-08 | End: 2020-08-08

## 2020-08-08 RX ORDER — FAMOTIDINE 10 MG/ML
20 INJECTION INTRAVENOUS ONCE
Status: COMPLETED | OUTPATIENT
Start: 2020-08-08 | End: 2020-08-08

## 2020-08-08 RX ORDER — TACROLIMUS 1 MG/1
4 CAPSULE ORAL EVERY EVENING
Status: DISCONTINUED | OUTPATIENT
Start: 2020-08-08 | End: 2020-08-12 | Stop reason: HOSPADM

## 2020-08-08 RX ORDER — TACROLIMUS 1 MG/1
4 CAPSULE ORAL EVERY MORNING
Status: DISCONTINUED | OUTPATIENT
Start: 2020-08-09 | End: 2020-08-12 | Stop reason: HOSPADM

## 2020-08-08 RX ADMIN — TRAMADOL HYDROCHLORIDE 50 MG: 50 TABLET, FILM COATED ORAL at 03:08

## 2020-08-08 RX ADMIN — MAGNESIUM SULFATE IN WATER 2 G: 40 INJECTION, SOLUTION INTRAVENOUS at 12:08

## 2020-08-08 RX ADMIN — ALUMINUM HYDROXIDE, MAGNESIUM HYDROXIDE, AND DIMETHICONE 10 ML: 400; 400; 40 SUSPENSION ORAL at 08:08

## 2020-08-08 RX ADMIN — Medication 30 ML: at 05:08

## 2020-08-08 RX ADMIN — FILGRASTIM 480 MCG: 480 INJECTION, SOLUTION INTRAVENOUS; SUBCUTANEOUS at 09:08

## 2020-08-08 RX ADMIN — Medication 30 ML: at 09:08

## 2020-08-08 RX ADMIN — HYDROXYZINE HYDROCHLORIDE 25 MG: 25 TABLET, FILM COATED ORAL at 09:08

## 2020-08-08 RX ADMIN — MYCOPHENOLATE MOFETIL 1000 MG: 250 CAPSULE ORAL at 08:08

## 2020-08-08 RX ADMIN — ALUMINUM HYDROXIDE, MAGNESIUM HYDROXIDE, AND DIMETHICONE 10 ML: 400; 400; 40 SUSPENSION ORAL at 05:08

## 2020-08-08 RX ADMIN — ONDANSETRON HYDROCHLORIDE 8 MG: 4 TABLET, FILM COATED ORAL at 05:08

## 2020-08-08 RX ADMIN — LEVOFLOXACIN 500 MG: 500 TABLET, FILM COATED ORAL at 08:08

## 2020-08-08 RX ADMIN — TACROLIMUS 4 MG: 1 CAPSULE ORAL at 05:08

## 2020-08-08 RX ADMIN — URSODIOL 300 MG: 300 CAPSULE ORAL at 08:08

## 2020-08-08 RX ADMIN — ONDANSETRON HYDROCHLORIDE 8 MG: 4 TABLET, FILM COATED ORAL at 09:08

## 2020-08-08 RX ADMIN — GABAPENTIN 300 MG: 300 CAPSULE ORAL at 08:08

## 2020-08-08 RX ADMIN — TRAMADOL HYDROCHLORIDE 50 MG: 50 TABLET, FILM COATED ORAL at 09:08

## 2020-08-08 RX ADMIN — AMLODIPINE BESYLATE 10 MG: 10 TABLET ORAL at 08:08

## 2020-08-08 RX ADMIN — OXYCODONE HYDROCHLORIDE 10 MG: 5 TABLET ORAL at 08:08

## 2020-08-08 RX ADMIN — Medication 10 ML: at 06:08

## 2020-08-08 RX ADMIN — ONDANSETRON HYDROCHLORIDE 8 MG: 4 TABLET, FILM COATED ORAL at 01:08

## 2020-08-08 RX ADMIN — LORAZEPAM 1 MG: 2 INJECTION INTRAMUSCULAR; INTRAVENOUS at 08:08

## 2020-08-08 RX ADMIN — FLUCONAZOLE 400 MG: 200 TABLET ORAL at 08:08

## 2020-08-08 RX ADMIN — LORAZEPAM 1 MG: 2 INJECTION INTRAMUSCULAR; INTRAVENOUS at 09:08

## 2020-08-08 RX ADMIN — Medication 30 ML: at 12:08

## 2020-08-08 RX ADMIN — ACYCLOVIR 800 MG: 800 TABLET ORAL at 05:08

## 2020-08-08 RX ADMIN — HYDROXYZINE HYDROCHLORIDE 25 MG: 25 TABLET, FILM COATED ORAL at 03:08

## 2020-08-08 RX ADMIN — ACYCLOVIR 800 MG: 800 TABLET ORAL at 08:08

## 2020-08-08 RX ADMIN — LORAZEPAM 1 MG: 2 INJECTION INTRAMUSCULAR; INTRAVENOUS at 03:08

## 2020-08-08 RX ADMIN — PANTOPRAZOLE SODIUM 40 MG: 40 TABLET, DELAYED RELEASE ORAL at 08:08

## 2020-08-08 RX ADMIN — TACROLIMUS 3 MG: 1 CAPSULE ORAL at 08:08

## 2020-08-08 RX ADMIN — Medication 10 ML: at 05:08

## 2020-08-08 RX ADMIN — OXYCODONE HYDROCHLORIDE 10 MG: 5 TABLET ORAL at 01:08

## 2020-08-08 RX ADMIN — ALUMINUM HYDROXIDE, MAGNESIUM HYDROXIDE, AND DIMETHICONE 10 ML: 400; 400; 40 SUSPENSION ORAL at 12:08

## 2020-08-08 RX ADMIN — FLUTICASONE PROPIONATE 100 MCG: 50 SPRAY, METERED NASAL at 08:08

## 2020-08-08 RX ADMIN — DIPHENHYDRAMINE HYDROCHLORIDE 50 MG: 50 INJECTION, SOLUTION INTRAMUSCULAR; INTRAVENOUS at 03:08

## 2020-08-08 RX ADMIN — HYDROCORTISONE: 10 CREAM TOPICAL at 08:08

## 2020-08-08 RX ADMIN — MYCOPHENOLATE MOFETIL 1000 MG: 250 CAPSULE ORAL at 03:08

## 2020-08-08 RX ADMIN — Medication 400 MG: at 12:08

## 2020-08-08 RX ADMIN — TAMSULOSIN HYDROCHLORIDE 0.4 MG: 0.4 CAPSULE ORAL at 08:08

## 2020-08-08 RX ADMIN — FAMOTIDINE 20 MG: 10 INJECTION INTRAVENOUS at 03:08

## 2020-08-08 RX ADMIN — Medication 10 ML: at 12:08

## 2020-08-08 RX ADMIN — Medication 400 MG: at 09:08

## 2020-08-08 NOTE — ASSESSMENT & PLAN NOTE
Admitted 7/21/20 for planned Flu/Cy/TBI haplo allo SCT. Son is the donor. Today is Day +10.  Receive 2 bags and a total CD34 dose of 3.10 x10^6/kg on 7/28/20  Will receive post transplant cyclophosphamide on Day +3 and +4.  Of note, patient is to not receive any steroids until 24hrs after completion of post tx CTX. This will be 8/5 @1130  - No signs of GVHD  - Tac level 4.5 this AM   - increase tacro to 4 mg AM/ 4 mg PM  - will f/u and adjust as needed    Planned conditioning regimen:  Fludarabine on Day -6, -5, -4, -3, and -2  Cyclophosphamide on Day -6 and -5  Total Body Irradiation on Day -1     Planned  GVHD Prophylaxis:  Cyclophosphamide on Day +3 and +4  Tacrolimus starting on Day +5  Mycophenolate starting on Day +5     Antimicrobial Prophylaxis:  Acyclovir starting on Day -6  Levofloxacin starting on Day -1  Fluconazole starting on Day -1  Bactrim starting on Day +30     SOS/VOD Prophylaxis:  Ursodiol on Day -6 through Day +30     Growth Factor Support:  Neupogen starting on Day +7

## 2020-08-08 NOTE — PLAN OF CARE
Day +11 of Allo/Haplo SCT. Afebrile. Free from falls or injury. Oxy IR and Tramadol given for pain. Ativan IV given for nausea. Atarax given for itching. Pt is up independently to restroom. Bed locked in lowest position, non skid socks on, call light within reach. Pt instructed to call if any assistance is needed. Vitals stable. Will cont to colton pt.

## 2020-08-08 NOTE — PLAN OF CARE
Side rails up x2; call bell in place; bed in lowest, locked position; skid proof socks on; no evidence of skin breakdown; care plan explained to patient; pt remains free of injury.  Pt is day +10 for an allo SCT. Pt with poor appetite, c/o nausea and pain, zofran, compazine, tramadol, and oxy IR given. Pt stated he had relief. Pt ambulates in room, Voids and BM x 1. Mg po and IVPB replacement given. VSS and afebrile.

## 2020-08-08 NOTE — SUBJECTIVE & OBJECTIVE
Subjective:     Interval History: Day +11 Flu/Cy/TBI haplo alloSCT for AML/CML. Complaining of mild headache, and itching over the hands. Tac 4.5 today.     Objective:     Vital Signs (Most Recent):  Temp: 98.2 °F (36.8 °C) (08/08/20 0811)  Pulse: 97 (08/08/20 0811)  Resp: 16 (08/08/20 0946)  BP: 122/75 (08/08/20 0811)  SpO2: 95 % (08/08/20 0811) Vital Signs (24h Range):  Temp:  [97.8 °F (36.6 °C)-98.8 °F (37.1 °C)] 98.2 °F (36.8 °C)  Pulse:  [] 97  Resp:  [16-18] 16  SpO2:  [95 %-98 %] 95 %  BP: (109-122)/(66-75) 122/75     Weight: 87.4 kg (192 lb 9.2 oz)  Body mass index is 31.08 kg/m².  Body surface area is 2.02 meters squared.    ECOG SCORE           Intake/Output - Last 3 Shifts       08/06 0700 - 08/07 0659 08/07 0700 - 08/08 0659 08/08 0700 - 08/09 0659    P.O. 1618 2220     I.V. (mL/kg)  50 (0.6)     Total Intake(mL/kg) 1618 (18.6) 2270 (26)     Urine (mL/kg/hr) 1375 (0.7) 1475 (0.7)     Stool  0     Total Output 1375 1475     Net +243 +795            Stool Occurrence  1 x           Physical Exam  Constitutional:       General: He is not in acute distress.     Appearance: Normal appearance.   HENT:      Head: Normocephalic and atraumatic.   Eyes:      Pupils: Pupils are equal, round, and reactive to light.   Cardiovascular:      Rate and Rhythm: Normal rate and regular rhythm.      Heart sounds: No murmur.   Pulmonary:      Effort: No respiratory distress.      Breath sounds: Normal breath sounds. No wheezing.   Abdominal:      General: Abdomen is flat. Bowel sounds are normal. There is no distension.      Palpations: Abdomen is soft. There is no mass.      Tenderness: There is no abdominal tenderness.   Musculoskeletal:         General: No swelling or deformity.   Skin:     Coloration: Skin is not jaundiced.   Neurological:      General: No focal deficit present.      Mental Status: He is alert and oriented to person, place, and time. Mental status is at baseline.         Significant Labs:   CBC:    Recent Labs   Lab 08/07/20 0312 08/08/20 0311   WBC 0.06* 0.04*   HGB 11.7* 10.6*   HCT 34.3* 31.2*   PLT 10* 8*    and CMP:   Recent Labs   Lab 08/07/20 0312 08/08/20 0311    135*   K 4.2 4.2    101   CO2 26 27   GLU 98 100   BUN 13 12   CREATININE 0.7 0.8   CALCIUM 9.5 9.3   PROT 7.0 6.9   ALBUMIN 3.9 3.7   BILITOT 0.6 0.6   ALKPHOS 75 79   AST 12 10   ALT 25 21   ANIONGAP 8 7*   EGFRNONAA >60.0 >60.0       Diagnostic Results:  None

## 2020-08-08 NOTE — ASSESSMENT & PLAN NOTE
- WBC 0.04, ANC 0, Hgb 10.6, plt 8k  - lovenox held; need to add back once plt >50K again  - transfuse Hgb <7g and plt <10k  - transfuse 1u plt today

## 2020-08-08 NOTE — ASSESSMENT & PLAN NOTE
- Presented to [facility] with CMP in blast crisis and with nodular disease on 3/30/2020  - Induced with FLAG-Addis. Achieved morphologic CR  - Started on daily Ponatinib 30 mg daily, which he continues to take outpatient. Will hold Ponatinib on admission.  - Referred to Dr. Hodges by Dr. Ramos for SCT consideration  - [place] [facility] [place] showing no morphologic or immunophenotypic evidence of CML  - Admitted 7/21/20 for planned Flu/Cy/TBI haplo allo SCT  - See SCT candidate

## 2020-08-08 NOTE — PROGRESS NOTES
[facility]  Hematology  Bone Marrow Transplant  Progress Note    Patient Name: Kvng Jones Sr.  Admission Date: 7/21/2020  Hospital Length of Stay: 18 days  Code Status: Full Code    Subjective:     Interval History: Day +11 Flu/Cy/TBI haplo alloSCT for AML/CML. Complaining of mild headache, and itching over the hands. Tac 4.5 today.     Objective:     Vital Signs (Most Recent):  Temp: 98.2 °F (36.8 °C) (08/08/20 0811)  Pulse: 97 (08/08/20 0811)  Resp: 16 (08/08/20 0946)  BP: 122/75 (08/08/20 0811)  SpO2: 95 % (08/08/20 0811) Vital Signs (24h Range):  Temp:  [97.8 °F (36.6 °C)-98.8 °F (37.1 °C)] 98.2 °F (36.8 °C)  Pulse:  [] 97  Resp:  [16-18] 16  SpO2:  [95 %-98 %] 95 %  BP: (109-122)/(66-75) 122/75     Weight: 87.4 kg (192 lb 9.2 oz)  Body mass index is 31.08 kg/m².  Body surface area is 2.02 meters squared.    ECOG SCORE           Intake/Output - Last 3 Shifts       08/06 0700 - 08/07 0659 08/07 0700 - 08/08 0659 08/08 0700 - 08/09 0659    P.O. 1618 2220     I.V. (mL/kg)  50 (0.6)     Total Intake(mL/kg) 1618 (18.6) 2270 (26)     Urine (mL/kg/hr) 1375 (0.7) 1475 (0.7)     Stool  0     Total Output 1375 1475     Net +243 +795            Stool Occurrence  1 x           Physical Exam  Constitutional:       General: He is not in acute distress.     Appearance: Normal appearance.   HENT:      Head: Normocephalic and atraumatic.   Eyes:      Pupils: Pupils are equal, round, and reactive to light.   Cardiovascular:      Rate and Rhythm: Normal rate and regular rhythm.      Heart sounds: No murmur.   Pulmonary:      Effort: No respiratory distress.      Breath sounds: Normal breath sounds. No wheezing.   Abdominal:      General: Abdomen is flat. Bowel sounds are normal. There is no distension.      Palpations: Abdomen is soft. There is no mass.      Tenderness: There is no abdominal tenderness.   Musculoskeletal:         General: No swelling or deformity.   Skin:     Coloration: Skin is  not jaundiced.   Neurological:      General: No focal deficit present.      Mental Status: He is alert and oriented to person, place, and time. Mental status is at baseline.         Significant Labs:   CBC:   Recent Labs   Lab 08/07/20 0312 08/08/20 0311   WBC 0.06* 0.04*   HGB 11.7* 10.6*   HCT 34.3* 31.2*   PLT 10* 8*    and CMP:   Recent Labs   Lab 08/07/20 0312 08/08/20 0311    135*   K 4.2 4.2    101   CO2 26 27   GLU 98 100   BUN 13 12   CREATININE 0.7 0.8   CALCIUM 9.5 9.3   PROT 7.0 6.9   ALBUMIN 3.9 3.7   BILITOT 0.6 0.6   ALKPHOS 75 79   AST 12 10   ALT 25 21   ANIONGAP 8 7*   EGFRNONAA >60.0 >60.0       Diagnostic Results:  None    Assessment/Plan:     * History of allogeneic stem cell transplant  Admitted 7/21/20 for planned Flu/Cy/TBI haplo allo SCT. Son is the donor. Today is Day +10.  Receive 2 bags and a total CD34 dose of 3.10 x10^6/kg on 7/28/20  Will receive post transplant cyclophosphamide on Day +3 and +4.  Of note, patient is to not receive any steroids until 24hrs after completion of post tx CTX. This will be 8/5 @1130  - No signs of GVHD  - Tac level 4.5 this AM   - increase tacro to 4 mg AM/ 4 mg PM  - will f/u and adjust as needed    Planned conditioning regimen:  Fludarabine on Day -6, -5, -4, -3, and -2  Cyclophosphamide on Day -6 and -5  Total Body Irradiation on Day -1     Planned  GVHD Prophylaxis:  Cyclophosphamide on Day +3 and +4  Tacrolimus starting on Day +5  Mycophenolate starting on Day +5     Antimicrobial Prophylaxis:  Acyclovir starting on Day -6  Levofloxacin starting on Day -1  Fluconazole starting on Day -1  Bactrim starting on Day +30     SOS/VOD Prophylaxis:  Ursodiol on Day -6 through Day +30     Growth Factor Support:  Neupogen starting on Day +7     Mucositis due to chemotherapy  - MMM  - c/w [facility] scheduled    CINV (chemotherapy-induced nausea and vomiting)  - will schedule zofran at his request   - compazine and ativan as prns    Acute myeloid  leukemia in remission  - See CML    Blurry vision  - C/o worsening blurry vision 7/30/20  - May be contributing to headaches  - Ophtho consulted and saw patient 7/30  - Ophtho dilated pupils and performed fundal exam. Fundal exam neg.  - Patient had been viewing laptop screen for ~ 2 hours prior to experiencing blurry vision  - Per ophtho, blurry vision due to dry eyes. rec'd artificial tears QID prn and warm compresses BID prn. Can escalate artificial tears to ointment if needed.    Antineoplastic chemotherapy induced pancytopenia  - WBC 0.04, ANC 0, Hgb 10.6, plt 8k  - lovenox held; need to add back once plt >50K again  - transfuse Hgb <7g and plt <10k  - transfuse 1u plt today     Rash and nonspecific skin eruption  - Newly noted macular rash to upper back and chest on 7/29  - Intermittently pruritic  - GVHD not suspected this soon after transplant  - Likely drug reaction, thought maybe to be from DMSO from stem cell transplant  - Continue to monitor closely  - Started on PRN PO benadryl, topical benadryl cream, and topical hydrocortisone   - Patient states that benadryl causes him to be jittery. Ordered prn atarax instead.  - resolved    Abdominal gas pain  - Started on PRN simethicone    Headache  - Started having headaches several weeks ago. Onset seems to correspond to when patient quit smoking and when he started ponatinib  - Has been off of ponatinib for a few days but fevers have persisted  - Pattern is like cluster headaches (onset during sleep), but not unilateral.  - O2 via non-rebreather at 12 L over 15 minutes seemed to help but developed hot flush so pt stopped  Ultram, Flexeril, Oxycodone not helping  Imitrex PRN q2hr (max 200mg in 24hrs). Not contraindicated per pharm D.  Description of headache sounds like tension headache--throbbing with pressure across crown of head  No focal neurological deficits  - Patient reporting that headache may be sinus in nature. Maxillary tenderness noted.  - Patient  "takes Claritin at home. States that he cannot take Zyrtec (which is on formulary) due to SE and "addiction"  - States that it was hard for him to ween himself off of Zyrtec.  - Stared Flonase 7/27/20. Started home Claritin 7/27/20 (not on formulary)  - Somewhat responding to caffeine, Imitrex, tramadol, oxy, and ativan  - Now c/o blurry vision, which may also be contributing to headaches. Seen by ophtho for this  - Continue to monitor closely; especially with expected thrombocytopenia  - Neuro on consult - trial IV mag. Appreciate input  - improved after IV mag yesterday     Cervical stenosis of spine  - Continue home Flexeril and ultram  - PRN oxy IR added for additional relief  - likely component of his neck pain this AM    Tobacco abuse, in remission  - 35 pack year history  - Quit smoking 6/1/2020 using Chantix  - Patient completed course of Chantix and denies need for Nicotine patch.     Daily consumption of alcohol  - Drinks 3-4 alcoholic beverages nightly  - Will need to monitor closely for s/s of alcohol withdrawal while inpatient  - Will consider consulting addiction medicine if indicated  - Prn ativan available    BPH (benign prostatic hyperplasia)  - Continue home Flomax    GERD (gastroesophageal reflux disease)  - Takes Nexium at home  - Giving Protonix while inpatient as Nexium is not on hospital formulary  - Can resume Nexium at discharge  - Had CP this am that is believed to be due to GERD (troponin and EKG unremarkable). Relieved after protonix administration  - PRN TUMS ordered.    Essential hypertension  - increased home amlodipine from 5 mg to 10 mg daily  - BPs as high as 180s/110s  - Started prn hydralazine - possibly worsened headache  - Decreased IVF rate  - BP normalized    CML in remission  - Presented to [facility] with CMP in blast crisis and with nodular disease on 3/30/2020  - Induced with FLAG-Addis. Achieved morphologic CR  - Started on daily Ponatinib 30 mg daily, which he continues to take " outpatient. Will hold Ponatinib on admission.  - Referred to Dr. Hodges by Dr. Ramos for SCT consideration  - Had BMBx at [facility] on 7/1/2020 showing no morphologic or immunophenotypic evidence of CML  - Admitted 7/21/20 for planned Flu/Cy/TBI haplo allo SCT  - See SCT candidate        VTE Risk Mitigation (From admission, onward)         Ordered     heparin, porcine (PF) 100 unit/mL injection flush 300 Units  As needed (PRN)      07/21/20 1920                Disposition: TBD    Karl Hutton MD  Bone Marrow Transplant  [facility]

## 2020-08-09 LAB
ALBUMIN SERPL BCP-MCNC: 3.8 G/DL (ref 3.5–5.2)
ALP SERPL-CCNC: 78 U/L (ref 55–135)
ALT SERPL W/O P-5'-P-CCNC: 21 U/L (ref 10–44)
ANION GAP SERPL CALC-SCNC: 10 MMOL/L (ref 8–16)
ANISOCYTOSIS BLD QL SMEAR: SLIGHT
AST SERPL-CCNC: 12 U/L (ref 10–40)
BASOPHILS # BLD AUTO: 0.01 K/UL (ref 0–0.2)
BASOPHILS NFR BLD: 5.9 % (ref 0–1.9)
BILIRUB SERPL-MCNC: 0.6 MG/DL (ref 0.1–1)
BUN SERPL-MCNC: 14 MG/DL (ref 8–23)
CALCIUM SERPL-MCNC: 9.4 MG/DL (ref 8.7–10.5)
CHLORIDE SERPL-SCNC: 100 MMOL/L (ref 95–110)
CO2 SERPL-SCNC: 24 MMOL/L (ref 23–29)
CREAT SERPL-MCNC: 0.8 MG/DL (ref 0.5–1.4)
DIFFERENTIAL METHOD: ABNORMAL
EOSINOPHIL # BLD AUTO: 0 K/UL (ref 0–0.5)
EOSINOPHIL NFR BLD: 0 % (ref 0–8)
ERYTHROCYTE [DISTWIDTH] IN BLOOD BY AUTOMATED COUNT: 12.3 % (ref 11.5–14.5)
EST. GFR  (AFRICAN AMERICAN): >60 ML/MIN/1.73 M^2
EST. GFR  (NON AFRICAN AMERICAN): >60 ML/MIN/1.73 M^2
GLUCOSE SERPL-MCNC: 112 MG/DL (ref 70–110)
HCT VFR BLD AUTO: 29 % (ref 40–54)
HGB BLD-MCNC: 10 G/DL (ref 14–18)
HYPOCHROMIA BLD QL SMEAR: ABNORMAL
IMM GRANULOCYTES # BLD AUTO: 0 K/UL (ref 0–0.04)
IMM GRANULOCYTES NFR BLD AUTO: 0 % (ref 0–0.5)
LYMPHOCYTES # BLD AUTO: 0 K/UL (ref 1–4.8)
LYMPHOCYTES NFR BLD: 11.8 % (ref 18–48)
MAGNESIUM SERPL-MCNC: 1.8 MG/DL (ref 1.6–2.6)
MCH RBC QN AUTO: 33.1 PG (ref 27–31)
MCHC RBC AUTO-ENTMCNC: 34.5 G/DL (ref 32–36)
MCV RBC AUTO: 96 FL (ref 82–98)
MONOCYTES # BLD AUTO: 0.1 K/UL (ref 0.3–1)
MONOCYTES NFR BLD: 29.4 % (ref 4–15)
NEUTROPHILS # BLD AUTO: 0.1 K/UL (ref 1.8–7.7)
NEUTROPHILS NFR BLD: 52.9 % (ref 38–73)
NRBC BLD-RTO: 0 /100 WBC
OVALOCYTES BLD QL SMEAR: ABNORMAL
PHOSPHATE SERPL-MCNC: 3.9 MG/DL (ref 2.7–4.5)
PLATELET # BLD AUTO: 31 K/UL (ref 150–350)
PLATELET BLD QL SMEAR: ABNORMAL
PMV BLD AUTO: 11.9 FL (ref 9.2–12.9)
POIKILOCYTOSIS BLD QL SMEAR: SLIGHT
POLYCHROMASIA BLD QL SMEAR: ABNORMAL
POTASSIUM SERPL-SCNC: 4.4 MMOL/L (ref 3.5–5.1)
PROT SERPL-MCNC: 7.1 G/DL (ref 6–8.4)
RBC # BLD AUTO: 3.02 M/UL (ref 4.6–6.2)
SODIUM SERPL-SCNC: 134 MMOL/L (ref 136–145)
TACROLIMUS BLD-MCNC: 4.2 NG/ML (ref 5–15)
WBC # BLD AUTO: 0.17 K/UL (ref 3.9–12.7)

## 2020-08-09 PROCEDURE — 25000003 PHARM REV CODE 250

## 2020-08-09 PROCEDURE — 84100 ASSAY OF PHOSPHORUS: CPT

## 2020-08-09 PROCEDURE — 25000003 PHARM REV CODE 250: Performed by: STUDENT IN AN ORGANIZED HEALTH CARE EDUCATION/TRAINING PROGRAM

## 2020-08-09 PROCEDURE — 99233 SBSQ HOSP IP/OBS HIGH 50: CPT | Mod: ,,, | Performed by: INTERNAL MEDICINE

## 2020-08-09 PROCEDURE — 25000003 PHARM REV CODE 250: Performed by: NURSE PRACTITIONER

## 2020-08-09 PROCEDURE — 80053 COMPREHEN METABOLIC PANEL: CPT

## 2020-08-09 PROCEDURE — 63600175 PHARM REV CODE 636 W HCPCS: Performed by: INTERNAL MEDICINE

## 2020-08-09 PROCEDURE — 63600175 PHARM REV CODE 636 W HCPCS: Performed by: STUDENT IN AN ORGANIZED HEALTH CARE EDUCATION/TRAINING PROGRAM

## 2020-08-09 PROCEDURE — 83735 ASSAY OF MAGNESIUM: CPT

## 2020-08-09 PROCEDURE — 80197 ASSAY OF TACROLIMUS: CPT

## 2020-08-09 PROCEDURE — 20600001 HC STEP DOWN PRIVATE ROOM

## 2020-08-09 PROCEDURE — 25000003 PHARM REV CODE 250: Performed by: INTERNAL MEDICINE

## 2020-08-09 PROCEDURE — A9155 ARTIFICIAL SALIVA: HCPCS | Performed by: INTERNAL MEDICINE

## 2020-08-09 PROCEDURE — 85025 COMPLETE CBC W/AUTO DIFF WBC: CPT

## 2020-08-09 PROCEDURE — 99233 PR SUBSEQUENT HOSPITAL CARE,LEVL III: ICD-10-PCS | Mod: ,,, | Performed by: INTERNAL MEDICINE

## 2020-08-09 PROCEDURE — A4216 STERILE WATER/SALINE, 10 ML: HCPCS | Performed by: NURSE PRACTITIONER

## 2020-08-09 RX ADMIN — LORAZEPAM 1 MG: 2 INJECTION INTRAMUSCULAR; INTRAVENOUS at 08:08

## 2020-08-09 RX ADMIN — FILGRASTIM 480 MCG: 480 INJECTION, SOLUTION INTRAVENOUS; SUBCUTANEOUS at 09:08

## 2020-08-09 RX ADMIN — Medication 400 MG: at 05:08

## 2020-08-09 RX ADMIN — Medication 400 MG: at 10:08

## 2020-08-09 RX ADMIN — AMLODIPINE BESYLATE 10 MG: 10 TABLET ORAL at 09:08

## 2020-08-09 RX ADMIN — ONDANSETRON HYDROCHLORIDE 8 MG: 4 TABLET, FILM COATED ORAL at 05:08

## 2020-08-09 RX ADMIN — DIPHENHYDRAMINE HYDROCHLORIDE 25 MG: 25 CAPSULE ORAL at 12:08

## 2020-08-09 RX ADMIN — LEVOFLOXACIN 500 MG: 500 TABLET, FILM COATED ORAL at 09:08

## 2020-08-09 RX ADMIN — Medication 30 ML: at 05:08

## 2020-08-09 RX ADMIN — LORAZEPAM 1 MG: 2 INJECTION INTRAMUSCULAR; INTRAVENOUS at 10:08

## 2020-08-09 RX ADMIN — LORAZEPAM 1 MG: 2 INJECTION INTRAMUSCULAR; INTRAVENOUS at 02:08

## 2020-08-09 RX ADMIN — MYCOPHENOLATE MOFETIL 1000 MG: 250 CAPSULE ORAL at 08:08

## 2020-08-09 RX ADMIN — TAMSULOSIN HYDROCHLORIDE 0.4 MG: 0.4 CAPSULE ORAL at 09:08

## 2020-08-09 RX ADMIN — URSODIOL 300 MG: 300 CAPSULE ORAL at 09:08

## 2020-08-09 RX ADMIN — ACYCLOVIR 800 MG: 800 TABLET ORAL at 05:08

## 2020-08-09 RX ADMIN — FLUTICASONE PROPIONATE 100 MCG: 50 SPRAY, METERED NASAL at 10:08

## 2020-08-09 RX ADMIN — TACROLIMUS 4 MG: 1 CAPSULE ORAL at 09:08

## 2020-08-09 RX ADMIN — PANTOPRAZOLE SODIUM 40 MG: 40 TABLET, DELAYED RELEASE ORAL at 09:08

## 2020-08-09 RX ADMIN — TRAMADOL HYDROCHLORIDE 50 MG: 50 TABLET, FILM COATED ORAL at 11:08

## 2020-08-09 RX ADMIN — ONDANSETRON HYDROCHLORIDE 8 MG: 4 TABLET, FILM COATED ORAL at 09:08

## 2020-08-09 RX ADMIN — ALUMINUM HYDROXIDE, MAGNESIUM HYDROXIDE, AND DIMETHICONE 10 ML: 400; 400; 40 SUSPENSION ORAL at 12:08

## 2020-08-09 RX ADMIN — TRAMADOL HYDROCHLORIDE 50 MG: 50 TABLET, FILM COATED ORAL at 05:08

## 2020-08-09 RX ADMIN — Medication 10 ML: at 12:08

## 2020-08-09 RX ADMIN — FLUCONAZOLE 400 MG: 200 TABLET ORAL at 09:08

## 2020-08-09 RX ADMIN — MYCOPHENOLATE MOFETIL 1000 MG: 250 CAPSULE ORAL at 09:08

## 2020-08-09 RX ADMIN — ONDANSETRON HYDROCHLORIDE 8 MG: 4 TABLET, FILM COATED ORAL at 02:08

## 2020-08-09 RX ADMIN — TACROLIMUS 4 MG: 1 CAPSULE ORAL at 05:08

## 2020-08-09 RX ADMIN — OXYCODONE HYDROCHLORIDE 10 MG: 5 TABLET ORAL at 02:08

## 2020-08-09 RX ADMIN — Medication 10 ML: at 06:08

## 2020-08-09 RX ADMIN — ALUMINUM HYDROXIDE, MAGNESIUM HYDROXIDE, AND DIMETHICONE 10 ML: 400; 400; 40 SUSPENSION ORAL at 09:08

## 2020-08-09 RX ADMIN — URSODIOL 300 MG: 300 CAPSULE ORAL at 08:08

## 2020-08-09 RX ADMIN — CYCLOBENZAPRINE HYDROCHLORIDE 10 MG: 5 TABLET, FILM COATED ORAL at 11:08

## 2020-08-09 RX ADMIN — Medication 30 ML: at 02:08

## 2020-08-09 RX ADMIN — HYDROXYZINE HYDROCHLORIDE 25 MG: 25 TABLET, FILM COATED ORAL at 07:08

## 2020-08-09 RX ADMIN — OXYCODONE HYDROCHLORIDE 10 MG: 5 TABLET ORAL at 03:08

## 2020-08-09 RX ADMIN — OXYCODONE HYDROCHLORIDE 10 MG: 5 TABLET ORAL at 09:08

## 2020-08-09 RX ADMIN — OXYCODONE HYDROCHLORIDE 10 MG: 5 TABLET ORAL at 08:08

## 2020-08-09 RX ADMIN — GABAPENTIN 300 MG: 300 CAPSULE ORAL at 08:08

## 2020-08-09 RX ADMIN — HYDROXYZINE HYDROCHLORIDE 25 MG: 25 TABLET, FILM COATED ORAL at 05:08

## 2020-08-09 RX ADMIN — HYDROCORTISONE: 10 CREAM TOPICAL at 10:08

## 2020-08-09 RX ADMIN — ACYCLOVIR 800 MG: 800 TABLET ORAL at 09:08

## 2020-08-09 RX ADMIN — Medication 30 ML: at 09:08

## 2020-08-09 RX ADMIN — MYCOPHENOLATE MOFETIL 1000 MG: 250 CAPSULE ORAL at 02:08

## 2020-08-09 NOTE — PLAN OF CARE
Day +12 Allo/Haplo SCT. Pt with c/o persistent HA and nausea - prns administered. Pt also with c/o itchiness relieved by prn atarax and benadryl x1. Remains afebrile and VSS. Will continue to monitor.

## 2020-08-09 NOTE — ASSESSMENT & PLAN NOTE
- WBC 0.17, ANC 0.1 , Hgb 10, plt 31k  - lovenox held; need to add back once plt >50K again  - transfuse Hgb <7g and plt <10k

## 2020-08-09 NOTE — ASSESSMENT & PLAN NOTE
Admitted 7/21/20 for planned Flu/Cy/TBI haplo allo SCT. Son is the donor. Today is Day +10.  Receive 2 bags and a total CD34 dose of 3.10 x10^6/kg on 7/28/20  Will receive post transplant cyclophosphamide on Day +3 and +4.  Of note, patient is to not receive any steroids until 24hrs after completion of post tx CTX. This will be 8/5 @1130  - No signs of GVHD  - Tac level 4.5 ( on 08/08)  - increase tacro to 4 mg AM/ 4 mg PM yesterday   - will f/u and adjust as needed    Planned conditioning regimen:  Fludarabine on Day -6, -5, -4, -3, and -2  Cyclophosphamide on Day -6 and -5  Total Body Irradiation on Day -1     Planned  GVHD Prophylaxis:  Cyclophosphamide on Day +3 and +4  Tacrolimus starting on Day +5  Mycophenolate starting on Day +5     Antimicrobial Prophylaxis:  Acyclovir starting on Day -6  Levofloxacin starting on Day -1  Fluconazole starting on Day -1  Bactrim starting on Day +30     SOS/VOD Prophylaxis:  Ursodiol on Day -6 through Day +30     Growth Factor Support:  Neupogen starting on Day +7

## 2020-08-09 NOTE — ASSESSMENT & PLAN NOTE
- Continue home Flexeril and ultram  - PRN oxy IR added for additional relief  - likely component of his neck pain this AM

## 2020-08-09 NOTE — PROGRESS NOTES
[facility]  Hematology  Bone Marrow Transplant  Progress Note    Patient Name: Kvng Jones Sr.  Admission Date: 7/21/2020  Hospital Length of Stay: 19 days  Code Status: Full Code    Subjective:     Interval History: Day +12 Flu/Cy/TBI haplo alloSCT for AML/CML. Had some chills yesterday after platelets transfusion. Complainng of back pain. His Tacro levels 4.5 yesterday. Tacro increased to 4 mg BID     Objective:     Vital Signs (Most Recent):  Temp: 98.5 °F (36.9 °C) (08/09/20 0758)  Pulse: 92 (08/09/20 0758)  Resp: 16 (08/09/20 0911)  BP: 111/72 (08/09/20 0348)  SpO2: (!) 92 % (08/09/20 0758) Vital Signs (24h Range):  Temp:  [97.6 °F (36.4 °C)-98.7 °F (37.1 °C)] 98.5 °F (36.9 °C)  Pulse:  [] 92  Resp:  [16-20] 16  SpO2:  [92 %-96 %] 92 %  BP: (100-124)/(60-74) 111/72     Weight: 85.9 kg (189 lb 6 oz)  Body mass index is 30.57 kg/m².  Body surface area is 2 meters squared.    ECOG SCORE         Intake/Output - Last 3 Shifts       08/07 0700 - 08/08 0659 08/08 0700 - 08/09 0659 08/09 0700 - 08/10 0659    P.O. 2220 600     I.V. (mL/kg) 50 (0.6)      Blood  239     Total Intake(mL/kg) 2270 (26) 839 (9.8)     Urine (mL/kg/hr) 1475 (0.7) 1175 (0.6)     Stool 0 1     Total Output 1475 1176     Net +795 -337            Stool Occurrence 1 x            Physical Exam  Constitutional:       General: He is not in acute distress.     Appearance: Normal appearance.   HENT:      Head: Normocephalic and atraumatic.   Eyes:      Pupils: Pupils are equal, round, and reactive to light.   Cardiovascular:      Rate and Rhythm: Normal rate and regular rhythm.      Heart sounds: No murmur.   Pulmonary:      Effort: No respiratory distress.      Breath sounds: Normal breath sounds. No wheezing.   Abdominal:      General: Abdomen is flat. Bowel sounds are normal. There is no distension.      Palpations: Abdomen is soft. There is no mass.      Tenderness: There is no abdominal tenderness.   Musculoskeletal:          General: No swelling or deformity.   Skin:     Coloration: Skin is not jaundiced.   Neurological:      General: No focal deficit present.      Mental Status: He is alert and oriented to person, place, and time. Mental status is at baseline.         Significant Labs:   CBC:   Recent Labs   Lab 08/08/20  0311 08/09/20  0400   WBC 0.04* 0.17*   HGB 10.6* 10.0*   HCT 31.2* 29.0*   PLT 8* 31*    and CMP:   Recent Labs   Lab 08/08/20  0311 08/09/20  0400   * 134*   K 4.2 4.4    100   CO2 27 24    112*   BUN 12 14   CREATININE 0.8 0.8   CALCIUM 9.3 9.4   PROT 6.9 7.1   ALBUMIN 3.7 3.8   BILITOT 0.6 0.6   ALKPHOS 79 78   AST 10 12   ALT 21 21   ANIONGAP 7* 10   EGFRNONAA >60.0 >60.0       Diagnostic Results:  None    Assessment/Plan:     * History of allogeneic stem cell transplant  Admitted 7/21/20 for planned Flu/Cy/TBI haplo allo SCT. Son is the donor. Today is Day +10.  Receive 2 bags and a total CD34 dose of 3.10 x10^6/kg on 7/28/20  Will receive post transplant cyclophosphamide on Day +3 and +4.  Of note, patient is to not receive any steroids until 24hrs after completion of post tx CTX. This will be 8/5 @1130  - No signs of GVHD  - Tac level 4.5 ( on 08/08)  - increase tacro to 4 mg AM/ 4 mg PM yesterday   - will f/u and adjust as needed    Planned conditioning regimen:  Fludarabine on Day -6, -5, -4, -3, and -2  Cyclophosphamide on Day -6 and -5  Total Body Irradiation on Day -1     Planned  GVHD Prophylaxis:  Cyclophosphamide on Day +3 and +4  Tacrolimus starting on Day +5  Mycophenolate starting on Day +5     Antimicrobial Prophylaxis:  Acyclovir starting on Day -6  Levofloxacin starting on Day -1  Fluconazole starting on Day -1  Bactrim starting on Day +30     SOS/VOD Prophylaxis:  Ursodiol on Day -6 through Day +30     Growth Factor Support:  Neupogen starting on Day +7     Mucositis due to chemotherapy  - MMM  - c/w [facility] scheduled    CINV (chemotherapy-induced nausea and  vomiting)  - will schedule zofran at his request   - compazine and ativan as prns    Acute myeloid leukemia in remission  - See CML    Blurry vision  - C/o worsening blurry vision 7/30/20  - May be contributing to headaches  - Ophtho consulted and saw patient 7/30  - Ophtho dilated pupils and performed fundal exam. Fundal exam neg.  - Patient had been viewing laptop screen for ~ 2 hours prior to experiencing blurry vision  - Per ophtho, blurry vision due to dry eyes. rec'd artificial tears QID prn and warm compresses BID prn. Can escalate artificial tears to ointment if needed.    Antineoplastic chemotherapy induced pancytopenia  - WBC 0.17, ANC 0.1 , Hgb 10, plt 31k  - lovenox held; need to add back once plt >50K again  - transfuse Hgb <7g and plt <10k      Rash and nonspecific skin eruption  - Newly noted macular rash to upper back and chest on 7/29  - Intermittently pruritic  - GVHD not suspected this soon after transplant  - Likely drug reaction, thought maybe to be from DMSO from stem cell transplant  - Continue to monitor closely  - Started on PRN PO benadryl, topical benadryl cream, and topical hydrocortisone   - Patient states that benadryl causes him to be jittery. Ordered prn atarax instead.  - resolved    Abdominal gas pain  - Started on PRN simethicone    Headache  - Started having headaches several weeks ago. Onset seems to correspond to when patient quit smoking and when he started ponatinib  - Has been off of ponatinib for a few days but fevers have persisted  - Pattern is like cluster headaches (onset during sleep), but not unilateral.  - O2 via non-rebreather at 12 L over 15 minutes seemed to help but developed hot flush so pt stopped  Ultram, Flexeril, Oxycodone not helping  Imitrex PRN q2hr (max 200mg in 24hrs). Not contraindicated per pharm D.  Description of headache sounds like tension headache--throbbing with pressure across crown of head  No focal neurological deficits  - Patient reporting  "that headache may be sinus in nature. Maxillary tenderness noted.  - Patient takes Claritin at home. States that he cannot take Zyrtec (which is on formulary) due to SE and "addiction"  - States that it was hard for him to ween himself off of Zyrtec.  - Stared Flonase 7/27/20. Started home Claritin 7/27/20 (not on formulary)  - Somewhat responding to caffeine, Imitrex, tramadol, oxy, and ativan  - Now c/o blurry vision, which may also be contributing to headaches. Seen by ophtho for this  - Continue to monitor closely; especially with expected thrombocytopenia  - Neuro on consult - trial IV mag. Appreciate input  - improved after IV mag yesterday     Cervical stenosis of spine  - Continue home Flexeril and ultram  - PRN oxy IR added for additional relief  - likely component of his neck pain this AM    Tobacco abuse, in remission  - 35 pack year history  - Quit smoking 6/1/2020 using Chantix  - Patient completed course of Chantix and denies need for Nicotine patch.     Daily consumption of alcohol  - Drinks 3-4 alcoholic beverages nightly  - Will need to monitor closely for s/s of alcohol withdrawal while inpatient  - Will consider consulting addiction medicine if indicated  - Prn ativan available    BPH (benign prostatic hyperplasia)  - Continue home Flomax    GERD (gastroesophageal reflux disease)  - Takes Nexium at home  - Giving Protonix while inpatient as Nexium is not on hospital formulary  - Can resume Nexium at discharge  - Had CP this am that is believed to be due to GERD (troponin and EKG unremarkable). Relieved after protonix administration  - PRN TUMS ordered.    Essential hypertension  - increased home amlodipine from 5 mg to 10 mg daily  - BPs as high as 180s/110s  - Started prn hydralazine - possibly worsened headache  - Decreased IVF rate  - BP normalized    CML in remission  - Presented to [facility] with CMP in blast crisis and with nodular disease on 3/30/2020  - Induced with FLAG-Addis. Achieved " morphologic CR  - Started on daily Ponatinib 30 mg daily, which he continues to take outpatient. Will hold Ponatinib on admission.  - Referred to Dr. Hodges by Dr. Ramos for SCT consideration  - Had BMBx at [facility] on 7/1/2020 showing no morphologic or immunophenotypic evidence of CML  - Admitted 7/21/20 for planned Flu/Cy/TBI haplo allo SCT  - See SCT candidate        VTE Risk Mitigation (From admission, onward)         Ordered     heparin, porcine (PF) 100 unit/mL injection flush 300 Units  As needed (PRN)      07/21/20 1920                Disposition: TBD    Karl Hutton MD  Bone Marrow Transplant  [facility]

## 2020-08-09 NOTE — PLAN OF CARE
Side rails up x2; call bell in place; bed in lowest, locked position; skid proof socks on; no evidence of skin breakdown; care plan explained to patient; pt remains free of injury.  Pt is day +11 for an allo SCT. Pt with poor appetite, c/o nausea and pain, zofran, tramadol, and oxy IR given. Magnesium IVPB given for HA. Pt stated he had moderate relief. Pt ambulates in room, Voids. Pt premedicated with pepcid and benadryl, Platelets transfused for count of 8,000. VSS and afebrile.

## 2020-08-09 NOTE — SUBJECTIVE & OBJECTIVE
Subjective:     Interval History: Day +12 Flu/Cy/TBI haplo alloSCT for AML/CML. Had some chills yesterday after platelets transfusion. Complainng of back pain. His Tacro levels 4.5 yesterday. Tacro increased to 4 mg BID     Objective:     Vital Signs (Most Recent):  Temp: 98.5 °F (36.9 °C) (08/09/20 0758)  Pulse: 92 (08/09/20 0758)  Resp: 16 (08/09/20 0911)  BP: 111/72 (08/09/20 0348)  SpO2: (!) 92 % (08/09/20 0758) Vital Signs (24h Range):  Temp:  [97.6 °F (36.4 °C)-98.7 °F (37.1 °C)] 98.5 °F (36.9 °C)  Pulse:  [] 92  Resp:  [16-20] 16  SpO2:  [92 %-96 %] 92 %  BP: (100-124)/(60-74) 111/72     Weight: 85.9 kg (189 lb 6 oz)  Body mass index is 30.57 kg/m².  Body surface area is 2 meters squared.    ECOG SCORE         Intake/Output - Last 3 Shifts       08/07 0700 - 08/08 0659 08/08 0700 - 08/09 0659 08/09 0700 - 08/10 0659    P.O. 2220 600     I.V. (mL/kg) 50 (0.6)      Blood  239     Total Intake(mL/kg) 2270 (26) 839 (9.8)     Urine (mL/kg/hr) 1475 (0.7) 1175 (0.6)     Stool 0 1     Total Output 1475 1176     Net +795 -337            Stool Occurrence 1 x            Physical Exam  Constitutional:       General: He is not in acute distress.     Appearance: Normal appearance.   HENT:      Head: Normocephalic and atraumatic.   Eyes:      Pupils: Pupils are equal, round, and reactive to light.   Cardiovascular:      Rate and Rhythm: Normal rate and regular rhythm.      Heart sounds: No murmur.   Pulmonary:      Effort: No respiratory distress.      Breath sounds: Normal breath sounds. No wheezing.   Abdominal:      General: Abdomen is flat. Bowel sounds are normal. There is no distension.      Palpations: Abdomen is soft. There is no mass.      Tenderness: There is no abdominal tenderness.   Musculoskeletal:         General: No swelling or deformity.   Skin:     Coloration: Skin is not jaundiced.   Neurological:      General: No focal deficit present.      Mental Status: He is alert and oriented to person,  place, and time. Mental status is at baseline.         Significant Labs:   CBC:   Recent Labs   Lab 08/08/20 0311 08/09/20  0400   WBC 0.04* 0.17*   HGB 10.6* 10.0*   HCT 31.2* 29.0*   PLT 8* 31*    and CMP:   Recent Labs   Lab 08/08/20 0311 08/09/20  0400   * 134*   K 4.2 4.4    100   CO2 27 24    112*   BUN 12 14   CREATININE 0.8 0.8   CALCIUM 9.3 9.4   PROT 6.9 7.1   ALBUMIN 3.7 3.8   BILITOT 0.6 0.6   ALKPHOS 79 78   AST 10 12   ALT 21 21   ANIONGAP 7* 10   EGFRNONAA >60.0 >60.0       Diagnostic Results:  None

## 2020-08-10 PROBLEM — E83.42 HYPOMAGNESEMIA: Status: ACTIVE | Noted: 2020-08-10

## 2020-08-10 LAB
ABO + RH BLD: NORMAL
ALBUMIN SERPL BCP-MCNC: 3.7 G/DL (ref 3.5–5.2)
ALP SERPL-CCNC: 82 U/L (ref 55–135)
ALT SERPL W/O P-5'-P-CCNC: 22 U/L (ref 10–44)
ANION GAP SERPL CALC-SCNC: 13 MMOL/L (ref 8–16)
ANISOCYTOSIS BLD QL SMEAR: SLIGHT
AST SERPL-CCNC: 12 U/L (ref 10–40)
BASOPHILS # BLD AUTO: 0.02 K/UL (ref 0–0.2)
BASOPHILS NFR BLD: 1.3 % (ref 0–1.9)
BILIRUB SERPL-MCNC: 0.6 MG/DL (ref 0.1–1)
BLD GP AB SCN CELLS X3 SERPL QL: NORMAL
BUN SERPL-MCNC: 18 MG/DL (ref 8–23)
CALCIUM SERPL-MCNC: 9.9 MG/DL (ref 8.7–10.5)
CHLORIDE SERPL-SCNC: 100 MMOL/L (ref 95–110)
CO2 SERPL-SCNC: 21 MMOL/L (ref 23–29)
CREAT SERPL-MCNC: 0.8 MG/DL (ref 0.5–1.4)
DIFFERENTIAL METHOD: ABNORMAL
EOSINOPHIL # BLD AUTO: 0 K/UL (ref 0–0.5)
EOSINOPHIL NFR BLD: 0 % (ref 0–8)
ERYTHROCYTE [DISTWIDTH] IN BLOOD BY AUTOMATED COUNT: 12.5 % (ref 11.5–14.5)
EST. GFR  (AFRICAN AMERICAN): >60 ML/MIN/1.73 M^2
EST. GFR  (NON AFRICAN AMERICAN): >60 ML/MIN/1.73 M^2
GLUCOSE SERPL-MCNC: 111 MG/DL (ref 70–110)
HCT VFR BLD AUTO: 29.5 % (ref 40–54)
HGB BLD-MCNC: 10.1 G/DL (ref 14–18)
HYPOCHROMIA BLD QL SMEAR: ABNORMAL
IMM GRANULOCYTES # BLD AUTO: 0.03 K/UL (ref 0–0.04)
IMM GRANULOCYTES NFR BLD AUTO: 1.9 % (ref 0–0.5)
LYMPHOCYTES # BLD AUTO: 0 K/UL (ref 1–4.8)
LYMPHOCYTES NFR BLD: 1.9 % (ref 18–48)
MAGNESIUM SERPL-MCNC: 1.5 MG/DL (ref 1.6–2.6)
MCH RBC QN AUTO: 33.2 PG (ref 27–31)
MCHC RBC AUTO-ENTMCNC: 34.2 G/DL (ref 32–36)
MCV RBC AUTO: 97 FL (ref 82–98)
MONOCYTES # BLD AUTO: 0.2 K/UL (ref 0.3–1)
MONOCYTES NFR BLD: 11.7 % (ref 4–15)
NEUTROPHILS # BLD AUTO: 1.3 K/UL (ref 1.8–7.7)
NEUTROPHILS NFR BLD: 83.2 % (ref 38–73)
NRBC BLD-RTO: 0 /100 WBC
PHOSPHATE SERPL-MCNC: 4.4 MG/DL (ref 2.7–4.5)
PLATELET # BLD AUTO: 28 K/UL (ref 150–350)
PLATELET BLD QL SMEAR: ABNORMAL
PMV BLD AUTO: 11.2 FL (ref 9.2–12.9)
POTASSIUM SERPL-SCNC: 4.1 MMOL/L (ref 3.5–5.1)
PROT SERPL-MCNC: 7.3 G/DL (ref 6–8.4)
RBC # BLD AUTO: 3.04 M/UL (ref 4.6–6.2)
SODIUM SERPL-SCNC: 134 MMOL/L (ref 136–145)
TACROLIMUS BLD-MCNC: 5.3 NG/ML (ref 5–15)
WBC # BLD AUTO: 1.54 K/UL (ref 3.9–12.7)

## 2020-08-10 PROCEDURE — 86850 RBC ANTIBODY SCREEN: CPT

## 2020-08-10 PROCEDURE — 25000003 PHARM REV CODE 250: Performed by: INTERNAL MEDICINE

## 2020-08-10 PROCEDURE — 63600175 PHARM REV CODE 636 W HCPCS: Performed by: INTERNAL MEDICINE

## 2020-08-10 PROCEDURE — 25000003 PHARM REV CODE 250

## 2020-08-10 PROCEDURE — A4216 STERILE WATER/SALINE, 10 ML: HCPCS | Performed by: NURSE PRACTITIONER

## 2020-08-10 PROCEDURE — 25000003 PHARM REV CODE 250: Performed by: NURSE PRACTITIONER

## 2020-08-10 PROCEDURE — 84100 ASSAY OF PHOSPHORUS: CPT

## 2020-08-10 PROCEDURE — 83735 ASSAY OF MAGNESIUM: CPT

## 2020-08-10 PROCEDURE — 99233 SBSQ HOSP IP/OBS HIGH 50: CPT | Mod: ,,, | Performed by: INTERNAL MEDICINE

## 2020-08-10 PROCEDURE — A9155 ARTIFICIAL SALIVA: HCPCS | Performed by: INTERNAL MEDICINE

## 2020-08-10 PROCEDURE — 20600001 HC STEP DOWN PRIVATE ROOM

## 2020-08-10 PROCEDURE — 85025 COMPLETE CBC W/AUTO DIFF WBC: CPT

## 2020-08-10 PROCEDURE — 94761 N-INVAS EAR/PLS OXIMETRY MLT: CPT

## 2020-08-10 PROCEDURE — 63600175 PHARM REV CODE 636 W HCPCS

## 2020-08-10 PROCEDURE — 25000003 PHARM REV CODE 250: Performed by: STUDENT IN AN ORGANIZED HEALTH CARE EDUCATION/TRAINING PROGRAM

## 2020-08-10 PROCEDURE — 99233 PR SUBSEQUENT HOSPITAL CARE,LEVL III: ICD-10-PCS | Mod: ,,, | Performed by: INTERNAL MEDICINE

## 2020-08-10 PROCEDURE — 80197 ASSAY OF TACROLIMUS: CPT

## 2020-08-10 PROCEDURE — 63600175 PHARM REV CODE 636 W HCPCS: Performed by: STUDENT IN AN ORGANIZED HEALTH CARE EDUCATION/TRAINING PROGRAM

## 2020-08-10 PROCEDURE — 80053 COMPREHEN METABOLIC PANEL: CPT

## 2020-08-10 RX ORDER — HYDROMORPHONE HYDROCHLORIDE 1 MG/ML
1 INJECTION, SOLUTION INTRAMUSCULAR; INTRAVENOUS; SUBCUTANEOUS ONCE
Status: COMPLETED | OUTPATIENT
Start: 2020-08-10 | End: 2020-08-10

## 2020-08-10 RX ORDER — DIPHENHYDRAMINE HCL 25 MG
25 CAPSULE ORAL
Status: COMPLETED | OUTPATIENT
Start: 2020-08-10 | End: 2020-08-10

## 2020-08-10 RX ORDER — GABAPENTIN 300 MG/1
300 CAPSULE ORAL 3 TIMES DAILY
Status: DISCONTINUED | OUTPATIENT
Start: 2020-08-10 | End: 2020-08-12 | Stop reason: HOSPADM

## 2020-08-10 RX ORDER — CYCLOBENZAPRINE HCL 5 MG
10 TABLET ORAL 3 TIMES DAILY
Status: DISCONTINUED | OUTPATIENT
Start: 2020-08-10 | End: 2020-08-12 | Stop reason: HOSPADM

## 2020-08-10 RX ORDER — MAGNESIUM SULFATE HEPTAHYDRATE 40 MG/ML
2 INJECTION, SOLUTION INTRAVENOUS ONCE
Status: COMPLETED | OUTPATIENT
Start: 2020-08-10 | End: 2020-08-10

## 2020-08-10 RX ORDER — HYDROMORPHONE HYDROCHLORIDE 1 MG/ML
1 INJECTION, SOLUTION INTRAMUSCULAR; INTRAVENOUS; SUBCUTANEOUS EVERY 4 HOURS PRN
Status: DISCONTINUED | OUTPATIENT
Start: 2020-08-10 | End: 2020-08-11

## 2020-08-10 RX ADMIN — GABAPENTIN 300 MG: 300 CAPSULE ORAL at 11:08

## 2020-08-10 RX ADMIN — Medication 30 ML: at 09:08

## 2020-08-10 RX ADMIN — ONDANSETRON HYDROCHLORIDE 8 MG: 4 TABLET, FILM COATED ORAL at 09:08

## 2020-08-10 RX ADMIN — PROCHLORPERAZINE MALEATE 10 MG: 5 TABLET ORAL at 01:08

## 2020-08-10 RX ADMIN — CYCLOBENZAPRINE HYDROCHLORIDE 10 MG: 5 TABLET, FILM COATED ORAL at 01:08

## 2020-08-10 RX ADMIN — CYCLOBENZAPRINE HYDROCHLORIDE 10 MG: 5 TABLET, FILM COATED ORAL at 02:08

## 2020-08-10 RX ADMIN — TACROLIMUS 4 MG: 1 CAPSULE ORAL at 08:08

## 2020-08-10 RX ADMIN — ACYCLOVIR 800 MG: 800 TABLET ORAL at 05:08

## 2020-08-10 RX ADMIN — Medication 10 ML: at 11:08

## 2020-08-10 RX ADMIN — URSODIOL 300 MG: 300 CAPSULE ORAL at 08:08

## 2020-08-10 RX ADMIN — MYCOPHENOLATE MOFETIL 1000 MG: 250 CAPSULE ORAL at 09:08

## 2020-08-10 RX ADMIN — Medication 10 ML: at 06:08

## 2020-08-10 RX ADMIN — CYCLOBENZAPRINE HYDROCHLORIDE 10 MG: 5 TABLET, FILM COATED ORAL at 11:08

## 2020-08-10 RX ADMIN — LORAZEPAM 1 MG: 2 INJECTION INTRAMUSCULAR; INTRAVENOUS at 03:08

## 2020-08-10 RX ADMIN — Medication 30 ML: at 04:08

## 2020-08-10 RX ADMIN — TACROLIMUS 4 MG: 1 CAPSULE ORAL at 05:08

## 2020-08-10 RX ADMIN — ONDANSETRON HYDROCHLORIDE 8 MG: 4 TABLET, FILM COATED ORAL at 02:08

## 2020-08-10 RX ADMIN — HYDROMORPHONE HYDROCHLORIDE 1 MG: 1 INJECTION, SOLUTION INTRAMUSCULAR; INTRAVENOUS; SUBCUTANEOUS at 08:08

## 2020-08-10 RX ADMIN — ONDANSETRON HYDROCHLORIDE 8 MG: 4 TABLET, FILM COATED ORAL at 06:08

## 2020-08-10 RX ADMIN — AMLODIPINE BESYLATE 10 MG: 10 TABLET ORAL at 09:08

## 2020-08-10 RX ADMIN — GABAPENTIN 300 MG: 300 CAPSULE ORAL at 08:08

## 2020-08-10 RX ADMIN — MAGNESIUM SULFATE HEPTAHYDRATE 2 G: 40 INJECTION, SOLUTION INTRAVENOUS at 09:08

## 2020-08-10 RX ADMIN — HYDROMORPHONE HYDROCHLORIDE 1 MG: 1 INJECTION, SOLUTION INTRAMUSCULAR; INTRAVENOUS; SUBCUTANEOUS at 04:08

## 2020-08-10 RX ADMIN — ACYCLOVIR 800 MG: 800 TABLET ORAL at 08:08

## 2020-08-10 RX ADMIN — OXYCODONE HYDROCHLORIDE 10 MG: 5 TABLET ORAL at 03:08

## 2020-08-10 RX ADMIN — Medication 10 ML: at 12:08

## 2020-08-10 RX ADMIN — GABAPENTIN 300 MG: 300 CAPSULE ORAL at 02:08

## 2020-08-10 RX ADMIN — Medication 400 MG: at 12:08

## 2020-08-10 RX ADMIN — Medication 400 MG: at 05:08

## 2020-08-10 RX ADMIN — Medication 400 MG: at 09:08

## 2020-08-10 RX ADMIN — LORAZEPAM 1 MG: 2 INJECTION INTRAMUSCULAR; INTRAVENOUS at 05:08

## 2020-08-10 RX ADMIN — DIPHENHYDRAMINE HYDROCHLORIDE 25 MG: 25 CAPSULE ORAL at 08:08

## 2020-08-10 RX ADMIN — FLUCONAZOLE 400 MG: 200 TABLET ORAL at 09:08

## 2020-08-10 RX ADMIN — URSODIOL 300 MG: 300 CAPSULE ORAL at 09:08

## 2020-08-10 RX ADMIN — MYCOPHENOLATE MOFETIL 1000 MG: 250 CAPSULE ORAL at 02:08

## 2020-08-10 RX ADMIN — Medication 10 ML: at 05:08

## 2020-08-10 RX ADMIN — MYCOPHENOLATE MOFETIL 1000 MG: 250 CAPSULE ORAL at 08:08

## 2020-08-10 RX ADMIN — Medication 30 ML: at 12:08

## 2020-08-10 RX ADMIN — LEVOFLOXACIN 500 MG: 500 TABLET, FILM COATED ORAL at 09:08

## 2020-08-10 RX ADMIN — PANTOPRAZOLE SODIUM 40 MG: 40 TABLET, DELAYED RELEASE ORAL at 09:08

## 2020-08-10 RX ADMIN — TAMSULOSIN HYDROCHLORIDE 0.4 MG: 0.4 CAPSULE ORAL at 09:08

## 2020-08-10 RX ADMIN — HYDROMORPHONE HYDROCHLORIDE 1 MG: 1 INJECTION, SOLUTION INTRAMUSCULAR; INTRAVENOUS; SUBCUTANEOUS at 12:08

## 2020-08-10 RX ADMIN — TRAMADOL HYDROCHLORIDE 50 MG: 50 TABLET, FILM COATED ORAL at 01:08

## 2020-08-10 RX ADMIN — FILGRASTIM 480 MCG: 480 INJECTION, SOLUTION INTRAVENOUS; SUBCUTANEOUS at 09:08

## 2020-08-10 NOTE — PROGRESS NOTES
[facility]  Hematology  Bone Marrow Transplant  Progress Note    Patient Name: Kvng Jones Sr.  Admission Date: 7/21/2020  Hospital Length of Stay: 20 days  Code Status: Full Code    Subjective:     Interval History: Day +13 Flu/Cy/TBI haplo alloSCT for CML. HDS/VSS overnight. Neck and back pain this morning. Also complaining of palm itching though no rash present and thinks it is from his opioid pain medication. Tac within limits this AM. Mag 1.5 and will get 2g IV. ANC 1281 this AM.     Objective:     Vital Signs (Most Recent):  Temp: 97.8 °F (36.6 °C) (08/10/20 0725)  Pulse: 103 (08/10/20 0725)  Resp: 20 (08/10/20 0725)  BP: (!) 145/65 (08/10/20 0725)  SpO2: (!) 94 % (08/10/20 0725) Vital Signs (24h Range):  Temp:  [97.8 °F (36.6 °C)-99 °F (37.2 °C)] 97.8 °F (36.6 °C)  Pulse:  [102-107] 103  Resp:  [16-20] 20  SpO2:  [93 %-97 %] 94 %  BP: (101-145)/(59-77) 145/65     Weight: 84.8 kg (186 lb 15.2 oz)  Body mass index is 30.17 kg/m².  Body surface area is 1.99 meters squared.    Intake/Output - Last 3 Shifts       08/08 0700 - 08/09 0659 08/09 0700 - 08/10 0659 08/10 0700 - 08/11 0659    P.O. 600 1740     I.V. (mL/kg)       Blood 239      Total Intake(mL/kg) 839 (9.8) 1740 (20.5)     Urine (mL/kg/hr) 1175 (0.6) 750 (0.4)     Stool 1 0     Total Output 1176 750     Net -337 +990            Stool Occurrence  3 x           Physical Exam  Constitutional:       General: He is not in acute distress.     Appearance: Normal weight. Mild distress this AM from pain.   HENT:      Head: Normocephalic and atraumatic.      Mouth/Throat:      Mouth: Dry MM. Mild evidence of mucositis on bottom gums.     Pharynx: No oropharyngeal exudate or posterior oropharyngeal erythema.   Eyes:      General: No scleral icterus.     Pupils: Pupils are equal, round, and reactive to light.   Neck:      Musculoskeletal: Normal range of motion. No neck rigidity.   Cardiovascular:      Rate and Rhythm: Normal rate and  regular rhythm.      Pulses: Normal pulses.      Heart sounds: Normal heart sounds. No murmur.   Pulmonary:      Effort: Pulmonary effort is normal. No respiratory distress.      Breath sounds: Normal breath sounds.   Abdominal:      General: Abdomen is flat. Bowel sounds are normal.      Palpations: Abdomen is soft.   Musculoskeletal: Normal range of motion.      Right lower leg: No edema.      Left lower leg: No edema.      Comments: RCW grewal CDI, improving area of erythema   Lymphadenopathy:      Cervical: No cervical adenopathy.   Skin:     General: Skin is warm.      Coloration: Skin is not jaundiced or pale.   Neurological:      General: No focal deficit present.      Mental Status: He is alert and oriented to person, place, and time. Mental status is at baseline.   Psychiatric:         Mood and Affect: Mood normal.         Thought Content: Thought content normal.     Significant Labs:   CBC:   Recent Labs   Lab 08/09/20  0400 08/10/20  0400   WBC 0.17* 1.54*   HGB 10.0* 10.1*   HCT 29.0* 29.5*   PLT 31* 28*    and CMP:   Recent Labs   Lab 08/09/20  0400 08/10/20  0400   * 134*   K 4.4 4.1    100   CO2 24 21*   * 111*   BUN 14 18   CREATININE 0.8 0.8   CALCIUM 9.4 9.9   PROT 7.1 7.3   ALBUMIN 3.8 3.7   BILITOT 0.6 0.6   ALKPHOS 78 82   AST 12 12   ALT 21 22   ANIONGAP 10 13   EGFRNONAA >60.0 >60.0       Diagnostic Results:  None    Assessment/Plan:     * History of allogeneic stem cell transplant  Admitted 7/21/20 for planned Flu/Cy/TBI haplo allo SCT. Son is the donor. Today is Day +13.  Receive 2 bags and a total CD34 dose of 3.10 x10^6/kg on 7/28/20  Will receive post transplant cyclophosphamide on Day +3 and +4.  Of note, patient is to not receive any steroids until 24hrs after completion of post tx CTX. This will be 8/5 @1130  - mild palm and ankle itching, no rash on exam, t bili wnl and no diarrhe - GVDH score 0  - Tac level 5.3  - On 4mg BID     - will f/u and adjust as  needed    Planned conditioning regimen:  Fludarabine on Day -6, -5, -4, -3, and -2  Cyclophosphamide on Day -6 and -5  Total Body Irradiation on Day -1     Planned  GVHD Prophylaxis:  Cyclophosphamide on Day +3 and +4  Tacrolimus starting on Day +5  Mycophenolate starting on Day +5     Antimicrobial Prophylaxis:  Acyclovir starting on Day -6  Levofloxacin starting on Day -1  Fluconazole starting on Day -1  Bactrim starting on Day +30     SOS/VOD Prophylaxis:  Ursodiol on Day -6 through Day +30     Growth Factor Support:  Neupogen starting on Day +7     Hypomagnesemia  - 1.5 this AM, previously contributed to neck pain and HA  - replace with 2g IV    Mucositis due to chemotherapy  - c/w [facility] scheduled  - improving    CINV (chemotherapy-induced nausea and vomiting)  - will schedule zofran at his request   - compazine and ativan as prns    Acute myeloid leukemia in remission  - See CML    Blurry vision  - C/o worsening blurry vision 7/30/20  - May be contributing to headaches  - Ophtho consulted and saw patient 7/30  - Ophtho dilated pupils and performed fundal exam. Fundal exam neg.  - Patient had been viewing laptop screen for ~ 2 hours prior to experiencing blurry vision  - Per ophtho, blurry vision due to dry eyes. rec'd artificial tears QID prn and warm compresses BID prn. Can escalate artificial tears to ointment if needed.    Antineoplastic chemotherapy induced pancytopenia  - WBC 1.54, ANC 1281 , Hgb 10.1, plt 28k  - lovenox held; need to add back once plt >50K again  - transfuse Hgb <7g and plt <10k      Rash and nonspecific skin eruption  - Newly noted macular rash to upper back and chest on 7/29  - Intermittently pruritic  - GVHD not suspected this soon after transplant  - Likely drug reaction, thought maybe to be from DMSO from stem cell transplant  - Continue to monitor closely  - Started on PRN PO benadryl, topical benadryl cream, and topical hydrocortisone   - Patient states that benadryl causes him to  "be jittery. Ordered prn atarax instead.  - resolved    Abdominal gas pain  - Started on PRN simethicone    Headache  - Started having headaches several weeks ago. Onset seems to correspond to when patient quit smoking and when he started ponatinib  - Has been off of ponatinib for a few days but fevers have persisted  - Pattern is like cluster headaches (onset during sleep), but not unilateral.  - O2 via non-rebreather at 12 L over 15 minutes seemed to help but developed hot flush so pt stopped  Ultram, Flexeril, Oxycodone not helping  Imitrex PRN q2hr (max 200mg in 24hrs). Not contraindicated per pharm D.  Description of headache sounds like tension headache--throbbing with pressure across crown of head  No focal neurological deficits  - Patient reporting that headache may be sinus in nature. Maxillary tenderness noted.  - Patient takes Claritin at home. States that he cannot take Zyrtec (which is on formulary) due to SE and "addiction"  - States that it was hard for him to ween himself off of Zyrtec.  - Stared Flonase 7/27/20. Started home Claritin 7/27/20 (not on formulary)  - Somewhat responding to caffeine, Imitrex, tramadol, oxy, and ativan  - Now c/o blurry vision, which may also be contributing to headaches. Seen by ophtho for this  - Continue to monitor closely; especially with expected thrombocytopenia  - Neuro on consult - trial IV mag. Appreciate input  - no complaint this morning    Cervical stenosis of spine  - Continue home Flexeril and ultram  - PRN oxy IR added for additional relief  - ongoing neck pain and 10/10 this AM  - 1 time dose of benadryl and dilaudid     Tobacco abuse, in remission  - 35 pack year history  - Quit smoking 6/1/2020 using Chantix  - Patient completed course of Chantix and denies need for Nicotine patch.     Daily consumption of alcohol  - Drinks 3-4 alcoholic beverages nightly  - Will need to monitor closely for s/s of alcohol withdrawal while inpatient  - Will consider " consulting addiction medicine if indicated  - Prn ativan available    BPH (benign prostatic hyperplasia)  - Continue home Flomax    GERD (gastroesophageal reflux disease)  - Takes Nexium at home  - Giving Protonix while inpatient as Nexium is not on hospital formulary  - Can resume Nexium at discharge  - Had CP this am that is believed to be due to GERD (troponin and EKG unremarkable). Relieved after protonix administration  - PRN TUMS ordered.    Essential hypertension  - increased home amlodipine from 5 mg to 10 mg daily  - BPs as high as 180s/110s  - Started prn hydralazine - possibly worsened headache  - Decreased IVF rate  - BP normalized    CML in remission  - Presented to [facility] with CMP in blast crisis and with nodular disease on 3/30/2020  - Induced with FLAG-Addis. Achieved morphologic CR  - Started on daily Ponatinib 30 mg daily, which he continues to take outpatient. Will hold Ponatinib on admission.  - Referred to Dr. Hodges by Dr. Ramos for SCT consideration  - [place] [facility] [place] showing no morphologic or immunophenotypic evidence of CML  - Admitted 7/21/20 for planned Flu/Cy/TBI haplo allo SCT  - See SCT candidate      VTE Risk Mitigation (From admission, onward)         Ordered     heparin, porcine (PF) 100 unit/mL injection flush 300 Units  As needed (PRN)      07/21/20 1920                Disposition: pending cc of alloSCT, starting to engraft    Hunter Basurto MD  Bone Marrow Transplant  [facility]

## 2020-08-10 NOTE — PROGRESS NOTES
SW visit bedside to discuss post SCT lodging opportunities.  S/w pt/spouse bedside.  Provided printed and contact information.  Pt/spouse to review and contact SW as needed with questions/concerns.    Ro Marroquin [facility]  Oncology Social Work  Hematology Services  [phone]

## 2020-08-10 NOTE — ASSESSMENT & PLAN NOTE
- Continue home Flexeril and ultram  - PRN oxy IR added for additional relief  - ongoing neck pain and 10/10 this AM  - 1 time dose of benadryl and dilaudid

## 2020-08-10 NOTE — ASSESSMENT & PLAN NOTE
- WBC 1.54, ANC 1281 , Hgb 10.1, plt 28k  - lovenox held; need to add back once plt >50K again  - transfuse Hgb <7g and plt <10k

## 2020-08-10 NOTE — SUBJECTIVE & OBJECTIVE
Subjective:     Interval History: Day +13 Flu/Cy/TBI haplo alloSCT for CML. HDS/VSS overnight. Neck and back pain this morning. Also complaining of palm itching though no rash present and thinks it is from his opioid pain medication. Tac within limits this AM. Mag 1.5 and will get 2g IV. ANC 1281 this AM.     Objective:     Vital Signs (Most Recent):  Temp: 97.8 °F (36.6 °C) (08/10/20 0725)  Pulse: 103 (08/10/20 0725)  Resp: 20 (08/10/20 0725)  BP: (!) 145/65 (08/10/20 0725)  SpO2: (!) 94 % (08/10/20 0725) Vital Signs (24h Range):  Temp:  [97.8 °F (36.6 °C)-99 °F (37.2 °C)] 97.8 °F (36.6 °C)  Pulse:  [102-107] 103  Resp:  [16-20] 20  SpO2:  [93 %-97 %] 94 %  BP: (101-145)/(59-77) 145/65     Weight: 84.8 kg (186 lb 15.2 oz)  Body mass index is 30.17 kg/m².  Body surface area is 1.99 meters squared.    Intake/Output - Last 3 Shifts       08/08 0700 - 08/09 0659 08/09 0700 - 08/10 0659 08/10 0700 - 08/11 0659    P.O. 600 1740     I.V. (mL/kg)       Blood 239      Total Intake(mL/kg) 839 (9.8) 1740 (20.5)     Urine (mL/kg/hr) 1175 (0.6) 750 (0.4)     Stool 1 0     Total Output 1176 750     Net -337 +990            Stool Occurrence  3 x           Physical Exam  Constitutional:       General: He is not in acute distress.     Appearance: Normal weight. Mild distress this AM from pain.   HENT:      Head: Normocephalic and atraumatic.      Mouth/Throat:      Mouth: Dry MM. Mild evidence of mucositis on bottom gums.     Pharynx: No oropharyngeal exudate or posterior oropharyngeal erythema.   Eyes:      General: No scleral icterus.     Pupils: Pupils are equal, round, and reactive to light.   Neck:      Musculoskeletal: Normal range of motion. No neck rigidity.   Cardiovascular:      Rate and Rhythm: Normal rate and regular rhythm.      Pulses: Normal pulses.      Heart sounds: Normal heart sounds. No murmur.   Pulmonary:      Effort: Pulmonary effort is normal. No respiratory distress.      Breath sounds: Normal breath  sounds.   Abdominal:      General: Abdomen is flat. Bowel sounds are normal.      Palpations: Abdomen is soft.   Musculoskeletal: Normal range of motion.      Right lower leg: No edema.      Left lower leg: No edema.      Comments: RCW grewal CDI, improving area of erythema   Lymphadenopathy:      Cervical: No cervical adenopathy.   Skin:     General: Skin is warm.      Coloration: Skin is not jaundiced or pale.   Neurological:      General: No focal deficit present.      Mental Status: He is alert and oriented to person, place, and time. Mental status is at baseline.   Psychiatric:         Mood and Affect: Mood normal.         Thought Content: Thought content normal.     Significant Labs:   CBC:   Recent Labs   Lab 08/09/20  0400 08/10/20  0400   WBC 0.17* 1.54*   HGB 10.0* 10.1*   HCT 29.0* 29.5*   PLT 31* 28*    and CMP:   Recent Labs   Lab 08/09/20  0400 08/10/20  0400   * 134*   K 4.4 4.1    100   CO2 24 21*   * 111*   BUN 14 18   CREATININE 0.8 0.8   CALCIUM 9.4 9.9   PROT 7.1 7.3   ALBUMIN 3.8 3.7   BILITOT 0.6 0.6   ALKPHOS 78 82   AST 12 12   ALT 21 22   ANIONGAP 10 13   EGFRNONAA >60.0 >60.0       Diagnostic Results:  None

## 2020-08-10 NOTE — PLAN OF CARE
Day +13 Allo/Haplo SCT. Pt with c/o persistent HA with worsening pain radiating down neck, states he has gotten minimal relief with prns. Pt requesting round the clock prn meds for nausea and pain control. Remains afebrile and VSS. Will continue to monitor.

## 2020-08-10 NOTE — ASSESSMENT & PLAN NOTE
"- Started having headaches several weeks ago. Onset seems to correspond to when patient quit smoking and when he started ponatinib  - Has been off of ponatinib for a few days but fevers have persisted  - Pattern is like cluster headaches (onset during sleep), but not unilateral.  - O2 via non-rebreather at 12 L over 15 minutes seemed to help but developed hot flush so pt stopped  Ultram, Flexeril, Oxycodone not helping  Imitrex PRN q2hr (max 200mg in 24hrs). Not contraindicated per pharm D.  Description of headache sounds like tension headache--throbbing with pressure across crown of head  No focal neurological deficits  - Patient reporting that headache may be sinus in nature. Maxillary tenderness noted.  - Patient takes Claritin at home. States that he cannot take Zyrtec (which is on formulary) due to SE and "addiction"  - States that it was hard for him to ween himself off of Zyrtec.  - Stared Flonase 7/27/20. Started home Claritin 7/27/20 (not on formulary)  - Somewhat responding to caffeine, Imitrex, tramadol, oxy, and ativan  - Now c/o blurry vision, which may also be contributing to headaches. Seen by ophtho for this  - Continue to monitor closely; especially with expected thrombocytopenia  - Neuro on consult - trial IV mag. Appreciate input  - no complaint this morning  "

## 2020-08-10 NOTE — PLAN OF CARE
Day +13 BMT. Pt main complaint is pain to neck radiating to back. Dilaudid 1 mg q4h started. Gabapentin and flexeril scheduled TID. Cervical collar placed. Ativan given for nausea. Instructed patient to call for assistance, bed low and locked, call bell within reach, nonskid socks on, patient verbalized understanding.

## 2020-08-10 NOTE — PLAN OF CARE
POC reviewed with patient and wife, questions answered and concerns addressed. VSS, continue to have ongoing nausea. Medicated x 2 with oxycodone for c/o neck pain. Ambulating independently in room. In no acute distress; will continue to monitor.

## 2020-08-10 NOTE — ASSESSMENT & PLAN NOTE
Admitted 7/21/20 for planned Flu/Cy/TBI haplo allo SCT. Son is the donor. Today is Day +13.  Receive 2 bags and a total CD34 dose of 3.10 x10^6/kg on 7/28/20  Will receive post transplant cyclophosphamide on Day +3 and +4.  Of note, patient is to not receive any steroids until 24hrs after completion of post tx CTX. This will be 8/5 @1130  - mild palm and ankle itching, no rash on exam, t bili wnl and no diarrhe - GVDH score 0  - Tac level 5.3  - On 4mg BID     - will f/u and adjust as needed    Planned conditioning regimen:  Fludarabine on Day -6, -5, -4, -3, and -2  Cyclophosphamide on Day -6 and -5  Total Body Irradiation on Day -1     Planned  GVHD Prophylaxis:  Cyclophosphamide on Day +3 and +4  Tacrolimus starting on Day +5  Mycophenolate starting on Day +5     Antimicrobial Prophylaxis:  Acyclovir starting on Day -6  Levofloxacin starting on Day -1  Fluconazole starting on Day -1  Bactrim starting on Day +30     SOS/VOD Prophylaxis:  Ursodiol on Day -6 through Day +30     Growth Factor Support:  Neupogen starting on Day +7

## 2020-08-11 LAB
ALBUMIN SERPL BCP-MCNC: 3.4 G/DL (ref 3.5–5.2)
ALP SERPL-CCNC: 80 U/L (ref 55–135)
ALT SERPL W/O P-5'-P-CCNC: 21 U/L (ref 10–44)
ANION GAP SERPL CALC-SCNC: 10 MMOL/L (ref 8–16)
ANISOCYTOSIS BLD QL SMEAR: SLIGHT
AST SERPL-CCNC: 15 U/L (ref 10–40)
BASOPHILS # BLD AUTO: ABNORMAL K/UL (ref 0–0.2)
BASOPHILS NFR BLD: 0 % (ref 0–1.9)
BILIRUB SERPL-MCNC: 0.5 MG/DL (ref 0.1–1)
BUN SERPL-MCNC: 22 MG/DL (ref 8–23)
CALCIUM SERPL-MCNC: 9.6 MG/DL (ref 8.7–10.5)
CHLORIDE SERPL-SCNC: 98 MMOL/L (ref 95–110)
CO2 SERPL-SCNC: 25 MMOL/L (ref 23–29)
CREAT SERPL-MCNC: 0.8 MG/DL (ref 0.5–1.4)
DIFFERENTIAL METHOD: ABNORMAL
DOHLE BOD BLD QL SMEAR: PRESENT
EOSINOPHIL # BLD AUTO: ABNORMAL K/UL (ref 0–0.5)
EOSINOPHIL NFR BLD: 0 % (ref 0–8)
ERYTHROCYTE [DISTWIDTH] IN BLOOD BY AUTOMATED COUNT: 12.8 % (ref 11.5–14.5)
EST. GFR  (AFRICAN AMERICAN): >60 ML/MIN/1.73 M^2
EST. GFR  (NON AFRICAN AMERICAN): >60 ML/MIN/1.73 M^2
GLUCOSE SERPL-MCNC: 103 MG/DL (ref 70–110)
HCT VFR BLD AUTO: 27.9 % (ref 40–54)
HGB BLD-MCNC: 9.4 G/DL (ref 14–18)
HYPOCHROMIA BLD QL SMEAR: ABNORMAL
IMM GRANULOCYTES # BLD AUTO: ABNORMAL K/UL (ref 0–0.04)
IMM GRANULOCYTES NFR BLD AUTO: ABNORMAL % (ref 0–0.5)
LYMPHOCYTES # BLD AUTO: ABNORMAL K/UL (ref 1–4.8)
LYMPHOCYTES NFR BLD: 1 % (ref 18–48)
MAGNESIUM SERPL-MCNC: 2 MG/DL (ref 1.6–2.6)
MCH RBC QN AUTO: 33 PG (ref 27–31)
MCHC RBC AUTO-ENTMCNC: 33.7 G/DL (ref 32–36)
MCV RBC AUTO: 98 FL (ref 82–98)
MONOCYTES # BLD AUTO: ABNORMAL K/UL (ref 0.3–1)
MONOCYTES NFR BLD: 7 % (ref 4–15)
NEUTROPHILS # BLD AUTO: ABNORMAL K/UL (ref 1.8–7.7)
NEUTROPHILS NFR BLD: 91 % (ref 38–73)
NEUTS BAND NFR BLD MANUAL: 1 %
NRBC BLD-RTO: 0 /100 WBC
OVALOCYTES BLD QL SMEAR: ABNORMAL
PHOSPHATE SERPL-MCNC: 4.6 MG/DL (ref 2.7–4.5)
PLATELET # BLD AUTO: 29 K/UL (ref 150–350)
PLATELET BLD QL SMEAR: ABNORMAL
PMV BLD AUTO: 12.2 FL (ref 9.2–12.9)
POIKILOCYTOSIS BLD QL SMEAR: SLIGHT
POLYCHROMASIA BLD QL SMEAR: ABNORMAL
POTASSIUM SERPL-SCNC: 4.1 MMOL/L (ref 3.5–5.1)
PROT SERPL-MCNC: 7 G/DL (ref 6–8.4)
RBC # BLD AUTO: 2.85 M/UL (ref 4.6–6.2)
SODIUM SERPL-SCNC: 133 MMOL/L (ref 136–145)
TACROLIMUS BLD-MCNC: 6.6 NG/ML (ref 5–15)
TOXIC GRANULES BLD QL SMEAR: PRESENT
WBC # BLD AUTO: 4.05 K/UL (ref 3.9–12.7)

## 2020-08-11 PROCEDURE — 20600001 HC STEP DOWN PRIVATE ROOM

## 2020-08-11 PROCEDURE — 94761 N-INVAS EAR/PLS OXIMETRY MLT: CPT

## 2020-08-11 PROCEDURE — 97803 MED NUTRITION INDIV SUBSEQ: CPT

## 2020-08-11 PROCEDURE — 83735 ASSAY OF MAGNESIUM: CPT

## 2020-08-11 PROCEDURE — A4216 STERILE WATER/SALINE, 10 ML: HCPCS | Performed by: NURSE PRACTITIONER

## 2020-08-11 PROCEDURE — 25000003 PHARM REV CODE 250: Performed by: NURSE PRACTITIONER

## 2020-08-11 PROCEDURE — 99233 PR SUBSEQUENT HOSPITAL CARE,LEVL III: ICD-10-PCS | Mod: ,,, | Performed by: INTERNAL MEDICINE

## 2020-08-11 PROCEDURE — 63600175 PHARM REV CODE 636 W HCPCS: Performed by: STUDENT IN AN ORGANIZED HEALTH CARE EDUCATION/TRAINING PROGRAM

## 2020-08-11 PROCEDURE — 25000003 PHARM REV CODE 250: Performed by: INTERNAL MEDICINE

## 2020-08-11 PROCEDURE — 36415 COLL VENOUS BLD VENIPUNCTURE: CPT

## 2020-08-11 PROCEDURE — 63600175 PHARM REV CODE 636 W HCPCS

## 2020-08-11 PROCEDURE — 80197 ASSAY OF TACROLIMUS: CPT

## 2020-08-11 PROCEDURE — A9155 ARTIFICIAL SALIVA: HCPCS | Performed by: INTERNAL MEDICINE

## 2020-08-11 PROCEDURE — 85007 BL SMEAR W/DIFF WBC COUNT: CPT

## 2020-08-11 PROCEDURE — 85027 COMPLETE CBC AUTOMATED: CPT

## 2020-08-11 PROCEDURE — 84100 ASSAY OF PHOSPHORUS: CPT

## 2020-08-11 PROCEDURE — 80053 COMPREHEN METABOLIC PANEL: CPT

## 2020-08-11 PROCEDURE — 99233 SBSQ HOSP IP/OBS HIGH 50: CPT | Mod: ,,, | Performed by: INTERNAL MEDICINE

## 2020-08-11 PROCEDURE — 63600175 PHARM REV CODE 636 W HCPCS: Performed by: INTERNAL MEDICINE

## 2020-08-11 PROCEDURE — 25000003 PHARM REV CODE 250

## 2020-08-11 RX ORDER — TACROLIMUS 0.5 MG/1
4 CAPSULE ORAL EVERY 12 HOURS
Qty: 480 CAPSULE | Refills: 6 | Status: SHIPPED | OUTPATIENT
Start: 2020-08-11 | End: 2020-08-18 | Stop reason: SDUPTHER

## 2020-08-11 RX ORDER — FLUCONAZOLE 200 MG/1
400 TABLET ORAL DAILY
Qty: 60 TABLET | Refills: 6 | Status: SHIPPED | OUTPATIENT
Start: 2020-08-11 | End: 2020-08-14

## 2020-08-11 RX ORDER — MYCOPHENOLATE MOFETIL 250 MG/1
1000 CAPSULE ORAL 3 TIMES DAILY
Qty: 252 CAPSULE | Refills: 0 | Status: SHIPPED | OUTPATIENT
Start: 2020-08-11 | End: 2020-09-04 | Stop reason: ALTCHOICE

## 2020-08-11 RX ORDER — TRAMADOL HYDROCHLORIDE 50 MG/1
50 TABLET ORAL EVERY 6 HOURS PRN
Status: DISCONTINUED | OUTPATIENT
Start: 2020-08-11 | End: 2020-08-12 | Stop reason: HOSPADM

## 2020-08-11 RX ORDER — MORPHINE SULFATE 15 MG/1
15 TABLET ORAL EVERY 4 HOURS PRN
Status: DISCONTINUED | OUTPATIENT
Start: 2020-08-11 | End: 2020-08-12 | Stop reason: HOSPADM

## 2020-08-11 RX ORDER — URSODIOL 300 MG/1
300 CAPSULE ORAL 2 TIMES DAILY
Qty: 32 CAPSULE | Refills: 0 | Status: SHIPPED | OUTPATIENT
Start: 2020-08-11 | End: 2020-09-01 | Stop reason: ALTCHOICE

## 2020-08-11 RX ORDER — ACYCLOVIR 800 MG/1
800 TABLET ORAL 2 TIMES DAILY
Qty: 60 TABLET | Refills: 11 | Status: SHIPPED | OUTPATIENT
Start: 2020-08-11 | End: 2020-08-28

## 2020-08-11 RX ORDER — LORAZEPAM 0.5 MG/1
1 TABLET ORAL EVERY 8 HOURS PRN
Status: DISCONTINUED | OUTPATIENT
Start: 2020-08-11 | End: 2020-08-12 | Stop reason: HOSPADM

## 2020-08-11 RX ORDER — OXYCODONE HYDROCHLORIDE 5 MG/1
10 TABLET ORAL EVERY 4 HOURS PRN
Status: DISCONTINUED | OUTPATIENT
Start: 2020-08-11 | End: 2020-08-11

## 2020-08-11 RX ORDER — SULFAMETHOXAZOLE AND TRIMETHOPRIM 800; 160 MG/1; MG/1
1 TABLET ORAL
Qty: 12 TABLET | Refills: 6 | Status: SHIPPED | OUTPATIENT
Start: 2020-08-28 | End: 2020-10-01 | Stop reason: SDUPTHER

## 2020-08-11 RX ADMIN — GABAPENTIN 300 MG: 300 CAPSULE ORAL at 09:08

## 2020-08-11 RX ADMIN — ONDANSETRON HYDROCHLORIDE 8 MG: 4 TABLET, FILM COATED ORAL at 05:08

## 2020-08-11 RX ADMIN — ALUMINUM HYDROXIDE, MAGNESIUM HYDROXIDE, AND DIMETHICONE 10 ML: 400; 400; 40 SUSPENSION ORAL at 09:08

## 2020-08-11 RX ADMIN — Medication 30 ML: at 01:08

## 2020-08-11 RX ADMIN — ACYCLOVIR 800 MG: 800 TABLET ORAL at 09:08

## 2020-08-11 RX ADMIN — MYCOPHENOLATE MOFETIL 1000 MG: 250 CAPSULE ORAL at 03:08

## 2020-08-11 RX ADMIN — Medication 30 ML: at 09:08

## 2020-08-11 RX ADMIN — MORPHINE SULFATE 15 MG: 15 TABLET ORAL at 01:08

## 2020-08-11 RX ADMIN — Medication 10 ML: at 01:08

## 2020-08-11 RX ADMIN — GABAPENTIN 300 MG: 300 CAPSULE ORAL at 03:08

## 2020-08-11 RX ADMIN — TAMSULOSIN HYDROCHLORIDE 0.4 MG: 0.4 CAPSULE ORAL at 09:08

## 2020-08-11 RX ADMIN — HYDROMORPHONE HYDROCHLORIDE 1 MG: 1 INJECTION, SOLUTION INTRAMUSCULAR; INTRAVENOUS; SUBCUTANEOUS at 05:08

## 2020-08-11 RX ADMIN — TACROLIMUS 4 MG: 1 CAPSULE ORAL at 09:08

## 2020-08-11 RX ADMIN — LORAZEPAM 1 MG: 2 INJECTION INTRAMUSCULAR; INTRAVENOUS at 12:08

## 2020-08-11 RX ADMIN — TACROLIMUS 4 MG: 1 CAPSULE ORAL at 05:08

## 2020-08-11 RX ADMIN — MYCOPHENOLATE MOFETIL 1000 MG: 250 CAPSULE ORAL at 09:08

## 2020-08-11 RX ADMIN — CYCLOBENZAPRINE HYDROCHLORIDE 10 MG: 5 TABLET, FILM COATED ORAL at 11:08

## 2020-08-11 RX ADMIN — Medication 10 ML: at 06:08

## 2020-08-11 RX ADMIN — ONDANSETRON HYDROCHLORIDE 8 MG: 4 TABLET, FILM COATED ORAL at 01:08

## 2020-08-11 RX ADMIN — TRAMADOL HYDROCHLORIDE 50 MG: 50 TABLET, FILM COATED ORAL at 09:08

## 2020-08-11 RX ADMIN — ONDANSETRON HYDROCHLORIDE 8 MG: 4 TABLET, FILM COATED ORAL at 09:08

## 2020-08-11 RX ADMIN — CYCLOBENZAPRINE HYDROCHLORIDE 10 MG: 5 TABLET, FILM COATED ORAL at 05:08

## 2020-08-11 RX ADMIN — ACYCLOVIR 800 MG: 800 TABLET ORAL at 05:08

## 2020-08-11 RX ADMIN — MORPHINE SULFATE 15 MG: 15 TABLET ORAL at 05:08

## 2020-08-11 RX ADMIN — PANTOPRAZOLE SODIUM 40 MG: 40 TABLET, DELAYED RELEASE ORAL at 09:08

## 2020-08-11 RX ADMIN — FLUCONAZOLE 400 MG: 200 TABLET ORAL at 09:08

## 2020-08-11 RX ADMIN — URSODIOL 300 MG: 300 CAPSULE ORAL at 09:08

## 2020-08-11 RX ADMIN — Medication 10 ML: at 05:08

## 2020-08-11 RX ADMIN — Medication 10 ML: at 12:08

## 2020-08-11 RX ADMIN — Medication 30 ML: at 05:08

## 2020-08-11 RX ADMIN — HYDROMORPHONE HYDROCHLORIDE 1 MG: 1 INJECTION, SOLUTION INTRAMUSCULAR; INTRAVENOUS; SUBCUTANEOUS at 12:08

## 2020-08-11 RX ADMIN — LORAZEPAM 1 MG: 0.5 TABLET ORAL at 09:08

## 2020-08-11 RX ADMIN — MORPHINE SULFATE 15 MG: 15 TABLET ORAL at 09:08

## 2020-08-11 RX ADMIN — AMLODIPINE BESYLATE 10 MG: 10 TABLET ORAL at 09:08

## 2020-08-11 NOTE — ASSESSMENT & PLAN NOTE
- WBC 4.05, ANC 3686, Hgb 9.4, plt 29k  - lovenox held; need to add back once plt >50K again  - transfuse Hgb <7g and plt <10k

## 2020-08-11 NOTE — ASSESSMENT & PLAN NOTE
Admitted 7/21/20 for planned Flu/Cy/TBI haplo allo SCT. Son is the donor. Today is Day +14  Receive 2 bags and a total CD34 dose of 3.10 x10^6/kg on 7/28/20  Will receive post transplant cyclophosphamide on Day +3 and +4.  Of note, patient is to not receive any steroids until 24hrs after completion of post tx CTX. This will be 8/5 @1130  - improved palm and ankle itching, no rash on exam, t bili wnl and no diarrhea - GVDH score 0  - Tac level 6.6  - On 4mg BID     - will f/u and adjust as needed    Planned conditioning regimen:  Fludarabine on Day -6, -5, -4, -3, and -2  Cyclophosphamide on Day -6 and -5  Total Body Irradiation on Day -1     Planned  GVHD Prophylaxis:  Cyclophosphamide on Day +3 and +4  Tacrolimus starting on Day +5  Mycophenolate starting on Day +5     Antimicrobial Prophylaxis:  Acyclovir starting on Day -6  Levofloxacin starting on Day -1  Fluconazole starting on Day -1  Bactrim starting on Day +30     SOS/VOD Prophylaxis:  Ursodiol on Day -6 through Day +30     Growth Factor Support:  Neupogen starting on Day +7

## 2020-08-11 NOTE — PROGRESS NOTES
[facility]  Hematology  Bone Marrow Transplant  Progress Note    Patient Name: Kvng Jones Sr.  Admission Date: 7/21/2020  Hospital Length of Stay: 21 days  Code Status: Full Code    Subjective:     Interval History: Day +14 Flu/Cuy/TBI haplo alloSCT for AML. Stable. Ongoing neck pain, barely relieved by dilaudid. No other complaints and improving appetite. Engrafted with ANC of 3686. Tac 6.6.  Itching in hands has improved.      Objective:     Vital Signs (Most Recent):  Temp: 98.4 °F (36.9 °C) (08/11/20 0812)  Pulse: 104 (08/11/20 0812)  Resp: 16 (08/11/20 0812)  BP: 115/68 (08/11/20 0812)  SpO2: 96 % (08/11/20 0812) Vital Signs (24h Range):  Temp:  [97.8 °F (36.6 °C)-98.8 °F (37.1 °C)] 98.4 °F (36.9 °C)  Pulse:  [] 104  Resp:  [16-19] 16  SpO2:  [94 %-96 %] 96 %  BP: (103-129)/(59-80) 115/68     Weight: 85 kg (187 lb 6.3 oz)  Body mass index is 30.25 kg/m².  Body surface area is 1.99 meters squared.    Intake/Output - Last 3 Shifts       08/09 0700 - 08/10 0659 08/10 0700 - 08/11 0659 08/11 0700 - 08/12 0659    P.O. 1740 500     Blood       Total Intake(mL/kg) 1740 (20.5) 500 (5.9)     Urine (mL/kg/hr) 750 (0.4) 750 (0.4)     Stool 0 0     Total Output 750 750     Net +990 -250            Urine Occurrence  5 x     Stool Occurrence 3 x 3 x           Physical Exam  Constitutional:       General: He is not in acute distress.     Appearance: Continues to appear to be in mild distress from pain but non-toxic appearing  HENT:      Head: Normocephalic and atraumatic.      Mouth/Throat:      Mouth: Dry MM. Mild evidence of mucositis on bottom gums.     Pharynx: No oropharyngeal exudate or posterior oropharyngeal erythema.   Eyes:      General: No scleral icterus.     Pupils: Pupils are equal, round, and reactive to light.   Neck:      Musculoskeletal: Normal range of motion. No neck rigidity.   Cardiovascular:      Rate and Rhythm: Normal rate and regular rhythm.      Pulses: Normal  pulses.      Heart sounds: Normal heart sounds. No murmur.   Pulmonary:      Effort: Pulmonary effort is normal. No respiratory distress.      Breath sounds: Normal breath sounds.   Abdominal:      General: Abdomen is flat. Bowel sounds are normal.      Palpations: Abdomen is soft.   Musculoskeletal: Normal range of motion.      Right lower leg: No edema.      Left lower leg: No edema.      Comments: RCW grewal CDI  Lymphadenopathy:      Cervical: No cervical adenopathy.   Skin:     General: Skin is warm. No rash.      Coloration: Skin is not jaundiced or pale.   Neurological:      General: No focal deficit present.      Mental Status: He is alert and oriented to person, place, and time. Mental status is at baseline.   Psychiatric:         Mood and Affect: Mood normal.         Thought Content: Thought content normal.     Significant Labs:   CBC:   Recent Labs   Lab 08/10/20  0400 08/11/20  0400   WBC 1.54* 4.05   HGB 10.1* 9.4*   HCT 29.5* 27.9*   PLT 28* 29*    and CMP:   Recent Labs   Lab 08/10/20  0400 08/11/20  0400   * 133*   K 4.1 4.1    98   CO2 21* 25   * 103   BUN 18 22   CREATININE 0.8 0.8   CALCIUM 9.9 9.6   PROT 7.3 7.0   ALBUMIN 3.7 3.4*   BILITOT 0.6 0.5   ALKPHOS 82 80   AST 12 15   ALT 22 21   ANIONGAP 13 10   EGFRNONAA >60.0 >60.0       Diagnostic Results:  None    Assessment/Plan:     * History of allogeneic stem cell transplant  Admitted 7/21/20 for planned Flu/Cy/TBI haplo allo SCT. Son is the donor. Today is Day +14  Receive 2 bags and a total CD34 dose of 3.10 x10^6/kg on 7/28/20  Will receive post transplant cyclophosphamide on Day +3 and +4.  Of note, patient is to not receive any steroids until 24hrs after completion of post tx CTX. This will be 8/5 @1130  - improved palm and ankle itching, no rash on exam, t bili wnl and no diarrhea - GVDH score 0  - Tac level 6.6  - On 4mg BID     - will f/u and adjust as needed    Planned conditioning regimen:  Fludarabine on Day -6,  -5, -4, -3, and -2  Cyclophosphamide on Day -6 and -5  Total Body Irradiation on Day -1     Planned  GVHD Prophylaxis:  Cyclophosphamide on Day +3 and +4  Tacrolimus starting on Day +5  Mycophenolate starting on Day +5     Antimicrobial Prophylaxis:  Acyclovir starting on Day -6  Levofloxacin starting on Day -1  Fluconazole starting on Day -1  Bactrim starting on Day +30     SOS/VOD Prophylaxis:  Ursodiol on Day -6 through Day +30     Growth Factor Support:  Neupogen starting on Day +7     Hypomagnesemia  - 2 this AM, previously contributed to neck pain and HA    Mucositis due to chemotherapy  - c/w [facility] scheduled, will back off to prn  - improving    CINV (chemotherapy-induced nausea and vomiting)  - schedule zofran at his request  - will decrease to prn today  - c/w compazine and ativan as prns    Acute myeloid leukemia in remission  - See CML    Blurry vision  - C/o worsening blurry vision 7/30/20  - May be contributing to headaches  - Ophtho consulted and saw patient 7/30  - Ophtho dilated pupils and performed fundal exam. Fundal exam neg.  - Patient had been viewing laptop screen for ~ 2 hours prior to experiencing blurry vision  - Per ophtho, blurry vision due to dry eyes. rec'd artificial tears QID prn and warm compresses BID prn. Can escalate artificial tears to ointment if needed.    Antineoplastic chemotherapy induced pancytopenia  - WBC 4.05, ANC 3686, Hgb 9.4, plt 29k  - lovenox held; need to add back once plt >50K again  - transfuse Hgb <7g and plt <10k      Rash and nonspecific skin eruption  - Newly noted macular rash to upper back and chest on 7/29  - Intermittently pruritic  - GVHD not suspected this soon after transplant  - Likely drug reaction, thought maybe to be from DMSO from stem cell transplant  - Continue to monitor closely  - Started on PRN PO benadryl, topical benadryl cream, and topical hydrocortisone   - Patient states that benadryl causes him to be jittery. Ordered prn atarax  "instead.  - resolved    Abdominal gas pain  - Started on PRN simethicone    Headache  - Started having headaches several weeks ago. Onset seems to correspond to when patient quit smoking and when he started ponatinib  - Has been off of ponatinib for a few days but fevers have persisted  - Pattern is like cluster headaches (onset during sleep), but not unilateral.  - O2 via non-rebreather at 12 L over 15 minutes seemed to help but developed hot flush so pt stopped  Ultram, Flexeril, Oxycodone not helping  Imitrex PRN q2hr (max 200mg in 24hrs). Not contraindicated per pharm D.  Description of headache sounds like tension headache--throbbing with pressure across crown of head  No focal neurological deficits  - Patient reporting that headache may be sinus in nature. Maxillary tenderness noted.  - Patient takes Claritin at home. States that he cannot take Zyrtec (which is on formulary) due to SE and "addiction"  - States that it was hard for him to ween himself off of Zyrtec.  - Stared Flonase 7/27/20. Started home Claritin 7/27/20 (not on formulary)  - Somewhat responding to caffeine, Imitrex, tramadol, oxy, and ativan  - Now c/o blurry vision, which may also be contributing to headaches. Seen by ophtho for this  - Continue to monitor closely; especially with expected thrombocytopenia  - Neuro on consult - trial IV mag. Appreciate input  - no complaints this morning    Cervical stenosis of spine  - Ongoing, reported severe neck pain   - neck pillow and c-collar without improvement yesterday  - also increased flexeril to 10 mg TID and gabapentin 300 mg to TID (from nightly)  - took 6 mg IV dilaudid and he states it did not improve  - will transition to oral medication today in anticipation for d/c soon  - oxy 20 mg q6 hr prn    Tobacco abuse, in remission  - 35 pack year history  - Quit smoking 6/1/2020 using Chantix  - Patient completed course of Chantix and denies need for Nicotine patch.     Daily consumption of " alcohol  - Drinks 3-4 alcoholic beverages nightly  - Will need to monitor closely for s/s of alcohol withdrawal while inpatient  - Will consider consulting addiction medicine if indicated  - Prn ativan available    BPH (benign prostatic hyperplasia)  - Continue home Flomax    GERD (gastroesophageal reflux disease)  - Takes Nexium at home  - Giving Protonix while inpatient as Nexium is not on hospital formulary  - Can resume Nexium at discharge  - Had CP this am that is believed to be due to GERD (troponin and EKG unremarkable). Relieved after protonix administration  - PRN TUMS ordered.    Essential hypertension  - increased home amlodipine from 5 mg to 10 mg daily  - BPs as high as 180s/110s  - Started prn hydralazine - possibly worsened headache  - Decreased IVF rate  - BP normalized    CML in remission  - Presented to [facility] with CMP in blast crisis and with nodular disease on 3/30/2020  - Induced with FLAG-Addis. Achieved morphologic CR  - Started on daily Ponatinib 30 mg daily, which he continues to take outpatient. Will hold Ponatinib on admission.  - Referred to Dr. Hodges by Dr. Ramos for SCT consideration  - Had BMBx at [facility] on 7/1/2020 showing no morphologic or immunophenotypic evidence of CML  - Admitted 7/21/20 for planned Flu/Cy/TBI haplo allo SCT  - See SCT candidate      VTE Risk Mitigation (From admission, onward)         Ordered     heparin, porcine (PF) 100 unit/mL injection flush 300 Units  As needed (PRN)      07/21/20 1920                Disposition: engrafted, pending pain control     Hunter Basurto MD  Bone Marrow Transplant  [facility]

## 2020-08-11 NOTE — ASSESSMENT & PLAN NOTE
- Ongoing, reported sever neck pain   - neck pillow and c-collar without improvement yesterday  - took 6 mg IV dilaudid and he states it did not improve  - will transition to oral medication today in anticipation for d/c soon  - oxy 20 mg q6 hr prn

## 2020-08-11 NOTE — PLAN OF CARE
Day +14 Allo/Haplo SCT. Pt with c/o persistent head/neck pain. Cervical collar in place. Medication adjustments made yesterday for better pain control, however Pt still requesting round-the-clock prn pain and nausea medications. No complaints of itching overnight. Remains afebrile and VSS. Will continue to monitor.

## 2020-08-11 NOTE — ASSESSMENT & PLAN NOTE
"- Started having headaches several weeks ago. Onset seems to correspond to when patient quit smoking and when he started ponatinib  - Has been off of ponatinib for a few days but fevers have persisted  - Pattern is like cluster headaches (onset during sleep), but not unilateral.  - O2 via non-rebreather at 12 L over 15 minutes seemed to help but developed hot flush so pt stopped  Ultram, Flexeril, Oxycodone not helping  Imitrex PRN q2hr (max 200mg in 24hrs). Not contraindicated per pharm D.  Description of headache sounds like tension headache--throbbing with pressure across crown of head  No focal neurological deficits  - Patient reporting that headache may be sinus in nature. Maxillary tenderness noted.  - Patient takes Claritin at home. States that he cannot take Zyrtec (which is on formulary) due to SE and "addiction"  - States that it was hard for him to ween himself off of Zyrtec.  - Stared Flonase 7/27/20. Started home Claritin 7/27/20 (not on formulary)  - Somewhat responding to caffeine, Imitrex, tramadol, oxy, and ativan  - Now c/o blurry vision, which may also be contributing to headaches. Seen by ophtho for this  - Continue to monitor closely; especially with expected thrombocytopenia  - Neuro on consult - trial IV mag. Appreciate input  - no complaints this morning  "

## 2020-08-11 NOTE — PLAN OF CARE
Recommendations    Recommendation:   1. Continue regular diet, encourage good PO intake at meals   2. Continue boost plus and add protein balls to increase intake   3. Diet education completed   RD to monitor and follow up    Goals: pt to tolerate >85% of EEN/EPN by RD follow up  Nutrition Goal Status: progressing towards goal  Communication of RD Recs: other (comment)(POC)

## 2020-08-11 NOTE — ASSESSMENT & PLAN NOTE
- Ongoing, reported severe neck pain   - neck pillow and c-collar without improvement yesterday  - also increased flexeril to 10 mg TID and gabapentin 300 mg to TID (from nightly)  - took 6 mg IV dilaudid and he states it did not improve  - will transition to oral medication today in anticipation for d/c soon  - oxy 20 mg q6 hr prn

## 2020-08-11 NOTE — PROGRESS NOTES
"[facility]  Adult Nutrition  Progress Note    SUMMARY       Recommendations    Recommendation:   1. Continue regular diet, encourage good PO intake at meals   2. Continue boost plus and add protein balls to increase intake   3. Diet education completed   RD to monitor and follow up    Goals: pt to tolerate >85% of EEN/EPN by RD follow up  Nutrition Goal Status: progressing towards goal  Communication of RD Recs: other (comment)(POC)    Reason for Assessment    Reason For Assessment: RD follow-up  Diagnosis: (CML in remission)  Relevant Medical History: CML; melanoma of external ear  Interdisciplinary Rounds: did not attend  General Information Comments: Day +14 Allo SCT. Pt states intake poor x 4 days now, decreased PO 2/2 nausea. Pt motivated to drink boost this am, encouraged ONS intake, smoothies and snacks as tolerable. Wt loss of ~7 lbs over past week per chart. NFPE completed today, pt with mild muscle/fat loss, at risk for moderate malnutrition if poor PO continues.  Nutrition Discharge Planning: adequate PO intake    Nutrition Risk Screen    Nutrition Risk Screen: no indicators present    Nutrition/Diet History    Food Allergies: NKFA  Factors Affecting Nutritional Intake: decreased appetite, nausea/vomiting    Anthropometrics    Temp: 98.4 °F (36.9 °C)  Height Method: Stated  Height: 5' 6" (167.6 cm)  Height (inches): 66 in  Weight Method: Standard Scale  Weight: 85 kg (187 lb 6.3 oz)  Weight (lb): 187.39 lb  Ideal Body Weight (IBW), Male: 142 lb  % Ideal Body Weight, Male (lb): 140.51 %  BMI (Calculated): 30.3  BMI Grade: 30 - 34.9- obesity - grade I       Lab/Procedures/Meds    Pertinent Labs Reviewed: reviewed  Pertinent Labs Comments: Na 133  Pertinent Medications Reviewed: reviewed  Pertinent Medications Comments: fluconazole; pantoprazole; tacrolimus     Estimated/Assessed Needs    Weight Used For Calorie Calculations: 89.1 kg (196 lb 6.9 oz)  Energy Calorie Requirements (kcal): " 0278-6568 kcal/d  Energy Need Method: Kcal/kg(25-30 kcal/d)  Protein Requirements:  g/day(1.0-1.4 g/kg)  Weight Used For Protein Calculations: 89.1 kg (196 lb 6.9 oz)  Fluid Requirements (mL): 1 mL/kcal or per MD  RDA Method (mL): 2227    Nutrition Prescription Ordered    Current Diet Order: Regular diet  Oral Nutrition Supplement: boost plus    Evaluation of Received Nutrient/Fluid Intake    I/O: +6.6 L since admit  Energy Calories Required: not meeting needs  Protein Required: not meeting needs  Fluid Required: meeting needs  Comments: LBM 8/11  Tolerance: tolerating  % Intake of Estimated Energy Needs: 25 - 50 %  % Meal Intake: 25 - 50 %    Nutrition Risk    Level of Risk/Frequency of Follow-up: low     Assessment and Plan  Nutrition Problem  increased nutrient needs     Related to (etiology):   Physiological needs 2/2 CML     Signs and Symptoms (as evidenced by):   Pt s/p Allo SCT      Interventions (treatment strategy):  Collaboration of care with other providers  Commercial beverage     Nutrition Diagnosis Status:   Continues     Monitor and Evaluation    Food and Nutrient Intake: energy intake, food and beverage intake  Food and Nutrient Adminstration: diet order  Knowledge/Beliefs/Attitudes: food and nutrition knowledge/skill  Anthropometric Measurements: weight, weight change  Biochemical Data, Medical Tests and Procedures: electrolyte and renal panel, gastrointestinal profile, glucose/endocrine profile, inflammatory profile, lipid profile  Nutrition-Focused Physical Findings: overall appearance     Malnutrition Assessment  Orbital Region (Subcutaneous Fat Loss): mild depletion  Upper Arm Region (Subcutaneous Fat Loss): mild depletion   Judaism Region (Muscle Loss): mild depletion  Clavicle Bone Region (Muscle Loss): well nourished  Clavicle and Acromion Bone Region (Muscle Loss): well nourished  Dorsal Hand (Muscle Loss): well nourished  Anterior Thigh Region (Muscle Loss): well nourished  Posterior  Calf Region (Muscle Loss): well nourished     Nutrition Follow-Up    RD Follow-up?: Yes

## 2020-08-11 NOTE — PLAN OF CARE
Problem: Adult Inpatient Plan of Care  Goal: Plan of Care Review  Outcome: Ongoing, Progressing  Flowsheets (Taken 8/11/2020 5541)  Plan of Care Reviewed With: patient  Patient remains free from falls and injury this shift. Bed in low, locked position with call light in reach. Plan of care reviewed with pt.  Family at bedside. VSS.  C/o neck and back pain, PRNs given.  Patient encouraged to call for assistance when getting out of bed.  Patient verbalized understanding. All belongings within reach.  Will continue to monitor.

## 2020-08-11 NOTE — SUBJECTIVE & OBJECTIVE
Subjective:     Interval History: Day +14 Flu/Cuy/TBI haplo alloSCT for AML. Stable. Ongoing neck pain, barely relieved by dilaudid. No other complaints and improving appetite. Engrafted with ANC of 3606. Tac 6.6.  Itching in hands has improved.      Objective:     Vital Signs (Most Recent):  Temp: 98.4 °F (36.9 °C) (08/11/20 0812)  Pulse: 104 (08/11/20 0812)  Resp: 16 (08/11/20 0812)  BP: 115/68 (08/11/20 0812)  SpO2: 96 % (08/11/20 0812) Vital Signs (24h Range):  Temp:  [97.8 °F (36.6 °C)-98.8 °F (37.1 °C)] 98.4 °F (36.9 °C)  Pulse:  [] 104  Resp:  [16-19] 16  SpO2:  [94 %-96 %] 96 %  BP: (103-129)/(59-80) 115/68     Weight: 85 kg (187 lb 6.3 oz)  Body mass index is 30.25 kg/m².  Body surface area is 1.99 meters squared.    Intake/Output - Last 3 Shifts       08/09 0700 - 08/10 0659 08/10 0700 - 08/11 0659 08/11 0700 - 08/12 0659    P.O. 1740 500     Blood       Total Intake(mL/kg) 1740 (20.5) 500 (5.9)     Urine (mL/kg/hr) 750 (0.4) 750 (0.4)     Stool 0 0     Total Output 750 750     Net +990 -250            Urine Occurrence  5 x     Stool Occurrence 3 x 3 x           Physical Exam  Constitutional:       General: He is not in acute distress.     Appearance: Continues to appear to be in mild distress from pain but non-toxic appearing  HENT:      Head: Normocephalic and atraumatic.      Mouth/Throat:      Mouth: Dry MM. Mild evidence of mucositis on bottom gums.     Pharynx: No oropharyngeal exudate or posterior oropharyngeal erythema.   Eyes:      General: No scleral icterus.     Pupils: Pupils are equal, round, and reactive to light.   Neck:      Musculoskeletal: Normal range of motion. No neck rigidity.   Cardiovascular:      Rate and Rhythm: Normal rate and regular rhythm.      Pulses: Normal pulses.      Heart sounds: Normal heart sounds. No murmur.   Pulmonary:      Effort: Pulmonary effort is normal. No respiratory distress.      Breath sounds: Normal breath sounds.   Abdominal:      General:  Abdomen is flat. Bowel sounds are normal.      Palpations: Abdomen is soft.   Musculoskeletal: Normal range of motion.      Right lower leg: No edema.      Left lower leg: No edema.      Comments: RCW grewal CDI  Lymphadenopathy:      Cervical: No cervical adenopathy.   Skin:     General: Skin is warm. No rash.      Coloration: Skin is not jaundiced or pale.   Neurological:      General: No focal deficit present.      Mental Status: He is alert and oriented to person, place, and time. Mental status is at baseline.   Psychiatric:         Mood and Affect: Mood normal.         Thought Content: Thought content normal.     Significant Labs:   CBC:   Recent Labs   Lab 08/10/20  0400 08/11/20  0400   WBC 1.54* 4.05   HGB 10.1* 9.4*   HCT 29.5* 27.9*   PLT 28* 29*    and CMP:   Recent Labs   Lab 08/10/20  0400 08/11/20  0400   * 133*   K 4.1 4.1    98   CO2 21* 25   * 103   BUN 18 22   CREATININE 0.8 0.8   CALCIUM 9.9 9.6   PROT 7.3 7.0   ALBUMIN 3.7 3.4*   BILITOT 0.6 0.5   ALKPHOS 82 80   AST 12 15   ALT 22 21   ANIONGAP 13 10   EGFRNONAA >60.0 >60.0       Diagnostic Results:  None

## 2020-08-12 ENCOUNTER — TELEPHONE (OUTPATIENT)
Dept: PAIN MEDICINE | Facility: CLINIC | Age: 60
End: 2020-08-12

## 2020-08-12 VITALS
HEIGHT: 66 IN | DIASTOLIC BLOOD PRESSURE: 69 MMHG | WEIGHT: 187.38 LBS | BODY MASS INDEX: 30.11 KG/M2 | RESPIRATION RATE: 18 BRPM | TEMPERATURE: 98 F | OXYGEN SATURATION: 95 % | SYSTOLIC BLOOD PRESSURE: 126 MMHG | HEART RATE: 99 BPM

## 2020-08-12 DIAGNOSIS — Z94.84 HISTORY OF ALLOGENEIC STEM CELL TRANSPLANT: Primary | ICD-10-CM

## 2020-08-12 DIAGNOSIS — Z94.81 STATUS POST ALLOGENEIC BONE MARROW TRANSPLANT: Primary | ICD-10-CM

## 2020-08-12 PROBLEM — T45.1X5A CHEMOTHERAPY-INDUCED THROMBOCYTOPENIA: Status: ACTIVE | Noted: 2020-08-12

## 2020-08-12 PROBLEM — T45.1X5A ANTINEOPLASTIC CHEMOTHERAPY INDUCED ANEMIA: Status: ACTIVE | Noted: 2020-08-12

## 2020-08-12 PROBLEM — D69.59 CHEMOTHERAPY-INDUCED THROMBOCYTOPENIA: Status: ACTIVE | Noted: 2020-08-12

## 2020-08-12 PROBLEM — D89.810 ACUTE GVHD: Status: ACTIVE | Noted: 2020-08-12

## 2020-08-12 PROBLEM — D64.81 ANTINEOPLASTIC CHEMOTHERAPY INDUCED ANEMIA: Status: ACTIVE | Noted: 2020-08-12

## 2020-08-12 LAB
ALBUMIN SERPL BCP-MCNC: 3.3 G/DL (ref 3.5–5.2)
ALP SERPL-CCNC: 81 U/L (ref 55–135)
ALT SERPL W/O P-5'-P-CCNC: 23 U/L (ref 10–44)
ANION GAP SERPL CALC-SCNC: 10 MMOL/L (ref 8–16)
ANISOCYTOSIS BLD QL SMEAR: SLIGHT
AST SERPL-CCNC: 15 U/L (ref 10–40)
BASOPHILS # BLD AUTO: 0.02 K/UL (ref 0–0.2)
BASOPHILS NFR BLD: 0.4 % (ref 0–1.9)
BILIRUB SERPL-MCNC: 0.4 MG/DL (ref 0.1–1)
BUN SERPL-MCNC: 23 MG/DL (ref 8–23)
CALCIUM SERPL-MCNC: 9.4 MG/DL (ref 8.7–10.5)
CHLORIDE SERPL-SCNC: 100 MMOL/L (ref 95–110)
CO2 SERPL-SCNC: 25 MMOL/L (ref 23–29)
CREAT SERPL-MCNC: 0.9 MG/DL (ref 0.5–1.4)
DIFFERENTIAL METHOD: ABNORMAL
EOSINOPHIL # BLD AUTO: 0 K/UL (ref 0–0.5)
EOSINOPHIL NFR BLD: 0 % (ref 0–8)
ERYTHROCYTE [DISTWIDTH] IN BLOOD BY AUTOMATED COUNT: 12.8 % (ref 11.5–14.5)
EST. GFR  (AFRICAN AMERICAN): >60 ML/MIN/1.73 M^2
EST. GFR  (NON AFRICAN AMERICAN): >60 ML/MIN/1.73 M^2
GLUCOSE SERPL-MCNC: 109 MG/DL (ref 70–110)
HCT VFR BLD AUTO: 27.7 % (ref 40–54)
HGB BLD-MCNC: 9.3 G/DL (ref 14–18)
HYPOCHROMIA BLD QL SMEAR: ABNORMAL
IMM GRANULOCYTES # BLD AUTO: 0.07 K/UL (ref 0–0.04)
IMM GRANULOCYTES NFR BLD AUTO: 1.4 % (ref 0–0.5)
LYMPHOCYTES # BLD AUTO: 0.1 K/UL (ref 1–4.8)
LYMPHOCYTES NFR BLD: 1.8 % (ref 18–48)
MAGNESIUM SERPL-MCNC: 1.8 MG/DL (ref 1.6–2.6)
MCH RBC QN AUTO: 32.7 PG (ref 27–31)
MCHC RBC AUTO-ENTMCNC: 33.6 G/DL (ref 32–36)
MCV RBC AUTO: 98 FL (ref 82–98)
MONOCYTES # BLD AUTO: 0.7 K/UL (ref 0.3–1)
MONOCYTES NFR BLD: 14.2 % (ref 4–15)
NEUTROPHILS # BLD AUTO: 4.1 K/UL (ref 1.8–7.7)
NEUTROPHILS NFR BLD: 82.2 % (ref 38–73)
NRBC BLD-RTO: 0 /100 WBC
OVALOCYTES BLD QL SMEAR: ABNORMAL
PHOSPHATE SERPL-MCNC: 4.9 MG/DL (ref 2.7–4.5)
PLATELET # BLD AUTO: 39 K/UL (ref 150–350)
PLATELET BLD QL SMEAR: ABNORMAL
PMV BLD AUTO: 12 FL (ref 9.2–12.9)
POIKILOCYTOSIS BLD QL SMEAR: SLIGHT
POLYCHROMASIA BLD QL SMEAR: ABNORMAL
POTASSIUM SERPL-SCNC: 4.2 MMOL/L (ref 3.5–5.1)
PROT SERPL-MCNC: 7.1 G/DL (ref 6–8.4)
RBC # BLD AUTO: 2.84 M/UL (ref 4.6–6.2)
SODIUM SERPL-SCNC: 135 MMOL/L (ref 136–145)
TACROLIMUS BLD-MCNC: 7 NG/ML (ref 5–15)
WBC # BLD AUTO: 5 K/UL (ref 3.9–12.7)

## 2020-08-12 PROCEDURE — A9155 ARTIFICIAL SALIVA: HCPCS | Performed by: INTERNAL MEDICINE

## 2020-08-12 PROCEDURE — 25000003 PHARM REV CODE 250: Performed by: NURSE PRACTITIONER

## 2020-08-12 PROCEDURE — 63600175 PHARM REV CODE 636 W HCPCS: Performed by: INTERNAL MEDICINE

## 2020-08-12 PROCEDURE — 84100 ASSAY OF PHOSPHORUS: CPT

## 2020-08-12 PROCEDURE — 25000003 PHARM REV CODE 250

## 2020-08-12 PROCEDURE — 85025 COMPLETE CBC W/AUTO DIFF WBC: CPT

## 2020-08-12 PROCEDURE — 80053 COMPREHEN METABOLIC PANEL: CPT

## 2020-08-12 PROCEDURE — 63600175 PHARM REV CODE 636 W HCPCS: Performed by: STUDENT IN AN ORGANIZED HEALTH CARE EDUCATION/TRAINING PROGRAM

## 2020-08-12 PROCEDURE — 99239 HOSP IP/OBS DSCHRG MGMT >30: CPT | Mod: ,,, | Performed by: INTERNAL MEDICINE

## 2020-08-12 PROCEDURE — 25000003 PHARM REV CODE 250: Performed by: INTERNAL MEDICINE

## 2020-08-12 PROCEDURE — A4216 STERILE WATER/SALINE, 10 ML: HCPCS | Performed by: NURSE PRACTITIONER

## 2020-08-12 PROCEDURE — 80197 ASSAY OF TACROLIMUS: CPT

## 2020-08-12 PROCEDURE — 99239 PR HOSPITAL DISCHARGE DAY,>30 MIN: ICD-10-PCS | Mod: ,,, | Performed by: INTERNAL MEDICINE

## 2020-08-12 PROCEDURE — 83735 ASSAY OF MAGNESIUM: CPT

## 2020-08-12 PROCEDURE — 94761 N-INVAS EAR/PLS OXIMETRY MLT: CPT

## 2020-08-12 RX ORDER — AMLODIPINE BESYLATE 10 MG/1
10 TABLET ORAL DAILY
Qty: 30 TABLET | Refills: 11 | Status: SHIPPED | OUTPATIENT
Start: 2020-08-12 | End: 2020-08-28

## 2020-08-12 RX ORDER — CYCLOBENZAPRINE HCL 10 MG
10 TABLET ORAL 3 TIMES DAILY PRN
Qty: 90 TABLET | Refills: 0 | Status: SHIPPED | OUTPATIENT
Start: 2020-08-12 | End: 2020-08-13

## 2020-08-12 RX ORDER — DIPHENHYDRAMINE HCL 25 MG
25 CAPSULE ORAL
Status: DISCONTINUED | OUTPATIENT
Start: 2020-08-12 | End: 2020-08-12

## 2020-08-12 RX ORDER — HYDROCORTISONE 1 %
CREAM (GRAM) TOPICAL 2 TIMES DAILY PRN
Qty: 28 G | Refills: 0 | Status: SHIPPED | OUTPATIENT
Start: 2020-08-12 | End: 2020-08-12 | Stop reason: HOSPADM

## 2020-08-12 RX ORDER — TRIAMCINOLONE ACETONIDE 1 MG/G
CREAM TOPICAL 2 TIMES DAILY
Qty: 30 G | Refills: 1 | Status: SHIPPED | OUTPATIENT
Start: 2020-08-12 | End: 2020-08-28

## 2020-08-12 RX ORDER — FLUTICASONE PROPIONATE 50 MCG
2 SPRAY, SUSPENSION (ML) NASAL DAILY
Qty: 16 G | Refills: 0 | Status: SHIPPED | OUTPATIENT
Start: 2020-08-12 | End: 2020-08-28

## 2020-08-12 RX ORDER — GABAPENTIN 300 MG/1
300 CAPSULE ORAL 3 TIMES DAILY
Qty: 90 CAPSULE | Refills: 11 | Status: SHIPPED | OUTPATIENT
Start: 2020-08-12 | End: 2020-10-05 | Stop reason: SDUPTHER

## 2020-08-12 RX ORDER — PROCHLORPERAZINE MALEATE 10 MG
10 TABLET ORAL 4 TIMES DAILY PRN
Qty: 30 TABLET | Refills: 1 | Status: SHIPPED | OUTPATIENT
Start: 2020-08-12 | End: 2020-10-15 | Stop reason: SDUPTHER

## 2020-08-12 RX ORDER — MORPHINE SULFATE 15 MG/1
15 TABLET ORAL EVERY 4 HOURS PRN
Qty: 30 TABLET | Refills: 0 | Status: SHIPPED | OUTPATIENT
Start: 2020-08-12 | End: 2020-08-13

## 2020-08-12 RX ORDER — HYDROCODONE BITARTRATE AND ACETAMINOPHEN 500; 5 MG/1; MG/1
TABLET ORAL
Status: DISCONTINUED | OUTPATIENT
Start: 2020-08-12 | End: 2020-08-12 | Stop reason: HOSPADM

## 2020-08-12 RX ORDER — ACETAMINOPHEN 325 MG/1
650 TABLET ORAL
Status: DISCONTINUED | OUTPATIENT
Start: 2020-08-12 | End: 2020-08-12

## 2020-08-12 RX ORDER — ONDANSETRON 8 MG/1
8 TABLET, ORALLY DISINTEGRATING ORAL EVERY 8 HOURS PRN
Qty: 30 TABLET | Refills: 1 | Status: SHIPPED | OUTPATIENT
Start: 2020-08-12 | End: 2020-11-11 | Stop reason: SDUPTHER

## 2020-08-12 RX ADMIN — URSODIOL 300 MG: 300 CAPSULE ORAL at 08:08

## 2020-08-12 RX ADMIN — HEPARIN SODIUM (PORCINE) LOCK FLUSH IV SOLN 100 UNIT/ML 300 UNITS: 100 SOLUTION at 03:08

## 2020-08-12 RX ADMIN — Medication 10 ML: at 12:08

## 2020-08-12 RX ADMIN — ONDANSETRON HYDROCHLORIDE 8 MG: 4 TABLET, FILM COATED ORAL at 06:08

## 2020-08-12 RX ADMIN — MORPHINE SULFATE 15 MG: 15 TABLET ORAL at 04:08

## 2020-08-12 RX ADMIN — MYCOPHENOLATE MOFETIL 1000 MG: 250 CAPSULE ORAL at 08:08

## 2020-08-12 RX ADMIN — Medication 30 ML: at 12:08

## 2020-08-12 RX ADMIN — MORPHINE SULFATE 15 MG: 15 TABLET ORAL at 03:08

## 2020-08-12 RX ADMIN — MORPHINE SULFATE 15 MG: 15 TABLET ORAL at 12:08

## 2020-08-12 RX ADMIN — Medication 400 MG: at 07:08

## 2020-08-12 RX ADMIN — PANTOPRAZOLE SODIUM 40 MG: 40 TABLET, DELAYED RELEASE ORAL at 08:08

## 2020-08-12 RX ADMIN — MORPHINE SULFATE 15 MG: 15 TABLET ORAL at 07:08

## 2020-08-12 RX ADMIN — ONDANSETRON HYDROCHLORIDE 8 MG: 4 TABLET, FILM COATED ORAL at 03:08

## 2020-08-12 RX ADMIN — AMLODIPINE BESYLATE 10 MG: 10 TABLET ORAL at 08:08

## 2020-08-12 RX ADMIN — Medication 400 MG: at 12:08

## 2020-08-12 RX ADMIN — GABAPENTIN 300 MG: 300 CAPSULE ORAL at 03:08

## 2020-08-12 RX ADMIN — Medication 10 ML: at 06:08

## 2020-08-12 RX ADMIN — MYCOPHENOLATE MOFETIL 1000 MG: 250 CAPSULE ORAL at 03:08

## 2020-08-12 RX ADMIN — Medication 30 ML: at 08:08

## 2020-08-12 RX ADMIN — ACYCLOVIR 800 MG: 800 TABLET ORAL at 07:08

## 2020-08-12 RX ADMIN — CYCLOBENZAPRINE HYDROCHLORIDE 10 MG: 5 TABLET, FILM COATED ORAL at 08:08

## 2020-08-12 RX ADMIN — CYCLOBENZAPRINE HYDROCHLORIDE 10 MG: 5 TABLET, FILM COATED ORAL at 03:08

## 2020-08-12 RX ADMIN — TACROLIMUS 4 MG: 1 CAPSULE ORAL at 07:08

## 2020-08-12 RX ADMIN — TAMSULOSIN HYDROCHLORIDE 0.4 MG: 0.4 CAPSULE ORAL at 08:08

## 2020-08-12 RX ADMIN — GABAPENTIN 300 MG: 300 CAPSULE ORAL at 08:08

## 2020-08-12 RX ADMIN — FLUCONAZOLE 400 MG: 200 TABLET ORAL at 08:08

## 2020-08-12 NOTE — PROGRESS NOTES
Subjective:    Patient ID: Kvng Jones Sr. is a 60 y.o. male.    Chief Complaint: No chief complaint on file.      HPI   60 year old male admitted to [facility] for CML presenting with blast crisis and lewis disease. He was admitted for evaluation and induction chemotherapy with FLAG-Addis. Complications of therapy include epididymal orchitis with negative testicular ultrasound, neutropenic fever, dyspnea, and GGO of bilateral lungs on CT chest. Fever and chest findings were covered with broad spectrum antimicrobials. He did have hallucinations and vivid dreams with posaconazole. Chemotherapy was administered by left arm PICC line that was removed during hospital stay for MSSA infection treated with vancomycin. Hospital admission was 3/30/2020 to 5/1/2020 achieving morphologic CR with induction chemotherapy. He then started Ponatinib 30mg daily.     Studies performed during his hospital stay include pre-chemotherapy ECHO with normal ejection fraction of 60-65%. HIV, HBV, and HCV tests were negative. CBC at admission was WBC 38.6, Hgb 14.6, and platelet 416K. CT of the head without contrast was normal 4/20/2020. US of abdomen performed for abdominal distention and neutropenic fever had hepatic steatosis, liver 19cm, and spleen 11 cm. He does have a history of alcohol use and pancreatitis.    Patient is a . He is  to wife Guillermina who will be a caregiver post transplant. He has a 45 pack year smoking history. He quit 6/1/2020, now on Chantix. He drinks 3-4 alcohol beverages nightly. He has 3 children. He has several family members in the [facility] area.    Interval History:  Patient presents for first clinic visit post Flu/Cy/TBI allogeneic stem cell transplant. See Hospital Course below. Comes to clinic with wife. C/o severe headache and neck and shoulder muscle spasms. Has poor appetite. States food does not taste good. Also has intermittent nausea for which he takes zofran  and compazine. Denies fevers, chills, SOB, cough, and diarrhea. No rashes noted. He is staying locally in a short-term rental apartment.     Hospital Course: Admitted 7/21/20 for a Flu/Cy/TBI haplo allo SCT. Son was donor. Received 2 bags and a total CD34 dose of 3.10 x10^6/kg on 7/28/20. Tolerated stem cells well. C/o headaches prior to admission, which persisted throughout admission. Neuro ultimately consulted for rec's. Was a daily drinker prior to admission. No s/s of alcolhol withdrawal during admission. Transplant further complicated by hypertension, heart burn, c-diff neg diarrhea, nausea, sinus congestion, mild peripheral edema, blurry vision (ophtho consulted), mucositis, electrolyte abnormalities, and neck pain 2/2 spinal stenosis (referred to pain mgt at discharge). He engrafted on Day + 8/10/20 with an ANC of 1280. He was discharged on 8/12/2020.      Review of Systems   Constitutional: Negative for activity change, appetite change, chills, diaphoresis, fatigue, fever and unexpected weight change.   HENT: Negative.  Negative for congestion, dental problem, mouth sores, nosebleeds, postnasal drip, rhinorrhea, sinus pressure, sore throat and trouble swallowing.    Eyes: Negative.  Negative for photophobia and visual disturbance.   Respiratory: Negative.  Negative for cough and shortness of breath.    Cardiovascular: Negative.  Negative for chest pain, palpitations and leg swelling.   Gastrointestinal: Positive for nausea. Negative for abdominal distention, abdominal pain, blood in stool, constipation, diarrhea and vomiting.   Endocrine: Negative.    Genitourinary: Negative.  Negative for dysuria, frequency, hematuria and urgency.   Musculoskeletal: Positive for neck pain. Negative for arthralgias, back pain and myalgias.        From line placement, history of cervical spinal stenosis   Skin: Negative.  Negative for pallor and rash.   Allergic/Immunologic: Negative for environmental allergies, food  allergies and immunocompromised state.   Neurological: Positive for headaches. Negative for dizziness, syncope, weakness and numbness.   Hematological: Negative for adenopathy. Does not bruise/bleed easily.   Psychiatric/Behavioral: Negative.  Negative for confusion. The patient is not nervous/anxious.        Objective:     Physical Exam  Vitals signs reviewed.   Constitutional:       General: He is not in acute distress.     Appearance: He is well-developed.   HENT:      Head: Normocephalic.      Mouth/Throat:      Pharynx: No oropharyngeal exudate.   Eyes:      General: No scleral icterus.     Conjunctiva/sclera: Conjunctivae normal.      Pupils: Pupils are equal, round, and reactive to light.   Neck:      Musculoskeletal: Normal range of motion and neck supple. Normal range of motion.      Thyroid: No thyromegaly.   Cardiovascular:      Rate and Rhythm: Normal rate and regular rhythm.      Heart sounds: Normal heart sounds.   Pulmonary:      Effort: Pulmonary effort is normal. No respiratory distress.      Breath sounds: Normal breath sounds.   Abdominal:      General: Bowel sounds are normal. There is no distension.      Palpations: Abdomen is soft.      Tenderness: There is no abdominal tenderness.   Musculoskeletal: Normal range of motion.         General: No tenderness.   Skin:     General: Skin is warm and dry.      Coloration: Skin is not pale.      Findings: No petechiae or rash.      Comments: Dalal intact to right CW with no redness or drainage   Neurological:      Mental Status: He is alert and oriented to person, place, and time.      Cranial Nerves: No cranial nerve deficit.      Coordination: Coordination normal.   Psychiatric:         Thought Content: Thought content normal.         Assessment:       1. History of allogeneic stem cell transplant    2. CML in remission    3. Acute myeloid leukemia in remission    4. Acute GVHD    5. Mucositis due to chemotherapy    6. CINV (chemotherapy-induced  nausea and vomiting)    7. Blurry vision    8. Antineoplastic chemotherapy induced anemia    9. Chemotherapy-induced thrombocytopenia    10. Rash and nonspecific skin eruption    11. Abdominal gas pain    12. Chronic intractable headache, unspecified headache type    13. Cervical stenosis of spine    14. Tobacco abuse, in remission    15. Daily consumption of alcohol    16. Benign prostatic hyperplasia without lower urinary tract symptoms    17. Gastroesophageal reflux disease without esophagitis    18. Essential hypertension        Plan:         History of allogeneic stem cell transplant  - Admitted 7/21/20 for planned Flu/Cy/TBI haplo allo SCT. Son is the donor. Today is Day +17  - Receive 2 bags and a total CD34 dose of 3.10 x10^6/kg on 7/28/20  - Recieved post transplant cyclophosphamide on Day +3 and +4 for GVHD ppx.  - Tacro level was collected after he took his tacro today. Currently taking 4 mg (8 tablets) twice daily. Will continue this as he took tacro prior to lab draw. Instructed not to take tacro before labs  - On 4mg BID  - Engrafted 8/10/20 with ANC of 1280  - Continue Ursodiol through Day +30 (8/27/20)      CML in remission  - Presented to [facility] with CMP in blast crisis and with nodular disease on 3/30/2020  - Induced with FLAG-Addis. Achieved morphologic CR  - Started on daily Ponatinib 30 mg daily, which he continued to take outpatient. Held Ponatinib during admission.  - Referred to Dr. Hodges by Dr. Ramos for SCT consideration  - Had BMBx at [facility] on 7/1/2020 showing no morphologic or immunophenotypic evidence of CML  - Admitted 7/21/20 for planned Flu/Cy/TBI haplo allo SCT    Acute myeloid leukemia in remission  - See CML    GVHD  - had rash inpatient that was believed to be drug eruption. Resolved  - no evidence of GVHD today  - GVHD charting with each visit  - Tacro level was collected after he took his tacro today. Currently taking 4 mg (8 tablets) twice daily. Will continue this as he took tacro  prior to lab draw. Instructed not to take tacro before labs  - no evidence of acute GVHD on exam    ID  - CMV and EBV collected today and pending    FEN/Hypomagnesemia  - electrolytes: wnl today. Mag 1.6. Will start mag 400 mg BID as patient is on tacro. May also help with headaches  - drinking 50 ounces of water daily, but also drinking coffee. BUN/creat up slightly. Explained that coffee has diuretic effect, and therefore, he should be drinking more water daily.     Mucositis due to chemotherapy  - continue prn [facility]  - improving     CINV (chemotherapy-induced nausea and vomiting)  - c/w prn zofran, compazine, and ativan     Blurry vision  - C/o worsening blurry vision 7/30/20  - May be contributing to headaches  - Ophtho consulted and saw patient 7/30  - Ophtho dilated pupils and performed fundal exam. Fundal exam neg.  - Patient had been viewing laptop screen for ~ 2 hours prior to experiencing blurry vision  - Per ophtho, blurry vision due to dry eyes. rec'd artificial tears QID prn and warm compresses BID prn. Can escalate artificial tears to ointment if needed.     Antineoplastic chemotherapy-induced cytopenias: anemia and thrombocytopenia   - WBC 4.42, Hgb 8.5, plt 51K  - transfuse Hgb <7g and plt <10k      Rash and nonspecific skin eruption  - Newly noted macular rash to upper back and chest on 7/29  - Intermittently pruritic  - GVHD not suspected this soon after transplant  - Likely drug reaction, thought maybe to be from DMSO from stem cell transplant  - Continue to monitor closely  - Started on PRN PO benadryl, topical benadryl cream, and topical hydrocortisone   - Patient states that benadryl causes him to be jittery. Ordered prn atarax instead.  - resolved     Abdominal gas pain  - Started on PRN simethicone inpatient  - not prescribed at discharge. No c/o gas pain today     Headache  - Started having headaches several weeks ago. Onset seems to correspond to when patient quit smoking and when he  "started ponatinib  - Has been off of ponatinib for a few days but fevers have persisted  - Pattern is like cluster headaches (onset during sleep), but not unilateral.  - O2 via non-rebreather at 12 L over 15 minutes seemed to help but developed hot flush so pt stopped  Ultram, Flexeril, Oxycodone not helping  Imitrex PRN q2hr (max 200mg in 24hrs). Not contraindicated per pharm D.  Description of headache sounds like tension headache--throbbing with pressure across crown of head  No focal neurological deficits  - Patient reporting that headache may be sinus in nature. Maxillary tenderness noted.  - Patient takes Claritin at home. States that he cannot take Zyrtec (which is on formulary) due to SE and "addiction"  - States that it was hard for him to ween himself off of Zyrtec.  - Stared Flonase 7/27/20. Started home Claritin 7/27/20 (not on formulary)  - Somewhat responding to caffeine, Imitrex, tramadol, oxy, and ativan  - Now c/o blurry vision, which may also be contributing to headaches. Seen by ophtho for this  - Continue to monitor closely; especially with expected thrombocytopenia  - Neuro consulted inpatient and trialed IV mag.   - is seeing pain mgt for this. Current headaches seem to be caused by muscle spasms 2/2 cervical stenosis  - zanflex prescribed by pain mgt. Also prescribed celobrex, but instructed patient not to take this as it is an NSAID.  - recommended alternating heat and ice  - prescribed lidoderm patches today (8/14/20)     Cervical stenosis of spine  - Ongoing, reported severe neck pain while inpatient   - neck pillow and c-collar without improvement   - increased flexeril to 10 mg TID and gabapentin 300 mg to TID (from nightly) while inpatient  - oxy 20 mg q6 hr prn  - was referred to pain mgt at discharge. Has been seen by them and has f/u appt on 8/21/20. Will have imaging between now and then  - also referred to PT, which he will start on Monday     Tobacco abuse, in remission  - 35 pack " year history  - Quit smoking 6/1/2020 using Chantix  - Patient completed course of Chantix and denies need for Nicotine patch.      Daily consumption of alcohol  - Drank 3-4 alcoholic beverages nightly prior to admission  - No s/s of alcohol withdrawal while inpatient    BPH (benign prostatic hyperplasia)  - Continue home Flomax     GERD (gastroesophageal reflux disease)  - Takes Nexium at home  - Giving Protonix while inpatient as Nexium is not on hospital formulary  - Resumed Nexium at discharge  - can take TUMS prn     Essential hypertension  - increased home amlodipine from 5 mg to 10 mg daily while inpatient due to BPs as high as 180s/110s  - Started prn hydralazine - possibly worsened headache  - Decreased IVF rate  - BP normalized  - today /67     Ivon Morrow, FNP  Hematology/Oncology/Bone Marrow Transplant      Follow-up: CBC, CMP, t&s, tacro, CMV, EBV (weekly only), mag, phos and appt with Dr. Hodges or NP twice weekly (Tues and Fri). Please schedule through mid September.

## 2020-08-12 NOTE — PROGRESS NOTES
BMT Pharmacist Discharge Note     All discharge medications were reviewed with the patient and wife. 14 medications were sent to the [facility] pharmacy for bedside delivery: acyclovir, amlodipine, cyclobenzaprine, fluconazole, fluticase propionate, gabapentin, magic mouthwash, mycophenolate, ondansetron, prochlorperazine, Bactrim DS, tacrolimus, triamcinolone cream, ursodiol, and morphine.     Medication Indication Morning Lunch/Afternoon Night   Acyclovir 800mg  Viral infection prevention 1 tablet  1 tablet   Fluconazole 200mg  Fungal infection prevention 2 tablets     Sulfamethoxazole-trimethoprim (Bactrim) 800-160mg PCP pneumonia prevention   (start on 8/28/20) 1 tablet on Mon., Wed., and Fri.     Tacrolimus (Prograf) 0.5mg* GVHD prevention - take AFTER labs on clinic days* 8 capsules  8 capsules   Mycophenolate 250mg GVHD prevention 4 capsules 4 capsules 4 capsules   Triamcinolone 0.1% cream Skin GVHD 1 application  1 application    Ursodiol 300mg Liver protection (through 8/27/20) 1 capsule  1 capsule   ----------------------------------------       Fluticasone nasal spray Allergies 2 sprays     Loratadine (Claritin) 10mg Allergies 1 tablet     Gabapentin 300mg Neuropathy/pain 1 capsule 1 capsule 1 capsule   Amlodipine 10mg Blood pressure 1 tablet     Esomeprazole (Nexium) 20mg Acid reflux 1 tablet     Multivitamin Supplement 1 tablet     Tamsulosin 0.4mg BPH/urinary issues 1 capsule     *Dose may change at each appointment    AS NEEDED MEDICATIONS:  Ondansetron (Zofran) 8mg every 8 hours as needed for nausea  Prochlorperazine (Compazine) 10mg four times a day as needed for nausea (2nd choice)  [facility]'s solution - swish and swallow 10mls four times a day as needed for mouth sores  Chlorhexidine solution - swish and spit 30mls twice a day as needed for mouth care  Tramadol 50mg every 6 hours as needed for pain  Morphine 15mg every 4 hours as needed for severe pain  Cyclobenzaprine 10mg three times a day as needed  for muscle spasms    NO LONGER TAKE:   Levofloxacin   Valacyclovir  Varenicline      Notes:   Introduced post-transplant pharmacy services to the patient and wife. I discussed that they will be seen twice a month (first appointment ~8/28) up until day +100, and then as needed from that point on. I asked that the patient bring his medication tote to each clinic appointment, that way if there were any medication questions they could more easily be answered by the pharmacist.      I discussed the importance of taking acyclovir up until day +365 to prevent viral infections. I also explained that the dose and frequency of tacrolimus might change at each appointment, so to use the medication sheet provided for dosing instructions rather than what is printed on the bottle label. Patient and wife understand not to take tacrolimus in the morning before lab draws and that he may take his medication after we have drawn a tacrolimus level. Patient understands the importance of taking fluconazole and Bactrim as long as he is on immunosuppression.         All questions were answered.      Jerri Julien, Pharm.D., BCOP  Clinical Pharmacy Specialist, Bone Marrow Transplant  [facility]  SpectraLink: [phone]

## 2020-08-12 NOTE — PROGRESS NOTES
S/w pt offering support and discussing discharge planning needs.  Pt has lodging secured at a nearby local apartment located within a mile of the hospital.  Pt/spouse have contact information for the SWer and have agreed to contact SW as needed with questions or concerns.      Ro Marroquin, [facility]  Oncology Social Work  Hematology Services  [phone]

## 2020-08-12 NOTE — PROGRESS NOTES
Discharge teaching done with patient and patient's caregiver on BMT unit.  Approximately 20 minute spent on discussion of post-transplant guidelines including:  Low microbial diet, safe food handling, dining out, home sanitation, outpatient plan of care, progression of recovery of cells and immune system, ways to prevent infection, fatigue, ways to avoid bleeding, pet and plant exposure and care, returning to work, smoking and alcohol consumption, sexual activity, sexual desire and response, immunizations, traveling, nutrition, personal hygiene, hand washing, oral care, eye care, signs and symptoms to report immediately, and emotional changes post transplant.  Also discussed who to contact during business hours, after hours, and holidays.  Written materials supplied.  Patient and family given the opportunity to ask questions.  Verbalizes understanding.  All questions answered to their satisfaction.  Patient and family instructed to call with any questions or concerns.  Patient and caregiver were given handouts which reiterate all the above teaching.    Aundrea Jackson NP  Hematology/Oncology/BMT

## 2020-08-12 NOTE — PROGRESS NOTES
ROAD TEST:  Oxygen: room air; VSS   Ambulates: independently  Discontinued: Right chest wall grewal catheter left in place,  per Dr. Aponte    Tolerating: Regular diet  Elimination: voids w/o difficulty  ADLs: performs independently  Teaching: An informational handout was provided to the patient which included instructions that addressed activity level, diet, discharge medications, follow-up appointments, weight monitoring and what to do if symptoms worsen.  Heparin flushed each lumen of grewal catheter and green capped lines.  Educated patient on importance of keeping site clean and dressing dry and intact. Showed patient and wife how to cover dressing while showering.  Pt will follow-up with clinic twice weekly for line care and dressing changes.  Discussed all discharge instructions and medication education with the patient and wife. Patient verbalizes understanding of all information given. Patient states he has no further questions. Patient leaving with wife and  via wheelchair.

## 2020-08-12 NOTE — DISCHARGE SUMMARY
"[facility]  Hematology  Bone Marrow Transplant  Discharge Summary      Patient Name: Kvng Jones Sr.  MRN: [id number]  Admission Date: 7/21/2020  Hospital Length of Stay: 22 days  Discharge Date and Time:  08/12/2020 2:57 PM  Attending Physician: Cristhian Aponte MD   Discharging Provider: Hunter Basurto MD  Primary Care Provider: Primary Doctor No    HPI: Mr. Kvng Jones Sr. is a 60-y-o male patient of Dr. 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 His son will be his donor, and his wife will be his caregiver post transplant. A Dalal catheter was placed per general surgery today,  prior to admission. On initial bedside interview, he reports isolated soreness at the catheter with intermittent radiation up the right lateral neck, associated with tenderness to palpation. However, he denies associated pain with inspiration, SOB, or cough. He reports on-going intermittent nausea and "tingling" of the bilateral feet since initiating chemotherapy that has not progressed. Of note, he does reports daily consumption of alcohol. His last drink was on evening of 07/20 and he denies history of requiring treatment for ETOH withdrawal and current cravings, tremors, diaphoresis, or hallucinations. He also reports a prior history of smoking with estimated quit date on 06/01/2020; he is currently using CHANTIX and is not interested in use of nicotine patches.     * No surgery found *     Hospital Course: 07/21/2020: Mr. Jones is admitted for a Flu/Cy/TBI haplo allo SCT. Today is Day -7. He is a daily drinker. Monitor closely for s/s of withdrawal.  07/22/2020: Day - 6 from Flu/Cy/TBI haplo allo SCT for CML. Afebrile. BP elevated as high as 180s/110s. Increased amlodipine dose to 10 mg daily. Patient reporting headaches. May be r/t hypertension, but unsure of whether it is causing headaches or if headaches are causing hypertension. Questioning " whether headaches are 2/2 nicotine withdrawal. Onset seems to correspond to time that patient quit smoking. Patient not thrombocytopenic. Ibuprofen 800 mg x 1 dose ordered. Had candid discussion with patient about home drinking behavior. Explained that withdrawal could cause seizures. He states that he has 3-4 beers, 3-4 glasses of wine, or 3-4 whiskeys nightly. Was drinking nightly up to night prior to admission. States that when he was in [facility] from 3/20-5/20, he did not experience withdrawal symptoms. Will monitor very closely for s/s of withdrawal. Dalal placed to right chest wall yesterday prior to admission. Reports tenderness to insertion site. No sign of infection noted. Likely 2/2 trauma.  07/23/2020: Day -5 from Flu/Cy/TBI haplo allo SCT for CML. Continues to have elevated BP and headaches. HTN and headaches do not seem to co-occur. Amlodipine dose increased and prn hydralazine ordered. Headache onset starts during sleep and lasts for a few hours after awakening. Although not described as unilateral, questioning whether may be cluster headaches--especially given the fact that he is male, a previous smoker, and a current drinker. Headache onset occurred several weeks ago and seems to coincide with quitting smoking and initiation of ponatinib. Has been off ponatinib for a few days now, so less likely to be causing headaches. Will try O2 with next headache. Will also consider trying imitrex. BMT Pharm D checked and determined that imitrex is not contraindicated with chemo regimen. If headaches persist, will consider imaging.  07/24/2020: Day - 4 from Flu/Cy/TBI haplo allo SCT for CML. BP improving with increased amlodipine dose and prn hydralazine. Decreased IVF rate as patient is no longer receiving compazine. Weight up from discharge and net + of I/O. +1 edema to feet/ankles. 20 mg IV lasix ordered C/o chest pain this morning. Troponin normal and EKG essentially normal. Cxr neg. Patient believed that pain  "was GERD. Takes nexium daily at home and had not yet received protonix. TUMs prn ordered. Received tramadol for headache over night with mild relief. Reports nausea. States compazine provides best relief. Compazine scheduled. Reports loose stool x 1 this morning. Will r/o c-diff with increased frequency.   07/25/2020: Day - 3 from Flu/Cy/TBI haplo allo SCT for CMP. Continues to complain of headache. Yesterday around noon felt great until around 11:30PM after taking some Hydralazine. At that point his same headache returned which he has had throughout the night. Received Ultram, oxycodone, flexeril which did not provide relief. Felt the oxygen may have helped but developed hot flash and removed it. Denies visual complaints, focal weakness, confusion. Denies any other complaints this morning.  07/26/2020: Day - 2 from Flu/Cy/TBI haplo allo SCT for CMP. Headache significantly improved after starting imitrex and has completely resolved this morning. Pt unsure if the imitrex is what helped, he also had a large bowel movement last night which seemed to provide significant relief. His only concern this morning is a slight swelling in his left lower frontal gums which isn't bothering him. He's not sure if it is normal or not, just noticed because he was told to remain aware of mouth sores. Deneis any other complaints.  07/27/2020: Day -1 from Flu/Cy/TBI haplo allo SCT for CML. Will receive TBI today. Continues to be afebrile. VSS. BP improved. Headaches still recurrent but seem to have responded to imitrex. Patient states that he feel that headaches my be sinus in nature. Maxillary tenderness noted. Takes Claritin at home. Not on hospital formulary. Zyrtec on formulary, but patient states that he cannot take Zyrtec due to SE and "addiction" to it. Will start Flonase. Okay to take home Claritin if someone can bring it from home. Reports increased stool frequency, but denies liquid stools.   07/28/2020: Day 0 from Flu/Cy/TBI " haplo allo SCT for CML. Received 2 bags and a total CD34 dose of 3.10 x10^6/kg today. Tolerated well. Afebrile. VSS. Blood pressure improving. No headaches over night.  07/29/2020 Today is Day +1 s/p Flu/Cy/TBI Haplo SCT for CML. Tolerated transplant yesterday without incident. Pt with headache again this morning. Also with increased nausea. Reports gas pain and a new rash to his upper back and chest with pruritis.   07/30/2020: Day +2 from Flu/Cy/TBI haplo SCT for CML. Continues to experience headaches. Headaches seem to respond to imitrex and caffeine. Concerned for rebound headaches 2/2 Imitrex. C/o blurry vision today. Ophtho consulted. Pruritic rash to chest and bilat shoulders. Do not suspect GVHD this early. Started topical hydrocortisone. ANC down to 1600 today.  07/31/2020: Day +3 from Flu/Cy/TBI haplo SCT for CML. Continues to be afebrile. VSS. Headache persist. Mild relief with current treatments. Neuro consulted for recs and will see patient today. 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 Rash to chest and bilat shoulder improving with hydrocortisone cream. Seen by ophtho yesterday regarding right blurry vision. Both eyes dilated and examined. Fundal exam neg. Patient had been on laptop for ~ 2 hours prior to experiencing blurry vision. Blurry vision thought to be due to dry eyes. Artificial tears and warm compresses recommended and ordered. Can escalate art tears to ointment if insufficient relief. ANC down to 1200 today.   08/01/2020: Day +4 from Flu/Cy/TBI haplo SCT for CML. Complaining of refractory HA and nausea, no emesis. Continues to have OK appetite. Has mild skin rash on chest, last day of Cytoxan today.   08/02/2020: Day +5 from Flu/Cy/TBI haplo SCT for CML. Says HA persistent but nausea improved with IV mag o/n. Had flushing this morning which he said is a recurrent problem. Neuro on board, recommend IV mag for HA and s/o. Overall pt feels better now that Cytoxan is done.  "  08/03/2020 Day +6 from Flu/Cy/TBI haplo identical SCT for CML. Continues to complain of a HA though increased dose of oxy make it "bearable". Positive fluid status (+11.6L). Still pancytopenic, . Electrolytes wnl. Diarrhea improving.   08/04/2020 Day +7 from Flu/Cy/TBI haplo alloSCT for AML/CML. NEON. Improvement in HA symptoms. No signs of aGVHD. Continues to be pancytopenic. Scheduled antiemetics working. No diarrhea.   08/05/2020 Day +8 from Flu/Cy/TBI haplo alloSCT for AML/CML. NEON. Appears well this AM. Refused scheduled antiemetics and will make them prn today. Has very mild mucositis and will add duke's. Neutropenic and thrombocytopenic. No signs of aGVHD. Still markedly positive fluid balance.   08/06/2020 Day +9 from Flu/Cy/TBI haplo alloSCT for CML. NEON. HA this morning that kept him up overnight. Tac 2 this AM. No signs of aGVHD. Denies NVDC.   08/07/2020 Day +10 from funmilayo/Cy/TBI haplo allo SCT for AML/CML. Has neck pain this morning that is MSK in nature. HA improved after 2g IV mag yesterday though returned some hours later. Has ongoing nausea and would like zofran scheduled today. Tac pending this AM after subtherapeutic last 2 mornings requiring dose increases. No evidence of aGVHD.   8/8/2020 Day +11 Flu/Cy/TBI haplo alloSCT for AML/CML. Complaining of mild headache, and itching over the hands. Tac 4.5 today.    8/9/2020 Day +12 Flu/Cy/TBI haplo alloSCT for AML/CML. Had some chills yesterday after platelets transfusion. Complainng of back pain. His Tacro levels 4.5 yesterday. Tacro increased to 4 mg BID   08/10/2020 Day +13 Flu/Cy/TBI haplo alloSCT for CML. HDS/VSS overnight. Neck and back pain this morning. Also complaining of palm itching though no rash present and thinks it is from his opioid pain medication. Tac within limits this AM. Mag 1.5 and will get 2g IV. ANC 1281 this AM.   08/11/2020 Day +14 Flu/Cuy/TBI haplo alloSCT for AML. Stable. Ongoing neck pain, barely relieved by " dilaudid. No other complaints and improving appetite. Engrafted with ANC of 3606. Tac 6.6.  Itching in hands has improved.    08/12/2020 Day +15 Flu/Cy/TBI haplo allo SCT for AML/CML. NEON. Has ongoing neck pain, not completely improved with current regimen of pain control with 300 mg TID of gabapentin, 10 mg TID of flexeril and 15 mg q4 hr prn of morphine. Out patient consult to pain management, PT and OT placed. No signs of GVHD though does have some miild hand itching for which he will take triamcinolone cream. Has f/u with Ivon CALDERON on 8/14 with labs prior.     Consults (From admission, onward)        Status Ordering Provider     Inpatient consult to Neurology  Once     Provider:  (Not yet assigned)    Completed OSMIN TORRES     Inpatient consult to Ophthalmology  Once     Provider:  (Not yet assigned)    Completed IVON TIDWELL.        Vitals:    08/12/20 1224   BP:    Pulse:    Resp: 16   Temp:      Physical Exam  Constitutional:       General: He is not in acute distress.     Head: Normocephalic and atraumatic.      Mouth/Throat:      Mouth: Dry MM. Mild evidence of mucositis on bottom gums.     Pharynx: No oropharyngeal exudate or posterior oropharyngeal erythema.   Eyes:      General: No scleral icterus.     Pupils: Pupils are equal, round, and reactive to light.   Neck:      Musculoskeletal: Normal range of motion. No neck rigidity.   Cardiovascular:      Rate and Rhythm: Normal rate and regular rhythm.      Pulses: Normal pulses.      Heart sounds: Normal heart sounds. No murmur.   Pulmonary:      Effort: Pulmonary effort is normal. No respiratory distress.      Breath sounds: Normal breath sounds.   Abdominal:      General: Abdomen is flat. Bowel sounds are normal.      Palpations: Abdomen is soft.   Musculoskeletal: Normal range of motion.      Right lower leg: No edema.      Left lower leg: No edema.      Comments: CHERYL PEREZ  Lymphadenopathy:      Cervical: No cervical adenopathy.    Skin:     General: Skin is warm. No rash.      Coloration: Skin is not jaundiced or pale.   Neurological:      General: No focal deficit present.      Mental Status: He is alert and oriented to person, place, and time. Mental status is at baseline.   Psychiatric:         Mood and Affect: Mood normal.         Thought Content: Thought content normal.     Significant Diagnostic Studies: Labs:   CMP   Recent Labs   Lab 08/11/20  0400 08/12/20  0341   * 135*   K 4.1 4.2   CL 98 100   CO2 25 25    109   BUN 22 23   CREATININE 0.8 0.9   CALCIUM 9.6 9.4   PROT 7.0 7.1   ALBUMIN 3.4* 3.3*   BILITOT 0.5 0.4   ALKPHOS 80 81   AST 15 15   ALT 21 23   ANIONGAP 10 10   ESTGFRAFRICA >60.0 >60.0   EGFRNONAA >60.0 >60.0    and CBC   Recent Labs   Lab 08/11/20  0400 08/12/20  0341   WBC 4.05 5.00   HGB 9.4* 9.3*   HCT 27.9* 27.7*   PLT 29* 39*       Pending Diagnostic Studies:     None        Final Active Diagnoses:    Diagnosis Date Noted POA    PRINCIPAL PROBLEM:  History of allogeneic stem cell transplant [Z94.84]  Not Applicable    Hypomagnesemia [E83.42] 08/10/2020 No    Mucositis due to chemotherapy [K12.31] 08/05/2020 No    CINV (chemotherapy-induced nausea and vomiting) [R11.2, T45.1X5A] 08/04/2020 No    Blurry vision [H53.8] 07/30/2020 No    Acute myeloid leukemia in remission [C92.01]  Yes    Abdominal gas pain [R14.1] 07/29/2020 No    Rash and nonspecific skin eruption [R21] 07/29/2020 No    Antineoplastic chemotherapy induced pancytopenia [D61.810, T45.1X5A] 07/29/2020 No    Headache [R51] 07/23/2020 Yes    Essential hypertension [I10] 07/21/2020 Yes    GERD (gastroesophageal reflux disease) [K21.9] 07/21/2020 Yes    BPH (benign prostatic hyperplasia) [N40.0] 07/21/2020 Yes    Daily consumption of alcohol [Z78.9] 07/21/2020 Yes    Tobacco abuse, 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.201] 07/21/2020 Not Applicable    Cervical stenosis of spine [M48.02] 07/21/2020 Yes    CML in remission [C92.11]  Yes       Problems Resolved During this Admission:    Diagnosis Date Noted Date Resolved POA    Antineoplastic chemotherapy induced anemia [D64.81, T45.1X5A] 07/30/2020 07/31/2020 No    CML in remission [C92.11] 07/21/2020 07/22/2020 Yes    Bone marrow transplant candidate [Z76.82]  07/21/2020 Not Applicable      Discharged Condition: good    Disposition: Home or Self Care    Follow Up: 8/14 with NP with labs prior     Patient Instructions:      Ambulatory referral/consult to Pain Clinic   Standing Status: Future   Referral Priority: Routine Referral Type: Consultation   Referral Reason: Specialty Services Required   Requested Specialty: Pain Medicine   Number of Visits Requested: 1     Ambulatory referral/consult to Physical/Occupational Therapy   Standing Status: Future   Referral Priority: Routine Referral Type: Physical Medicine   Referral Reason: Specialty Services Required   Number of Visits Requested: 1     Medications:  Reconciled Home Medications:      Medication List      START taking these medications    acyclovir 800 MG Tab  Commonly known as: ZOVIRAX  Take 1 tablet (800 mg total) by mouth 2 (two) times daily.     fluticasone propionate 50 mcg/actuation nasal spray  Commonly known as: FLONASE  2 sprays (100 mcg total) by Each Nostril route once daily.     gabapentin 300 MG capsule  Commonly known as: NEURONTIN  Take 1 capsule (300 mg total) by mouth 3 (three) times daily.     magic mouthwash diphen/antac/lidoc/nysta  Take 10 mLs by mouth 4 (four) times daily as needed (mouth sores).     morphine 15 MG tablet  Commonly known as: MSIR  Take 1 tablet (15 mg total) by mouth every 4 (four) hours as needed for Pain.     mycophenolate 250 mg Cap  Commonly known as: CELLCEPT  Take 4 capsules (1,000 mg total) by mouth 3 (three) times daily.     prochlorperazine 10 MG tablet  Commonly known as: COMPAZINE  Take 1 tablet (10 mg total) by mouth 4 (four) times daily as needed for Nausea.     sulfamethoxazole-trimethoprim  800-160mg 800-160 mg Tab  Commonly known as: BACTRIM DS  Take 1 tablet by mouth every Mon, Wed, Fri.  Start taking on: August 28, 2020     tacrolimus 0.5 MG Cap  Commonly known as: PROGRAF  Take 8 capsules (4 mg total) by mouth every 12 (twelve) hours. Take after labs on clinic appointment days.     triamcinolone acetonide 0.1% 0.1 % cream  Commonly known as: KENALOG  Apply topically 2 (two) times daily. Rash on hands     ursodioL 300 mg capsule  Commonly known as: ACTIGALL  Take 1 capsule (300 mg total) by mouth 2 (two) times daily.        CHANGE how you take these medications    amLODIPine 10 MG tablet  Commonly known as: NORVASC  Take 1 tablet (10 mg total) by mouth once daily.  What changed:   · medication strength  · how much to take     fluconazole 200 MG Tab  Commonly known as: DIFLUCAN  Take 2 tablets (400 mg total) by mouth once daily.  What changed: how much to take     ondansetron 8 MG Tbdl  Commonly known as: ZOFRAN-ODT  Dissolve 1 tablet (8 mg total) by mouth every 8 (eight) hours as needed (nausea).  What changed:   · medication strength  · how much to take  · how to take this  · when to take this  · reasons to take this        CONTINUE taking these medications    chlorhexidine 0.12 % solution  Commonly known as: PERIDEX  RINSE WITH 15 MLS PO FOR 30 SECONDS BID     cyclobenzaprine 10 MG tablet  Commonly known as: FLEXERIL  Take 1 tablet (10 mg total) by mouth 3 (three) times daily as needed for Muscle spasms.     esomeprazole 20 MG capsule  Commonly known as: NEXIUM  Take 20 mg by mouth before breakfast.     loratadine 10 mg tablet  Commonly known as: CLARITIN  Take 10 mg by mouth once daily.     multivit-min-FA-lycopen-lutein 300-600-300 mcg Tab  Take 1 tablet by mouth once daily.     tamsulosin 0.4 mg Cap  Commonly known as: FLOMAX  1 capsule 30 minutes after the same meal each day     traMADoL 50 mg tablet  Commonly known as: ULTRAM  Take 1 tablet by mouth every 6 (six) hours as needed (pain).         STOP taking these medications    levoFLOXacin 500 MG tablet  Commonly known as: LEVAQUIN     ondansetron 4 MG tablet  Commonly known as: ZOFRAN     valACYclovir 500 MG tablet  Commonly known as: VALTREX     varenicline 1 mg Tab  Commonly known as: CHANTIX            Hunter Basurto MD  Bone Marrow Transplant  [facility]

## 2020-08-12 NOTE — TELEPHONE ENCOUNTER
----- Message from Valencia Franco sent at 8/12/2020  4:16 PM CDT -----  Regarding: FW: Appt request    ----- Message -----  From: Manolo Orozco  Sent: 8/12/2020   1:59 PM CDT  To: [facility] Pain Management Schedulers  Subject: Appt request                                     Good afternoon,    Provider is requesting that pt be scheduled with pain management, referral is in.    Thank you!

## 2020-08-12 NOTE — PLAN OF CARE
Day +15 Allo/Haplo SCT. PRN medications transitioned to PO yesterday, Pt tolerating well. Given PO morphine x2 for neck pain and PO ativan x1 for nausea. Pt no longer requiring round-the-clock medications. Remains afebrile and VSS. Plan to discharge today. Will continue to monitor.

## 2020-08-13 ENCOUNTER — OFFICE VISIT (OUTPATIENT)
Dept: PAIN MEDICINE | Facility: CLINIC | Age: 60
End: 2020-08-13
Attending: ANESTHESIOLOGY
Payer: COMMERCIAL

## 2020-08-13 VITALS
OXYGEN SATURATION: 100 % | WEIGHT: 187.38 LBS | DIASTOLIC BLOOD PRESSURE: 73 MMHG | RESPIRATION RATE: 18 BRPM | HEART RATE: 102 BPM | TEMPERATURE: 97 F | BODY MASS INDEX: 30.11 KG/M2 | HEIGHT: 66 IN | SYSTOLIC BLOOD PRESSURE: 102 MMHG

## 2020-08-13 DIAGNOSIS — M79.18 MYOFASCIAL PAIN: Primary | ICD-10-CM

## 2020-08-13 DIAGNOSIS — M96.1 CERVICAL POSTLAMINECTOMY SYNDROME: ICD-10-CM

## 2020-08-13 DIAGNOSIS — G89.4 CHRONIC PAIN SYNDROME: ICD-10-CM

## 2020-08-13 PROCEDURE — 99999 PR PBB SHADOW E&M-EST. PATIENT-LVL V: CPT | Mod: PBBFAC,BMT,, | Performed by: ANESTHESIOLOGY

## 2020-08-13 PROCEDURE — 99204 OFFICE O/P NEW MOD 45 MIN: CPT | Mod: S$PBB,BMT,, | Performed by: ANESTHESIOLOGY

## 2020-08-13 PROCEDURE — 99204 PR OFFICE/OUTPT VISIT, NEW, LEVL IV, 45-59 MIN: ICD-10-PCS | Mod: S$PBB,BMT,, | Performed by: ANESTHESIOLOGY

## 2020-08-13 PROCEDURE — 99999 PR PBB SHADOW E&M-EST. PATIENT-LVL V: ICD-10-PCS | Mod: PBBFAC,BMT,, | Performed by: ANESTHESIOLOGY

## 2020-08-13 RX ORDER — CELECOXIB 100 MG/1
100 CAPSULE ORAL 2 TIMES DAILY
Qty: 60 CAPSULE | Refills: 0 | Status: SHIPPED | OUTPATIENT
Start: 2020-08-13 | End: 2020-08-13 | Stop reason: SDUPTHER

## 2020-08-13 RX ORDER — TIZANIDINE 4 MG/1
4 TABLET ORAL NIGHTLY PRN
Qty: 30 TABLET | Refills: 0 | Status: SHIPPED | OUTPATIENT
Start: 2020-08-13 | End: 2020-09-12

## 2020-08-13 RX ORDER — TIZANIDINE 4 MG/1
4 TABLET ORAL NIGHTLY PRN
Qty: 30 TABLET | Refills: 0 | Status: SHIPPED | OUTPATIENT
Start: 2020-08-13 | End: 2020-08-13 | Stop reason: SDUPTHER

## 2020-08-13 RX ORDER — CELECOXIB 100 MG/1
100 CAPSULE ORAL 2 TIMES DAILY
Qty: 60 CAPSULE | Refills: 0 | Status: SHIPPED | OUTPATIENT
Start: 2020-08-13 | End: 2020-08-14

## 2020-08-13 NOTE — PROGRESS NOTES
Pt discharging locally for post SCT stay with grewal line which will be managed during pts twice weekly clinic appointments.  No other needs identified.  Pt/spouse have contact information for the SWer and have agreed to contact as needed for support.      Ro Marroquin, [facility]  Oncology Social Work  Hematology Services  [phone]

## 2020-08-13 NOTE — PROGRESS NOTES
Chronic Pain - New Consult    Referring Physician: Shin, Flashreferral    Chief Complaint: neck pain     SUBJECTIVE:    61-year-old male who was referred to us for further evaluation of neck pain.  Patient was recent discharge from the hospital yesterday after undergoing bone marrow transplant for leukemia.  Patient reports pain started 1 week ago during his hospitalization.  Patient is on a able to recall an inciting event.  Patient reports pain to be aching/sharp in nature.  Pain is constant exacerbated with movement.  Pain is alleviated with morphine 5 mg every 4 hr, heat packs, sitting up straight and gabapentin.  Patient rates the pain to be 8/10 at its worst and improves to 5/10.  Patient denies any weakness in bilateral upper extremities.  Patient denies any changes in bowel or bladder function.  Patient denies any numbness tingling in bilateral upper extremities.  Patient has history of a C3-C5 ACDF many years ago.  Patient reports this pain is different than the pain he had prior to his cervical spine surgery, as it does not radiate to bilateral upper extremities at this time.  Patient reports starting PT on Monday for his neck pain.    Patient denies night fever/night sweats, urinary incontinence, bowel incontinence, significant weight loss, significant motor weakness and loss of sensations.    Physical Therapy/Home Exercise: yes      Pain Disability Index Review:  Last 3 PDI Scores 8/13/2020   Pain Disability Index (PDI) 0       Pain Medications:    - Opioids: Ultram (Tramadol HCL)  - Adjuvant Medications: Neurontin (Gabapentin)  - Others: see med list     report:  Reviewed and consistent with medication use as prescribed.    Pain Procedures: none    Imaging: none    Past Medical History:   Diagnosis Date    CML (chronic myelocytic leukemia)     Hx of psychiatric care     valium, ativan    Melanoma in situ of external ear, right      Past Surgical History:   Procedure Laterality Date     CHOLECYSTECTOMY      NECK SURGERY  2015    TONSILLECTOMY       Social History     Socioeconomic History    Marital status:      Spouse name: Lauren    Number of children: 3    Years of education: Not on file    Highest education level: Not on file   Occupational History    Not on file   Social Needs    Financial resource strain: Not on file    Food insecurity     Worry: Not on file     Inability: Not on file    Transportation needs     Medical: Not on file     Non-medical: Not on file   Tobacco Use    Smoking status: Former Smoker     Packs/day: 1.00     Years: 35.00     Pack years: 35.00     Start date: 1985     Quit date: 2020     Years since quittin.2    Smokeless tobacco: Never Used   Substance and Sexual Activity    Alcohol use: Yes     Alcohol/week: 12.0 standard drinks     Types: 4 Glasses of wine, 4 Cans of beer, 4 Shots of liquor per week    Drug use: Yes     Types: Marijuana     Comment: medical marijuana    Sexual activity: Yes     Partners: Female   Lifestyle    Physical activity     Days per week: Not on file     Minutes per session: Not on file    Stress: Not on file   Relationships    Social connections     Talks on phone: Not on file     Gets together: Not on file     Attends Bahai service: Not on file     Active member of club or organization: Not on file     Attends meetings of clubs or organizations: Not on file     Relationship status: Not on file   Other Topics Concern    Patient feels they ought to cut down on drinking/drug use Not Asked    Patient annoyed by others criticizing their drinking/drug use Not Asked    Patient has felt bad or guilty about drinking/drug use Not Asked    Patient has had a drink/used drugs as an eye opener in the AM Not Asked   Social History Narrative     (Guillermina)    3 children (Michelle Estrada, Qasim Estrada, [place]- [place])         No family history on file.    Review of patient's allergies  indicates:   Allergen Reactions    Posaconazole Hallucinations    Unclassified drug Other (See Comments)     Pt reports that he has an allergic reaction to an Antifungal medication, but is unsure of the name of the drug. Will obtain name prior to arrival in am and notify Pre-op RN.    Vancomycin analogues Other (See Comments)     Pt reports he had rigors    Codeine Hives    Iodine Hives and Rash    Iodinated contrast media Hives       Current Outpatient Medications   Medication Sig    acyclovir (ZOVIRAX) 800 MG Tab Take 1 tablet (800 mg total) by mouth 2 (two) times daily.    amLODIPine (NORVASC) 10 MG tablet Take 1 tablet (10 mg total) by mouth once daily.    chlorhexidine (PERIDEX) 0.12 % solution RINSE WITH 15 MLS PO FOR 30 SECONDS BID    cyclobenzaprine (FLEXERIL) 10 MG tablet Take 1 tablet (10 mg total) by mouth 3 (three) times daily as needed for Muscle spasms.    esomeprazole (NEXIUM) 20 MG capsule Take 20 mg by mouth before breakfast.    fluconazole (DIFLUCAN) 200 MG Tab Take 2 tablets (400 mg total) by mouth once daily.    fluticasone propionate (FLONASE) 50 mcg/actuation nasal spray 2 sprays (100 mcg total) by Each Nostril route once daily.    gabapentin (NEURONTIN) 300 MG capsule Take 1 capsule (300 mg total) by mouth 3 (three) times daily.    loratadine (CLARITIN) 10 mg tablet Take 10 mg by mouth once daily.    magic mouthwash diphen/antac/lidoc/nysta Take 10 mLs by mouth 4 (four) times daily as needed (mouth sores).    morphine (MSIR) 15 MG tablet Take 1 tablet (15 mg total) by mouth every 4 (four) hours as needed for Pain.    multivit-min-FA-lycopen-lutein 300-600-300 mcg Tab Take 1 tablet by mouth once daily.     mycophenolate (CELLCEPT) 250 mg Cap Take 4 capsules (1,000 mg total) by mouth 3 (three) times daily.    ondansetron (ZOFRAN-ODT) 8 MG TbDL Dissolve 1 tablet (8 mg total) by mouth every 8 (eight) hours as needed (nausea).    opw transplant care kit Welcome to [facility].  This is your transplant care kit.    prochlorperazine (COMPAZINE) 10 MG tablet Take 1 tablet (10 mg total) by mouth 4 (four) times daily as needed for Nausea.    [START ON 8/28/2020] sulfamethoxazole-trimethoprim 800-160mg (BACTRIM DS) 800-160 mg Tab Take 1 tablet by mouth every Mon, Wed, Fri.    tacrolimus (PROGRAF) 0.5 MG Cap Take 8 capsules (4 mg total) by mouth every 12 (twelve) hours. Take after labs on clinic appointment days.    tamsulosin (FLOMAX) 0.4 mg Cap 1 capsule 30 minutes after the same meal each day    traMADoL (ULTRAM) 50 mg tablet Take 1 tablet by mouth every 6 (six) hours as needed (pain).     triamcinolone acetonide 0.1% (KENALOG) 0.1 % cream Apply topically 2 (two) times daily. Rash on hands    ursodioL (ACTIGALL) 300 mg capsule Take 1 capsule (300 mg total) by mouth 2 (two) times daily.     No current facility-administered medications for this visit.      Facility-Administered Medications Ordered in Other Visits   Medication    artificial tears 0.5 % ophthalmic solution 2 drop    diphenhydrAMINE injection 50 mg       REVIEW OF SYSTEMS:    GENERAL:  No weight loss, malaise or fevers.  HEENT:  Negative for frequent or significant headaches.  NECK:  Negative for lumps, goiter, pain and significant neck swelling.  RESPIRATORY:  Negative for cough, wheezing or shortness of breath.  CARDIOVASCULAR:  Negative for chest pain, leg swelling or palpitations.  GI:  Negative for abdominal discomfort, blood in stools or black stools or change in bowel habits.  MUSCULOSKELETAL:  See HPI.  SKIN:  Negative for lesions, rash, and itching.  PSYCH:  +ve for sleep disturbance, mood disorder and recent psychosocial stressors.  HEMATOLOGY/LYMPHOLOGY:  Negative for prolonged bleeding, bruising easily or swollen nodes.  NEURO:   No history of headaches, syncope, paralysis, seizures or tremors.  All other reviewed and negative other than HPI.    OBJECTIVE:    /73   Pulse 102   Temp  "97 °F (36.1 °C)   Resp 18   Ht 5' 6" (1.676 m)   Wt 85 kg (187 lb 6.3 oz)   SpO2 100%   BMI 30.25 kg/m²     PHYSICAL EXAMINATION:    General appearance: Well appearing, in no acute distress, alert and oriented x3.  Psych:  Mood and affect appropriate.  Skin: Skin color, texture, turgor normal, no rashes or lesions, in both upper and lower body.  Head/face:  Normocephalic, atraumatic. No palpable lymph nodes.  Neck:  Physical examination of the neck is very limited due to increased pain with range of motion in all planes.  Paraspinal cervical tenderness noted bilaterally  Cor: RRR  Pulm: CTA  GI:  Soft and non-tender.  Extremities: Peripheral joint ROM is full and pain free without obvious instability or laxity in all four extremities. No deformities, edema, or skin discoloration. Good capillary refill.  Musculoskeletal: Shoulder provocative maneuvers are negative. Bilateral upper and lower extremity strength is normal and symmetric.  No atrophy or tone abnormalities are noted.  Neuro: Bilateral upper and lower extremity coordination and muscle stretch reflexes are physiologic and symmetric.No loss of sensation is noted.  Gait: normal.    ASSESSMENT: 60 y.o. year old male with neck pain, consistent with     1. Myofascial pain  X-Ray Cervical Spine Complete 5 view    CT Cervical Spine Without Contrast   2. Chronic pain syndrome  X-Ray Cervical Spine Complete 5 view    CT Cervical Spine Without Contrast   3. Cervical postlaminectomy syndrome  X-Ray Cervical Spine Complete 5 view    CT Cervical Spine Without Contrast       PLAN:     - I have stressed the importance of physical activity and a home exercise plan to help with pain and improve health.  - will obtain a cervical spine x-ray  - will obtain CT scan of the cervical spine  - start Zanaflex 4 mg nightly  - discontinue Flexeril  - can increase gabapentin from 300 mg 3 times a day to 300 mg in the morning 300 mg in the afternoon and 600 mg at night.  If patient " continues to tolerate medication with no side effect may increase to 300 mg in the morning, 300 mg in the afternoon and 600 mg at night after 1 week  - RTC 2 weeks to review imaging  - At this time I do not recommend any steroid injections due to his recent history of bone marrow transplant and current Jenifer catheter in the right chest  - Counseled patient regarding the importance of activity modification, constant sleeping habits and physical therapy.    The above plan and management options were discussed at length with patient. Patient is in agreement with the above and verbalized understanding. It will be communicated with the referring physician via electronic record, fax, or mail.    Oscar Mcelroy MD  08/13/2020

## 2020-08-14 ENCOUNTER — INFUSION (OUTPATIENT)
Dept: INFUSION THERAPY | Facility: HOSPITAL | Age: 60
End: 2020-08-14
Payer: COMMERCIAL

## 2020-08-14 ENCOUNTER — OFFICE VISIT (OUTPATIENT)
Dept: HEMATOLOGY/ONCOLOGY | Facility: CLINIC | Age: 60
End: 2020-08-14
Payer: COMMERCIAL

## 2020-08-14 VITALS
TEMPERATURE: 98 F | RESPIRATION RATE: 17 BRPM | SYSTOLIC BLOOD PRESSURE: 123 MMHG | BODY MASS INDEX: 31 KG/M2 | HEIGHT: 66 IN | WEIGHT: 192.88 LBS | OXYGEN SATURATION: 94 % | HEART RATE: 94 BPM | DIASTOLIC BLOOD PRESSURE: 67 MMHG

## 2020-08-14 DIAGNOSIS — I10 ESSENTIAL HYPERTENSION: ICD-10-CM

## 2020-08-14 DIAGNOSIS — D89.810 ACUTE GVHD: ICD-10-CM

## 2020-08-14 DIAGNOSIS — F17.201 TOBACCO ABUSE, IN REMISSION: ICD-10-CM

## 2020-08-14 DIAGNOSIS — R51.9 CHRONIC INTRACTABLE HEADACHE, UNSPECIFIED HEADACHE TYPE: ICD-10-CM

## 2020-08-14 DIAGNOSIS — M48.02 CERVICAL STENOSIS OF SPINE: ICD-10-CM

## 2020-08-14 DIAGNOSIS — D69.59 CHEMOTHERAPY-INDUCED THROMBOCYTOPENIA: ICD-10-CM

## 2020-08-14 DIAGNOSIS — C92.01 ACUTE MYELOID LEUKEMIA IN REMISSION: ICD-10-CM

## 2020-08-14 DIAGNOSIS — Z78.9 DAILY CONSUMPTION OF ALCOHOL: ICD-10-CM

## 2020-08-14 DIAGNOSIS — R11.2 CINV (CHEMOTHERAPY-INDUCED NAUSEA AND VOMITING): ICD-10-CM

## 2020-08-14 DIAGNOSIS — C92.11 CML IN REMISSION: ICD-10-CM

## 2020-08-14 DIAGNOSIS — C92.10 CML (CHRONIC MYELOCYTIC LEUKEMIA): ICD-10-CM

## 2020-08-14 DIAGNOSIS — K12.31 MUCOSITIS DUE TO CHEMOTHERAPY: ICD-10-CM

## 2020-08-14 DIAGNOSIS — R14.1 ABDOMINAL GAS PAIN: ICD-10-CM

## 2020-08-14 DIAGNOSIS — N40.0 BENIGN PROSTATIC HYPERPLASIA WITHOUT LOWER URINARY TRACT SYMPTOMS: ICD-10-CM

## 2020-08-14 DIAGNOSIS — T45.1X5A ANTINEOPLASTIC CHEMOTHERAPY INDUCED ANEMIA: ICD-10-CM

## 2020-08-14 DIAGNOSIS — D64.81 ANTINEOPLASTIC CHEMOTHERAPY INDUCED ANEMIA: ICD-10-CM

## 2020-08-14 DIAGNOSIS — Z94.84 HISTORY OF ALLOGENEIC STEM CELL TRANSPLANT: Primary | ICD-10-CM

## 2020-08-14 DIAGNOSIS — T45.1X5A CHEMOTHERAPY-INDUCED THROMBOCYTOPENIA: ICD-10-CM

## 2020-08-14 DIAGNOSIS — G89.29 CHRONIC INTRACTABLE HEADACHE, UNSPECIFIED HEADACHE TYPE: ICD-10-CM

## 2020-08-14 DIAGNOSIS — H53.8 BLURRY VISION: ICD-10-CM

## 2020-08-14 DIAGNOSIS — T45.1X5A CINV (CHEMOTHERAPY-INDUCED NAUSEA AND VOMITING): ICD-10-CM

## 2020-08-14 DIAGNOSIS — Z94.81 STATUS POST ALLOGENEIC BONE MARROW TRANSPLANT: ICD-10-CM

## 2020-08-14 DIAGNOSIS — R21 RASH AND NONSPECIFIC SKIN ERUPTION: ICD-10-CM

## 2020-08-14 DIAGNOSIS — K21.9 GASTROESOPHAGEAL REFLUX DISEASE WITHOUT ESOPHAGITIS: ICD-10-CM

## 2020-08-14 LAB
ABO + RH BLD: NORMAL
ALBUMIN SERPL BCP-MCNC: 3.5 G/DL (ref 3.5–5.2)
ALP SERPL-CCNC: 83 U/L (ref 55–135)
ALT SERPL W/O P-5'-P-CCNC: 18 U/L (ref 10–44)
ANION GAP SERPL CALC-SCNC: 11 MMOL/L (ref 8–16)
ANISOCYTOSIS BLD QL SMEAR: SLIGHT
AST SERPL-CCNC: 15 U/L (ref 10–40)
BASOPHILS # BLD AUTO: 0.02 K/UL (ref 0–0.2)
BASOPHILS NFR BLD: 0.5 % (ref 0–1.9)
BILIRUB SERPL-MCNC: 0.4 MG/DL (ref 0.1–1)
BLD GP AB SCN CELLS X3 SERPL QL: NORMAL
BUN SERPL-MCNC: 31 MG/DL (ref 8–23)
CALCIUM SERPL-MCNC: 9.6 MG/DL (ref 8.7–10.5)
CHLORIDE SERPL-SCNC: 101 MMOL/L (ref 95–110)
CO2 SERPL-SCNC: 25 MMOL/L (ref 23–29)
CREAT SERPL-MCNC: 1.1 MG/DL (ref 0.5–1.4)
DIFFERENTIAL METHOD: ABNORMAL
EOSINOPHIL # BLD AUTO: 0 K/UL (ref 0–0.5)
EOSINOPHIL NFR BLD: 0 % (ref 0–8)
ERYTHROCYTE [DISTWIDTH] IN BLOOD BY AUTOMATED COUNT: 13.3 % (ref 11.5–14.5)
EST. GFR  (AFRICAN AMERICAN): >60 ML/MIN/1.73 M^2
EST. GFR  (NON AFRICAN AMERICAN): >60 ML/MIN/1.73 M^2
GLUCOSE SERPL-MCNC: 95 MG/DL (ref 70–110)
HCT VFR BLD AUTO: 25.8 % (ref 40–54)
HGB BLD-MCNC: 8.5 G/DL (ref 14–18)
HYPOCHROMIA BLD QL SMEAR: ABNORMAL
IMM GRANULOCYTES # BLD AUTO: 0.14 K/UL (ref 0–0.04)
IMM GRANULOCYTES NFR BLD AUTO: 3.2 % (ref 0–0.5)
LYMPHOCYTES # BLD AUTO: 0.2 K/UL (ref 1–4.8)
LYMPHOCYTES NFR BLD: 3.8 % (ref 18–48)
MAGNESIUM SERPL-MCNC: 1.6 MG/DL (ref 1.6–2.6)
MCH RBC QN AUTO: 32.8 PG (ref 27–31)
MCHC RBC AUTO-ENTMCNC: 32.9 G/DL (ref 32–36)
MCV RBC AUTO: 100 FL (ref 82–98)
MONOCYTES # BLD AUTO: 1.1 K/UL (ref 0.3–1)
MONOCYTES NFR BLD: 25.3 % (ref 4–15)
NEUTROPHILS # BLD AUTO: 3 K/UL (ref 1.8–7.7)
NEUTROPHILS NFR BLD: 67.2 % (ref 38–73)
NRBC BLD-RTO: 0 /100 WBC
OVALOCYTES BLD QL SMEAR: ABNORMAL
PHOSPHATE SERPL-MCNC: 4.7 MG/DL (ref 2.7–4.5)
PLATELET # BLD AUTO: 51 K/UL (ref 150–350)
PLATELET BLD QL SMEAR: ABNORMAL
PMV BLD AUTO: 10.6 FL (ref 9.2–12.9)
POIKILOCYTOSIS BLD QL SMEAR: SLIGHT
POLYCHROMASIA BLD QL SMEAR: ABNORMAL
POTASSIUM SERPL-SCNC: 4.7 MMOL/L (ref 3.5–5.1)
PROT SERPL-MCNC: 7.3 G/DL (ref 6–8.4)
RBC # BLD AUTO: 2.59 M/UL (ref 4.6–6.2)
SODIUM SERPL-SCNC: 137 MMOL/L (ref 136–145)
TACROLIMUS BLD-MCNC: 8.3 NG/ML (ref 5–15)
TOXIC GRANULES BLD QL SMEAR: PRESENT
WBC # BLD AUTO: 4.42 K/UL (ref 3.9–12.7)

## 2020-08-14 PROCEDURE — 87799 DETECT AGENT NOS DNA QUANT: CPT

## 2020-08-14 PROCEDURE — 85025 COMPLETE CBC W/AUTO DIFF WBC: CPT

## 2020-08-14 PROCEDURE — 25000003 PHARM REV CODE 250: Performed by: NURSE PRACTITIONER

## 2020-08-14 PROCEDURE — 99999 PR PBB SHADOW E&M-EST. PATIENT-LVL III: CPT | Mod: PBBFAC,BMT,, | Performed by: NURSE PRACTITIONER

## 2020-08-14 PROCEDURE — 80197 ASSAY OF TACROLIMUS: CPT

## 2020-08-14 PROCEDURE — 3008F BODY MASS INDEX DOCD: CPT | Mod: BMT,CPTII,S$GLB, | Performed by: NURSE PRACTITIONER

## 2020-08-14 PROCEDURE — 63600175 PHARM REV CODE 636 W HCPCS: Performed by: NURSE PRACTITIONER

## 2020-08-14 PROCEDURE — 99215 PR OFFICE/OUTPT VISIT, EST, LEVL V, 40-54 MIN: ICD-10-PCS | Mod: BMT,S$GLB,, | Performed by: NURSE PRACTITIONER

## 2020-08-14 PROCEDURE — 99999 PR PBB SHADOW E&M-EST. PATIENT-LVL III: ICD-10-PCS | Mod: PBBFAC,BMT,, | Performed by: NURSE PRACTITIONER

## 2020-08-14 PROCEDURE — 36592 COLLECT BLOOD FROM PICC: CPT

## 2020-08-14 PROCEDURE — 99215 OFFICE O/P EST HI 40 MIN: CPT | Mod: BMT,S$GLB,, | Performed by: NURSE PRACTITIONER

## 2020-08-14 PROCEDURE — 80053 COMPREHEN METABOLIC PANEL: CPT

## 2020-08-14 PROCEDURE — 3008F PR BODY MASS INDEX (BMI) DOCUMENTED: ICD-10-PCS | Mod: BMT,CPTII,S$GLB, | Performed by: NURSE PRACTITIONER

## 2020-08-14 PROCEDURE — A4216 STERILE WATER/SALINE, 10 ML: HCPCS | Performed by: NURSE PRACTITIONER

## 2020-08-14 PROCEDURE — 86850 RBC ANTIBODY SCREEN: CPT

## 2020-08-14 PROCEDURE — 83735 ASSAY OF MAGNESIUM: CPT

## 2020-08-14 PROCEDURE — 84100 ASSAY OF PHOSPHORUS: CPT

## 2020-08-14 RX ORDER — SODIUM CHLORIDE 0.9 % (FLUSH) 0.9 %
10 SYRINGE (ML) INJECTION
Status: COMPLETED | OUTPATIENT
Start: 2020-08-14 | End: 2020-08-14

## 2020-08-14 RX ORDER — SODIUM CHLORIDE 0.9 % (FLUSH) 0.9 %
10 SYRINGE (ML) INJECTION
Status: CANCELLED | OUTPATIENT
Start: 2020-08-14

## 2020-08-14 RX ORDER — LIDOCAINE 50 MG/G
2 PATCH TOPICAL DAILY
Qty: 10 PATCH | Refills: 3 | Status: SHIPPED | OUTPATIENT
Start: 2020-08-14 | End: 2020-09-08

## 2020-08-14 RX ORDER — FLUCONAZOLE 200 MG/1
400 TABLET ORAL DAILY
Qty: 60 TABLET | Refills: 6
Start: 2020-08-14 | End: 2020-09-09 | Stop reason: SDUPTHER

## 2020-08-14 RX ORDER — LANOLIN ALCOHOL/MO/W.PET/CERES
400 CREAM (GRAM) TOPICAL 2 TIMES DAILY
Qty: 200 TABLET | Refills: 1 | Status: SHIPPED | OUTPATIENT
Start: 2020-08-14 | End: 2020-08-28 | Stop reason: SDUPTHER

## 2020-08-14 RX ORDER — HEPARIN 100 UNIT/ML
500 SYRINGE INTRAVENOUS
Status: COMPLETED | OUTPATIENT
Start: 2020-08-14 | End: 2020-08-14

## 2020-08-14 RX ORDER — TAMSULOSIN HYDROCHLORIDE 0.4 MG/1
0.4 CAPSULE ORAL DAILY
Qty: 30 CAPSULE | Refills: 3 | Status: SHIPPED | OUTPATIENT
Start: 2020-08-14 | End: 2020-10-12 | Stop reason: SDUPTHER

## 2020-08-14 RX ORDER — HEPARIN 100 UNIT/ML
500 SYRINGE INTRAVENOUS
Status: CANCELLED | OUTPATIENT
Start: 2020-08-14

## 2020-08-14 RX ADMIN — HEPARIN 500 UNITS: 100 SYRINGE at 09:08

## 2020-08-14 RX ADMIN — Medication 10 ML: at 09:08

## 2020-08-14 NOTE — NURSING
Labs collected from red lumen of CVC.  Line flushed and heparinized after collection.  Cap replaced.  Dressing C/D/I.  Patient tolerated procdure without issue.  Specimens labeled and sent to lab.

## 2020-08-14 NOTE — Clinical Note
CBC, CMP, t&s, tacro, CMV, EBV (weekly only), mag, phos and appt with Dr. Hodges or NP twice weekly (Tues and Fri). Please schedule through mid September.

## 2020-08-17 ENCOUNTER — CLINICAL SUPPORT (OUTPATIENT)
Dept: REHABILITATION | Facility: HOSPITAL | Age: 60
End: 2020-08-17
Payer: COMMERCIAL

## 2020-08-17 DIAGNOSIS — M62.9 MUSCULOSKELETAL DISORDER INVOLVING UPPER TRAPEZIUS MUSCLE: ICD-10-CM

## 2020-08-17 DIAGNOSIS — M54.2 NECK PAIN: ICD-10-CM

## 2020-08-17 DIAGNOSIS — M53.82 DECREASED ROM OF INTERVERTEBRAL DISCS OF CERVICAL SPINE: ICD-10-CM

## 2020-08-17 LAB
CMV DNA SERPL NAA+PROBE-ACNC: NORMAL IU/ML
EBV DNA BY PCR: NORMAL IU/ML

## 2020-08-17 PROCEDURE — 97161 PT EVAL LOW COMPLEX 20 MIN: CPT

## 2020-08-17 NOTE — PLAN OF CARE
[facility]  Physical Therapy Initial Evaluation    Name: Kvng Jones Sr.  Clinic Number: [id number]    Therapy Diagnosis:   Encounter Diagnoses   Name Primary?    Neck pain     Decreased ROM of intervertebral discs of cervical spine     Musculoskeletal disorder involving upper trapezius muscle      Physician: Yomaira Little NP    Physician Orders: PT Eval and Treat   Medical Diagnosis from Referral: M54.2 (ICD-10-CM) - Neck pain  Evaluation Date: 8/17/2020  Authorization Period Expiration: 12/31/2020  Plan of Care Expiration: 10/30/2020  Visit # / Visits authorized: 1/ 20    Time In: 150   Time Out: 230  Total Billable Time: 0 minutes    Precautions: Standard    Subjective   Date of onset: 2015  History of current condition - Kvng reports: 2015 patient had a cervical fusion and had issues after the operation. Patient reports staying on pain medication for three years. At the end or march 2020 patient was diagnosed with chronic myeloid leukemia. Patient reported he had a bone marrow transplant on July 28th and woke up with severe neck stiffness. Patient reports he cannot find a comfortable position to sleep or get rest because his neck is bothering him so bad. Pt denies any numbness or tingling that is radiating down his arms. Patient reports he gets headaches very often.     Trauma- No  Bowel/Bladder Disturbances- No  History of Cancer-Yes  Constant Pain- Yes  Night Pain- No  Headaches- yes  Drop Attacks/Dizzy/Diplopia/Dysphagia/Dysarthria- No       Medical History:   Past Medical History:   Diagnosis Date    CML (chronic myelocytic leukemia)     Hx of psychiatric care     valium, ativan    Melanoma in situ of external ear, right        Surgical History:   Kvng Jones SrCamron  has a past surgical history that includes Tonsillectomy; Neck surgery (2015); and Cholecystectomy.    Medications:   Kvng has a current medication list which includes the following  prescription(s): acyclovir, amlodipine, chlorhexidine, esomeprazole, fluconazole, fluticasone propionate, gabapentin, lidocaine, loratadine, magic mouthwash diphen/antac/lidoc/nysta, magnesium oxide, mycophenolate, ondansetron, opw transplant care kit, prochlorperazine, sulfamethoxazole-trimethoprim 800-160mg, tacrolimus, tamsulosin, tizanidine, triamcinolone acetonide 0.1%, and ursodiol, and the following Facility-Administered Medications: artificial tears and diphenhydramine.    Allergies:   Review of patient's allergies indicates:   Allergen Reactions    Posaconazole Hallucinations    Unclassified drug Other (See Comments)     Pt reports that he has an allergic reaction to an Antifungal medication, but is unsure of the name of the drug. Will obtain name prior to arrival in am and notify Pre-op RN.    Vancomycin analogues Other (See Comments)     Pt reports he had rigors    Codeine Hives    Iodine Hives and Rash    Iodinated contrast media Hives        Imaging, See imaging report:     Prior Therapy: Yes   Social History:  lives with their family  Occupation:    Prior Level of Function: Independent   Current Level of Function: Unable to sleep and lay down     Pain:  Current 6/10, worst 8/10, best 3/10   Location: bilateral neck   Description: Aching, Throbbing, Grabbing, Tight and Sharp  Aggravating Factors: turning head left and up  Easing Factors: pain medication and rest    Pts goals: To regain range of motion and decrease pain    Objective     Cervical AROM: Pain/Dysfunction with Movement:   Flexion: 20 degrees Increased pain in neck   Extension: 20 degrees    Right side bending: 10 degrees    Left side bending: 10 degrees    Right rotation: 5 degrees Increased pain (worst pain)    Left rotation: 5 degrees      Myotome Right Left Pain/Dysfunction with Movement   C4- Shoulder Elevation 4+/5 4+/5    C5- Shoulder Abduction 4/5 4/5    C6- Wrist Extension 5/5 5/5    C7- Elbow Extension 4+/5 4+/5     C8- Phalangeal Abduction 4+/5 4+/5    T1- 5th Finger Abduction 4+/5 4+/5      Posture Assessment: Forward head and slumped posture    Palpation: Tout musculature bilateral upper traps and cervical paraspinals             CMS Impairment/Limitation/Restriction for FOTO Cervical Survey    Therapist reviewed FOTO scores for Kvng Jones Sr. on 8/17/2020.   FOTO documents entered into [email or web address] - see Media section.    Limitation Score: 82%  Category: Mobility             TREATMENT     Next visit: light cervical stretching, manual traction, isometric cervical holds       Home Exercises and Patient Education Provided    Education provided:   - Purpose of PT     Assessment   Kvng is a 60 y.o. male referred to outpatient Physical Therapy with a medical diagnosis of neck pain. Pt presents with decreased cervical range of motion, decreased upper extremity strength, increased muscular tightness of upper traps and paraspinals, and increased cervical pain. Patient presents to therapy with increased irritability of neck pain and difficulty relaxing. Next visit will focus of pain control and relaxation techniques of cervical musculature.     Pt prognosis is Fair.   Pt will benefit from skilled outpatient Physical Therapy to address the deficits stated above and in the chart below, provide pt/family education, and to maximize pt's level of independence.     Plan of care discussed with patient: Yes  Pt's spiritual, cultural and educational needs considered and patient is agreeable to the plan of care and goals as stated below:     Anticipated Barriers for therapy: Chronic pain    Medical Necessity is demonstrated by the following  History  Co-morbidities and personal factors that may impact the plan of care Co-morbidities:   history of cancer    Personal Factors:   no deficits     moderate   Examination  Body Structures and Functions, activity limitations and participation restrictions that may impact the plan of care Body  Regions:   head  neck    Body Systems:    ROM  strength    Participation Restrictions:   None    Activity limitations:   Learning and applying knowledge  no deficits    General Tasks and Commands  no deficits    Communication  no deficits    Mobility  lifting and carrying objects    Self care  no deficits    Domestic Life  no deficits    Interactions/Relationships  no deficits    Life Areas  no deficits    Community and Social Life  recreation and leisure         high   Clinical Presentation stable and uncomplicated low   Decision Making/ Complexity Score: low     Goals:  Short Term Goals (3 Weeks):   1. Pt will be independent with HEP to supplement PT in improving pain free cervical mobility  2. Pt will improve cervical AROM 5 deg in all planes to improve cervical mobility for driving  3. Pt will improve UE MMTs by 1/2 grade in all planes to improve strength for lifting and carrying tasks.  4. Pt will demonstrate improved sitting posture to decrease pain experienced in head and neck.  Long Term Goals (6 Weeks):   1. Pt will improve FOTO to </=50% limitation to improve perceived limitation with changing and maintaining mobility.  2. Pt will improve cervical AROM to WNL in all planes to improve cervical mobility for driving   3. Pt will improve UE MMTs 1 grade in all planes to improve strength for lifting and carrying tasks.  4. Pt will report no pain with cervical AROM in all planes to promote QOL.      Plan   Plan of care Certification: 8/17/2020 to 10/30/2020.    Outpatient Physical Therapy 3 times weekly for 10 weeks to include the following interventions: Cervical/Lumbar Traction, Manual Therapy, Moist Heat/ Ice, Neuromuscular Re-ed, Patient Education, Self Care, Therapeutic Activites and Therapeutic Exercise.     Polo Stearns, PT

## 2020-08-17 NOTE — PROGRESS NOTES
Subjective:    Patient ID: Kvng Jones Sr. is a 60 y.o. male.    Chief Complaint: Pain (pt c/o neck pain)      HPI   60 year old male admitted to [facility] for CML presenting with blast crisis and lewis disease. He was admitted for evaluation and induction chemotherapy with FLAG-Addis. Complications of therapy include epididymal orchitis with negative testicular ultrasound, neutropenic fever, dyspnea, and GGO of bilateral lungs on CT chest. Fever and chest findings were covered with broad spectrum antimicrobials. He did have hallucinations and vivid dreams with posaconazole. Chemotherapy was administered by left arm PICC line that was removed during hospital stay for MSSA infection treated with vancomycin. Hospital admission was 3/30/2020 to 5/1/2020 achieving morphologic CR with induction chemotherapy. He then started Ponatinib 30mg daily.     Studies performed during his hospital stay include pre-chemotherapy ECHO with normal ejection fraction of 60-65%. HIV, HBV, and HCV tests were negative. CBC at admission was WBC 38.6, Hgb 14.6, and platelet 416K. CT of the head without contrast was normal 4/20/2020. US of abdomen performed for abdominal distention and neutropenic fever had hepatic steatosis, liver 19cm, and spleen 11 cm. He does have a history of alcohol use and pancreatitis.    Patient is a . He is  to wife Guillermina who will be a caregiver post transplant. He has a 45 pack year smoking history. He quit 6/1/2020, now on Chantix. He drinks 3-4 alcohol beverages nightly. He has 3 children. He has several family members in the [facility] area.    Hospital Course: Admitted 7/21/20 for a Flu/Cy/TBI haplo allo SCT. Son was donor. Received 2 bags and a total CD34 dose of 3.10 x10^6/kg on 7/28/20. Tolerated stem cells well. C/o headaches prior to admission, which persisted throughout admission. Neuro ultimately consulted for rec's. Was a daily drinker prior to admission. No  s/s of alcolhol withdrawal during admission. Transplant further complicated by hypertension, heart burn, c-diff neg diarrhea, nausea, sinus congestion, mild peripheral edema, blurry vision (ophtho consulted), mucositis, electrolyte abnormalities, and neck pain 2/2 spinal stenosis (referred to pain mgt at discharge). He engrafted on 8/10/20 with an ANC of 1280. He was discharged on 8/12/2020.    Interval History:  Patient presents for follow up clinic visit post Flu/Cy/TBI allogeneic stem cell transplant. Today is Day +21. Comes to clinic with wife. Denies fevers, chills, SOB, cough, n/v/d/c, rashes or skin issues. He continues with poor appetite, reports overall lack of appetite and poor taste. Reports a tremor, likely from tacro. His largest concern remains his severe lower head and upper neck/shoulder pain. He continues to follow with pain management. He is on morphine and zanaflex. Wife expresses concerns that patient is very drowsy at times and is having hallucinations.      Review of Systems   Constitutional: Positive for fatigue. Negative for activity change, appetite change, chills, diaphoresis, fever and unexpected weight change.   HENT: Positive for sore throat. Negative for congestion, dental problem, mouth sores, nosebleeds, postnasal drip, rhinorrhea, sinus pressure and trouble swallowing.    Eyes: Negative.  Negative for photophobia and visual disturbance.   Respiratory: Negative.  Negative for cough and shortness of breath.    Cardiovascular: Negative.  Negative for chest pain, palpitations and leg swelling.   Gastrointestinal: Negative for abdominal distention, abdominal pain, blood in stool, constipation, diarrhea, nausea and vomiting.   Endocrine: Negative.    Genitourinary: Negative.  Negative for dysuria, frequency, hematuria and urgency.   Musculoskeletal: Positive for neck pain and neck stiffness. Negative for arthralgias, back pain and myalgias.        History of cervical spinal stenosis    Skin: Negative.  Negative for pallor and rash.   Allergic/Immunologic: Negative for environmental allergies, food allergies and immunocompromised state.   Neurological: Positive for tremors and headaches. Negative for dizziness, syncope, weakness and numbness.   Hematological: Negative for adenopathy. Does not bruise/bleed easily.   Psychiatric/Behavioral: Negative.  Negative for confusion. The patient is not nervous/anxious.        Objective:     Physical Exam  Vitals signs and nursing note reviewed.   Constitutional:       General: He is not in acute distress.     Appearance: Normal appearance. He is well-developed.   HENT:      Head: Normocephalic.      Mouth/Throat:      Mouth: Mucous membranes are dry.      Comments: Thrush  Eyes:      General: No scleral icterus.     Extraocular Movements: Extraocular movements intact.      Conjunctiva/sclera: Conjunctivae normal.   Neck:      Musculoskeletal: Normal range of motion and neck supple. Normal range of motion.      Thyroid: No thyromegaly.   Cardiovascular:      Rate and Rhythm: Normal rate and regular rhythm.      Pulses: Normal pulses.      Heart sounds: Normal heart sounds.   Pulmonary:      Effort: Pulmonary effort is normal. No respiratory distress.      Breath sounds: Normal breath sounds.   Abdominal:      General: Bowel sounds are normal. There is no distension.      Palpations: Abdomen is soft.      Tenderness: There is no abdominal tenderness.   Musculoskeletal: Normal range of motion.         General: No tenderness.      Right lower leg: No edema.      Left lower leg: No edema.   Skin:     General: Skin is warm and dry.      Coloration: Skin is not pale.      Findings: No erythema, petechiae or rash.      Comments: Dalal intact to right CW with no redness or drainage   Neurological:      Mental Status: He is alert and oriented to person, place, and time.      Cranial Nerves: No cranial nerve deficit.      Coordination: Coordination normal.    Psychiatric:         Thought Content: Thought content normal.         Assessment:       1. History of allogeneic stem cell transplant    2. CML in remission    3. Posterior neck pain    4. Cervical stenosis of spine    5. Thrush of mouth and esophagus    6. Hypomagnesemia    7. Mucositis due to chemotherapy    8. CINV (chemotherapy-induced nausea and vomiting)    9. Antineoplastic chemotherapy induced anemia    10. Chemotherapy-induced thrombocytopenia    11. Tobacco abuse, in remission    12. Daily consumption of alcohol    13. Benign prostatic hyperplasia without lower urinary tract symptoms    14. Gastroesophageal reflux disease without esophagitis    15. Essential hypertension        Plan:         History of allogeneic stem cell transplant  - Admitted 7/21/20 for planned Flu/Cy/TBI haplo allo SCT. Son is the donor. Today is Day +21  - Receive 2 bags and a total CD34 dose of 3.10 x10^6/kg on 7/28/20  - Recieved post transplant cyclophosphamide on Day +3 and +4 for GVHD ppx.  - Engrafted 8/10/20 with ANC of 1280  - Tacro level 4.3. Currently taking 4 mg (8 tablets) twice daily. Will increase to 4mg/4.5mg per Dr. Hodges (8/18). Instructed not to take tacro before labs  - Continue Ursodiol through Day +30 (8/27/20)      CML in remission  - Presented to [facility] with CMP in blast crisis and with nodular disease on 3/30/2020  - Induced with FLAG-Addis. Achieved morphologic CR  - Started on daily Ponatinib 30 mg daily, which he continued to take outpatient. Held Ponatinib during admission.  - Referred to Dr. Hodges by Dr. Ramos for SCT consideration  - [place] [facility] [place] showing no morphologic or immunophenotypic evidence of CML  - Admitted 7/21/20 for planned Flu/Cy/TBI haplo allo SCT    Acute myeloid leukemia in remission  - See CML    GVHD  - had rash inpatient that was believed to be drug eruption. Resolved  - no evidence of GVHD today  - GVHD charting with each visit  - no evidence of acute GVHD on  "exam    ID  - last CMV and EBV undetected; continue twice weekly CMV, weekly EBV, today's pending    FEN/Hypomagnesemia  - electrolytes: wnl today. Mag 1.7. Continue mag 400 mg BID as patient is on tacro. May also help with headaches     Mucositis due to chemotherapy/Thrush  - continue prn Wheeler's  - recommend baking soda and warm water rinses  - improving     CINV (chemotherapy-induced nausea and vomiting)  - c/w prn zofran, compazine     Blurry vision  - C/o worsening blurry vision 7/30/20  - May be contributing to headaches  - Ophtho consulted and saw patient 7/30  - Ophtho dilated pupils and performed fundal exam. Fundal exam neg.  - Patient had been viewing laptop screen for ~ 2 hours prior to experiencing blurry vision  - Per ophtho, blurry vision due to dry eyes. rec'd artificial tears QID prn and warm compresses BID prn. Can escalate artificial tears to ointment if needed.  - no complaints of blurry vision at visit today     Antineoplastic chemotherapy-induced cytopenias: anemia and thrombocytopenia   - WBC 5.26, Hgb 9.2, plt 66K  - transfuse Hgb <7g and plt <10k     Headache/Neck pain  - Started having headaches several weeks ago. Onset seems to correspond to when patient quit smoking and when he started ponatinib  - Has been off of ponatinib for a few days but fevers have persisted  - Pattern is like cluster headaches (onset during sleep), but not unilateral.  - O2 via non-rebreather at 12 L over 15 minutes seemed to help but developed hot flush so pt stopped  Ultram, Flexeril, Oxycodone not helping  Imitrex PRN q2hr (max 200mg in 24hrs). Not contraindicated per pharm D.  Description of headache sounds like tension headache--throbbing with pressure across crown of head  No focal neurological deficits  - Patient reporting that headache may be sinus in nature. Maxillary tenderness noted.  - Patient takes Claritin at home. States that he cannot take Zyrtec (which is on formulary) due to SE and "addiction"  - " States that it was hard for him to wean himself off of Zyrtec.  - Stared Flonase 7/27/20. Started home Claritin 7/27/20 (not on formulary)  - Somewhat responding to caffeine, Imitrex, tramadol, oxy, and ativan  - Now c/o blurry vision, which may also be contributing to headaches. Seen by ophtho for this  - Continue to monitor closely; especially with expected thrombocytopenia  - Neuro consulted inpatient and trialed IV mag.   - Is now seeing pain mgt for this. Current headaches seem to be caused by muscle spasms 2/2 cervical stenosis  - zanaflex prescribed by pain mgt. Also prescribed Celebrex, had previously held due to low platelets, discussed with Dr. Hodges and will now start medication (8/18) as platelets >50K  - recommended alternating heat and ice  - prescribed lidoderm patches 8/14/20-- did not help  - also taking morphine 15mg Q4h  - has CT and xray on 8/19; follow up with pain management scheduled for 8/21     Cervical stenosis of spine  - hx of cervical spinal fusion from c3-c5; patient reports no injury to area  - Ongoing, reported severe neck pain  - neck pillow and c-collar without improvement   - see headache/neck pain for past and current management  - was referred to pain mgt at discharge. Has been seen by them and has f/u appt on 8/21/20. Will have imaging 8/19  - also referred to PT, started 8/17     Tobacco abuse, in remission  - 35 pack year history  - Quit smoking 6/1/2020 using Chantix  - Patient completed course of Chantix and denies need for Nicotine patch.      Daily consumption of alcohol  - Drank 3-4 alcoholic beverages nightly prior to admission  - No s/s of alcohol withdrawal while inpatient    BPH (benign prostatic hyperplasia)  - Continue home Flomax     GERD (gastroesophageal reflux disease)  - Takes Nexium at home  - Giving Protonix while inpatient as Nexium is not on hospital formulary  - Resumed Nexium at discharge  - can take TUMS prn     Essential hypertension  - increased home  amlodipine from 5 mg to 10 mg daily while inpatient due to BPs as high as 180s/110s  - BP normal at today's visit       Follow-up: CBC, CMP, t&s, tacro, CMV, EBV (weekly only), mag, phos and appt with Dr. Hodges or NP twice weekly. Please schedule through mid September.        Marimar Brothers NP  Hematology/Oncology/BMT

## 2020-08-18 ENCOUNTER — OFFICE VISIT (OUTPATIENT)
Dept: HEMATOLOGY/ONCOLOGY | Facility: CLINIC | Age: 60
End: 2020-08-18
Payer: COMMERCIAL

## 2020-08-18 ENCOUNTER — LAB VISIT (OUTPATIENT)
Dept: LAB | Facility: HOSPITAL | Age: 60
End: 2020-08-18
Payer: COMMERCIAL

## 2020-08-18 ENCOUNTER — TELEPHONE (OUTPATIENT)
Dept: PAIN MEDICINE | Facility: CLINIC | Age: 60
End: 2020-08-18

## 2020-08-18 VITALS
WEIGHT: 180.13 LBS | SYSTOLIC BLOOD PRESSURE: 127 MMHG | TEMPERATURE: 98 F | BODY MASS INDEX: 28.95 KG/M2 | HEART RATE: 98 BPM | HEIGHT: 66 IN | OXYGEN SATURATION: 99 % | DIASTOLIC BLOOD PRESSURE: 79 MMHG | RESPIRATION RATE: 18 BRPM

## 2020-08-18 DIAGNOSIS — C92.11 CML IN REMISSION: ICD-10-CM

## 2020-08-18 DIAGNOSIS — B37.81 THRUSH OF MOUTH AND ESOPHAGUS: ICD-10-CM

## 2020-08-18 DIAGNOSIS — M54.2 POSTERIOR NECK PAIN: ICD-10-CM

## 2020-08-18 DIAGNOSIS — K21.9 GASTROESOPHAGEAL REFLUX DISEASE WITHOUT ESOPHAGITIS: ICD-10-CM

## 2020-08-18 DIAGNOSIS — T45.1X5A CHEMOTHERAPY-INDUCED THROMBOCYTOPENIA: ICD-10-CM

## 2020-08-18 DIAGNOSIS — T45.1X5A CINV (CHEMOTHERAPY-INDUCED NAUSEA AND VOMITING): ICD-10-CM

## 2020-08-18 DIAGNOSIS — Z78.9 DAILY CONSUMPTION OF ALCOHOL: ICD-10-CM

## 2020-08-18 DIAGNOSIS — T45.1X5A ANTINEOPLASTIC CHEMOTHERAPY INDUCED ANEMIA: ICD-10-CM

## 2020-08-18 DIAGNOSIS — I10 ESSENTIAL HYPERTENSION: ICD-10-CM

## 2020-08-18 DIAGNOSIS — D69.59 CHEMOTHERAPY-INDUCED THROMBOCYTOPENIA: ICD-10-CM

## 2020-08-18 DIAGNOSIS — F17.201 TOBACCO ABUSE, IN REMISSION: ICD-10-CM

## 2020-08-18 DIAGNOSIS — R11.2 CINV (CHEMOTHERAPY-INDUCED NAUSEA AND VOMITING): ICD-10-CM

## 2020-08-18 DIAGNOSIS — K12.31 MUCOSITIS DUE TO CHEMOTHERAPY: ICD-10-CM

## 2020-08-18 DIAGNOSIS — B37.0 THRUSH OF MOUTH AND ESOPHAGUS: ICD-10-CM

## 2020-08-18 DIAGNOSIS — Z94.84 HISTORY OF ALLOGENEIC STEM CELL TRANSPLANT: ICD-10-CM

## 2020-08-18 DIAGNOSIS — N40.0 BENIGN PROSTATIC HYPERPLASIA WITHOUT LOWER URINARY TRACT SYMPTOMS: ICD-10-CM

## 2020-08-18 DIAGNOSIS — Z94.84 HISTORY OF ALLOGENEIC STEM CELL TRANSPLANT: Primary | ICD-10-CM

## 2020-08-18 DIAGNOSIS — M48.02 CERVICAL STENOSIS OF SPINE: ICD-10-CM

## 2020-08-18 DIAGNOSIS — D64.81 ANTINEOPLASTIC CHEMOTHERAPY INDUCED ANEMIA: ICD-10-CM

## 2020-08-18 DIAGNOSIS — E83.42 HYPOMAGNESEMIA: ICD-10-CM

## 2020-08-18 LAB
ABO + RH BLD: NORMAL
ALBUMIN SERPL BCP-MCNC: 3.7 G/DL (ref 3.5–5.2)
ALP SERPL-CCNC: 97 U/L (ref 55–135)
ALT SERPL W/O P-5'-P-CCNC: 16 U/L (ref 10–44)
ANION GAP SERPL CALC-SCNC: 10 MMOL/L (ref 8–16)
AST SERPL-CCNC: 11 U/L (ref 10–40)
BASOPHILS # BLD AUTO: 0.04 K/UL (ref 0–0.2)
BASOPHILS NFR BLD: 0.8 % (ref 0–1.9)
BILIRUB SERPL-MCNC: 0.5 MG/DL (ref 0.1–1)
BLD GP AB SCN CELLS X3 SERPL QL: NORMAL
BUN SERPL-MCNC: 28 MG/DL (ref 8–23)
CALCIUM SERPL-MCNC: 10.6 MG/DL (ref 8.7–10.5)
CHLORIDE SERPL-SCNC: 102 MMOL/L (ref 95–110)
CO2 SERPL-SCNC: 25 MMOL/L (ref 23–29)
CREAT SERPL-MCNC: 1 MG/DL (ref 0.5–1.4)
DIFFERENTIAL METHOD: ABNORMAL
EOSINOPHIL # BLD AUTO: 0 K/UL (ref 0–0.5)
EOSINOPHIL NFR BLD: 0 % (ref 0–8)
ERYTHROCYTE [DISTWIDTH] IN BLOOD BY AUTOMATED COUNT: 13.7 % (ref 11.5–14.5)
EST. GFR  (AFRICAN AMERICAN): >60 ML/MIN/1.73 M^2
EST. GFR  (NON AFRICAN AMERICAN): >60 ML/MIN/1.73 M^2
GLUCOSE SERPL-MCNC: 117 MG/DL (ref 70–110)
HCT VFR BLD AUTO: 28.6 % (ref 40–54)
HGB BLD-MCNC: 9.2 G/DL (ref 14–18)
IMM GRANULOCYTES # BLD AUTO: 0.06 K/UL (ref 0–0.04)
IMM GRANULOCYTES NFR BLD AUTO: 1.1 % (ref 0–0.5)
LYMPHOCYTES # BLD AUTO: 0.2 K/UL (ref 1–4.8)
LYMPHOCYTES NFR BLD: 2.9 % (ref 18–48)
MAGNESIUM SERPL-MCNC: 1.7 MG/DL (ref 1.6–2.6)
MCH RBC QN AUTO: 32.6 PG (ref 27–31)
MCHC RBC AUTO-ENTMCNC: 32.2 G/DL (ref 32–36)
MCV RBC AUTO: 101 FL (ref 82–98)
MONOCYTES # BLD AUTO: 1.1 K/UL (ref 0.3–1)
MONOCYTES NFR BLD: 21.5 % (ref 4–15)
NEUTROPHILS # BLD AUTO: 3.9 K/UL (ref 1.8–7.7)
NEUTROPHILS NFR BLD: 73.7 % (ref 38–73)
NRBC BLD-RTO: 0 /100 WBC
PHOSPHATE SERPL-MCNC: 4.6 MG/DL (ref 2.7–4.5)
PLATELET # BLD AUTO: 66 K/UL (ref 150–350)
PMV BLD AUTO: 11.2 FL (ref 9.2–12.9)
POTASSIUM SERPL-SCNC: 5.1 MMOL/L (ref 3.5–5.1)
PROT SERPL-MCNC: 8.2 G/DL (ref 6–8.4)
RBC # BLD AUTO: 2.82 M/UL (ref 4.6–6.2)
SODIUM SERPL-SCNC: 137 MMOL/L (ref 136–145)
TACROLIMUS BLD-MCNC: 4.3 NG/ML (ref 5–15)
WBC # BLD AUTO: 5.26 K/UL (ref 3.9–12.7)

## 2020-08-18 PROCEDURE — 99999 PR PBB SHADOW E&M-EST. PATIENT-LVL III: CPT | Mod: PBBFAC,BMT,, | Performed by: NURSE PRACTITIONER

## 2020-08-18 PROCEDURE — 85025 COMPLETE CBC W/AUTO DIFF WBC: CPT

## 2020-08-18 PROCEDURE — 99215 OFFICE O/P EST HI 40 MIN: CPT | Mod: BMT,S$GLB,, | Performed by: NURSE PRACTITIONER

## 2020-08-18 PROCEDURE — 99999 PR PBB SHADOW E&M-EST. PATIENT-LVL III: ICD-10-PCS | Mod: PBBFAC,BMT,, | Performed by: NURSE PRACTITIONER

## 2020-08-18 PROCEDURE — 3008F PR BODY MASS INDEX (BMI) DOCUMENTED: ICD-10-PCS | Mod: BMT,CPTII,S$GLB, | Performed by: NURSE PRACTITIONER

## 2020-08-18 PROCEDURE — 83735 ASSAY OF MAGNESIUM: CPT

## 2020-08-18 PROCEDURE — 84100 ASSAY OF PHOSPHORUS: CPT

## 2020-08-18 PROCEDURE — 87799 DETECT AGENT NOS DNA QUANT: CPT

## 2020-08-18 PROCEDURE — 80197 ASSAY OF TACROLIMUS: CPT

## 2020-08-18 PROCEDURE — 3008F BODY MASS INDEX DOCD: CPT | Mod: BMT,CPTII,S$GLB, | Performed by: NURSE PRACTITIONER

## 2020-08-18 PROCEDURE — 99215 PR OFFICE/OUTPT VISIT, EST, LEVL V, 40-54 MIN: ICD-10-PCS | Mod: BMT,S$GLB,, | Performed by: NURSE PRACTITIONER

## 2020-08-18 PROCEDURE — 86901 BLOOD TYPING SEROLOGIC RH(D): CPT

## 2020-08-18 PROCEDURE — 80053 COMPREHEN METABOLIC PANEL: CPT

## 2020-08-18 RX ORDER — CELECOXIB 100 MG/1
100 CAPSULE ORAL 2 TIMES DAILY
Qty: 60 CAPSULE | Refills: 2
Start: 2020-08-18 | End: 2020-09-18

## 2020-08-18 RX ORDER — NYSTATIN 100000 [USP'U]/ML
5 SUSPENSION ORAL
Qty: 200 ML | Refills: 0 | Status: SHIPPED | OUTPATIENT
Start: 2020-08-18 | End: 2020-08-29

## 2020-08-18 RX ORDER — TACROLIMUS 0.5 MG/1
CAPSULE ORAL
Qty: 480 CAPSULE | Refills: 6 | Status: SHIPPED | OUTPATIENT
Start: 2020-08-18 | End: 2020-09-08

## 2020-08-18 RX ORDER — MORPHINE SULFATE 15 MG/1
15 TABLET ORAL EVERY 4 HOURS PRN
Refills: 0
Start: 2020-08-18 | End: 2020-08-21 | Stop reason: SDUPTHER

## 2020-08-18 NOTE — TELEPHONE ENCOUNTER
lvm informing pt CT has been cancelled due to prior auth not being obtained in time    Asked pt to call bck to have CT rescheduled    informed pt to go in tomorrow at 1:00 for XRAY

## 2020-08-19 ENCOUNTER — HOSPITAL ENCOUNTER (OUTPATIENT)
Dept: RADIOLOGY | Facility: OTHER | Age: 60
Discharge: HOME | End: 2020-08-19
Attending: ANESTHESIOLOGY
Payer: COMMERCIAL

## 2020-08-19 DIAGNOSIS — M79.18 MYOFASCIAL PAIN: ICD-10-CM

## 2020-08-19 DIAGNOSIS — Z94.81 S/P ALLOGENEIC BONE MARROW TRANSPLANT: Primary | ICD-10-CM

## 2020-08-19 DIAGNOSIS — M96.1 CERVICAL POSTLAMINECTOMY SYNDROME: ICD-10-CM

## 2020-08-19 DIAGNOSIS — G89.4 CHRONIC PAIN SYNDROME: ICD-10-CM

## 2020-08-19 PROCEDURE — 72050 XR CERVICAL SPINE COMPLETE 5 VIEW: ICD-10-PCS | Mod: 26,BMT,, | Performed by: INTERNAL MEDICINE

## 2020-08-19 PROCEDURE — 72050 X-RAY EXAM NECK SPINE 4/5VWS: CPT | Mod: 26,BMT,, | Performed by: INTERNAL MEDICINE

## 2020-08-19 PROCEDURE — 72050 X-RAY EXAM NECK SPINE 4/5VWS: CPT | Mod: TC,FY

## 2020-08-20 ENCOUNTER — TELEPHONE (OUTPATIENT)
Dept: PAIN MEDICINE | Facility: CLINIC | Age: 60
End: 2020-08-20

## 2020-08-20 LAB
CMV DNA SERPL NAA+PROBE-ACNC: NORMAL IU/ML
EBV DNA BY PCR: NORMAL IU/ML

## 2020-08-20 NOTE — PROGRESS NOTES
Subjective:    Patient ID: Kvng Jones Sr. is a 60 y.o. male.    Chief Complaint: Follow-up    Oncologic hx:  60 y.o. male admitted to [facility] for CML presenting with blast crisis and lewis disease. He was admitted for evaluation and induction chemotherapy with FLAG-Addis. Complications of therapy include epididymal orchitis with negative testicular ultrasound, neutropenic fever, dyspnea, and GGO of bilateral lungs on CT chest. Fever and chest findings were covered with broad spectrum antimicrobials. He did have hallucinations and vivid dreams with posaconazole. Chemotherapy was administered by left arm PICC line that was removed during hospital stay for MSSA infection treated with vancomycin. Hospital admission was 3/30/2020 to 5/1/2020 achieving morphologic CR with induction chemotherapy. He then started Ponatinib 30mg daily.     Studies performed during his hospital stay include pre-chemotherapy ECHO with normal ejection fraction of 60-65%. HIV, HBV, and HCV tests were negative. CBC at admission was WBC 38.6, Hgb 14.6, and platelet 416K. CT of the head without contrast was normal 4/20/2020. US of abdomen performed for abdominal distention and neutropenic fever had hepatic steatosis, liver 19cm, and spleen 11 cm. He does have a history of alcohol use and pancreatitis.    Patient is a . He is  to wife Guillermina who will be a caregiver post transplant. He has a 45 pack year smoking history. He quit 6/1/2020, now on Chantix. He drinks 3-4 alcohol beverages nightly. He has 3 children. He has several family members in the [facility] area.    Transplant Course: Admitted 7/21/20 for a Flu/Cy/TBI haplo allo SCT. Son was donor. Received 2 bags and a total CD34 dose of 3.10 x10^6/kg on 7/28/20. Tolerated stem cells well. C/o headaches prior to admission, which persisted throughout admission. Neuro ultimately consulted for rec's. Was a daily drinker prior to admission. No s/s of  alcolhol withdrawal during admission. Transplant further complicated by hypertension, heart burn, c-diff neg diarrhea, nausea, sinus congestion, mild peripheral edema, blurry vision (ophtho consulted), mucositis, electrolyte abnormalities, and neck pain 2/2 spinal stenosis (referred to pain mgt at discharge). He engrafted on 8/10/20 with an ANC of 1280. He was discharged on 8/12/2020.    Interval History:  Patient presents for follow up clinic visit post Flu/Cy/TBI allogeneic stem cell transplant. Today is Day +24. Comes to clinic with his sister. He continues with poor appetite, reports overall lack of appetite and poor taste. Reports continued tremor, likely from tacro. His largest concern remains his severe lower head and upper neck/shoulder pain. He feels like his neck pain started after hickmann was placed. He is now also having back spasms to lower left side of back, which started this morning. He had an xray performed, but his CT was delayed due to insurance auth. It is now scheduled for Monday and he will f/u with pain management Tuesday. He has one pill of morphine left so this will be refilled for a short supply to get him through the weekend until he sees pain management. He is on morphine, gabapentin, celebrex, and zanaflex with continued pain. He continues with drowsiness and states he never in fact had hallucinations. With some nausea controlled with antiemetics. Denies fevers, chills, SOB, cough, chest pain, vomiting, diarrhea, constipation, rashes or skin issues.     Review of Systems   Constitutional: Positive for activity change (drowsy) and fatigue. Negative for appetite change, chills, diaphoresis, fever and unexpected weight change.   HENT: Negative for congestion, dental problem, mouth sores, nosebleeds, postnasal drip, rhinorrhea, sinus pressure and trouble swallowing.    Eyes: Negative.  Negative for photophobia and visual disturbance.   Respiratory: Negative.  Negative for cough and  shortness of breath.    Cardiovascular: Negative.  Negative for chest pain, palpitations and leg swelling.   Gastrointestinal: Negative for abdominal distention, abdominal pain, blood in stool, constipation, diarrhea, nausea and vomiting.   Endocrine: Negative.    Genitourinary: Negative.  Negative for dysuria, frequency, hematuria and urgency.   Musculoskeletal: Positive for back pain, neck pain and neck stiffness.        History of cervical spinal stenosis. See hpi   Skin: Negative.  Negative for pallor and rash.   Allergic/Immunologic: Negative for environmental allergies, food allergies and immunocompromised state.   Neurological: Positive for tremors and headaches. Negative for dizziness, syncope, weakness and numbness.   Hematological: Negative for adenopathy. Does not bruise/bleed easily.   Psychiatric/Behavioral: Negative.  Negative for confusion. The patient is not nervous/anxious.        Objective:     Physical Exam  Vitals signs and nursing note reviewed.   Constitutional:       Appearance: Normal appearance. He is well-developed.   HENT:      Head: Normocephalic and atraumatic.      Mouth/Throat:      Mouth: Mucous membranes are dry.      Comments: Thrush appears resolved  Eyes:      General: No scleral icterus.  Neck:      Musculoskeletal: Neck supple. Decreased range of motion.   Cardiovascular:      Rate and Rhythm: Normal rate and regular rhythm.      Pulses: Normal pulses.      Heart sounds: Normal heart sounds.   Pulmonary:      Effort: Pulmonary effort is normal. No respiratory distress.      Breath sounds: Normal breath sounds.   Abdominal:      General: Bowel sounds are normal. There is no distension.      Palpations: Abdomen is soft.      Tenderness: There is no abdominal tenderness.   Musculoskeletal:         General: No tenderness.      Right lower leg: No edema.      Left lower leg: No edema.   Skin:     General: Skin is warm and dry.      Coloration: Skin is not pale.      Findings: No  erythema, petechiae or rash.      Comments: Dalal intact to right CW with no redness or drainage   Neurological:      Mental Status: He is alert and oriented to person, place, and time.   Psychiatric:         Attention and Perception: Attention normal.         Speech: Speech normal.         Thought Content: Thought content normal.         Cognition and Memory: Cognition and memory normal.      Comments: Tearful during portion of exam         Assessment:       1. S/P allogeneic bone marrow transplant    2. History of allogeneic stem cell transplant    3. CML in remission    4. Acute myeloid leukemia in remission    5. Hypomagnesemia    6. Hyperphosphatemia    7. Mucositis due to chemotherapy    8. CINV (chemotherapy-induced nausea and vomiting)    9. Blurry vision    10. Antineoplastic chemotherapy induced anemia    11. Chemotherapy-induced thrombocytopenia    12. Posterior neck pain    13. Cervical stenosis of spine    14. Chronic intractable headache, unspecified headache type    15. Tobacco abuse, in remission    16. Benign prostatic hyperplasia without lower urinary tract symptoms    17. Gastroesophageal reflux disease without esophagitis    18. Essential hypertension        Plan:         History of allogeneic stem cell transplant  - Admitted 7/21/20 for planned Flu/Cy/TBI haplo allo SCT. Son was the donor. Received 2 bags and a total CD34 dose of 3.10 x10^6/kg on 7/28/20. Recieved post transplant cyclophosphamide. Engrafted 8/10/20 day +13  - Today is Day +24  - Tacro level 7.2. Currently taking 4 mg / 4.5 mg (last dose change was 8/18), will continue this dose  - Continue Ursodiol through Day +30 (8/27/20)  - Discussed need for day +30 BM bx 8/28. Will schedule to hold spot, but pt currently refusing restaging marrow. He states he will speak with Dr. Hodges and further discuss      CML in remission  - Presented to [facility] with CMP in blast crisis and with nodular disease on 3/30/2020  - Induced with FLAG-Addis.  Achieved morphologic CR  - Started on daily Ponatinib 30 mg daily, which he continued to take outpatient. Held Ponatinib during admission.  - Referred to Dr. Hodges by Dr. Ramos for SCT consideration  - [place] [facility] [place] showing no morphologic or immunophenotypic evidence of CML  - Admitted 7/21/20 for planned Flu/Cy/TBI haplo allo SCT    Acute myeloid leukemia in remission  - See CML    GVHD  - had rash inpatient that was believed to be drug eruption. Resolved  - no evidence of GVHD today  - GVHD charting with each visit   - no evidence of acute GVHD on exam    ID  - Last CMV and EBV undetected; continue twice weekly CMV, weekly EBV, today's pending  - To begin bactrim 8/28    Hypomagnesemia/hyperphosphatemia   - Mag 1.7. Continue mag 400 mg BID as patient is on tacro, this may also help with headaches  - Phos 5.1. Will watch and consider phos binder if needed in future     Mucositis due to chemotherapy/Thrush  - Continue prn Wheeler's  - Recommend baking soda and warm water rinses  - Started nystatin x10 days 8/18 (EOT 8/27)     CINV (chemotherapy-induced nausea and vomiting)  - c/w prn zofran and compazine helping     Blurry vision  - C/o worsening blurry vision 7/30/20  - May be contributing to headaches  - Ophtho consulted and saw patient 7/30  - Ophtho dilated pupils and performed fundal exam. Fundal exam neg.  - Patient had been viewing laptop screen for ~ 2 hours prior to experiencing blurry vision  - Per ophtho, blurry vision due to dry eyes. rec'd artificial tears QID prn and warm compresses BID prn. Can escalate artificial tears to ointment if needed  - No complaints of blurry vision at visit today     Antineoplastic chemotherapy-induced cytopenias: anemia and thrombocytopenia   - Transfuse Hgb <7g and plt <10K  - No indication to transfuse today     Headache/ Neck pain/ Cervical stenosis of spine  - Hx of spinal fusion C3-C5  - Started having headaches several weeks ago. Onset seems to correspond  to when patient quit smoking and when he started ponatinib  - Had been off of ponatinib for a few days but HAs persisted  - Pattern is like cluster headaches (onset during sleep), but not unilateral versus tension HA   - O2 via non-rebreather at 12 L over 15 minutes seemed to help but developed hot flush so pt stopped  - Ultram, Flexeril, Oxycodone, lidoderm, neck pillow, c-collar did not help  - Imitrex PRN q2hr (max 200mg in 24hrs). Not contraindicated per pharm D.  - Patient reporting that headache may be sinus in nature. Maxillary tenderness noted. Taking claritin  - Saw optho and neuro inpatient, trialed IV mag  - Is now seeing pain mgt for this. Current headaches seem to be caused by muscle spasms 2/2 cervical stenosis  - Flexeril changed to zanaflex by pain management. Also on gabapentin. Restarted celebrex 8/18 as plt >50K. Also taking morphine 15mg Q4h prn (he is taking this only two times daily despite being in pain). Educated to take more in short term until pain management appt.  - He has 1 pill of morphine left. Refilled 8/21 for short supply to last him until his pain management appt.   - Recommended alternating heat and ice  - Xray 9/19 shows surgical changes of fusion from C3 through C5 with degenerative changed below fusion. CT scheduled for 8/24. Pain management next appt 8/25 to review imaging   - No steroid injections at this time per pain management   - Pt feels neck pain started after hickmann was placed, will await CT results and pain management recs prior to considering hickmann removal. If Dr. Hodges wishes to remove, will need to change labs to venipuncture. Pt aware    Tobacco abuse, in remission  - 35 pack year history  - Quit smoking 6/1/2020 using Chantix  - Patient completed course of Chantix and denies need for Nicotine patch    BPH (benign prostatic hyperplasia)  - Continue Flomax     GERD (gastroesophageal reflux disease)  - Continue nexium   - Can take TUMS prn     Essential  hypertension  - Increased home amlodipine from 5 mg to 10 mg daily while inpatient due to BPs as high as 180s/110s  - BP normal at today's visit    Follow-up:   - CBC, CMP, t&s, tacro, CMV, EBV (weekly only), mag, phos and appt with Dr. Hodges alternating with NP twice weekly.   - Labs should be in AMs while on tacro. Has been scheduled through end of September.   - Schedule bm bx 8/28 with Ivon from 11-12 (this is temporary as pt is currently refusing but we need the spot held in case he changes his mind)    Yasmine Goldsmith, PHUC, NP  Hematology/Oncology

## 2020-08-20 NOTE — TELEPHONE ENCOUNTER
Staff called to confirm 8/21/20 9 am Virtual videovisit with Sondra Gallegos Np. Patient informed of instructions.    Patient confirmed and verbalized understanding of instructions.

## 2020-08-20 NOTE — TELEPHONE ENCOUNTER
Staff called the patient to reschedule his appointment until after his 8/24/20 1:30 pm CT scan as tomorrows visit was to follow up the imaging results.     Patient accepted an appointment for 8/25/20 2:20 pm with rosy alberto via virtual video to discuss imaging results.

## 2020-08-20 NOTE — TELEPHONE ENCOUNTER
----- Message from Autumn Levy RN sent at 8/20/2020 10:21 AM CDT -----  Good morning! I just spoke to mutual patient, Mr. Keanu Jones, regarding his upcoming CT scan being postponed. He wanted me to check with you guys to make sure that Dr. Gallegos was aware it was postponed in case she also wanted to postpone his visit tomorrow. If any changes need to be made to his schedule, he asked that you guys give him a call. Thank you!

## 2020-08-21 ENCOUNTER — OFFICE VISIT (OUTPATIENT)
Dept: HEMATOLOGY/ONCOLOGY | Facility: CLINIC | Age: 60
End: 2020-08-21
Payer: COMMERCIAL

## 2020-08-21 ENCOUNTER — INFUSION (OUTPATIENT)
Dept: INFUSION THERAPY | Facility: HOSPITAL | Age: 60
End: 2020-08-21
Attending: NURSE PRACTITIONER
Payer: COMMERCIAL

## 2020-08-21 VITALS
RESPIRATION RATE: 14 BRPM | SYSTOLIC BLOOD PRESSURE: 130 MMHG | DIASTOLIC BLOOD PRESSURE: 69 MMHG | BODY MASS INDEX: 28.93 KG/M2 | WEIGHT: 180 LBS | HEART RATE: 102 BPM | HEIGHT: 66 IN

## 2020-08-21 DIAGNOSIS — Z94.84 HISTORY OF ALLOGENEIC STEM CELL TRANSPLANT: Primary | ICD-10-CM

## 2020-08-21 DIAGNOSIS — T45.1X5A CHEMOTHERAPY-INDUCED THROMBOCYTOPENIA: ICD-10-CM

## 2020-08-21 DIAGNOSIS — E83.39 HYPERPHOSPHATEMIA: ICD-10-CM

## 2020-08-21 DIAGNOSIS — M54.2 POSTERIOR NECK PAIN: ICD-10-CM

## 2020-08-21 DIAGNOSIS — Z94.84 HISTORY OF ALLOGENEIC STEM CELL TRANSPLANT: ICD-10-CM

## 2020-08-21 DIAGNOSIS — F17.201 TOBACCO ABUSE, IN REMISSION: ICD-10-CM

## 2020-08-21 DIAGNOSIS — D69.59 CHEMOTHERAPY-INDUCED THROMBOCYTOPENIA: ICD-10-CM

## 2020-08-21 DIAGNOSIS — H53.8 BLURRY VISION: ICD-10-CM

## 2020-08-21 DIAGNOSIS — C92.01 ACUTE MYELOID LEUKEMIA IN REMISSION: ICD-10-CM

## 2020-08-21 DIAGNOSIS — K12.31 MUCOSITIS DUE TO CHEMOTHERAPY: ICD-10-CM

## 2020-08-21 DIAGNOSIS — T45.1X5A ANTINEOPLASTIC CHEMOTHERAPY INDUCED ANEMIA: ICD-10-CM

## 2020-08-21 DIAGNOSIS — D64.81 ANTINEOPLASTIC CHEMOTHERAPY INDUCED ANEMIA: ICD-10-CM

## 2020-08-21 DIAGNOSIS — R51.9 CHRONIC INTRACTABLE HEADACHE, UNSPECIFIED HEADACHE TYPE: ICD-10-CM

## 2020-08-21 DIAGNOSIS — G89.29 CHRONIC INTRACTABLE HEADACHE, UNSPECIFIED HEADACHE TYPE: ICD-10-CM

## 2020-08-21 DIAGNOSIS — T45.1X5A CINV (CHEMOTHERAPY-INDUCED NAUSEA AND VOMITING): ICD-10-CM

## 2020-08-21 DIAGNOSIS — M48.02 CERVICAL STENOSIS OF SPINE: ICD-10-CM

## 2020-08-21 DIAGNOSIS — I10 ESSENTIAL HYPERTENSION: ICD-10-CM

## 2020-08-21 DIAGNOSIS — N40.0 BENIGN PROSTATIC HYPERPLASIA WITHOUT LOWER URINARY TRACT SYMPTOMS: ICD-10-CM

## 2020-08-21 DIAGNOSIS — K21.9 GASTROESOPHAGEAL REFLUX DISEASE WITHOUT ESOPHAGITIS: ICD-10-CM

## 2020-08-21 DIAGNOSIS — C92.11 CML IN REMISSION: ICD-10-CM

## 2020-08-21 DIAGNOSIS — R11.2 CINV (CHEMOTHERAPY-INDUCED NAUSEA AND VOMITING): ICD-10-CM

## 2020-08-21 DIAGNOSIS — E83.42 HYPOMAGNESEMIA: ICD-10-CM

## 2020-08-21 DIAGNOSIS — Z94.81 S/P ALLOGENEIC BONE MARROW TRANSPLANT: Primary | ICD-10-CM

## 2020-08-21 LAB
ABO + RH BLD: NORMAL
ALBUMIN SERPL BCP-MCNC: 3.6 G/DL (ref 3.5–5.2)
ALP SERPL-CCNC: 92 U/L (ref 55–135)
ALT SERPL W/O P-5'-P-CCNC: 10 U/L (ref 10–44)
ANION GAP SERPL CALC-SCNC: 12 MMOL/L (ref 8–16)
AST SERPL-CCNC: 9 U/L (ref 10–40)
BASOPHILS # BLD AUTO: 0.04 K/UL (ref 0–0.2)
BASOPHILS NFR BLD: 0.8 % (ref 0–1.9)
BILIRUB SERPL-MCNC: 0.3 MG/DL (ref 0.1–1)
BLD GP AB SCN CELLS X3 SERPL QL: NORMAL
BUN SERPL-MCNC: 43 MG/DL (ref 8–23)
CALCIUM SERPL-MCNC: 10.2 MG/DL (ref 8.7–10.5)
CHLORIDE SERPL-SCNC: 100 MMOL/L (ref 95–110)
CO2 SERPL-SCNC: 23 MMOL/L (ref 23–29)
CREAT SERPL-MCNC: 1.3 MG/DL (ref 0.5–1.4)
DIFFERENTIAL METHOD: ABNORMAL
EOSINOPHIL # BLD AUTO: 0.1 K/UL (ref 0–0.5)
EOSINOPHIL NFR BLD: 1.7 % (ref 0–8)
ERYTHROCYTE [DISTWIDTH] IN BLOOD BY AUTOMATED COUNT: 13.7 % (ref 11.5–14.5)
EST. GFR  (AFRICAN AMERICAN): >60 ML/MIN/1.73 M^2
EST. GFR  (NON AFRICAN AMERICAN): 58.5 ML/MIN/1.73 M^2
GLUCOSE SERPL-MCNC: 111 MG/DL (ref 70–110)
HCT VFR BLD AUTO: 25.6 % (ref 40–54)
HGB BLD-MCNC: 8.2 G/DL (ref 14–18)
IMM GRANULOCYTES # BLD AUTO: 0.06 K/UL (ref 0–0.04)
IMM GRANULOCYTES NFR BLD AUTO: 1.3 % (ref 0–0.5)
LYMPHOCYTES # BLD AUTO: 0.2 K/UL (ref 1–4.8)
LYMPHOCYTES NFR BLD: 4 % (ref 18–48)
MAGNESIUM SERPL-MCNC: 1.7 MG/DL (ref 1.6–2.6)
MCH RBC QN AUTO: 31.9 PG (ref 27–31)
MCHC RBC AUTO-ENTMCNC: 32 G/DL (ref 32–36)
MCV RBC AUTO: 100 FL (ref 82–98)
MONOCYTES # BLD AUTO: 1 K/UL (ref 0.3–1)
MONOCYTES NFR BLD: 21.7 % (ref 4–15)
NEUTROPHILS # BLD AUTO: 3.4 K/UL (ref 1.8–7.7)
NEUTROPHILS NFR BLD: 70.5 % (ref 38–73)
NRBC BLD-RTO: 0 /100 WBC
PHOSPHATE SERPL-MCNC: 5.1 MG/DL (ref 2.7–4.5)
PLATELET # BLD AUTO: 68 K/UL (ref 150–350)
PMV BLD AUTO: 10.7 FL (ref 9.2–12.9)
POTASSIUM SERPL-SCNC: 4.7 MMOL/L (ref 3.5–5.1)
PROT SERPL-MCNC: 7.8 G/DL (ref 6–8.4)
RBC # BLD AUTO: 2.57 M/UL (ref 4.6–6.2)
SODIUM SERPL-SCNC: 135 MMOL/L (ref 136–145)
TACROLIMUS BLD-MCNC: 7.2 NG/ML (ref 5–15)
WBC # BLD AUTO: 4.8 K/UL (ref 3.9–12.7)

## 2020-08-21 PROCEDURE — 99999 PR PBB SHADOW E&M-EST. PATIENT-LVL V: CPT | Mod: PBBFAC,BMT,, | Performed by: NURSE PRACTITIONER

## 2020-08-21 PROCEDURE — 99215 PR OFFICE/OUTPT VISIT, EST, LEVL V, 40-54 MIN: ICD-10-PCS | Mod: BMT,S$GLB,, | Performed by: NURSE PRACTITIONER

## 2020-08-21 PROCEDURE — 36592 COLLECT BLOOD FROM PICC: CPT

## 2020-08-21 PROCEDURE — 3008F PR BODY MASS INDEX (BMI) DOCUMENTED: ICD-10-PCS | Mod: BMT,CPTII,S$GLB, | Performed by: NURSE PRACTITIONER

## 2020-08-21 PROCEDURE — 84100 ASSAY OF PHOSPHORUS: CPT

## 2020-08-21 PROCEDURE — 25000003 PHARM REV CODE 250: Performed by: NURSE PRACTITIONER

## 2020-08-21 PROCEDURE — 80197 ASSAY OF TACROLIMUS: CPT

## 2020-08-21 PROCEDURE — 85025 COMPLETE CBC W/AUTO DIFF WBC: CPT

## 2020-08-21 PROCEDURE — 87799 DETECT AGENT NOS DNA QUANT: CPT

## 2020-08-21 PROCEDURE — 99215 OFFICE O/P EST HI 40 MIN: CPT | Mod: BMT,S$GLB,, | Performed by: NURSE PRACTITIONER

## 2020-08-21 PROCEDURE — 3008F BODY MASS INDEX DOCD: CPT | Mod: BMT,CPTII,S$GLB, | Performed by: NURSE PRACTITIONER

## 2020-08-21 PROCEDURE — A4216 STERILE WATER/SALINE, 10 ML: HCPCS | Performed by: NURSE PRACTITIONER

## 2020-08-21 PROCEDURE — 80053 COMPREHEN METABOLIC PANEL: CPT

## 2020-08-21 PROCEDURE — 83735 ASSAY OF MAGNESIUM: CPT

## 2020-08-21 PROCEDURE — 63600175 PHARM REV CODE 636 W HCPCS: Performed by: NURSE PRACTITIONER

## 2020-08-21 PROCEDURE — 99999 PR PBB SHADOW E&M-EST. PATIENT-LVL V: ICD-10-PCS | Mod: PBBFAC,BMT,, | Performed by: NURSE PRACTITIONER

## 2020-08-21 PROCEDURE — 86901 BLOOD TYPING SEROLOGIC RH(D): CPT

## 2020-08-21 RX ORDER — HEPARIN 100 UNIT/ML
500 SYRINGE INTRAVENOUS
Status: COMPLETED | OUTPATIENT
Start: 2020-08-21 | End: 2020-08-21

## 2020-08-21 RX ORDER — SODIUM CHLORIDE 0.9 % (FLUSH) 0.9 %
10 SYRINGE (ML) INJECTION
Status: COMPLETED | OUTPATIENT
Start: 2020-08-21 | End: 2020-08-21

## 2020-08-21 RX ORDER — SODIUM CHLORIDE 0.9 % (FLUSH) 0.9 %
10 SYRINGE (ML) INJECTION
Status: CANCELLED | OUTPATIENT
Start: 2020-08-21

## 2020-08-21 RX ORDER — MORPHINE SULFATE 15 MG/1
15 TABLET ORAL EVERY 6 HOURS PRN
Qty: 28 TABLET | Refills: 0 | Status: SHIPPED | OUTPATIENT
Start: 2020-08-21 | End: 2020-08-28 | Stop reason: SDUPTHER

## 2020-08-21 RX ORDER — HEPARIN 100 UNIT/ML
500 SYRINGE INTRAVENOUS
Status: CANCELLED | OUTPATIENT
Start: 2020-08-21

## 2020-08-21 RX ADMIN — HEPARIN 500 UNITS: 100 SYRINGE at 08:08

## 2020-08-21 RX ADMIN — Medication 10 ML: at 08:08

## 2020-08-21 NOTE — Clinical Note
- Schedule bm bx 8/28 with Ivon from 11-12 (this is temporary as pt is currently refusing but we need the spot held in case he changes his mind)

## 2020-08-21 NOTE — NURSING
Patient's labs obtained from red lumen of CVC. All lines flushed, heparinized and capped.  Sterile dressing change preformed.  Site c/d/i.  Specimens sent to lab.  Other than discomfort r/t neck pain, patient tolerated well.

## 2020-08-24 ENCOUNTER — HOSPITAL ENCOUNTER (OUTPATIENT)
Dept: RADIOLOGY | Facility: OTHER | Age: 60
Discharge: HOME | End: 2020-08-24
Attending: ANESTHESIOLOGY
Payer: COMMERCIAL

## 2020-08-24 ENCOUNTER — OFFICE VISIT (OUTPATIENT)
Dept: HEMATOLOGY/ONCOLOGY | Facility: CLINIC | Age: 60
End: 2020-08-24
Payer: COMMERCIAL

## 2020-08-24 VITALS
HEART RATE: 101 BPM | TEMPERATURE: 99 F | OXYGEN SATURATION: 100 % | DIASTOLIC BLOOD PRESSURE: 74 MMHG | BODY MASS INDEX: 29.35 KG/M2 | WEIGHT: 182.63 LBS | SYSTOLIC BLOOD PRESSURE: 120 MMHG | RESPIRATION RATE: 16 BRPM | HEIGHT: 66 IN

## 2020-08-24 DIAGNOSIS — M96.1 CERVICAL POSTLAMINECTOMY SYNDROME: ICD-10-CM

## 2020-08-24 DIAGNOSIS — Z94.81 S/P ALLOGENEIC BONE MARROW TRANSPLANT: Primary | ICD-10-CM

## 2020-08-24 DIAGNOSIS — M79.18 MYOFASCIAL PAIN: ICD-10-CM

## 2020-08-24 DIAGNOSIS — Z94.84 HISTORY OF ALLOGENEIC STEM CELL TRANSPLANT: ICD-10-CM

## 2020-08-24 DIAGNOSIS — C92.01 ACUTE MYELOID LEUKEMIA IN REMISSION: ICD-10-CM

## 2020-08-24 DIAGNOSIS — G89.4 CHRONIC PAIN SYNDROME: ICD-10-CM

## 2020-08-24 LAB — EBV DNA BY PCR: NORMAL IU/ML

## 2020-08-24 PROCEDURE — 99999 PR PBB SHADOW E&M-EST. PATIENT-LVL V: ICD-10-PCS | Mod: PBBFAC,BMT,, | Performed by: INTERNAL MEDICINE

## 2020-08-24 PROCEDURE — 3008F BODY MASS INDEX DOCD: CPT | Mod: BMT,CPTII,S$GLB, | Performed by: INTERNAL MEDICINE

## 2020-08-24 PROCEDURE — 3008F PR BODY MASS INDEX (BMI) DOCUMENTED: ICD-10-PCS | Mod: BMT,CPTII,S$GLB, | Performed by: INTERNAL MEDICINE

## 2020-08-24 PROCEDURE — 72125 CT NECK SPINE W/O DYE: CPT | Mod: 26,BMT,, | Performed by: RADIOLOGY

## 2020-08-24 PROCEDURE — 99215 PR OFFICE/OUTPT VISIT, EST, LEVL V, 40-54 MIN: ICD-10-PCS | Mod: BMT,S$GLB,, | Performed by: INTERNAL MEDICINE

## 2020-08-24 PROCEDURE — 72125 CT NECK SPINE W/O DYE: CPT | Mod: TC

## 2020-08-24 PROCEDURE — 99215 OFFICE O/P EST HI 40 MIN: CPT | Mod: BMT,S$GLB,, | Performed by: INTERNAL MEDICINE

## 2020-08-24 PROCEDURE — 99999 PR PBB SHADOW E&M-EST. PATIENT-LVL V: CPT | Mod: PBBFAC,BMT,, | Performed by: INTERNAL MEDICINE

## 2020-08-24 PROCEDURE — 72125 CT CERVICAL SPINE WITHOUT CONTRAST: ICD-10-PCS | Mod: 26,BMT,, | Performed by: RADIOLOGY

## 2020-08-24 RX ORDER — DIAZEPAM 2 MG/1
2 TABLET ORAL
Qty: 1 TABLET | Refills: 0 | Status: SHIPPED | OUTPATIENT
Start: 2020-08-24 | End: 2020-09-08

## 2020-08-24 RX ORDER — OXYCODONE HYDROCHLORIDE 5 MG/1
5 CAPSULE ORAL
Qty: 1 CAPSULE | Refills: 0 | Status: SHIPPED | OUTPATIENT
Start: 2020-08-24 | End: 2020-09-08

## 2020-08-24 NOTE — PROGRESS NOTES
Subjective:    Patient ID: Kvng Jones Sr. is a 60 y.o. male.    Chief Complaint: Follow-up    Oncologic hx:  60 y.o. 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 He was admitted for evaluation and induction chemotherapy with FLAG-Addis. Complications of therapy include epididymal orchitis with negative testicular ultrasound, neutropenic fever, dyspnea, and GGO of bilateral lungs on CT chest. Fever and chest findings were covered with broad spectrum antimicrobials. He did have hallucinations and vivid dreams with posaconazole. Chemotherapy was administered by left arm PICC line that was removed during hospital stay for MSSA infection treated with vancomycin. Hospital admission was 3/30/2020 to 5/1/2020 achieving morphologic CR with induction chemotherapy. He then started Ponatinib 30mg daily.     Studies performed during his hospital stay include pre-chemotherapy ECHO with normal ejection fraction of 60-65%. HIV, HBV, and HCV tests were negative. CBC at admission was WBC 38.6, Hgb 14.6, and platelet 416K. CT of the head without contrast was normal 4/20/2020. US of abdomen performed for abdominal distention and neutropenic fever had hepatic steatosis, liver 19cm, and spleen 11 cm. He does have a history of alcohol use and pancreatitis.    Patient is a . He is  to wife Guillermina who will be a caregiver post transplant. He has a 45 pack year smoking history. He quit 6/1/2020, now on Chantix. He drinks 3-4 alcohol beverages nightly. He has 3 children. He has several family members in the [facility] area.    Transplant Course: Admitted 7/21/20 for a Flu/Cy/TBI haplo allo SCT. Son was donor. Received 2 bags and a total CD34 dose of 3.10 x10^6/kg on 7/28/20. Tolerated stem cells well. C/o headaches prior to admission, which persisted throughout admission. Neuro ultimately consulted for rec's. Was a daily drinker prior to admission. No s/s of  alcolhol withdrawal during admission. Transplant further complicated by hypertension, heart burn, c-diff neg diarrhea, nausea, sinus congestion, mild peripheral edema, blurry vision (ophtho consulted), mucositis, electrolyte abnormalities, and neck pain 2/2 spinal stenosis (referred to pain mgt at discharge). He engrafted on 8/10/20 with an ANC of 1280. He was discharged on 8/12/2020.    Interval History:  Patient presents for follow up clinic visit post Flu/Cy/TBI allogeneic stem cell transplant. Today is Day +27.   Return visit after Flu/Cy/TBI Haplo transplant for AML/CML. Recovery is on pace post-transplant: still has significant fatigue and low appetite.  CC today is painful, itchy skin. Skin is extraordinarily dry which is common post transplant. He noted that it was painful to soak in the bath. Ice, cooling provided relief.  Oral thrush has resolved.  CC, chronic, is neck pain that is likely exacerbated by weight loss and postural deconditioning. CT today, pain management virtual consult tomorrow, and PT first visit Wednesday. Current medication regimen is morphine, gabapentin, celebrex, and zanaflex.  Digestion is normal, no fevers, no rash.      Review of Systems   Constitutional: Positive for activity change (drowsy) and fatigue. Negative for appetite change, chills, diaphoresis, fever and unexpected weight change.   HENT: Negative for congestion, dental problem, mouth sores, nosebleeds, postnasal drip, rhinorrhea, sinus pressure and trouble swallowing.    Eyes: Negative.  Negative for photophobia and visual disturbance.   Respiratory: Negative.  Negative for cough and shortness of breath.    Cardiovascular: Negative.  Negative for chest pain, palpitations and leg swelling.   Gastrointestinal: Negative for abdominal distention, abdominal pain, blood in stool, constipation, diarrhea, nausea and vomiting.   Endocrine: Negative.    Genitourinary: Negative.  Negative for dysuria, frequency, hematuria and  urgency.   Musculoskeletal: Positive for back pain, neck pain and neck stiffness.        History of cervical spinal stenosis. See hpi   Skin: Negative.  Negative for pallor and rash.   Allergic/Immunologic: Negative for environmental allergies, food allergies and immunocompromised state.   Neurological: Positive for tremors and headaches. Negative for dizziness, syncope, weakness and numbness.   Hematological: Negative for adenopathy. Does not bruise/bleed easily.   Psychiatric/Behavioral: Negative.  Negative for confusion. The patient is not nervous/anxious.        Objective:     Physical Exam  Vitals signs and nursing note reviewed.   Constitutional:       Appearance: Normal appearance. He is well-developed.   HENT:      Head: Normocephalic and atraumatic.      Mouth/Throat:      Mouth: Mucous membranes are dry.      Comments: Thrush appears resolved  Eyes:      General: No scleral icterus.  Neck:      Musculoskeletal: Neck supple. Decreased range of motion.   Cardiovascular:      Rate and Rhythm: Normal rate and regular rhythm.      Pulses: Normal pulses.      Heart sounds: Normal heart sounds.   Pulmonary:      Effort: Pulmonary effort is normal. No respiratory distress.      Breath sounds: Normal breath sounds.   Abdominal:      General: Bowel sounds are normal. There is no distension.      Palpations: Abdomen is soft.      Tenderness: There is no abdominal tenderness.   Musculoskeletal:         General: No tenderness.      Right lower leg: No edema.      Left lower leg: No edema.   Skin:     General: Skin is warm and dry.      Coloration: Skin is not pale.      Findings: No erythema, petechiae or rash.      Comments: Dalal intact to right CW with no redness or drainage  Very dry skin diffusely, flaking   Neurological:      Mental Status: He is alert and oriented to person, place, and time.   Psychiatric:         Attention and Perception: Attention normal.         Speech: Speech normal.         Thought  Content: Thought content normal.         Cognition and Memory: Cognition and memory normal.      Comments: Tearful during portion of exam         Assessment:       No diagnosis found.    Plan:         History of allogeneic stem cell transplant  - Admitted 7/21/20 for planned Flu/Cy/TBI haplo allo SCT. Son was the donor. Received 2 bags and a total CD34 dose of 3.10 x10^6/kg on 7/28/20. Recieved post transplant cyclophosphamide. Engrafted neutrophils 8/10/20 day +13  - Today is Day +27  - Tacro level pending (goal 7-9). Currently taking 4 mg / 4.5 mg (last dose change was 8/18)  - Continue Ursodiol through Day +30 (8/27/20)  - Discussed need for day +30 BM bx 8/28. Patient has had bad experience x2. Once here. Rx for Valium and Oxycodone pre-procedure.      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 Achieved morphologic CR  - Started on daily Ponatinib 30 mg daily, which he continued to take outpatient. Held Ponatinib during admission; may not need to restart per recent literature. BBMT 2020(26): 141-86  - [place] [facility] [place] showing no morphologic or immunophenotypic evidence of AML/CML  - Admitted 7/21/20 for Flu/Cy/TBI haplo allo SCT    GVHD  - had rash inpatient that was believed to be drug eruption. Resolved  - no evidence of GVHD today (skin, liver or GI); no evidence of chronic GVHD  - GVHD charting with each visit     Dry Painful, Itchy Skin  - lotion discussed - non-fragrance such as CeraVe, Aquaphor, Jergens, Vaseline Intensive care  - Aveeno bath soak  - Avoiding benadryl, atarax for pruritus due to number of sedative medications patient is currently on for pain management    ID  - Last CMV and EBV undetected; continue twice weekly CMV, weekly EBV, today's pending  - Acyclovir (Zoster prophylaxis) until 6 months after tacrolimus discontinued  - Diflucan  (Fungal prophylaxis) until off tacrolimus  - Bactrim (PJP prophylaxis) until off tacrolimus    Hypomagnesemia/hyperphosphatemia   - Mag wnl. Continue mag 400 mg BID as patient is on tacro, this may also help with headaches  - Phos wnl.  Will watch and consider phos binder if needed in future     Mucositis due to chemotherapy/Thrush  - Continue prn [facility]'s; patient also uses Aloe oral rinses  - Recommend baking soda and warm water rinses  - Started nystatin x10 days 8/18 (EOT 8/27); oral exam normal 8/24/2020     CINV (chemotherapy-induced nausea and vomiting)  - c/w prn zofran and compazine helping     Blurry vision  - C/o worsening blurry vision 7/30/20  - May be contributing to headaches  - Ophtho consulted and saw patient 7/30  - Ophtho dilated pupils and performed fundal exam. Fundal exam neg.  - Patient had been viewing laptop screen for ~ 2 hours prior to experiencing blurry vision  - Per ophtho, blurry vision due to dry eyes. rec'd artificial tears QID prn and warm compresses BID prn. Can escalate artificial tears to ointment if needed     Antineoplastic chemotherapy-induced cytopenias: anemia and thrombocytopenia   - Transfuse Hgb <7g and plt <10K  - No indication to transfuse today     Headache/ Neck pain/ Cervical stenosis of spine  - Hx of spinal fusion C3-C5  - Started having headaches when he started Ponatinib at diagnosis. Onset seems to correspond to when patient quit smoking and when he started ponatinib  - Had been off of ponatinib for a few days but HAs persisted during transplant admission  - Pattern is like cluster headaches (onset during sleep), but not unilateral versus tension HA   - O2 via non-rebreather at 12 L over 15 minutes seemed to help but developed hot flash so pt stopped  - Ultram, Flexeril, Oxycodone, lidoderm, neck pillow, c-collar did not help  - Imitrex PRN q2hr (max 200mg in 24hrs). Not contraindicated per pharm D.  - Patient reporting that headache may be sinus in nature.  Maxillary tenderness noted. Taking claritin  - Saw optho and neuro inpatient, trialed IV mag  - Is now seeing pain mgt for this. Current headaches seem to be caused by muscle spasms 2/2 cervical stenosis  - Flexeril changed to zanaflex by pain management. Also on gabapentin. Restarted celebrex 8/18 as plt >50K. Also taking morphine 15mg Q4h prn (he is taking this only two times daily despite being in pain). Educated to take more in short term until pain management appt.  - He has 1 pill of morphine left. Refilled 8/21 for short supply to last him until his pain management appt.   - Recommended alternating heat and ice  - Xray 9/19 shows surgical changes of fusion from C3 through C5 with degenerative changed below fusion. CT scheduled for 8/24. Pain management next appt 8/25 to review imaging. Physical therapy starts 8/26.  - No steroid injections at this time per pain management   - Pt feels neck pain started after hickmann was placed, will await CT results and pain management recs prior to considering hickmann removal. If Dr. Hodges wishes to remove, will need to change labs to venipuncture. Pt aware.    Tobacco abuse, in remission  - 35 pack year history  - Quit smoking 6/1/2020 using Chantix  - Patient completed course of Chantix and denies need for Nicotine patch    BPH (benign prostatic hyperplasia)  - Continue Flomax     GERD (gastroesophageal reflux disease)  - Continue nexium   - Can take TUMS prn     Essential hypertension  - Increased home amlodipine from 5 mg to 10 mg daily while inpatient due to BPs as high as 180s/110s   - BP normal at today's visit    Follow-up:   - CBC, CMP, t&s, tacro, CMV, EBV (weekly only), mag, phos and appt with Dr. Hodges alternating with NP twice weekly.   - Labs should be in AMs while on tacro. Has been scheduled through end of September.   - Schedule bm bx 8/28 with Ivon from 11-12; will provide Rx for Valium/Oxycodone pre-procedure

## 2020-08-25 ENCOUNTER — TELEPHONE (OUTPATIENT)
Dept: PAIN MEDICINE | Facility: CLINIC | Age: 60
End: 2020-08-25

## 2020-08-25 ENCOUNTER — OFFICE VISIT (OUTPATIENT)
Dept: PAIN MEDICINE | Facility: CLINIC | Age: 60
End: 2020-08-25
Payer: COMMERCIAL

## 2020-08-25 DIAGNOSIS — M47.812 CERVICAL SPONDYLOSIS: ICD-10-CM

## 2020-08-25 DIAGNOSIS — M79.18 MYOFASCIAL PAIN: ICD-10-CM

## 2020-08-25 DIAGNOSIS — M50.30 DDD (DEGENERATIVE DISC DISEASE), CERVICAL: ICD-10-CM

## 2020-08-25 DIAGNOSIS — M96.1 CERVICAL POSTLAMINECTOMY SYNDROME: Primary | ICD-10-CM

## 2020-08-25 LAB — CMV DNA SERPL NAA+PROBE-ACNC: NORMAL IU/ML

## 2020-08-25 PROCEDURE — 99213 OFFICE O/P EST LOW 20 MIN: CPT | Mod: BMT,95,, | Performed by: NURSE PRACTITIONER

## 2020-08-25 PROCEDURE — 99213 PR OFFICE/OUTPT VISIT, EST, LEVL III, 20-29 MIN: ICD-10-PCS | Mod: BMT,95,, | Performed by: NURSE PRACTITIONER

## 2020-08-25 RX ORDER — METHOCARBAMOL 500 MG/1
500 TABLET, FILM COATED ORAL 3 TIMES DAILY PRN
Qty: 30 TABLET | Refills: 0 | Status: SHIPPED | OUTPATIENT
Start: 2020-08-25 | End: 2020-09-05

## 2020-08-25 NOTE — TELEPHONE ENCOUNTER
Phoned patient and discussed Dr. Mcelroy recommendation. We discussed trying medial branch blocks for his neck pain. He agreed, order placed today.

## 2020-08-25 NOTE — H&P (VIEW-ONLY)
Chronic Pain - Established Visit  Chronic Pain-Tele-Medicine-Established Note (Follow up visit)        The patient location is: Home  The chief complaint leading to consultation is: pain  Visit type: Virtual visit with synchronous audio and video  Total time spent with patient: 15 min  Each patient to whom he or she provides medical services by telemedicine is:  (1) informed of the relationship between the physician and patient and the respective role of any other health care provider with respect to management of the patient; and (2) notified that he or she may decline to receive medical services by telemedicine and may withdraw from such care at any time.      Referring Physician: No ref. provider found    Chief Complaint: neck pain     SUBJECTIVE:    Interval History 8/25/2020:  The patient returns to clinic today for follow up of neck pain via virtual visit. He is here today for imaging review. He continues to report neck pain that radiates into his shoulders. He denies any radicular arm pain. His pain is worse with turning his head to the side and leaning backwards. He also reports difficulty sleeping due to pain. He cannot lay flat due to pain. He reports limited relief with Zanaflex given at last visit. He reports minimal benefit with Gabapentin. He continues to take Morphine with some benefit. He denies any other health changes. His pain today is 7/10.    HPI:  61-year-old male who was referred to us for further evaluation of neck pain.  Patient was recent discharge from the hospital yesterday after undergoing bone marrow transplant for leukemia.  Patient reports pain started 1 week ago during his hospitalization.  Patient is on a able to recall an inciting event.  Patient reports pain to be aching/sharp in nature.  Pain is constant exacerbated with movement.  Pain is alleviated with morphine 5 mg every 4 hr, heat packs, sitting up straight and gabapentin.  Patient rates the pain to be 8/10 at its worst and  improves to 5/10.  Patient denies any weakness in bilateral upper extremities.  Patient denies any changes in bowel or bladder function.  Patient denies any numbness tingling in bilateral upper extremities.  Patient has history of a C3-C5 ACDF many years ago.  Patient reports this pain is different than the pain he had prior to his cervical spine surgery, as it does not radiate to bilateral upper extremities at this time.  Patient reports starting PT on Monday for his neck pain.    Patient denies night fever/night sweats, urinary incontinence, bowel incontinence, significant weight loss, significant motor weakness and loss of sensations.    Physical Therapy/Home Exercise: yes      Pain Disability Index Review:  Last 3 PDI Scores 8/13/2020   Pain Disability Index (PDI) 0       Pain Medications:  Gabapentin  Zanaflex  Morphine      report:  Reviewed and consistent with medication use as prescribed.    Pain Procedures: none    Imaging:   CT Cervical Spine 8/24/2020:  FINDINGS:  Multiple axial images of the cervical vertebral column were obtained.  The study was reformatted coronal and sagittal plane.     Contrary to the clinical history, I do not detect evidence of a laminectomy at any level.  Instead the patient has undergone anterior cervical fusion of C3, C4, C5 utilizing anterior plate and screws.  There is bone graft material between the endplates of C3-C4 and C4-C5.     There is straightening of the lordotic curvature.  No indication of spondylolisthesis or scoliotic curvature.  The posterior elements align normally.  In the coronal plane, the lateral masses are also well aligned with osteoarthritic changes seen throughout.     In the axial plane, there does not appear to be any obvious disruption involving the lamina or pedicle at any level.     Note is made of heavy calcification involving the carotid artery system on either side of the midline.     Impression:     Status post anterior cervical fusion of  C3-C4 and C5.     No appreciable findings that would suggest a laminectomy at any level.     Diffuse osteoarthritic changes.    Xray Cervical Spine 2020:  FINDINGS:  There are surgical changes of an anterior cervical fusion from C3 through C5 with bony fusion of the vertebral bodies. Hardware is intact.  There is straightening of the normal cervical lordosis. The C2-3 level demonstrates no abnormality. There is grade 1 anterolisthesis at C5-6 with mild disc space narrowing and mild osteophytic spurring.  C7 vertebral body is partly obscured.  There is no significant neural foraminal narrowing.     There is vascular calcification within the soft tissue of the right neck. There is a partly visualized central venous catheter overlying the right lung apex.     Impression:     Surgical changes of an anterior cervical fusion from C3 through C5.  Mild degenerative changes at the level just below the fusion.    Past Medical History:   Diagnosis Date    CML (chronic myelocytic leukemia)     Hx of psychiatric care     valium, ativan    Melanoma in situ of external ear, right      Past Surgical History:   Procedure Laterality Date    CHOLECYSTECTOMY      NECK SURGERY  2015    TONSILLECTOMY       Social History     Socioeconomic History    Marital status:      Spouse name: Lauren    Number of children: 3    Years of education: Not on file    Highest education level: Not on file   Occupational History    Not on file   Social Needs    Financial resource strain: Not on file    Food insecurity     Worry: Not on file     Inability: Not on file    Transportation needs     Medical: Not on file     Non-medical: Not on file   Tobacco Use    Smoking status: Former Smoker     Packs/day: 1.00     Years: 35.00     Pack years: 35.00     Start date: 1985     Quit date: 2020     Years since quittin.2    Smokeless tobacco: Never Used   Substance and Sexual Activity    Alcohol use: Yes     Alcohol/week:  12.0 standard drinks     Types: 4 Glasses of wine, 4 Cans of beer, 4 Shots of liquor per week    Drug use: Yes     Types: Marijuana     Comment: medical marijuana    Sexual activity: Yes     Partners: Female   Lifestyle    Physical activity     Days per week: Not on file     Minutes per session: Not on file    Stress: Not on file   Relationships    Social connections     Talks on phone: Not on file     Gets together: Not on file     Attends Restorationist service: Not on file     Active member of club or organization: Not on file     Attends meetings of clubs or organizations: Not on file     Relationship status: Not on file   Other Topics Concern    Patient feels they ought to cut down on drinking/drug use Not Asked    Patient annoyed by others criticizing their drinking/drug use Not Asked    Patient has felt bad or guilty about drinking/drug use Not Asked    Patient has had a drink/used drugs as an eye opener in the AM Not Asked   Social History Narrative     (Guillermina)    3 children (Michelle Estrada, Qasim Estrada, [facility])         History reviewed. No pertinent family history.    Review of patient's allergies indicates:   Allergen Reactions    Posaconazole Hallucinations    Unclassified drug Other (See Comments)     Pt reports that he has an allergic reaction to an Antifungal medication, but is unsure of the name of the drug. Will obtain name prior to arrival in am and notify Pre-op RN.    Vancomycin analogues Other (See Comments)     Pt reports he had rigors    Codeine Hives    Iodine Hives and Rash    Iodinated contrast media Hives       Current Outpatient Medications   Medication Sig    acyclovir (ZOVIRAX) 800 MG Tab Take 1 tablet (800 mg total) by mouth 2 (two) times daily.    amLODIPine (NORVASC) 10 MG tablet Take 1 tablet (10 mg total) by mouth once daily.    celecoxib (CELEBREX) 100 MG capsule Take 1 capsule (100 mg total) by mouth 2 (two) times daily.     chlorhexidine (PERIDEX) 0.12 % solution RINSE WITH 15 MLS PO FOR 30 SECONDS BID    diazePAM (VALIUM) 2 MG tablet Take 1 tablet (2 mg total) by mouth On call Procedure for Anxiety (take 2 hours prior to procedure).    esomeprazole (NEXIUM) 20 MG capsule Take 20 mg by mouth before breakfast.    fluconazole (DIFLUCAN) 200 MG Tab Take 2 tablets (400 mg total) by mouth once daily.    fluticasone propionate (FLONASE) 50 mcg/actuation nasal spray 2 sprays (100 mcg total) by Each Nostril route once daily.    gabapentin (NEURONTIN) 300 MG capsule Take 1 capsule (300 mg total) by mouth 3 (three) times daily.    lidocaine (LIDODERM) 5 % Place 2 patches onto the skin once daily. Remove & Discard patch within 12 hours or as directed by MD    loratadine (CLARITIN) 10 mg tablet Take 10 mg by mouth once daily.    magic mouthwash diphen/antac/lidoc/nysta Take 10 mLs by mouth 4 (four) times daily as needed (mouth sores).    magnesium oxide (MAGOX) 400 mg (241.3 mg magnesium) tablet Take 1 tablet (400 mg total) by mouth 2 (two) times daily.    methocarbamoL (ROBAXIN) 500 MG Tab Take 1 tablet (500 mg total) by mouth 3 (three) times daily as needed.    morphine (MSIR) 15 MG tablet Take 1 tablet (15 mg total) by mouth every 6 (six) hours as needed for Pain.    mycophenolate (CELLCEPT) 250 mg Cap Take 4 capsules (1,000 mg total) by mouth 3 (three) times daily.    nystatin (MYCOSTATIN) 100,000 unit/mL suspension Take 5 mLs (500,000 Units total) by mouth 4 (four) times daily with meals and nightly. for 10 days    ondansetron (ZOFRAN-ODT) 8 MG TbDL Dissolve 1 tablet (8 mg total) by mouth every 8 (eight) hours as needed (nausea).    opw transplant care kit Welcome to [facility].  This is your transplant care kit.    oxyCODONE (OXY-IR) 5 mg Cap Take 1 capsule (5 mg total) by mouth On call Procedure for Pain (take 30 minutes prior to procedure).    prochlorperazine (COMPAZINE) 10 MG tablet Take 1 tablet (10 mg  total) by mouth 4 (four) times daily as needed for Nausea.    [START ON 8/28/2020] sulfamethoxazole-trimethoprim 800-160mg (BACTRIM DS) 800-160 mg Tab Take 1 tablet by mouth every Mon, Wed, Fri.    tacrolimus (PROGRAF) 0.5 MG Cap Take 4mg (8 tablets) by mouth in the mornings and 4.5mg (9 tablets) by mouth in the evenings  Take after labs on clinic appointment days.    tamsulosin (FLOMAX) 0.4 mg Cap Take 1 capsule (0.4 mg total) by mouth once daily.    tiZANidine (ZANAFLEX) 4 MG tablet Take 1 tablet (4 mg total) by mouth nightly as needed.    triamcinolone acetonide 0.1% (KENALOG) 0.1 % cream Apply topically 2 (two) times daily. Rash on hands    ursodioL (ACTIGALL) 300 mg capsule Take 1 capsule (300 mg total) by mouth 2 (two) times daily.     No current facility-administered medications for this visit.      Facility-Administered Medications Ordered in Other Visits   Medication    artificial tears 0.5 % ophthalmic solution 2 drop    diphenhydrAMINE injection 50 mg       REVIEW OF SYSTEMS:    GENERAL:  No weight loss, malaise or fevers.  HEENT:  Negative for frequent or significant headaches.  NECK:  Negative for lumps, goiter, pain and significant neck swelling.  RESPIRATORY:  Negative for cough, wheezing or shortness of breath.  CARDIOVASCULAR:  Negative for chest pain, leg swelling or palpitations.  GI:  Negative for abdominal discomfort, blood in stools or black stools or change in bowel habits.  MUSCULOSKELETAL:  See HPI.  SKIN:  Negative for lesions, rash, and itching.  PSYCH:  Negative mood disorder and recent psychosocial stressors. Positive for sleep disturbance.   HEMATOLOGY/LYMPHOLOGY:  CML, recent bone marrow transplant.   NEURO:   No history of headaches, syncope, paralysis, seizures or tremors.  All other reviewed and negative other than HPI.    OBJECTIVE:    Exam limited due to virtual visit:  General appearance: Well appearing, in no acute distress, alert and oriented x3.  Psych:  Mood and  affect appropriate.  Neck: Limited ROM with pain on flexion, extension, and lateral rotation.     Previous physical exam:  There were no vitals taken for this visit.    PHYSICAL EXAMINATION:    General appearance: Well appearing, in no acute distress, alert and oriented x3.  Psych:  Mood and affect appropriate.  Skin: Skin color, texture, turgor normal, no rashes or lesions, in both upper and lower body.  Head/face:  Normocephalic, atraumatic. No palpable lymph nodes.  Neck:  Physical examination of the neck is very limited due to increased pain with range of motion in all planes.  Paraspinal cervical tenderness noted bilaterally  Cor: RRR  Pulm: CTA  GI:  Soft and non-tender.  Extremities: Peripheral joint ROM is full and pain free without obvious instability or laxity in all four extremities. No deformities, edema, or skin discoloration. Good capillary refill.  Musculoskeletal: Shoulder provocative maneuvers are negative. Bilateral upper and lower extremity strength is normal and symmetric.  No atrophy or tone abnormalities are noted.  Neuro: Bilateral upper and lower extremity coordination and muscle stretch reflexes are physiologic and symmetric.No loss of sensation is noted.  Gait: normal.    ASSESSMENT: 60 y.o. year old male with neck pain, consistent with     1. Cervical postlaminectomy syndrome     2. Cervical spondylosis     3. DDD (degenerative disc disease), cervical     4. Myofascial pain  methocarbamoL (ROBAXIN) 500 MG Tab       PLAN:     - Previous imaging was reviewed and discussed with the patient today.    - Will hold off on steroid injections at this time due to recent history of bone marrow transplant.     - Discontinue Zanaflex. Trial Robaxin 500 mg TID PRN.     - Continue Gabapentin. If limited relief, consider switching to Lyrica.     - I have stressed the importance of physical activity and a home exercise plan to help with pain and improve health.    - He is scheduled for physical therapy  tomorrow.     - RTC PRN. Will discuss other options with Dr. Mcelroy.     - Counseled patient regarding the importance of activity modification, constant sleeping habits and physical therapy.    - Dr. Mcelroy was consulted on the patient and agrees with this plan.    The above plan and management options were discussed at length with patient. Patient is in agreement with the above and verbalized understanding.    Sondra Gallegos NP  08/25/2020

## 2020-08-25 NOTE — PROGRESS NOTES
Chronic Pain - Established Visit  Chronic Pain-Tele-Medicine-Established Note (Follow up visit)        The patient location is: Home  The chief complaint leading to consultation is: pain  Visit type: Virtual visit with synchronous audio and video  Total time spent with patient: 15 min  Each patient to whom he or she provides medical services by telemedicine is:  (1) informed of the relationship between the physician and patient and the respective role of any other health care provider with respect to management of the patient; and (2) notified that he or she may decline to receive medical services by telemedicine and may withdraw from such care at any time.      Referring Physician: No ref. provider found    Chief Complaint: neck pain     SUBJECTIVE:    Interval History 8/25/2020:  The patient returns to clinic today for follow up of neck pain via virtual visit. He is here today for imaging review. He continues to report neck pain that radiates into his shoulders. He denies any radicular arm pain. His pain is worse with turning his head to the side and leaning backwards. He also reports difficulty sleeping due to pain. He cannot lay flat due to pain. He reports limited relief with Zanaflex given at last visit. He reports minimal benefit with Gabapentin. He continues to take Morphine with some benefit. He denies any other health changes. His pain today is 7/10.    HPI:  61-year-old male who was referred to us for further evaluation of neck pain.  Patient was recent discharge from the hospital yesterday after undergoing bone marrow transplant for leukemia.  Patient reports pain started 1 week ago during his hospitalization.  Patient is on a able to recall an inciting event.  Patient reports pain to be aching/sharp in nature.  Pain is constant exacerbated with movement.  Pain is alleviated with morphine 5 mg every 4 hr, heat packs, sitting up straight and gabapentin.  Patient rates the pain to be 8/10 at its worst and  improves to 5/10.  Patient denies any weakness in bilateral upper extremities.  Patient denies any changes in bowel or bladder function.  Patient denies any numbness tingling in bilateral upper extremities.  Patient has history of a C3-C5 ACDF many years ago.  Patient reports this pain is different than the pain he had prior to his cervical spine surgery, as it does not radiate to bilateral upper extremities at this time.  Patient reports starting PT on Monday for his neck pain.    Patient denies night fever/night sweats, urinary incontinence, bowel incontinence, significant weight loss, significant motor weakness and loss of sensations.    Physical Therapy/Home Exercise: yes      Pain Disability Index Review:  Last 3 PDI Scores 8/13/2020   Pain Disability Index (PDI) 0       Pain Medications:  Gabapentin  Zanaflex  Morphine      report:  Reviewed and consistent with medication use as prescribed.    Pain Procedures: none    Imaging:   CT Cervical Spine 8/24/2020:  FINDINGS:  Multiple axial images of the cervical vertebral column were obtained.  The study was reformatted coronal and sagittal plane.     Contrary to the clinical history, I do not detect evidence of a laminectomy at any level.  Instead the patient has undergone anterior cervical fusion of C3, C4, C5 utilizing anterior plate and screws.  There is bone graft material between the endplates of C3-C4 and C4-C5.     There is straightening of the lordotic curvature.  No indication of spondylolisthesis or scoliotic curvature.  The posterior elements align normally.  In the coronal plane, the lateral masses are also well aligned with osteoarthritic changes seen throughout.     In the axial plane, there does not appear to be any obvious disruption involving the lamina or pedicle at any level.     Note is made of heavy calcification involving the carotid artery system on either side of the midline.     Impression:     Status post anterior cervical fusion of  C3-C4 and C5.     No appreciable findings that would suggest a laminectomy at any level.     Diffuse osteoarthritic changes.    Xray Cervical Spine 2020:  FINDINGS:  There are surgical changes of an anterior cervical fusion from C3 through C5 with bony fusion of the vertebral bodies. Hardware is intact.  There is straightening of the normal cervical lordosis. The C2-3 level demonstrates no abnormality. There is grade 1 anterolisthesis at C5-6 with mild disc space narrowing and mild osteophytic spurring.  C7 vertebral body is partly obscured.  There is no significant neural foraminal narrowing.     There is vascular calcification within the soft tissue of the right neck. There is a partly visualized central venous catheter overlying the right lung apex.     Impression:     Surgical changes of an anterior cervical fusion from C3 through C5.  Mild degenerative changes at the level just below the fusion.    Past Medical History:   Diagnosis Date    CML (chronic myelocytic leukemia)     Hx of psychiatric care     valium, ativan    Melanoma in situ of external ear, right      Past Surgical History:   Procedure Laterality Date    CHOLECYSTECTOMY      NECK SURGERY  2015    TONSILLECTOMY       Social History     Socioeconomic History    Marital status:      Spouse name: Lauren    Number of children: 3    Years of education: Not on file    Highest education level: Not on file   Occupational History    Not on file   Social Needs    Financial resource strain: Not on file    Food insecurity     Worry: Not on file     Inability: Not on file    Transportation needs     Medical: Not on file     Non-medical: Not on file   Tobacco Use    Smoking status: Former Smoker     Packs/day: 1.00     Years: 35.00     Pack years: 35.00     Start date: 1985     Quit date: 2020     Years since quittin.2    Smokeless tobacco: Never Used   Substance and Sexual Activity    Alcohol use: Yes     Alcohol/week:  12.0 standard drinks     Types: 4 Glasses of wine, 4 Cans of beer, 4 Shots of liquor per week    Drug use: Yes     Types: Marijuana     Comment: medical marijuana    Sexual activity: Yes     Partners: Female   Lifestyle    Physical activity     Days per week: Not on file     Minutes per session: Not on file    Stress: Not on file   Relationships    Social connections     Talks on phone: Not on file     Gets together: Not on file     Attends Congregation service: Not on file     Active member of club or organization: Not on file     Attends meetings of clubs or organizations: Not on file     Relationship status: Not on file   Other Topics Concern    Patient feels they ought to cut down on drinking/drug use Not Asked    Patient annoyed by others criticizing their drinking/drug use Not Asked    Patient has felt bad or guilty about drinking/drug use Not Asked    Patient has had a drink/used drugs as an eye opener in the AM Not Asked   Social History Narrative     (Guillermina)    3 children (Michelle Estrada, Qasim Estrada, [facility])         History reviewed. No pertinent family history.    Review of patient's allergies indicates:   Allergen Reactions    Posaconazole Hallucinations    Unclassified drug Other (See Comments)     Pt reports that he has an allergic reaction to an Antifungal medication, but is unsure of the name of the drug. Will obtain name prior to arrival in am and notify Pre-op RN.    Vancomycin analogues Other (See Comments)     Pt reports he had rigors    Codeine Hives    Iodine Hives and Rash    Iodinated contrast media Hives       Current Outpatient Medications   Medication Sig    acyclovir (ZOVIRAX) 800 MG Tab Take 1 tablet (800 mg total) by mouth 2 (two) times daily.    amLODIPine (NORVASC) 10 MG tablet Take 1 tablet (10 mg total) by mouth once daily.    celecoxib (CELEBREX) 100 MG capsule Take 1 capsule (100 mg total) by mouth 2 (two) times daily.     chlorhexidine (PERIDEX) 0.12 % solution RINSE WITH 15 MLS PO FOR 30 SECONDS BID    diazePAM (VALIUM) 2 MG tablet Take 1 tablet (2 mg total) by mouth On call Procedure for Anxiety (take 2 hours prior to procedure).    esomeprazole (NEXIUM) 20 MG capsule Take 20 mg by mouth before breakfast.    fluconazole (DIFLUCAN) 200 MG Tab Take 2 tablets (400 mg total) by mouth once daily.    fluticasone propionate (FLONASE) 50 mcg/actuation nasal spray 2 sprays (100 mcg total) by Each Nostril route once daily.    gabapentin (NEURONTIN) 300 MG capsule Take 1 capsule (300 mg total) by mouth 3 (three) times daily.    lidocaine (LIDODERM) 5 % Place 2 patches onto the skin once daily. Remove & Discard patch within 12 hours or as directed by MD    loratadine (CLARITIN) 10 mg tablet Take 10 mg by mouth once daily.    magic mouthwash diphen/antac/lidoc/nysta Take 10 mLs by mouth 4 (four) times daily as needed (mouth sores).    magnesium oxide (MAGOX) 400 mg (241.3 mg magnesium) tablet Take 1 tablet (400 mg total) by mouth 2 (two) times daily.    methocarbamoL (ROBAXIN) 500 MG Tab Take 1 tablet (500 mg total) by mouth 3 (three) times daily as needed.    morphine (MSIR) 15 MG tablet Take 1 tablet (15 mg total) by mouth every 6 (six) hours as needed for Pain.    mycophenolate (CELLCEPT) 250 mg Cap Take 4 capsules (1,000 mg total) by mouth 3 (three) times daily.    nystatin (MYCOSTATIN) 100,000 unit/mL suspension Take 5 mLs (500,000 Units total) by mouth 4 (four) times daily with meals and nightly. for 10 days    ondansetron (ZOFRAN-ODT) 8 MG TbDL Dissolve 1 tablet (8 mg total) by mouth every 8 (eight) hours as needed (nausea).    opw transplant care kit Welcome to [facility].  This is your transplant care kit.    oxyCODONE (OXY-IR) 5 mg Cap Take 1 capsule (5 mg total) by mouth On call Procedure for Pain (take 30 minutes prior to procedure).    prochlorperazine (COMPAZINE) 10 MG tablet Take 1 tablet (10 mg  total) by mouth 4 (four) times daily as needed for Nausea.    [START ON 8/28/2020] sulfamethoxazole-trimethoprim 800-160mg (BACTRIM DS) 800-160 mg Tab Take 1 tablet by mouth every Mon, Wed, Fri.    tacrolimus (PROGRAF) 0.5 MG Cap Take 4mg (8 tablets) by mouth in the mornings and 4.5mg (9 tablets) by mouth in the evenings  Take after labs on clinic appointment days.    tamsulosin (FLOMAX) 0.4 mg Cap Take 1 capsule (0.4 mg total) by mouth once daily.    tiZANidine (ZANAFLEX) 4 MG tablet Take 1 tablet (4 mg total) by mouth nightly as needed.    triamcinolone acetonide 0.1% (KENALOG) 0.1 % cream Apply topically 2 (two) times daily. Rash on hands    ursodioL (ACTIGALL) 300 mg capsule Take 1 capsule (300 mg total) by mouth 2 (two) times daily.     No current facility-administered medications for this visit.      Facility-Administered Medications Ordered in Other Visits   Medication    artificial tears 0.5 % ophthalmic solution 2 drop    diphenhydrAMINE injection 50 mg       REVIEW OF SYSTEMS:    GENERAL:  No weight loss, malaise or fevers.  HEENT:  Negative for frequent or significant headaches.  NECK:  Negative for lumps, goiter, pain and significant neck swelling.  RESPIRATORY:  Negative for cough, wheezing or shortness of breath.  CARDIOVASCULAR:  Negative for chest pain, leg swelling or palpitations.  GI:  Negative for abdominal discomfort, blood in stools or black stools or change in bowel habits.  MUSCULOSKELETAL:  See HPI.  SKIN:  Negative for lesions, rash, and itching.  PSYCH:  Negative mood disorder and recent psychosocial stressors. Positive for sleep disturbance.   HEMATOLOGY/LYMPHOLOGY:  CML, recent bone marrow transplant.   NEURO:   No history of headaches, syncope, paralysis, seizures or tremors.  All other reviewed and negative other than HPI.    OBJECTIVE:    Exam limited due to virtual visit:  General appearance: Well appearing, in no acute distress, alert and oriented x3.  Psych:  Mood and  affect appropriate.  Neck: Limited ROM with pain on flexion, extension, and lateral rotation.     Previous physical exam:  There were no vitals taken for this visit.    PHYSICAL EXAMINATION:    General appearance: Well appearing, in no acute distress, alert and oriented x3.  Psych:  Mood and affect appropriate.  Skin: Skin color, texture, turgor normal, no rashes or lesions, in both upper and lower body.  Head/face:  Normocephalic, atraumatic. No palpable lymph nodes.  Neck:  Physical examination of the neck is very limited due to increased pain with range of motion in all planes.  Paraspinal cervical tenderness noted bilaterally  Cor: RRR  Pulm: CTA  GI:  Soft and non-tender.  Extremities: Peripheral joint ROM is full and pain free without obvious instability or laxity in all four extremities. No deformities, edema, or skin discoloration. Good capillary refill.  Musculoskeletal: Shoulder provocative maneuvers are negative. Bilateral upper and lower extremity strength is normal and symmetric.  No atrophy or tone abnormalities are noted.  Neuro: Bilateral upper and lower extremity coordination and muscle stretch reflexes are physiologic and symmetric.No loss of sensation is noted.  Gait: normal.    ASSESSMENT: 60 y.o. year old male with neck pain, consistent with     1. Cervical postlaminectomy syndrome     2. Cervical spondylosis     3. DDD (degenerative disc disease), cervical     4. Myofascial pain  methocarbamoL (ROBAXIN) 500 MG Tab       PLAN:     - Previous imaging was reviewed and discussed with the patient today.    - Will hold off on steroid injections at this time due to recent history of bone marrow transplant.     - Discontinue Zanaflex. Trial Robaxin 500 mg TID PRN.     - Continue Gabapentin. If limited relief, consider switching to Lyrica.     - I have stressed the importance of physical activity and a home exercise plan to help with pain and improve health.    - He is scheduled for physical therapy  tomorrow.     - RTC PRN. Will discuss other options with Dr. Mcelroy.     - Counseled patient regarding the importance of activity modification, constant sleeping habits and physical therapy.    - Dr. Mcelroy was consulted on the patient and agrees with this plan.    The above plan and management options were discussed at length with patient. Patient is in agreement with the above and verbalized understanding.    Sondra Gallegos NP  08/25/2020

## 2020-08-26 ENCOUNTER — TELEPHONE (OUTPATIENT)
Dept: PAIN MEDICINE | Facility: CLINIC | Age: 60
End: 2020-08-26

## 2020-08-26 ENCOUNTER — CLINICAL SUPPORT (OUTPATIENT)
Dept: REHABILITATION | Facility: HOSPITAL | Age: 60
End: 2020-08-26
Payer: COMMERCIAL

## 2020-08-26 DIAGNOSIS — M54.2 NECK PAIN: ICD-10-CM

## 2020-08-26 DIAGNOSIS — M62.9 MUSCULOSKELETAL DISORDER INVOLVING UPPER TRAPEZIUS MUSCLE: ICD-10-CM

## 2020-08-26 DIAGNOSIS — M53.82 DECREASED ROM OF INTERVERTEBRAL DISCS OF CERVICAL SPINE: ICD-10-CM

## 2020-08-26 PROBLEM — Z94.84 HISTORY OF ALLOGENEIC STEM CELL TRANSPLANT: Status: RESOLVED | Noted: 2020-08-26 | Resolved: 2020-08-26

## 2020-08-26 PROBLEM — L85.3 DRY SKIN: Status: ACTIVE | Noted: 2020-08-26

## 2020-08-26 PROBLEM — Z94.84 HISTORY OF ALLOGENEIC STEM CELL TRANSPLANT: Status: ACTIVE | Noted: 2020-08-26

## 2020-08-26 PROBLEM — B37.0 THRUSH: Status: ACTIVE | Noted: 2020-08-26

## 2020-08-26 PROCEDURE — 97110 THERAPEUTIC EXERCISES: CPT

## 2020-08-26 PROCEDURE — 97140 MANUAL THERAPY 1/> REGIONS: CPT

## 2020-08-26 NOTE — TELEPHONE ENCOUNTER
----- Message from Mary Richardson sent at 8/26/2020 11:48 AM CDT -----  Type:  Patient Returning Call    Who Called:     Who Left Message for Patient:     Does the patient know what this is regarding?: missed call     Best Call Back Number: [phone]     Additional Information:  n/a

## 2020-08-26 NOTE — TELEPHONE ENCOUNTER
----- Message from Oscar Mcelroy MD sent at 8/26/2020 10:50 AM CDT -----  Bilateral C3,4,5 MBB  ----- Message -----  From: Alta Corrigan MA  Sent: 8/26/2020  10:44 AM CDT  To: Oscar Mcelroy MD    Ok can you notate the procedure and levels so that I can send It to the schedulers    Sondra is out until next week      ----- Message -----  From: Oscar Mcelroy MD  Sent: 8/25/2020   5:08 PM CDT  To: Sondra Gallegos NP, Alta Corrigan MA    Just fearing increased risk of infection with the recent redness around his line   I think now we are safe proceeding with MBB    Best  MG  ----- Message -----  From: Sondra Gallegos NP  Sent: 8/25/2020   4:26 PM CDT  To: Oscar Mcelroy MD, Alta Corrigan MA    Good afternoon,     I saw Mr. Jones today who is continuing to report severe neck pain. He reported no relief with Zanaflex. I did switch him to Robaxin. He continues to take Gabapentin. He is scheduled to start physical therapy. He asks about procedure options, but I saw that you recommended that he wait due to recent BMT. Would you still recommend this?    I'm CCilucia Holm on this as I will be out of the office for the remainder of the week.     Thanks, Sondra

## 2020-08-26 NOTE — PROGRESS NOTES
"                            Physical Therapy Daily Treatment Note     Name: Kvng Jones .  Clinic Number: [id number]    Therapy Diagnosis:   Encounter Diagnoses   Name Primary?    Neck pain     Decreased ROM of intervertebral discs of cervical spine     Musculoskeletal disorder involving upper trapezius muscle      Physician: Yomaira Little NP    Visit Date: 8/26/2020  Physician Orders: PT Eval and Treat   Medical Diagnosis from Referral: M54.2 (ICD-10-CM) - Neck pain  Evaluation Date: 8/17/2020  Authorization Period Expiration: 12/31/2020  Plan of Care Expiration: 10/30/2020  Visit # / Visits authorized: 2/ 20    Time In: 1000a  Time Out: 1045a  Total Billable Time: 45 minutes    Subjective      Pt reports: he was compliant with home exercise program given last session.   Response to previous treatment: Patient states that he is not feeling any difference in pain.  He states that it changes each day, but still having a lot of spasm, headaches, pain in static positions.    Pain: 7/10  Location: bilateral neck      Objective     Kvng received therapeutic exercises to develop ROM, flexibility and posture for 15 minutes including:  Bilateral shoulder flexion with volleyball - 5x  Trunk rotation seated - 5x ea  Cerv extension isometric - 3x10" manual  Seated row - 10x, red    Kvng received the following manual therapy techniques for 10 minutes, including:  Gentle mobilization of tissue surrounding cervical spine    Home Exercises Provided and Patient Education Provided     Education provided:   Continue current HEP    Written Home Exercises Provided: Continue with current HEP  Exercises were reviewed and Kvng was able to demonstrate them prior to the end of the session.      Pt received a written copy of exercises to perform at home.   See EMR under patient instructions for exercises given.     Kvng demonstrated fair  understanding of the education provided.     Assessment "     The patient is limited in ability to perform cervical AROM due to pain and guarding of musculature.  He has difficulty with transitional movements, reaching, cervical AROM that limits ability to perform ADLs and sleep.  Kvng is not progressing well towards his goals.   Pt prognosis is Fair.     Pt will continue to benefit from skilled outpatient physical therapy to address the deficits listed in the problem list box on initial evaluation, provide pt/family education and to maximize pt's level of independence in the home and community environment.     Pt's spiritual, cultural and educational needs considered and pt agreeable to plan of care and goals.    Anticipated barriers to physical therapy: None    Goals:   Short Term Goals (3 Weeks):   1. Pt will be independent with HEP to supplement PT in improving pain free cervical mobility  2. Pt will improve cervical AROM 5 deg in all planes to improve cervical mobility for driving  3. Pt will improve UE MMTs by 1/2 grade in all planes to improve strength for lifting and carrying tasks.  4. Pt will demonstrate improved sitting posture to decrease pain experienced in head and neck.  Long Term Goals (6 Weeks):   1. Pt will improve FOTO to </=50% limitation to improve perceived limitation with changing and maintaining mobility.  2. Pt will improve cervical AROM to WNL in all planes to improve cervical mobility for driving   3. Pt will improve UE MMTs 1 grade in all planes to improve strength for lifting and carrying tasks.  4. Pt will report no pain with cervical AROM in all planes to promote QOL    Plan     Patient will benefit from continued education in need for gentle AROM, walking, perhaps relaxation techniques.    Rito Holland, PT

## 2020-08-26 NOTE — PROGRESS NOTES
Subjective:    Patient ID: Kvng Jones Sr. is a 60 y.o. male.    Chief Complaint: No chief complaint on file.    Oncologic hx:  60 y.o. male admitted to [facility] for CML presenting with blast crisis and lewis disease. He was admitted for evaluation and induction chemotherapy with FLAG-Addis. Complications of therapy include epididymal orchitis with negative testicular ultrasound, neutropenic fever, dyspnea, and GGO of bilateral lungs on CT chest. Fever and chest findings were covered with broad spectrum antimicrobials. He did have hallucinations and vivid dreams with posaconazole. Chemotherapy was administered by left arm PICC line that was removed during hospital stay for MSSA infection treated with vancomycin. Hospital admission was 3/30/2020 to 5/1/2020 achieving morphologic CR with induction chemotherapy. He then started Ponatinib 30mg daily.     Studies performed during his hospital stay include pre-chemotherapy ECHO with normal ejection fraction of 60-65%. HIV, HBV, and HCV tests were negative. CBC at admission was WBC 38.6, Hgb 14.6, and platelet 416K. CT of the head without contrast was normal 4/20/2020. US of abdomen performed for abdominal distention and neutropenic fever had hepatic steatosis, liver 19cm, and spleen 11 cm. He does have a history of alcohol use and pancreatitis.    Patient is a . He is  to wife Guillermina who will be a caregiver post transplant. He has a 45 pack year smoking history. He quit 6/1/2020, now on Chantix. He drinks 3-4 alcohol beverages nightly. He has 3 children. He has several family members in the [facility] area.    Transplant Course: Admitted 7/21/20 for a Flu/Cy/TBI haplo allo SCT. Son was donor. Received 2 bags and a total CD34 dose of 3.10 x10^6/kg on 7/28/20. Tolerated stem cells well. C/o headaches prior to admission, which persisted throughout admission. Neuro ultimately consulted for rec's. Was a daily drinker prior to  admission. No s/s of alcolhol withdrawal during admission. Transplant further complicated by hypertension, heart burn, c-diff neg diarrhea, nausea, sinus congestion, mild peripheral edema, blurry vision (ophtho consulted), mucositis, electrolyte abnormalities, and neck pain 2/2 spinal stenosis (referred to pain mgt at discharge). He engrafted on 8/10/20 with an ANC of 1280. He was discharged on 8/12/2020.    Interval History:  Patient presents for follow up clinic visit post Flu/Cy/TBI allogeneic stem cell transplant. Today is Day +31. Fatigue is improving. He is not in a wheelchair today. Appetite is fair, mainly 2/2 to food not tasting good and difficulty chewing (teeth pulled pre transplant). Thrush has resolved. Dry, itchy, painful skin has improved with Lubriderm. Continues to have severe neck/shoulder pain/muscle spasms. Is following with pain mgt and PT for this. Has largely been refractory to treatment with medications (zanaflex, gabapentin, lidoderm patch, celebrex). Some relief with oral morphine. Was switched from zanaflex to robaxin by pain mgt. Dr. Mcelroy is planning a medial branch block on 9/2/20. Hopefully he will get some relief from this. Denies fevers, cough, SOB, chest pain, n/v/d/c, and rash.     Review of Systems   Constitutional: Positive for activity change and fatigue. Negative for appetite change, chills, diaphoresis, fever and unexpected weight change.   HENT: Negative for congestion, dental problem, mouth sores, nosebleeds, postnasal drip, rhinorrhea, sinus pressure and trouble swallowing.    Eyes: Negative.  Negative for photophobia and visual disturbance.   Respiratory: Negative.  Negative for cough and shortness of breath.    Cardiovascular: Negative.  Negative for chest pain, palpitations and leg swelling.   Gastrointestinal: Negative for abdominal distention, abdominal pain, blood in stool, constipation, diarrhea, nausea and vomiting.   Endocrine: Negative.    Genitourinary:  Negative.  Negative for dysuria, frequency, hematuria and urgency.   Musculoskeletal: Positive for back pain, neck pain and neck stiffness.        History of cervical spinal stenosis. See hpi   Skin: Negative.  Negative for pallor and rash.   Allergic/Immunologic: Negative for environmental allergies, food allergies and immunocompromised state.   Neurological: Positive for tremors and headaches. Negative for dizziness, syncope, weakness and numbness.   Hematological: Negative for adenopathy. Does not bruise/bleed easily.   Psychiatric/Behavioral: Negative.  Negative for confusion. The patient is not nervous/anxious.        Objective:     Physical Exam  Vitals signs and nursing note reviewed.   Constitutional:       Appearance: Normal appearance. He is well-developed.   HENT:      Head: Normocephalic and atraumatic.      Mouth/Throat:      Mouth: Mucous membranes are dry.      Comments: Thrush appears resolved  Eyes:      General: No scleral icterus.  Neck:      Musculoskeletal: Neck supple. Decreased range of motion.   Cardiovascular:      Rate and Rhythm: Normal rate and regular rhythm.      Pulses: Normal pulses.      Heart sounds: Normal heart sounds.   Pulmonary:      Effort: Pulmonary effort is normal. No respiratory distress.      Breath sounds: Normal breath sounds.   Abdominal:      General: Bowel sounds are normal. There is no distension.      Palpations: Abdomen is soft.      Tenderness: There is no abdominal tenderness.   Musculoskeletal:         General: No tenderness.      Right lower leg: No edema.      Left lower leg: No edema.   Skin:     General: Skin is warm and dry.      Coloration: Skin is not pale.      Findings: No erythema, petechiae or rash.      Comments: Dalal intact to right CW with no redness or drainage  Very dry skin diffusely, flaking   Neurological:      Mental Status: He is alert and oriented to person, place, and time.   Psychiatric:         Attention and Perception: Attention  normal.         Speech: Speech normal.         Thought Content: Thought content normal.         Cognition and Memory: Cognition and memory normal.      Comments: Tearful during portion of exam         Assessment:       1. Acute myeloid leukemia in remission    2. History of allogeneic stem cell transplant    3. CML in remission    4. Acute GVHD    5. Dry skin    6. Hypomagnesemia    7. Mucositis due to chemotherapy    8. Thrush    9. CINV (chemotherapy-induced nausea and vomiting)    10. Antineoplastic chemotherapy induced anemia    11. Chemotherapy-induced thrombocytopenia    12. Chronic intractable headache, unspecified headache type    13. Cervical stenosis of spine    14. Decreased ROM of intervertebral discs of cervical spine    15. Musculoskeletal disorder involving upper trapezius muscle    16. Tobacco abuse, in remission    17. Benign prostatic hyperplasia without lower urinary tract symptoms    18. Gastroesophageal reflux disease without esophagitis    19. Essential hypertension        Plan:         History of allogeneic stem cell transplant  - Admitted 7/21/20 for planned Flu/Cy/TBI haplo allo SCT. Son was the donor. Received 2 bags and a total CD34 dose of 3.10 x10^6/kg on 7/28/20. Recieved post transplant cyclophosphamide. Engrafted neutrophils 8/10/20 day +13  - Today is Day +31  - Tacro level 10.1 (goal 7-9). Currently taking 4 mg / 4.5 mg (last dose change was 8/18). Maintain current dose per Dr. Hodges.  - stopped Ursodiol Day +30 (8/27/20)  - planning for day +30 BM bx today after this clinic visit. Patient has had bad experience x2. Once here. Rx for Valium and Oxycodone pre-procedure.  - patient very nervous prior to procedure. Stated that he had significant pain and had to ambulate with crutches after previous BMBx. Extremely concerned about post-procedure pain. Provided with prescription for MSIR x 10 tabs for post-procedure pain.      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 Achieved morphologic CR  - Started on daily Ponatinib 30 mg daily, which he continued to take outpatient. Held Ponatinib during admission; may not need to restart per recent literature. BBMT 2020(26): 181-49  - Had BMBx at [facility] on 7/1/2020 showing no morphologic or immunophenotypic evidence of AML/CML  - Admitted 7/21/20 for Flu/Cy/TBI haplo allo SCT    GVHD  - had rash inpatient that was believed to be drug eruption. Resolved  - no evidence of GVHD today (skin, liver or GI); no evidence of chronic GVHD  - GVHD charting with each visit     Dry Painful, Itchy Skin  - lotion recommended - non-fragrance such as CeraVe, Aquaphor, Jergens, Vaseline Intensive care  - Aveeno bath soak  - Avoiding benadryl, atarax for pruritus due to number of sedative medications patient is currently on for pain management    ID  - Last CMV and EBV undetected; continue twice weekly CMV, weekly EBV, today's pending  - Acyclovir (Zoster prophylaxis) until 6 months after tacrolimus discontinued  - Diflucan (Fungal prophylaxis) until off tacrolimus  - Bactrim (PJP prophylaxis) until off tacrolimus. Will begin bactrim today (8/28/20)    Hypomagnesemia/hyperphosphatemia   - Mag 1.2. increased mag dose 800 mg BID as patient is on tacro, this may also help with headaches  - Phos 3.2.  Will watch and consider phos binder if needed in future     Mucositis due to chemotherapy/Thrush  - Continue prn [facility]; patient also uses Aloe oral rinses  - Recommend baking soda and warm water rinses  - Started nystatin x10 days 8/18 completed 8/27)  - No thrush on exam today     CINV (chemotherapy-induced nausea and vomiting)  - c/w prn zofran and compazine. Helping.     Blurry vision  - C/o worsening blurry vision 7/30/20  - May be contributing to headaches  - Ophtho consulted and saw patient 7/30  - Ophtho dilated pupils and  performed fundal exam. Fundal exam neg.  - Patient had been viewing laptop screen for ~ 2 hours prior to experiencing blurry vision  - Per ophtho, blurry vision due to dry eyes. rec'd artificial tears QID prn and warm compresses BID prn. Can escalate artificial tears to ointment if needed     Antineoplastic chemotherapy-induced cytopenias: anemia and thrombocytopenia   - Transfuse Hgb <7g and plt <10K  - No indication to transfuse today     Headache/ Neck pain/ Cervical stenosis of spine  - Hx of spinal fusion C3-C5  - Started having headaches when he started Ponatinib at diagnosis. Onset seems to correspond to when patient quit smoking and when he started ponatinib  - Had been off of ponatinib for a few days but HAs persisted during transplant admission  - Pattern is like cluster headaches (onset during sleep), but not unilateral versus tension HA   - O2 via non-rebreather at 12 L over 15 minutes seemed to help but developed hot flash so pt stopped  - Ultram, Flexeril, Oxycodone, lidoderm, neck pillow, c-collar did not help  - Imitrex PRN q2hr (max 200mg in 24hrs). Not contraindicated per pharm D.  - Patient reporting that headache may be sinus in nature. Maxillary tenderness noted. Taking claritin  - Saw optho and neuro inpatient, trialed IV mag  - Is now seeing pain mgt for this. Current headaches seem to be caused by muscle spasms 2/2 cervical stenosis  - Flexeril changed to zanaflex by pain management. Also on gabapentin. Restarted celebrex 8/18 as plt >50K. Also taking morphine 15mg Q4h prn (he is taking this only two times daily despite being in pain). Educated to take more in short term until pain management appt.  - He has 1 pill of morphine left. Refilled 8/21 for short supply to last him until his pain management appt.   - Recommended alternating heat and ice  - Xray 9/19 shows surgical changes of fusion from C3 through C5 with degenerative changed below fusion.   - Following with pain mgt and PT.  -  Started PT on 8/26.  - No steroid injections at this time per pain management due to transplant.  - Pt feels neck pain started after hickmann was placed  - Has been mostly refractory to medications (zanaflex, gabapentin, celebrex, lidoderm patches). Some relief with oral morphine  - CT from 8/24/20 showing history of C3, C4, and C5 fusion and diffuse osteoarthritic changes  - Had virtual pain management appt on  8/25 to review CT results.  - Zanaflex switched to Robaxin per pain mgt.  - Dr. Mcelroy (pain mgt) planning for medial branch block on 9/2/20.    Tobacco abuse, in remission  - 35 pack year history  - Quit smoking 6/1/2020 using Chantix  - Patient completed course of Chantix and denies need for Nicotine patch    BPH (benign prostatic hyperplasia)  - Continue Flomax     GERD (gastroesophageal reflux disease)  - Continue nexium   - Can take TUMS prn     Essential hypertension  - Increased home amlodipine from 5 mg to 10 mg daily while inpatient due to BPs as high as 180s/110s   - /70 today  - has been out of amlodipine for last 5 days. Will hold at this time. Can resume with elevated BP.    Follow-up:   - Day +30 BMBx today following this appt.  - CBC, CMP, t&s, tacro, CMV, EBV (weekly only), mag, phos and appt with Dr. Hodges alternating with NP twice weekly.  - Labs should be in AMs while on tacro. Appts scheduled through scheduled through end of September.     Ivon Morrow, EVANP  Hematology/Oncology/Bone Marrow Transplant

## 2020-08-28 ENCOUNTER — OFFICE VISIT (OUTPATIENT)
Dept: HEMATOLOGY/ONCOLOGY | Facility: CLINIC | Age: 60
End: 2020-08-28
Payer: COMMERCIAL

## 2020-08-28 ENCOUNTER — CLINICAL SUPPORT (OUTPATIENT)
Dept: HEMATOLOGY/ONCOLOGY | Facility: CLINIC | Age: 60
End: 2020-08-28
Payer: COMMERCIAL

## 2020-08-28 ENCOUNTER — INFUSION (OUTPATIENT)
Dept: INFUSION THERAPY | Facility: HOSPITAL | Age: 60
End: 2020-08-28
Attending: NURSE PRACTITIONER
Payer: COMMERCIAL

## 2020-08-28 VITALS
OXYGEN SATURATION: 99 % | BODY MASS INDEX: 29.3 KG/M2 | DIASTOLIC BLOOD PRESSURE: 70 MMHG | TEMPERATURE: 97 F | HEART RATE: 106 BPM | HEIGHT: 66 IN | WEIGHT: 182.31 LBS | SYSTOLIC BLOOD PRESSURE: 118 MMHG

## 2020-08-28 DIAGNOSIS — G89.29 CHRONIC INTRACTABLE HEADACHE, UNSPECIFIED HEADACHE TYPE: ICD-10-CM

## 2020-08-28 DIAGNOSIS — C92.11 CML IN REMISSION: ICD-10-CM

## 2020-08-28 DIAGNOSIS — D64.81 ANTINEOPLASTIC CHEMOTHERAPY INDUCED ANEMIA: ICD-10-CM

## 2020-08-28 DIAGNOSIS — Z94.84 HISTORY OF ALLOGENEIC STEM CELL TRANSPLANT: ICD-10-CM

## 2020-08-28 DIAGNOSIS — C92.01 ACUTE MYELOID LEUKEMIA IN REMISSION: Primary | ICD-10-CM

## 2020-08-28 DIAGNOSIS — K21.9 GASTROESOPHAGEAL REFLUX DISEASE WITHOUT ESOPHAGITIS: ICD-10-CM

## 2020-08-28 DIAGNOSIS — T45.1X5A CINV (CHEMOTHERAPY-INDUCED NAUSEA AND VOMITING): ICD-10-CM

## 2020-08-28 DIAGNOSIS — Z94.84 HISTORY OF ALLOGENEIC STEM CELL TRANSPLANT: Primary | ICD-10-CM

## 2020-08-28 DIAGNOSIS — E83.42 HYPOMAGNESEMIA: ICD-10-CM

## 2020-08-28 DIAGNOSIS — R51.9 CHRONIC INTRACTABLE HEADACHE, UNSPECIFIED HEADACHE TYPE: ICD-10-CM

## 2020-08-28 DIAGNOSIS — C92.10 CML (CHRONIC MYELOCYTIC LEUKEMIA): ICD-10-CM

## 2020-08-28 DIAGNOSIS — D89.810 ACUTE GVHD: ICD-10-CM

## 2020-08-28 DIAGNOSIS — L85.3 DRY SKIN: ICD-10-CM

## 2020-08-28 DIAGNOSIS — K12.31 MUCOSITIS DUE TO CHEMOTHERAPY: ICD-10-CM

## 2020-08-28 DIAGNOSIS — C92.01 ACUTE MYELOID LEUKEMIA IN REMISSION: ICD-10-CM

## 2020-08-28 DIAGNOSIS — R11.2 CINV (CHEMOTHERAPY-INDUCED NAUSEA AND VOMITING): ICD-10-CM

## 2020-08-28 DIAGNOSIS — M53.82 DECREASED ROM OF INTERVERTEBRAL DISCS OF CERVICAL SPINE: ICD-10-CM

## 2020-08-28 DIAGNOSIS — B37.0 THRUSH: ICD-10-CM

## 2020-08-28 DIAGNOSIS — M62.9 MUSCULOSKELETAL DISORDER INVOLVING UPPER TRAPEZIUS MUSCLE: ICD-10-CM

## 2020-08-28 DIAGNOSIS — F17.201 TOBACCO ABUSE, IN REMISSION: ICD-10-CM

## 2020-08-28 DIAGNOSIS — N40.0 BENIGN PROSTATIC HYPERPLASIA WITHOUT LOWER URINARY TRACT SYMPTOMS: ICD-10-CM

## 2020-08-28 DIAGNOSIS — I10 ESSENTIAL HYPERTENSION: ICD-10-CM

## 2020-08-28 DIAGNOSIS — M48.02 CERVICAL STENOSIS OF SPINE: ICD-10-CM

## 2020-08-28 DIAGNOSIS — D69.59 CHEMOTHERAPY-INDUCED THROMBOCYTOPENIA: ICD-10-CM

## 2020-08-28 DIAGNOSIS — T45.1X5A ANTINEOPLASTIC CHEMOTHERAPY INDUCED ANEMIA: ICD-10-CM

## 2020-08-28 DIAGNOSIS — T45.1X5A CHEMOTHERAPY-INDUCED THROMBOCYTOPENIA: ICD-10-CM

## 2020-08-28 LAB
ABO + RH BLD: NORMAL
ALBUMIN SERPL BCP-MCNC: 3.5 G/DL (ref 3.5–5.2)
ALP SERPL-CCNC: 92 U/L (ref 55–135)
ALT SERPL W/O P-5'-P-CCNC: 11 U/L (ref 10–44)
ANION GAP SERPL CALC-SCNC: 11 MMOL/L (ref 8–16)
AST SERPL-CCNC: 12 U/L (ref 10–40)
BASOPHILS # BLD AUTO: 0.03 K/UL (ref 0–0.2)
BASOPHILS NFR BLD: 0.9 % (ref 0–1.9)
BILIRUB SERPL-MCNC: 0.2 MG/DL (ref 0.1–1)
BLD GP AB SCN CELLS X3 SERPL QL: NORMAL
BUN SERPL-MCNC: 24 MG/DL (ref 8–23)
CALCIUM SERPL-MCNC: 9.3 MG/DL (ref 8.7–10.5)
CHLORIDE SERPL-SCNC: 104 MMOL/L (ref 95–110)
CO2 SERPL-SCNC: 21 MMOL/L (ref 23–29)
CREAT SERPL-MCNC: 1.4 MG/DL (ref 0.5–1.4)
DIFFERENTIAL METHOD: ABNORMAL
EOSINOPHIL # BLD AUTO: 0.6 K/UL (ref 0–0.5)
EOSINOPHIL NFR BLD: 16.8 % (ref 0–8)
ERYTHROCYTE [DISTWIDTH] IN BLOOD BY AUTOMATED COUNT: 15.4 % (ref 11.5–14.5)
EST. GFR  (AFRICAN AMERICAN): >60 ML/MIN/1.73 M^2
EST. GFR  (NON AFRICAN AMERICAN): 53.5 ML/MIN/1.73 M^2
GLUCOSE SERPL-MCNC: 127 MG/DL (ref 70–110)
HCT VFR BLD AUTO: 25.2 % (ref 40–54)
HGB BLD-MCNC: 8.1 G/DL (ref 14–18)
IMM GRANULOCYTES # BLD AUTO: 0.07 K/UL (ref 0–0.04)
IMM GRANULOCYTES NFR BLD AUTO: 2.1 % (ref 0–0.5)
LYMPHOCYTES # BLD AUTO: 0.1 K/UL (ref 1–4.8)
LYMPHOCYTES NFR BLD: 3.2 % (ref 18–48)
MAGNESIUM SERPL-MCNC: 1.2 MG/DL (ref 1.6–2.6)
MCH RBC QN AUTO: 32.5 PG (ref 27–31)
MCHC RBC AUTO-ENTMCNC: 32.1 G/DL (ref 32–36)
MCV RBC AUTO: 101 FL (ref 82–98)
MONOCYTES # BLD AUTO: 0.5 K/UL (ref 0.3–1)
MONOCYTES NFR BLD: 15.6 % (ref 4–15)
NEUTROPHILS # BLD AUTO: 2.1 K/UL (ref 1.8–7.7)
NEUTROPHILS NFR BLD: 61.4 % (ref 38–73)
NRBC BLD-RTO: 0 /100 WBC
PHOSPHATE SERPL-MCNC: 3.2 MG/DL (ref 2.7–4.5)
PLATELET # BLD AUTO: 79 K/UL (ref 150–350)
PMV BLD AUTO: 11 FL (ref 9.2–12.9)
POTASSIUM SERPL-SCNC: 4.7 MMOL/L (ref 3.5–5.1)
PROT SERPL-MCNC: 7.1 G/DL (ref 6–8.4)
RBC # BLD AUTO: 2.49 M/UL (ref 4.6–6.2)
SODIUM SERPL-SCNC: 136 MMOL/L (ref 136–145)
TACROLIMUS BLD-MCNC: 10.1 NG/ML (ref 5–15)
WBC # BLD AUTO: 3.39 K/UL (ref 3.9–12.7)

## 2020-08-28 PROCEDURE — 99999 PR PBB SHADOW E&M-EST. PATIENT-LVL V: ICD-10-PCS | Mod: PBBFAC,BMT,, | Performed by: NURSE PRACTITIONER

## 2020-08-28 PROCEDURE — 81268 CHIMERISM ANAL W/CELL SELECT: CPT

## 2020-08-28 PROCEDURE — 99215 OFFICE O/P EST HI 40 MIN: CPT | Mod: BMT,S$GLB,, | Performed by: NURSE PRACTITIONER

## 2020-08-28 PROCEDURE — 38222 DX BONE MARROW BX & ASPIR: CPT | Mod: BMT,RT,S$GLB, | Performed by: NURSE PRACTITIONER

## 2020-08-28 PROCEDURE — A4216 STERILE WATER/SALINE, 10 ML: HCPCS | Performed by: NURSE PRACTITIONER

## 2020-08-28 PROCEDURE — 88237 TISSUE CULTURE BONE MARROW: CPT

## 2020-08-28 PROCEDURE — 99999 PR PBB SHADOW E&M-EST. PATIENT-LVL III: ICD-10-PCS | Mod: PBBFAC,BMT,,

## 2020-08-28 PROCEDURE — 88189 FLOWCYTOMETRY/READ 16 & >: CPT | Mod: ,,, | Performed by: PATHOLOGY

## 2020-08-28 PROCEDURE — 85097 BONE MARROW INTERPRETATION: CPT | Mod: ,,, | Performed by: PATHOLOGY

## 2020-08-28 PROCEDURE — 81268 CHIMERISM ANAL W/CELL SELECT: CPT | Mod: 91

## 2020-08-28 PROCEDURE — 36592 COLLECT BLOOD FROM PICC: CPT

## 2020-08-28 PROCEDURE — 88313 PR  SPECIAL STAINS,GROUP II: ICD-10-PCS | Mod: 26,,, | Performed by: PATHOLOGY

## 2020-08-28 PROCEDURE — 84100 ASSAY OF PHOSPHORUS: CPT

## 2020-08-28 PROCEDURE — 88189 PR  FLOWCYTOMETRY/READ, 16 & > MARKERS: ICD-10-PCS | Mod: ,,, | Performed by: PATHOLOGY

## 2020-08-28 PROCEDURE — 88313 SPECIAL STAINS GROUP 2: CPT | Mod: 59 | Performed by: PATHOLOGY

## 2020-08-28 PROCEDURE — 88313 SPECIAL STAINS GROUP 2: CPT | Mod: 26,,, | Performed by: PATHOLOGY

## 2020-08-28 PROCEDURE — 88305 TISSUE EXAM BY PATHOLOGIST: CPT | Mod: 26,,, | Performed by: PATHOLOGY

## 2020-08-28 PROCEDURE — 80053 COMPREHEN METABOLIC PANEL: CPT

## 2020-08-28 PROCEDURE — 99999 PR PBB SHADOW E&M-EST. PATIENT-LVL I: CPT | Mod: PBBFAC,BMT,, | Performed by: NURSE PRACTITIONER

## 2020-08-28 PROCEDURE — 99499 NO LOS: ICD-10-PCS | Mod: BMT,S$GLB,, | Performed by: NURSE PRACTITIONER

## 2020-08-28 PROCEDURE — 88184 FLOWCYTOMETRY/ TC 1 MARKER: CPT | Performed by: PATHOLOGY

## 2020-08-28 PROCEDURE — 3008F BODY MASS INDEX DOCD: CPT | Mod: BMT,CPTII,S$GLB, | Performed by: NURSE PRACTITIONER

## 2020-08-28 PROCEDURE — 88311 DECALCIFY TISSUE: CPT | Performed by: PATHOLOGY

## 2020-08-28 PROCEDURE — 99499 UNLISTED E&M SERVICE: CPT | Mod: BMT,S$GLB,, | Performed by: NURSE PRACTITIONER

## 2020-08-28 PROCEDURE — 99999 PR PBB SHADOW E&M-EST. PATIENT-LVL III: CPT | Mod: PBBFAC,BMT,,

## 2020-08-28 PROCEDURE — 38222 PR BONE MARROW BIOPSY(IES) W/ASPIRATION(S); DIAGNOSTIC: ICD-10-PCS | Mod: BMT,RT,S$GLB, | Performed by: NURSE PRACTITIONER

## 2020-08-28 PROCEDURE — 25000003 PHARM REV CODE 250: Performed by: NURSE PRACTITIONER

## 2020-08-28 PROCEDURE — 99999 PR PBB SHADOW E&M-EST. PATIENT-LVL V: CPT | Mod: PBBFAC,BMT,, | Performed by: NURSE PRACTITIONER

## 2020-08-28 PROCEDURE — 86850 RBC ANTIBODY SCREEN: CPT

## 2020-08-28 PROCEDURE — 87799 DETECT AGENT NOS DNA QUANT: CPT

## 2020-08-28 PROCEDURE — 88264 CHROMOSOME ANALYSIS 20-25: CPT

## 2020-08-28 PROCEDURE — 63600175 PHARM REV CODE 636 W HCPCS: Performed by: NURSE PRACTITIONER

## 2020-08-28 PROCEDURE — 88311 DECALCIFY TISSUE: CPT | Mod: 26,,, | Performed by: PATHOLOGY

## 2020-08-28 PROCEDURE — 88342 IMHCHEM/IMCYTCHM 1ST ANTB: CPT | Performed by: PATHOLOGY

## 2020-08-28 PROCEDURE — 83735 ASSAY OF MAGNESIUM: CPT

## 2020-08-28 PROCEDURE — 99999 PR PBB SHADOW E&M-EST. PATIENT-LVL I: ICD-10-PCS | Mod: PBBFAC,BMT,, | Performed by: NURSE PRACTITIONER

## 2020-08-28 PROCEDURE — 85097 PR  BONE MARROW,SMEAR INTERPRETATION: ICD-10-PCS | Mod: ,,, | Performed by: PATHOLOGY

## 2020-08-28 PROCEDURE — 80197 ASSAY OF TACROLIMUS: CPT

## 2020-08-28 PROCEDURE — 88305 TISSUE EXAM BY PATHOLOGIST: CPT | Mod: 59 | Performed by: PATHOLOGY

## 2020-08-28 PROCEDURE — 81206 BCR/ABL1 GENE MAJOR BP: CPT

## 2020-08-28 PROCEDURE — 88185 FLOWCYTOMETRY/TC ADD-ON: CPT | Mod: 59 | Performed by: PATHOLOGY

## 2020-08-28 PROCEDURE — 88342 CHG IMMUNOCYTOCHEMISTRY: ICD-10-PCS | Mod: 26,59,, | Performed by: PATHOLOGY

## 2020-08-28 PROCEDURE — 88305 TISSUE EXAM BY PATHOLOGIST: ICD-10-PCS | Mod: 26,,, | Performed by: PATHOLOGY

## 2020-08-28 PROCEDURE — 88342 IMHCHEM/IMCYTCHM 1ST ANTB: CPT | Mod: 26,59,, | Performed by: PATHOLOGY

## 2020-08-28 PROCEDURE — 3008F PR BODY MASS INDEX (BMI) DOCUMENTED: ICD-10-PCS | Mod: BMT,CPTII,S$GLB, | Performed by: NURSE PRACTITIONER

## 2020-08-28 PROCEDURE — 99215 PR OFFICE/OUTPT VISIT, EST, LEVL V, 40-54 MIN: ICD-10-PCS | Mod: BMT,S$GLB,, | Performed by: NURSE PRACTITIONER

## 2020-08-28 PROCEDURE — 88311 PR  DECALCIFY TISSUE: ICD-10-PCS | Mod: 26,,, | Performed by: PATHOLOGY

## 2020-08-28 PROCEDURE — 85025 COMPLETE CBC W/AUTO DIFF WBC: CPT

## 2020-08-28 RX ORDER — SODIUM CHLORIDE 0.9 % (FLUSH) 0.9 %
10 SYRINGE (ML) INJECTION
Status: COMPLETED | OUTPATIENT
Start: 2020-08-28 | End: 2020-08-28

## 2020-08-28 RX ORDER — ACYCLOVIR 800 MG/1
800 TABLET ORAL 2 TIMES DAILY
Qty: 60 TABLET | Refills: 11
Start: 2020-08-28 | End: 2020-09-09 | Stop reason: SDUPTHER

## 2020-08-28 RX ORDER — LANOLIN ALCOHOL/MO/W.PET/CERES
800 CREAM (GRAM) TOPICAL 2 TIMES DAILY
Qty: 200 TABLET | Refills: 1 | Status: SHIPPED | OUTPATIENT
Start: 2020-08-28 | End: 2020-10-05

## 2020-08-28 RX ORDER — MORPHINE SULFATE 15 MG/1
15 TABLET ORAL EVERY 6 HOURS PRN
Qty: 10 TABLET | Refills: 0 | Status: SHIPPED | OUTPATIENT
Start: 2020-08-28 | End: 2020-09-04 | Stop reason: SDUPTHER

## 2020-08-28 RX ORDER — MORPHINE SULFATE 15 MG/1
15 TABLET ORAL EVERY 6 HOURS PRN
Qty: 10 TABLET | Refills: 0 | Status: SHIPPED | OUTPATIENT
Start: 2020-08-28 | End: 2020-08-28 | Stop reason: SDUPTHER

## 2020-08-28 RX ORDER — FLUTICASONE PROPIONATE 50 MCG
2 SPRAY, SUSPENSION (ML) NASAL DAILY PRN
Qty: 16 G | Refills: 0 | Status: SHIPPED | OUTPATIENT
Start: 2020-08-28 | End: 2020-10-22 | Stop reason: SDUPTHER

## 2020-08-28 RX ORDER — SODIUM CHLORIDE 0.9 % (FLUSH) 0.9 %
10 SYRINGE (ML) INJECTION
Status: CANCELLED | OUTPATIENT
Start: 2020-08-28

## 2020-08-28 RX ORDER — HEPARIN 100 UNIT/ML
500 SYRINGE INTRAVENOUS
Status: CANCELLED | OUTPATIENT
Start: 2020-08-28

## 2020-08-28 RX ORDER — TRIAMCINOLONE ACETONIDE 1 MG/G
CREAM TOPICAL
Qty: 30 G | Refills: 1 | Status: SHIPPED | OUTPATIENT
Start: 2020-08-28 | End: 2020-10-29

## 2020-08-28 RX ORDER — HEPARIN 100 UNIT/ML
500 SYRINGE INTRAVENOUS
Status: COMPLETED | OUTPATIENT
Start: 2020-08-28 | End: 2020-08-28

## 2020-08-28 RX ADMIN — Medication 10 ML: at 09:08

## 2020-08-28 RX ADMIN — HEPARIN 500 UNITS: 100 SYRINGE at 09:08

## 2020-08-28 NOTE — NURSING
Patient's labs collected from red lumen of Dalal line.  Specimens sent to lab. All lumens flushed, heparinized, luers replaced, and capped.  Sterile dressing change performed.  Site c/d/i.  Patient tolerated well.

## 2020-08-28 NOTE — PROGRESS NOTES
Subjective:    Patient ID: Kvng Jones Sr. is a 60 y.o. male.    Chief Complaint: No chief complaint on file.    Oncologic hx:  60 y.o. male admitted to [facility] for CML presenting with blast crisis and lewis disease. He was admitted for evaluation and induction chemotherapy with FLAG-Addis. Complications of therapy include epididymal orchitis with negative testicular ultrasound, neutropenic fever, dyspnea, and GGO of bilateral lungs on CT chest. Fever and chest findings were covered with broad spectrum antimicrobials. He did have hallucinations and vivid dreams with posaconazole. Chemotherapy was administered by left arm PICC line that was removed during hospital stay for MSSA infection treated with vancomycin. Hospital admission was 3/30/2020 to 5/1/2020 achieving morphologic CR with induction chemotherapy. He then started Ponatinib 30mg daily.     Studies performed during his hospital stay include pre-chemotherapy ECHO with normal ejection fraction of 60-65%. HIV, HBV, and HCV tests were negative. CBC at admission was WBC 38.6, Hgb 14.6, and platelet 416K. CT of the head without contrast was normal 4/20/2020. US of abdomen performed for abdominal distention and neutropenic fever had hepatic steatosis, liver 19cm, and spleen 11 cm. He does have a history of alcohol use and pancreatitis.    Patient is a . He is  to wife Guillermina who will be a caregiver post transplant. He has a 45 pack year smoking history. He quit 6/1/2020, now on Chantix. He drinks 3-4 alcohol beverages nightly. He has 3 children. He has several family members in the [facility] area.    Transplant Course: Admitted 7/21/20 for a Flu/Cy/TBI haplo allo SCT. Son was donor. Received 2 bags and a total CD34 dose of 3.10 x10^6/kg on 7/28/20. Tolerated stem cells well. C/o headaches prior to admission, which persisted throughout admission. Neuro ultimately consulted for rec's. Was a daily drinker prior to  admission. No s/s of alcolhol withdrawal during admission. Transplant further complicated by hypertension, heart burn, c-diff neg diarrhea, nausea, sinus congestion, mild peripheral edema, blurry vision (ophtho consulted), mucositis, electrolyte abnormalities, and neck pain 2/2 spinal stenosis (referred to pain mgt at discharge). He engrafted on 8/10/20 with an ANC of 1280. He was discharged on 8/12/2020.    Interval History:  Patient presents for follow up clinic visit post Flu/Cy/TBI allogeneic stem cell transplant. Today is Day +35. Fatigue is improving. He is not in a wheelchair today. Appetite is fair, mainly 2/2 to food not tasting good and difficulty chewing (teeth pulled pre transplant). Thrush has resolved. Dry, itchy, painful skin has improved with Lubriderm. Continues to have severe neck/shoulder pain/muscle spasms. Is following with pain mgt and PT for this. Has largely been refractory to treatment with medications (zanaflex, gabapentin, lidoderm patch, celebrex). Some relief with oral morphine. Was switched from zanaflex to robaxin by pain mgt. Dr. Mcelroy is planning a medial branch block on 9/2/20. Hopefully he will get some relief from this. Denies fevers, cough, SOB, chest pain, n/v/d/c, and rash.     Review of Systems   Constitutional: Positive for activity change and fatigue. Negative for appetite change, chills, diaphoresis, fever and unexpected weight change.   HENT: Negative for congestion, dental problem, mouth sores, nosebleeds, postnasal drip, rhinorrhea, sinus pressure and trouble swallowing.    Eyes: Negative.  Negative for photophobia and visual disturbance.   Respiratory: Negative.  Negative for cough and shortness of breath.    Cardiovascular: Negative.  Negative for chest pain, palpitations and leg swelling.   Gastrointestinal: Negative for abdominal distention, abdominal pain, blood in stool, constipation, diarrhea, nausea and vomiting.   Endocrine: Negative.    Genitourinary:  Negative.  Negative for dysuria, frequency, hematuria and urgency.   Musculoskeletal: Positive for back pain, neck pain and neck stiffness.        History of cervical spinal stenosis. See hpi   Skin: Negative.  Negative for pallor and rash.   Allergic/Immunologic: Negative for environmental allergies, food allergies and immunocompromised state.   Neurological: Positive for tremors and headaches. Negative for dizziness, syncope, weakness and numbness.   Hematological: Negative for adenopathy. Does not bruise/bleed easily.   Psychiatric/Behavioral: Negative.  Negative for confusion. The patient is not nervous/anxious.        Objective:     Physical Exam  Vitals signs and nursing note reviewed.   Constitutional:       Appearance: Normal appearance. He is well-developed.   HENT:      Head: Normocephalic and atraumatic.      Mouth/Throat:      Mouth: Mucous membranes are dry.      Comments: Thrush appears resolved  Eyes:      General: No scleral icterus.  Neck:      Musculoskeletal: Neck supple. Decreased range of motion.   Cardiovascular:      Rate and Rhythm: Normal rate and regular rhythm.      Pulses: Normal pulses.      Heart sounds: Normal heart sounds.   Pulmonary:      Effort: Pulmonary effort is normal. No respiratory distress.      Breath sounds: Normal breath sounds.   Abdominal:      General: Bowel sounds are normal. There is no distension.      Palpations: Abdomen is soft.      Tenderness: There is no abdominal tenderness.   Musculoskeletal:         General: No tenderness.      Right lower leg: No edema.      Left lower leg: No edema.   Skin:     General: Skin is warm and dry.      Coloration: Skin is not pale.      Findings: No erythema, petechiae or rash.      Comments: Dalal intact to right CW with no redness or drainage  Very dry skin diffusely, flaking   Neurological:      Mental Status: He is alert and oriented to person, place, and time.   Psychiatric:         Attention and Perception: Attention  normal.         Speech: Speech normal.         Thought Content: Thought content normal.         Cognition and Memory: Cognition and memory normal.      Comments: Tearful during portion of exam         Assessment:       1. History of allogeneic stem cell transplant    2. CML in remission    3. Acute myeloid leukemia in remission    4. Dry skin    5. Hypomagnesemia    6. CINV (chemotherapy-induced nausea and vomiting)    7. Blurry vision    8. Antineoplastic chemotherapy induced anemia    9. Chemotherapy-induced thrombocytopenia    10. Chronic intractable headache, unspecified headache type    11. Cervical stenosis of spine    12. Decreased ROM of intervertebral discs of cervical spine    13. Tobacco abuse, in remission    14. Benign prostatic hyperplasia without lower urinary tract symptoms    15. Gastroesophageal reflux disease without esophagitis    16. Essential hypertension        Plan:         History of allogeneic stem cell transplant  - Admitted 7/21/20 for planned Flu/Cy/TBI haplo allo SCT. Son was the donor. Received 2 bags and a total CD34 dose of 3.10 x10^6/kg on 7/28/20. Recieved post transplant cyclophosphamide. Engrafted neutrophils 8/10/20 day +13  - Today is Day +35  - Tacro level 13.5 (goal 7-9). Currently taking 4 mg / 4.5 mg (last dose change was 8/18). Will hold this evening's dose and resume tomorrow at 4 mg BID.  - stopped Ursodiol Day +30 (8/27/20)  - planning for day +30 BM bx today after this clinic visit. Patient has had bad experience x 2. Once here. Rx for Valium and Oxycodone pre-procedure.  - had BMBx in clinic on 8/28/20. Results pending at this time.      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 Achieved morphologic CR  - Started on daily Ponatinib 30 mg daily, which he continued to take outpatient. Held Ponatinib during admission; may not  need to restart per recent literature. BBMT 2020(26): 472-49  - Had BMBx at [facility] on 7/1/2020 showing no morphologic or immunophenotypic evidence of AML/CML  - Admitted 7/21/20 for Flu/Cy/TBI haplo allo SCT    GVHD  - had rash inpatient that was believed to be drug eruption. Resolved  - no evidence of GVHD today (skin, liver or GI); no evidence of chronic GVHD  - GVHD charting with each visit     Dry Painful, Itchy Skin  - lotion recommended - non-fragrance such as CeraVe, Aquaphor, Jergens, Vaseline Intensive care  - Aveeno bath soak  - Avoiding benadryl, atarax for pruritus due to number of sedative medications patient is currently on for pain management  - improved    ID  - Last CMV and EBV undetected; continue twice weekly CMV, weekly EBV, today's pending  - Acyclovir (Zoster prophylaxis) until 6 months after tacrolimus discontinued  - Diflucan (Fungal prophylaxis) until off tacrolimus  - Bactrim (PJP prophylaxis) until off tacrolimus.     Hypomagnesemia/hyperphosphatemia   - Mag 1.3. Increased mag dose from 800 mg BID at previous visit. Will increase to TID today (9/1/20). Was unable to get IV mag in infusion today or tomorrow.  - Phos 4.3.  Will watch and consider phos binder if needed in future. Not indicated at this time.     Mucositis due to chemotherapy/Thrush  - Continue prn Duke's; patient also uses Aloe oral rinses  - Recommend baking soda and warm water rinses  - Started nystatin x10 days 8/18 (completed 8/27)  - No thrush on exam today     CINV (chemotherapy-induced nausea and vomiting)  - c/w prn zofran and compazine. Helping.     Blurry vision  - C/o worsening blurry vision 7/30/20  - May be contributing to headaches  - Ophtho consulted and saw patient 7/30  - Ophtho dilated pupils and performed fundal exam. Fundal exam neg.  - Patient had been viewing laptop screen for ~ 2 hours prior to experiencing blurry vision  - Per ophtho, blurry vision due to dry eyes. rec'd artificial tears QID prn and warm  compresses BID prn. Can escalate artificial tears to ointment if needed     Antineoplastic chemotherapy-induced cytopenias: anemia and thrombocytopenia   - Transfuse Hgb <7g and plt <10K  - No indication to transfuse today     Headache/ Neck pain/ Cervical stenosis of spine  - Hx of spinal fusion C3-C5  - Started having headaches when he started Ponatinib at diagnosis. Onset seems to correspond to when patient quit smoking and when he started ponatinib  - Had been off of ponatinib for a few days but HAs persisted during transplant admission  - Pattern is like cluster headaches (onset during sleep), but not unilateral versus tension HA   - O2 via non-rebreather at 12 L over 15 minutes seemed to help but developed hot flash so pt stopped  - Ultram, Flexeril, Oxycodone, lidoderm, neck pillow, c-collar did not help  - Imitrex PRN q2hr (max 200mg in 24hrs). Not contraindicated per pharm D.  - Patient reporting that headache may be sinus in nature. Maxillary tenderness noted. Taking claritin  - Saw optho and neuro inpatient, trialed IV mag  - Is now seeing pain mgt for this. Current headaches seem to be caused by muscle spasms 2/2 cervical stenosis  - Flexeril changed to zanaflex by pain management. Also on gabapentin. Restarted celebrex 8/18 as plt >50K. Also taking morphine 15mg Q4h prn (he is taking this only two times daily despite being in pain). Educated to take more in short term until pain management appt.  - He has 1 pill of morphine left. Refilled 8/21 for short supply to last him until his pain management appt.   - Recommended alternating heat and ice  - Xray 9/19 shows surgical changes of fusion from C3 through C5 with degenerative changed below fusion.   - Following with pain mgt and PT.  - Started PT on 8/26.  - No steroid injections at this time per pain management due to transplant.  - Pt feels neck pain started after hickmann was placed  - Has been mostly refractory to medications (zanaflex, gabapentin,  celebrex, lidoderm patches). Some relief with oral morphine  - CT from 8/24/20 showing history of C3, C4, and C5 fusion and diffuse osteoarthritic changes  - Had virtual pain management appt on  8/25 to review CT results.  - Zanaflex switched to Robaxin per pain mgt.  - Dr. Mcelroy (pain mgt) planning for medial branch block on 9/2/20.    Tobacco abuse, in remission  - 35 pack year history  - Quit smoking 6/1/2020 using Chantix  - Patient completed course of Chantix and denies need for Nicotine patch    BPH (benign prostatic hyperplasia)  - Continue Flomax     GERD (gastroesophageal reflux disease)  - Continue nexium   - Can take TUMS prn     Essential hypertension  - Increased home amlodipine from 5 mg to 10 mg daily while inpatient due to BPs as high as 180s/110s   - /66 today  - has been out of amlodipine for last 5 days. Held at last visit (8/28/20). Can resume with elevated BP.    Follow-up:   - CBC, CMP, t&s, tacro, CMV, EBV (weekly only), mag, phos and appt with Dr. Hodges alternating with NP twice weekly.  - Labs should be in AMs while on tacro. Appts scheduled through end of September.     Ivon Morrow, EVANP  Hematology/Oncology/Bone Marrow Transplant

## 2020-08-28 NOTE — PROCEDURES
Procedures   PROCEDURE NOTE:  Date of Procedure: 08/28/2020  Bone Marrow Biopsy and Aspiration  Indication: CML. Day +30 BMBx  Consent: Informed consent was obtained from patient.  Timeout: Done and documented.  Position: Prone  Site: Right posterior illiac crest.  Prep: Betadine.  Needle used: 11 gauge Jamshidi needle.  Anesthetic: 2% lidocaine 5 cc.  Biopsy: The biopsy needle was introduced into the marrow cavity and an aspirate was obtained without complications and sent for flow cytometry and cytogenetics, chimerisms, and BCR/ABL p210. Core biopsy obtained without difficulty and sent for routine histologic examination.  Complications: None.  Disposition: Patient was left in the room with RN and instructed to lie on back for 20 minutes. Instructed to keep dressing dry and intact for 24 hours.  Blood loss: Minimal.     Ivon Morrow, EVANP  Hematology/Oncology/Bone Marrow Transplant

## 2020-08-28 NOTE — PROGRESS NOTES
BMT Pharmacist Medication Review Note     All current medications were reviewed with the patient and wife. The patient brought in all of his medications with him to this visit.      We discussed the following changes:   - Completed ursodiol yesterday   - Started Bactrim today   - Will complete MMF on Tuesday (9/1)   - Added celecoxib and methocarbamol for pain   - Added magnesium for replacement and increased to 800mg BID   - Stopping amlodipine at this time    Medication Indication Morning Lunch/Afternoon Night   Acyclovir 800mg  Viral infection prevention 1 tablet  1 tablet   Fluconazole 200mg  Fungal infection prevention 2 tablets     Sulfamethoxazole-trimethoprim (Bactrim) 800-160mg PCP pneumonia prevention 1 tablet on Mon., Wed., and Fri.     Tacrolimus (Prograf) 0.5mg* GVHD prevention - take AFTER labs on clinic days* 8 capsules  9 capsules   Mycophenolate 250mg GVHD prevention (through 9/1) 4 capsules 4 capsules 4 capsules   ----------------------------------------       Celecoxib 100mg Pain 1 tablet  1 tablet   Esomeprazole (Nexium) 20mg Acid reflux 1 tablet     Fluticasone nasal spray Allergies 2 sprays     Gabapentin 300mg Neuropathy/pain 1 capsule 1 capsule 1 capsule   Loratadine (Claritin) 10mg Allergies 1 tablet     Magnesium oxide 400mg Magnesium supplement 2 tablet  2 tablet   Tamsulosin 0.4mg BPH/urinary issues 1 capsule     *Dose may change at each appointment    AS NEEDED MEDICATIONS:  Ondansetron (Zofran) 8mg every 8 hours as needed for nausea  Prochlorperazine (Compazine) 10mg four times a day as needed for nausea (2nd choice)  Dukes solution - swish and swallow 10mls four times a day as needed for mouth sores  Morphine 15mg every 6 hours as needed for severe pain  Methocarbamol 500mg three times a day as needed for muscle spasms  Nystatin 5mL four times daily for thrush  Triamcinolone 0.1% cream as needed for rash       The patient has ample supply of all medications. A few will need refills  before next pharmacy appointment, but I checked that all prescriptions have adequate refill amounts.       All questions were answered.      Jerri Julien, Pharm.D., BCOP  Clinical Pharmacy Specialist, Bone Marrow Transplant  [facility]  SpectraLink: [id number]

## 2020-08-28 NOTE — PROGRESS NOTES
Patient presented for planned BMBx in clinic without sedation. Tolerated procedure well.     Ivon Morrow, FNP  Hematology/Oncology/Bone Marrow Transplant

## 2020-08-31 ENCOUNTER — CLINICAL SUPPORT (OUTPATIENT)
Dept: REHABILITATION | Facility: HOSPITAL | Age: 60
End: 2020-08-31
Payer: COMMERCIAL

## 2020-08-31 DIAGNOSIS — M62.9 MUSCULOSKELETAL DISORDER INVOLVING UPPER TRAPEZIUS MUSCLE: ICD-10-CM

## 2020-08-31 DIAGNOSIS — M53.82 DECREASED ROM OF INTERVERTEBRAL DISCS OF CERVICAL SPINE: ICD-10-CM

## 2020-08-31 DIAGNOSIS — M54.2 NECK PAIN: ICD-10-CM

## 2020-08-31 LAB
CMV DNA SERPL NAA+PROBE-ACNC: NORMAL IU/ML
EBV DNA BY PCR: NORMAL IU/ML

## 2020-08-31 PROCEDURE — 97110 THERAPEUTIC EXERCISES: CPT

## 2020-08-31 NOTE — PROGRESS NOTES
"  Physical Therapy Daily Treatment Note     Name: Kvng Jones .  Clinic Number: [id number]    Therapy Diagnosis:   Encounter Diagnoses   Name Primary?    Neck pain     Decreased ROM of intervertebral discs of cervical spine     Musculoskeletal disorder involving upper trapezius muscle      Physician: Yomaira Little NP    Visit Date: 8/31/2020    Physician Orders: PT Eval and Treat   Medical Diagnosis from Referral: M54.2 (ICD-10-CM) - Neck pain  Evaluation Date: 8/17/2020  Authorization Period Expiration: 12/31/2020  Plan of Care Expiration: 10/30/2020  Visit # / Visits authorized: 3/ 20      Time In: 12:21 pm   Time Out: 12:38 pm   Total Billable Time: 17 minutes    Precautions: Standard    Subjective     Pt reports: that his neck is feeling tight. Pt stated that he had a bone marrow biopsy done on Friday and stressed his neck due to position he was in for procedure. Pt stated that he is scheduled to have a nerve block procedure on his neck on Wednesday.     Response to previous treatment: no adverse effects  Functional change: none    Pain: 0/10 at rest, 7/10 if move neck       Objective     Kvng received therapeutic exercises to develop ROM, flexibility and posture for 17 minutes including:    Seated cervical retractions/chin tucks 2"x8 then held due to c/o pain  Seated scap retractions x10  Seated cervical rotation AROM x4 then held   Gentle (R) UT stretch (SB to (L)) held       Home Exercises Provided and Patient Education Provided     Education provided:   -     Assessment     Pt with limited tolerance to light therex today. Additional reps of seated cervical retractions/chin tucks held due to pt c/o experiencing spasms/pain in his neck. Pt attempt to perform seated (B) cervical rotation AROM, but pt c/o experiencing muscle spasms/pain in (R) side of his neck so additional reps were held. Pt reported that his (R) UT region felt tight and like it needed to be stretched so attempted to have " pt perform gentle (R) UT stretch, but pt also c/o experiencing spasm/pain in his neck so was held. Once pt stopped performing particular motion/therex, pt reported that his spasm/pain resolved. Pt agreed to hold remainder of session today due to difficulty tolerating light therex. Pt stated that he is scheduled to get a nerve block in his neck on Wednesday. Discussed with pt that if he is still not able to tolerate performing light/gentle therex after receiving nerve block then will recommend holding PT and pt follow up with his MD. Pt verbalized understanding and was agreeable to this plan.     Kvng is not progressing well towards his goals.   Pt prognosis is Fair.     Pt will continue to benefit from skilled outpatient physical therapy to address the deficits listed in the problem list box on initial evaluation, provide pt/family education and to maximize pt's level of independence in the home and community environment.     Pt's spiritual, cultural and educational needs considered and pt agreeable to plan of care and goals.    Anticipated barriers to physical therapy: chronic pain     Goals:     Short Term Goals (3 Weeks):   1. Pt will be independent with HEP to supplement PT in improving pain free cervical mobility  2. Pt will improve cervical AROM 5 deg in all planes to improve cervical mobility for driving  3. Pt will improve UE MMTs by 1/2 grade in all planes to improve strength for lifting and carrying tasks.  4. Pt will demonstrate improved sitting posture to decrease pain experienced in head and neck.  Long Term Goals (6 Weeks):   1. Pt will improve FOTO to </=50% limitation to improve perceived limitation with changing and maintaining mobility.  2. Pt will improve cervical AROM to WNL in all planes to improve cervical mobility for driving   3. Pt will improve UE MMTs 1 grade in all planes to improve strength for lifting and carrying tasks.  4. Pt will report no pain with cervical AROM in all planes  to promote QOL    Plan     Continue per POC. Discussed with pt if he still is not able to tolerate performing therex after he gets nerve block then will recommend holding PT and pt follow up with MD. Pt verbalized understanding and was agreeable to plan     Lina Chou PT

## 2020-09-01 ENCOUNTER — OFFICE VISIT (OUTPATIENT)
Dept: HEMATOLOGY/ONCOLOGY | Facility: CLINIC | Age: 60
End: 2020-09-01
Payer: COMMERCIAL

## 2020-09-01 ENCOUNTER — INFUSION (OUTPATIENT)
Dept: INFUSION THERAPY | Facility: HOSPITAL | Age: 60
End: 2020-09-01
Attending: NURSE PRACTITIONER
Payer: COMMERCIAL

## 2020-09-01 VITALS
HEART RATE: 86 BPM | DIASTOLIC BLOOD PRESSURE: 66 MMHG | HEIGHT: 66 IN | BODY MASS INDEX: 29.33 KG/M2 | TEMPERATURE: 98 F | SYSTOLIC BLOOD PRESSURE: 135 MMHG | OXYGEN SATURATION: 99 % | WEIGHT: 182.5 LBS

## 2020-09-01 DIAGNOSIS — R51.9 CHRONIC INTRACTABLE HEADACHE, UNSPECIFIED HEADACHE TYPE: ICD-10-CM

## 2020-09-01 DIAGNOSIS — L85.3 DRY SKIN: ICD-10-CM

## 2020-09-01 DIAGNOSIS — M53.82 DECREASED ROM OF INTERVERTEBRAL DISCS OF CERVICAL SPINE: ICD-10-CM

## 2020-09-01 DIAGNOSIS — T45.1X5A CHEMOTHERAPY-INDUCED THROMBOCYTOPENIA: ICD-10-CM

## 2020-09-01 DIAGNOSIS — T45.1X5A ANTINEOPLASTIC CHEMOTHERAPY INDUCED ANEMIA: ICD-10-CM

## 2020-09-01 DIAGNOSIS — G89.29 CHRONIC INTRACTABLE HEADACHE, UNSPECIFIED HEADACHE TYPE: ICD-10-CM

## 2020-09-01 DIAGNOSIS — K21.9 GASTROESOPHAGEAL REFLUX DISEASE WITHOUT ESOPHAGITIS: ICD-10-CM

## 2020-09-01 DIAGNOSIS — E83.42 HYPOMAGNESEMIA: ICD-10-CM

## 2020-09-01 DIAGNOSIS — C92.01 ACUTE MYELOID LEUKEMIA IN REMISSION: ICD-10-CM

## 2020-09-01 DIAGNOSIS — H53.8 BLURRY VISION: ICD-10-CM

## 2020-09-01 DIAGNOSIS — Z94.84 HISTORY OF ALLOGENEIC STEM CELL TRANSPLANT: Primary | ICD-10-CM

## 2020-09-01 DIAGNOSIS — M48.02 CERVICAL STENOSIS OF SPINE: ICD-10-CM

## 2020-09-01 DIAGNOSIS — D69.59 CHEMOTHERAPY-INDUCED THROMBOCYTOPENIA: ICD-10-CM

## 2020-09-01 DIAGNOSIS — T45.1X5A CINV (CHEMOTHERAPY-INDUCED NAUSEA AND VOMITING): ICD-10-CM

## 2020-09-01 DIAGNOSIS — R11.2 CINV (CHEMOTHERAPY-INDUCED NAUSEA AND VOMITING): ICD-10-CM

## 2020-09-01 DIAGNOSIS — I10 ESSENTIAL HYPERTENSION: ICD-10-CM

## 2020-09-01 DIAGNOSIS — C92.11 CML IN REMISSION: ICD-10-CM

## 2020-09-01 DIAGNOSIS — F17.201 TOBACCO ABUSE, IN REMISSION: ICD-10-CM

## 2020-09-01 DIAGNOSIS — N40.0 BENIGN PROSTATIC HYPERPLASIA WITHOUT LOWER URINARY TRACT SYMPTOMS: ICD-10-CM

## 2020-09-01 DIAGNOSIS — D64.81 ANTINEOPLASTIC CHEMOTHERAPY INDUCED ANEMIA: ICD-10-CM

## 2020-09-01 LAB
ABO + RH BLD: NORMAL
ALBUMIN SERPL BCP-MCNC: 3.6 G/DL (ref 3.5–5.2)
ALP SERPL-CCNC: 88 U/L (ref 55–135)
ALT SERPL W/O P-5'-P-CCNC: 12 U/L (ref 10–44)
ANION GAP SERPL CALC-SCNC: 13 MMOL/L (ref 8–16)
AST SERPL-CCNC: 13 U/L (ref 10–40)
BASOPHILS # BLD AUTO: 0.06 K/UL (ref 0–0.2)
BASOPHILS NFR BLD: 1.2 % (ref 0–1.9)
BILIRUB SERPL-MCNC: 0.2 MG/DL (ref 0.1–1)
BLD GP AB SCN CELLS X3 SERPL QL: NORMAL
BUN SERPL-MCNC: 20 MG/DL (ref 8–23)
CALCIUM SERPL-MCNC: 9.2 MG/DL (ref 8.7–10.5)
CHLORIDE SERPL-SCNC: 108 MMOL/L (ref 95–110)
CO2 SERPL-SCNC: 19 MMOL/L (ref 23–29)
CREAT SERPL-MCNC: 1.4 MG/DL (ref 0.5–1.4)
DIFFERENTIAL METHOD: ABNORMAL
EOSINOPHIL # BLD AUTO: 0.9 K/UL (ref 0–0.5)
EOSINOPHIL NFR BLD: 17.5 % (ref 0–8)
ERYTHROCYTE [DISTWIDTH] IN BLOOD BY AUTOMATED COUNT: 16.1 % (ref 11.5–14.5)
EST. GFR  (AFRICAN AMERICAN): >60 ML/MIN/1.73 M^2
EST. GFR  (NON AFRICAN AMERICAN): 53.5 ML/MIN/1.73 M^2
GLUCOSE SERPL-MCNC: 114 MG/DL (ref 70–110)
HCT VFR BLD AUTO: 24 % (ref 40–54)
HGB BLD-MCNC: 7.9 G/DL (ref 14–18)
IMM GRANULOCYTES # BLD AUTO: 0.17 K/UL (ref 0–0.04)
IMM GRANULOCYTES NFR BLD AUTO: 3.4 % (ref 0–0.5)
LYMPHOCYTES # BLD AUTO: 0.2 K/UL (ref 1–4.8)
LYMPHOCYTES NFR BLD: 3.8 % (ref 18–48)
MAGNESIUM SERPL-MCNC: 1.3 MG/DL (ref 1.6–2.6)
MCH RBC QN AUTO: 32.9 PG (ref 27–31)
MCHC RBC AUTO-ENTMCNC: 32.9 G/DL (ref 32–36)
MCV RBC AUTO: 100 FL (ref 82–98)
MONOCYTES # BLD AUTO: 0.6 K/UL (ref 0.3–1)
MONOCYTES NFR BLD: 12.9 % (ref 4–15)
NEUTROPHILS # BLD AUTO: 3 K/UL (ref 1.8–7.7)
NEUTROPHILS NFR BLD: 61.2 % (ref 38–73)
NRBC BLD-RTO: 0 /100 WBC
PHOSPHATE SERPL-MCNC: 4.3 MG/DL (ref 2.7–4.5)
PLATELET # BLD AUTO: 110 K/UL (ref 150–350)
PMV BLD AUTO: 10.6 FL (ref 9.2–12.9)
POTASSIUM SERPL-SCNC: 4.7 MMOL/L (ref 3.5–5.1)
PROT SERPL-MCNC: 6.9 G/DL (ref 6–8.4)
RBC # BLD AUTO: 2.4 M/UL (ref 4.6–6.2)
SODIUM SERPL-SCNC: 140 MMOL/L (ref 136–145)
TACROLIMUS BLD-MCNC: 13.5 NG/ML (ref 5–15)
WBC # BLD AUTO: 4.96 K/UL (ref 3.9–12.7)

## 2020-09-01 PROCEDURE — 99999 PR PBB SHADOW E&M-EST. PATIENT-LVL IV: ICD-10-PCS | Mod: PBBFAC,BMT,, | Performed by: NURSE PRACTITIONER

## 2020-09-01 PROCEDURE — 85025 COMPLETE CBC W/AUTO DIFF WBC: CPT

## 2020-09-01 PROCEDURE — 36592 COLLECT BLOOD FROM PICC: CPT

## 2020-09-01 PROCEDURE — 99215 PR OFFICE/OUTPT VISIT, EST, LEVL V, 40-54 MIN: ICD-10-PCS | Mod: BMT,S$GLB,, | Performed by: NURSE PRACTITIONER

## 2020-09-01 PROCEDURE — 83735 ASSAY OF MAGNESIUM: CPT

## 2020-09-01 PROCEDURE — A4216 STERILE WATER/SALINE, 10 ML: HCPCS | Performed by: NURSE PRACTITIONER

## 2020-09-01 PROCEDURE — 80053 COMPREHEN METABOLIC PANEL: CPT

## 2020-09-01 PROCEDURE — 3008F PR BODY MASS INDEX (BMI) DOCUMENTED: ICD-10-PCS | Mod: BMT,CPTII,S$GLB, | Performed by: NURSE PRACTITIONER

## 2020-09-01 PROCEDURE — 87799 DETECT AGENT NOS DNA QUANT: CPT

## 2020-09-01 PROCEDURE — 63600175 PHARM REV CODE 636 W HCPCS: Performed by: NURSE PRACTITIONER

## 2020-09-01 PROCEDURE — 84100 ASSAY OF PHOSPHORUS: CPT

## 2020-09-01 PROCEDURE — 86901 BLOOD TYPING SEROLOGIC RH(D): CPT

## 2020-09-01 PROCEDURE — 25000003 PHARM REV CODE 250: Performed by: NURSE PRACTITIONER

## 2020-09-01 PROCEDURE — 99999 PR PBB SHADOW E&M-EST. PATIENT-LVL IV: CPT | Mod: PBBFAC,BMT,, | Performed by: NURSE PRACTITIONER

## 2020-09-01 PROCEDURE — 99215 OFFICE O/P EST HI 40 MIN: CPT | Mod: BMT,S$GLB,, | Performed by: NURSE PRACTITIONER

## 2020-09-01 PROCEDURE — 80197 ASSAY OF TACROLIMUS: CPT

## 2020-09-01 PROCEDURE — 3008F BODY MASS INDEX DOCD: CPT | Mod: BMT,CPTII,S$GLB, | Performed by: NURSE PRACTITIONER

## 2020-09-01 RX ORDER — HEPARIN 100 UNIT/ML
500 SYRINGE INTRAVENOUS
Status: CANCELLED | OUTPATIENT
Start: 2020-09-01

## 2020-09-01 RX ORDER — HEPARIN 100 UNIT/ML
500 SYRINGE INTRAVENOUS
Status: COMPLETED | OUTPATIENT
Start: 2020-09-01 | End: 2020-09-01

## 2020-09-01 RX ORDER — SODIUM CHLORIDE 0.9 % (FLUSH) 0.9 %
10 SYRINGE (ML) INJECTION
Status: CANCELLED | OUTPATIENT
Start: 2020-09-01

## 2020-09-01 RX ORDER — SODIUM CHLORIDE 0.9 % (FLUSH) 0.9 %
10 SYRINGE (ML) INJECTION
Status: COMPLETED | OUTPATIENT
Start: 2020-09-01 | End: 2020-09-01

## 2020-09-01 RX ADMIN — HEPARIN 500 UNITS: 100 SYRINGE at 09:09

## 2020-09-01 RX ADMIN — Medication 10 ML: at 09:09

## 2020-09-01 NOTE — NURSING
Pt arrived for labs from [facility].  Red lumen with good blood return, labs sent.  Line flushed.  Pt discharged to next appt with wife

## 2020-09-02 ENCOUNTER — HOSPITAL ENCOUNTER (OUTPATIENT)
Facility: OTHER | Age: 60
Discharge: HOME | End: 2020-09-02
Attending: ANESTHESIOLOGY | Admitting: ANESTHESIOLOGY
Payer: COMMERCIAL

## 2020-09-02 VITALS
BODY MASS INDEX: 28.93 KG/M2 | RESPIRATION RATE: 16 BRPM | TEMPERATURE: 98 F | HEIGHT: 66 IN | OXYGEN SATURATION: 95 % | SYSTOLIC BLOOD PRESSURE: 105 MMHG | HEART RATE: 80 BPM | DIASTOLIC BLOOD PRESSURE: 64 MMHG | WEIGHT: 180 LBS

## 2020-09-02 DIAGNOSIS — M47.12 OSTEOARTHRITIS OF SPINE WITH MYELOPATHY, CERVICAL REGION: Primary | ICD-10-CM

## 2020-09-02 DIAGNOSIS — M47.819 SPONDYLOSIS WITHOUT MYELOPATHY: ICD-10-CM

## 2020-09-02 DIAGNOSIS — M47.813 SPONDYLOSIS OF CERVICOTHORACIC REGION W/O MYELOPATHY OR RADICULOPATHY: ICD-10-CM

## 2020-09-02 DIAGNOSIS — M47.819 OSTEOARTHRITIS OF SPINE AT MULTIPLE LEVELS: ICD-10-CM

## 2020-09-02 PROCEDURE — 64490 PR INJ DX/THER AGNT PARAVERT FACET JOINT,IMG GUIDE,CERV/THORAC, 1ST LEVEL: ICD-10-PCS | Mod: 50,BMT,, | Performed by: ANESTHESIOLOGY

## 2020-09-02 PROCEDURE — 64491 INJ PARAVERT F JNT C/T 2 LEV: CPT | Mod: 50,BMT,, | Performed by: ANESTHESIOLOGY

## 2020-09-02 PROCEDURE — 63600175 PHARM REV CODE 636 W HCPCS: Performed by: ANESTHESIOLOGY

## 2020-09-02 PROCEDURE — 64490 INJ PARAVERT F JNT C/T 1 LEV: CPT | Mod: 50,BMT,, | Performed by: ANESTHESIOLOGY

## 2020-09-02 PROCEDURE — 64490 INJ PARAVERT F JNT C/T 1 LEV: CPT | Mod: 50 | Performed by: ANESTHESIOLOGY

## 2020-09-02 PROCEDURE — 25000003 PHARM REV CODE 250: Performed by: ANESTHESIOLOGY

## 2020-09-02 PROCEDURE — 64491 INJ PARAVERT F JNT C/T 2 LEV: CPT | Mod: 50 | Performed by: ANESTHESIOLOGY

## 2020-09-02 PROCEDURE — 64491 PR INJ DX/THER AGNT PARAVERT FACET JOINT,IMG GUIDE,CERV/THORAC, 2ND LEVEL: ICD-10-PCS | Mod: 50,BMT,, | Performed by: ANESTHESIOLOGY

## 2020-09-02 RX ORDER — HEPARIN 100 UNIT/ML
500 SYRINGE INTRAVENOUS
Status: CANCELLED | OUTPATIENT
Start: 2020-09-02

## 2020-09-02 RX ORDER — SODIUM CHLORIDE 0.9 % (FLUSH) 0.9 %
10 SYRINGE (ML) INJECTION
Status: CANCELLED | OUTPATIENT
Start: 2020-09-02

## 2020-09-02 RX ORDER — FENTANYL CITRATE 50 UG/ML
INJECTION, SOLUTION INTRAMUSCULAR; INTRAVENOUS
Status: DISCONTINUED | OUTPATIENT
Start: 2020-09-02 | End: 2020-09-02 | Stop reason: HOSPADM

## 2020-09-02 RX ORDER — LORAZEPAM/0.9% SODIUM CHLORIDE 100MG/0.1L
2 PLASTIC BAG, INJECTION (ML) INTRAVENOUS
Status: CANCELLED | OUTPATIENT
Start: 2020-09-02

## 2020-09-02 RX ORDER — SODIUM CHLORIDE 9 MG/ML
500 INJECTION, SOLUTION INTRAVENOUS CONTINUOUS
Status: DISCONTINUED | OUTPATIENT
Start: 2020-09-02 | End: 2020-09-02 | Stop reason: HOSPADM

## 2020-09-02 RX ORDER — LIDOCAINE HYDROCHLORIDE 5 MG/ML
INJECTION, SOLUTION INFILTRATION; INTRAVENOUS
Status: DISCONTINUED | OUTPATIENT
Start: 2020-09-02 | End: 2020-09-02 | Stop reason: HOSPADM

## 2020-09-02 RX ORDER — DEXAMETHASONE SODIUM PHOSPHATE 4 MG/ML
INJECTION, SOLUTION INTRA-ARTICULAR; INTRALESIONAL; INTRAMUSCULAR; INTRAVENOUS; SOFT TISSUE
Status: DISCONTINUED | OUTPATIENT
Start: 2020-09-02 | End: 2020-09-02 | Stop reason: HOSPADM

## 2020-09-02 RX ORDER — MIDAZOLAM HYDROCHLORIDE 1 MG/ML
INJECTION INTRAMUSCULAR; INTRAVENOUS
Status: DISCONTINUED | OUTPATIENT
Start: 2020-09-02 | End: 2020-09-02 | Stop reason: HOSPADM

## 2020-09-02 RX ORDER — LIDOCAINE HYDROCHLORIDE 10 MG/ML
INJECTION INFILTRATION; PERINEURAL
Status: DISCONTINUED | OUTPATIENT
Start: 2020-09-02 | End: 2020-09-02 | Stop reason: HOSPADM

## 2020-09-02 NOTE — DISCHARGE SUMMARY
Discharge Note  Short Stay      SUMMARY     Admit Date: 9/2/2020    Attending Physician: Taylor Russo      Discharge Physician: Taylor Russo      Discharge Date: 9/2/2020 3:29 PM    Procedure(s) (LRB):  BLOCK, NERVE bilateral C4,5,6 MBB (Bilateral)    Final Diagnosis: Lumbar spondylosis [M47.816]    Disposition: Home or self care    Patient Instructions:   Current Discharge Medication List      CONTINUE these medications which have NOT CHANGED    Details   acyclovir (ZOVIRAX) 800 MG Tab Take 1 tablet (800 mg total) by mouth 2 (two) times daily.  Qty: 60 tablet, Refills: 11    Associated Diagnoses: CML (chronic myelocytic leukemia)      celecoxib (CELEBREX) 100 MG capsule Take 1 capsule (100 mg total) by mouth 2 (two) times daily.  Qty: 60 capsule, Refills: 2    Associated Diagnoses: Cervical stenosis of spine      chlorhexidine (PERIDEX) 0.12 % solution RINSE WITH 15 MLS PO FOR 30 SECONDS BID      diazePAM (VALIUM) 2 MG tablet Take 1 tablet (2 mg total) by mouth On call Procedure for Anxiety (take 2 hours prior to procedure).  Qty: 1 tablet, Refills: 0    Associated Diagnoses: S/P allogeneic bone marrow transplant; Acute myeloid leukemia in remission      esomeprazole (NEXIUM) 20 MG capsule Take 20 mg by mouth before breakfast.      fluconazole (DIFLUCAN) 200 MG Tab Take 2 tablets (400 mg total) by mouth once daily.  Qty: 60 tablet, Refills: 6    Associated Diagnoses: CML (chronic myelocytic leukemia)      fluticasone propionate (FLONASE) 50 mcg/actuation nasal spray 2 sprays (100 mcg total) by Each Nostril route daily as needed for Rhinitis.  Qty: 16 g, Refills: 0    Associated Diagnoses: History of allogeneic stem cell transplant      gabapentin (NEURONTIN) 300 MG capsule Take 1 capsule (300 mg total) by mouth 3 (three) times daily.  Qty: 90 capsule, Refills: 11    Associated Diagnoses: History of allogeneic stem cell transplant; Cervical stenosis of spine      lidocaine (LIDODERM) 5 % Place 2 patches onto the  skin once daily. Remove & Discard patch within 12 hours or as directed by MD  Qty: 10 patch, Refills: 3    Associated Diagnoses: History of allogeneic stem cell transplant      loratadine (CLARITIN) 10 mg tablet Take 10 mg by mouth once daily.      magic mouthwash diphen/antac/lidoc/nysta Take 10 mLs by mouth 4 (four) times daily as needed (mouth sores).  Qty: 120 mL, Refills: 0    Associated Diagnoses: CML in remission; History of allogeneic stem cell transplant      magnesium oxide (MAGOX) 400 mg (241.3 mg magnesium) tablet Take 2 tablets (800 mg total) by mouth 2 (two) times daily.  Qty: 200 tablet, Refills: 1    Associated Diagnoses: History of allogeneic stem cell transplant      methocarbamoL (ROBAXIN) 500 MG Tab Take 1 tablet (500 mg total) by mouth 3 (three) times daily as needed.  Qty: 30 tablet, Refills: 0    Associated Diagnoses: Myofascial pain      morphine (MSIR) 15 MG tablet Take 1 tablet (15 mg total) by mouth every 6 (six) hours as needed for Pain.  Qty: 10 tablet, Refills: 0    Comments: Quantity prescribed more than 7 day supply? No  Associated Diagnoses: Cervical stenosis of spine      mycophenolate (CELLCEPT) 250 mg Cap Take 4 capsules (1,000 mg total) by mouth 3 (three) times daily.  Qty: 252 capsule, Refills: 0    Associated Diagnoses: CML (chronic myelocytic leukemia)      ondansetron (ZOFRAN-ODT) 8 MG TbDL Dissolve 1 tablet (8 mg total) by mouth every 8 (eight) hours as needed (nausea).  Qty: 30 tablet, Refills: 1    Associated Diagnoses: History of allogeneic stem cell transplant      opw transplant care kit Welcome to [facility].  This is your transplant care kit.  Qty: 1 kit, Refills: 0      oxyCODONE (OXY-IR) 5 mg Cap Take 1 capsule (5 mg total) by mouth On call Procedure for Pain (take 30 minutes prior to procedure).  Qty: 1 capsule, Refills: 0    Comments: Quantity prescribed more than 7 day supply? No  Associated Diagnoses: S/P allogeneic bone marrow transplant; Acute  myeloid leukemia in remission      prochlorperazine (COMPAZINE) 10 MG tablet Take 1 tablet (10 mg total) by mouth 4 (four) times daily as needed for Nausea.  Qty: 30 tablet, Refills: 1    Associated Diagnoses: History of allogeneic stem cell transplant      sulfamethoxazole-trimethoprim 800-160mg (BACTRIM DS) 800-160 mg Tab Take 1 tablet by mouth every Mon, Wed, Fri.  Qty: 12 tablet, Refills: 6    Associated Diagnoses: CML (chronic myelocytic leukemia)      tacrolimus (PROGRAF) 0.5 MG Cap Take 4mg (8 tablets) by mouth in the mornings and 4.5mg (9 tablets) by mouth in the evenings  Take after labs on clinic appointment days.  Qty: 480 capsule, Refills: 6    Comments: Just changing dose. No fill needed  Associated Diagnoses: History of allogeneic stem cell transplant      tamsulosin (FLOMAX) 0.4 mg Cap Take 1 capsule (0.4 mg total) by mouth once daily.  Qty: 30 capsule, Refills: 3    Associated Diagnoses: History of allogeneic stem cell transplant      tiZANidine (ZANAFLEX) 4 MG tablet Take 1 tablet (4 mg total) by mouth nightly as needed.  Qty: 30 tablet, Refills: 0      triamcinolone acetonide 0.1% (KENALOG) 0.1 % cream Apply topically as needed. Rash on hands  Qty: 30 g, Refills: 1    Associated Diagnoses: History of allogeneic stem cell transplant                 Discharge Diagnosis: Lumbar spondylosis [M47.816]  Condition on Discharge: Stable with no complications to procedure   Diet on Discharge: Same as before.  Activity: as per instruction sheet.  Discharge to: Home with a responsible adult.  Follow up: 2-4 weeks

## 2020-09-02 NOTE — PROGRESS NOTES
Subjective:    Patient ID: Kvng Jones Sr. is a 60 y.o. male.    Chief Complaint: History of allogeneic stem cell transplant    Oncologic hx:  60 y.o. male admitted to [facility] for CML presenting with blast crisis and lewis disease. He was admitted for evaluation and induction chemotherapy with FLAG-Addis. Complications of therapy include epididymal orchitis with negative testicular ultrasound, neutropenic fever, dyspnea, and GGO of bilateral lungs on CT chest. Fever and chest findings were covered with broad spectrum antimicrobials. He did have hallucinations and vivid dreams with posaconazole. Chemotherapy was administered by left arm PICC line that was removed during hospital stay for MSSA infection treated with vancomycin. Hospital admission was 3/30/2020 to 5/1/2020 achieving morphologic CR with induction chemotherapy. He then started Ponatinib 30mg daily.     Studies performed during his hospital stay include pre-chemotherapy ECHO with normal ejection fraction of 60-65%. HIV, HBV, and HCV tests were negative. CBC at admission was WBC 38.6, Hgb 14.6, and platelet 416K. CT of the head without contrast was normal 4/20/2020. US of abdomen performed for abdominal distention and neutropenic fever had hepatic steatosis, liver 19cm, and spleen 11 cm. He does have a history of alcohol use and pancreatitis.    Patient is a . He is  to wife Guillermina who will be a caregiver post transplant. He has a 45 pack year smoking history. He quit 6/1/2020, now on Chantix. He drinks 3-4 alcohol beverages nightly. He has 3 children. He has several family members in the [facility] area.    Transplant Course: Admitted 7/21/20 for a Flu/Cy/TBI haplo allo SCT. Son was donor. Received 2 bags and a total CD34 dose of 3.10 x10^6/kg on 7/28/20. Tolerated stem cells well. C/o headaches prior to admission, which persisted throughout admission. Neuro ultimately consulted for rec's. Was a daily drinker  prior to admission. No s/s of alcolhol withdrawal during admission. Transplant further complicated by hypertension, heart burn, c-diff neg diarrhea, nausea, sinus congestion, mild peripheral edema, blurry vision (ophtho consulted), mucositis, electrolyte abnormalities, and neck pain 2/2 spinal stenosis (referred to pain mgt at discharge). He engrafted on 8/10/20 with an ANC of 1280. He was discharged on 8/12/2020.    Interval History:  Patient presents for follow up clinic visit post Flu/Cy/TBI allogeneic stem cell transplant. Today is Day +38. He is s/p MBB on9/2/20 for his severe neck pain/stiffness. He states that he got very little relief and is requesting morphine today. Continues with PT for neck issues. Appetite and oral intake has improved though food still does not taste good. Nausea is controlled with zofran and compazine. Denies fevers, cough, SOB, chest pain, n/v/d/c, and rash.     Review of Systems   Constitutional: Positive for activity change and fatigue. Negative for appetite change, chills, diaphoresis, fever and unexpected weight change.   HENT: Negative for congestion, dental problem, mouth sores, nosebleeds, postnasal drip, rhinorrhea, sinus pressure and trouble swallowing.    Eyes: Negative.  Negative for photophobia and visual disturbance.   Respiratory: Negative.  Negative for cough and shortness of breath.    Cardiovascular: Negative.  Negative for chest pain, palpitations and leg swelling.   Gastrointestinal: Negative for abdominal distention, abdominal pain, blood in stool, constipation, diarrhea, nausea and vomiting.   Endocrine: Negative.    Genitourinary: Negative.  Negative for dysuria, frequency, hematuria and urgency.   Musculoskeletal: Positive for back pain, neck pain and neck stiffness.        History of cervical spinal stenosis. See hpi   Skin: Negative.  Negative for pallor and rash.   Allergic/Immunologic: Negative for environmental allergies, food allergies and  immunocompromised state.   Neurological: Positive for tremors and headaches. Negative for dizziness, syncope, weakness and numbness.   Hematological: Negative for adenopathy. Does not bruise/bleed easily.   Psychiatric/Behavioral: Negative.  Negative for confusion. The patient is not nervous/anxious.        Objective:     Physical Exam  Vitals signs and nursing note reviewed.   Constitutional:       Appearance: Normal appearance. He is well-developed.   HENT:      Head: Normocephalic and atraumatic.      Mouth/Throat:      Mouth: Mucous membranes are dry.   Eyes:      General: No scleral icterus.  Neck:      Musculoskeletal: Neck supple. Decreased range of motion.   Cardiovascular:      Rate and Rhythm: Normal rate and regular rhythm.      Pulses: Normal pulses.      Heart sounds: Normal heart sounds.   Pulmonary:      Effort: Pulmonary effort is normal. No respiratory distress.      Breath sounds: Normal breath sounds.   Abdominal:      General: Bowel sounds are normal. There is no distension.      Palpations: Abdomen is soft.      Tenderness: There is no abdominal tenderness.   Musculoskeletal:         General: No tenderness.      Right lower leg: No edema.      Left lower leg: No edema.   Skin:     General: Skin is warm and dry.      Coloration: Skin is not pale.      Findings: No erythema, petechiae or rash.      Comments: Dalal intact to right CW with no redness or drainage     Neurological:      Mental Status: He is alert and oriented to person, place, and time.   Psychiatric:         Attention and Perception: Attention normal.         Speech: Speech normal.         Thought Content: Thought content normal.         Cognition and Memory: Cognition and memory normal.         Assessment:       1. History of allogeneic stem cell transplant    2. CML in remission    3. Acute myeloid leukemia in remission    4. Acute GVHD    5. Dry skin    6. Hypomagnesemia    7. CINV (chemotherapy-induced nausea and vomiting)     8. Blurry vision    9. Antineoplastic chemotherapy induced anemia    10. Chemotherapy-induced thrombocytopenia    11. Musculoskeletal disorder involving upper trapezius muscle    12. Decreased ROM of intervertebral discs of cervical spine    13. Cervical stenosis of spine    14. Chronic intractable headache, unspecified headache type    15. Tobacco abuse, in remission    16. Benign prostatic hyperplasia without lower urinary tract symptoms    17. Gastroesophageal reflux disease without esophagitis    18. Essential hypertension    19. AURORA (acute kidney injury)        Plan:         History of allogeneic stem cell transplant  - Admitted 7/21/20 for planned Flu/Cy/TBI haplo allo SCT. Son was the donor. Received 2 bags and a total CD34 dose of 3.10 x10^6/kg on 7/28/20. Recieved post transplant cyclophosphamide. Engrafted neutrophils 8/10/20 day +13  - Today is Day +38  - Tacro level 7.2 (goal 7-9). Currently taking 4 mg BID. Last dose change was on 9/1/20. Given AURORA, will decrease dose to 3.5 mg BID.  - stopped Ursodiol Day +30 (8/27/20)  - planning for day +30 BM bx today after this clinic visit. Patient has had bad experience x 2. Once here. Rx for Valium and Oxycodone pre-procedure.  - had BMBx in clinic on 8/28/20 showing no definitive morphologic or immunophenotypic evidence of residual CML. Chimerisms are pending.      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 Achieved morphologic CR  - Started on daily Ponatinib 30 mg daily, which he continued to take outpatient. Held Ponatinib during admission; may not need to restart per recent literature. BBMT 2020(86): 212-56  - Had BMBx at [facility] on 7/1/2020 showing no morphologic or immunophenotypic evidence of AML/CML  - Admitted 7/21/20 for Flu/Cy/TBI haplo allo SCT    GVHD  - had rash inpatient that was believed to be drug  eruption. Resolved  - no evidence of GVHD today (skin, liver or GI); no evidence of chronic GVHD  - GVHD charting with each visit     Dry Painful, Itchy Skin  - lotion recommended - non-fragrance such as CeraVe, Aquaphor, Jergens, Vaseline Intensive care  - Aveeno bath soak  - Avoiding benadryl, atarax for pruritus due to number of sedative medications patient is currently on for pain management  - improved    ID  - Last CMV and EBV undetected; continue twice weekly CMV, weekly EBV, today's pending  - Acyclovir (Zoster prophylaxis) until 6 months after tacrolimus discontinued  - Diflucan (Fungal prophylaxis) until off tacrolimus  - Bactrim (PJP prophylaxis) until off tacrolimus.     Hypomagnesemia/hyperphosphatemia   - Mag 1.3. Increased mag dose from 800 mg BID to TID at previous visit. Was unable to get IV mag in infusion at previous visit. Will receive IV mag in infusion today (9/4).  - Phos 3.2.  Will watch and consider phos binder if needed in future. Not indicated at this time.     Mucositis due to chemotherapy/Thrush  - Continue prn [facility]; patient also uses Aloe oral rinses  - Recommend baking soda and warm water rinses  - Started nystatin x10 days 8/18 (completed 8/27)  - No thrush on exam today     CINV (chemotherapy-induced nausea and vomiting)  - c/w prn zofran and compazine. Helping.     Blurry vision  - C/o worsening blurry vision 7/30/20  - May be contributing to headaches  - Ophtho consulted and saw patient 7/30  - Ophtho dilated pupils and performed fundal exam. Fundal exam neg.  - Patient had been viewing laptop screen for ~ 2 hours prior to experiencing blurry vision  - Per ophtho, blurry vision due to dry eyes. rec'd artificial tears QID prn and warm compresses BID prn. Can escalate artificial tears to ointment if needed  - improved     Antineoplastic chemotherapy-induced cytopenias: anemia and thrombocytopenia   - Transfuse Hgb <7g and plt <10K  - hgb 7.1; plt 113K  - check B12, folate, TSH, T4  all of which were normal. Also checked reticulocytes which were elelvated. BMBx showing no residual disease. Expect anemia will improve.  - will give 1 unit prbc in infusion today for symptomatic anemia (9/4).     Headache/ Neck pain/ Cervical stenosis of spine  - Hx of spinal fusion C3-C5  - Started having headaches when he started Ponatinib at diagnosis. Onset seems to correspond to when patient quit smoking and when he started ponatinib  - Had been off of ponatinib for a few days but HAs persisted during transplant admission  - Pattern is like cluster headaches (onset during sleep), but not unilateral versus tension HA   - O2 via non-rebreather at 12 L over 15 minutes seemed to help but developed hot flash so pt stopped  - Ultram, Flexeril, Oxycodone, lidoderm, neck pillow, c-collar did not help  - Imitrex PRN q2hr (max 200mg in 24hrs). Not contraindicated per pharm D.  - Patient reporting that headache may be sinus in nature. Maxillary tenderness noted. Taking claritin  - Saw optho and neuro inpatient, trialed IV mag  - Is now seeing pain mgt for this. Current headaches seem to be caused by muscle spasms 2/2 cervical stenosis  - Flexeril changed to zanaflex by pain management. Also on gabapentin. Restarted celebrex 8/18 as plt >50K. Also taking morphine 15mg Q4h prn (he is taking this only two times daily despite being in pain). Educated to take more in short term until pain management appt.  - He has 1 pill of morphine left. Refilled 8/21 for short supply to last him until his pain management appt.   - Recommended alternating heat and ice  - Xray 9/19 shows surgical changes of fusion from C3 through C5 with degenerative changed below fusion.   - Following with pain mgt and PT.  - Started PT on 8/26.  - No steroid injections at this time per pain management due to transplant.  - Pt feels neck pain started after hickmann was placed  - Has been mostly refractory to medications (zanaflex, gabapentin, celebrex,  lidoderm patches). Some relief with oral morphine  - CT from 8/24/20 showing history of C3, C4, and C5 fusion and diffuse osteoarthritic changes  - Had virtual pain management appt on  8/25 to review CT results.  - Zanaflex switched to Robaxin per pain mgt.  - had medial branch block on 9/2/20 with Dr. Mcelroy (pain mgt). States that he got very little relief. Called Dr. Mcelroy's office requesting morphine. Was denied until he can see MD. Plan is for him to see Dr. Mcelroy's on 9/8/20 (not yet scheduled). He is requesting morphine from me today until he can see Dr. Mcelroy. Gave him 12 tablets and told him that after he sees Dr. Mcelroy on Tuesday, he will receive no more narcotic medication from BMT. We will defer to pain mgt for all future pain medication prescriptions.    Tobacco abuse, in remission  - 35 pack year history  - Quit smoking 6/1/2020 using Chantix  - Patient completed course of Chantix and denies need for Nicotine patch    BPH (benign prostatic hyperplasia)  - Continue Flomax     GERD (gastroesophageal reflux disease)  - Continue nexium   - Can take TUMS prn     Essential hypertension  - Increased home amlodipine from 5 mg to 10 mg daily while inpatient due to BPs as high as 180s/110s   - /71 today  - has been out of amlodipine for last 5 days. Held at last visit (8/28/20). Can resume with elevated BP. No indication for resumption today.    AURORA (9/4)  - creatinine up to 1.8 today.   - stopped cellcept on Day +35  - holding Celebrex  - dose reduced tacro  - will renally dose fluconazle to 200 mg daily. (will need to be increased back up to 400 mg daily upon resolution of AURORA).  - do not believe that Bacrim is the culprit because he has been taking this for weeks (despite the fact that it was not to have started until Day +30)  - oral fluid intake fair (3 bottles daily). Encouraged him to double water intake.  - will give 1 liter IVF in infusion today (9/4).    Follow-up:   - CBC, CMP,  t&s, tacro, CMV, EBV (weekly only), mag, phos and appt with Dr. Hodges alternating with NP twice weekly.  - Labs should be in AMs while on tacro. Appts scheduled through end of September.   - please B12, folate, TSH, T4, and reticulocytes to lab work for Tuesday.      Ivon Morrow, FNP  Hematology/Oncology/Bone Marrow Transplant

## 2020-09-02 NOTE — INTERVAL H&P NOTE
The patient has been examined and the H&P has been reviewed:    I concur with the findings and no changes have occurred since H&P was written.    Anesthesia/Surgery risks, benefits and alternative options discussed and understood by patient/family.          Active Hospital Problems    Diagnosis  POA    Spondylosis of cervicothoracic region w/o myelopathy or radiculopathy [M47.813]  Yes      Resolved Hospital Problems   No resolved problems to display.

## 2020-09-02 NOTE — OP NOTE
"Patient Name: Kvng Jones .  MRN: [id number]    INFORMED CONSENT: The procedure, risks, benefits and options were discussed with patient. There are no contraindications to the procedure. The patient expressed understanding and agreed to proceed. The personnel performing the procedure was discussed. I verify that I personally obtained Kvng's consent prior to the start of the procedure and the signed consent can be found on the patient's chart.    Procedure Date: 09/02/2020    Anesthesia: Topical    Pre Procedure diagnosis: Lumbar spondylosis [M47.816]  1. Osteoarthritis of spine with myelopathy, cervical region    2. Spondylosis without myelopathy    3. Osteoarthritis of spine at multiple levels    4. Spondylosis of cervicothoracic region w/o myelopathy or radiculopathy      Post-Procedure diagnosis: SAME    Sedation: Midazolam 2mg and Fentanyl 50mcg    PROCEDURE: BILATERAL C4, 5, 6 CERVICAL FACET MEDIAL BRANCH NERVE BLOCK (Prone)    DESCRIPTION OF PROCEDURE: The patient was brought to the procedure room.  After performing time out. IV access was obtained prior to the procedure.  The patient was positioned prone on the fluoroscopy table.  Continuous hemodynamic monitoring was initiated including blood pressure, EKG, and pulse oximetry.  The skin overlying the cervical spine was prepped with chlorhexidine and draped into a sterile field.  Fluoroscopy was used to identify the location of the both side C4 to C6 medial branch nerves.  Skin anesthesia was achieved using 5 cc of Lidocaine 1% over the injection sites. A 22 gauge, 3 1/2" spinal needle was slowly inserted at each level using AP, lateral and oblique fluoroscopic imaging. Final needle position was at the midpoint of the waist of the articular pillars. Negative aspiration for blood or CSF was confirmed.  6 ml bupivacaine 0.25%was injected at all sites..  The needles were removed and bleeding was nil. A sterile dressing was applied.No specimens " collected. The patient was brought to recovery in stable condition. Kvng was taken back to the recovery room for further observation.     Blood Loss: Nill  Specimen: None    Taylor Russo MD

## 2020-09-03 ENCOUNTER — TELEPHONE (OUTPATIENT)
Dept: PAIN MEDICINE | Facility: CLINIC | Age: 60
End: 2020-09-03

## 2020-09-03 ENCOUNTER — PATIENT MESSAGE (OUTPATIENT)
Dept: PAIN MEDICINE | Facility: CLINIC | Age: 60
End: 2020-09-03

## 2020-09-03 LAB
CMV DNA SERPL NAA+PROBE-ACNC: NORMAL IU/ML
EBV DNA BY PCR: NORMAL IU/ML

## 2020-09-03 NOTE — TELEPHONE ENCOUNTER
----- Message from Gardenia Mace sent at 9/3/2020  4:39 PM CDT -----  Regarding: rt a missed call   Type:  Patient Returning Call    Who Called: SONA SCHWARTZ SR. [[id number]]    Who Left Message for Patient:unknown    Does the patient know what this is regarding?yes    Best Call Back Number:[phone]    Additional Information:

## 2020-09-03 NOTE — TELEPHONE ENCOUNTER
Staff spoke with pt regarding MBB 9/2/20    Pt reports he only received 10% of over all relief 24 hours after the block    Staff informed pt we will send regards to the ordering provider and follow up upon their response     Pt verbalized understanding

## 2020-09-03 NOTE — TELEPHONE ENCOUNTER
This is negative result. Please make him a follow up with Dr. Mcelroy, as he would like to discuss medication management.

## 2020-09-03 NOTE — TELEPHONE ENCOUNTER
----- Message from Alee Rosenberg, Patient Care Assistant sent at 9/3/2020 11:12 AM CDT -----  Name of Who is Calling: SONA SCHWARTZ SR. [[id number]]    What is the request in detail: Requesting a call back in regards of pain diary. Please contact to further discuss and advise      Can the clinic reply by [facility]: No    What Number to Call Back if not in [facility]:   [phone]

## 2020-09-03 NOTE — TELEPHONE ENCOUNTER
staff returned call to pt     explained to him that per Sondra, since he had a failed block procedure, the next step is for him to see the physician for him to discuss medication management, in an office visit    Pt stated he wants medication for over the weekend, staff reiterated the above information,    Pt state he will call his other provider to see what they can do for him       Pt scheduled for 9/10/20 @ 1:15 with Dr. Mcelroy

## 2020-09-04 ENCOUNTER — OFFICE VISIT (OUTPATIENT)
Dept: HEMATOLOGY/ONCOLOGY | Facility: CLINIC | Age: 60
End: 2020-09-04
Payer: COMMERCIAL

## 2020-09-04 ENCOUNTER — INFUSION (OUTPATIENT)
Dept: INFUSION THERAPY | Facility: HOSPITAL | Age: 60
End: 2020-09-04
Attending: NURSE PRACTITIONER
Payer: COMMERCIAL

## 2020-09-04 ENCOUNTER — LAB VISIT (OUTPATIENT)
Dept: LAB | Facility: HOSPITAL | Age: 60
End: 2020-09-04
Payer: COMMERCIAL

## 2020-09-04 VITALS
TEMPERATURE: 98 F | SYSTOLIC BLOOD PRESSURE: 112 MMHG | DIASTOLIC BLOOD PRESSURE: 70 MMHG | HEART RATE: 72 BPM | OXYGEN SATURATION: 100 % | RESPIRATION RATE: 18 BRPM

## 2020-09-04 VITALS
TEMPERATURE: 98 F | DIASTOLIC BLOOD PRESSURE: 71 MMHG | BODY MASS INDEX: 30.28 KG/M2 | HEART RATE: 84 BPM | OXYGEN SATURATION: 100 % | RESPIRATION RATE: 18 BRPM | WEIGHT: 187.63 LBS | SYSTOLIC BLOOD PRESSURE: 118 MMHG

## 2020-09-04 DIAGNOSIS — N40.0 BENIGN PROSTATIC HYPERPLASIA WITHOUT LOWER URINARY TRACT SYMPTOMS: ICD-10-CM

## 2020-09-04 DIAGNOSIS — R11.2 CINV (CHEMOTHERAPY-INDUCED NAUSEA AND VOMITING): ICD-10-CM

## 2020-09-04 DIAGNOSIS — R51.9 CHRONIC INTRACTABLE HEADACHE, UNSPECIFIED HEADACHE TYPE: ICD-10-CM

## 2020-09-04 DIAGNOSIS — Z94.84 HISTORY OF ALLOGENEIC STEM CELL TRANSPLANT: Primary | ICD-10-CM

## 2020-09-04 DIAGNOSIS — H53.8 BLURRY VISION: ICD-10-CM

## 2020-09-04 DIAGNOSIS — D64.81 ANTINEOPLASTIC CHEMOTHERAPY INDUCED ANEMIA: ICD-10-CM

## 2020-09-04 DIAGNOSIS — F17.201 TOBACCO ABUSE, IN REMISSION: ICD-10-CM

## 2020-09-04 DIAGNOSIS — C92.01 ACUTE MYELOID LEUKEMIA IN REMISSION: ICD-10-CM

## 2020-09-04 DIAGNOSIS — M48.02 CERVICAL STENOSIS OF SPINE: ICD-10-CM

## 2020-09-04 DIAGNOSIS — T45.1X5A CINV (CHEMOTHERAPY-INDUCED NAUSEA AND VOMITING): ICD-10-CM

## 2020-09-04 DIAGNOSIS — T45.1X5A ANTINEOPLASTIC CHEMOTHERAPY INDUCED ANEMIA: ICD-10-CM

## 2020-09-04 DIAGNOSIS — D69.59 CHEMOTHERAPY-INDUCED THROMBOCYTOPENIA: ICD-10-CM

## 2020-09-04 DIAGNOSIS — D89.810 ACUTE GVHD: ICD-10-CM

## 2020-09-04 DIAGNOSIS — M62.9 MUSCULOSKELETAL DISORDER INVOLVING UPPER TRAPEZIUS MUSCLE: ICD-10-CM

## 2020-09-04 DIAGNOSIS — E83.42 HYPOMAGNESEMIA: ICD-10-CM

## 2020-09-04 DIAGNOSIS — L85.3 DRY SKIN: ICD-10-CM

## 2020-09-04 DIAGNOSIS — I10 ESSENTIAL HYPERTENSION: ICD-10-CM

## 2020-09-04 DIAGNOSIS — M53.82 DECREASED ROM OF INTERVERTEBRAL DISCS OF CERVICAL SPINE: ICD-10-CM

## 2020-09-04 DIAGNOSIS — Z94.84 HISTORY OF ALLOGENEIC STEM CELL TRANSPLANT: ICD-10-CM

## 2020-09-04 DIAGNOSIS — G89.29 CHRONIC INTRACTABLE HEADACHE, UNSPECIFIED HEADACHE TYPE: ICD-10-CM

## 2020-09-04 DIAGNOSIS — K21.9 GASTROESOPHAGEAL REFLUX DISEASE WITHOUT ESOPHAGITIS: ICD-10-CM

## 2020-09-04 DIAGNOSIS — C92.11 CML IN REMISSION: ICD-10-CM

## 2020-09-04 DIAGNOSIS — N17.9 AKI (ACUTE KIDNEY INJURY): ICD-10-CM

## 2020-09-04 DIAGNOSIS — T45.1X5A CHEMOTHERAPY-INDUCED THROMBOCYTOPENIA: ICD-10-CM

## 2020-09-04 PROBLEM — M54.30 SCIATICA: Status: ACTIVE | Noted: 2020-09-04

## 2020-09-04 LAB
ABO + RH BLD: NORMAL
ALBUMIN SERPL BCP-MCNC: 3.7 G/DL (ref 3.5–5.2)
ALP SERPL-CCNC: 75 U/L (ref 55–135)
ALT SERPL W/O P-5'-P-CCNC: 13 U/L (ref 10–44)
ANION GAP SERPL CALC-SCNC: 7 MMOL/L (ref 8–16)
AST SERPL-CCNC: 13 U/L (ref 10–40)
BASOPHILS # BLD AUTO: 0.05 K/UL (ref 0–0.2)
BASOPHILS NFR BLD: 0.7 % (ref 0–1.9)
BCR/ABL,P210 RESULT: NORMAL
BILIRUB SERPL-MCNC: 0.1 MG/DL (ref 0.1–1)
BLD GP AB SCN CELLS X3 SERPL QL: NORMAL
BLD PROD TYP BPU: NORMAL
BLOOD UNIT EXPIRATION DATE: NORMAL
BLOOD UNIT TYPE CODE: 6200
BLOOD UNIT TYPE: NORMAL
BUN SERPL-MCNC: 27 MG/DL (ref 8–23)
CALCIUM SERPL-MCNC: 8.8 MG/DL (ref 8.7–10.5)
CHLORIDE SERPL-SCNC: 106 MMOL/L (ref 95–110)
CHROM BANDING METHOD: NORMAL
CHROMOSOME ANALYSIS BM ADDITIONAL INFORMATION: NORMAL
CHROMOSOME ANALYSIS BM RELEASED BY: NORMAL
CHROMOSOME ANALYSIS BM RESULT SUMMARY: NORMAL
CLINICAL CYTOGENETICIST REVIEW: NORMAL
CO2 SERPL-SCNC: 22 MMOL/L (ref 23–29)
CODING SYSTEM: NORMAL
COMMENT: NORMAL
CREAT SERPL-MCNC: 1.8 MG/DL (ref 0.5–1.4)
DIFFERENTIAL METHOD: ABNORMAL
DISPENSE STATUS: NORMAL
EOSINOPHIL # BLD AUTO: 0.5 K/UL (ref 0–0.5)
EOSINOPHIL NFR BLD: 6.9 % (ref 0–8)
ERYTHROCYTE [DISTWIDTH] IN BLOOD BY AUTOMATED COUNT: 17.3 % (ref 11.5–14.5)
EST. GFR  (AFRICAN AMERICAN): 45.6 ML/MIN/1.73 M^2
EST. GFR  (NON AFRICAN AMERICAN): 39.5 ML/MIN/1.73 M^2
FINAL PATHOLOGIC DIAGNOSIS: NORMAL
FOLATE SERPL-MCNC: 5.1 NG/ML (ref 4–24)
GLUCOSE SERPL-MCNC: 98 MG/DL (ref 70–110)
GROSS: NORMAL
HCT VFR BLD AUTO: 22.3 % (ref 40–54)
HGB BLD-MCNC: 7.1 G/DL (ref 14–18)
IMM GRANULOCYTES # BLD AUTO: 0.28 K/UL (ref 0–0.04)
IMM GRANULOCYTES NFR BLD AUTO: 3.7 % (ref 0–0.5)
KARYOTYP MAR: NORMAL
LYMPHOCYTES # BLD AUTO: 0.3 K/UL (ref 1–4.8)
LYMPHOCYTES NFR BLD: 3.6 % (ref 18–48)
Lab: NORMAL
MAGNESIUM SERPL-MCNC: 1.3 MG/DL (ref 1.6–2.6)
MCH RBC QN AUTO: 33.6 PG (ref 27–31)
MCHC RBC AUTO-ENTMCNC: 31.8 G/DL (ref 32–36)
MCV RBC AUTO: 106 FL (ref 82–98)
MICROSCOPIC EXAM: NORMAL
MONOCYTES # BLD AUTO: 0.9 K/UL (ref 0.3–1)
MONOCYTES NFR BLD: 11.7 % (ref 4–15)
NEUTROPHILS # BLD AUTO: 5.5 K/UL (ref 1.8–7.7)
NEUTROPHILS NFR BLD: 73.4 % (ref 38–73)
NRBC BLD-RTO: 0 /100 WBC
NUM UNITS TRANS PACKED RBC: NORMAL
PATH REPORT.FINAL DX SPEC: NORMAL
PHOSPHATE SERPL-MCNC: 3.2 MG/DL (ref 2.7–4.5)
PLATELET # BLD AUTO: 113 K/UL (ref 150–350)
PMV BLD AUTO: 10.3 FL (ref 9.2–12.9)
POTASSIUM SERPL-SCNC: 4.8 MMOL/L (ref 3.5–5.1)
PROT SERPL-MCNC: 6.5 G/DL (ref 6–8.4)
RBC # BLD AUTO: 2.11 M/UL (ref 4.6–6.2)
REASON FOR REFERRAL (NARRATIVE): NORMAL
REF LAB TEST METHOD: NORMAL
RETICS/RBC NFR AUTO: 4.6 % (ref 0.4–2)
SODIUM SERPL-SCNC: 135 MMOL/L (ref 136–145)
SPECIMEN SOURCE: NORMAL
SPECIMEN TYPE: NORMAL
SPECIMEN: NORMAL
SUPPLEMENTAL DIAGNOSIS: NORMAL
T4 FREE SERPL-MCNC: 0.78 NG/DL (ref 0.71–1.51)
TACROLIMUS BLD-MCNC: 7.2 NG/ML (ref 5–15)
TSH SERPL DL<=0.005 MIU/L-ACNC: 3.37 UIU/ML (ref 0.4–4)
VIT B12 SERPL-MCNC: 635 PG/ML (ref 210–950)
WBC # BLD AUTO: 7.52 K/UL (ref 3.9–12.7)

## 2020-09-04 PROCEDURE — 36415 COLL VENOUS BLD VENIPUNCTURE: CPT

## 2020-09-04 PROCEDURE — 82746 ASSAY OF FOLIC ACID SERUM: CPT

## 2020-09-04 PROCEDURE — 80197 ASSAY OF TACROLIMUS: CPT

## 2020-09-04 PROCEDURE — 3008F PR BODY MASS INDEX (BMI) DOCUMENTED: ICD-10-PCS | Mod: BMT,CPTII,S$GLB, | Performed by: NURSE PRACTITIONER

## 2020-09-04 PROCEDURE — 84439 ASSAY OF FREE THYROXINE: CPT

## 2020-09-04 PROCEDURE — 82607 VITAMIN B-12: CPT

## 2020-09-04 PROCEDURE — 84443 ASSAY THYROID STIM HORMONE: CPT

## 2020-09-04 PROCEDURE — 63600175 PHARM REV CODE 636 W HCPCS: Performed by: INTERNAL MEDICINE

## 2020-09-04 PROCEDURE — 3008F BODY MASS INDEX DOCD: CPT | Mod: BMT,CPTII,S$GLB, | Performed by: NURSE PRACTITIONER

## 2020-09-04 PROCEDURE — 25000003 PHARM REV CODE 250: Performed by: NURSE PRACTITIONER

## 2020-09-04 PROCEDURE — 83735 ASSAY OF MAGNESIUM: CPT

## 2020-09-04 PROCEDURE — 85025 COMPLETE CBC W/AUTO DIFF WBC: CPT

## 2020-09-04 PROCEDURE — 84100 ASSAY OF PHOSPHORUS: CPT

## 2020-09-04 PROCEDURE — 87799 DETECT AGENT NOS DNA QUANT: CPT

## 2020-09-04 PROCEDURE — 63600175 PHARM REV CODE 636 W HCPCS: Performed by: NURSE PRACTITIONER

## 2020-09-04 PROCEDURE — 96360 HYDRATION IV INFUSION INIT: CPT

## 2020-09-04 PROCEDURE — 86920 COMPATIBILITY TEST SPIN: CPT

## 2020-09-04 PROCEDURE — 36430 TRANSFUSION BLD/BLD COMPNT: CPT

## 2020-09-04 PROCEDURE — 86901 BLOOD TYPING SEROLOGIC RH(D): CPT

## 2020-09-04 PROCEDURE — 99215 OFFICE O/P EST HI 40 MIN: CPT | Mod: BMT,S$GLB,, | Performed by: NURSE PRACTITIONER

## 2020-09-04 PROCEDURE — P9040 RBC LEUKOREDUCED IRRADIATED: HCPCS

## 2020-09-04 PROCEDURE — 36592 COLLECT BLOOD FROM PICC: CPT

## 2020-09-04 PROCEDURE — 99999 PR PBB SHADOW E&M-EST. PATIENT-LVL V: CPT | Mod: PBBFAC,BMT,, | Performed by: NURSE PRACTITIONER

## 2020-09-04 PROCEDURE — A4216 STERILE WATER/SALINE, 10 ML: HCPCS | Performed by: NURSE PRACTITIONER

## 2020-09-04 PROCEDURE — 99215 PR OFFICE/OUTPT VISIT, EST, LEVL V, 40-54 MIN: ICD-10-PCS | Mod: BMT,S$GLB,, | Performed by: NURSE PRACTITIONER

## 2020-09-04 PROCEDURE — 96366 THER/PROPH/DIAG IV INF ADDON: CPT

## 2020-09-04 PROCEDURE — 96365 THER/PROPH/DIAG IV INF INIT: CPT

## 2020-09-04 PROCEDURE — 99999 PR PBB SHADOW E&M-EST. PATIENT-LVL V: ICD-10-PCS | Mod: PBBFAC,BMT,, | Performed by: NURSE PRACTITIONER

## 2020-09-04 PROCEDURE — 80053 COMPREHEN METABOLIC PANEL: CPT

## 2020-09-04 PROCEDURE — 25000003 PHARM REV CODE 250: Performed by: INTERNAL MEDICINE

## 2020-09-04 PROCEDURE — 85045 AUTOMATED RETICULOCYTE COUNT: CPT

## 2020-09-04 RX ORDER — LORAZEPAM/0.9% SODIUM CHLORIDE 100MG/0.1L
2 PLASTIC BAG, INJECTION (ML) INTRAVENOUS
Status: COMPLETED | OUTPATIENT
Start: 2020-09-04 | End: 2020-09-04

## 2020-09-04 RX ORDER — HEPARIN 100 UNIT/ML
500 SYRINGE INTRAVENOUS
Status: COMPLETED | OUTPATIENT
Start: 2020-09-04 | End: 2020-09-04

## 2020-09-04 RX ORDER — HYDROCODONE BITARTRATE AND ACETAMINOPHEN 500; 5 MG/1; MG/1
TABLET ORAL ONCE
Status: COMPLETED | OUTPATIENT
Start: 2020-09-04 | End: 2020-09-04

## 2020-09-04 RX ORDER — SODIUM CHLORIDE 0.9 % (FLUSH) 0.9 %
10 SYRINGE (ML) INJECTION
Status: DISCONTINUED | OUTPATIENT
Start: 2020-09-04 | End: 2020-09-04 | Stop reason: HOSPADM

## 2020-09-04 RX ORDER — SODIUM CHLORIDE 0.9 % (FLUSH) 0.9 %
10 SYRINGE (ML) INJECTION
Status: COMPLETED | OUTPATIENT
Start: 2020-09-04 | End: 2020-09-04

## 2020-09-04 RX ORDER — DIPHENHYDRAMINE HCL 25 MG
25 CAPSULE ORAL
Status: CANCELLED | OUTPATIENT
Start: 2020-09-04

## 2020-09-04 RX ORDER — HEPARIN 100 UNIT/ML
500 SYRINGE INTRAVENOUS
Status: CANCELLED | OUTPATIENT
Start: 2020-09-04

## 2020-09-04 RX ORDER — ACETAMINOPHEN 325 MG/1
650 TABLET ORAL
Status: CANCELLED | OUTPATIENT
Start: 2020-09-04

## 2020-09-04 RX ORDER — MORPHINE SULFATE 15 MG/1
15 TABLET ORAL EVERY 6 HOURS PRN
Qty: 12 TABLET | Refills: 0 | Status: SHIPPED | OUTPATIENT
Start: 2020-09-04 | End: 2020-09-11 | Stop reason: SDUPTHER

## 2020-09-04 RX ORDER — HEPARIN 100 UNIT/ML
500 SYRINGE INTRAVENOUS
Status: DISCONTINUED | OUTPATIENT
Start: 2020-09-04 | End: 2020-09-04 | Stop reason: HOSPADM

## 2020-09-04 RX ORDER — HYDROCODONE BITARTRATE AND ACETAMINOPHEN 500; 5 MG/1; MG/1
TABLET ORAL ONCE
Status: CANCELLED | OUTPATIENT
Start: 2020-09-04 | End: 2020-09-04

## 2020-09-04 RX ORDER — ACETAMINOPHEN 325 MG/1
650 TABLET ORAL
Status: COMPLETED | OUTPATIENT
Start: 2020-09-04 | End: 2020-09-04

## 2020-09-04 RX ORDER — DIPHENHYDRAMINE HCL 25 MG
25 CAPSULE ORAL
Status: COMPLETED | OUTPATIENT
Start: 2020-09-04 | End: 2020-09-04

## 2020-09-04 RX ORDER — SODIUM CHLORIDE 0.9 % (FLUSH) 0.9 %
10 SYRINGE (ML) INJECTION
Status: CANCELLED | OUTPATIENT
Start: 2020-09-04

## 2020-09-04 RX ADMIN — ACETAMINOPHEN 650 MG: 325 TABLET ORAL at 11:09

## 2020-09-04 RX ADMIN — SODIUM CHLORIDE: 0.9 INJECTION, SOLUTION INTRAVENOUS at 11:09

## 2020-09-04 RX ADMIN — Medication 10 ML: at 08:09

## 2020-09-04 RX ADMIN — HEPARIN 500 UNITS: 100 SYRINGE at 02:09

## 2020-09-04 RX ADMIN — DIPHENHYDRAMINE HYDROCHLORIDE 25 MG: 25 CAPSULE ORAL at 11:09

## 2020-09-04 RX ADMIN — SODIUM CHLORIDE 1000 ML: 0.9 INJECTION, SOLUTION INTRAVENOUS at 01:09

## 2020-09-04 RX ADMIN — HEPARIN 500 UNITS: 100 SYRINGE at 08:09

## 2020-09-04 RX ADMIN — MAGNESIUM SULFATE IN WATER 2 G: 40 INJECTION, SOLUTION INTRAVENOUS at 11:09

## 2020-09-04 NOTE — PLAN OF CARE
Pt admitted for 1 unit PRBC, 1L IVF, Mag Ridder. Labs reviewed and nausea addressed. Pt received treatment, tolerated well. Plan of care reviewed and Pt discharged @ 15:00

## 2020-09-04 NOTE — NURSING
Patients labs collected from red lumen of Dalal. Sepicmens sent to lab.  Dressing changed using sterile protocol.  Patient tolerated well.  Site c/d/i.

## 2020-09-08 ENCOUNTER — OFFICE VISIT (OUTPATIENT)
Dept: HEMATOLOGY/ONCOLOGY | Facility: CLINIC | Age: 60
End: 2020-09-08
Payer: COMMERCIAL

## 2020-09-08 ENCOUNTER — INFUSION (OUTPATIENT)
Dept: INFUSION THERAPY | Facility: HOSPITAL | Age: 60
End: 2020-09-08
Attending: NURSE PRACTITIONER
Payer: COMMERCIAL

## 2020-09-08 VITALS
HEIGHT: 66 IN | TEMPERATURE: 98 F | SYSTOLIC BLOOD PRESSURE: 128 MMHG | OXYGEN SATURATION: 99 % | HEART RATE: 80 BPM | BODY MASS INDEX: 30.29 KG/M2 | DIASTOLIC BLOOD PRESSURE: 76 MMHG | WEIGHT: 188.5 LBS | RESPIRATION RATE: 16 BRPM

## 2020-09-08 DIAGNOSIS — D64.81 ANTINEOPLASTIC CHEMOTHERAPY INDUCED ANEMIA: ICD-10-CM

## 2020-09-08 DIAGNOSIS — N17.9 AKI (ACUTE KIDNEY INJURY): ICD-10-CM

## 2020-09-08 DIAGNOSIS — K21.9 GASTROESOPHAGEAL REFLUX DISEASE WITHOUT ESOPHAGITIS: ICD-10-CM

## 2020-09-08 DIAGNOSIS — C92.01 ACUTE MYELOID LEUKEMIA IN REMISSION: ICD-10-CM

## 2020-09-08 DIAGNOSIS — N40.0 BENIGN PROSTATIC HYPERPLASIA WITHOUT LOWER URINARY TRACT SYMPTOMS: ICD-10-CM

## 2020-09-08 DIAGNOSIS — G89.29 CHRONIC INTRACTABLE HEADACHE, UNSPECIFIED HEADACHE TYPE: ICD-10-CM

## 2020-09-08 DIAGNOSIS — F17.201 TOBACCO ABUSE, IN REMISSION: ICD-10-CM

## 2020-09-08 DIAGNOSIS — I10 ESSENTIAL HYPERTENSION: ICD-10-CM

## 2020-09-08 DIAGNOSIS — L85.3 DRY SKIN: ICD-10-CM

## 2020-09-08 DIAGNOSIS — T45.1X5A ANTINEOPLASTIC CHEMOTHERAPY INDUCED ANEMIA: ICD-10-CM

## 2020-09-08 DIAGNOSIS — M54.2 POSTERIOR NECK PAIN: ICD-10-CM

## 2020-09-08 DIAGNOSIS — E83.42 HYPOMAGNESEMIA: ICD-10-CM

## 2020-09-08 DIAGNOSIS — T45.1X5A CINV (CHEMOTHERAPY-INDUCED NAUSEA AND VOMITING): ICD-10-CM

## 2020-09-08 DIAGNOSIS — C92.11 CML IN REMISSION: ICD-10-CM

## 2020-09-08 DIAGNOSIS — M48.02 CERVICAL STENOSIS OF SPINE: ICD-10-CM

## 2020-09-08 DIAGNOSIS — R51.9 CHRONIC INTRACTABLE HEADACHE, UNSPECIFIED HEADACHE TYPE: ICD-10-CM

## 2020-09-08 DIAGNOSIS — D69.59 CHEMOTHERAPY-INDUCED THROMBOCYTOPENIA: ICD-10-CM

## 2020-09-08 DIAGNOSIS — Z94.84 HISTORY OF ALLOGENEIC STEM CELL TRANSPLANT: Primary | ICD-10-CM

## 2020-09-08 DIAGNOSIS — R11.2 CINV (CHEMOTHERAPY-INDUCED NAUSEA AND VOMITING): ICD-10-CM

## 2020-09-08 DIAGNOSIS — T45.1X5A CHEMOTHERAPY-INDUCED THROMBOCYTOPENIA: ICD-10-CM

## 2020-09-08 LAB
ABO + RH BLD: NORMAL
ALBUMIN SERPL BCP-MCNC: 3.6 G/DL (ref 3.5–5.2)
ALP SERPL-CCNC: 76 U/L (ref 55–135)
ALT SERPL W/O P-5'-P-CCNC: 16 U/L (ref 10–44)
ANION GAP SERPL CALC-SCNC: 12 MMOL/L (ref 8–16)
AST SERPL-CCNC: 14 U/L (ref 10–40)
BASOPHILS # BLD AUTO: 0.05 K/UL (ref 0–0.2)
BASOPHILS NFR BLD: 0.7 % (ref 0–1.9)
BILIRUB SERPL-MCNC: 0.2 MG/DL (ref 0.1–1)
BLD GP AB SCN CELLS X3 SERPL QL: NORMAL
BUN SERPL-MCNC: 10 MG/DL (ref 8–23)
CALCIUM SERPL-MCNC: 8.9 MG/DL (ref 8.7–10.5)
CHLORIDE SERPL-SCNC: 108 MMOL/L (ref 95–110)
CO2 SERPL-SCNC: 20 MMOL/L (ref 23–29)
CREAT SERPL-MCNC: 1.2 MG/DL (ref 0.5–1.4)
DIFFERENTIAL METHOD: ABNORMAL
EBV DNA BY PCR: NORMAL IU/ML
EOSINOPHIL # BLD AUTO: 1 K/UL (ref 0–0.5)
EOSINOPHIL NFR BLD: 15.4 % (ref 0–8)
ERYTHROCYTE [DISTWIDTH] IN BLOOD BY AUTOMATED COUNT: 18.7 % (ref 11.5–14.5)
EST. GFR  (AFRICAN AMERICAN): >60 ML/MIN/1.73 M^2
EST. GFR  (NON AFRICAN AMERICAN): >60 ML/MIN/1.73 M^2
FINAL DIAGNOSIS - CHIMERISM SORT: NORMAL
GLUCOSE SERPL-MCNC: 107 MG/DL (ref 70–110)
HCT VFR BLD AUTO: 30 % (ref 40–54)
HGB BLD-MCNC: 9.4 G/DL (ref 14–18)
IMM GRANULOCYTES # BLD AUTO: 0.25 K/UL (ref 0–0.04)
IMM GRANULOCYTES NFR BLD AUTO: 3.7 % (ref 0–0.5)
LYMPHOCYTES # BLD AUTO: 0.3 K/UL (ref 1–4.8)
LYMPHOCYTES NFR BLD: 4.1 % (ref 18–48)
MAGNESIUM SERPL-MCNC: 1.3 MG/DL (ref 1.6–2.6)
MCH RBC QN AUTO: 33.2 PG (ref 27–31)
MCHC RBC AUTO-ENTMCNC: 31.3 G/DL (ref 32–36)
MCV RBC AUTO: 106 FL (ref 82–98)
MONOCYTES # BLD AUTO: 0.7 K/UL (ref 0.3–1)
MONOCYTES NFR BLD: 10.1 % (ref 4–15)
NEUTROPHILS # BLD AUTO: 4.5 K/UL (ref 1.8–7.7)
NEUTROPHILS NFR BLD: 66 % (ref 38–73)
NRBC BLD-RTO: 0 /100 WBC
PHOSPHATE SERPL-MCNC: 3.7 MG/DL (ref 2.7–4.5)
PLATELET # BLD AUTO: 122 K/UL (ref 150–350)
PMV BLD AUTO: 10.2 FL (ref 9.2–12.9)
POTASSIUM SERPL-SCNC: 4.4 MMOL/L (ref 3.5–5.1)
PROT SERPL-MCNC: 6.6 G/DL (ref 6–8.4)
RBC # BLD AUTO: 2.83 M/UL (ref 4.6–6.2)
SODIUM SERPL-SCNC: 140 MMOL/L (ref 136–145)
SPECIMEN TYPE -CHIMERISM SORT: NORMAL
TACROLIMUS BLD-MCNC: 6.7 NG/ML (ref 5–15)
WBC # BLD AUTO: 6.76 K/UL (ref 3.9–12.7)

## 2020-09-08 PROCEDURE — A4216 STERILE WATER/SALINE, 10 ML: HCPCS | Performed by: NURSE PRACTITIONER

## 2020-09-08 PROCEDURE — 85025 COMPLETE CBC W/AUTO DIFF WBC: CPT

## 2020-09-08 PROCEDURE — 25000003 PHARM REV CODE 250: Performed by: NURSE PRACTITIONER

## 2020-09-08 PROCEDURE — 86850 RBC ANTIBODY SCREEN: CPT

## 2020-09-08 PROCEDURE — 3008F BODY MASS INDEX DOCD: CPT | Mod: BMT,CPTII,S$GLB, | Performed by: NURSE PRACTITIONER

## 2020-09-08 PROCEDURE — 84100 ASSAY OF PHOSPHORUS: CPT

## 2020-09-08 PROCEDURE — 80053 COMPREHEN METABOLIC PANEL: CPT

## 2020-09-08 PROCEDURE — 99999 PR PBB SHADOW E&M-EST. PATIENT-LVL V: ICD-10-PCS | Mod: PBBFAC,BMT,, | Performed by: NURSE PRACTITIONER

## 2020-09-08 PROCEDURE — 83735 ASSAY OF MAGNESIUM: CPT

## 2020-09-08 PROCEDURE — 99999 PR PBB SHADOW E&M-EST. PATIENT-LVL V: CPT | Mod: PBBFAC,BMT,, | Performed by: NURSE PRACTITIONER

## 2020-09-08 PROCEDURE — 80197 ASSAY OF TACROLIMUS: CPT

## 2020-09-08 PROCEDURE — 3008F PR BODY MASS INDEX (BMI) DOCUMENTED: ICD-10-PCS | Mod: BMT,CPTII,S$GLB, | Performed by: NURSE PRACTITIONER

## 2020-09-08 PROCEDURE — 99215 PR OFFICE/OUTPT VISIT, EST, LEVL V, 40-54 MIN: ICD-10-PCS | Mod: BMT,S$GLB,, | Performed by: NURSE PRACTITIONER

## 2020-09-08 PROCEDURE — 36592 COLLECT BLOOD FROM PICC: CPT

## 2020-09-08 PROCEDURE — 63600175 PHARM REV CODE 636 W HCPCS: Performed by: NURSE PRACTITIONER

## 2020-09-08 PROCEDURE — 99215 OFFICE O/P EST HI 40 MIN: CPT | Mod: BMT,S$GLB,, | Performed by: NURSE PRACTITIONER

## 2020-09-08 PROCEDURE — 87799 DETECT AGENT NOS DNA QUANT: CPT

## 2020-09-08 RX ORDER — SODIUM CHLORIDE 0.9 % (FLUSH) 0.9 %
10 SYRINGE (ML) INJECTION
Status: COMPLETED | OUTPATIENT
Start: 2020-09-08 | End: 2020-09-08

## 2020-09-08 RX ORDER — HEPARIN 100 UNIT/ML
500 SYRINGE INTRAVENOUS
Status: CANCELLED | OUTPATIENT
Start: 2020-09-08

## 2020-09-08 RX ORDER — TACROLIMUS 0.5 MG/1
4 CAPSULE ORAL EVERY 12 HOURS
Qty: 480 CAPSULE | Refills: 6 | Status: SHIPPED | OUTPATIENT
Start: 2020-09-08 | End: 2020-09-15

## 2020-09-08 RX ORDER — HEPARIN 100 UNIT/ML
500 SYRINGE INTRAVENOUS
Status: COMPLETED | OUTPATIENT
Start: 2020-09-08 | End: 2020-09-08

## 2020-09-08 RX ORDER — SODIUM CHLORIDE 0.9 % (FLUSH) 0.9 %
10 SYRINGE (ML) INJECTION
Status: CANCELLED | OUTPATIENT
Start: 2020-09-08

## 2020-09-08 RX ADMIN — HEPARIN 500 UNITS: 100 SYRINGE at 08:09

## 2020-09-08 RX ADMIN — Medication 10 ML: at 08:09

## 2020-09-08 NOTE — PROGRESS NOTES
Subjective:    Patient ID: Kvng Jones Sr. is a 60 y.o. male.    Chief Complaint: History of allogeneic stem cell transplant    Oncologic hx:  60 y.o. 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 He was admitted for evaluation and induction chemotherapy with FLAG-Addis. Complications of therapy include epididymal orchitis with negative testicular ultrasound, neutropenic fever, dyspnea, and GGO of bilateral lungs on CT chest. Fever and chest findings were covered with broad spectrum antimicrobials. He did have hallucinations and vivid dreams with posaconazole. Chemotherapy was administered by left arm PICC line that was removed during hospital stay for MSSA infection treated with vancomycin. Hospital admission was 3/30/2020 to 5/1/2020 achieving morphologic CR with induction chemotherapy. He then started Ponatinib 30mg daily.     Studies performed during his hospital stay include pre-chemotherapy ECHO with normal ejection fraction of 60-65%. HIV, HBV, and HCV tests were negative. CBC at admission was WBC 38.6, Hgb 14.6, and platelet 416K. CT of the head without contrast was normal 4/20/2020. US of abdomen performed for abdominal distention and neutropenic fever had hepatic steatosis, liver 19cm, and spleen 11 cm. He does have a history of alcohol use and pancreatitis.    Patient is a . He is  to wife Guillermina who will be a caregiver post transplant. He has a 45 pack year smoking history. He quit 6/1/2020, now on Chantix. He drinks 3-4 alcohol beverages nightly. He has 3 children. He has several family members in the [facility] area.    Transplant Course: Admitted 7/21/20 for a Flu/Cy/TBI haplo allo SCT. Son was donor. Received 2 bags and a total CD34 dose of 3.10 x10^6/kg on 7/28/20. Tolerated stem cells well. C/o headaches prior to admission, which persisted throughout admission. Neuro ultimately consulted for rec's. Was a daily drinker  prior to admission. No s/s of alcolhol withdrawal during admission. Transplant further complicated by hypertension, heart burn, c-diff neg diarrhea, nausea, sinus congestion, mild peripheral edema, blurry vision (ophtho consulted), mucositis, electrolyte abnormalities, and neck pain 2/2 spinal stenosis (referred to pain mgt at discharge). He engrafted on 8/10/20 with an ANC of 1280. He was discharged on 8/12/2020.    Interval History:  Patient presents for follow up clinic visit post Flu/Cy/TBI allogeneic stem cell transplant. Today is Day +42. He is s/p MBB on 9/2/20 for his severe neck pain/stiffness; per pain management notes it was a failed procedure as patient continues with pain. He is scheduled for a follow up with Dr. Mcelroy on 9/10/20. Continues with PT for neck issues. Appetite and oral intake has improved. Nausea is controlled with zofran and compazine. Denies fevers, cough, SOB, chest pain, n/v/d/c, and rash.     Review of Systems   Constitutional: Positive for activity change and fatigue. Negative for appetite change, chills, diaphoresis, fever and unexpected weight change.   HENT: Negative for congestion, dental problem, mouth sores, nosebleeds, postnasal drip, rhinorrhea, sinus pressure and trouble swallowing.    Eyes: Negative.  Negative for photophobia and visual disturbance.   Respiratory: Negative.  Negative for cough and shortness of breath.    Cardiovascular: Negative.  Negative for chest pain, palpitations and leg swelling.   Gastrointestinal: Negative for abdominal distention, abdominal pain, blood in stool, constipation, diarrhea, nausea and vomiting.   Endocrine: Negative.    Genitourinary: Negative.  Negative for dysuria, frequency, hematuria and urgency.   Musculoskeletal: Positive for back pain, neck pain and neck stiffness.        History of cervical spinal stenosis. See hpi   Skin: Negative.  Negative for pallor and rash.   Allergic/Immunologic: Negative for environmental allergies,  food allergies and immunocompromised state.   Neurological: Positive for headaches. Negative for dizziness, tremors, syncope, weakness and numbness.   Hematological: Negative for adenopathy. Does not bruise/bleed easily.   Psychiatric/Behavioral: Negative.  Negative for confusion. The patient is not nervous/anxious.        Objective:     Physical Exam  Vitals signs and nursing note reviewed.   Constitutional:       Appearance: Normal appearance. He is well-developed.   HENT:      Head: Normocephalic and atraumatic.      Mouth/Throat:      Mouth: Mucous membranes are dry.   Eyes:      General: No scleral icterus.  Neck:      Musculoskeletal: Neck supple. Decreased range of motion.   Cardiovascular:      Rate and Rhythm: Normal rate and regular rhythm.      Pulses: Normal pulses.      Heart sounds: Normal heart sounds.   Pulmonary:      Effort: Pulmonary effort is normal. No respiratory distress.      Breath sounds: Normal breath sounds.   Abdominal:      General: Bowel sounds are normal. There is no distension.      Palpations: Abdomen is soft.      Tenderness: There is no abdominal tenderness.   Musculoskeletal:         General: No tenderness.      Right lower leg: No edema.      Left lower leg: No edema.   Skin:     General: Skin is warm and dry.      Coloration: Skin is not pale.      Findings: No erythema, petechiae or rash.      Comments: Dalal intact to right CW with no redness or drainage     Neurological:      Mental Status: He is alert and oriented to person, place, and time.   Psychiatric:         Attention and Perception: Attention normal.         Speech: Speech normal.         Thought Content: Thought content normal.         Cognition and Memory: Cognition and memory normal.         Assessment:       1. History of allogeneic stem cell transplant    2. Acute myeloid leukemia in remission    3. CML in remission    4. Dry skin    5. Hypomagnesemia    6. CINV (chemotherapy-induced nausea and vomiting)    7.  Antineoplastic chemotherapy induced anemia    8. Chemotherapy-induced thrombocytopenia    9. Tobacco abuse, in remission    10. Cervical stenosis of spine    11. Chronic intractable headache, unspecified headache type    12. Benign prostatic hyperplasia without lower urinary tract symptoms    13. Gastroesophageal reflux disease without esophagitis    14. Essential hypertension    15. AURORA (acute kidney injury)    16. Posterior neck pain        Plan:         History of allogeneic stem cell transplant  - Admitted 7/21/20 for planned Flu/Cy/TBI haplo allo SCT. Son was the donor. Received 2 bags and a total CD34 dose of 3.10 x10^6/kg on 7/28/20. Recieved post transplant cyclophosphamide. Engrafted neutrophils 8/10/20 day +13  - Today is Day +42  - Tacro level 6.7 (goal 7-9). Currently taking 3.5mg BID. Will increase back to 4mg BID now that AURORA as resolved  - stopped Ursodiol Day +30 (8/27/20)  - planning for day +30 BM bx today after this clinic visit. Patient has had bad experience x 2. Once here. Rx for Valium and Oxycodone pre-procedure.  - had BMBx in clinic on 8/28/20 showing no definitive morphologic or immunophenotypic evidence of residual CML. Chimerisms are pending.      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 Achieved morphologic CR  - Started on daily Ponatinib 30 mg daily, which he continued to take outpatient. Held Ponatinib during admission; may not need to restart per recent literature. BBMT 2020(57): 054-69  - [place] [facility] [place] showing no morphologic or immunophenotypic evidence of AML/CML  - Admitted 7/21/20 for Flu/Cy/TBI haplo allo SCT    GVHD  - had rash inpatient that was believed to be drug eruption. Resolved  - no evidence of acute GVHD today (skin, liver or GI)  - GVHD charting with each visit     Dry Painful, Itchy Skin  - lotion  recommended - non-fragrance such as CeraVe, Aquaphor, Jergens, Vaseline Intensive care  - Aveeno bath soak  - Avoiding benadryl, atarax for pruritus due to number of sedative medications patient is currently on for pain management  - improved    ID  - Last CMV and EBV undetected; continue twice weekly CMV, weekly EBV, today's pending  - Acyclovir (Zoster prophylaxis) until Day +365  - Diflucan (Fungal prophylaxis) until off tacrolimus.  - Bactrim (PJP prophylaxis) until off tacrolimus.     Hypomagnesemia/hyperphosphatemia   - Mag 1.3. Increase magnesium to 1200 mg TID; currently reports no diarrhea, instructed to decrease dose back to 800mg TID if increase in diarrhea with increase oral mag. Received IV mag in infusion center last 9/4; will hold off today  - Phos 3.7. Previously elevated, no indication for phos binder at this time.     Mucositis due to chemotherapy/Thrush  - Continue prn [facility]'s; patient also uses Aloe oral rinses  - Recommend baking soda and warm water rinses  - Started nystatin x10 days 8/18 (completed 8/27)  - No thrush on exam today     CINV (chemotherapy-induced nausea and vomiting)  - c/w prn zofran and compazine. Helping.     Blurry vision  - C/o worsening blurry vision 7/30/20  - May be contributing to headaches  - Ophtho consulted and saw patient 7/30  - Ophtho dilated pupils and performed fundal exam. Fundal exam neg.  - Patient had been viewing laptop screen for ~ 2 hours prior to experiencing blurry vision  - Per ophtho, blurry vision due to dry eyes. rec'd artificial tears QID prn and warm compresses BID prn. Can escalate artificial tears to ointment if needed  - improved     Antineoplastic chemotherapy-induced cytopenias: anemia and thrombocytopenia   - Transfuse Hgb <7g and plt <10K  - hgb 9.4; plt 122K  - check B12, folate, TSH, T4 all of which were normal. Also checked reticulocytes which were elelvated. BMBx showing no residual disease. Improved from 7.2 at last visit; did receive  1unit RBC on 9/4     Headache/ Neck pain/ Cervical stenosis of spine  - Hx of spinal fusion C3-C5  - Started having headaches when he started Ponatinib at diagnosis. Onset seems to correspond to when patient quit smoking and when he started ponatinib  - Had been off of ponatinib for a few days but HAs persisted during transplant admission  - Pattern is like cluster headaches (onset during sleep), but not unilateral versus tension HA   - O2 via non-rebreather at 12 L over 15 minutes seemed to help but developed hot flash so pt stopped  - Ultram, Flexeril, Oxycodone, lidoderm, neck pillow, c-collar did not help  - Imitrex PRN q2hr (max 200mg in 24hrs). Not contraindicated per pharm D.  - Patient reporting that headache may be sinus in nature. Maxillary tenderness noted. Taking claritin  - Saw optho and neuro inpatient, trialed IV mag  - Is now seeing pain mgt for this. Current headaches seem to be caused by muscle spasms 2/2 cervical stenosis  - Flexeril changed to zanaflex by pain management. Also on gabapentin. Restarted celebrex 8/18 as plt >50K. Also taking morphine 15mg Q4h prn (he is taking this only two times daily despite being in pain). Educated to take more in short term until pain management appt.  - He has 1 pill of morphine left. Refilled 8/21 for short supply to last him until his pain management appt.   - Recommended alternating heat and ice  - Xray 9/19 shows surgical changes of fusion from C3 through C5 with degenerative changed below fusion.   - Following with pain mgt and PT.  - Started PT on 8/26.  - No steroid injections at this time per pain management due to transplant.  - Pt feels neck pain started after hickmann was placed  - Has been mostly refractory to medications (zanaflex, gabapentin, celebrex, lidoderm patches). Some relief with oral morphine  - CT from 8/24/20 showing history of C3, C4, and C5 fusion and diffuse osteoarthritic changes  - Had virtual pain management appt on  8/25 to  review CT results.  - Zanaflex switched to Robaxin per pain mgt.  - had medial branch block on 9/2/20 with Dr. Mcelroy (pain mgt). States that he got very little relief. Called Dr. Mcelroy's office requesting morphine. Was denied until he can see MD. Plan is for him to see Dr. Mcelroy Thursday 9/10/20. We will defer to pain mgt for all future pain medication prescriptions.    Tobacco abuse, in remission  - 35 pack year history  - Quit smoking 6/1/2020 using Chantix  - Patient completed course of Chantix and denies need for Nicotine patch    BPH (benign prostatic hyperplasia)  - Continue Flomax     GERD (gastroesophageal reflux disease)  - Continue nexium   - Can take TUMS prn     Essential hypertension  - Increased home amlodipine from 5 mg to 10 mg daily while inpatient due to BPs as high as 180s/110s   - /76 today  - continue to hold amlodipine, has been off for two weeks    AURORA (on 9/4)  - creatinine down to 1.2 today. Previously 1.8 at last visit on 9/4  - stopped cellcept on Day +35  - will continue to hold celebrex, will defer to restart to pain management  - will increase fluconazole back up to 400 mg daily today  - tacro dose also increased back to previous dose  - continue increased PO fluid intake    Follow-up:   - CBC, CMP, type and screen, tacro, CMV, EBV (weekly only), mag, phos and appt with Dr. Hodges alternating with NP twice weekly.  - Labs should be in AMs while on tacro. Appts scheduled through end of September.      Marimar Brothers NP  Hematology/Oncology/BMT

## 2020-09-08 NOTE — NURSING
Patient here for blood draw from right chest CVC-red line with good blood return-blood to lab-line flushed.

## 2020-09-09 LAB — CMV DNA SERPL NAA+PROBE-ACNC: NORMAL IU/ML

## 2020-09-10 ENCOUNTER — OFFICE VISIT (OUTPATIENT)
Dept: PAIN MEDICINE | Facility: CLINIC | Age: 60
End: 2020-09-10
Attending: ANESTHESIOLOGY
Payer: COMMERCIAL

## 2020-09-10 VITALS
HEIGHT: 66 IN | SYSTOLIC BLOOD PRESSURE: 126 MMHG | HEART RATE: 88 BPM | DIASTOLIC BLOOD PRESSURE: 83 MMHG | BODY MASS INDEX: 30.05 KG/M2 | WEIGHT: 187 LBS

## 2020-09-10 DIAGNOSIS — G89.3 CANCER ASSOCIATED PAIN: ICD-10-CM

## 2020-09-10 DIAGNOSIS — M47.813 SPONDYLOSIS OF CERVICOTHORACIC REGION W/O MYELOPATHY OR RADICULOPATHY: ICD-10-CM

## 2020-09-10 DIAGNOSIS — M79.18 MYOFASCIAL PAIN: Primary | ICD-10-CM

## 2020-09-10 LAB
CMV DNA SERPL NAA+PROBE-ACNC: NORMAL IU/ML
EBV DNA BY PCR: NORMAL IU/ML

## 2020-09-10 PROCEDURE — 3008F BODY MASS INDEX DOCD: CPT | Mod: BMT,CPTII,S$GLB, | Performed by: ANESTHESIOLOGY

## 2020-09-10 PROCEDURE — 99999 PR PBB SHADOW E&M-EST. PATIENT-LVL IV: ICD-10-PCS | Mod: PBBFAC,BMT,, | Performed by: ANESTHESIOLOGY

## 2020-09-10 PROCEDURE — 99213 OFFICE O/P EST LOW 20 MIN: CPT | Mod: BMT,S$GLB,, | Performed by: ANESTHESIOLOGY

## 2020-09-10 PROCEDURE — 3008F PR BODY MASS INDEX (BMI) DOCUMENTED: ICD-10-PCS | Mod: BMT,CPTII,S$GLB, | Performed by: ANESTHESIOLOGY

## 2020-09-10 PROCEDURE — 99999 PR PBB SHADOW E&M-EST. PATIENT-LVL IV: CPT | Mod: PBBFAC,BMT,, | Performed by: ANESTHESIOLOGY

## 2020-09-10 PROCEDURE — 1125F PR PAIN SEVERITY QUANTIFIED, PAIN PRESENT: ICD-10-PCS | Mod: BMT,S$GLB,, | Performed by: ANESTHESIOLOGY

## 2020-09-10 PROCEDURE — 99213 PR OFFICE/OUTPT VISIT, EST, LEVL III, 20-29 MIN: ICD-10-PCS | Mod: BMT,S$GLB,, | Performed by: ANESTHESIOLOGY

## 2020-09-10 PROCEDURE — 1125F AMNT PAIN NOTED PAIN PRSNT: CPT | Mod: BMT,S$GLB,, | Performed by: ANESTHESIOLOGY

## 2020-09-10 NOTE — PROGRESS NOTES
Chronic patient Established Note (Follow up visit)      SUBJECTIVE:  Interval History 9/10/2020:  Kvng Jones Sr. presents to the clinic for a follow-up appointment for neck pain. Since the last visit, Kvng Jones Sr. states the pain has been persistant. Current pain intensity is 7/10. His neck pain started 7/21/20 after waking from anesthesia after right chest wall port placement. Prior to this date, he never had issues with neck pain or headaches. After the procedure, he has pain with any movement of his neck. He is unable to turn his head. He has been unable to sleep well due to the pain. The pain is sharp, localized over his bilateral trapezius muscle right > left, and does radiate into his scalp and shoulder. He denies numbness or weakness in his arms. He gets minimal relief from oral Morphine. He is unable to take other medications due to his recent bone marrow transplant for Leukemia. He has tried several sessions of physical therapy with no relief as he was unable to participate due to pain. He has not been able to continue home stretches.   patient expressed frustration today regarding his care as he was told from his primary team that our pain service will be providing him with narcotic medication.  I explained in details the difference between chronic and acute pain and what our interventional Pain service can help with.     patient has been suffering from this new pain only after hospitalization less than 3 months ago.  He states that it only happened after removal of his port and before that  He had no pain   I explained to the patient by definition  And duration this is considered acute pain for which his primary care team is more than capable of  Continuing managing   patient does have history of neck pain requiring narcotics treatment in the past for which he stated was able to wean off with his provider in [place]   unfortunately his pain management provider and left thigh it was not  contacted nor consulted by his primary team      Interval History 8/25/2020:  The patient returns to clinic today for follow up of neck pain via virtual visit. He is here today for imaging review. He continues to report neck pain that radiates into his shoulders. He denies any radicular arm pain. His pain is worse with turning his head to the side and leaning backwards. He also reports difficulty sleeping due to pain. He cannot lay flat due to pain. He reports limited relief with Zanaflex given at last visit. He reports minimal benefit with Gabapentin. He continues to take Morphine with some benefit. He denies any other health changes. His pain today is 7/10.     HPI:  61-year-old male who was referred to us for further evaluation of neck pain.  Patient was recent discharge from the hospital yesterday after undergoing bone marrow transplant for leukemia.  Patient reports pain started 1 week ago during his hospitalization.   Patient reports pain to be aching/sharp in nature.  Pain is constant exacerbated with movement.  Pain is alleviated with morphine 5 mg every 4 hr, heat packs, sitting up straight and gabapentin.  Patient rates the pain to be 8/10 at its worst and improves to 5/10.  Patient denies any weakness in bilateral upper extremities.  Patient denies any changes in bowel or bladder function.  Patient denies any numbness tingling in bilateral upper extremities.  Patient has history of a C3-C5 ACDF many years ago.  Patient reports this pain is different than the pain he had prior to his cervical spine surgery, as it does not radiate to bilateral upper extremities at this time.  Patient reports starting PT on Monday for his neck pain.    Pain Disability Index Review:  Last 3 PDI Scores 9/10/2020 8/13/2020   Pain Disability Index (PDI) 32 0       Pain Medications:    - Opioids: Morphine    Opioid Contract: no    - He is unable to take Ibuprofen or Tylenol due to recent bone marrow transplant    Pain  Procedures:   9/2/20-BLOCK, NERVE bilateral C4,5,6 MBB, no relief      Physical Therapy/Home Exercise: no    Imaging:  Narrative & Impression     EXAMINATION:  CT CERVICAL SPINE WITHOUT CONTRAST     CLINICAL HISTORY:  cervical postlaminectomy;Myalgia, other site     TECHNIQUE:  Low dose axial images, sagittal and coronal reformations were performed though the cervical spine.  Contrast was not administered.     COMPARISON:  Plain radiograph dated 19th August 2020     FINDINGS:  Multiple axial images of the cervical vertebral column were obtained.  The study was reformatted coronal and sagittal plane.     Contrary to the clinical history, I do not detect evidence of a laminectomy at any level.  Instead the patient has undergone anterior cervical fusion of C3, C4, C5 utilizing anterior plate and screws.  There is bone graft material between the endplates of C3-C4 and C4-C5.     There is straightening of the lordotic curvature.  No indication of spondylolisthesis or scoliotic curvature.  The posterior elements align normally.  In the coronal plane, the lateral masses are also well aligned with osteoarthritic changes seen throughout.     In the axial plane, there does not appear to be any obvious disruption involving the lamina or pedicle at any level.     Note is made of heavy calcification involving the carotid artery system on either side of the midline.     Impression:     Status post anterior cervical fusion of C3-C4 and C5.     No appreciable findings that would suggest a laminectomy at any level.     Diffuse osteoarthritic changes.        Electronically signed by: Hunter Holloway  Date:                                            08/24/2020  Time:                                           14:13                    Narrative & Impression     EXAMINATION:  XR CERVICAL SPINE COMPLETE 5 VIEW     CLINICAL HISTORY:  . Myalgia, other site     TECHNIQUE:  AP, Lateral, bilateral oblique and open mouth views of the cervical  spine were performed.     COMPARISON:  No.     FINDINGS:  There are surgical changes of an anterior cervical fusion from C3 through C5 with bony fusion of the vertebral bodies. Hardware is intact.  There is straightening of the normal cervical lordosis. The C2-3 level demonstrates no abnormality. There is grade 1 anterolisthesis at C5-6 with mild disc space narrowing and mild osteophytic spurring.  C7 vertebral body is partly obscured.  There is no significant neural foraminal narrowing.     There is vascular calcification within the soft tissue of the right neck. There is a partly visualized central venous catheter overlying the right lung apex.     Impression:     Surgical changes of an anterior cervical fusion from C3 through C5.  Mild degenerative changes at the level just below the fusion.        Electronically signed by: Katiana Marcus MD  Date:                                            08/19/2020  Time:                                           13:29              Allergies:   Review of patient's allergies indicates:   Allergen Reactions    Posaconazole Hallucinations    Unclassified drug Other (See Comments)     Pt reports that he has an allergic reaction to an Antifungal medication, but is unsure of the name of the drug. Will obtain name prior to arrival in am and notify Pre-op RN.    Vancomycin analogues Other (See Comments)     Pt reports he had rigors    Codeine Hives    Iodine Hives and Rash    Iodinated contrast media Hives       Current Medications:   Current Outpatient Medications   Medication Sig Dispense Refill    acyclovir (ZOVIRAX) 800 MG Tab Take 1 tablet (800 mg total) by mouth 2 (two) times daily. 60 tablet 11    chlorhexidine (PERIDEX) 0.12 % solution RINSE WITH 15 MLS PO FOR 30 SECONDS BID      esomeprazole (NEXIUM) 20 MG capsule Take 20 mg by mouth before breakfast.      fluconazole (DIFLUCAN) 200 MG Tab Take 2 tablets (400 mg total) by mouth once daily. 60 tablet 6     fluticasone propionate (FLONASE) 50 mcg/actuation nasal spray 2 sprays (100 mcg total) by Each Nostril route daily as needed for Rhinitis. 16 g 0    gabapentin (NEURONTIN) 300 MG capsule Take 1 capsule (300 mg total) by mouth 3 (three) times daily. 90 capsule 11    loratadine (CLARITIN) 10 mg tablet Take 10 mg by mouth once daily.      magic mouthwash diphen/antac/lidoc/nysta Take 10 mLs by mouth 4 (four) times daily as needed (mouth sores). 120 mL 0    magnesium oxide (MAGOX) 400 mg (241.3 mg magnesium) tablet Take 2 tablets (800 mg total) by mouth 2 (two) times daily. 200 tablet 1    morphine (MSIR) 15 MG tablet Take 1 tablet (15 mg total) by mouth every 6 (six) hours as needed for Pain. 12 tablet 0    ondansetron (ZOFRAN-ODT) 8 MG TbDL Dissolve 1 tablet (8 mg total) by mouth every 8 (eight) hours as needed (nausea). 30 tablet 1    prochlorperazine (COMPAZINE) 10 MG tablet Take 1 tablet (10 mg total) by mouth 4 (four) times daily as needed for Nausea. 30 tablet 1    sulfamethoxazole-trimethoprim 800-160mg (BACTRIM DS) 800-160 mg Tab Take 1 tablet by mouth every Mon, Wed, Fri. 12 tablet 6    tacrolimus (PROGRAF) 0.5 MG Cap Take 8 capsules (4 mg total) by mouth every 12 (twelve) hours. Take 4mg (8 capsules) by mouth in the mornings and 4.5mg (9 capsules) by mouth in the evenings. Take after labs on clinic appointment days. 480 capsule 6    tamsulosin (FLOMAX) 0.4 mg Cap Take 1 capsule (0.4 mg total) by mouth once daily. 30 capsule 3    triamcinolone acetonide 0.1% (KENALOG) 0.1 % cream Apply topically as needed. Rash on hands 30 g 1    celecoxib (CELEBREX) 100 MG capsule Take 1 capsule (100 mg total) by mouth 2 (two) times daily. (Patient not taking: Reported on 9/10/2020) 60 capsule 2    tiZANidine (ZANAFLEX) 4 MG tablet Take 1 tablet (4 mg total) by mouth nightly as needed. (Patient not taking: Reported on 9/10/2020) 30 tablet 0     No current facility-administered medications for this visit.       Facility-Administered Medications Ordered in Other Visits   Medication Dose Route Frequency Provider Last Rate Last Dose    artificial tears 0.5 % ophthalmic solution 2 drop  2 drop Both Eyes PRN Ivon Morrow NP        diphenhydrAMINE injection 50 mg  50 mg Intravenous PRN Ivon Morrow NP           REVIEW OF SYSTEMS:    GENERAL:  No weight loss, malaise or fevers.  HEENT:  Negative for frequent or significant headaches.  NECK:  Negative for lumps, goiter, pain and significant neck swelling.  RESPIRATORY:  Negative for cough, wheezing or shortness of breath.  CARDIOVASCULAR:  Negative for chest pain, leg swelling or palpitations.  GI:  Negative for abdominal discomfort, blood in stools or black stools or change in bowel habits.  MUSCULOSKELETAL:  See HPI.  SKIN:  Negative for lesions, rash, and itching.  PSYCH:   Positive  for sleep disturbance, mood disorder and recent psychosocial stressors.  HEMATOLOGY/LYMPHOLOGY:  Negative for prolonged bleeding, bruising easily or swollen nodes.  NEURO:   No history of headaches, syncope, paralysis, seizures or tremors.  All other reviewed and negative other than HPI.    Past Medical History:  Past Medical History:   Diagnosis Date    CML (chronic myelocytic leukemia)     Hx of psychiatric care     valium, ativan    Melanoma in situ of external ear, right        Past Surgical History:  Past Surgical History:   Procedure Laterality Date    CHOLECYSTECTOMY      INJECTION OF ANESTHETIC AGENT AROUND NERVE Bilateral 9/2/2020    Procedure: BLOCK, NERVE bilateral C4,5,6 MBB;  Surgeon: Oscar Mcelroy MD;  Location: [facility], [place], operated by [facility];  Service: Pain Management;  Laterality: Bilateral;  bilateral C4,5,6 MBB   consent needed    NECK SURGERY  2015    TONSILLECTOMY         Family History:  No family history on file.    Social History:  Social History     Socioeconomic History    Marital status:      Spouse name: Lauren    Number of children: 3    Years of  "education: Not on file    Highest education level: Not on file   Occupational History    Not on file   Social Needs    Financial resource strain: Not on file    Food insecurity     Worry: Not on file     Inability: Not on file    Transportation needs     Medical: Not on file     Non-medical: Not on file   Tobacco Use    Smoking status: Former Smoker     Packs/day: 1.00     Years: 35.00     Pack years: 35.00     Start date: 1985     Quit date: 2020     Years since quittin.2    Smokeless tobacco: Never Used   Substance and Sexual Activity    Alcohol use: Yes     Alcohol/week: 12.0 standard drinks     Types: 4 Glasses of wine, 4 Cans of beer, 4 Shots of liquor per week    Drug use: Yes     Types: Marijuana     Comment: medical marijuana    Sexual activity: Yes     Partners: Female   Lifestyle    Physical activity     Days per week: Not on file     Minutes per session: Not on file    Stress: Not on file   Relationships    Social connections     Talks on phone: Not on file     Gets together: Not on file     Attends Buddhism service: Not on file     Active member of club or organization: Not on file     Attends meetings of clubs or organizations: Not on file     Relationship status: Not on file   Other Topics Concern    Patient feels they ought to cut down on drinking/drug use Not Asked    Patient annoyed by others criticizing their drinking/drug use Not Asked    Patient has felt bad or guilty about drinking/drug use Not Asked    Patient has had a drink/used drugs as an eye opener in the AM Not Asked   Social History Narrative     (Guillermina)    3 children (Michelle Estrada, Qasim Estrada, [facility])           OBJECTIVE:    Ht 5' 6" (1.676 m)   Wt 84.8 kg (187 lb)   BMI 30.18 kg/m²     PHYSICAL EXAMINATION:    General appearance: Well appearing, in no acute distress, alert and oriented x3.  Psych:  Mood and affect appropriate.  Skin: Skin color, texture, turgor " normal, no rashes or lesions, in both upper and lower body.  Head/face:  Atraumatic, normocephalic. No palpable lymph nodes  Neck: + pain to palpation over the cervical paraspinous muscles. Spurling Negative. + pain with neck flexion, extension, or lateral flexion.   Cor: RRR  Pulm: CTA  GI: Abdomen soft and non-tender.  Back: Straight leg raising in the sitting and supine positions is negative to radicular pain. No pain to palpation over the spine or costovertebral angles. Normal range of motion without pain reproduction.  Extremities: Peripheral joint ROM is full and pain free without obvious instability or laxity in all four extremities. No deformities, edema, or skin discoloration. Good capillary refill.  Musculoskeletal: Shoulder, hip, sacroiliac and knee provocative maneuvers are negative. Bilateral upper and lower extremity strength is normal and symmetric.  No atrophy or tone abnormalities are noted.  Neuro: Bilateral upper and lower extremity coordination and muscle stretch reflexes are physiologic and symmetric.  Plantar response are downgoing. No loss of sensation is noted.  Gait: Normal.    ASSESSMENT: 60 y.o. year old male with bilateral neck pain, consistent with      1. Myofascial pain  Ambulatory referral/consult to [facility]   2. 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 Cancer associated pain        I explained to the patient that due to the acute nature of his event that the care over less than 30 days  Which he stated before that was not requiring any  Pain medication.   and as the CT  scan did not show any change of his cervical spine condition since his cervical fusion and  he failed medial branch block diagnostic for arthritis pain.   I would defer to his primary team to investigate further why that happened especially in relation to PICC line removal      PLAN:     - I have stressed the importance of physical activity and a home exercise plan to  help with pain and improve health.  - Patient can continue with medications for now per his primary team since they are providing benefits, using them appropriately, and without side effects..  - Counseled patient regarding the importance of activity modification.    The above plan and management options were discussed at length with patient. Patient is in agreement with the above and verbalized understanding.    Oscar Mcelroy MD  09/10/2020

## 2020-09-10 NOTE — PROGRESS NOTES
Subjective:    Patient ID: Kvng Jones Sr. is a 60 y.o. male.    Chief Complaint: History of allogeneic stem cell transplant    Oncologic hx:  60 y.o. 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 He was admitted for evaluation and induction chemotherapy with FLAG-Addis. Complications of therapy include epididymal orchitis with negative testicular ultrasound, neutropenic fever, dyspnea, and GGO of bilateral lungs on CT chest. Fever and chest findings were covered with broad spectrum antimicrobials. He did have hallucinations and vivid dreams with posaconazole. Chemotherapy was administered by left arm PICC line that was removed during hospital stay for MSSA infection treated with vancomycin. Hospital admission was 3/30/2020 to 5/1/2020 achieving morphologic CR with induction chemotherapy. He then started Ponatinib 30mg daily.     Studies performed during his hospital stay include pre-chemotherapy ECHO with normal ejection fraction of 60-65%. HIV, HBV, and HCV tests were negative. CBC at admission was WBC 38.6, Hgb 14.6, and platelet 416K. CT of the head without contrast was normal 4/20/2020. US of abdomen performed for abdominal distention and neutropenic fever had hepatic steatosis, liver 19cm, and spleen 11 cm. He does have a history of alcohol use and pancreatitis.    Patient is a . He is  to wife Guillermina who will be a caregiver post transplant. He has a 45 pack year smoking history. He quit 6/1/2020, now on Chantix. He drinks 3-4 alcohol beverages nightly. He has 3 children. He has several family members in the [facility] area.    Transplant Course: Admitted 7/21/20 for a Flu/Cy/TBI haplo allo SCT. Son was donor. Received 2 bags and a total CD34 dose of 3.10 x10^6/kg on 7/28/20. Tolerated stem cells well. C/o headaches prior to admission, which persisted throughout admission. Neuro ultimately consulted for rec's. Was a daily drinker  prior to admission. No s/s of alcolhol withdrawal during admission. Transplant further complicated by hypertension, heart burn, c-diff neg diarrhea, nausea, sinus congestion, mild peripheral edema, blurry vision (ophtho consulted), mucositis, electrolyte abnormalities, and neck pain 2/2 spinal stenosis (referred to pain mgt at discharge). He engrafted on 8/10/20 with an ANC of 1280. He was discharged on 8/12/2020.    Interval History:  Patient presents for follow up clinic visit post Flu/Cy/TBI allogeneic stem cell transplant. Today is Day +45.  He seen  follow up with Dr. Mcelroy pain management on 9/10/20. Per patient, pain management signed off as his pain not a chronic issue and advised him to f/u with BMT service for further management.  epic note pending. Adjusted morphine dose for pain control. Continues with PT for neck issues. Noted scattered non-pruritic papules on abdomen and chest. Left wrist slightly swollen, not tender or any movement restriction. Bilateral dry eyes present.  Appetite and oral intake has improved. Nausea is controlled with zofran and compazine. Denies fevers, cough, SOB, chest pain, n/v/d/c, and rash.     Review of Systems   Constitutional: Positive for activity change and fatigue. Negative for appetite change, chills, diaphoresis, fever and unexpected weight change.   HENT: Negative for congestion, dental problem, mouth sores, nosebleeds, postnasal drip, rhinorrhea, sinus pressure and trouble swallowing.    Eyes: Negative.  Negative for photophobia and visual disturbance.   Respiratory: Negative.  Negative for cough and shortness of breath.    Cardiovascular: Negative.  Negative for chest pain, palpitations and leg swelling.   Gastrointestinal: Negative for abdominal distention, abdominal pain, blood in stool, constipation, diarrhea, nausea and vomiting.   Endocrine: Negative.    Genitourinary: Negative.  Negative for dysuria, frequency, hematuria and urgency.    Musculoskeletal: Positive for back pain, neck pain and neck stiffness.        History of cervical spinal stenosis. See hpi   Skin: Positive for rash. Negative for pallor.   Allergic/Immunologic: Negative for environmental allergies, food allergies and immunocompromised state.   Neurological: Negative for dizziness, tremors, syncope, weakness, numbness and headaches.   Hematological: Negative for adenopathy. Does not bruise/bleed easily.   Psychiatric/Behavioral: Negative.  Negative for confusion. The patient is not nervous/anxious.        Objective:     Physical Exam  Vitals signs and nursing note reviewed.   Constitutional:       Appearance: Normal appearance. He is well-developed.   HENT:      Head: Normocephalic and atraumatic.      Mouth/Throat:      Mouth: Mucous membranes are dry.   Eyes:      General: No scleral icterus.  Neck:      Musculoskeletal: Neck supple. Decreased range of motion.   Cardiovascular:      Rate and Rhythm: Normal rate and regular rhythm.      Pulses: Normal pulses.      Heart sounds: Normal heart sounds.   Pulmonary:      Effort: Pulmonary effort is normal. No respiratory distress.      Breath sounds: Normal breath sounds.   Abdominal:      General: Bowel sounds are normal. There is no distension.      Palpations: Abdomen is soft.      Tenderness: There is no abdominal tenderness.   Musculoskeletal:         General: Swelling (Left wrist +2 swelling) present. No tenderness.      Right lower leg: No edema.      Left lower leg: No edema.   Skin:     General: Skin is warm and dry.      Coloration: Skin is not pale.      Findings: Rash (scattered papules on chest abdomen) present. No erythema or petechiae.      Comments: Dalal intact to right CW with no redness or drainage     Neurological:      Mental Status: He is alert and oriented to person, place, and time.   Psychiatric:         Attention and Perception: Attention normal.         Speech: Speech normal.         Thought Content:  Thought content normal.         Cognition and Memory: Cognition and memory normal.         Assessment:       1. Bilateral dry eyes    2. Cervical stenosis of spine        Plan:         History of allogeneic stem cell transplant  - Admitted 7/21/20 for planned Flu/Cy/TBI haplo allo SCT. Son was the donor. Received 2 bags and a total CD34 dose of 3.10 x10^6/kg on 7/28/20. Recieved post transplant cyclophosphamide. Engrafted neutrophils 8/10/20 day +13  - Today is Day +45  - Tacro level 8 (goal 7-9). Continue same dose now. Currently taking 4 mg BID.   - stopped Ursodiol Day +30 (8/27/20)  - Bone bx,patient has had bad experience x 2. Once here. Rx for Valium and Oxycodone pre-procedure.  - had BMBx in clinic on 8/28/20 showing no definitive morphologic or immunophenotypic evidence of residual CML. Chimerisms are pending.      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 Achieved morphologic CR  - Started on daily Ponatinib 30 mg daily, which he continued to take outpatient. Held Ponatinib during admission; may not need to restart per recent literature. BBMT 2020(26): 449-28  - [place] [facility] [place] showing no morphologic or immunophenotypic evidence of AML/CML  - Admitted 7/21/20 for Flu/Cy/TBI haplo allo SCT    GVHD  - had rash inpatient that was believed to be drug eruption. Resolved  - Patient has raised non-pruritic papular rash on chest, abdomen less likely GVH  - GVHD charting with each visit     Dry Painful, Itchy Skin  - lotion recommended - non-fragrance such as CeraVe, Aquaphor, Jergens, Vaseline Intensive care  - Aveeno bath soak  - Avoiding benadryl, atarax for pruritus due to number of sedative medications patient is currently on for pain management  - improved    ID  - Last CMV and EBV undetected; continue twice weekly CMV, weekly EBV, today's pending  -  Acyclovir (Zoster prophylaxis) until Day +365  - Diflucan (Fungal prophylaxis) until off tacrolimus.  - Bactrim (PJP prophylaxis) until off tacrolimus.     Hypomagnesemia/hyperphosphatemia   - Mag 1.2. IncreaseD magnesium to 1200 mg TID during last visit, due to diarrhea patient back to 1 tab TID. Increased to 2 tab TID, currently reports no diarrhea. Received IV mag in infusion center last 9/4; will hold off today  - Phos 4.9. Previously elevated, no indication for phos binder at this time.     Mucositis due to chemotherapy/Thrush  - Continue prn [facility]; patient also uses Aloe oral rinses  - Recommend baking soda and warm water rinses  - Started nystatin x10 days 8/18 (completed 8/27)  - No thrush on exam today     CINV (chemotherapy-induced nausea and vomiting)  - c/w prn zofran and compazine. Helping.     Blurry vision  - C/o worsening blurry vision 7/30/20  - May be contributing to headaches  - Ophtho consulted and saw patient 7/30  - Ophtho dilated pupils and performed fundal exam. Fundal exam neg.  - Patient had been viewing laptop screen for ~ 2 hours prior to experiencing blurry vision  - Per ophtho, blurry vision due to dry eyes. rec'd artificial tears QID prn and warm compresses BID prn. Can escalate artificial tears to ointment if needed  - improved     Antineoplastic chemotherapy-induced cytopenias: anemia and thrombocytopenia   - Transfuse Hgb <7g and plt <10K  - hgb 9.5; plt 130K  - check B12, folate, TSH, T4 all of which were normal. Also checked reticulocytes which were elelvated. BMBx showing no residual disease. Improved from 7.2 at last visit; did receive 1unit RBC on 9/4     Headache/ Neck pain/ Cervical stenosis of spine  - Hx of spinal fusion C3-C5  - Started having headaches when he started Ponatinib at diagnosis. Onset seems to correspond to when patient quit smoking and when he started ponatinib  - Had been off of ponatinib for a few days but HAs persisted during transplant admission  -  Pattern is like cluster headaches (onset during sleep), but not unilateral versus tension HA   - O2 via non-rebreather at 12 L over 15 minutes seemed to help but developed hot flash so pt stopped  - Ultram, Flexeril, Oxycodone, lidoderm, neck pillow, c-collar did not help  - Imitrex PRN q2hr (max 200mg in 24hrs). Not contraindicated per pharm D.  - Patient reporting that headache may be sinus in nature. Maxillary tenderness noted. Taking claritin  - Saw optho and neuro inpatient, trialed IV mag  - Is now seeing pain mgt for this. Current headaches seem to be caused by muscle spasms 2/2 cervical stenosis  - Flexeril changed to zanaflex by pain management. Also on gabapentin. Restarted celebrex 8/18 as plt >50K. Changed morphine 15mg Q4h prn, to 30 mg BID. Per patient he increase dose to 30 mg bedtime, pain controlled with this dose. If pain not controlled consider to change Morphine ER in future  - He has 1 pill of morphine left. Refilled 09/11, short supply patient wants to try it and see how it works.  - Recommended alternating heat and ice  - Xray 9/19 shows surgical changes of fusion from C3 through C5 with degenerative changed below fusion.   - Following with pain mgt and PT.  - Started PT on 8/26.  - No steroid injections at this time per pain management due to transplant.  - Pt feels neck pain started after hickmann was placed  - Has been mostly refractory to medications (zanaflex, gabapentin, celebrex, lidoderm patches). Some relief with oral morphine  - CT from 8/24/20 showing history of C3, C4, and C5 fusion and diffuse osteoarthritic changes  - Had virtual pain management appt on  8/25 to review CT results.  - Zanaflex switched to Robaxin per pain mgt.  - had medial branch block on 9/2/20 with Dr. Mcelroy (pain mgt). States that he got very little relief. Called Dr. Mcelroy's office requesting morphine. Was denied until he can see MD. Seen by Dr. Mcelroy 9/10/20. Per patient pain mgmnt signed off, as it  is not a chronic issue, defer to BMT service for further pain control.  -Consider to remove his line in future, as he feels it's bothering and causing neck pain.   Tobacco abuse, in remission  - 35 pack year history  - Quit smoking 6/1/2020 using Chantix  - Patient completed course of Chantix and denies need for Nicotine patch    BPH (benign prostatic hyperplasia)  - Continue Flomax     GERD (gastroesophageal reflux disease)  - Continue nexium   - Can take TUMS prn     Essential hypertension  - Increased home amlodipine from 5 mg to 10 mg daily while inpatient due to BPs as high as 180s/110s   - /77 today  - continue to hold amlodipine, has been off for two weeks    AUR[facility]A (on 9/4)  - creatinine down to 1.5 today. Previously 1.2 at last visit   - stopped cellcept on Day +35  - will continue to hold celebrex, will defer to restart to pain management  - will increase fluconazole back up to 400 mg daily today  - tacro dose, no change today  - continue increased PO fluid intake    Follow-up:   - CBC, CMP, type and screen, tacro, CMV, EBV (weekly only), mag, phos and appt with Dr. Hodges alternating with NP twice weekly.  - Labs should be in AMs while on tacro. Appts scheduled through end of September.    Aundrea Jackson NP  Hematology/Oncology/BMT

## 2020-09-11 ENCOUNTER — OFFICE VISIT (OUTPATIENT)
Dept: HEMATOLOGY/ONCOLOGY | Facility: CLINIC | Age: 60
End: 2020-09-11
Payer: COMMERCIAL

## 2020-09-11 ENCOUNTER — INFUSION (OUTPATIENT)
Dept: INFUSION THERAPY | Facility: HOSPITAL | Age: 60
End: 2020-09-11
Attending: NURSE PRACTITIONER
Payer: COMMERCIAL

## 2020-09-11 ENCOUNTER — CLINICAL SUPPORT (OUTPATIENT)
Dept: HEMATOLOGY/ONCOLOGY | Facility: CLINIC | Age: 60
End: 2020-09-11
Payer: COMMERCIAL

## 2020-09-11 VITALS
BODY MASS INDEX: 30.33 KG/M2 | TEMPERATURE: 98 F | HEART RATE: 78 BPM | WEIGHT: 188.69 LBS | RESPIRATION RATE: 16 BRPM | OXYGEN SATURATION: 99 % | DIASTOLIC BLOOD PRESSURE: 77 MMHG | SYSTOLIC BLOOD PRESSURE: 126 MMHG | HEIGHT: 66 IN

## 2020-09-11 DIAGNOSIS — Z94.81 S/P ALLOGENEIC BONE MARROW TRANSPLANT: Primary | ICD-10-CM

## 2020-09-11 DIAGNOSIS — H04.123 BILATERAL DRY EYES: Primary | ICD-10-CM

## 2020-09-11 DIAGNOSIS — C92.01 ACUTE MYELOID LEUKEMIA IN REMISSION: ICD-10-CM

## 2020-09-11 DIAGNOSIS — M48.02 CERVICAL STENOSIS OF SPINE: ICD-10-CM

## 2020-09-11 DIAGNOSIS — Z94.84 HISTORY OF ALLOGENEIC STEM CELL TRANSPLANT: Primary | ICD-10-CM

## 2020-09-11 LAB
ABO + RH BLD: NORMAL
ALBUMIN SERPL BCP-MCNC: 3.7 G/DL (ref 3.5–5.2)
ALP SERPL-CCNC: 68 U/L (ref 55–135)
ALT SERPL W/O P-5'-P-CCNC: 14 U/L (ref 10–44)
ANION GAP SERPL CALC-SCNC: 8 MMOL/L (ref 8–16)
AST SERPL-CCNC: 12 U/L (ref 10–40)
BASOPHILS # BLD AUTO: 0.05 K/UL (ref 0–0.2)
BASOPHILS NFR BLD: 0.8 % (ref 0–1.9)
BILIRUB SERPL-MCNC: 0.3 MG/DL (ref 0.1–1)
BLD GP AB SCN CELLS X3 SERPL QL: NORMAL
BUN SERPL-MCNC: 13 MG/DL (ref 8–23)
CALCIUM SERPL-MCNC: 9.2 MG/DL (ref 8.7–10.5)
CHLORIDE SERPL-SCNC: 106 MMOL/L (ref 95–110)
CO2 SERPL-SCNC: 24 MMOL/L (ref 23–29)
CREAT SERPL-MCNC: 1.5 MG/DL (ref 0.5–1.4)
DIFFERENTIAL METHOD: ABNORMAL
EOSINOPHIL # BLD AUTO: 1 K/UL (ref 0–0.5)
EOSINOPHIL NFR BLD: 17.3 % (ref 0–8)
ERYTHROCYTE [DISTWIDTH] IN BLOOD BY AUTOMATED COUNT: 18.5 % (ref 11.5–14.5)
EST. GFR  (AFRICAN AMERICAN): 56.9 ML/MIN/1.73 M^2
EST. GFR  (NON AFRICAN AMERICAN): 49.2 ML/MIN/1.73 M^2
GLUCOSE SERPL-MCNC: 114 MG/DL (ref 70–110)
HCT VFR BLD AUTO: 30.3 % (ref 40–54)
HGB BLD-MCNC: 9.5 G/DL (ref 14–18)
IMM GRANULOCYTES # BLD AUTO: 0.08 K/UL (ref 0–0.04)
IMM GRANULOCYTES NFR BLD AUTO: 1.3 % (ref 0–0.5)
LYMPHOCYTES # BLD AUTO: 0.3 K/UL (ref 1–4.8)
LYMPHOCYTES NFR BLD: 4.3 % (ref 18–48)
MAGNESIUM SERPL-MCNC: 1.2 MG/DL (ref 1.6–2.6)
MCH RBC QN AUTO: 33 PG (ref 27–31)
MCHC RBC AUTO-ENTMCNC: 31.4 G/DL (ref 32–36)
MCV RBC AUTO: 105 FL (ref 82–98)
MONOCYTES # BLD AUTO: 0.7 K/UL (ref 0.3–1)
MONOCYTES NFR BLD: 11.5 % (ref 4–15)
NEUTROPHILS # BLD AUTO: 3.9 K/UL (ref 1.8–7.7)
NEUTROPHILS NFR BLD: 64.8 % (ref 38–73)
NRBC BLD-RTO: 0 /100 WBC
PHOSPHATE SERPL-MCNC: 4.9 MG/DL (ref 2.7–4.5)
PLATELET # BLD AUTO: 130 K/UL (ref 150–350)
PMV BLD AUTO: 10.1 FL (ref 9.2–12.9)
POTASSIUM SERPL-SCNC: 4.5 MMOL/L (ref 3.5–5.1)
PROT SERPL-MCNC: 6.4 G/DL (ref 6–8.4)
RBC # BLD AUTO: 2.88 M/UL (ref 4.6–6.2)
SODIUM SERPL-SCNC: 138 MMOL/L (ref 136–145)
TACROLIMUS BLD-MCNC: 8 NG/ML (ref 5–15)
WBC # BLD AUTO: 6.01 K/UL (ref 3.9–12.7)

## 2020-09-11 PROCEDURE — 80053 COMPREHEN METABOLIC PANEL: CPT

## 2020-09-11 PROCEDURE — 83735 ASSAY OF MAGNESIUM: CPT

## 2020-09-11 PROCEDURE — 99214 PR OFFICE/OUTPT VISIT, EST, LEVL IV, 30-39 MIN: ICD-10-PCS | Mod: BMT,S$GLB,, | Performed by: NURSE PRACTITIONER

## 2020-09-11 PROCEDURE — 3008F PR BODY MASS INDEX (BMI) DOCUMENTED: ICD-10-PCS | Mod: BMT,CPTII,S$GLB, | Performed by: NURSE PRACTITIONER

## 2020-09-11 PROCEDURE — 3008F BODY MASS INDEX DOCD: CPT | Mod: BMT,CPTII,S$GLB, | Performed by: NURSE PRACTITIONER

## 2020-09-11 PROCEDURE — 85025 COMPLETE CBC W/AUTO DIFF WBC: CPT

## 2020-09-11 PROCEDURE — 99999 PR PBB SHADOW E&M-EST. PATIENT-LVL V: ICD-10-PCS | Mod: PBBFAC,BMT,, | Performed by: NURSE PRACTITIONER

## 2020-09-11 PROCEDURE — 80197 ASSAY OF TACROLIMUS: CPT

## 2020-09-11 PROCEDURE — 63600175 PHARM REV CODE 636 W HCPCS: Performed by: NURSE PRACTITIONER

## 2020-09-11 PROCEDURE — 99214 OFFICE O/P EST MOD 30 MIN: CPT | Mod: BMT,S$GLB,, | Performed by: NURSE PRACTITIONER

## 2020-09-11 PROCEDURE — A4216 STERILE WATER/SALINE, 10 ML: HCPCS | Performed by: NURSE PRACTITIONER

## 2020-09-11 PROCEDURE — 99999 PR PBB SHADOW E&M-EST. PATIENT-LVL V: CPT | Mod: PBBFAC,BMT,, | Performed by: NURSE PRACTITIONER

## 2020-09-11 PROCEDURE — 99999 PR PBB SHADOW E&M-EST. PATIENT-LVL I: CPT | Mod: PBBFAC,BMT,,

## 2020-09-11 PROCEDURE — 84100 ASSAY OF PHOSPHORUS: CPT

## 2020-09-11 PROCEDURE — 36592 COLLECT BLOOD FROM PICC: CPT

## 2020-09-11 PROCEDURE — 25000003 PHARM REV CODE 250: Performed by: NURSE PRACTITIONER

## 2020-09-11 PROCEDURE — 86901 BLOOD TYPING SEROLOGIC RH(D): CPT

## 2020-09-11 PROCEDURE — 87799 DETECT AGENT NOS DNA QUANT: CPT

## 2020-09-11 PROCEDURE — 99999 PR PBB SHADOW E&M-EST. PATIENT-LVL I: ICD-10-PCS | Mod: PBBFAC,BMT,,

## 2020-09-11 RX ORDER — SODIUM CHLORIDE 0.9 % (FLUSH) 0.9 %
10 SYRINGE (ML) INJECTION
Status: CANCELLED | OUTPATIENT
Start: 2020-09-11

## 2020-09-11 RX ORDER — HEPARIN 100 UNIT/ML
500 SYRINGE INTRAVENOUS
Status: CANCELLED | OUTPATIENT
Start: 2020-09-11

## 2020-09-11 RX ORDER — HEPARIN 100 UNIT/ML
500 SYRINGE INTRAVENOUS
Status: COMPLETED | OUTPATIENT
Start: 2020-09-11 | End: 2020-09-11

## 2020-09-11 RX ORDER — MORPHINE SULFATE 15 MG/1
30 TABLET ORAL EVERY 12 HOURS PRN
Qty: 12 TABLET | Refills: 0 | Status: SHIPPED | OUTPATIENT
Start: 2020-09-11 | End: 2020-09-15 | Stop reason: SDUPTHER

## 2020-09-11 RX ORDER — SODIUM CHLORIDE 0.9 % (FLUSH) 0.9 %
10 SYRINGE (ML) INJECTION
Status: COMPLETED | OUTPATIENT
Start: 2020-09-11 | End: 2020-09-11

## 2020-09-11 RX ADMIN — HEPARIN 500 UNITS: 100 SYRINGE at 08:09

## 2020-09-11 RX ADMIN — Medication 10 ML: at 08:09

## 2020-09-11 NOTE — NURSING
Patient's labs drawn from red lumen of Dalal.  Specimens sent to lab.  Dressing changed following sterile protocol.  Patient tolerated well.  Patient reports rash on ABD and swelling to bilateral hands.  Patient seeing NP next and encouraged to discuss during visit.

## 2020-09-14 LAB — CMV DNA SERPL NAA+PROBE-ACNC: NORMAL IU/ML

## 2020-09-15 ENCOUNTER — INFUSION (OUTPATIENT)
Dept: INFUSION THERAPY | Facility: HOSPITAL | Age: 60
End: 2020-09-15
Payer: COMMERCIAL

## 2020-09-15 ENCOUNTER — OFFICE VISIT (OUTPATIENT)
Dept: HEMATOLOGY/ONCOLOGY | Facility: CLINIC | Age: 60
End: 2020-09-15
Payer: COMMERCIAL

## 2020-09-15 ENCOUNTER — INFUSION (OUTPATIENT)
Dept: INFUSION THERAPY | Facility: HOSPITAL | Age: 60
End: 2020-09-15
Attending: NURSE PRACTITIONER
Payer: COMMERCIAL

## 2020-09-15 VITALS
HEIGHT: 66 IN | TEMPERATURE: 98 F | OXYGEN SATURATION: 98 % | SYSTOLIC BLOOD PRESSURE: 123 MMHG | WEIGHT: 191.13 LBS | RESPIRATION RATE: 18 BRPM | HEART RATE: 79 BPM | BODY MASS INDEX: 30.72 KG/M2 | DIASTOLIC BLOOD PRESSURE: 68 MMHG

## 2020-09-15 VITALS
SYSTOLIC BLOOD PRESSURE: 138 MMHG | TEMPERATURE: 98 F | DIASTOLIC BLOOD PRESSURE: 69 MMHG | HEART RATE: 81 BPM | RESPIRATION RATE: 18 BRPM

## 2020-09-15 DIAGNOSIS — M48.02 CERVICAL STENOSIS OF SPINE: ICD-10-CM

## 2020-09-15 DIAGNOSIS — T45.1X5A CHEMOTHERAPY-INDUCED THROMBOCYTOPENIA: ICD-10-CM

## 2020-09-15 DIAGNOSIS — Z94.84 HISTORY OF ALLOGENEIC STEM CELL TRANSPLANT: ICD-10-CM

## 2020-09-15 DIAGNOSIS — D89.810 ACUTE GVHD COMPLICATING BONE MARROW TRANSPLANTATION: Primary | ICD-10-CM

## 2020-09-15 DIAGNOSIS — C92.11 CML IN REMISSION: ICD-10-CM

## 2020-09-15 DIAGNOSIS — E83.42 HYPOMAGNESEMIA: ICD-10-CM

## 2020-09-15 DIAGNOSIS — I10 ESSENTIAL HYPERTENSION: ICD-10-CM

## 2020-09-15 DIAGNOSIS — C92.01 ACUTE MYELOID LEUKEMIA IN REMISSION: ICD-10-CM

## 2020-09-15 DIAGNOSIS — N40.0 BENIGN PROSTATIC HYPERPLASIA WITHOUT LOWER URINARY TRACT SYMPTOMS: ICD-10-CM

## 2020-09-15 DIAGNOSIS — R11.2 CINV (CHEMOTHERAPY-INDUCED NAUSEA AND VOMITING): ICD-10-CM

## 2020-09-15 DIAGNOSIS — E83.42 HYPOMAGNESEMIA: Primary | ICD-10-CM

## 2020-09-15 DIAGNOSIS — M53.82 DECREASED ROM OF INTERVERTEBRAL DISCS OF CERVICAL SPINE: ICD-10-CM

## 2020-09-15 DIAGNOSIS — Z94.84 HISTORY OF ALLOGENEIC STEM CELL TRANSPLANT: Primary | ICD-10-CM

## 2020-09-15 DIAGNOSIS — D69.59 CHEMOTHERAPY-INDUCED THROMBOCYTOPENIA: ICD-10-CM

## 2020-09-15 DIAGNOSIS — F17.201 TOBACCO ABUSE, IN REMISSION: ICD-10-CM

## 2020-09-15 DIAGNOSIS — K21.9 GASTROESOPHAGEAL REFLUX DISEASE WITHOUT ESOPHAGITIS: ICD-10-CM

## 2020-09-15 DIAGNOSIS — T45.1X5A ANTINEOPLASTIC CHEMOTHERAPY INDUCED ANEMIA: ICD-10-CM

## 2020-09-15 DIAGNOSIS — D64.81 ANTINEOPLASTIC CHEMOTHERAPY INDUCED ANEMIA: ICD-10-CM

## 2020-09-15 DIAGNOSIS — H04.123 BILATERAL DRY EYES: ICD-10-CM

## 2020-09-15 DIAGNOSIS — T86.09 ACUTE GVHD COMPLICATING BONE MARROW TRANSPLANTATION: Primary | ICD-10-CM

## 2020-09-15 DIAGNOSIS — N17.9 AKI (ACUTE KIDNEY INJURY): ICD-10-CM

## 2020-09-15 DIAGNOSIS — T45.1X5A CINV (CHEMOTHERAPY-INDUCED NAUSEA AND VOMITING): ICD-10-CM

## 2020-09-15 DIAGNOSIS — G89.29 CHRONIC INTRACTABLE HEADACHE, UNSPECIFIED HEADACHE TYPE: ICD-10-CM

## 2020-09-15 DIAGNOSIS — R51.9 CHRONIC INTRACTABLE HEADACHE, UNSPECIFIED HEADACHE TYPE: ICD-10-CM

## 2020-09-15 LAB
ALBUMIN SERPL BCP-MCNC: 3.8 G/DL (ref 3.5–5.2)
ALP SERPL-CCNC: 67 U/L (ref 55–135)
ALT SERPL W/O P-5'-P-CCNC: 17 U/L (ref 10–44)
ANION GAP SERPL CALC-SCNC: 11 MMOL/L (ref 8–16)
AST SERPL-CCNC: 14 U/L (ref 10–40)
BASOPHILS # BLD AUTO: 0.05 K/UL (ref 0–0.2)
BASOPHILS NFR BLD: 1 % (ref 0–1.9)
BILIRUB SERPL-MCNC: 0.2 MG/DL (ref 0.1–1)
BUN SERPL-MCNC: 13 MG/DL (ref 8–23)
CALCIUM SERPL-MCNC: 9.3 MG/DL (ref 8.7–10.5)
CHLORIDE SERPL-SCNC: 104 MMOL/L (ref 95–110)
CO2 SERPL-SCNC: 24 MMOL/L (ref 23–29)
CREAT SERPL-MCNC: 1.3 MG/DL (ref 0.5–1.4)
DIFFERENTIAL METHOD: ABNORMAL
EBV DNA BY PCR: NORMAL IU/ML
EOSINOPHIL # BLD AUTO: 0.8 K/UL (ref 0–0.5)
EOSINOPHIL NFR BLD: 15.2 % (ref 0–8)
ERYTHROCYTE [DISTWIDTH] IN BLOOD BY AUTOMATED COUNT: 17.9 % (ref 11.5–14.5)
EST. GFR  (AFRICAN AMERICAN): >60 ML/MIN/1.73 M^2
EST. GFR  (NON AFRICAN AMERICAN): 58.5 ML/MIN/1.73 M^2
GLUCOSE SERPL-MCNC: 109 MG/DL (ref 70–110)
HCT VFR BLD AUTO: 29.9 % (ref 40–54)
HGB BLD-MCNC: 9.1 G/DL (ref 14–18)
IMM GRANULOCYTES # BLD AUTO: 0.03 K/UL (ref 0–0.04)
IMM GRANULOCYTES NFR BLD AUTO: 0.6 % (ref 0–0.5)
LYMPHOCYTES # BLD AUTO: 0.4 K/UL (ref 1–4.8)
LYMPHOCYTES NFR BLD: 8.3 % (ref 18–48)
MAGNESIUM SERPL-MCNC: 1.3 MG/DL (ref 1.6–2.6)
MCH RBC QN AUTO: 32.3 PG (ref 27–31)
MCHC RBC AUTO-ENTMCNC: 30.4 G/DL (ref 32–36)
MCV RBC AUTO: 106 FL (ref 82–98)
MONOCYTES # BLD AUTO: 0.6 K/UL (ref 0.3–1)
MONOCYTES NFR BLD: 12.3 % (ref 4–15)
NEUTROPHILS # BLD AUTO: 3.2 K/UL (ref 1.8–7.7)
NEUTROPHILS NFR BLD: 62.6 % (ref 38–73)
NRBC BLD-RTO: 0 /100 WBC
PHOSPHATE SERPL-MCNC: 4.1 MG/DL (ref 2.7–4.5)
PLATELET # BLD AUTO: 153 K/UL (ref 150–350)
PMV BLD AUTO: 10.1 FL (ref 9.2–12.9)
POTASSIUM SERPL-SCNC: 4.2 MMOL/L (ref 3.5–5.1)
PROT SERPL-MCNC: 6.7 G/DL (ref 6–8.4)
RBC # BLD AUTO: 2.82 M/UL (ref 4.6–6.2)
SODIUM SERPL-SCNC: 139 MMOL/L (ref 136–145)
TACROLIMUS BLD-MCNC: 9.1 NG/ML (ref 5–15)
WBC # BLD AUTO: 5.05 K/UL (ref 3.9–12.7)

## 2020-09-15 PROCEDURE — 25000003 PHARM REV CODE 250: Performed by: NURSE PRACTITIONER

## 2020-09-15 PROCEDURE — 99215 PR OFFICE/OUTPT VISIT, EST, LEVL V, 40-54 MIN: ICD-10-PCS | Mod: BMT,S$GLB,, | Performed by: NURSE PRACTITIONER

## 2020-09-15 PROCEDURE — 99999 PR PBB SHADOW E&M-EST. PATIENT-LVL III: ICD-10-PCS | Mod: PBBFAC,BMT,, | Performed by: NURSE PRACTITIONER

## 2020-09-15 PROCEDURE — 96365 THER/PROPH/DIAG IV INF INIT: CPT

## 2020-09-15 PROCEDURE — 99999 PR PBB SHADOW E&M-EST. PATIENT-LVL III: CPT | Mod: PBBFAC,BMT,, | Performed by: NURSE PRACTITIONER

## 2020-09-15 PROCEDURE — 3008F BODY MASS INDEX DOCD: CPT | Mod: BMT,CPTII,S$GLB, | Performed by: NURSE PRACTITIONER

## 2020-09-15 PROCEDURE — 83735 ASSAY OF MAGNESIUM: CPT

## 2020-09-15 PROCEDURE — A4216 STERILE WATER/SALINE, 10 ML: HCPCS | Performed by: NURSE PRACTITIONER

## 2020-09-15 PROCEDURE — 63600175 PHARM REV CODE 636 W HCPCS: Performed by: NURSE PRACTITIONER

## 2020-09-15 PROCEDURE — 85025 COMPLETE CBC W/AUTO DIFF WBC: CPT

## 2020-09-15 PROCEDURE — 80197 ASSAY OF TACROLIMUS: CPT

## 2020-09-15 PROCEDURE — 84100 ASSAY OF PHOSPHORUS: CPT

## 2020-09-15 PROCEDURE — 80053 COMPREHEN METABOLIC PANEL: CPT

## 2020-09-15 PROCEDURE — 87799 DETECT AGENT NOS DNA QUANT: CPT

## 2020-09-15 PROCEDURE — 3008F PR BODY MASS INDEX (BMI) DOCUMENTED: ICD-10-PCS | Mod: BMT,CPTII,S$GLB, | Performed by: NURSE PRACTITIONER

## 2020-09-15 PROCEDURE — 36592 COLLECT BLOOD FROM PICC: CPT

## 2020-09-15 PROCEDURE — 99215 OFFICE O/P EST HI 40 MIN: CPT | Mod: BMT,S$GLB,, | Performed by: NURSE PRACTITIONER

## 2020-09-15 RX ORDER — LORAZEPAM/0.9% SODIUM CHLORIDE 100MG/0.1L
2 PLASTIC BAG, INJECTION (ML) INTRAVENOUS
Status: CANCELLED | OUTPATIENT
Start: 2020-09-15

## 2020-09-15 RX ORDER — HEPARIN 100 UNIT/ML
500 SYRINGE INTRAVENOUS
Status: COMPLETED | OUTPATIENT
Start: 2020-09-15 | End: 2020-09-15

## 2020-09-15 RX ORDER — HEPARIN 100 UNIT/ML
500 SYRINGE INTRAVENOUS
Status: CANCELLED | OUTPATIENT
Start: 2020-09-15

## 2020-09-15 RX ORDER — MORPHINE SULFATE 15 MG/1
15 TABLET, FILM COATED, EXTENDED RELEASE ORAL 2 TIMES DAILY
Qty: 60 TABLET | Refills: 0 | Status: SHIPPED | OUTPATIENT
Start: 2020-09-15 | End: 2020-10-12 | Stop reason: SDUPTHER

## 2020-09-15 RX ORDER — SODIUM CHLORIDE 0.9 % (FLUSH) 0.9 %
10 SYRINGE (ML) INJECTION
Status: CANCELLED | OUTPATIENT
Start: 2020-09-15

## 2020-09-15 RX ORDER — SODIUM CHLORIDE 0.9 % (FLUSH) 0.9 %
10 SYRINGE (ML) INJECTION
Status: COMPLETED | OUTPATIENT
Start: 2020-09-15 | End: 2020-09-15

## 2020-09-15 RX ORDER — PREDNISONE 20 MG/1
100 TABLET ORAL DAILY
Qty: 180 TABLET | Refills: 2 | Status: SHIPPED | OUTPATIENT
Start: 2020-09-15 | End: 2020-09-25

## 2020-09-15 RX ORDER — LORAZEPAM/0.9% SODIUM CHLORIDE 100MG/0.1L
2 PLASTIC BAG, INJECTION (ML) INTRAVENOUS
Status: COMPLETED | OUTPATIENT
Start: 2020-09-15 | End: 2020-09-15

## 2020-09-15 RX ORDER — TACROLIMUS 0.5 MG/1
CAPSULE ORAL
Qty: 480 CAPSULE | Refills: 6 | Status: SHIPPED | OUTPATIENT
Start: 2020-09-15 | End: 2020-09-18 | Stop reason: SDUPTHER

## 2020-09-15 RX ORDER — MORPHINE SULFATE 15 MG/1
30 TABLET ORAL EVERY 6 HOURS PRN
Qty: 90 TABLET | Refills: 0 | Status: SHIPPED | OUTPATIENT
Start: 2020-09-15 | End: 2020-10-05 | Stop reason: SDUPTHER

## 2020-09-15 RX ADMIN — HEPARIN 500 UNITS: 100 SYRINGE at 12:09

## 2020-09-15 RX ADMIN — Medication 10 ML: at 12:09

## 2020-09-15 RX ADMIN — Medication 10 ML: at 09:09

## 2020-09-15 RX ADMIN — MAGNESIUM SULFATE IN WATER 2 G: 40 INJECTION, SOLUTION INTRAVENOUS at 11:09

## 2020-09-15 RX ADMIN — HEPARIN 500 UNITS: 100 SYRINGE at 09:09

## 2020-09-15 NOTE — PLAN OF CARE
1130 patient completed and tolerated Mg sulfate. Pt with no new complaints or concerns at this time. NAD noted, pt d/c home.

## 2020-09-15 NOTE — PROGRESS NOTES
Subjective:    Patient ID: Kvng Jones Sr. is a 60 y.o. male.    Chief Complaint: Headache    Oncologic hx:  60 y.o. 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 He was admitted for evaluation and induction chemotherapy with FLAG-Addis. Complications of therapy include epididymal orchitis with negative testicular ultrasound, neutropenic fever, dyspnea, and GGO of bilateral lungs on CT chest. Fever and chest findings were covered with broad spectrum antimicrobials. He did have hallucinations and vivid dreams with posaconazole. Chemotherapy was administered by left arm PICC line that was removed during hospital stay for MSSA infection treated with vancomycin. Hospital admission was 3/30/2020 to 5/1/2020 achieving morphologic CR with induction chemotherapy. He then started Ponatinib 30mg daily.     Studies performed during his hospital stay include pre-chemotherapy ECHO with normal ejection fraction of 60-65%. HIV, HBV, and HCV tests were negative. CBC at admission was WBC 38.6, Hgb 14.6, and platelet 416K. CT of the head without contrast was normal 4/20/2020. US of abdomen performed for abdominal distention and neutropenic fever had hepatic steatosis, liver 19cm, and spleen 11 cm. He does have a history of alcohol use and pancreatitis.    Patient is a . He is  to wife Guillermina who will be a caregiver post transplant. He has a 45 pack year smoking history. He quit 6/1/2020, now on Chantix. He drinks 3-4 alcohol beverages nightly. He has 3 children. He has several family members in the [facility] area.    Transplant Course: Admitted 7/21/20 for a Flu/Cy/TBI haplo allo SCT. Son was donor. Received 2 bags and a total CD34 dose of 3.10 x10^6/kg on 7/28/20. Tolerated stem cells well. C/o headaches prior to admission, which persisted throughout admission. Neuro ultimately consulted for rec's. Was a daily drinker prior to admission. No s/s of  alcolhol withdrawal during admission. Transplant further complicated by hypertension, heart burn, c-diff neg diarrhea, nausea, sinus congestion, mild peripheral edema, blurry vision (ophtho consulted), mucositis, electrolyte abnormalities, and neck pain 2/2 spinal stenosis (referred to pain mgt at discharge). He engrafted on 8/10/20 with an ANC of 1280. He was discharged on 8/12/2020.    Interval History:  Patient presents for follow up clinic visit post Flu/Cy/TBI allogeneic stem cell transplant. Today is Day +49. Continues with neck pain. He was seen by Dr. Mcelroy of pain management on 9/10/20. Per patient, pain management signed off as his pain not a chronic issue and advised him to f/u with BMT service for further management. Dr. Mcelroy epic note still pending; pt said he was recommended to start targeted PT for back from pain management, we are awaiting orders, I will reach out to their office today. Adjusted morphine dose for pain control. Left wrist and hand slightly swollen, not tender or any movement restriction. Bilateral dry eyes present. Appetite and oral intake has improved. Nausea is controlled with zofran and compazine. Denies fevers, cough, SOB, chest pain, n/v/d/c.  He had a new rash on 9/10/20 to abdomen only, looked like folliculitis. Progressed over weekend, patient stated very pruritic in nature. Now to lower back, bilateral arms, bilateral legs, and worse to abdomen. Diffuse erythema noted.     Review of Systems   Constitutional: Positive for activity change and fatigue. Negative for appetite change, chills, diaphoresis, fever and unexpected weight change.   HENT: Negative for congestion, dental problem, mouth sores, nosebleeds, postnasal drip, rhinorrhea, sinus pressure and trouble swallowing.    Eyes: Negative.  Negative for photophobia and visual disturbance.   Respiratory: Negative.  Negative for cough and shortness of breath.    Cardiovascular: Negative.  Negative for chest pain,  palpitations and leg swelling.   Gastrointestinal: Negative for abdominal distention, abdominal pain, blood in stool, constipation, diarrhea, nausea and vomiting.   Endocrine: Negative.    Genitourinary: Negative.  Negative for dysuria, frequency, hematuria and urgency.   Musculoskeletal: Positive for back pain, neck pain and neck stiffness.        History of cervical spinal stenosis. See hpi   Skin: Positive for rash. Negative for pallor.   Allergic/Immunologic: Negative for environmental allergies, food allergies and immunocompromised state.   Neurological: Negative for dizziness, tremors, syncope, weakness, numbness and headaches.   Hematological: Negative for adenopathy. Does not bruise/bleed easily.   Psychiatric/Behavioral: Negative.  Negative for confusion. The patient is not nervous/anxious.        Objective:     Physical Exam  Vitals signs and nursing note reviewed.   Constitutional:       Appearance: Normal appearance. He is well-developed.   HENT:      Head: Normocephalic and atraumatic.      Mouth/Throat:      Mouth: Mucous membranes are dry.   Eyes:      General: No scleral icterus.  Neck:      Musculoskeletal: Neck supple. Decreased range of motion.   Cardiovascular:      Rate and Rhythm: Normal rate and regular rhythm.      Pulses: Normal pulses.      Heart sounds: Normal heart sounds.   Pulmonary:      Effort: Pulmonary effort is normal. No respiratory distress.      Breath sounds: Normal breath sounds.   Abdominal:      General: Bowel sounds are normal. There is no distension.      Palpations: Abdomen is soft.      Tenderness: There is no abdominal tenderness.   Musculoskeletal:         General: Swelling (Left wrist +2 swelling) present. No tenderness.      Right lower leg: No edema.      Left lower leg: No edema.   Skin:     General: Skin is warm and dry.      Coloration: Skin is not pale.      Findings: Rash (diffuse erythema to legs, abdomen, arms and lower back) present. No erythema or  petechiae.      Comments: Dalal intact to right CW with no redness or drainage   Neurological:      Mental Status: He is alert and oriented to person, place, and time.   Psychiatric:         Attention and Perception: Attention normal.         Speech: Speech normal.         Thought Content: Thought content normal.         Cognition and Memory: Cognition and memory normal.         Assessment:       1. Acute GVHD complicating bone marrow transplantation    2. Cervical stenosis of spine    3. History of allogeneic stem cell transplant    4. Acute myeloid leukemia in remission    5. CML in remission    6. Hypomagnesemia    7. CINV (chemotherapy-induced nausea and vomiting)    8. Antineoplastic chemotherapy induced anemia    9. Chemotherapy-induced thrombocytopenia    10. Bilateral dry eyes    11. Tobacco abuse, in remission    12. Chronic intractable headache, unspecified headache type    13. Decreased ROM of intervertebral discs of cervical spine    14. Benign prostatic hyperplasia without lower urinary tract symptoms    15. Gastroesophageal reflux disease without esophagitis    16. Essential hypertension    17. AURORA (acute kidney injury)        Plan:         History of allogeneic stem cell transplant  - Admitted 7/21/20 for planned Flu/Cy/TBI haplo allo SCT. Son was the donor. Received 2 bags and a total CD34 dose of 3.10 x10^6/kg on 7/28/20. Recieved post transplant cyclophosphamide. Engrafted neutrophils 8/10/20 day +13  - Today is Day +49  - Tacro level 9.1 (goal 10-11 right now with skin GVHD; previously 7-9). Currently taking 4 mg BID; will increase to 4mg AM and 4.5mg PM per Dr. Hodges (9/15/20)  - stopped Ursodiol Day +30 (8/27/20)  - patient requests Rx for Valium and Oxycodone for pre-procedure in clinic bone marrow biopsies.  - had BMBx in clinic on 8/28/20 showing no definitive morphologic or immunophenotypic evidence of residual CML. Chimerisms are pending.      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 Achieved morphologic CR  - Started on daily Ponatinib 30 mg daily, which he continued to take outpatient. Held Ponatinib during admission; may not need to restart per recent literature. BBMT 2020(26): 472-49  - Had BMBx at [facility] on 7/1/2020 showing no morphologic or immunophenotypic evidence of AML/CML  - Admitted 7/21/20 for Flu/Cy/TBI haplo allo SCT    GVHD  - had rash inpatient that was believed to be drug eruption. Resolved  - now with diffuse erythema to legs, lower back, arms, and abdomen. Had Dr. Hodges in to assess rash as well. Will start 1mg/kg (90mg, will round to 100mg for dosing) of prednisone daily  - states itching to rash has improved after doing oatmeal baths over the weekend  - GVHD charting with each visit     ID  - Last CMV and EBV undetected; continue twice weekly CMV, weekly EBV, today's pending  - Acyclovir (Zoster prophylaxis) until Day +365  - Diflucan (Fungal prophylaxis) until off tacrolimus.  - Bactrim (PJP prophylaxis) until off tacrolimus.     Hypomagnesemia/hyperphosphatemia   - Mag 1.3. Currently on mag 800mg TID; was previously on 1200mg TID but did not tolerate due to diarrhea; will give 2g magnesium in infusion center today  - Phos 4.1. Previously elevated, no indication for phos binder at this time.     Mucositis due to chemotherapy/Thrush  - Continue prn [facility]'s; patient also uses Aloe oral rinses  - Recommend baking soda and warm water rinses  - Started nystatin x10 days 8/18 (completed 8/27)  - No thrush on exam today     CINV (chemotherapy-induced nausea and vomiting)  - c/w prn zofran and compazine. Helping.     Blurry vision  - C/o worsening blurry vision 7/30/20  - May be contributing to headaches  - Ophtho consulted and saw patient 7/30  - Ophtho dilated pupils and performed fundal exam. Fundal exam neg.  - Patient had been viewing  laptop screen for ~ 2 hours prior to experiencing blurry vision  - Per ophtho, blurry vision due to dry eyes. rec'd artificial tears QID prn and warm compresses BID prn. Can escalate artificial tears to ointment if needed  - improved     Antineoplastic chemotherapy-induced cytopenias: anemia and thrombocytopenia   - Transfuse Hgb <7g and plt <10K  - hgb 9.1; plt 153K  - check B12, folate, TSH, T4 all of which were normal. Also checked reticulocytes which were elelvated. BMBx showing no residual disease.     Headache/ Neck pain/ Cervical stenosis of spine  - Hx of spinal fusion C3-C5  - Started having headaches when he started Ponatinib at diagnosis. Onset seems to correspond to when patient quit smoking and when he started ponatinib  - Had been off of ponatinib for a few days but HAs persisted during transplant admission  - Pattern is like cluster headaches (onset during sleep), but not unilateral versus tension HA   - O2 via non-rebreather at 12 L over 15 minutes seemed to help but developed hot flash so pt stopped  - Ultram, Flexeril, Oxycodone, lidoderm, neck pillow, c-collar did not help  - Imitrex PRN q2hr (max 200mg in 24hrs). Not contraindicated per pharm D.  - Patient reporting that headache may be sinus in nature. Maxillary tenderness noted. Taking claritin  - Saw optho and neuro inpatient, trialed IV mag  - Is now seeing pain mgt for this. Current headaches seem to be caused by muscle spasms 2/2 cervical stenosis  - Flexeril changed to zanaflex by pain management. Also on gabapentin. Restarted celebrex 8/18 as plt >50K  - Recommended alternating heat and ice  - Xray 9/19 shows surgical changes of fusion from C3 through C5 with degenerative changed below fusion.   - Following with pain mgt and PT.  - Started PT on 8/26.  - No steroid injections at this time per pain management due to transplant.  - Pt feels neck pain started after hickmann was placed  - Has been mostly refractory to medications (zanaflex,  gabapentin, celebrex, lidoderm patches). Some relief with oral morphine  - CT from 8/24/20 showing history of C3, C4, and C5 fusion and diffuse osteoarthritic changes  - Had virtual pain management appt on 8/25 to review CT results.  - Zanaflex switched to Robaxin per pain mgt.  - had medial branch block on 9/2/20 with Dr. Mcelroy (pain mgt). States that he got very little relief. Called Dr. Mcelroy's office requesting morphine. Was denied until he can see MD. Seen by Dr. Mcelroy 9/10/20. Per patient pain mgmnt signed off, as it is not a chronic issue, defer to BMT service for further pain control.  - will start on MS contin 15mg BID; use PRN morphine q6h for breakthrough; titrate up MS contin as needed  -Consider to remove his line in future, as he feels it's bothering and causing neck pain. Currently still needing IV mag and has acute GVHD     Tobacco abuse, in remission  - 35 pack year history  - Quit smoking 6/1/2020 using Chantix  - Patient completed course of Chantix and denies need for Nicotine patch    BPH (benign prostatic hyperplasia)  - Continue Flomax     GERD (gastroesophageal reflux disease)  - Continue nexium   - Can take TUMS prn     Essential hypertension  - Increased home amlodipine from 5 mg to 10 mg daily while inpatient due to BPs as high as 180s/110s   - /68 today  - continue to hold amlodipine, has been off for two weeks    AURORA (on 9/4)  - creatinine down to 1.3 today. Previously 1.5 at last visit   - stopped cellcept on Day +35  - will continue to hold celebrex, will defer to restart to pain management  - will increase fluconazole back up to 400 mg daily  - continue increased PO fluid intake    Follow-up:   - CBC, CMP, type and screen, tacro, CMV, EBV (weekly only), mag, phos and appt with Dr. Hodges alternating with NP twice weekly.  - Labs should be in AMs while on tacro. Appts scheduled through end of September.    Change tomorrow's appointment to Friday    Marimar Brothers  NP  Hematology/Oncology/BMT

## 2020-09-15 NOTE — PROGRESS NOTES
BMT Pharmacist Medication Review Note     All current medications were reviewed with the patient. The patient brought in all of his medications with him to this visit.      We discussed the changes made by the physician.     The patient has ample supply of all medications.      All questions were answered.      Medication Indication Morning Lunch/Afternoon Evening Night   Acyclovir 800mg  Viral infection prevention  1 tablet  1 tablet   Fluconazole 200mg  Fungal infection prevention  2 tablets     Sulfamethoxazole-trimethoprim (Bactrim) 800-160mg PCP pneumonia prevention 1 tablet on Mon., Wed., and Fri.      Tacrolimus (Prograf) 0.5mg* GVHD prevention - take AFTER labs on clinic days* 8 capsules   8 capsules   ----------------------------------------        Esomeprazole (Nexium) 20mg Acid reflux  1 tablet     Gabapentin 300mg Neuropathy/pain 1 capsule 1 capsule  1 capsule   Loratadine (Claritin) 10mg Allergies 1 tablet      Magnesium oxide 400mg Magnesium supplement 2 tablets 2 tablets 2 tablets    Tamsulosin 0.4mg BPH/urinary issues 1 capsule      *Dose may change at each appointment    AS NEEDED MEDICATIONS:  Ondansetron (Zofran) 8mg every 8 hours as needed for nausea  Prochlorperazine (Compazine) 10mg four times a day as needed for nausea (2nd choice)  Dukes solution - swish and swallow 10mls four times a day as needed for mouth sores  Morphine 30mg every 12 hours as needed for severe pain  Triamcinolone 0.1% cream twice a day as needed for rash  Artificial tears into each eye as needed for dry eyes  Fluticasone nasal spray 2 sprays daily as needed    STOP UNTIL TOLD TO RESTART:  Celecoxib (Celebrex)    NO LONGER TAKE:  Methocarbamol   Mycophenolate  Tizanidine        Allie Soliman, PharmD, BCPS, BCOP  Clinical Pharmacy Specialist   BMT/Hematology Oncology  SpectraLink: [phone]

## 2020-09-15 NOTE — Clinical Note
Please change appointment with Dr. Hodges on Wednesday 9/16 to [place] on 9/18 @9:30, labs at 8:30. Please add the chimerism blood order that I placed to his labs on Friday

## 2020-09-15 NOTE — NURSING
0905- Patient arrived ambulatory to the unit to have labs drawn from central line.  Patient has no new complaints, shows no signs of distress.  Samples drawn from central line and sent to lab, lines flushed and heparin locked.  Patient discharged to home setting, will return later this week for labs/dressing change.

## 2020-09-16 PROBLEM — M62.9 MUSCULOSKELETAL DISORDER INVOLVING UPPER TRAPEZIUS MUSCLE: Status: RESOLVED | Noted: 2020-08-17 | Resolved: 2020-09-16

## 2020-09-16 PROBLEM — M54.30 SCIATICA: Status: RESOLVED | Noted: 2020-09-04 | Resolved: 2020-09-16

## 2020-09-16 PROBLEM — M48.02 CERVICAL STENOSIS OF SPINE: Status: RESOLVED | Noted: 2020-07-21 | Resolved: 2020-09-16

## 2020-09-16 PROBLEM — M53.82 DECREASED ROM OF INTERVERTEBRAL DISCS OF CERVICAL SPINE: Status: RESOLVED | Noted: 2020-08-17 | Resolved: 2020-09-16

## 2020-09-16 PROBLEM — H53.8 BLURRY VISION: Status: RESOLVED | Noted: 2020-07-30 | Resolved: 2020-09-16

## 2020-09-17 LAB — CMV DNA SERPL NAA+PROBE-ACNC: NORMAL IU/ML

## 2020-09-17 NOTE — PROGRESS NOTES
Subjective:    Patient ID: Kvng Jones Sr. is a 60 y.o. male.    Chief Complaint: Nausea    Oncologic hx:  60 y.o. 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 He was admitted for evaluation and induction chemotherapy with FLAG-Addis. Complications of therapy include epididymal orchitis with negative testicular ultrasound, neutropenic fever, dyspnea, and GGO of bilateral lungs on CT chest. Fever and chest findings were covered with broad spectrum antimicrobials. He did have hallucinations and vivid dreams with posaconazole. Chemotherapy was administered by left arm PICC line that was removed during hospital stay for MSSA infection treated with vancomycin. Hospital admission was 3/30/2020 to 5/1/2020 achieving morphologic CR with induction chemotherapy. He then started Ponatinib 30mg daily.     Studies performed during his hospital stay include pre-chemotherapy ECHO with normal ejection fraction of 60-65%. HIV, HBV, and HCV tests were negative. CBC at admission was WBC 38.6, Hgb 14.6, and platelet 416K. CT of the head without contrast was normal 4/20/2020. US of abdomen performed for abdominal distention and neutropenic fever had hepatic steatosis, liver 19cm, and spleen 11 cm. He does have a history of alcohol use and pancreatitis.    Patient is a . He is  to wife Guillermina who will be a caregiver post transplant. He has a 45 pack year smoking history. He quit 6/1/2020, now on Chantix. He drinks 3-4 alcohol beverages nightly. He has 3 children. He has several family members in the [facility] area.    Transplant Course: Admitted 7/21/20 for a Flu/Cy/TBI haplo allo SCT. Son was donor. Received 2 bags and a total CD34 dose of 3.10 x10^6/kg on 7/28/20. Tolerated stem cells well. C/o headaches prior to admission, which persisted throughout admission. Neuro ultimately consulted for rec's. Was a daily drinker prior to admission. No s/s of  alcolhol withdrawal during admission. Transplant further complicated by hypertension, heart burn, c-diff neg diarrhea, nausea, sinus congestion, mild peripheral edema, blurry vision (ophtho consulted), mucositis, electrolyte abnormalities, and neck pain 2/2 spinal stenosis (referred to pain mgt at discharge). He engrafted on 8/10/20 with an ANC of 1280. He was discharged on 8/12/2020.    Interval History:  Patient presents for follow up clinic visit post Flu/Cy/TBI allogeneic stem cell transplant. Today is Day +52. His neck pain has significantly improved since starting high dose steroids for GVHD of the skin. He reports pain is about 75% better. Continues on PRN morphine, awaiting for PA for MS contin. Appetite much improved with steroids, does not some lichenoid changes to his upper mouth, he has been using dukes for the pain. He reports he still continues with nausea, mostly in the mornings. Discussed he can take zofran before bed or when he wakes up in the middle of the night to use the bathroom. Denies fevers, cough, SOB, chest pain, n/v/d/c. His rash to lower back, bilateral arms, bilateral legs, and abdomen are improving. Resolved on arms and back, improving to legs, abdomen remains the same. Continues on 100mg prednisone daily  Peripheral blood chimerisms drawn today as unable to obtain from day +30 bmbx      Review of Systems   Constitutional: Positive for activity change and fatigue. Negative for appetite change, chills, diaphoresis, fever and unexpected weight change.   HENT: Negative for congestion, dental problem, mouth sores, nosebleeds, postnasal drip, rhinorrhea, sinus pressure and trouble swallowing.    Eyes: Negative.  Negative for photophobia and visual disturbance.   Respiratory: Negative.  Negative for cough and shortness of breath.    Cardiovascular: Negative.  Negative for chest pain, palpitations and leg swelling.   Gastrointestinal: Negative for abdominal distention, abdominal pain, blood in  stool, constipation, diarrhea, nausea and vomiting.   Endocrine: Negative.    Genitourinary: Negative.  Negative for dysuria, frequency, hematuria and urgency.   Musculoskeletal: Positive for back pain, neck pain and neck stiffness.        History of cervical spinal stenosis. See hpi   Skin: Positive for rash. Negative for pallor.   Allergic/Immunologic: Negative for environmental allergies, food allergies and immunocompromised state.   Neurological: Negative for dizziness, tremors, syncope, weakness, numbness and headaches.   Hematological: Negative for adenopathy. Does not bruise/bleed easily.   Psychiatric/Behavioral: Negative.  Negative for confusion. The patient is not nervous/anxious.        Objective:     Physical Exam  Vitals signs and nursing note reviewed.   Constitutional:       Appearance: Normal appearance. He is well-developed.   HENT:      Head: Normocephalic and atraumatic.      Mouth/Throat:      Mouth: Mucous membranes are dry.   Eyes:      General: No scleral icterus.  Neck:      Musculoskeletal: Neck supple. Decreased range of motion.   Cardiovascular:      Rate and Rhythm: Normal rate and regular rhythm.      Pulses: Normal pulses.      Heart sounds: Normal heart sounds.   Pulmonary:      Effort: Pulmonary effort is normal. No respiratory distress.      Breath sounds: Normal breath sounds.   Abdominal:      General: Bowel sounds are normal. There is no distension.      Palpations: Abdomen is soft.      Tenderness: There is no abdominal tenderness.   Musculoskeletal:         General: Swelling (Left wrist +2 swelling) present. No tenderness.      Right lower leg: No edema.      Left lower leg: No edema.   Skin:     General: Skin is warm and dry.      Coloration: Skin is not pale.      Findings: Rash (improving erythema to legs, arms and lower back; abdomen remains the same) present. No erythema or petechiae.      Comments: Dalal intact to right CW with no redness or drainage   Neurological:       Mental Status: He is alert and oriented to person, place, and time.   Psychiatric:         Attention and Perception: Attention normal.         Speech: Speech normal.         Thought Content: Thought content normal.         Cognition and Memory: Cognition and memory normal.         Assessment:       1. History of allogeneic stem cell transplant    2. CML in remission    3. Acute GVHD complicating bone marrow transplantation    4. Hypomagnesemia    5. CINV (chemotherapy-induced nausea and vomiting)    6. Cervical stenosis of spine    7. Chronic intractable headache, unspecified headache type    8. Antineoplastic chemotherapy induced anemia    9. Bilateral dry eyes    10. Tobacco abuse, in remission    11. Benign prostatic hyperplasia without lower urinary tract symptoms    12. Gastroesophageal reflux disease without esophagitis    13. Essential hypertension    14. S/P allogeneic bone marrow transplant        Plan:         History of allogeneic stem cell transplant  - Admitted 7/21/20 for planned Flu/Cy/TBI haplo allo SCT. Son was the donor. Received 2 bags and a total CD34 dose of 3.10 x10^6/kg on 7/28/20. Recieved post transplant cyclophosphamide. Engrafted neutrophils 8/10/20 day +13  - Today is Day +52  - Tacro level 7.9 (goal 10-11 right now with skin GVHD; previously 7-9). Currently taking 4 mg/4.5mg; will increase to 4.5mg BIDper Dr. Hodges (9/18/20)  - stopped Ursodiol Day +30 (8/27/20)  - patient requests Rx for Valium and Oxycodone for pre-procedure in clinic bone marrow biopsies.  - had BMBx in clinic on 8/28/20 showing no definitive morphologic or immunophenotypic evidence of residual CML. Chimerisms unable to be obtained due to insufficient sample; drawn peripherally on 9/18.      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 Achieved morphologic CR  - Started on daily  Ponatinib 30 mg daily, which he continued to take outpatient. Held Ponatinib during admission; may not need to restart per recent literature. BBMT 2020(26): 472-49  - Had BMBx at [facility] on 7/1/2020 showing no morphologic or immunophenotypic evidence of AML/CML  - Admitted 7/21/20 for Flu/Cy/TBI haplo allo SCT    GVHD  - had rash inpatient that was believed to be drug eruption. Resolved  - diffuse erythema to legs, lower back, arms, and abdomen presented on 9/15. Started 1mg/kg (90mg, will round to 100mg for dosing) of prednisone daily (9/15/20)  - rash much improved today; resolved to arms; improved to legs; abdomen stable  - will wean slowly by 5mg every week per Dr. Hodges; continues on 100mg daily at this time  - GVHD charting with each visit     ID  - Last CMV and EBV undetected; continue twice weekly CMV, weekly EBV, today's pending  - Acyclovir (Zoster prophylaxis) until Day +365  - Diflucan (Fungal prophylaxis) until off tacrolimus.  - Bactrim (PJP prophylaxis) until off tacrolimus.     Hypomagnesemia/hyperphosphatemia   - Mag 1.5. Currently on mag 800mg TID  - Phos 3.8. Previously elevated, no indication for phos binder at this time.     Mucositis due to chemotherapy/Thrush  - Continue prn Duke's; patient also uses Aloe oral rinses  - Recommend baking soda and warm water rinses  - Started nystatin x10 days 8/18 (completed 8/27)  - No thrush on exam today; does have a buccal lesion to the roof of his mouth     CINV (chemotherapy-induced nausea and vomiting)  - reports always in the morning  - c/w prn zofran and compazine; discussed taking dose at night or in the middle of the night when he is up to use the bathroom     Blurry vision  - C/o worsening blurry vision 7/30/20  - May be contributing to headaches  - Ophtho consulted and saw patient 7/30  - Ophtho dilated pupils and performed fundal exam. Fundal exam neg.  - Patient had been viewing laptop screen for ~ 2 hours prior to experiencing blurry vision  - Per  ophtho, blurry vision due to dry eyes. rec'd artificial tears QID prn and warm compresses BID prn. Can escalate artificial tears to ointment if needed  - improved     Antineoplastic chemotherapy-induced cytopenias: anemia  - Transfuse Hgb <7g and plt <10K  - hgb 9.8; plt 175K  - check B12, folate, TSH, T4 all of which were normal. Also checked reticulocytes which were elelvated. BMBx showing no residual disease.     Headache/ Neck pain/ Cervical stenosis of spine  - Hx of spinal fusion C3-C5  - Started having headaches when he started Ponatinib at diagnosis. Onset seems to correspond to when patient quit smoking and when he started ponatinib  - Had been off of ponatinib for a few days but HAs persisted during transplant admission  - Pattern is like cluster headaches (onset during sleep), but not unilateral versus tension HA   - O2 via non-rebreather at 12 L over 15 minutes seemed to help but developed hot flash so pt stopped  - Ultram, Flexeril, Oxycodone, lidoderm, neck pillow, c-collar did not help  - Imitrex PRN q2hr (max 200mg in 24hrs). Not contraindicated per pharm D.  - Patient reporting that headache may be sinus in nature. Maxillary tenderness noted. Taking claritin  - Saw optho and neuro inpatient, trialed IV mag  - Is now seeing pain mgt for this. Current headaches seem to be caused by muscle spasms 2/2 cervical stenosis  - Flexeril changed to zanaflex by pain management. Also on gabapentin. Restarted celebrex 8/18 as plt >50K; however did not work so is no longer taking  - Recommended alternating heat and ice  - Xray 9/19 shows surgical changes of fusion from C3 through C5 with degenerative changed below fusion.   - Following with pain mgt and PT.  - Started PT on 8/26.  - No steroid injections at this time per pain management due to transplant.  - Pt feels neck pain started after hickmann was placed  - Has been mostly refractory to medications (zanaflex, gabapentin, celebrex, lidoderm patches). Some  relief with oral morphine  - CT from 8/24/20 showing history of C3, C4, and C5 fusion and diffuse osteoarthritic changes  - Had virtual pain management appt on 8/25 to review CT results.  - Zanaflex switched to Robaxin per pain mgt.  - had medial branch block on 9/2/20 with Dr. Mcelroy (pain mgt). States that he got very little relief. Called Dr. Mcelroy's office requesting morphine. Was denied until he can see MD. Seen by Dr. Mcelroy 9/10/20. Per patient pain mgmnt signed off, as it is not a chronic issue, defer to BMT service for further pain control.  - will start on MS contin 15mg BID; use PRN morphine q6h for breakthrough; titrate up MS contin as needed  - patient unable to obtain prescription on Tuesday 9/15 due to insurance authorization; spoke with pharmacy and should be approved in 1-2 days  - Consider to remove his line in future, as he feels it's bothering and causing neck pain. Currently still needing IV mag and has acute GVHD  - Patient reports much improvement to pain since starting high dose prednisone for his skin GVHD     Tobacco abuse, in remission  - 35 pack year history  - Quit smoking 6/1/2020 using Chantix  - Patient completed course of Chantix and denies need for Nicotine patch    BPH (benign prostatic hyperplasia)  - Continue Flomax     GERD (gastroesophageal reflux disease)  - Continue nexium; discussed with patient can increase to 40mg if needed while on high dose steroids  - Can take TUMS prn     Essential hypertension  - Increased home amlodipine from 5 mg to 10 mg daily while inpatient due to BPs as high as 180s/110s   - normal BP today  - continue to hold amlodipine    [facility] (on 9/4)  - creatinine down to 1.3 today. Previously 1.5 at last visit   - stopped cellcept on Day +35  - will continue to hold celebrex, will defer to restart to pain management  - will increase fluconazole back up to 400 mg daily  - continue increased PO fluid intake    Follow-up:   - CBC, CMP, type and screen,  tacro, CMV, EBV (weekly only), mag, phos and appt with Dr. Hodges alternating with NP twice weekly.  - Labs should be in AMs while on tacro. Appts scheduled through end of September.        Marimar Brothers NP  Hematology/Oncology/BMT

## 2020-09-18 ENCOUNTER — OFFICE VISIT (OUTPATIENT)
Dept: HEMATOLOGY/ONCOLOGY | Facility: CLINIC | Age: 60
End: 2020-09-18
Payer: COMMERCIAL

## 2020-09-18 ENCOUNTER — INFUSION (OUTPATIENT)
Dept: INFUSION THERAPY | Facility: HOSPITAL | Age: 60
End: 2020-09-18
Attending: NURSE PRACTITIONER
Payer: COMMERCIAL

## 2020-09-18 VITALS
DIASTOLIC BLOOD PRESSURE: 77 MMHG | SYSTOLIC BLOOD PRESSURE: 124 MMHG | TEMPERATURE: 98 F | RESPIRATION RATE: 20 BRPM | HEIGHT: 66 IN | HEART RATE: 77 BPM | WEIGHT: 190.38 LBS | OXYGEN SATURATION: 98 % | BODY MASS INDEX: 30.59 KG/M2

## 2020-09-18 DIAGNOSIS — T45.1X5A CINV (CHEMOTHERAPY-INDUCED NAUSEA AND VOMITING): ICD-10-CM

## 2020-09-18 DIAGNOSIS — I10 ESSENTIAL HYPERTENSION: ICD-10-CM

## 2020-09-18 DIAGNOSIS — K21.9 GASTROESOPHAGEAL REFLUX DISEASE WITHOUT ESOPHAGITIS: ICD-10-CM

## 2020-09-18 DIAGNOSIS — Z94.84 HISTORY OF ALLOGENEIC STEM CELL TRANSPLANT: Primary | ICD-10-CM

## 2020-09-18 DIAGNOSIS — C92.11 CML IN REMISSION: ICD-10-CM

## 2020-09-18 DIAGNOSIS — N40.0 BENIGN PROSTATIC HYPERPLASIA WITHOUT LOWER URINARY TRACT SYMPTOMS: ICD-10-CM

## 2020-09-18 DIAGNOSIS — Z94.81 S/P ALLOGENEIC BONE MARROW TRANSPLANT: ICD-10-CM

## 2020-09-18 DIAGNOSIS — C92.01 ACUTE MYELOID LEUKEMIA IN REMISSION: ICD-10-CM

## 2020-09-18 DIAGNOSIS — M48.02 CERVICAL STENOSIS OF SPINE: ICD-10-CM

## 2020-09-18 DIAGNOSIS — E83.42 HYPOMAGNESEMIA: ICD-10-CM

## 2020-09-18 DIAGNOSIS — R51.9 CHRONIC INTRACTABLE HEADACHE, UNSPECIFIED HEADACHE TYPE: ICD-10-CM

## 2020-09-18 DIAGNOSIS — D89.810 ACUTE GVHD COMPLICATING BONE MARROW TRANSPLANTATION: ICD-10-CM

## 2020-09-18 DIAGNOSIS — F17.201 TOBACCO ABUSE, IN REMISSION: ICD-10-CM

## 2020-09-18 DIAGNOSIS — T86.09 ACUTE GVHD COMPLICATING BONE MARROW TRANSPLANTATION: ICD-10-CM

## 2020-09-18 DIAGNOSIS — D64.81 ANTINEOPLASTIC CHEMOTHERAPY INDUCED ANEMIA: ICD-10-CM

## 2020-09-18 DIAGNOSIS — R11.2 CINV (CHEMOTHERAPY-INDUCED NAUSEA AND VOMITING): ICD-10-CM

## 2020-09-18 DIAGNOSIS — T45.1X5A ANTINEOPLASTIC CHEMOTHERAPY INDUCED ANEMIA: ICD-10-CM

## 2020-09-18 DIAGNOSIS — G89.29 CHRONIC INTRACTABLE HEADACHE, UNSPECIFIED HEADACHE TYPE: ICD-10-CM

## 2020-09-18 DIAGNOSIS — H04.123 BILATERAL DRY EYES: ICD-10-CM

## 2020-09-18 LAB
ABO + RH BLD: NORMAL
ALBUMIN SERPL BCP-MCNC: 4.2 G/DL (ref 3.5–5.2)
ALP SERPL-CCNC: 63 U/L (ref 55–135)
ALT SERPL W/O P-5'-P-CCNC: 15 U/L (ref 10–44)
ANION GAP SERPL CALC-SCNC: 10 MMOL/L (ref 8–16)
AST SERPL-CCNC: 11 U/L (ref 10–40)
BASOPHILS # BLD AUTO: 0.03 K/UL (ref 0–0.2)
BASOPHILS NFR BLD: 0.4 % (ref 0–1.9)
BILIRUB SERPL-MCNC: 0.2 MG/DL (ref 0.1–1)
BLD GP AB SCN CELLS X3 SERPL QL: NORMAL
BUN SERPL-MCNC: 21 MG/DL (ref 8–23)
CALCIUM SERPL-MCNC: 9.8 MG/DL (ref 8.7–10.5)
CHLORIDE SERPL-SCNC: 104 MMOL/L (ref 95–110)
CO2 SERPL-SCNC: 25 MMOL/L (ref 23–29)
CREAT SERPL-MCNC: 1.3 MG/DL (ref 0.5–1.4)
DIFFERENTIAL METHOD: ABNORMAL
EOSINOPHIL # BLD AUTO: 0.1 K/UL (ref 0–0.5)
EOSINOPHIL NFR BLD: 1 % (ref 0–8)
ERYTHROCYTE [DISTWIDTH] IN BLOOD BY AUTOMATED COUNT: 17.4 % (ref 11.5–14.5)
EST. GFR  (AFRICAN AMERICAN): >60 ML/MIN/1.73 M^2
EST. GFR  (NON AFRICAN AMERICAN): 58.5 ML/MIN/1.73 M^2
GLUCOSE SERPL-MCNC: 138 MG/DL (ref 70–110)
HCT VFR BLD AUTO: 30.9 % (ref 40–54)
HGB BLD-MCNC: 9.8 G/DL (ref 14–18)
IMM GRANULOCYTES # BLD AUTO: 0.06 K/UL (ref 0–0.04)
IMM GRANULOCYTES NFR BLD AUTO: 0.8 % (ref 0–0.5)
LYMPHOCYTES # BLD AUTO: 0.4 K/UL (ref 1–4.8)
LYMPHOCYTES NFR BLD: 5.2 % (ref 18–48)
MAGNESIUM SERPL-MCNC: 1.5 MG/DL (ref 1.6–2.6)
MCH RBC QN AUTO: 33 PG (ref 27–31)
MCHC RBC AUTO-ENTMCNC: 31.7 G/DL (ref 32–36)
MCV RBC AUTO: 104 FL (ref 82–98)
MONOCYTES # BLD AUTO: 0.4 K/UL (ref 0.3–1)
MONOCYTES NFR BLD: 4.9 % (ref 4–15)
NEUTROPHILS # BLD AUTO: 6.7 K/UL (ref 1.8–7.7)
NEUTROPHILS NFR BLD: 87.7 % (ref 38–73)
NRBC BLD-RTO: 0 /100 WBC
PHOSPHATE SERPL-MCNC: 3.8 MG/DL (ref 2.7–4.5)
PLATELET # BLD AUTO: 175 K/UL (ref 150–350)
PMV BLD AUTO: 10.2 FL (ref 9.2–12.9)
POTASSIUM SERPL-SCNC: 4.1 MMOL/L (ref 3.5–5.1)
PROT SERPL-MCNC: 7 G/DL (ref 6–8.4)
RBC # BLD AUTO: 2.97 M/UL (ref 4.6–6.2)
SODIUM SERPL-SCNC: 139 MMOL/L (ref 136–145)
TACROLIMUS BLD-MCNC: 7.9 NG/ML (ref 5–15)
WBC # BLD AUTO: 7.68 K/UL (ref 3.9–12.7)

## 2020-09-18 PROCEDURE — 85025 COMPLETE CBC W/AUTO DIFF WBC: CPT

## 2020-09-18 PROCEDURE — 87799 DETECT AGENT NOS DNA QUANT: CPT

## 2020-09-18 PROCEDURE — 83735 ASSAY OF MAGNESIUM: CPT

## 2020-09-18 PROCEDURE — 80053 COMPREHEN METABOLIC PANEL: CPT

## 2020-09-18 PROCEDURE — 86850 RBC ANTIBODY SCREEN: CPT

## 2020-09-18 PROCEDURE — 36592 COLLECT BLOOD FROM PICC: CPT

## 2020-09-18 PROCEDURE — 99215 PR OFFICE/OUTPT VISIT, EST, LEVL V, 40-54 MIN: ICD-10-PCS | Mod: BMT,S$GLB,, | Performed by: NURSE PRACTITIONER

## 2020-09-18 PROCEDURE — 63600175 PHARM REV CODE 636 W HCPCS: Performed by: NURSE PRACTITIONER

## 2020-09-18 PROCEDURE — 80197 ASSAY OF TACROLIMUS: CPT

## 2020-09-18 PROCEDURE — 99215 OFFICE O/P EST HI 40 MIN: CPT | Mod: BMT,S$GLB,, | Performed by: NURSE PRACTITIONER

## 2020-09-18 PROCEDURE — 81268 CHIMERISM ANAL W/CELL SELECT: CPT

## 2020-09-18 PROCEDURE — 3008F PR BODY MASS INDEX (BMI) DOCUMENTED: ICD-10-PCS | Mod: BMT,CPTII,S$GLB, | Performed by: NURSE PRACTITIONER

## 2020-09-18 PROCEDURE — 25000003 PHARM REV CODE 250: Performed by: NURSE PRACTITIONER

## 2020-09-18 PROCEDURE — 99999 PR PBB SHADOW E&M-EST. PATIENT-LVL V: ICD-10-PCS | Mod: PBBFAC,BMT,, | Performed by: NURSE PRACTITIONER

## 2020-09-18 PROCEDURE — 84100 ASSAY OF PHOSPHORUS: CPT

## 2020-09-18 PROCEDURE — A4216 STERILE WATER/SALINE, 10 ML: HCPCS | Performed by: NURSE PRACTITIONER

## 2020-09-18 PROCEDURE — 99999 PR PBB SHADOW E&M-EST. PATIENT-LVL V: CPT | Mod: PBBFAC,BMT,, | Performed by: NURSE PRACTITIONER

## 2020-09-18 PROCEDURE — 3008F BODY MASS INDEX DOCD: CPT | Mod: BMT,CPTII,S$GLB, | Performed by: NURSE PRACTITIONER

## 2020-09-18 RX ORDER — HEPARIN 100 UNIT/ML
500 SYRINGE INTRAVENOUS
Status: COMPLETED | OUTPATIENT
Start: 2020-09-18 | End: 2020-09-18

## 2020-09-18 RX ORDER — SODIUM CHLORIDE 0.9 % (FLUSH) 0.9 %
10 SYRINGE (ML) INJECTION
Status: CANCELLED | OUTPATIENT
Start: 2020-09-18

## 2020-09-18 RX ORDER — SODIUM CHLORIDE 0.9 % (FLUSH) 0.9 %
10 SYRINGE (ML) INJECTION
Status: COMPLETED | OUTPATIENT
Start: 2020-09-18 | End: 2020-09-18

## 2020-09-18 RX ORDER — HEPARIN 100 UNIT/ML
500 SYRINGE INTRAVENOUS
Status: CANCELLED | OUTPATIENT
Start: 2020-09-18

## 2020-09-18 RX ORDER — TACROLIMUS 0.5 MG/1
CAPSULE ORAL
Qty: 480 CAPSULE | Refills: 6 | Status: SHIPPED | OUTPATIENT
Start: 2020-09-18 | End: 2020-09-29

## 2020-09-18 RX ADMIN — HEPARIN 500 UNITS: 100 SYRINGE at 10:09

## 2020-09-18 RX ADMIN — Medication 10 ML: at 10:09

## 2020-09-18 NOTE — NURSING
Patient's labs collected from red lumen o fCVC line.  Specimens sent to lab.  All lumens flushed, heparinized, luers replaced, and capped.  Sterile dressing change performed.  Site c/d/i.   Patient tolerated well.

## 2020-09-21 ENCOUNTER — OFFICE VISIT (OUTPATIENT)
Dept: HEMATOLOGY/ONCOLOGY | Facility: CLINIC | Age: 60
End: 2020-09-21
Payer: COMMERCIAL

## 2020-09-21 ENCOUNTER — INFUSION (OUTPATIENT)
Dept: INFUSION THERAPY | Facility: HOSPITAL | Age: 60
End: 2020-09-21
Payer: COMMERCIAL

## 2020-09-21 VITALS
RESPIRATION RATE: 18 BRPM | WEIGHT: 193.19 LBS | OXYGEN SATURATION: 96 % | TEMPERATURE: 98 F | HEIGHT: 66 IN | SYSTOLIC BLOOD PRESSURE: 139 MMHG | DIASTOLIC BLOOD PRESSURE: 73 MMHG | HEART RATE: 98 BPM | BODY MASS INDEX: 31.05 KG/M2

## 2020-09-21 DIAGNOSIS — C92.11 CML IN REMISSION: ICD-10-CM

## 2020-09-21 DIAGNOSIS — C92.01 ACUTE MYELOID LEUKEMIA IN REMISSION: ICD-10-CM

## 2020-09-21 DIAGNOSIS — Z94.84 HISTORY OF ALLOGENEIC STEM CELL TRANSPLANT: Primary | ICD-10-CM

## 2020-09-21 DIAGNOSIS — D89.810 ACUTE GVHD COMPLICATING BONE MARROW TRANSPLANTATION: ICD-10-CM

## 2020-09-21 DIAGNOSIS — T86.09 ACUTE GVHD COMPLICATING BONE MARROW TRANSPLANTATION: ICD-10-CM

## 2020-09-21 LAB
ALBUMIN SERPL BCP-MCNC: 4.2 G/DL (ref 3.5–5.2)
ALP SERPL-CCNC: 57 U/L (ref 55–135)
ALT SERPL W/O P-5'-P-CCNC: 17 U/L (ref 10–44)
ANION GAP SERPL CALC-SCNC: 11 MMOL/L (ref 8–16)
AST SERPL-CCNC: 10 U/L (ref 10–40)
BASOPHILS # BLD AUTO: 0.02 K/UL (ref 0–0.2)
BASOPHILS NFR BLD: 0.3 % (ref 0–1.9)
BILIRUB SERPL-MCNC: 0.2 MG/DL (ref 0.1–1)
BUN SERPL-MCNC: 15 MG/DL (ref 8–23)
CALCIUM SERPL-MCNC: 9 MG/DL (ref 8.7–10.5)
CHLORIDE SERPL-SCNC: 102 MMOL/L (ref 95–110)
CMV DNA SERPL NAA+PROBE-ACNC: NORMAL IU/ML
CO2 SERPL-SCNC: 27 MMOL/L (ref 23–29)
CREAT SERPL-MCNC: 1.2 MG/DL (ref 0.5–1.4)
DIFFERENTIAL METHOD: ABNORMAL
EOSINOPHIL # BLD AUTO: 0.1 K/UL (ref 0–0.5)
EOSINOPHIL NFR BLD: 1.1 % (ref 0–8)
ERYTHROCYTE [DISTWIDTH] IN BLOOD BY AUTOMATED COUNT: 17.4 % (ref 11.5–14.5)
EST. GFR  (AFRICAN AMERICAN): >60 ML/MIN/1.73 M^2
EST. GFR  (NON AFRICAN AMERICAN): >60 ML/MIN/1.73 M^2
GLUCOSE SERPL-MCNC: 123 MG/DL (ref 70–110)
HCT VFR BLD AUTO: 30.7 % (ref 40–54)
HGB BLD-MCNC: 10.1 G/DL (ref 14–18)
IMM GRANULOCYTES # BLD AUTO: 0.13 K/UL (ref 0–0.04)
IMM GRANULOCYTES NFR BLD AUTO: 1.7 % (ref 0–0.5)
LYMPHOCYTES # BLD AUTO: 0.7 K/UL (ref 1–4.8)
LYMPHOCYTES NFR BLD: 9.6 % (ref 18–48)
MAGNESIUM SERPL-MCNC: 1.6 MG/DL (ref 1.6–2.6)
MCH RBC QN AUTO: 34.4 PG (ref 27–31)
MCHC RBC AUTO-ENTMCNC: 32.9 G/DL (ref 32–36)
MCV RBC AUTO: 104 FL (ref 82–98)
MONOCYTES # BLD AUTO: 0.9 K/UL (ref 0.3–1)
MONOCYTES NFR BLD: 11.9 % (ref 4–15)
NEUTROPHILS # BLD AUTO: 5.6 K/UL (ref 1.8–7.7)
NEUTROPHILS NFR BLD: 75.4 % (ref 38–73)
NRBC BLD-RTO: 0 /100 WBC
PHOSPHATE SERPL-MCNC: 2.7 MG/DL (ref 2.7–4.5)
PLATELET # BLD AUTO: 188 K/UL (ref 150–350)
PMV BLD AUTO: 10.3 FL (ref 9.2–12.9)
POTASSIUM SERPL-SCNC: 3.9 MMOL/L (ref 3.5–5.1)
PROT SERPL-MCNC: 6.8 G/DL (ref 6–8.4)
RBC # BLD AUTO: 2.94 M/UL (ref 4.6–6.2)
SODIUM SERPL-SCNC: 140 MMOL/L (ref 136–145)
TACROLIMUS BLD-MCNC: 7.7 NG/ML (ref 5–15)
WBC # BLD AUTO: 7.47 K/UL (ref 3.9–12.7)

## 2020-09-21 PROCEDURE — 3008F PR BODY MASS INDEX (BMI) DOCUMENTED: ICD-10-PCS | Mod: BMT,CPTII,S$GLB, | Performed by: INTERNAL MEDICINE

## 2020-09-21 PROCEDURE — 80053 COMPREHEN METABOLIC PANEL: CPT

## 2020-09-21 PROCEDURE — 83735 ASSAY OF MAGNESIUM: CPT

## 2020-09-21 PROCEDURE — 85025 COMPLETE CBC W/AUTO DIFF WBC: CPT

## 2020-09-21 PROCEDURE — 36592 COLLECT BLOOD FROM PICC: CPT

## 2020-09-21 PROCEDURE — 99215 PR OFFICE/OUTPT VISIT, EST, LEVL V, 40-54 MIN: ICD-10-PCS | Mod: BMT,S$GLB,, | Performed by: INTERNAL MEDICINE

## 2020-09-21 PROCEDURE — 63600175 PHARM REV CODE 636 W HCPCS: Performed by: NURSE PRACTITIONER

## 2020-09-21 PROCEDURE — 87799 DETECT AGENT NOS DNA QUANT: CPT

## 2020-09-21 PROCEDURE — 99999 PR PBB SHADOW E&M-EST. PATIENT-LVL IV: ICD-10-PCS | Mod: PBBFAC,BMT,, | Performed by: INTERNAL MEDICINE

## 2020-09-21 PROCEDURE — 99215 OFFICE O/P EST HI 40 MIN: CPT | Mod: BMT,S$GLB,, | Performed by: INTERNAL MEDICINE

## 2020-09-21 PROCEDURE — 36591 DRAW BLOOD OFF VENOUS DEVICE: CPT

## 2020-09-21 PROCEDURE — 25000003 PHARM REV CODE 250: Performed by: NURSE PRACTITIONER

## 2020-09-21 PROCEDURE — 84100 ASSAY OF PHOSPHORUS: CPT

## 2020-09-21 PROCEDURE — A4216 STERILE WATER/SALINE, 10 ML: HCPCS | Performed by: NURSE PRACTITIONER

## 2020-09-21 PROCEDURE — 80197 ASSAY OF TACROLIMUS: CPT

## 2020-09-21 PROCEDURE — 3008F BODY MASS INDEX DOCD: CPT | Mod: BMT,CPTII,S$GLB, | Performed by: INTERNAL MEDICINE

## 2020-09-21 PROCEDURE — 99999 PR PBB SHADOW E&M-EST. PATIENT-LVL IV: CPT | Mod: PBBFAC,BMT,, | Performed by: INTERNAL MEDICINE

## 2020-09-21 PROCEDURE — 36415 COLL VENOUS BLD VENIPUNCTURE: CPT

## 2020-09-21 RX ORDER — HEPARIN 100 UNIT/ML
500 SYRINGE INTRAVENOUS
Status: COMPLETED | OUTPATIENT
Start: 2020-09-21 | End: 2020-09-21

## 2020-09-21 RX ORDER — SODIUM CHLORIDE 0.9 % (FLUSH) 0.9 %
10 SYRINGE (ML) INJECTION
Status: CANCELLED | OUTPATIENT
Start: 2020-09-21

## 2020-09-21 RX ORDER — HEPARIN 100 UNIT/ML
500 SYRINGE INTRAVENOUS
Status: CANCELLED | OUTPATIENT
Start: 2020-09-21

## 2020-09-21 RX ORDER — SODIUM CHLORIDE 0.9 % (FLUSH) 0.9 %
10 SYRINGE (ML) INJECTION
Status: COMPLETED | OUTPATIENT
Start: 2020-09-21 | End: 2020-09-21

## 2020-09-21 RX ADMIN — HEPARIN 500 UNITS: 100 SYRINGE at 08:09

## 2020-09-21 RX ADMIN — Medication 10 ML: at 08:09

## 2020-09-21 NOTE — PROGRESS NOTES
Subjective:    Patient ID: Kvng Jones Sr. is a 60 y.o. male.    Chief Complaint: No chief complaint on file.    Oncologic hx:  60 y.o. 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 He was admitted for evaluation and induction chemotherapy with FLAG-Addis. Complications of therapy include epididymal orchitis with negative testicular ultrasound, neutropenic fever, dyspnea, and GGO of bilateral lungs on CT chest. Fever and chest findings were covered with broad spectrum antimicrobials. He did have hallucinations and vivid dreams with posaconazole. Chemotherapy was administered by left arm PICC line that was removed during hospital stay for MSSA infection treated with vancomycin. Hospital admission was 3/30/2020 to 5/1/2020 achieving morphologic CR with induction chemotherapy. He then started Ponatinib 30mg daily.     Studies performed during his hospital stay include pre-chemotherapy ECHO with normal ejection fraction of 60-65%. HIV, HBV, and HCV tests were negative. CBC at admission was WBC 38.6, Hgb 14.6, and platelet 416K. CT of the head without contrast was normal 4/20/2020. US of abdomen performed for abdominal distention and neutropenic fever had hepatic steatosis, liver 19cm, and spleen 11 cm. He does have a history of alcohol use and pancreatitis.    Patient is a . He is  to wife Guillermina who will be a caregiver post transplant. He has a 45 pack year smoking history. He quit 6/1/2020, now on Chantix. He drinks 3-4 alcohol beverages nightly. He has 3 children. He has several family members in the [facility] area.    Transplant Course: Admitted 7/21/20 for a Flu/Cy/TBI haplo allo SCT. Son was donor. Received 2 bags and a total CD34 dose of 3.10 x10^6/kg on 7/28/20. Tolerated stem cells well. C/o headaches prior to admission, which persisted throughout admission. Neuro ultimately consulted for rec's. Was a daily drinker prior to  admission. No s/s of alcolhol withdrawal during admission. Transplant further complicated by hypertension, heart burn, c-diff neg diarrhea, nausea, sinus congestion, mild peripheral edema, blurry vision (ophtho consulted), mucositis, electrolyte abnormalities, and neck pain 2/2 spinal stenosis (referred to pain mgt at discharge). He engrafted on 8/10/20 with an ANC of 1280. He was discharged on 8/12/2020.    Interval History:  Patient presents for follow up clinic visit post Flu/Cy/TBI allogeneic stem cell transplant. Today is Day +55. His neck pain has significantly improved since starting high dose steroids for GVHD of the skin and a specialized neck/back program may start in 2 weeks with pain management. He reports pain is about 75% better and ROM to baseline prior to AML/CML diagnosis. Continues on PRN morphine and started MS Contin yesterday. Appetite much improved with steroids, does not some lichenoid changes to his upper mouth, he has been using dukes for the pain. Ate diet high in magnesium over the weekend; level pending. Denies fevers, cough, SOB, chest pain, n/v/d/c. His rash to lower back, bilateral arms, bilateral legs, and abdomen are improving; currently taking 100mg prednisone daily. Resolved on arms and back, improving to legs, abdomen remains the same.   Peripheral blood chimerisms drawn today as unable to obtain from day +30 bmbx      Review of Systems   Constitutional: Positive for activity change and fatigue. Negative for appetite change, chills, diaphoresis, fever and unexpected weight change.   HENT: Negative for congestion, dental problem, mouth sores, nosebleeds, postnasal drip, rhinorrhea, sinus pressure and trouble swallowing.    Eyes: Negative.  Negative for photophobia and visual disturbance.   Respiratory: Negative.  Negative for cough and shortness of breath.    Cardiovascular: Negative.  Negative for chest pain, palpitations and leg swelling.   Gastrointestinal: Negative for  abdominal distention, abdominal pain, blood in stool, constipation, diarrhea, nausea and vomiting.   Endocrine: Negative.    Genitourinary: Negative.  Negative for dysuria, frequency, hematuria and urgency.   Musculoskeletal: Positive for back pain, neck pain and neck stiffness.        History of cervical spinal stenosis. See hpi   Skin: Positive for rash. Negative for pallor.   Allergic/Immunologic: Negative for environmental allergies, food allergies and immunocompromised state.   Neurological: Negative for dizziness, tremors, syncope, weakness, numbness and headaches.   Hematological: Negative for adenopathy. Does not bruise/bleed easily.   Psychiatric/Behavioral: Negative.  Negative for confusion. The patient is not nervous/anxious.        Objective:     Physical Exam  Vitals signs and nursing note reviewed.   Constitutional:       Appearance: Normal appearance. He is well-developed.   HENT:      Head: Normocephalic and atraumatic.      Mouth/Throat:      Mouth: Mucous membranes are dry.   Eyes:      General: No scleral icterus.  Neck:      Musculoskeletal: Neck supple. Decreased range of motion.   Cardiovascular:      Rate and Rhythm: Normal rate and regular rhythm.      Pulses: Normal pulses.      Heart sounds: Normal heart sounds.   Pulmonary:      Effort: Pulmonary effort is normal. No respiratory distress.      Breath sounds: Normal breath sounds.   Abdominal:      General: Bowel sounds are normal. There is no distension.      Palpations: Abdomen is soft.      Tenderness: There is no abdominal tenderness.   Musculoskeletal:         General: No tenderness or swelling.      Right lower leg: No edema.      Left lower leg: No edema.   Skin:     General: Skin is warm and dry.      Coloration: Skin is not pale.      Findings: Rash (improving erythema to legs, arms and lower back; abdomen remains the same) present. No erythema or petechiae.      Comments: Dalal intact to right CW with no redness or drainage    Neurological:      Mental Status: He is alert and oriented to person, place, and time.   Psychiatric:         Attention and Perception: Attention normal.         Speech: Speech normal.         Thought Content: Thought content normal.         Cognition and Memory: Cognition and memory normal.         Assessment:       No diagnosis found.    Plan:         History of allogeneic stem cell transplant  - Admitted 7/21/20 for planned Flu/Cy/TBI haplo allo SCT. Son was the donor. Received 2 bags and a total CD34 dose of 3.10 x10^6/kg on 7/28/20. Recieved post transplant cyclophosphamide. Engrafted neutrophils 8/10/20 day +13  - Today is Day +55  - Tacro level pending (goal 10-11 right now with skin GVHD; previously 7-9). Currently taking 4.5 mg/4.5mg.  - stopped Ursodiol Day +30 (8/27/20)  - patient requests Rx for Valium and Oxycodone for pre-procedure in clinic bone marrow biopsies.  - had BMBx in clinic on 8/28/20 showing no definitive morphologic or immunophenotypic evidence of residual CML. Chimerisms unable to be obtained due to insufficient sample; drawn peripherally on 9/18.      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 Achieved morphologic CR  - Started on daily Ponatinib 30 mg daily, which he continued to take outpatient. Held Ponatinib during admission; may not need to restart per recent literature. BBMT 2020(26): 902-43  - Had BMBx at [facility] on 7/1/2020 showing no morphologic or immunophenotypic evidence of AML/CML  - Admitted 7/21/20 for Flu/Cy/TBI haplo allo SCT    GVHD  - had rash inpatient that was believed to be drug eruption. Resolved  - diffuse erythema to legs, lower back, arms, and abdomen presented on 9/15. Started 1mg/kg (90mg, will round to 100mg for dosing) of prednisone daily (9/15/20); decreased to 90 mg (4.5 tabs) on 9/21/2020  - rash much improved  9/21/2020; resolved to arms; improved to legs; abdomen stable  - will wean slowly by 5-10mg every week  - GVHD charting with each visit     ID  - Last CMV and EBV undetected; continue twice weekly CMV, weekly EBV, today's pending  - Acyclovir (Zoster prophylaxis) until Day +365  - Diflucan (Fungal prophylaxis) until off tacrolimus.  - Bactrim (PJP prophylaxis) until off tacrolimus.     Hypomagnesemia/hyperphosphatemia   - Mag; Currently on mag 800mg TID and increased magnesium in diet  - Phos; Previously elevated, no indication for phos binder at this time.     Mucositis due to chemotherapy/Thrush  - Continue prn Duke's; patient also uses Aloe oral rinses  - Recommend baking soda and warm water rinses  - Started nystatin x10 days 8/18 (completed 8/27)  - No thrush on exam today; does have a buccal lesion to the roof of his mouth     CINV (chemotherapy-induced nausea and vomiting)  - reports always in the morning  - c/w prn zofran and compazine; discussed taking dose at night or in the middle of the night when he is up to use the bathroom     Blurry vision  - C/o worsening blurry vision 7/30/20  - May be contributing to headaches  - Ophtho consulted and saw patient 7/30  - Ophtho dilated pupils and performed fundal exam. Fundal exam neg.  - Patient had been viewing laptop screen for ~ 2 hours prior to experiencing blurry vision  - Per ophtho, blurry vision due to dry eyes. rec'd artificial tears QID prn and warm compresses BID prn. Can escalate artificial tears to ointment if needed  - improved     Antineoplastic chemotherapy-induced cytopenias: anemia  - Transfuse Hgb <7g and plt <10K  - hgb 10; plt 188K  - BMBx showing no residual disease.     Headache/ Neck pain/ Cervical stenosis of spine  - Hx of spinal fusion C3-C5  - Started having headaches when he started Ponatinib at diagnosis. Onset seems to correspond to when patient quit smoking and when he started ponatinib  - Had been off of ponatinib for a few days  but HAs persisted during transplant admission  - Pattern is like cluster headaches (onset during sleep), but not unilateral versus tension HA   - O2 via non-rebreather at 12 L over 15 minutes seemed to help but developed hot flash so pt stopped  - Ultram, Flexeril, Oxycodone, lidoderm, neck pillow, c-collar did not help  - Imitrex PRN q2hr (max 200mg in 24hrs). Not contraindicated per pharm D.  - Patient reporting that headache may be sinus in nature. Maxillary tenderness noted. Taking claritin  - Saw optho and neuro inpatient, trialed IV mag  - Is now seeing pain mgt for this. Current headaches seem to be caused by muscle spasms 2/2 cervical stenosis  - Flexeril changed to zanaflex by pain management. Also on gabapentin. Restarted celebrex 8/18 as plt >50K; however did not work so is no longer taking  - Recommended alternating heat and ice  - Xray 9/19 shows surgical changes of fusion from C3 through C5 with degenerative changed below fusion.   - Following with pain mgt and PT.  - Started PT on 8/26.  - No steroid injections at this time per pain management due to transplant.  - Pt feels neck pain started after hickmann was placed  - Has been mostly refractory to medications (zanaflex, gabapentin, celebrex, lidoderm patches). Some relief with oral morphine  - CT from 8/24/20 showing history of C3, C4, and C5 fusion and diffuse osteoarthritic changes  - Had virtual pain management appt on 8/25 to review CT results.  - Zanaflex switched to Robaxin per pain mgt.  - had medial branch block on 9/2/20 with Dr. Mcelroy (pain mgt). States that he got very little relief. Called Dr. Mcelroy's office requesting morphine. Was denied until he can see MD. Seen by Dr. Mcelroy 9/10/20. Per patient pain mgmnt signed off, as it is not a chronic issue, defer to BMT service for further pain control.  - will started MS contin 15mg BID 9/20/2020; use PRN morphine q6h for breakthrough; titrate up MS contin as needed  - Consider to  remove his line in future, as he feels it's bothering and causing neck pain. Currently needing IV mag on occassion and has acute GVHD.  - Patient reports much improvement to pain since starting high dose prednisone for his skin GVHD     Tobacco abuse, in remission  - 35 pack year history  - Quit smoking 6/1/2020 using Chantix  - Patient completed course of Chantix and denies need for Nicotine patch    BPH (benign prostatic hyperplasia)  - Continue Flomax     GERD (gastroesophageal reflux disease)  - Continue nexium; discussed with patient can increase to 40mg if needed while on high dose steroids  - Can take TUMS prn     Essential hypertension  - Increased home amlodipine from 5 mg to 10 mg daily while inpatient due to BPs as high as 180s/110s   - normal BP today  - continue to hold amlodipine    AURORA (on 9/4)  - creatinine pending; trending down at last visit from 1.5 to 1.3  - stopped cellcept on Day +35  - will continue to hold celebrex, will defer to restart to pain management  - will increase fluconazole back up to 400 mg daily  - continue increased PO fluid intake    Follow-up:   - CBC, CMP, type and screen, tacro, CMV, EBV (weekly only), mag, phos and appt with Dr. Hodges alternating with NP twice weekly.  - Labs should be in AMs while on tacro. Appts scheduled through end of September.

## 2020-09-21 NOTE — NURSING
Pt tolerated Lab drawn today. NAD.PICC lumen 1 flushed + blood return present, flushed. Hep lock clamped and capped declined AVS. Uses my Ochnser. Discharged home. Ambulated independently.

## 2020-09-23 LAB
FINAL DIAGNOSIS - CHIMERISM SORT: NORMAL
SPECIMEN TYPE -CHIMERISM SORT: NORMAL

## 2020-09-24 LAB
CMV DNA SERPL NAA+PROBE-ACNC: NORMAL IU/ML
EBV DNA BY PCR: NORMAL IU/ML

## 2020-09-24 NOTE — PROGRESS NOTES
Subjective:    Patient ID: Kvng Jones Sr. is a 60 y.o. male.    Chief Complaint: No chief complaint on file.    Oncologic hx:  60 y.o. male admitted to [facility] for CML presenting with blast crisis and lewis disease. He was admitted for evaluation and induction chemotherapy with FLAG-Addis. Complications of therapy include epididymal orchitis with negative testicular ultrasound, neutropenic fever, dyspnea, and GGO of bilateral lungs on CT chest. Fever and chest findings were covered with broad spectrum antimicrobials. He did have hallucinations and vivid dreams with posaconazole. Chemotherapy was administered by left arm PICC line that was removed during hospital stay for MSSA infection treated with vancomycin. Hospital admission was 3/30/2020 to 5/1/2020 achieving morphologic CR with induction chemotherapy. He then started Ponatinib 30mg daily.     Studies performed during his hospital stay include pre-chemotherapy ECHO with normal ejection fraction of 60-65%. HIV, HBV, and HCV tests were negative. CBC at admission was WBC 38.6, Hgb 14.6, and platelet 416K. CT of the head without contrast was normal 4/20/2020. US of abdomen performed for abdominal distention and neutropenic fever had hepatic steatosis, liver 19cm, and spleen 11 cm. He does have a history of alcohol use and pancreatitis.    Patient is a . He is  to wife Guillermina who will be a caregiver post transplant. He has a 45 pack year smoking history. He quit 6/1/2020, now on Chantix. He drinks 3-4 alcohol beverages nightly. He has 3 children. He has several family members in the [facility] area.    Transplant Course: Admitted 7/21/20 for a Flu/Cy/TBI haplo allo SCT. Son was donor. Received 2 bags and a total CD34 dose of 3.10 x10^6/kg on 7/28/20. Tolerated stem cells well. C/o headaches prior to admission, which persisted throughout admission. Neuro ultimately consulted for rec's. Was a daily drinker prior to  admission. No s/s of alcolhol withdrawal during admission. Transplant further complicated by hypertension, heart burn, c-diff neg diarrhea, nausea, sinus congestion, mild peripheral edema, blurry vision (ophtho consulted), mucositis, electrolyte abnormalities, and neck pain 2/2 spinal stenosis (referred to pain mgt at discharge). He engrafted on 8/10/20 with an ANC of 1280. He was discharged on 8/12/2020.    Interval History:  Patient presents for follow up clinic visit post Flu/Cy/TBI allogeneic stem cell transplant. Today is Day +59. Still have nausea with steroids. Taking PRN antiemetics and is making sure to take steroids with food. He accidentally took his tacrolimus this morning before having his labs drawn. His neck pain remains improved on the steroids. Continues on MS contin, only sparingly taking PRN breakthrough morphine. His skin rash has improved, nearly resolved. Continuing to wean per plan below. Appetite is great. Lichenoid changes to his upper mouth are stable. Denies fevers, cough, SOB, chest pain, n/v/d/c. Peripheral blood chimerisms drawn 9/18/20 showing 100% donor in CD33. CD3 still showing insufficient DNA.      Review of Systems   Review of Systems   Constitutional: Negative for activity change, appetite change, chills, diaphoresis, fatigue and fever.   HENT: Negative for congestion, ear pain, mouth sores, nosebleeds, postnasal drip, rhinorrhea, sinus pain, sore throat and trouble swallowing.         Upper mouth pain   Eyes: Negative for pain, redness and visual disturbance.   Respiratory: Negative for cough, chest tightness, shortness of breath, wheezing and stridor.    Cardiovascular: Negative for chest pain, palpitations and leg swelling.   Gastrointestinal: Positive for nausea. Negative for abdominal distention, abdominal pain, blood in stool, constipation, diarrhea and vomiting.   Genitourinary: Negative for frequency, hematuria and urgency.   Musculoskeletal: Positive for neck pain (hx  of spinal stenosis) and neck stiffness. Negative for arthralgias and back pain.   Skin: Negative for rash.   Neurological: Negative for dizziness, syncope, weakness, light-headedness, numbness and headaches.   Hematological: Negative for adenopathy. Does not bruise/bleed easily.   Psychiatric/Behavioral: Negative for confusion.         Objective:     Physical Exam  Vitals signs and nursing note reviewed.   Constitutional:       Appearance: Normal appearance. He is well-developed.   HENT:      Head: Normocephalic and atraumatic.      Mouth/Throat:      Mouth: Mucous membranes are dry.   Eyes:      General: No scleral icterus.  Neck:      Musculoskeletal: Neck supple. Decreased range of motion.   Cardiovascular:      Rate and Rhythm: Normal rate and regular rhythm.      Pulses: Normal pulses.      Heart sounds: Normal heart sounds.   Pulmonary:      Effort: Pulmonary effort is normal. No respiratory distress.      Breath sounds: Normal breath sounds.   Abdominal:      General: Bowel sounds are normal. There is no distension.      Palpations: Abdomen is soft.      Tenderness: There is no abdominal tenderness.   Musculoskeletal:         General: No tenderness or swelling.      Right lower leg: No edema.      Left lower leg: No edema.   Skin:     General: Skin is warm and dry.      Coloration: Skin is not pale.      Findings: Rash (nearly resolved to legs, arms and lower back; abdomen remains the same) present. No erythema or petechiae.      Comments: Dalal intact to right CW with no redness or drainage   Neurological:      Mental Status: He is alert and oriented to person, place, and time.   Psychiatric:         Attention and Perception: Attention normal.         Speech: Speech normal.         Thought Content: Thought content normal.         Cognition and Memory: Cognition and memory normal.         Assessment:       1. Acute GVHD complicating bone marrow transplantation    2. History of allogeneic stem cell  transplant    3. CML in remission    4. Acute myeloid leukemia in remission    5. Hypomagnesemia    6. CINV (chemotherapy-induced nausea and vomiting)    7. Cervical stenosis of spine    8. Chronic intractable headache, unspecified headache type    9. Antineoplastic chemotherapy induced anemia    10. Bilateral dry eyes    11. Tobacco abuse, in remission    12. Benign prostatic hyperplasia without lower urinary tract symptoms    13. Gastroesophageal reflux disease without esophagitis    14. Essential hypertension        Plan:         History of allogeneic stem cell transplant  - Admitted 7/21/20 for planned Flu/Cy/TBI haplo allo SCT. Son was the donor. Received 2 bags and a total CD34 dose of 3.10 x10^6/kg on 7/28/20. Recieved post transplant cyclophosphamide. Engrafted neutrophils 8/10/20 day +13  - Today is Day +59  - Tacro level 12.8; patient took dose this morning (goal 10-11 right now with skin GVHD; previously 7-9). Continue dose of 4.5 mg/4.5mg.  - stopped Ursodiol Day +30 (8/27/20)  - patient requests Rx for Valium and Oxycodone for pre-procedure in clinic bone marrow biopsies.  - had BMBx in clinic on 8/28/20 showing no definitive morphologic or immunophenotypic evidence of residual CML. Chimerisms unable to be obtained due to insufficient sample; drawn peripherally on 9/18 showing CD33 100% donor, CD3 still insufficient sample DNA.      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 Achieved morphologic CR  - Started on daily Ponatinib 30 mg daily, which he continued to take outpatient. Held Ponatinib during admission; may not need to restart per recent literature. BBMT 2020(26): 476-48  - Had BMBx at [facility] on 7/1/2020 showing no morphologic or immunophenotypic evidence of AML/CML  - Admitted 7/21/20 for Flu/Cy/TBI haplo allo SCT    GVHD  - had rash inpatient that was  believed to be drug eruption. Resolved  - diffuse erythema to legs, lower back, arms, and abdomen presented on 9/15. Started 1mg/kg (90mg, will round to 100mg for dosing) of prednisone daily (9/15/20); decreased to 90 mg (4.5 tabs) on 9/21/2020; continue until next appointment; rash nearly resolved today  - will wean slowly by 5-10mg every week  - GVHD charting with each visit     ID  - Last CMV and EBV undetected; continue twice weekly CMV, weekly EBV, today's pending  - Acyclovir (Zoster prophylaxis) until Day +365  - Diflucan (Fungal prophylaxis) until off tacrolimus.  - Bactrim (PJP prophylaxis) until off tacrolimus.     Hypomagnesemia/hyperphosphatemia   - Mag; Currently on mag 800mg TID; will get 2g IV in infusion center today  - Phos; Previously elevated, no indication for phos binder at this time.     Mucositis due to chemotherapy/Thrush  - Continue prn Duke's; patient also uses Aloe oral rinses  - Recommend baking soda and warm water rinses  - Started nystatin x10 days 8/18 (completed 8/27)  - No thrush on exam today; does have a buccal lesion to the roof of his mouth; stable     CINV (chemotherapy-induced nausea and vomiting)  - reports always in the morning; continue to take steroids with food  - c/w prn zofran and compazine; discussed taking dose at night or in the middle of the night when he is up to use the bathroom     Blurry vision  - C/o worsening blurry vision 7/30/20  - May be contributing to headaches  - Ophtho consulted and saw patient 7/30  - Ophtho dilated pupils and performed fundal exam. Fundal exam neg.  - Patient had been viewing laptop screen for ~ 2 hours prior to experiencing blurry vision  - Per ophtho, blurry vision due to dry eyes. rec'd artificial tears QID prn and warm compresses BID prn. Can escalate artificial tears to ointment if needed  - improved     Antineoplastic chemotherapy-induced cytopenias: anemia  - Transfuse Hgb <7g and plt <10K  - hgb 10.4; plt 193K  - BMBx showing  no residual disease.     Headache/ Neck pain/ Cervical stenosis of spine  - Hx of spinal fusion C3-C5  - Started having headaches when he started Ponatinib at diagnosis. Onset seems to correspond to when patient quit smoking and when he started ponatinib  - Had been off of ponatinib for a few days but HAs persisted during transplant admission  - Pattern is like cluster headaches (onset during sleep), but not unilateral versus tension HA   - O2 via non-rebreather at 12 L over 15 minutes seemed to help but developed hot flash so pt stopped  - Ultram, Flexeril, Oxycodone, lidoderm, neck pillow, c-collar did not help  - Imitrex PRN q2hr (max 200mg in 24hrs). Not contraindicated per pharm D.  - Patient reporting that headache may be sinus in nature. Maxillary tenderness noted. Taking claritin  - Saw optho and neuro inpatient, trialed IV mag  - Is now seeing pain mgt for this. Current headaches seem to be caused by muscle spasms 2/2 cervical stenosis  - Flexeril changed to zanaflex by pain management. Also on gabapentin. Restarted celebrex 8/18 as plt >50K; however did not work so is no longer taking  - Recommended alternating heat and ice  - Xray 9/19 shows surgical changes of fusion from C3 through C5 with degenerative changed below fusion.   - Following with pain mgt and PT.  - Started PT on 8/26.  - No steroid injections at this time per pain management due to transplant.  - Pt feels neck pain started after hickmann was placed  - Has been mostly refractory to medications (zanaflex, gabapentin, celebrex, lidoderm patches). Some relief with oral morphine  - CT from 8/24/20 showing history of C3, C4, and C5 fusion and diffuse osteoarthritic changes  - Had virtual pain management appt on 8/25 to review CT results.  - Zanaflex switched to Robaxin per pain mgt.  - had medial branch block on 9/2/20 with Dr. Mcelroy (pain mgt). States that he got very little relief. Called Dr. Mcelroy's office requesting morphine. Was  denied until he can see MD. Seen by Dr. Mcelroy 9/10/20. Per patient pain mgmnt signed off, as it is not a chronic issue, defer to BMT service for further pain control.  - will started MS contin 15mg BID 9/20/2020; use PRN morphine q6h for breakthrough; titrate up MS contin as needed  - Consider to remove his line in future, as he feels it's bothering and causing neck pain. Currently needing IV mag on occassion and has acute GVHD.  - Patient reports much improvement to pain since starting high dose prednisone for his skin GVHD     Tobacco abuse, in remission  - 35 pack year history  - Quit smoking 6/1/2020 using Chantix  - Patient completed course of Chantix and denies need for Nicotine patch    BPH (benign prostatic hyperplasia)  - Continue Flomax     GERD (gastroesophageal reflux disease)  - Continue nexium; discussed with patient can increase to 40mg if needed while on high dose steroids  - Can take TUMS prn     Essential hypertension  - Increased home amlodipine from 5 mg to 10 mg daily while inpatient due to BPs as high as 180s/110s   - normal BP today  - continue to hold amlodipine    AURORA (on 9/4)  - creatinine pending; trending down now at 1.0 today  - stopped cellcept on Day +35  - will increase fluconazole back up to 400 mg daily  - continue increased PO fluid intake      Follow-up:   - CBC, CMP, type and screen, tacro, CMV, EBV (weekly only), mag, phos and appt with Dr. Hodges alternating with NP twice weekly.  - Labs should be in AMs while on tacro. Will get scheduled through October      Marimar Brothers NP  Hematology/Oncology/BMT

## 2020-09-25 ENCOUNTER — OFFICE VISIT (OUTPATIENT)
Dept: HEMATOLOGY/ONCOLOGY | Facility: CLINIC | Age: 60
End: 2020-09-25
Payer: COMMERCIAL

## 2020-09-25 ENCOUNTER — CLINICAL SUPPORT (OUTPATIENT)
Dept: HEMATOLOGY/ONCOLOGY | Facility: CLINIC | Age: 60
End: 2020-09-25
Payer: COMMERCIAL

## 2020-09-25 ENCOUNTER — INFUSION (OUTPATIENT)
Dept: INFUSION THERAPY | Facility: HOSPITAL | Age: 60
End: 2020-09-25
Attending: NURSE PRACTITIONER
Payer: COMMERCIAL

## 2020-09-25 ENCOUNTER — INFUSION (OUTPATIENT)
Dept: INFUSION THERAPY | Facility: OTHER | Age: 60
End: 2020-09-25
Attending: NURSE PRACTITIONER
Payer: COMMERCIAL

## 2020-09-25 VITALS
TEMPERATURE: 99 F | OXYGEN SATURATION: 95 % | HEIGHT: 66 IN | SYSTOLIC BLOOD PRESSURE: 132 MMHG | BODY MASS INDEX: 30.9 KG/M2 | WEIGHT: 192.25 LBS | DIASTOLIC BLOOD PRESSURE: 75 MMHG | HEART RATE: 81 BPM

## 2020-09-25 DIAGNOSIS — Z94.84 HISTORY OF ALLOGENEIC STEM CELL TRANSPLANT: Primary | ICD-10-CM

## 2020-09-25 DIAGNOSIS — T86.09 ACUTE GVHD COMPLICATING BONE MARROW TRANSPLANTATION: Primary | ICD-10-CM

## 2020-09-25 DIAGNOSIS — C92.01 ACUTE MYELOID LEUKEMIA IN REMISSION: ICD-10-CM

## 2020-09-25 DIAGNOSIS — C92.11 CML IN REMISSION: ICD-10-CM

## 2020-09-25 DIAGNOSIS — K21.9 GASTROESOPHAGEAL REFLUX DISEASE WITHOUT ESOPHAGITIS: ICD-10-CM

## 2020-09-25 DIAGNOSIS — I10 ESSENTIAL HYPERTENSION: ICD-10-CM

## 2020-09-25 DIAGNOSIS — F17.201 TOBACCO ABUSE, IN REMISSION: ICD-10-CM

## 2020-09-25 DIAGNOSIS — R11.2 CINV (CHEMOTHERAPY-INDUCED NAUSEA AND VOMITING): ICD-10-CM

## 2020-09-25 DIAGNOSIS — D64.81 ANTINEOPLASTIC CHEMOTHERAPY INDUCED ANEMIA: ICD-10-CM

## 2020-09-25 DIAGNOSIS — T45.1X5A ANTINEOPLASTIC CHEMOTHERAPY INDUCED ANEMIA: ICD-10-CM

## 2020-09-25 DIAGNOSIS — H04.123 BILATERAL DRY EYES: ICD-10-CM

## 2020-09-25 DIAGNOSIS — Z76.82 STEM CELL TRANSPLANT CANDIDATE: ICD-10-CM

## 2020-09-25 DIAGNOSIS — T45.1X5A CINV (CHEMOTHERAPY-INDUCED NAUSEA AND VOMITING): ICD-10-CM

## 2020-09-25 DIAGNOSIS — M48.02 CERVICAL STENOSIS OF SPINE: ICD-10-CM

## 2020-09-25 DIAGNOSIS — E83.42 HYPOMAGNESEMIA: Primary | ICD-10-CM

## 2020-09-25 DIAGNOSIS — Z94.84 HISTORY OF ALLOGENEIC STEM CELL TRANSPLANT: ICD-10-CM

## 2020-09-25 DIAGNOSIS — N40.0 BENIGN PROSTATIC HYPERPLASIA WITHOUT LOWER URINARY TRACT SYMPTOMS: ICD-10-CM

## 2020-09-25 DIAGNOSIS — D89.810 ACUTE GVHD COMPLICATING BONE MARROW TRANSPLANTATION: Primary | ICD-10-CM

## 2020-09-25 DIAGNOSIS — C92.10 CML (CHRONIC MYELOCYTIC LEUKEMIA): ICD-10-CM

## 2020-09-25 DIAGNOSIS — R51.9 CHRONIC INTRACTABLE HEADACHE, UNSPECIFIED HEADACHE TYPE: ICD-10-CM

## 2020-09-25 DIAGNOSIS — E83.42 HYPOMAGNESEMIA: ICD-10-CM

## 2020-09-25 DIAGNOSIS — G89.29 CHRONIC INTRACTABLE HEADACHE, UNSPECIFIED HEADACHE TYPE: ICD-10-CM

## 2020-09-25 LAB
ABO + RH BLD: NORMAL
ALBUMIN SERPL BCP-MCNC: 4 G/DL (ref 3.5–5.2)
ALP SERPL-CCNC: 56 U/L (ref 55–135)
ALT SERPL W/O P-5'-P-CCNC: 19 U/L (ref 10–44)
ANION GAP SERPL CALC-SCNC: 10 MMOL/L (ref 8–16)
AST SERPL-CCNC: 13 U/L (ref 10–40)
BASOPHILS # BLD AUTO: 0.01 K/UL (ref 0–0.2)
BASOPHILS NFR BLD: 0.1 % (ref 0–1.9)
BILIRUB SERPL-MCNC: 0.3 MG/DL (ref 0.1–1)
BLD GP AB SCN CELLS X3 SERPL QL: NORMAL
BUN SERPL-MCNC: 23 MG/DL (ref 8–23)
CALCIUM SERPL-MCNC: 8.9 MG/DL (ref 8.7–10.5)
CHLORIDE SERPL-SCNC: 102 MMOL/L (ref 95–110)
CO2 SERPL-SCNC: 25 MMOL/L (ref 23–29)
CREAT SERPL-MCNC: 1 MG/DL (ref 0.5–1.4)
DIFFERENTIAL METHOD: ABNORMAL
EBV DNA BY PCR: NORMAL IU/ML
EBV DNA BY PCR: NORMAL IU/ML
EOSINOPHIL # BLD AUTO: 0 K/UL (ref 0–0.5)
EOSINOPHIL NFR BLD: 0.4 % (ref 0–8)
ERYTHROCYTE [DISTWIDTH] IN BLOOD BY AUTOMATED COUNT: 17.2 % (ref 11.5–14.5)
EST. GFR  (AFRICAN AMERICAN): >60 ML/MIN/1.73 M^2
EST. GFR  (NON AFRICAN AMERICAN): >60 ML/MIN/1.73 M^2
GLUCOSE SERPL-MCNC: 117 MG/DL (ref 70–110)
HCT VFR BLD AUTO: 32.5 % (ref 40–54)
HGB BLD-MCNC: 10.4 G/DL (ref 14–18)
IMM GRANULOCYTES # BLD AUTO: 0.46 K/UL (ref 0–0.04)
IMM GRANULOCYTES NFR BLD AUTO: 4.7 % (ref 0–0.5)
LYMPHOCYTES # BLD AUTO: 0.5 K/UL (ref 1–4.8)
LYMPHOCYTES NFR BLD: 5 % (ref 18–48)
MAGNESIUM SERPL-MCNC: 1.2 MG/DL (ref 1.6–2.6)
MCH RBC QN AUTO: 33.2 PG (ref 27–31)
MCHC RBC AUTO-ENTMCNC: 32 G/DL (ref 32–36)
MCV RBC AUTO: 104 FL (ref 82–98)
MONOCYTES # BLD AUTO: 0.8 K/UL (ref 0.3–1)
MONOCYTES NFR BLD: 8 % (ref 4–15)
NEUTROPHILS # BLD AUTO: 8 K/UL (ref 1.8–7.7)
NEUTROPHILS NFR BLD: 81.8 % (ref 38–73)
NRBC BLD-RTO: 0 /100 WBC
PHOSPHATE SERPL-MCNC: 2.7 MG/DL (ref 2.7–4.5)
PLATELET # BLD AUTO: 193 K/UL (ref 150–350)
PMV BLD AUTO: 10 FL (ref 9.2–12.9)
POTASSIUM SERPL-SCNC: 4.4 MMOL/L (ref 3.5–5.1)
PROT SERPL-MCNC: 6.4 G/DL (ref 6–8.4)
RBC # BLD AUTO: 3.13 M/UL (ref 4.6–6.2)
SODIUM SERPL-SCNC: 137 MMOL/L (ref 136–145)
TACROLIMUS BLD-MCNC: 12.8 NG/ML (ref 5–15)
WBC # BLD AUTO: 9.83 K/UL (ref 3.9–12.7)

## 2020-09-25 PROCEDURE — 36592 COLLECT BLOOD FROM PICC: CPT

## 2020-09-25 PROCEDURE — 80053 COMPREHEN METABOLIC PANEL: CPT

## 2020-09-25 PROCEDURE — 84100 ASSAY OF PHOSPHORUS: CPT

## 2020-09-25 PROCEDURE — 3008F PR BODY MASS INDEX (BMI) DOCUMENTED: ICD-10-PCS | Mod: BMT,CPTII,S$GLB, | Performed by: NURSE PRACTITIONER

## 2020-09-25 PROCEDURE — 63600175 PHARM REV CODE 636 W HCPCS: Performed by: INTERNAL MEDICINE

## 2020-09-25 PROCEDURE — 99999 PR PBB SHADOW E&M-EST. PATIENT-LVL V: ICD-10-PCS | Mod: PBBFAC,BMT,, | Performed by: NURSE PRACTITIONER

## 2020-09-25 PROCEDURE — 99999 PR PBB SHADOW E&M-EST. PATIENT-LVL V: CPT | Mod: PBBFAC,BMT,, | Performed by: NURSE PRACTITIONER

## 2020-09-25 PROCEDURE — 96366 THER/PROPH/DIAG IV INF ADDON: CPT

## 2020-09-25 PROCEDURE — 85025 COMPLETE CBC W/AUTO DIFF WBC: CPT

## 2020-09-25 PROCEDURE — 83735 ASSAY OF MAGNESIUM: CPT

## 2020-09-25 PROCEDURE — 96365 THER/PROPH/DIAG IV INF INIT: CPT

## 2020-09-25 PROCEDURE — 3008F BODY MASS INDEX DOCD: CPT | Mod: BMT,CPTII,S$GLB, | Performed by: NURSE PRACTITIONER

## 2020-09-25 PROCEDURE — A4216 STERILE WATER/SALINE, 10 ML: HCPCS | Performed by: NURSE PRACTITIONER

## 2020-09-25 PROCEDURE — 99999 PR PBB SHADOW E&M-EST. PATIENT-LVL I: ICD-10-PCS | Mod: PBBFAC,BMT,,

## 2020-09-25 PROCEDURE — 63600175 PHARM REV CODE 636 W HCPCS: Performed by: NURSE PRACTITIONER

## 2020-09-25 PROCEDURE — 86850 RBC ANTIBODY SCREEN: CPT

## 2020-09-25 PROCEDURE — 99215 PR OFFICE/OUTPT VISIT, EST, LEVL V, 40-54 MIN: ICD-10-PCS | Mod: BMT,S$GLB,, | Performed by: NURSE PRACTITIONER

## 2020-09-25 PROCEDURE — 99999 PR PBB SHADOW E&M-EST. PATIENT-LVL I: CPT | Mod: PBBFAC,BMT,,

## 2020-09-25 PROCEDURE — 87799 DETECT AGENT NOS DNA QUANT: CPT

## 2020-09-25 PROCEDURE — 99215 OFFICE O/P EST HI 40 MIN: CPT | Mod: BMT,S$GLB,, | Performed by: NURSE PRACTITIONER

## 2020-09-25 PROCEDURE — 80197 ASSAY OF TACROLIMUS: CPT

## 2020-09-25 PROCEDURE — 25000003 PHARM REV CODE 250: Performed by: NURSE PRACTITIONER

## 2020-09-25 RX ORDER — HEPARIN 100 UNIT/ML
500 SYRINGE INTRAVENOUS
Status: CANCELLED | OUTPATIENT
Start: 2020-09-25

## 2020-09-25 RX ORDER — LORAZEPAM/0.9% SODIUM CHLORIDE 100MG/0.1L
2 PLASTIC BAG, INJECTION (ML) INTRAVENOUS
Status: COMPLETED | OUTPATIENT
Start: 2020-09-25 | End: 2020-09-25

## 2020-09-25 RX ORDER — HEPARIN 100 UNIT/ML
500 SYRINGE INTRAVENOUS
Status: COMPLETED | OUTPATIENT
Start: 2020-09-25 | End: 2020-09-25

## 2020-09-25 RX ORDER — SODIUM CHLORIDE 0.9 % (FLUSH) 0.9 %
10 SYRINGE (ML) INJECTION
Status: CANCELLED | OUTPATIENT
Start: 2020-09-25

## 2020-09-25 RX ORDER — LORAZEPAM/0.9% SODIUM CHLORIDE 100MG/0.1L
2 PLASTIC BAG, INJECTION (ML) INTRAVENOUS
Status: CANCELLED | OUTPATIENT
Start: 2020-09-25

## 2020-09-25 RX ORDER — PREDNISONE 20 MG/1
90 TABLET ORAL DAILY
Qty: 180 TABLET | Refills: 2
Start: 2020-09-25 | End: 2020-09-29

## 2020-09-25 RX ORDER — SODIUM CHLORIDE 0.9 % (FLUSH) 0.9 %
10 SYRINGE (ML) INJECTION
Status: COMPLETED | OUTPATIENT
Start: 2020-09-25 | End: 2020-09-25

## 2020-09-25 RX ADMIN — HEPARIN 500 UNITS: 100 SYRINGE at 08:09

## 2020-09-25 RX ADMIN — HEPARIN 500 UNITS: 100 SYRINGE at 02:09

## 2020-09-25 RX ADMIN — Medication 10 ML: at 08:09

## 2020-09-25 RX ADMIN — MAGNESIUM SULFATE IN WATER 2 G: 40 INJECTION, SOLUTION INTRAVENOUS at 12:09

## 2020-09-25 NOTE — PLAN OF CARE
Magnesium 2 grams IVPB administered, no reaction. Patient tolerated well. No apparent distress noted. Discharge instructions given to patient. Patient understands instructions.

## 2020-09-25 NOTE — NURSING
Labs obtained from [facility] and sent off. Dressing changed per policy. No redness around insertion site. No new complaints. Ambulated off unit.

## 2020-09-25 NOTE — Clinical Note
- CBC, CMP, type and screen, tacro, CMV, EBV (weekly only), mag, phos and appt with Dr. Hodges alternating with NP twice weekly. Need to be scheduled starting 10/2. Please schedule through the month of October  - Labs should be in AMs while on tacro

## 2020-09-25 NOTE — PROGRESS NOTES
BMT Pharmacist Medication Review Note     All current medications were reviewed with the patient and his wife. The patient brought in all of his medications with him to this visit.      We discussed the changes made by the physician including increasing magnesium to 2 tablets four times a day.  The patient reports feeling pins and needles when sweating.     The patient has ample supply of all medications except for Bactrim - a refill will be available on Tuesday.      The patient complains of nausea and using both nausea meds twcie per day and pain somewhat managable with MS contin twice a day, prn morphine once a day, and prednisone that he is on for GVHD. We discussed that his nausea occurs mostly after he eats so he will try prophylaxis with zofran 30 minutes before meals.  All questions were answered.      Medication Indication Morning Lunch/Afternoon Evening Night   Acyclovir 800mg  Viral infection prevention  1 tablet  1 tablet   Fluconazole 200mg  Fungal infection prevention  2 tablets     Sulfamethoxazole-trimethoprim (Bactrim) 800-160mg PCP pneumonia prevention 1 tablet on Mon., Wed., and Fri.      Tacrolimus (Prograf) 0.5mg* GVHD prevention - take AFTER labs on clinic days* 9 capsules   9 capsules   Prednisone 20mg GVHD skin rash 4.5 tablets      ----------------------------------------        Esomeprazole (Nexium) 20mg Acid reflux 2 capsules      Gabapentin 300mg Neuropathy/pain 1 capsule 1 capsule  1 capsule   Loratadine (Claritin) 10mg Allergies 1 tablet      Magnesium oxide 400mg Magnesium supplement 2 tablets 2 tablets 2 tablets 2 tablets   Tamsulosin (Flomax) 0.4mg BPH/urinary issues 1 capsule      Morphine ER (MSContin) 15mg Pain 1 tablet   1 tablet   Senakot-S 8.6-50mg Constipation 1 tablet      *Dose may change at each appointment    AS NEEDED MEDICATIONS:  Ondansetron (Zofran) 8mg every 8 hours as needed for nausea  Prochlorperazine (Compazine) 10mg four times a day as needed for nausea (2nd  choice)  Dukes solution - swish and swallow 10mls four times a day as needed for mouth sores  Morphine 15mg - 2 tablets every 6 hours as needed for severe pain  Triamcinolone 0.1% cream twice a day as needed for rash  Artificial tears into each eye as needed for dry eyes  Fluticasone nasal spray 2 sprays twice a day as needed for allergies  Tums - 2 tablets as needed for indigestion        Allie Soliman, PharmD, BCPS, BCOP  Clinical Pharmacy Specialist   BMT/Hematology Oncology  SpectraLink: [phone]

## 2020-09-28 LAB — EBV DNA BY PCR: NORMAL IU/ML

## 2020-09-29 ENCOUNTER — OFFICE VISIT (OUTPATIENT)
Dept: HEMATOLOGY/ONCOLOGY | Facility: CLINIC | Age: 60
End: 2020-09-29
Payer: COMMERCIAL

## 2020-09-29 ENCOUNTER — INFUSION (OUTPATIENT)
Dept: INFUSION THERAPY | Facility: HOSPITAL | Age: 60
End: 2020-09-29
Attending: NURSE PRACTITIONER
Payer: COMMERCIAL

## 2020-09-29 VITALS
WEIGHT: 196.63 LBS | OXYGEN SATURATION: 98 % | HEART RATE: 72 BPM | SYSTOLIC BLOOD PRESSURE: 141 MMHG | TEMPERATURE: 98 F | BODY MASS INDEX: 31.6 KG/M2 | RESPIRATION RATE: 16 BRPM | HEIGHT: 66 IN | DIASTOLIC BLOOD PRESSURE: 71 MMHG

## 2020-09-29 DIAGNOSIS — C92.01 ACUTE MYELOID LEUKEMIA IN REMISSION: Primary | ICD-10-CM

## 2020-09-29 DIAGNOSIS — C92.10 CML (CHRONIC MYELOCYTIC LEUKEMIA): ICD-10-CM

## 2020-09-29 DIAGNOSIS — D64.81 ANTINEOPLASTIC CHEMOTHERAPY INDUCED ANEMIA: ICD-10-CM

## 2020-09-29 DIAGNOSIS — M48.02 CERVICAL STENOSIS OF SPINE: ICD-10-CM

## 2020-09-29 DIAGNOSIS — G89.29 CHRONIC INTRACTABLE HEADACHE, UNSPECIFIED HEADACHE TYPE: ICD-10-CM

## 2020-09-29 DIAGNOSIS — N40.0 BENIGN PROSTATIC HYPERPLASIA WITHOUT LOWER URINARY TRACT SYMPTOMS: ICD-10-CM

## 2020-09-29 DIAGNOSIS — Z94.84 HISTORY OF ALLOGENEIC STEM CELL TRANSPLANT: ICD-10-CM

## 2020-09-29 DIAGNOSIS — C92.01 ACUTE MYELOID LEUKEMIA IN REMISSION: ICD-10-CM

## 2020-09-29 DIAGNOSIS — D89.810 ACUTE GVHD COMPLICATING BONE MARROW TRANSPLANTATION: ICD-10-CM

## 2020-09-29 DIAGNOSIS — Z76.82 STEM CELL TRANSPLANT CANDIDATE: ICD-10-CM

## 2020-09-29 DIAGNOSIS — R51.9 CHRONIC INTRACTABLE HEADACHE, UNSPECIFIED HEADACHE TYPE: ICD-10-CM

## 2020-09-29 DIAGNOSIS — E83.42 HYPOMAGNESEMIA: ICD-10-CM

## 2020-09-29 DIAGNOSIS — T45.1X5A ANTINEOPLASTIC CHEMOTHERAPY INDUCED ANEMIA: ICD-10-CM

## 2020-09-29 DIAGNOSIS — K59.03 DRUG INDUCED CONSTIPATION: ICD-10-CM

## 2020-09-29 DIAGNOSIS — R11.2 CINV (CHEMOTHERAPY-INDUCED NAUSEA AND VOMITING): ICD-10-CM

## 2020-09-29 DIAGNOSIS — H04.123 BILATERAL DRY EYES: ICD-10-CM

## 2020-09-29 DIAGNOSIS — T45.1X5A CINV (CHEMOTHERAPY-INDUCED NAUSEA AND VOMITING): ICD-10-CM

## 2020-09-29 DIAGNOSIS — Z94.84 HISTORY OF ALLOGENEIC STEM CELL TRANSPLANT: Primary | ICD-10-CM

## 2020-09-29 DIAGNOSIS — T86.09 ACUTE GVHD COMPLICATING BONE MARROW TRANSPLANTATION: ICD-10-CM

## 2020-09-29 DIAGNOSIS — I10 ESSENTIAL HYPERTENSION: ICD-10-CM

## 2020-09-29 DIAGNOSIS — K21.9 GASTROESOPHAGEAL REFLUX DISEASE WITHOUT ESOPHAGITIS: ICD-10-CM

## 2020-09-29 DIAGNOSIS — F17.201 TOBACCO ABUSE, IN REMISSION: ICD-10-CM

## 2020-09-29 LAB
ABO + RH BLD: NORMAL
ALBUMIN SERPL BCP-MCNC: 3.7 G/DL (ref 3.5–5.2)
ALP SERPL-CCNC: 47 U/L (ref 55–135)
ALT SERPL W/O P-5'-P-CCNC: 15 U/L (ref 10–44)
ANION GAP SERPL CALC-SCNC: 11 MMOL/L (ref 8–16)
ANISOCYTOSIS BLD QL SMEAR: SLIGHT
AST SERPL-CCNC: 12 U/L (ref 10–40)
BASOPHILS NFR BLD: 0 % (ref 0–1.9)
BILIRUB SERPL-MCNC: 0.2 MG/DL (ref 0.1–1)
BLD GP AB SCN CELLS X3 SERPL QL: NORMAL
BUN SERPL-MCNC: 15 MG/DL (ref 8–23)
CALCIUM SERPL-MCNC: 8.4 MG/DL (ref 8.7–10.5)
CHLORIDE SERPL-SCNC: 104 MMOL/L (ref 95–110)
CMV DNA SERPL NAA+PROBE-ACNC: NORMAL IU/ML
CO2 SERPL-SCNC: 24 MMOL/L (ref 23–29)
CREAT SERPL-MCNC: 0.9 MG/DL (ref 0.5–1.4)
DIFFERENTIAL METHOD: ABNORMAL
EOSINOPHIL NFR BLD: 0 % (ref 0–8)
ERYTHROCYTE [DISTWIDTH] IN BLOOD BY AUTOMATED COUNT: 17.3 % (ref 11.5–14.5)
EST. GFR  (AFRICAN AMERICAN): >60 ML/MIN/1.73 M^2
EST. GFR  (NON AFRICAN AMERICAN): >60 ML/MIN/1.73 M^2
GLUCOSE SERPL-MCNC: 122 MG/DL (ref 70–110)
HCT VFR BLD AUTO: 33 % (ref 40–54)
HGB BLD-MCNC: 10.3 G/DL (ref 14–18)
IMM GRANULOCYTES # BLD AUTO: ABNORMAL K/UL (ref 0–0.04)
IMM GRANULOCYTES NFR BLD AUTO: ABNORMAL % (ref 0–0.5)
LYMPHOCYTES NFR BLD: 12 % (ref 18–48)
MAGNESIUM SERPL-MCNC: 1.6 MG/DL (ref 1.6–2.6)
MCH RBC QN AUTO: 33.4 PG (ref 27–31)
MCHC RBC AUTO-ENTMCNC: 31.2 G/DL (ref 32–36)
MCV RBC AUTO: 107 FL (ref 82–98)
MONOCYTES NFR BLD: 7 % (ref 4–15)
NEUTROPHILS NFR BLD: 81 % (ref 38–73)
NRBC BLD-RTO: 0 /100 WBC
PHOSPHATE SERPL-MCNC: 3.3 MG/DL (ref 2.7–4.5)
PLATELET # BLD AUTO: 162 K/UL (ref 150–350)
PLATELET BLD QL SMEAR: ABNORMAL
PMV BLD AUTO: 9.4 FL (ref 9.2–12.9)
POIKILOCYTOSIS BLD QL SMEAR: SLIGHT
POTASSIUM SERPL-SCNC: 3.9 MMOL/L (ref 3.5–5.1)
PROT SERPL-MCNC: 6.1 G/DL (ref 6–8.4)
RBC # BLD AUTO: 3.08 M/UL (ref 4.6–6.2)
SODIUM SERPL-SCNC: 139 MMOL/L (ref 136–145)
TACROLIMUS BLD-MCNC: 5.3 NG/ML (ref 5–15)
WBC # BLD AUTO: 8.62 K/UL (ref 3.9–12.7)

## 2020-09-29 PROCEDURE — 85027 COMPLETE CBC AUTOMATED: CPT

## 2020-09-29 PROCEDURE — 87799 DETECT AGENT NOS DNA QUANT: CPT

## 2020-09-29 PROCEDURE — 99215 PR OFFICE/OUTPT VISIT, EST, LEVL V, 40-54 MIN: ICD-10-PCS | Mod: BMT,S$GLB,, | Performed by: NURSE PRACTITIONER

## 2020-09-29 PROCEDURE — 99999 PR PBB SHADOW E&M-EST. PATIENT-LVL V: ICD-10-PCS | Mod: PBBFAC,BMT,, | Performed by: NURSE PRACTITIONER

## 2020-09-29 PROCEDURE — 80197 ASSAY OF TACROLIMUS: CPT

## 2020-09-29 PROCEDURE — 3008F BODY MASS INDEX DOCD: CPT | Mod: BMT,CPTII,S$GLB, | Performed by: NURSE PRACTITIONER

## 2020-09-29 PROCEDURE — 85007 BL SMEAR W/DIFF WBC COUNT: CPT | Mod: NCS

## 2020-09-29 PROCEDURE — 84100 ASSAY OF PHOSPHORUS: CPT

## 2020-09-29 PROCEDURE — 80053 COMPREHEN METABOLIC PANEL: CPT

## 2020-09-29 PROCEDURE — 25000003 PHARM REV CODE 250: Performed by: NURSE PRACTITIONER

## 2020-09-29 PROCEDURE — 86901 BLOOD TYPING SEROLOGIC RH(D): CPT

## 2020-09-29 PROCEDURE — 99999 PR PBB SHADOW E&M-EST. PATIENT-LVL V: CPT | Mod: PBBFAC,BMT,, | Performed by: NURSE PRACTITIONER

## 2020-09-29 PROCEDURE — A4216 STERILE WATER/SALINE, 10 ML: HCPCS | Performed by: NURSE PRACTITIONER

## 2020-09-29 PROCEDURE — 99215 OFFICE O/P EST HI 40 MIN: CPT | Mod: BMT,S$GLB,, | Performed by: NURSE PRACTITIONER

## 2020-09-29 PROCEDURE — 83735 ASSAY OF MAGNESIUM: CPT

## 2020-09-29 PROCEDURE — 63600175 PHARM REV CODE 636 W HCPCS: Performed by: NURSE PRACTITIONER

## 2020-09-29 PROCEDURE — 3008F PR BODY MASS INDEX (BMI) DOCUMENTED: ICD-10-PCS | Mod: BMT,CPTII,S$GLB, | Performed by: NURSE PRACTITIONER

## 2020-09-29 PROCEDURE — 36592 COLLECT BLOOD FROM PICC: CPT

## 2020-09-29 RX ORDER — SODIUM CHLORIDE 0.9 % (FLUSH) 0.9 %
10 SYRINGE (ML) INJECTION
Status: COMPLETED | OUTPATIENT
Start: 2020-09-29 | End: 2020-09-29

## 2020-09-29 RX ORDER — HEPARIN 100 UNIT/ML
500 SYRINGE INTRAVENOUS
Status: CANCELLED | OUTPATIENT
Start: 2020-09-29

## 2020-09-29 RX ORDER — ESOMEPRAZOLE MAGNESIUM 40 MG/1
40 CAPSULE, DELAYED RELEASE ORAL
COMMUNITY

## 2020-09-29 RX ORDER — HEPARIN 100 UNIT/ML
500 SYRINGE INTRAVENOUS
Status: COMPLETED | OUTPATIENT
Start: 2020-09-29 | End: 2020-09-29

## 2020-09-29 RX ORDER — SULFAMETHOXAZOLE AND TRIMETHOPRIM 800; 160 MG/1; MG/1
1 TABLET ORAL
Qty: 12 TABLET | Refills: 4 | Status: SHIPPED | OUTPATIENT
Start: 2020-09-30 | End: 2020-12-02 | Stop reason: SDUPTHER

## 2020-09-29 RX ORDER — SODIUM CHLORIDE 0.9 % (FLUSH) 0.9 %
10 SYRINGE (ML) INJECTION
Status: CANCELLED | OUTPATIENT
Start: 2020-09-29

## 2020-09-29 RX ORDER — PREDNISONE 20 MG/1
80 TABLET ORAL DAILY
Qty: 180 TABLET | Refills: 2
Start: 2020-09-29 | End: 2020-10-20 | Stop reason: SDUPTHER

## 2020-09-29 RX ORDER — TACROLIMUS 0.5 MG/1
CAPSULE ORAL
Qty: 480 CAPSULE | Refills: 6 | Status: SHIPPED | OUTPATIENT
Start: 2020-09-29 | End: 2020-10-05

## 2020-09-29 RX ADMIN — Medication 10 ML: at 08:09

## 2020-09-29 RX ADMIN — HEPARIN 500 UNITS: 100 SYRINGE at 08:09

## 2020-09-29 NOTE — NURSING
Pt arrived for labs from [facility].  Red lumen flushes easilly with good blood return, labs sent.  Pt now going to next appt.

## 2020-09-29 NOTE — Clinical Note
Please move appointments in October to Monday and Thursday per patient request. Please schedule alternating appointments with NP and Dr. Hodges  Labs prior to appointments include CBC, CMP, type and screen, tacro, CMV, EBV (weekly only), mag, phos  AM appointments due to tacro levels

## 2020-09-29 NOTE — PROGRESS NOTES
Subjective:    Patient ID: Kvng Jones Sr. is a 60 y.o. male.    Chief Complaint: 2wk labs and follow up and Neck Pain    Oncologic hx:  60 y.o. male admitted to [facility] for CML presenting with blast crisis and lewsi disease. He was admitted for evaluation and induction chemotherapy with FLAG-Addis. Complications of therapy include epididymal orchitis with negative testicular ultrasound, neutropenic fever, dyspnea, and GGO of bilateral lungs on CT chest. Fever and chest findings were covered with broad spectrum antimicrobials. He did have hallucinations and vivid dreams with posaconazole. Chemotherapy was administered by left arm PICC line that was removed during hospital stay for MSSA infection treated with vancomycin. Hospital admission was 3/30/2020 to 5/1/2020 achieving morphologic CR with induction chemotherapy. He then started Ponatinib 30mg daily.     Studies performed during his hospital stay include pre-chemotherapy ECHO with normal ejection fraction of 60-65%. HIV, HBV, and HCV tests were negative. CBC at admission was WBC 38.6, Hgb 14.6, and platelet 416K. CT of the head without contrast was normal 4/20/2020. US of abdomen performed for abdominal distention and neutropenic fever had hepatic steatosis, liver 19cm, and spleen 11 cm. He does have a history of alcohol use and pancreatitis.    Patient is a . He is  to wife Guillermina who will be a caregiver post transplant. He has a 45 pack year smoking history. He quit 6/1/2020, now on Chantix. He drinks 3-4 alcohol beverages nightly. He has 3 children. He has several family members in the [facility] area.    Transplant Course: Admitted 7/21/20 for a Flu/Cy/TBI haplo allo SCT. Son was donor. Received 2 bags and a total CD34 dose of 3.10 x10^6/kg on 7/28/20. Tolerated stem cells well. C/o headaches prior to admission, which persisted throughout admission. Neuro ultimately consulted for rec's. Was a daily drinker prior  to admission. No s/s of alcolhol withdrawal during admission. Transplant further complicated by hypertension, heart burn, c-diff neg diarrhea, nausea, sinus congestion, mild peripheral edema, blurry vision (ophtho consulted), mucositis, electrolyte abnormalities, and neck pain 2/2 spinal stenosis (referred to pain mgt at discharge). He engrafted on 8/10/20 with an ANC of 1280. He was discharged on 8/12/2020.    Interval History:  Patient presents for follow up clinic visit post Flu/Cy/TBI allogeneic stem cell transplant. Today is Day +63.  Feeling well overall. Continues with AM nausea, discussed taking zofran or compazine in the middle of the night when he gets up to use the bathroom and see if this helps. Neck pain is more stiff this morning, unsure if he slept wrong. Continues on MS contin and PRN morphine. Appetite is good. Having constipation, taking PRN senna. Denies fevers, chest pain, sob. Skin rash has resolved, will wean steroids today      Review of Systems   Constitutional: Negative for activity change, appetite change, chills, diaphoresis, fatigue and fever.   HENT: Negative for congestion, ear pain, mouth sores, nosebleeds, postnasal drip, rhinorrhea, sinus pain, sore throat and trouble swallowing.         Upper mouth pain   Eyes: Negative for pain, redness and visual disturbance.   Respiratory: Negative for cough, chest tightness, shortness of breath, wheezing and stridor.    Cardiovascular: Negative for chest pain, palpitations and leg swelling.   Gastrointestinal: Positive for nausea. Negative for abdominal distention, abdominal pain, blood in stool, constipation, diarrhea and vomiting.   Genitourinary: Negative for frequency, hematuria and urgency.   Musculoskeletal: Positive for neck pain (hx of spinal stenosis) and neck stiffness. Negative for arthralgias and back pain.   Skin: Negative for rash.   Neurological: Negative for dizziness, syncope, weakness, light-headedness, numbness and headaches.    Hematological: Negative for adenopathy. Does not bruise/bleed easily.   Psychiatric/Behavioral: Negative for confusion.         Objective:     Physical Exam  Vitals signs and nursing note reviewed.   Constitutional:       Appearance: Normal appearance. He is well-developed.   HENT:      Head: Normocephalic and atraumatic.      Mouth/Throat:      Mouth: Mucous membranes are dry.   Eyes:      General: No scleral icterus.  Neck:      Musculoskeletal: Neck supple. Decreased range of motion.   Cardiovascular:      Rate and Rhythm: Normal rate and regular rhythm.      Pulses: Normal pulses.      Heart sounds: Normal heart sounds.   Pulmonary:      Effort: Pulmonary effort is normal. No respiratory distress.      Breath sounds: Normal breath sounds.   Abdominal:      General: Bowel sounds are normal. There is no distension.      Palpations: Abdomen is soft.      Tenderness: There is no abdominal tenderness.   Musculoskeletal:         General: No tenderness or swelling.      Right lower leg: No edema.      Left lower leg: No edema.   Skin:     General: Skin is warm and dry.      Coloration: Skin is not pale.      Findings: Rash resolved. No erythema or petechiae.      Comments: Dalal intact to right CW with no redness or drainage   Neurological:      Mental Status: He is alert and oriented to person, place, and time.   Psychiatric:         Attention and Perception: Attention normal.         Speech: Speech normal.         Thought Content: Thought content normal.         Cognition and Memory: Cognition and memory normal.         Assessment:       1. Acute myeloid leukemia in remission    2. CML (chronic myelocytic leukemia)    3. History of allogeneic stem cell transplant    4. Acute GVHD complicating bone marrow transplantation    5. Hypomagnesemia    6. CINV (chemotherapy-induced nausea and vomiting)    7. Antineoplastic chemotherapy induced anemia    8. Bilateral dry eyes    9. Chronic intractable headache, unspecified  headache type    10. Cervical stenosis of spine    11. Tobacco abuse, in remission    12. Benign prostatic hyperplasia without lower urinary tract symptoms    13. Gastroesophageal reflux disease without esophagitis    14. Essential hypertension    15. Drug induced constipation        Plan:         History of allogeneic stem cell transplant  - Admitted 7/21/20 for planned Flu/Cy/TBI haplo allo SCT. Son was the donor. Received 2 bags and a total CD34 dose of 3.10 x10^6/kg on 7/28/20. Recieved post transplant cyclophosphamide. Engrafted neutrophils 8/10/20 day +13  - Today is Day +63  - Tacro level 5.3; (goal 10-11 right now with skin GVHD; previously 7-9). Previously on 4.5mg BID. Will increase dose to 4.5 mg/5mg (9/29).  - stopped Ursodiol Day +30 (8/27/20)  - patient requests Rx for Valium and Oxycodone for pre-procedure in clinic bone marrow biopsies.  - had BMBx in clinic on 8/28/20 showing no definitive morphologic or immunophenotypic evidence of residual CML. Chimerisms unable to be obtained due to insufficient sample; drawn peripherally on 9/18 showing CD33 100% donor, CD3 still insufficient sample DNA.      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 Achieved morphologic CR  - Started on daily Ponatinib 30 mg daily, which he continued to take outpatient. Held Ponatinib during admission; may not need to restart per recent literature. BBMT 2020(83): 484-44  - Had BMBx at [facility] on 7/1/2020 showing no morphologic or immunophenotypic evidence of AML/CML  - Admitted 7/21/20 for Flu/Cy/TBI haplo allo SCT    GVHD  - had rash inpatient that was believed to be drug eruption. Resolved  - diffuse erythema to legs, lower back, arms, and abdomen presented on 9/15. Started 1mg/kg (90mg, will round to 100mg for dosing) of prednisone daily (9/15/20); decreased to 90 mg (4.5 tabs) on  9/21/2020; will decrease today to 4 tabs (80mg) today 9/29/20 as rash has now resolved  - will wean slowly by 10mg every week per Dr. Hodges  - GVHD charting with each visit     ID  - Last CMV and EBV undetected; continue twice weekly CMV, weekly EBV, today's pending  - Acyclovir (Zoster prophylaxis) until Day +365  - Diflucan (Fungal prophylaxis) until off tacrolimus for 2 months.  - Bactrim (PJP prophylaxis) until off tacrolimus for 2 months.     Hypomagnesemia  - Mag; Currently on mag 800mg QID; level 1.6 today; continue current dose     Mucositis due to chemotherapy  - Continue prn [facility]; patient also uses Aloe oral rinses  - Recommend baking soda and warm water rinses  - Started nystatin x10 days 8/18 (completed 8/27)  - Buccal lesion to the roof of his mouth; stable     CINV (chemotherapy-induced nausea and vomiting)  - reports always in the morning; continue to take steroids with food  - c/w prn zofran and compazine; discussed taking dose in the middle of the night when he is up to use the bathroom      Blurry vision/Dry eyes  - C/o worsening blurry vision 7/30/20  - May be contributing to headaches  - Ophtho consulted and saw patient 7/30  - Ophtho dilated pupils and performed fundal exam. Fundal exam neg.  - Patient had been viewing laptop screen for ~ 2 hours prior to experiencing blurry vision  - Per ophtho, blurry vision due to dry eyes. rec'd artificial tears QID prn and warm compresses BID prn. Can escalate artificial tears to ointment if needed  - improved     Antineoplastic chemotherapy-induced cytopenias: anemia  - Transfuse Hgb <7g and plt <10K  - hgb 10.3; plt 162K  - BMBx showing no residual disease.     Headache/ Neck pain/ Cervical stenosis of spine  - Hx of spinal fusion C3-C5  - Started having headaches when he started Ponatinib at diagnosis. Onset seems to correspond to when patient quit smoking and when he started ponatinib  - Had been off of ponatinib for a few days but HAs persisted during  transplant admission  - Pattern is like cluster headaches (onset during sleep), but not unilateral versus tension HA   - O2 via non-rebreather at 12 L over 15 minutes seemed to help but developed hot flash so pt stopped  - Ultram, Flexeril, Oxycodone, lidoderm, neck pillow, c-collar did not help  - Imitrex PRN q2hr (max 200mg in 24hrs). Not contraindicated per pharm D.  - Patient reporting that headache may be sinus in nature. Maxillary tenderness noted. Taking claritin  - Saw optho and neuro inpatient, trialed IV mag  - Is now seeing pain mgt for this. Current headaches seem to be caused by muscle spasms 2/2 cervical stenosis  - Flexeril changed to zanaflex by pain management. Also on gabapentin. Restarted celebrex 8/18 as plt >50K; however did not work so is no longer taking  - Recommended alternating heat and ice  - Xray 9/19 shows surgical changes of fusion from C3 through C5 with degenerative changed below fusion.   - Following with pain mgt and PT.  - Started PT on 8/26.  - No steroid injections at this time per pain management due to transplant.  - Pt feels neck pain started after hickmann was placed  - Has been mostly refractory to medications (zanaflex, gabapentin, celebrex, lidoderm patches). Some relief with oral morphine  - CT from 8/24/20 showing history of C3, C4, and C5 fusion and diffuse osteoarthritic changes  - Had virtual pain management appt on 8/25 to review CT results.  - Zanaflex switched to Robaxin per pain mgt.  - had medial branch block on 9/2/20 with Dr. Mcelroy (pain mgt). States that he got very little relief. Called Dr. Mcelroy's office requesting morphine. Was denied until he can see MD. Seen by Dr. Mcelroy 9/10/20. Per patient pain mgmnt signed off, as it is not a chronic issue, defer to BMT service for further pain control.  - will started MS contin 15mg BID 9/20/2020; use PRN morphine q6h for breakthrough; titrate up MS contin as needed  - Consider to remove his line in future,  as he feels it's bothering and causing neck pain. Currently needing IV mag on occassion and has acute GVHD.  - Patient reports much improvement to pain since starting high dose prednisone for his skin GVHD     Tobacco abuse, in remission  - 35 pack year history  - Quit smoking 6/1/2020 using Chantix  - Patient completed course of Chantix and denies need for Nicotine patch    BPH (benign prostatic hyperplasia)  - Continue Flomax     GERD (gastroesophageal reflux disease)  - Continue nexium; discussed with patient can increase to 40mg if needed while on high dose steroids  - Can take TUMS prn     Essential hypertension  - Increased home amlodipine from 5 mg to 10 mg daily while inpatient due to BPs as high as 180s/110s   - normal BP today  - continue to hold amlodipine    Constipation  - continue Senna PRN      Follow-up:   - CBC, CMP, type and screen, tacro, CMV, EBV (weekly only), mag, phos and appt with Dr. Hodges alternating with NP twice weekly.  - Labs should be in AMs while on tacro. Scheduled through October      Marimar Brothers NP  Hematology/Oncology/BMT

## 2020-10-01 ENCOUNTER — OFFICE VISIT (OUTPATIENT)
Dept: HEMATOLOGY/ONCOLOGY | Facility: CLINIC | Age: 60
End: 2020-10-01
Payer: COMMERCIAL

## 2020-10-01 ENCOUNTER — INFUSION (OUTPATIENT)
Dept: INFUSION THERAPY | Facility: HOSPITAL | Age: 60
End: 2020-10-01
Attending: NURSE PRACTITIONER
Payer: COMMERCIAL

## 2020-10-01 VITALS
WEIGHT: 198.13 LBS | RESPIRATION RATE: 16 BRPM | OXYGEN SATURATION: 97 % | BODY MASS INDEX: 31.84 KG/M2 | DIASTOLIC BLOOD PRESSURE: 76 MMHG | HEIGHT: 66 IN | TEMPERATURE: 98 F | SYSTOLIC BLOOD PRESSURE: 138 MMHG | HEART RATE: 72 BPM

## 2020-10-01 DIAGNOSIS — N40.0 BENIGN PROSTATIC HYPERPLASIA WITHOUT LOWER URINARY TRACT SYMPTOMS: ICD-10-CM

## 2020-10-01 DIAGNOSIS — T86.09 ACUTE GVHD COMPLICATING BONE MARROW TRANSPLANTATION: ICD-10-CM

## 2020-10-01 DIAGNOSIS — H04.123 BILATERAL DRY EYES: ICD-10-CM

## 2020-10-01 DIAGNOSIS — D84.9 IMMUNOCOMPROMISED PATIENT: ICD-10-CM

## 2020-10-01 DIAGNOSIS — M54.2 POSTERIOR NECK PAIN: ICD-10-CM

## 2020-10-01 DIAGNOSIS — K12.31 MUCOSITIS DUE TO CHEMOTHERAPY: ICD-10-CM

## 2020-10-01 DIAGNOSIS — E83.42 HYPOMAGNESEMIA: ICD-10-CM

## 2020-10-01 DIAGNOSIS — G89.29 CHRONIC INTRACTABLE HEADACHE, UNSPECIFIED HEADACHE TYPE: ICD-10-CM

## 2020-10-01 DIAGNOSIS — K21.9 GASTROESOPHAGEAL REFLUX DISEASE WITHOUT ESOPHAGITIS: ICD-10-CM

## 2020-10-01 DIAGNOSIS — I10 ESSENTIAL HYPERTENSION: ICD-10-CM

## 2020-10-01 DIAGNOSIS — F17.201 TOBACCO ABUSE, IN REMISSION: ICD-10-CM

## 2020-10-01 DIAGNOSIS — D75.9 DRUG-INDUCED CYTOPENIA: ICD-10-CM

## 2020-10-01 DIAGNOSIS — K59.03 DRUG INDUCED CONSTIPATION: ICD-10-CM

## 2020-10-01 DIAGNOSIS — Z94.81 S/P ALLOGENEIC BONE MARROW TRANSPLANT: Primary | ICD-10-CM

## 2020-10-01 DIAGNOSIS — C92.10 CML (CHRONIC MYELOCYTIC LEUKEMIA): ICD-10-CM

## 2020-10-01 DIAGNOSIS — T50.905A DRUG-INDUCED CYTOPENIA: ICD-10-CM

## 2020-10-01 DIAGNOSIS — H53.8 BLURRY VISION: ICD-10-CM

## 2020-10-01 DIAGNOSIS — D89.810 ACUTE GVHD COMPLICATING BONE MARROW TRANSPLANTATION: ICD-10-CM

## 2020-10-01 DIAGNOSIS — C92.01 ACUTE MYELOID LEUKEMIA IN REMISSION: ICD-10-CM

## 2020-10-01 DIAGNOSIS — Z94.84 HISTORY OF ALLOGENEIC STEM CELL TRANSPLANT: Primary | ICD-10-CM

## 2020-10-01 DIAGNOSIS — R51.9 CHRONIC INTRACTABLE HEADACHE, UNSPECIFIED HEADACHE TYPE: ICD-10-CM

## 2020-10-01 LAB
ABO + RH BLD: NORMAL
ALBUMIN SERPL BCP-MCNC: 4 G/DL (ref 3.5–5.2)
ALP SERPL-CCNC: 54 U/L (ref 55–135)
ALT SERPL W/O P-5'-P-CCNC: 18 U/L (ref 10–44)
ANION GAP SERPL CALC-SCNC: 13 MMOL/L (ref 8–16)
ANISOCYTOSIS BLD QL SMEAR: SLIGHT
AST SERPL-CCNC: 15 U/L (ref 10–40)
BASO STIPL BLD QL SMEAR: ABNORMAL
BASOPHILS NFR BLD: 0 % (ref 0–1.9)
BILIRUB SERPL-MCNC: 0.2 MG/DL (ref 0.1–1)
BLD GP AB SCN CELLS X3 SERPL QL: NORMAL
BUN SERPL-MCNC: 20 MG/DL (ref 8–23)
CALCIUM SERPL-MCNC: 8.9 MG/DL (ref 8.7–10.5)
CHLORIDE SERPL-SCNC: 103 MMOL/L (ref 95–110)
CMV DNA SERPL NAA+PROBE-ACNC: NORMAL IU/ML
CO2 SERPL-SCNC: 24 MMOL/L (ref 23–29)
CREAT SERPL-MCNC: 1 MG/DL (ref 0.5–1.4)
DACRYOCYTES BLD QL SMEAR: ABNORMAL
DIFFERENTIAL METHOD: ABNORMAL
EBV DNA BY PCR: NORMAL IU/ML
EOSINOPHIL NFR BLD: 1 % (ref 0–8)
ERYTHROCYTE [DISTWIDTH] IN BLOOD BY AUTOMATED COUNT: 17.8 % (ref 11.5–14.5)
EST. GFR  (AFRICAN AMERICAN): >60 ML/MIN/1.73 M^2
EST. GFR  (NON AFRICAN AMERICAN): >60 ML/MIN/1.73 M^2
GLUCOSE SERPL-MCNC: 113 MG/DL (ref 70–110)
HCT VFR BLD AUTO: 35.9 % (ref 40–54)
HGB BLD-MCNC: 11.2 G/DL (ref 14–18)
HYPOCHROMIA BLD QL SMEAR: ABNORMAL
IMM GRANULOCYTES # BLD AUTO: ABNORMAL K/UL (ref 0–0.04)
IMM GRANULOCYTES NFR BLD AUTO: ABNORMAL % (ref 0–0.5)
LYMPHOCYTES NFR BLD: 6 % (ref 18–48)
MAGNESIUM SERPL-MCNC: 1.6 MG/DL (ref 1.6–2.6)
MCH RBC QN AUTO: 33.6 PG (ref 27–31)
MCHC RBC AUTO-ENTMCNC: 31.2 G/DL (ref 32–36)
MCV RBC AUTO: 108 FL (ref 82–98)
MONOCYTES NFR BLD: 7 % (ref 4–15)
NEUTROPHILS NFR BLD: 83 % (ref 38–73)
NEUTS BAND NFR BLD MANUAL: 3 %
NRBC BLD-RTO: 0 /100 WBC
OVALOCYTES BLD QL SMEAR: ABNORMAL
PHOSPHATE SERPL-MCNC: 3.6 MG/DL (ref 2.7–4.5)
PLATELET # BLD AUTO: 178 K/UL (ref 150–350)
PLATELET BLD QL SMEAR: ABNORMAL
PMV BLD AUTO: 10.1 FL (ref 9.2–12.9)
POIKILOCYTOSIS BLD QL SMEAR: SLIGHT
POLYCHROMASIA BLD QL SMEAR: ABNORMAL
POTASSIUM SERPL-SCNC: 4.3 MMOL/L (ref 3.5–5.1)
PROT SERPL-MCNC: 6.7 G/DL (ref 6–8.4)
RBC # BLD AUTO: 3.33 M/UL (ref 4.6–6.2)
SMUDGE CELLS BLD QL SMEAR: PRESENT
SODIUM SERPL-SCNC: 140 MMOL/L (ref 136–145)
TACROLIMUS BLD-MCNC: 6 NG/ML (ref 5–15)
WBC # BLD AUTO: 7.76 K/UL (ref 3.9–12.7)

## 2020-10-01 PROCEDURE — 80053 COMPREHEN METABOLIC PANEL: CPT

## 2020-10-01 PROCEDURE — 85007 BL SMEAR W/DIFF WBC COUNT: CPT

## 2020-10-01 PROCEDURE — 99213 PR OFFICE/OUTPT VISIT, EST, LEVL III, 20-29 MIN: ICD-10-PCS | Mod: BMT,S$GLB,, | Performed by: NURSE PRACTITIONER

## 2020-10-01 PROCEDURE — 99999 PR PBB SHADOW E&M-EST. PATIENT-LVL V: ICD-10-PCS | Mod: PBBFAC,BMT,, | Performed by: NURSE PRACTITIONER

## 2020-10-01 PROCEDURE — 3008F PR BODY MASS INDEX (BMI) DOCUMENTED: ICD-10-PCS | Mod: BMT,CPTII,S$GLB, | Performed by: NURSE PRACTITIONER

## 2020-10-01 PROCEDURE — 99213 OFFICE O/P EST LOW 20 MIN: CPT | Mod: BMT,S$GLB,, | Performed by: NURSE PRACTITIONER

## 2020-10-01 PROCEDURE — 25000003 PHARM REV CODE 250: Performed by: NURSE PRACTITIONER

## 2020-10-01 PROCEDURE — 63600175 PHARM REV CODE 636 W HCPCS: Performed by: NURSE PRACTITIONER

## 2020-10-01 PROCEDURE — A4216 STERILE WATER/SALINE, 10 ML: HCPCS | Performed by: NURSE PRACTITIONER

## 2020-10-01 PROCEDURE — 83735 ASSAY OF MAGNESIUM: CPT

## 2020-10-01 PROCEDURE — 3008F BODY MASS INDEX DOCD: CPT | Mod: BMT,CPTII,S$GLB, | Performed by: NURSE PRACTITIONER

## 2020-10-01 PROCEDURE — 86901 BLOOD TYPING SEROLOGIC RH(D): CPT

## 2020-10-01 PROCEDURE — 84100 ASSAY OF PHOSPHORUS: CPT

## 2020-10-01 PROCEDURE — 36592 COLLECT BLOOD FROM PICC: CPT

## 2020-10-01 PROCEDURE — 80197 ASSAY OF TACROLIMUS: CPT

## 2020-10-01 PROCEDURE — 99999 PR PBB SHADOW E&M-EST. PATIENT-LVL V: CPT | Mod: PBBFAC,BMT,, | Performed by: NURSE PRACTITIONER

## 2020-10-01 PROCEDURE — 85027 COMPLETE CBC AUTOMATED: CPT

## 2020-10-01 RX ORDER — HEPARIN 100 UNIT/ML
500 SYRINGE INTRAVENOUS
Status: DISCONTINUED | OUTPATIENT
Start: 2020-10-01 | End: 2020-10-01 | Stop reason: HOSPADM

## 2020-10-01 RX ORDER — HEPARIN 100 UNIT/ML
500 SYRINGE INTRAVENOUS
Status: CANCELLED | OUTPATIENT
Start: 2020-10-01

## 2020-10-01 RX ORDER — SODIUM CHLORIDE 0.9 % (FLUSH) 0.9 %
10 SYRINGE (ML) INJECTION
Status: CANCELLED | OUTPATIENT
Start: 2020-10-01

## 2020-10-01 RX ORDER — SODIUM CHLORIDE 0.9 % (FLUSH) 0.9 %
10 SYRINGE (ML) INJECTION
Status: DISCONTINUED | OUTPATIENT
Start: 2020-10-01 | End: 2020-10-01 | Stop reason: HOSPADM

## 2020-10-01 RX ADMIN — HEPARIN SODIUM (PORCINE) LOCK FLUSH IV SOLN 100 UNIT/ML 500 UNITS: 100 SOLUTION at 09:10

## 2020-10-01 RX ADMIN — Medication 10 ML: at 09:10

## 2020-10-01 NOTE — PROGRESS NOTES
Subjective:    Patient ID: Kvng Jones Sr. is a 60 y.o. male.    Chief Complaint: TWICE WEEKLY FOLLOW UP and Neck Pain    Oncologic hx:  60 y.o. male admitted to [facility] for CML presenting with blast crisis and lewis disease. He was admitted for evaluation and induction chemotherapy with FLAG-Addis. Complications of therapy include epididymal orchitis with negative testicular ultrasound, neutropenic fever, dyspnea, and GGO of bilateral lungs on CT chest. Fever and chest findings were covered with broad spectrum antimicrobials. He did have hallucinations and vivid dreams with posaconazole. Chemotherapy was administered by left arm PICC line that was removed during hospital stay for MSSA infection treated with vancomycin. Hospital admission was 3/30/2020 to 5/1/2020 achieving morphologic CR with induction chemotherapy. He then started Ponatinib 30mg daily.     Studies performed during his hospital stay include pre-chemotherapy ECHO with normal ejection fraction of 60-65%. HIV, HBV, and HCV tests were negative. CBC at admission was WBC 38.6, Hgb 14.6, and platelet 416K. CT of the head without contrast was normal 4/20/2020. US of abdomen performed for abdominal distention and neutropenic fever had hepatic steatosis, liver 19cm, and spleen 11 cm. He does have a history of alcohol use and pancreatitis.    Patient is a . He is  to wife Guillermina who will be a caregiver post transplant. He has a 45 pack year smoking history. He quit 6/1/2020, now on Chantix. He drinks 3-4 alcohol beverages nightly. He has 3 children. He has several family members in the [facility] area.    Transplant Course: Admitted 7/21/20 for a Flu/Cy/TBI haplo allo SCT. Son was donor. Received 2 bags and a total CD34 dose of 3.10 x10^6/kg on 7/28/20. Tolerated stem cells well. C/o headaches prior to admission, which persisted throughout admission. Neuro ultimately consulted for rec's. Was a daily drinker prior  to admission. No s/s of alcolhol withdrawal during admission. Transplant further complicated by hypertension, heart burn, c-diff neg diarrhea, nausea, sinus congestion, mild peripheral edema, blurry vision (ophtho consulted), mucositis, electrolyte abnormalities, and neck pain 2/2 spinal stenosis (referred to pain mgt at discharge). He engrafted on 8/10/20 with an ANC of 1280. He was discharged on 8/12/2020.    Interval History:  Patient presents for follow up clinic visit post Flu/Cy/TBI allogeneic stem cell transplant. Today is Day +65. Feeling well overall. Continues with AM nausea, patient trying to take zofran or compazine in the middle of the night when he gets up to use the bathroom to see if this helps to resolve the issue. Neck pain is controlled with current measures. Continues on MS contin and PRN morphine. Appetite is good. Constipation controlled with PRN senna. Denies fevers, chest pain, sob. Skin rash has resolved, will wean steroids weekly.      Review of Systems   Constitutional: Negative for activity change, appetite change, chills, diaphoresis, fatigue and fever.   HENT: Negative for congestion, ear pain, mouth sores, nosebleeds, postnasal drip, rhinorrhea, sinus pain, sore throat and trouble swallowing.         Upper mouth pain   Eyes: Negative for pain, redness and visual disturbance.   Respiratory: Negative for cough, chest tightness, shortness of breath, wheezing and stridor.    Cardiovascular: Negative for chest pain, palpitations and leg swelling.   Gastrointestinal: Positive for nausea. Negative for abdominal distention, abdominal pain, blood in stool, constipation, diarrhea and vomiting.   Genitourinary: Negative for frequency, hematuria and urgency.   Musculoskeletal: Positive for neck pain (hx of spinal stenosis) and neck stiffness. Negative for arthralgias and back pain.   Skin: Negative for rash.   Neurological: Negative for dizziness, syncope, weakness, light-headedness, numbness and  headaches.   Hematological: Negative for adenopathy. Does not bruise/bleed easily.   Psychiatric/Behavioral: Negative for confusion.         Objective:     Physical Exam  Vitals signs and nursing note reviewed.   Constitutional:       Appearance: Normal appearance. He is well-developed.   HENT:      Head: Normocephalic and atraumatic.      Mouth/Throat:      Mouth: Mucous membranes are dry.   Eyes:      General: No scleral icterus.  Neck:      Musculoskeletal: Neck supple. Decreased range of motion.   Cardiovascular:      Rate and Rhythm: Normal rate and regular rhythm.      Pulses: Normal pulses.      Heart sounds: Normal heart sounds.   Pulmonary:      Effort: Pulmonary effort is normal. No respiratory distress.      Breath sounds: Normal breath sounds.   Abdominal:      General: Bowel sounds are normal. There is no distension.      Palpations: Abdomen is soft.      Tenderness: There is no abdominal tenderness.   Musculoskeletal:         General: No tenderness or swelling.      Right lower leg: No edema.      Left lower leg: No edema.   Skin:     General: Skin is warm and dry.      Coloration: Skin is not pale.      Findings: Rash resolved. No erythema or petechiae.      Comments: Dalal intact to right CW with no redness or drainage   Neurological:      Mental Status: He is alert and oriented to person, place, and time.   Psychiatric:         Attention and Perception: Attention normal.         Speech: Speech normal.         Thought Content: Thought content normal.         Cognition and Memory: Cognition and memory normal.         Assessment:       1. History of allogeneic stem cell transplant    2. CML (chronic myelocytic leukemia)    3. Acute GVHD complicating bone marrow transplantation    4. Immunocompromised patient    5. Hypomagnesemia    6. Mucositis due to chemotherapy    7. Blurry vision    8. Bilateral dry eyes    9. Drug-induced cytopenia    10. Chronic intractable headache, unspecified headache type     11. Posterior neck pain    12. Tobacco abuse, in remission    13. Benign prostatic hyperplasia without lower urinary tract symptoms    14. Gastroesophageal reflux disease without esophagitis    15. Essential hypertension    16. Drug induced constipation        Plan:         History of allogeneic stem cell transplant  - Admitted 7/21/20 for planned Flu/Cy/TBI haplo allo SCT. Son was the donor. Received 2 bags and a total CD34 dose of 3.10 x10^6/kg on 7/28/20. Recieved post transplant cyclophosphamide. Engrafted neutrophils 8/10/20 day +13  - Today is Day +65  - Tacro level 6; (goal 10-11 right now with skin GVHD; previously 7-9). Patient was taking 4.5 mg/5mg, increasing dose to 5mg BID (10/01)  - stopped Ursodiol Day +30 (8/27/20)  - patient requests Rx for Valium and Oxycodone for pre-procedure in clinic bone marrow biopsies.  - had BMBx in clinic on 8/28/20 showing no definitive morphologic or immunophenotypic evidence of residual CML. Chimerisms unable to be obtained due to insufficient sample; drawn peripherally on 9/18 showing CD33 100% donor, CD3 still insufficient sample DNA.      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 Achieved morphologic CR  - Started on daily Ponatinib 30 mg daily, which he continued to take outpatient. Held Ponatinib during admission; may not need to restart per recent literature. BBMT 2020(37): 926-76  - Had BMBx at [facility] on 7/1/2020 showing no morphologic or immunophenotypic evidence of AML/CML  - Admitted 7/21/20 for Flu/Cy/TBI haplo allo SCT    GVHD  - had rash inpatient that was believed to be drug eruption. Resolved  - diffuse erythema to legs, lower back, arms, and abdomen presented on 9/15. Started 1mg/kg (90mg, will round to 100mg for dosing) of prednisone daily (9/15/20); decreased to 90 mg (4.5 tabs) on 9/21/2020; will decrease to  4 tabs (80mg) on 9/29/20 as rash has now resolved  - will wean slowly by 10mg every week per Dr. Hodges  - GVHD charting with each visit     ID  - Last CMV and EBV undetected; continue twice weekly CMV, weekly EBV, today's pending  - Acyclovir (Zoster prophylaxis) until Day +365  - Diflucan (Fungal prophylaxis) until off tacrolimus for 2 months.  - Bactrim (PJP prophylaxis) until off tacrolimus for 2 months.     Hypomagnesemia  - Mag; Currently on mag 800mg QID; level 1.6 today; continue current dose     Mucositis due to chemotherapy  - Continue prn [facility]; patient also uses Aloe oral rinses  - Recommend baking soda and warm water rinses  - Started nystatin x10 days 8/18 (completed 8/27)  - Buccal lesion to the roof of his mouth; resolving     CINV (chemotherapy-induced nausea and vomiting)  - reports always in the morning; continue to take steroids with food  - c/w prn zofran and compazine; taking dose in the middle of the night when he is up to use the bathroom      Blurry vision/Dry eyes  - C/o worsening blurry vision 7/30/20  - May be contributing to headaches  - Ophtho consulted and saw patient 7/30  - Ophtho dilated pupils and performed fundal exam. Fundal exam neg.  - Patient had been viewing laptop screen for ~ 2 hours prior to experiencing blurry vision  - Per ophtho, blurry vision due to dry eyes. rec'd artificial tears QID prn and warm compresses BID prn. Can escalate artificial tears to ointment if needed  - improved     anemia  - Transfuse Hgb <7g and plt <10K  - hgb 11.2; no need of transfusion today  - BMBx showing no residual disease.     Headache/ Neck pain/ Cervical stenosis of spine  - Hx of spinal fusion C3-C5  - Started having headaches when he started Ponatinib at diagnosis. Onset seems to correspond to when patient quit smoking and when he started ponatinib  - Had been off of ponatinib for a few days but HAs persisted during transplant admission  - Pattern is like cluster headaches (onset during  sleep), but not unilateral versus tension HA   - O2 via non-rebreather at 12 L over 15 minutes seemed to help but developed hot flash so pt stopped  - Ultram, Flexeril, Oxycodone, lidoderm, neck pillow, c-collar did not help  - Imitrex PRN q2hr (max 200mg in 24hrs). Not contraindicated per pharm D.  - Patient reporting that headache may be sinus in nature. Maxillary tenderness noted. Taking claritin  - Saw optho and neuro inpatient, trialed IV mag  - Is now seeing pain mgt for this. Current headaches seem to be caused by muscle spasms 2/2 cervical stenosis  - Flexeril changed to zanaflex by pain management. Also on gabapentin. Restarted celebrex 8/18 as plt >50K; however did not work so is no longer taking  - Recommended alternating heat and ice  - Xray 9/19 shows surgical changes of fusion from C3 through C5 with degenerative changed below fusion.   - Following with pain mgt and PT.  - Started PT on 8/26.  - No steroid injections at this time per pain management due to transplant.  - Pt feels neck pain started after hickmann was placed  - Has been mostly refractory to medications (zanaflex, gabapentin, celebrex, lidoderm patches). Some relief with oral morphine  - CT from 8/24/20 showing history of C3, C4, and C5 fusion and diffuse osteoarthritic changes  - Had virtual pain management appt on 8/25 to review CT results.  - Zanaflex switched to Robaxin per pain mgt.  - had medial branch block on 9/2/20 with Dr. Mcelroy (pain mgt). States that he got very little relief. Called Dr. Mcelroy's office requesting morphine. Was denied until he can see MD. Seen by Dr. Mcelroy 9/10/20. Per patient pain mgmnt signed off, as it is not a chronic issue, defer to BMT service for further pain control.  - will started MS contin 15mg BID 9/20/2020; use PRN morphine q6h for breakthrough; titrate up MS contin as needed  - Consider to remove his line in future, as he feels it's bothering and causing neck pain. Currently needing IV  mag on occassion and has acute GVHD.  - Patient reports much improvement to pain since starting high dose prednisone for his skin GVHD     Tobacco abuse, in remission  - 35 pack year history  - Quit smoking 6/1/2020 using Chantix  - Patient completed course of Chantix and denies need for Nicotine patch    BPH (benign prostatic hyperplasia)  - Continue Flomax     GERD (gastroesophageal reflux disease)  - Continue nexium; discussed with patient can increase to 40mg if needed while on high dose steroids  - Can take TUMS prn     Essential hypertension  - Increased home amlodipine from 5 mg to 10 mg daily while inpatient due to BPs as high as 180s/110s   - normal BP today  - continue to hold amlodipine    Constipation  - continue Senna PRN      Follow-up:   - CBC, CMP, type and screen, tacro, CMV, EBV (weekly only), mag, phos and appt with Dr. Hodges alternating with NP twice weekly.  - Labs should be in AMs while on tacro. Scheduled through October      Aundrea Jackson NP  Hematology/Oncology/BMT

## 2020-10-01 NOTE — NURSING
Patient's labs collected from Red lumen of PICC line.  Specimens sent to lab.  All lumens flushed, heparinized, luers replaced, and capped.  Sterile dressing change performed.  Site c/d/i.  Patient tolerated well.

## 2020-10-02 ENCOUNTER — TELEPHONE (OUTPATIENT)
Dept: HEMATOLOGY/ONCOLOGY | Facility: CLINIC | Age: 60
End: 2020-10-02

## 2020-10-03 LAB — CMV DNA SERPL NAA+PROBE-ACNC: NORMAL IU/ML

## 2020-10-05 ENCOUNTER — INFUSION (OUTPATIENT)
Dept: INFUSION THERAPY | Facility: HOSPITAL | Age: 60
End: 2020-10-05
Attending: NURSE PRACTITIONER
Payer: COMMERCIAL

## 2020-10-05 ENCOUNTER — OFFICE VISIT (OUTPATIENT)
Dept: HEMATOLOGY/ONCOLOGY | Facility: CLINIC | Age: 60
End: 2020-10-05
Payer: COMMERCIAL

## 2020-10-05 ENCOUNTER — DOCUMENTATION ONLY (OUTPATIENT)
Dept: REHABILITATION | Facility: HOSPITAL | Age: 60
End: 2020-10-05
Payer: COMMERCIAL

## 2020-10-05 VITALS
TEMPERATURE: 98 F | SYSTOLIC BLOOD PRESSURE: 148 MMHG | HEIGHT: 66 IN | HEART RATE: 77 BPM | WEIGHT: 198.81 LBS | BODY MASS INDEX: 31.95 KG/M2 | OXYGEN SATURATION: 98 % | RESPIRATION RATE: 16 BRPM | DIASTOLIC BLOOD PRESSURE: 82 MMHG

## 2020-10-05 DIAGNOSIS — C92.01 ACUTE MYELOID LEUKEMIA IN REMISSION: ICD-10-CM

## 2020-10-05 DIAGNOSIS — N40.0 BENIGN PROSTATIC HYPERPLASIA WITHOUT LOWER URINARY TRACT SYMPTOMS: ICD-10-CM

## 2020-10-05 DIAGNOSIS — Z76.82 STEM CELL TRANSPLANT CANDIDATE: Primary | ICD-10-CM

## 2020-10-05 DIAGNOSIS — M48.02 CERVICAL STENOSIS OF SPINE: ICD-10-CM

## 2020-10-05 DIAGNOSIS — K59.03 DRUG INDUCED CONSTIPATION: ICD-10-CM

## 2020-10-05 DIAGNOSIS — Z94.84 HISTORY OF ALLOGENEIC STEM CELL TRANSPLANT: ICD-10-CM

## 2020-10-05 DIAGNOSIS — C92.10 CML (CHRONIC MYELOCYTIC LEUKEMIA): ICD-10-CM

## 2020-10-05 DIAGNOSIS — T86.09 ACUTE GVHD COMPLICATING BONE MARROW TRANSPLANTATION: ICD-10-CM

## 2020-10-05 DIAGNOSIS — I10 ESSENTIAL HYPERTENSION: ICD-10-CM

## 2020-10-05 DIAGNOSIS — D89.810 ACUTE GVHD COMPLICATING BONE MARROW TRANSPLANTATION: ICD-10-CM

## 2020-10-05 DIAGNOSIS — E83.42 HYPOMAGNESEMIA: ICD-10-CM

## 2020-10-05 DIAGNOSIS — Z94.84 HISTORY OF ALLOGENEIC STEM CELL TRANSPLANT: Primary | ICD-10-CM

## 2020-10-05 LAB
ALBUMIN SERPL BCP-MCNC: 4.1 G/DL (ref 3.5–5.2)
ALP SERPL-CCNC: 52 U/L (ref 55–135)
ALT SERPL W/O P-5'-P-CCNC: 27 U/L (ref 10–44)
ANION GAP SERPL CALC-SCNC: 13 MMOL/L (ref 8–16)
ANISOCYTOSIS BLD QL SMEAR: SLIGHT
AST SERPL-CCNC: 17 U/L (ref 10–40)
BASO STIPL BLD QL SMEAR: ABNORMAL
BASOPHILS # BLD AUTO: ABNORMAL K/UL (ref 0–0.2)
BASOPHILS NFR BLD: 0 % (ref 0–1.9)
BILIRUB SERPL-MCNC: 0.3 MG/DL (ref 0.1–1)
BUN SERPL-MCNC: 20 MG/DL (ref 8–23)
CALCIUM SERPL-MCNC: 9 MG/DL (ref 8.7–10.5)
CHLORIDE SERPL-SCNC: 101 MMOL/L (ref 95–110)
CO2 SERPL-SCNC: 24 MMOL/L (ref 23–29)
CREAT SERPL-MCNC: 1.1 MG/DL (ref 0.5–1.4)
DACRYOCYTES BLD QL SMEAR: ABNORMAL
DIFFERENTIAL METHOD: ABNORMAL
EOSINOPHIL # BLD AUTO: ABNORMAL K/UL (ref 0–0.5)
EOSINOPHIL NFR BLD: 2 % (ref 0–8)
ERYTHROCYTE [DISTWIDTH] IN BLOOD BY AUTOMATED COUNT: 17.5 % (ref 11.5–14.5)
EST. GFR  (AFRICAN AMERICAN): >60 ML/MIN/1.73 M^2
EST. GFR  (NON AFRICAN AMERICAN): >60 ML/MIN/1.73 M^2
GLUCOSE SERPL-MCNC: 122 MG/DL (ref 70–110)
HCT VFR BLD AUTO: 36.4 % (ref 40–54)
HGB BLD-MCNC: 11.6 G/DL (ref 14–18)
IMM GRANULOCYTES # BLD AUTO: ABNORMAL K/UL (ref 0–0.04)
IMM GRANULOCYTES NFR BLD AUTO: ABNORMAL % (ref 0–0.5)
LYMPHOCYTES # BLD AUTO: ABNORMAL K/UL (ref 1–4.8)
LYMPHOCYTES NFR BLD: 7 % (ref 18–48)
MAGNESIUM SERPL-MCNC: 1.5 MG/DL (ref 1.6–2.6)
MCH RBC QN AUTO: 34 PG (ref 27–31)
MCHC RBC AUTO-ENTMCNC: 31.9 G/DL (ref 32–36)
MCV RBC AUTO: 107 FL (ref 82–98)
MONOCYTES # BLD AUTO: ABNORMAL K/UL (ref 0.3–1)
MONOCYTES NFR BLD: 7 % (ref 4–15)
NEUTROPHILS NFR BLD: 83 % (ref 38–73)
NEUTS BAND NFR BLD MANUAL: 1 %
NRBC BLD-RTO: 0 /100 WBC
PHOSPHATE SERPL-MCNC: 3.9 MG/DL (ref 2.7–4.5)
PLATELET # BLD AUTO: 139 K/UL (ref 150–350)
PLATELET BLD QL SMEAR: ABNORMAL
PMV BLD AUTO: 10.3 FL (ref 9.2–12.9)
POIKILOCYTOSIS BLD QL SMEAR: SLIGHT
POLYCHROMASIA BLD QL SMEAR: ABNORMAL
POTASSIUM SERPL-SCNC: 4.5 MMOL/L (ref 3.5–5.1)
PROT SERPL-MCNC: 6.6 G/DL (ref 6–8.4)
RBC # BLD AUTO: 3.41 M/UL (ref 4.6–6.2)
SODIUM SERPL-SCNC: 138 MMOL/L (ref 136–145)
TACROLIMUS BLD-MCNC: 13.3 NG/ML (ref 5–15)
WBC # BLD AUTO: 5.57 K/UL (ref 3.9–12.7)

## 2020-10-05 PROCEDURE — 99999 PR PBB SHADOW E&M-EST. PATIENT-LVL V: ICD-10-PCS | Mod: PBBFAC,BMT,, | Performed by: NURSE PRACTITIONER

## 2020-10-05 PROCEDURE — 25000003 PHARM REV CODE 250: Performed by: NURSE PRACTITIONER

## 2020-10-05 PROCEDURE — 3008F PR BODY MASS INDEX (BMI) DOCUMENTED: ICD-10-PCS | Mod: BMT,CPTII,S$GLB, | Performed by: NURSE PRACTITIONER

## 2020-10-05 PROCEDURE — 99214 OFFICE O/P EST MOD 30 MIN: CPT | Mod: BMT,S$GLB,, | Performed by: NURSE PRACTITIONER

## 2020-10-05 PROCEDURE — 80197 ASSAY OF TACROLIMUS: CPT

## 2020-10-05 PROCEDURE — 84100 ASSAY OF PHOSPHORUS: CPT

## 2020-10-05 PROCEDURE — 87799 DETECT AGENT NOS DNA QUANT: CPT

## 2020-10-05 PROCEDURE — 85007 BL SMEAR W/DIFF WBC COUNT: CPT

## 2020-10-05 PROCEDURE — 83735 ASSAY OF MAGNESIUM: CPT

## 2020-10-05 PROCEDURE — 99999 PR PBB SHADOW E&M-EST. PATIENT-LVL V: CPT | Mod: PBBFAC,BMT,, | Performed by: NURSE PRACTITIONER

## 2020-10-05 PROCEDURE — 99214 PR OFFICE/OUTPT VISIT, EST, LEVL IV, 30-39 MIN: ICD-10-PCS | Mod: BMT,S$GLB,, | Performed by: NURSE PRACTITIONER

## 2020-10-05 PROCEDURE — 3008F BODY MASS INDEX DOCD: CPT | Mod: BMT,CPTII,S$GLB, | Performed by: NURSE PRACTITIONER

## 2020-10-05 PROCEDURE — 36592 COLLECT BLOOD FROM PICC: CPT

## 2020-10-05 PROCEDURE — A4216 STERILE WATER/SALINE, 10 ML: HCPCS | Performed by: NURSE PRACTITIONER

## 2020-10-05 PROCEDURE — 80053 COMPREHEN METABOLIC PANEL: CPT

## 2020-10-05 PROCEDURE — 85027 COMPLETE CBC AUTOMATED: CPT

## 2020-10-05 PROCEDURE — 63600175 PHARM REV CODE 636 W HCPCS: Performed by: NURSE PRACTITIONER

## 2020-10-05 RX ORDER — HEPARIN 100 UNIT/ML
500 SYRINGE INTRAVENOUS
Status: COMPLETED | OUTPATIENT
Start: 2020-10-05 | End: 2020-10-05

## 2020-10-05 RX ORDER — SODIUM CHLORIDE 0.9 % (FLUSH) 0.9 %
10 SYRINGE (ML) INJECTION
Status: COMPLETED | OUTPATIENT
Start: 2020-10-05 | End: 2020-10-05

## 2020-10-05 RX ORDER — HEPARIN 100 UNIT/ML
500 SYRINGE INTRAVENOUS
Status: CANCELLED | OUTPATIENT
Start: 2020-10-05

## 2020-10-05 RX ORDER — LANOLIN ALCOHOL/MO/W.PET/CERES
800 CREAM (GRAM) TOPICAL 4 TIMES DAILY
Qty: 200 TABLET | Refills: 1 | Status: SHIPPED | OUTPATIENT
Start: 2020-10-05 | End: 2020-11-18

## 2020-10-05 RX ORDER — SODIUM CHLORIDE 0.9 % (FLUSH) 0.9 %
10 SYRINGE (ML) INJECTION
Status: CANCELLED | OUTPATIENT
Start: 2020-10-05

## 2020-10-05 RX ORDER — FLUCONAZOLE 200 MG/1
400 TABLET ORAL DAILY
Qty: 60 TABLET | Refills: 6 | Status: SHIPPED | OUTPATIENT
Start: 2020-10-05 | End: 2020-11-09 | Stop reason: SDUPTHER

## 2020-10-05 RX ORDER — MORPHINE SULFATE 15 MG/1
30 TABLET ORAL EVERY 6 HOURS PRN
Qty: 90 TABLET | Refills: 0 | Status: SHIPPED | OUTPATIENT
Start: 2020-10-05 | End: 2021-01-20

## 2020-10-05 RX ORDER — TACROLIMUS 0.5 MG/1
CAPSULE ORAL
Qty: 480 CAPSULE | Refills: 6 | Status: SHIPPED | OUTPATIENT
Start: 2020-10-05 | End: 2020-10-22 | Stop reason: SDUPTHER

## 2020-10-05 RX ORDER — ACYCLOVIR 800 MG/1
800 TABLET ORAL 2 TIMES DAILY
Qty: 60 TABLET | Refills: 11 | Status: SHIPPED | OUTPATIENT
Start: 2020-10-05 | End: 2020-11-09 | Stop reason: SDUPTHER

## 2020-10-05 RX ORDER — GABAPENTIN 300 MG/1
300 CAPSULE ORAL 3 TIMES DAILY
Qty: 90 CAPSULE | Refills: 11 | Status: SHIPPED | OUTPATIENT
Start: 2020-10-05 | End: 2020-10-20 | Stop reason: SDUPTHER

## 2020-10-05 RX ADMIN — HEPARIN 500 UNITS: 100 SYRINGE at 09:10

## 2020-10-05 RX ADMIN — Medication 10 ML: at 09:10

## 2020-10-05 NOTE — PROGRESS NOTES
Subjective:    Patient ID: Kvng Jones Sr. is a 60 y.o. male.    Chief Complaint: TWICE WEEKLY FOLLOW UP and Neck Pain    Oncologic hx:  60 y.o. 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 He was admitted for evaluation and induction chemotherapy with FLAG-Addis. Complications of therapy include epididymal orchitis with negative testicular ultrasound, neutropenic fever, dyspnea, and GGO of bilateral lungs on CT chest. Fever and chest findings were covered with broad spectrum antimicrobials. He did have hallucinations and vivid dreams with posaconazole. Chemotherapy was administered by left arm PICC line that was removed during hospital stay for MSSA infection treated with vancomycin. Hospital admission was 3/30/2020 to 5/1/2020 achieving morphologic CR with induction chemotherapy. He then started Ponatinib 30mg daily.     Studies performed during his hospital stay include pre-chemotherapy ECHO with normal ejection fraction of 60-65%. HIV, HBV, and HCV tests were negative. CBC at admission was WBC 38.6, Hgb 14.6, and platelet 416K. CT of the head without contrast was normal 4/20/2020. US of abdomen performed for abdominal distention and neutropenic fever had hepatic steatosis, liver 19cm, and spleen 11 cm. He does have a history of alcohol use and pancreatitis.    Patient is a . He is  to wife Guillermina who will be a caregiver post transplant. He has a 45 pack year smoking history. He quit 6/1/2020, now on Chantix. He drinks 3-4 alcohol beverages nightly. He has 3 children. He has several family members in the [facility] area.    Transplant Course: Admitted 7/21/20 for a Flu/Cy/TBI haplo allo SCT. Son was donor. Received 2 bags and a total CD34 dose of 3.10 x10^6/kg on 7/28/20. Tolerated stem cells well. C/o headaches prior to admission, which persisted throughout admission. Neuro ultimately consulted for rec's. Was a daily drinker prior  to admission. No s/s of alcolhol withdrawal during admission. Transplant further complicated by hypertension, heart burn, c-diff neg diarrhea, nausea, sinus congestion, mild peripheral edema, blurry vision (ophtho consulted), mucositis, electrolyte abnormalities, and neck pain 2/2 spinal stenosis (referred to pain mgt at discharge). He engrafted on 8/10/20 with an ANC of 1280. He was discharged on 8/12/2020.    Interval History:  Patient presents for follow up clinic visit post Flu/Cy/TBI allogeneic stem cell transplant. Today is Day +69. Feeling well overall. Continues to have neck pain which is mostly controlled with current measures. Continues on MS contin and PRN morphine. He states nausea has improved and appetite is good. Constipation controlled with PRN senna, reports a BM today. Denies fevers, chest pain, sob. Skin rash has resolved and will continue to wean steroids     Review of Systems   Constitutional: Negative for activity change, appetite change, chills, diaphoresis, fatigue and fever.   HENT: Negative for congestion, ear pain, mouth sores, nosebleeds, postnasal drip, rhinorrhea, sinus pain, sore throat and trouble swallowing.    Eyes: Negative for pain, redness and visual disturbance.   Respiratory: Negative for cough, chest tightness, shortness of breath, wheezing and stridor.    Cardiovascular: Negative for chest pain, palpitations and leg swelling.   Gastrointestinal: Positive for constipation and nausea. Negative for abdominal distention, abdominal pain, blood in stool, diarrhea and vomiting.   Genitourinary: Negative for frequency, hematuria and urgency.   Musculoskeletal: Positive for neck pain (hx of spinal stenosis) and neck stiffness. Negative for arthralgias and back pain.   Skin: Negative for rash.   Neurological: Negative for dizziness, syncope, weakness, light-headedness, numbness and headaches.   Hematological: Negative for adenopathy. Does not bruise/bleed easily.    Psychiatric/Behavioral: Negative for confusion.         Objective:     Physical Exam  Vitals signs and nursing note reviewed.   Constitutional:       Appearance: Normal appearance. He is well-developed.   HENT:      Head: Normocephalic and atraumatic.      Mouth/Throat:      Mouth: Mucous membranes are dry.   Eyes:      General: No scleral icterus.  Neck:      Musculoskeletal: Neck supple. Decreased range of motion.   Cardiovascular:      Rate and Rhythm: Normal rate and regular rhythm.      Pulses: Normal pulses.      Heart sounds: Normal heart sounds.   Pulmonary:      Effort: Pulmonary effort is normal. No respiratory distress.      Breath sounds: Normal breath sounds.   Abdominal:      General: Bowel sounds are normal. There is no distension.      Palpations: Abdomen is soft.      Tenderness: There is no abdominal tenderness.   Musculoskeletal:         General: No tenderness or swelling.      Right lower leg: No edema.      Left lower leg: No edema.   Skin:     General: Skin is warm and dry.      Coloration: Skin is not pale.      Findings: Rash resolved. No erythema or petechiae.      Comments: Dalal intact to right CW with no redness or drainage   Neurological:      Mental Status: He is alert and oriented to person, place, and time.   Psychiatric:         Attention and Perception: Attention normal.         Speech: Speech normal.         Thought Content: Thought content normal.         Cognition and Memory: Cognition and memory normal.         Assessment:       1. History of allogeneic stem cell transplant    2. Acute GVHD complicating bone marrow transplantation    3. CML (chronic myelocytic leukemia)    4. Cervical stenosis of spine    5. Hypomagnesemia    6. Benign prostatic hyperplasia without lower urinary tract symptoms    7. Essential hypertension    8. Drug induced constipation        Plan:         History of allogeneic stem cell transplant  - Admitted 7/21/20 for planned Flu/Cy/TBI haplo allo SCT.  Son was the donor. Received 2 bags and a total CD34 dose of 3.10 x10^6/kg on 7/28/20. Recieved post transplant cyclophosphamide. Engrafted neutrophils 8/10/20 day +13  - Today is Day +65  - Tacro level 13.3; (goal 10-11 right now with skin GVHD; previously 7-9). Patient states he forgot and took his Tacro today. Continue dose to 5mg BID (10/01)  - stopped Ursodiol Day +30 (8/27/20)  - patient requests Rx for Valium and Oxycodone for pre-procedure in clinic bone marrow biopsies.  - had BMBx in clinic on 8/28/20 showing no definitive morphologic or immunophenotypic evidence of residual CML. Chimerisms unable to be obtained due to insufficient sample; drawn peripherally on 9/18 showing CD33 100% donor, CD3 still insufficient sample DNA.  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 Achieved morphologic CR  - Started on daily Ponatinib 30 mg daily, which he continued to take outpatient. Held Ponatinib during admission; may not need to restart per recent literature. BBMT 2020(26): 477-49  - Had BMBx at [facility] on 7/1/2020 showing no morphologic or immunophenotypic evidence of AML/CML  - Admitted 7/21/20 for Flu/Cy/TBI haplo allo SCT    GVHD  - had rash inpatient that was believed to be drug eruption. Resolved  - diffuse erythema to legs, lower back, arms, and abdomen presented on 9/15. Started 1mg/kg (90mg, will round to 100mg for dosing) of prednisone daily (9/15/20); decreased to 90 mg (4.5 tabs) on 9/21/2020; will decrease to 3.5 tabs (70 mg) on 10/6/20 as rash has now resolved  - will wean slowly by 10 mg every week per Dr. Hodges  - Kevin as above   - GVHD charting with each visit     ID  - Last CMV and EBV undetected; continue twice weekly CMV, weekly EBV, today's pending  - Acyclovir (Zoster prophylaxis) until Day +365  - Diflucan (Fungal prophylaxis)  until off tacrolimus for 2 months.  - Bactrim (PJP prophylaxis) until off tacrolimus for 2 months.     Hypomagnesemia  - Mag; Currently on mag 800mg QID; level 1.5 today; continue current dose     Mucositis due to chemotherapy  - Continue prn [facility]; patient also uses Aloe oral rinses  - Recommend baking soda and warm water rinses  - Started nystatin x10 days 8/18 (completed 8/27)  - Buccal lesion to the roof of his mouth; resolving     CINV (chemotherapy-induced nausea and vomiting)  - reports always in the morning; continue to take steroids with food  - c/w prn zofran and compazine; taking dose in the middle of the night when he is up to use the bathroom      Blurry vision/Dry eyes  - C/o worsening blurry vision 7/30/20  - Ophtho consulted and saw patient 7/30  - Ophtho dilated pupils and performed fundal exam. Fundal exam neg.  - Per ophtho, blurry vision due to dry eyes. rec'd artificial tears QID prn and warm compresses BID prn. Can escalate artificial tears to ointment if needed  RESOLVED     Anemia  - Transfuse Hgb <7g and plt <10K  - hgb 11.6; no need of transfusion today  - BMBx showing no residual disease.     Neck pain/ Cervical stenosis of spine  - Hx of spinal fusion C3-C5  - Xray 9/19 shows surgical changes of fusion from C3 through C5 with degenerative changed below fusion.   - Started PT on 8/26.  - Pt feels neck pain started after grewal was placed  - Has been mostly refractory to medications (zanaflex, gabapentin, celebrex, lidoderm patches). Some relief with oral morphine  - CT from 8/24/20 showing history of C3, C4, and C5 fusion and diffuse osteoarthritic changes  - Had virtual pain management appt on 8/25 to review CT results.  - Zanaflex switched to Robaxin per pain mgt.  - had medial branch block on 9/2/20 with Dr. Mcelroy (pain mgt). States that he got very little relief. Called Dr. Mcelroy's office requesting morphine. Was denied until he can see MD. Seen by Dr. Mcelroy 9/10/20. Per  patient pain mgmnt signed off, as it is not a chronic issue, defer to BMT service for further pain control.  - started MS contin 15mg BID 9/20/2020; use PRN morphine q6h for breakthrough; titrate up MS contin as needed  - Consider to remove his line in future, as he feels it's bothering and causing neck pain. Currently needing IV mag on occassion and has acute GVHD.  - Patient reports much improvement to pain since starting high dose prednisone for his skin GVHD     Tobacco abuse, in remission  - 35 pack year history  - Quit smoking 6/1/2020 using Chantix  - Patient completed course of Chantix and denies need for Nicotine patch    BPH (benign prostatic hyperplasia)  - Continue Flomax     GERD (gastroesophageal reflux disease)  - Continue nexium; discussed with patient can increase to 40mg if needed while on high dose steroids  - Can take TUMS prn     Essential hypertension  - Increased home amlodipine from 5 mg to 10 mg daily while inpatient due to BPs as high as 180s/110s   - normal BP today  - continue to hold amlodipine    Constipation  - continue Senna PRN    Follow-up:   - CBC, CMP, type and screen, tacro, CMV, EBV (weekly only), mag, phos and appt with Dr. Hodges alternating with NP twice weekly.  - Labs should be in AMs while on tacro. Scheduled through October    Yomaira Little NP  Hematology/Oncology/BMT

## 2020-10-05 NOTE — NURSING
Pt arrived for labs from [facility].  Red lumen with good blood return and flushes easily, labs sent.  Pt now going to [facility]

## 2020-10-05 NOTE — PROGRESS NOTES
Health  called patient to complete initial outcomes for the Healthy Back lumbar program. Questions were answered by the patient via [facility] and will be discussed with Dr. Thomas. The patient will meet with Dr. Thomas to determine program enrollment on 10/12/20.     HealthyBack Questionnaire  10/3/2020   Please select the location of your worst pain:  Neck   When did this pain begin?  July 21, 2020   Do you need any help looking after yourself? I need no help at all.   When doing household tasks, e.g., preparing food, gardening, using the video recorder, radio, telephone, or washing the car: I need no help at all.   Thinking about how easily you can get around your home and community: I get around my home and community by myself without any difficulty.   Because of your health, your relationships, e.g., your friends, partner or parents, generally: Are very close and warm   Thinking about your relationships with other people: Although I have friends, I am occasionally lonely.   Thinking about your health and your relationship with your  family:  My role in the family is unaffected by my health.   Thinking about your vision, including when using your glasses or contact lenses if needed: I see normally.   Thinking about your hearing, including using your hearing aid if needed: I have some difficulty hearing, or I do not hear clearly, e.g., I ask people to speak up or turn up the TV radio volume.   When you communicate with others, e.g., talking, listening, writing, or signing: I have no trouble speaking to them or understanding what they are saying.   Thinking about how you sleep: My sleep is interrupted most nights, but I am usually able to go back to sleep without difficulty.   Thinking about how you generally feel: I feel moderately anxious, worried or depressed.   How much pain or discomfort do you experience? I suffer from severe pain.   Little interest or pleasure in doing things Nearly every day    Feeling down, depressed or hopeless Several days   Please select the location of any additional pain: (hold down the control key, and click to select multiple responses) Upper back   Did any event trigger your pain?  Yes   If yes, please describe:  Movement   How long has this pain been going on?  3 months   Please indicate how the pain is changing.  Improving   What is the WORST level of pain that you have experienced in the last week?  5   What is the LEAST level of pain that you have experienced in the past week?  3   If you have any comments about your pain level, please provide below:  Pain level is low due to megadoses of steroids for last 4 weeks   What can you NOT do not that you used to be able to do?  Couldnt drive before steroid   Are your limitations mainly due to your pain?  Yes   What are your additional complaints, if any? (hold down the control key, and click to select multiple responses) None   Is there ever a time during the day when your pain stops, even for a brief moment, before returning? Yes   If yes, when your pain stops, does it disappear completely? Is it totally gone? No   Does looking down make your typical pain worse? Yes   Morning: Worse during   Afternoon: Better during   Evening:  Better during   Nighttime: Better during   Standing:  Same   Lying on stomach: Better   Lying on back: Better   Sitting:  Same   Walking: Same   Using a pillow: Better   Have all of your attempts to treat your neck/arm pain resulted in failure?  Yes   Do you believe your doctor(s) do NOT understand how much pain you have?  Yes   Do you believe that you have one or more conditions that have not been diagnosed by your doctor(s)?  Yes   Do you believe that you deserve more understanding from others including your family than you are getting?  No   Do you feel relatively calm about how your neck/arm pain has impacted your life?  No   Are you OK with treatment taking a very long time (even years) before you  feel relief from your neck/arm pain?  Yes   Do you believe that your pain has caused you to be more forgetful?  No   Do you feel that you have not received enough treatment for your pain?  Yes   Do you believe that your doctor(s) do not have the right training to treat your neck/arm pain effectively?  No   Do you believe you should not be allowed to work or attend school because of your neck/arm pain?  No   When did this pain begin?  July 21, 2020   Did the pain begin after an injury or an accident? No   Is the pain work related?  No   Please william which of the following over-the-counter medicines you take. (hold down the control key, and click to select multiple responses) None   Please william which of the following prescription medicines you take. (hold down the control key, and click to select multiple responses) Narcotics, Steroids   Emergency department  No   Health care providers (hold down the control key, and click to select multiple responses) Other   Investigations done (hold down the control key, and click to select multiple responses) CAT scan   Procedures (hold down the control key, and click to select multiple responses) None   Emergency department  No   Health care providers (hold down the control key, and click to select multiple responses) Other   Investigations done (hold down the control key, and click to select multiple responses) CAT scan   Procedures (hold down the control key, and click to select multiple responses) None   Have you had any surgery on your neck? Yes   Please william what you are experiencing. (hold down the control key, and click to select multiple responses) Arthritic joint pain   First activity you would like to perform better:  Driving   Current ability score to perform first activity: 9   Second activity you would like to perform better: Sitting   Current ability score to perform second activity: 9   Third activity you would like to perform better: Dont know   Current ability  score to perform third activity: 0   Pain Intensity I have no pain at the moment.   Personal care (washing, dressing, etc.) I can look after myself normally without it causing extra pain.   Lifting I can lift heavy weights, but it gives extra pain.   Reading I can read as much as I want to, with slight pain in my neck.   Headaches I have headaches almost all the time.   Concentration I can concentrate fully when I want to, with slight difficulty.   Working I cannot do my usual work.   Driving I can drive my car as long as I want with moderate pain in my neck.   Sleeping My sleep is moderately disturbed (2-3 hours sleepless).   Recreation I am able to engage in most, but not all of my recreation activities because of pain in my neck.   Do you need any help looking after yourself? I need no help at all.   When doing household tasks, e.g., preparing food, gardening, using the video recorder, radio, telephone, or washing the car: I need no help at all.   Thinking about how easily you can get around your home and community: I get around my home and community by myself without any difficulty.   Because of your health, your relationships, e.g., your friends, partner or parents, generally: Are very close and warm   Thinking about your relationships with other people: Although I have friends, I am occasionally lonely.   Thinking about your health and your relationship with your  family:  My role in the family is unaffected by my health.   Thinking about your vision, including when using your glasses or contact lenses if needed: I see normally.   Thinking about your hearing, including using your hearing aid if needed: I have some difficulty hearing, or I do not hear clearly, e.g., I ask people to speak up or turn up the TV radio volume.   When you communicate with others, e.g., talking, listening, writing, or signing: I have no trouble speaking to them or understanding what they are saying.   Thinking about how you sleep: My  sleep is interrupted most nights, but I am usually able to go back to sleep without difficulty.   Thinking about how you generally feel: I feel moderately anxious, worried or depressed.   How much pain or discomfort do you experience? I suffer from severe pain.   Little interest or pleasure in doing things Nearly every day   Feeling down, depressed or hopeless Several days   What is your occupation?     How do you spend your leisure time? What are your hobbies? ; fishing   What is your highest level of education? College   What is your work status? Employed   How did you hear about the Healthyback program?  Physician        Your nurse will provide you with initial wound care supplies

## 2020-10-07 LAB — CMV DNA SERPL NAA+PROBE-ACNC: NORMAL IU/ML

## 2020-10-08 ENCOUNTER — INFUSION (OUTPATIENT)
Dept: INFUSION THERAPY | Facility: HOSPITAL | Age: 60
End: 2020-10-08
Attending: NURSE PRACTITIONER
Payer: COMMERCIAL

## 2020-10-08 ENCOUNTER — CLINICAL SUPPORT (OUTPATIENT)
Dept: REHABILITATION | Facility: OTHER | Age: 60
End: 2020-10-08
Attending: PHYSICAL MEDICINE & REHABILITATION
Payer: COMMERCIAL

## 2020-10-08 ENCOUNTER — OFFICE VISIT (OUTPATIENT)
Dept: HEMATOLOGY/ONCOLOGY | Facility: CLINIC | Age: 60
End: 2020-10-08
Payer: COMMERCIAL

## 2020-10-08 VITALS
DIASTOLIC BLOOD PRESSURE: 78 MMHG | WEIGHT: 203.69 LBS | BODY MASS INDEX: 32.73 KG/M2 | SYSTOLIC BLOOD PRESSURE: 127 MMHG | HEIGHT: 66 IN | HEART RATE: 87 BPM

## 2020-10-08 VITALS
TEMPERATURE: 99 F | RESPIRATION RATE: 16 BRPM | HEART RATE: 77 BPM | OXYGEN SATURATION: 95 % | BODY MASS INDEX: 32.42 KG/M2 | HEIGHT: 66 IN | SYSTOLIC BLOOD PRESSURE: 149 MMHG | DIASTOLIC BLOOD PRESSURE: 81 MMHG | WEIGHT: 201.75 LBS

## 2020-10-08 DIAGNOSIS — C92.01 ACUTE MYELOID LEUKEMIA IN REMISSION: ICD-10-CM

## 2020-10-08 DIAGNOSIS — M54.2 NECK PAIN: Primary | ICD-10-CM

## 2020-10-08 DIAGNOSIS — Z94.84 HISTORY OF ALLOGENEIC STEM CELL TRANSPLANT: ICD-10-CM

## 2020-10-08 DIAGNOSIS — T86.09 ACUTE GVHD COMPLICATING BONE MARROW TRANSPLANTATION: ICD-10-CM

## 2020-10-08 DIAGNOSIS — M47.812 SPONDYLOSIS OF CERVICAL REGION WITHOUT MYELOPATHY OR RADICULOPATHY: ICD-10-CM

## 2020-10-08 DIAGNOSIS — C92.10 CML (CHRONIC MYELOCYTIC LEUKEMIA): ICD-10-CM

## 2020-10-08 DIAGNOSIS — M79.18 MYOFASCIAL PAIN: ICD-10-CM

## 2020-10-08 DIAGNOSIS — D89.810 ACUTE GVHD COMPLICATING BONE MARROW TRANSPLANTATION: ICD-10-CM

## 2020-10-08 DIAGNOSIS — Z94.84 HISTORY OF ALLOGENEIC STEM CELL TRANSPLANT: Primary | ICD-10-CM

## 2020-10-08 DIAGNOSIS — M48.02 CERVICAL STENOSIS OF SPINE: Primary | ICD-10-CM

## 2020-10-08 LAB
ALBUMIN SERPL BCP-MCNC: 4.1 G/DL (ref 3.5–5.2)
ALP SERPL-CCNC: 54 U/L (ref 55–135)
ALT SERPL W/O P-5'-P-CCNC: 24 U/L (ref 10–44)
ANION GAP SERPL CALC-SCNC: 12 MMOL/L (ref 8–16)
ANISOCYTOSIS BLD QL SMEAR: SLIGHT
AST SERPL-CCNC: 14 U/L (ref 10–40)
BASOPHILS NFR BLD: 0 % (ref 0–1.9)
BILIRUB SERPL-MCNC: 0.3 MG/DL (ref 0.1–1)
BUN SERPL-MCNC: 18 MG/DL (ref 8–23)
CALCIUM SERPL-MCNC: 9.1 MG/DL (ref 8.7–10.5)
CHLORIDE SERPL-SCNC: 102 MMOL/L (ref 95–110)
CO2 SERPL-SCNC: 24 MMOL/L (ref 23–29)
CREAT SERPL-MCNC: 1 MG/DL (ref 0.5–1.4)
DIFFERENTIAL METHOD: ABNORMAL
EBV DNA BY PCR: NORMAL IU/ML
EOSINOPHIL NFR BLD: 1 % (ref 0–8)
ERYTHROCYTE [DISTWIDTH] IN BLOOD BY AUTOMATED COUNT: 17.4 % (ref 11.5–14.5)
EST. GFR  (AFRICAN AMERICAN): >60 ML/MIN/1.73 M^2
EST. GFR  (NON AFRICAN AMERICAN): >60 ML/MIN/1.73 M^2
GLUCOSE SERPL-MCNC: 119 MG/DL (ref 70–110)
HCT VFR BLD AUTO: 37.7 % (ref 40–54)
HGB BLD-MCNC: 11.8 G/DL (ref 14–18)
IMM GRANULOCYTES # BLD AUTO: ABNORMAL K/UL (ref 0–0.04)
IMM GRANULOCYTES NFR BLD AUTO: ABNORMAL % (ref 0–0.5)
LYMPHOCYTES NFR BLD: 13 % (ref 18–48)
MAGNESIUM SERPL-MCNC: 1.6 MG/DL (ref 1.6–2.6)
MCH RBC QN AUTO: 33.6 PG (ref 27–31)
MCHC RBC AUTO-ENTMCNC: 31.3 G/DL (ref 32–36)
MCV RBC AUTO: 107 FL (ref 82–98)
METAMYELOCYTES NFR BLD MANUAL: 2 %
MONOCYTES NFR BLD: 4 % (ref 4–15)
NEUTROPHILS NFR BLD: 78 % (ref 38–73)
NEUTS BAND NFR BLD MANUAL: 2 %
NRBC BLD-RTO: 0 /100 WBC
PHOSPHATE SERPL-MCNC: 3.8 MG/DL (ref 2.7–4.5)
PLATELET # BLD AUTO: 144 K/UL (ref 150–350)
PMV BLD AUTO: 10.1 FL (ref 9.2–12.9)
POLYCHROMASIA BLD QL SMEAR: ABNORMAL
POTASSIUM SERPL-SCNC: 4.3 MMOL/L (ref 3.5–5.1)
PROT SERPL-MCNC: 6.7 G/DL (ref 6–8.4)
RBC # BLD AUTO: 3.51 M/UL (ref 4.6–6.2)
SODIUM SERPL-SCNC: 138 MMOL/L (ref 136–145)
TACROLIMUS BLD-MCNC: 7.9 NG/ML (ref 5–15)
WBC # BLD AUTO: 5.25 K/UL (ref 3.9–12.7)

## 2020-10-08 PROCEDURE — 25000003 PHARM REV CODE 250: Performed by: NURSE PRACTITIONER

## 2020-10-08 PROCEDURE — 3008F BODY MASS INDEX DOCD: CPT | Mod: BMT,CPTII,S$GLB, | Performed by: INTERNAL MEDICINE

## 2020-10-08 PROCEDURE — 83735 ASSAY OF MAGNESIUM: CPT

## 2020-10-08 PROCEDURE — 36592 COLLECT BLOOD FROM PICC: CPT

## 2020-10-08 PROCEDURE — A4216 STERILE WATER/SALINE, 10 ML: HCPCS | Performed by: NURSE PRACTITIONER

## 2020-10-08 PROCEDURE — 99204 PR OFFICE/OUTPT VISIT, NEW, LEVL IV, 45-59 MIN: ICD-10-PCS | Mod: BMT,,, | Performed by: PHYSICAL MEDICINE & REHABILITATION

## 2020-10-08 PROCEDURE — 3008F PR BODY MASS INDEX (BMI) DOCUMENTED: ICD-10-PCS | Mod: BMT,CPTII,S$GLB, | Performed by: INTERNAL MEDICINE

## 2020-10-08 PROCEDURE — 99999 PR PBB SHADOW E&M-EST. PATIENT-LVL V: CPT | Mod: PBBFAC,BMT,, | Performed by: INTERNAL MEDICINE

## 2020-10-08 PROCEDURE — 99215 PR OFFICE/OUTPT VISIT, EST, LEVL V, 40-54 MIN: ICD-10-PCS | Mod: BMT,S$GLB,, | Performed by: INTERNAL MEDICINE

## 2020-10-08 PROCEDURE — 99999 PR PBB SHADOW E&M-EST. PATIENT-LVL V: ICD-10-PCS | Mod: PBBFAC,BMT,, | Performed by: INTERNAL MEDICINE

## 2020-10-08 PROCEDURE — 80197 ASSAY OF TACROLIMUS: CPT

## 2020-10-08 PROCEDURE — 63600175 PHARM REV CODE 636 W HCPCS: Performed by: NURSE PRACTITIONER

## 2020-10-08 PROCEDURE — 85027 COMPLETE CBC AUTOMATED: CPT

## 2020-10-08 PROCEDURE — 84100 ASSAY OF PHOSPHORUS: CPT

## 2020-10-08 PROCEDURE — 99204 OFFICE O/P NEW MOD 45 MIN: CPT | Mod: BMT,,, | Performed by: PHYSICAL MEDICINE & REHABILITATION

## 2020-10-08 PROCEDURE — 85007 BL SMEAR W/DIFF WBC COUNT: CPT

## 2020-10-08 PROCEDURE — 99215 OFFICE O/P EST HI 40 MIN: CPT | Mod: BMT,S$GLB,, | Performed by: INTERNAL MEDICINE

## 2020-10-08 PROCEDURE — 80053 COMPREHEN METABOLIC PANEL: CPT

## 2020-10-08 RX ORDER — FAMOTIDINE 20 MG/1
20 TABLET, FILM COATED ORAL 2 TIMES DAILY PRN
COMMUNITY
End: 2022-06-15

## 2020-10-08 RX ORDER — CYCLOBENZAPRINE HCL 10 MG
10 TABLET ORAL 3 TIMES DAILY PRN
COMMUNITY
End: 2020-11-11

## 2020-10-08 RX ORDER — SODIUM CHLORIDE 0.9 % (FLUSH) 0.9 %
10 SYRINGE (ML) INJECTION
Status: COMPLETED | OUTPATIENT
Start: 2020-10-08 | End: 2020-10-08

## 2020-10-08 RX ORDER — HEPARIN 100 UNIT/ML
500 SYRINGE INTRAVENOUS
Status: CANCELLED | OUTPATIENT
Start: 2020-10-08

## 2020-10-08 RX ORDER — HEPARIN 100 UNIT/ML
500 SYRINGE INTRAVENOUS
Status: COMPLETED | OUTPATIENT
Start: 2020-10-08 | End: 2020-10-08

## 2020-10-08 RX ORDER — SODIUM CHLORIDE 0.9 % (FLUSH) 0.9 %
10 SYRINGE (ML) INJECTION
Status: CANCELLED | OUTPATIENT
Start: 2020-10-08

## 2020-10-08 RX ADMIN — Medication 10 ML: at 07:10

## 2020-10-08 RX ADMIN — HEPARIN 500 UNITS: 100 SYRINGE at 07:10

## 2020-10-08 NOTE — PROGRESS NOTES
Subjective:      Patient ID: Kvng Jones Sr. is a 60 y.o. male.    Chief Complaint: No chief complaint on file.    Mr Jones is a 61 yo male Sent in consultation by Dr. Mcelroy  for evaluation for the healthy back cervical program.  he has had neck pain for 10 years with on and off pain.  He had improvement after surgery in 2015, and then flared 3 months ago after he got his picc line. The pain has improved recently with prednisone, and fearful of weaning off.   The pain is neck dominant, and goes to the right shoulder blade and head and left collar bone. The pain is sharp with movement.  It is stabbing when still, and achy.  There is no tingling, burning, numbness, or weakness. The pain is constant 3-5/10.  It is worse with looking down, sitting, turning to both sides, and morning.  It Is better lying on stomach and back, pillow, walking, narcotics, afternoon, evening and bedtime.  He had C4,5,6 MBB with pain management 9/2/20 with no relief.  He did PT in 2015 with no relief, and then 3 visits in august which increased pain.  He had a fusion.  He did not go to chiropractor.  Goals are turning to left side, sitting, and looking down. Pattern 1    Ct cervical 8/24/20  Multiple axial images of the cervical vertebral column were obtained.  The study was reformatted coronal and sagittal plane.     Contrary to the clinical history, I do not detect evidence of a laminectomy at any level.  Instead the patient has undergone anterior cervical fusion of C3, C4, C5 utilizing anterior plate and screws.  There is bone graft material between the endplates of C3-C4 and C4-C5.     There is straightening of the lordotic curvature.  No indication of spondylolisthesis or scoliotic curvature.  The posterior elements align normally.  In the coronal plane, the lateral masses are also well aligned with osteoarthritic changes seen throughout.     In the axial plane, there does not appear to be any obvious disruption involving the  lamina or pedicle at any level.     Note is made of heavy calcification involving the carotid artery system on either side of the midline.     Impression:     Status post anterior cervical fusion of C3-C4 and C5.     No appreciable findings that would suggest a laminectomy at any level.     Diffuse osteoarthritic changes.    Past Medical History:  No date: CML (chronic myelocytic leukemia)  No date: Hx of psychiatric care      Comment:  valium, ativan  No date: Melanoma in situ of external ear, right    Past Surgical History:  No date: CHOLECYSTECTOMY  2020: INJECTION OF ANESTHETIC AGENT AROUND NERVE; Bilateral      Comment:  Procedure: BLOCK, NERVE bilateral C4,5,6 MBB;  Surgeon:                Oscar Mcelroy MD;  Location: [facility];                 Service: Pain Management;  Laterality: Bilateral;                 bilateral C4,5,6 MBB consent needed  2015: NECK SURGERY  No date: TONSILLECTOMY    No family history on file.      Social History    Socioeconomic History      Marital status:       Spouse name: Lauren      Number of children: 3      Years of education: Not on file      Highest education level: Not on file    Occupational History      Not on file    Social Needs      Financial resource strain: Not on file      Food insecurity        Worry: Not on file        Inability: Not on file      Transportation needs        Medical: Not on file        Non-medical: Not on file    Tobacco Use      Smoking status: Former Smoker        Packs/day: 1.00        Years: 35.00        Pack years: 35        Start date: 1985        Quit date: 2020        Years since quittin.3      Smokeless tobacco: Never Used    Substance and Sexual Activity      Alcohol use: Yes        Alcohol/week: 12.0 standard drinks        Types: 4 Glasses of wine, 4 Cans of beer, 4 Shots of liquor per week      Drug use: Yes        Types: Marijuana        Comment: medical marijuana      Sexual activity: Yes        Partners:  Female    Lifestyle      Physical activity        Days per week: Not on file        Minutes per session: Not on file      Stress: Not on file    Relationships      Social connections        Talks on phone: Not on file        Gets together: Not on file        Attends Yazidi service: Not on file        Active member of club or organization: Not on file        Attends meetings of clubs or organizations: Not on file        Relationship status: Not on file    Other Topics      Concerns:        Patient feels they ought to cut down on drinking/drug use: Not Asked        Patient annoyed by others criticizing their drinking/drug use: Not Asked        Patient has felt bad or guilty about drinking/drug use: Not Asked        Patient has had a drink/used drugs as an eye opener in the AM: Not Asked    Social History Narrative       (Guillermina)      3 children (Michelle Estrada, Qasim Estrada, [facility])            Current Outpatient Medications:  acyclovir (ZOVIRAX) 800 MG Tab, Take 1 tablet (800 mg total) by mouth 2 (two) times daily., Disp: 60 tablet, Rfl: 11  chlorhexidine (PERIDEX) 0.12 % solution, RINSE WITH 15 MLS PO FOR 30 SECONDS BID, Disp: , Rfl:   cyclobenzaprine (FLEXERIL) 10 MG tablet, Take 10 mg by mouth 3 (three) times daily as needed for Muscle spasms., Disp: , Rfl:   dextran 70-hypromellose (TEARS) ophthalmic solution, Place 1 drop into both eyes as needed., Disp: 30 mL, Rfl: 1  esomeprazole (NEXIUM) 40 MG capsule, Take 40 mg by mouth before breakfast., Disp: , Rfl:   famotidine (PEPCID) 20 MG tablet, Take 20 mg by mouth 2 (two) times daily., Disp: , Rfl:   fluconazole (DIFLUCAN) 200 MG Tab, Take 2 tablets (400 mg total) by mouth once daily., Disp: 60 tablet, Rfl: 6  fluticasone propionate (FLONASE) 50 mcg/actuation nasal spray, 2 sprays (100 mcg total) by Each Nostril route daily as needed for Rhinitis., Disp: 16 g, Rfl: 0  gabapentin (NEURONTIN) 300 MG capsule, Take 1 capsule (300 mg  total) by mouth 3 (three) times daily., Disp: 90 capsule, Rfl: 11  loratadine (CLARITIN) 10 mg tablet, Take 10 mg by mouth once daily., Disp: , Rfl:   magic mouthwash diphen/antac/lidoc/nysta, Take 10 mLs by mouth 4 (four) times daily as needed (mouth sores)., Disp: 120 mL, Rfl: 2  magnesium oxide (MAGOX) 400 mg (241.3 mg magnesium) tablet, Take 2 tablets (800 mg total) by mouth 4 (four) times daily., Disp: 200 tablet, Rfl: 1  morphine (MS CONTIN) 15 MG 12 hr tablet, Take 1 tablet (15 mg total) by mouth 2 (two) times daily., Disp: 60 tablet, Rfl: 0  morphine (MSIR) 15 MG tablet, Take 2 tablets (30 mg total) by mouth every 6 (six) hours as needed for Pain., Disp: 90 tablet, Rfl: 0  ondansetron (ZOFRAN-ODT) 8 MG TbDL, Dissolve 1 tablet (8 mg total) by mouth every 8 (eight) hours as needed (nausea)., Disp: 30 tablet, Rfl: 1  predniSONE (DELTASONE) 20 MG tablet, Take 4 tablets (80 mg total) by mouth once daily., Disp: 180 tablet, Rfl: 2  prochlorperazine (COMPAZINE) 10 MG tablet, Take 1 tablet (10 mg total) by mouth 4 (four) times daily as needed for Nausea., Disp: 30 tablet, Rfl: 1  sulfamethoxazole-trimethoprim 800-160mg (BACTRIM DS) 800-160 mg Tab, Take 1 tablet by mouth every Mon, Wed, Fri., Disp: 12 tablet, Rfl: 4  tacrolimus (PROGRAF) 0.5 MG Cap, Take 5 mg (10 capsules) by mouth in the morning. Take 5mg (10capsules) by mouth in the evening. Take after labs on clinic appointment days., Disp: 480 capsule, Rfl: 6  tamsulosin (FLOMAX) 0.4 mg Cap, Take 1 capsule (0.4 mg total) by mouth once daily., Disp: 30 capsule, Rfl: 3  triamcinolone acetonide 0.1% (KENALOG) 0.1 % cream, Apply topically as needed. Rash on hands, Disp: 30 g, Rfl: 1    No current facility-administered medications for this visit.   Facility-Administered Medications Ordered in Other Visits:  artificial tears 0.5 % ophthalmic solution 2 drop, 2 drop, Both Eyes, PRN, Ivon Morrow, NP  diphenhydrAMINE injection 50 mg, 50 mg, Intravenous, PRN, Ivon JOLLEY  YUMIKO Morrow         Review of patient's allergies indicates:   -- Posaconazole -- Hallucinations   -- Unclassified drug -- Other (See Comments)    --  Pt reports that he has an allergic reaction to an             Antifungal medication, but is unsure of the name             of the drug. Will obtain name prior to arrival in             am and notify Pre-op RN.   -- Vancomycin analogues -- Other (See Comments)    --  Pt reports he had rigors   -- Codeine -- Hives   -- Iodine -- Hives and Rash   -- Iodinated contrast media -- Hives        Review of Systems   Constitution: Negative for weight gain and weight loss.   Cardiovascular: Negative for chest pain.   Respiratory: Negative for shortness of breath.    Musculoskeletal: Positive for neck pain. Negative for back pain, joint pain and joint swelling.   Gastrointestinal: Positive for nausea. Negative for abdominal pain, bowel incontinence and vomiting.   Genitourinary: Negative for bladder incontinence.   Neurological: Negative for numbness and paresthesias.         Objective:        General: Kvng is well-developed, well-nourished, appears stated age, in no acute distress, alert and oriented to time, place and person.     General    Vitals reviewed.  Constitutional: He is oriented to person, place, and time. He appears well-developed and well-nourished.   HENT:   Head: Normocephalic and atraumatic.   Pulmonary/Chest: Effort normal.   Neurological: He is alert and oriented to person, place, and time.   Psychiatric: He has a normal mood and affect. His behavior is normal. Judgment and thought content normal.     General Musculoskeletal Exam   Gait: normal     Right Ankle/Foot Exam     Tests   Heel Walk: able to perform  Tiptoe Walk: able to perform    Left Ankle/Foot Exam     Tests   Heel Walk: able to perform  Tiptoe Walk: able to perform  Back (L-Spine & T-Spine) / Neck (C-Spine) Exam     Neck (C-Spine) Range of Motion   Flexion:     30 (with pain)  Extension: 30  (with pain)Right Lateral Bend: 10 Left Lateral Bend: 10 Right Rotation: 30 Left Rotation: 30     Spinal Sensation   Right Side Sensation  C-Spine Level: normal   L-Spine Level: normal  S-Spine Level: normal  Left Side Sensation  C-Spine Level: normal  L-Spine Level: normal  S-Spine Level: normal    Back (L-Spine & T-Spine) Tests   Right Side Tests  Straight leg raise:      Sitting SLR: > 70 degrees      Left Side Tests  Straight leg raise:     Sitting SLR: > 70 degrees          Other He has no scoliosis .  Spinal Kyphosis:  Absent      Muscle Strength   Right Upper Extremity   Biceps: 5/5   Deltoid:  5/5  Triceps:  5/5  Wrist extension: 5/5   Finger Flexors:  5/5  Left Upper Extremity  Biceps: 5/5   Deltoid:  5/5  Triceps:  5/5  Wrist extension: 5/5   Finger Flexors:  5/5  Right Lower Extremity   Hip Flexion: 5/5   Quadriceps:  5/5   Anterior tibial:  5/5   EHL:  5/5  Left Lower Extremity   Hip Flexion: 5/5   Quadriceps:  5/5   Anterior tibial:  5/5   EHL:  5/5    Reflexes     Left Side  Biceps:  2+  Triceps:  2+  Brachioradialis:  2+  Achilles:  2+  Left Zimmerman's Sign:  Absent  Babinski Sign:  absent  Quadriceps:  2+    Right Side   Biceps:  2+  Triceps:  2+  Brachioradialis:  2+  Achilles:  2+  Right Zimmerman's Sign:  absent  Babinski Sign:  absent  Quadriceps:  2+    Vascular Exam     Right Pulses        Carotid:                  2+    Left Pulses        Carotid:                  2+              Assessment:       1. Neck pain    2. Myofascial pain    3. Spondylosis of cervical region without myelopathy or radiculopathy           Plan:       Orders Placed This Encounter    Ambulatory referral/consult to [facility]     The patient has had a history of neck pain with limitations in there activities of Daily living    Previous treatment has not provided relief.    The situation was discussed at length with the patient.  More than 50% of the total time of 45 minutes was spent in counseling.  We discussed  different causes of neck pain and different treatment options.  We discussed the importance of stretching and strengthening.  We discussed posture and getting head over spine.  We discussed the pros and cons of further diagnostic testing, alternative treatment and Medications    Based on the history, physical exam, and functional index, an active physical therapy program is recommended.  The goal is to restore the patients function and reduce pain.  A program of progressive resistance exercises, biomechanical, and mobility maneuvers, instructions in proper body mechanics, aerobic conditioning and HEP will be utilized. The program will continue as long as making improvements.    An assessment of patients progress will be made at each visit to document change in status.    The patient will be actively involved in there own treatment, and responsible for appointments and home program    The patient's cervical isometric strength will be tested and they will be placed in a program of isolated strength training based on 50% of their total functional torque and advanced as clinically appropriate.      Directional preference of pain will further influence the patients active rehabilitation program    The patient was instructed there might be an initial increase in discomfort    They are enrolled in cervical program with fair prognosis  Pattern 1      Follow-up: No follow-ups on file. If there are any questions prior to this, the patient was instructed to contact the office.

## 2020-10-08 NOTE — PROGRESS NOTES
Health  met with patient to complete initial outcomes for the Healthy Back cervical program.  Questions were reviewed with patient and discussed with Dr. Harrington. The patient will meet with Dr. Harrington to determine program enrollment.     HealthyBack Questionnaire  10/8/2020   Please select the location of your worst pain:  Neck   When did this pain begin?  10 years ago with on and off pain, improved after surgery (fusion) on 2015.  Latest flare was 3 months after pick line for bone marrow trasnfusion   Do you need any help looking after yourself? I need no help at all.   When doing household tasks, e.g., preparing food, gardening, using the video recorder, radio, telephone, or washing the car: I need no help at all.   Thinking about how easily you can get around your home and community: I get around my home and community by myself without any difficulty.   Because of your health, your relationships, e.g., your friends, partner or parents, generally: Are very close and warm   Thinking about your relationships with other people: Although I have friends, I am occasionally lonely.   Thinking about your health and your relationship with your  family:  My role in the family is unaffected by my health.   Thinking about your vision, including when using your glasses or contact lenses if needed: I see normally.   Thinking about your hearing, including using your hearing aid if needed: I have some difficulty hearing, or I do not hear clearly, e.g., I ask people to speak up or turn up the TV radio volume.   When you communicate with others, e.g., talking, listening, writing, or signing: I have no trouble speaking to them or understanding what they are saying.   Thinking about how you sleep: My sleep is interrupted most nights, but I am usually able to go back to sleep without difficulty.   Thinking about how you generally feel: I feel moderately anxious, worried or depressed.   How much pain or discomfort do you  experience? I suffer from severe pain.   Little interest or pleasure in doing things Nearly every day   Feeling down, depressed or hopeless Several days   Please select the location of any additional pain: (hold down the control key, and click to select multiple responses) None of the above   Did any event trigger your pain?  No   If yes, please describe:  -   How long has this pain been going on?  10 years ago with on and off pain, 50% improvement after 2015 surgery(Fusion)   Please indicate how the pain is changing.  Worsening   What is the WORST level of pain that you have experienced in the last week?  5   What is the LEAST level of pain that you have experienced in the past week?  3   If you have any comments about your pain level, please provide below:  -   What can you NOT do not that you used to be able to do?  N/A   Are your limitations mainly due to your pain?  No   What are your additional complaints, if any? (hold down the control key, and click to select multiple responses) None   Is there ever a time during the day when your pain stops, even for a brief moment, before returning? No   If yes, when your pain stops, does it disappear completely? Is it totally gone? No   Does looking down make your typical pain worse? Yes   Morning: Worse during   Afternoon: Better during   Evening:  Better during   Nighttime: Better during   Standing:  Better   Lying on stomach: Better   Lying on back: Better   Sitting:  Worse   Walking: Better   Using a pillow: Better   Have all of your attempts to treat your neck/arm pain resulted in failure?  Yes   Do you believe your doctor(s) do NOT understand how much pain you have?  Yes   Do you believe that you have one or more conditions that have not been diagnosed by your doctor(s)?  Yes   Do you believe that you deserve more understanding from others including your family than you are getting?  No   Do you feel relatively calm about how your neck/arm pain has impacted your  life?  No   Are you OK with treatment taking a very long time (even years) before you feel relief from your neck/arm pain?  Yes   Do you believe that your pain has caused you to be more forgetful?  No   Do you feel that you have not received enough treatment for your pain?  Yes   Do you believe that your doctor(s) do not have the right training to treat your neck/arm pain effectively?  No   Do you believe you should not be allowed to work or attend school because of your neck/arm pain?  No   When did this pain begin?  10 years ago with on and off pain, improved after surgery (fusion) on 2015.  Latest flare was 3 months after pick line for bone marrow trasnfusion   Did the pain begin after an injury or an accident? No   Is the pain work related?  No   Please william which of the following over-the-counter medicines you take. (hold down the control key, and click to select multiple responses) None   Please william which of the following prescription medicines you take. (hold down the control key, and click to select multiple responses) Other   Emergency department  No   Health care providers (hold down the control key, and click to select multiple responses) Family doctor, Physical therapist   Investigations done (hold down the control key, and click to select multiple responses) CAT scan   Procedures (hold down the control key, and click to select multiple responses) None   Emergency department  No   Health care providers (hold down the control key, and click to select multiple responses) Family doctor   Investigations done (hold down the control key, and click to select multiple responses) None   Procedures (hold down the control key, and click to select multiple responses) Epidural steroid injections (SANTI)   Have you had any surgery on your neck? Yes   Please william what you are experiencing. (hold down the control key, and click to select multiple responses) Arthritic joint pain   First activity you would like to perform  better:  Turning to the left   Current ability score to perform first activity: 5   Second activity you would like to perform better: Sitting   Current ability score to perform second activity: 5   Third activity you would like to perform better: Looking down   Current ability score to perform third activity: 5   Pain Intensity The pain is very mild at the moment.   Personal care (washing, dressing, etc.) I can look after myself normally without it causing extra pain.   Lifting I can lift heavy weights without extra pain.   Reading I can read as much as I want to, with slight pain in my neck.   Headaches I have headaches almost all the time.   Concentration I can concentrate fully when I want to, with slight difficulty.   Working I cannot do my usual work.   Driving I can drive my car as long as I want with moderate pain in my neck.   Sleeping My sleep is moderately disturbed (2-3 hours sleepless).   Recreation I am able to engage in most, but not all of my recreation activities because of pain in my neck.   Do you need any help looking after yourself? I need no help at all.   When doing household tasks, e.g., preparing food, gardening, using the video recorder, radio, telephone, or washing the car: I need no help at all.   Thinking about how easily you can get around your home and community: I get around my home and community by myself without any difficulty.   Because of your health, your relationships, e.g., your friends, partner or parents, generally: Are very close and warm   Thinking about your relationships with other people: Although I have friends, I am occasionally lonely.   Thinking about your health and your relationship with your  family:  My role in the family is unaffected by my health.   Thinking about your vision, including when using your glasses or contact lenses if needed: I see normally.   Thinking about your hearing, including using your hearing aid if needed: I have some difficulty hearing, or  I do not hear clearly, e.g., I ask people to speak up or turn up the TV radio volume.   When you communicate with others, e.g., talking, listening, writing, or signing: I have no trouble speaking to them or understanding what they are saying.   Thinking about how you sleep: My sleep is interrupted most nights, but I am usually able to go back to sleep without difficulty.   Thinking about how you generally feel: I feel moderately anxious, worried or depressed.   How much pain or discomfort do you experience? I suffer from severe pain.   Little interest or pleasure in doing things Nearly every day   Feeling down, depressed or hopeless Several days   What is your occupation?     How do you spend your leisure time? What are your hobbies? Fishing and watching football   What is your highest level of education? College   What is your work status? Employed   How did you hear about the Healthyback program?  Physician

## 2020-10-09 ENCOUNTER — CLINICAL SUPPORT (OUTPATIENT)
Dept: REHABILITATION | Facility: OTHER | Age: 60
End: 2020-10-09
Attending: PHYSICAL MEDICINE & REHABILITATION
Payer: COMMERCIAL

## 2020-10-09 DIAGNOSIS — R29.898 DECREASED STRENGTH OF TRUNK AND BACK: ICD-10-CM

## 2020-10-09 DIAGNOSIS — R29.3 POOR POSTURE: ICD-10-CM

## 2020-10-09 PROCEDURE — 97110 THERAPEUTIC EXERCISES: CPT

## 2020-10-09 PROCEDURE — 97161 PT EVAL LOW COMPLEX 20 MIN: CPT

## 2020-10-09 NOTE — PROGRESS NOTES
Subjective:    Patient ID: Kvng Jones Sr. is a 60 y.o. male.    Chief Complaint: TWICE WEEKLY FOLLOW UP, Neck Pain, and Nausea    Oncologic hx:  60 y.o. 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 He was admitted for evaluation and induction chemotherapy with FLAG-Addis. Complications of therapy include epididymal orchitis with negative testicular ultrasound, neutropenic fever, dyspnea, and GGO of bilateral lungs on CT chest. Fever and chest findings were covered with broad spectrum antimicrobials. He did have hallucinations and vivid dreams with posaconazole. Chemotherapy was administered by left arm PICC line that was removed during hospital stay for MSSA infection treated with vancomycin. Hospital admission was 3/30/2020 to 5/1/2020 achieving morphologic CR with induction chemotherapy. He then started Ponatinib 30mg daily.     Studies performed during his hospital stay include pre-chemotherapy ECHO with normal ejection fraction of 60-65%. HIV, HBV, and HCV tests were negative. CBC at admission was WBC 38.6, Hgb 14.6, and platelet 416K. CT of the head without contrast was normal 4/20/2020. US of abdomen performed for abdominal distention and neutropenic fever had hepatic steatosis, liver 19cm, and spleen 11 cm. He does have a history of alcohol use and pancreatitis.    Patient is a . He is  to wife Guillermina who will be a caregiver post transplant. He has a 45 pack year smoking history. He quit 6/1/2020, now on Chantix. He drinks 3-4 alcohol beverages nightly. He has 3 children. He has several family members in the [facility] area.    Transplant Course: Admitted 7/21/20 for a Flu/Cy/TBI haplo allo SCT. Son was donor. Received 2 bags and a total CD34 dose of 3.10 x10^6/kg on 7/28/20. Tolerated stem cells well. C/o headaches prior to admission, which persisted throughout admission. Neuro ultimately consulted for rec's. Was a daily  drinker prior to admission. No s/s of alcolhol withdrawal during admission. Transplant further complicated by hypertension, heart burn, c-diff neg diarrhea, nausea, sinus congestion, mild peripheral edema, blurry vision (ophtho consulted), mucositis, electrolyte abnormalities, and neck pain 2/2 spinal stenosis (referred to pain mgt at discharge). He engrafted on 8/10/20 with an ANC of 1280. He was discharged on 8/12/2020.    Interval History:  Patient presents for follow up clinic visit post Flu/Cy/TBI allogeneic stem cell transplant. Today is Day +72. Neck and back pain are no worse and he starts the healthy back physical therapy program today. Rash has resolved and prednisone dose decreased to 70 mg Monday (3.5 tabs). Appetite is stable. No diarrhea. Liver enzymes are normal. tacro at goal of 7.9 (7-9).      Review of Systems   Constitutional: Positive for activity change and fatigue. Negative for appetite change, chills, diaphoresis, fever and unexpected weight change.   HENT: Negative for congestion, dental problem, mouth sores, nosebleeds, postnasal drip, rhinorrhea, sinus pressure and trouble swallowing.    Eyes: Negative.  Negative for photophobia and visual disturbance.   Respiratory: Negative.  Negative for cough and shortness of breath.    Cardiovascular: Negative.  Negative for chest pain, palpitations and leg swelling.   Gastrointestinal: Negative for abdominal distention, abdominal pain, blood in stool, constipation, diarrhea, nausea and vomiting.   Endocrine: Negative.    Genitourinary: Negative.  Negative for dysuria, frequency, hematuria and urgency.   Musculoskeletal: Positive for back pain, neck pain and neck stiffness.        History of cervical spinal stenosis. See hpi   Skin: Positive for rash. Negative for pallor.   Allergic/Immunologic: Negative for environmental allergies, food allergies and immunocompromised state.   Neurological: Negative for dizziness, tremors, syncope, weakness, numbness  and headaches.   Hematological: Negative for adenopathy. Does not bruise/bleed easily.   Psychiatric/Behavioral: Negative.  Negative for confusion. The patient is not nervous/anxious.        Objective:     Physical Exam  Vitals signs and nursing note reviewed.   Constitutional:       Appearance: Normal appearance. He is well-developed.   HENT:      Head: Normocephalic and atraumatic.      Mouth/Throat:      Mouth: Mucous membranes are dry.   Eyes:      General: No scleral icterus.  Neck:      Musculoskeletal: Neck supple. Decreased range of motion.   Cardiovascular:      Rate and Rhythm: Normal rate and regular rhythm.      Pulses: Normal pulses.      Heart sounds: Normal heart sounds.   Pulmonary:      Effort: Pulmonary effort is normal. No respiratory distress.      Breath sounds: Normal breath sounds.   Abdominal:      General: Bowel sounds are normal. There is no distension.      Palpations: Abdomen is soft.      Tenderness: There is no abdominal tenderness.   Musculoskeletal:         General: No tenderness or swelling.      Right lower leg: No edema.      Left lower leg: No edema.   Skin:     General: Skin is warm and dry.      Coloration: Skin is not pale.      Findings: Rash resolved. No erythema or petechiae.      Comments: Dalal intact to right CW with no redness or drainage   Neurological:      Mental Status: He is alert and oriented to person, place, and time.   Psychiatric:         Attention and Perception: Attention normal.         Speech: Speech normal.         Thought Content: Thought content normal.         Cognition and Memory: Cognition and memory normal.         Assessment:       No diagnosis found.    Plan:         History of allogeneic stem cell transplant  - Admitted 7/21/20 for planned Flu/Cy/TBI haplo allo SCT. Son was the donor. Received 2 bags and a total CD34 dose of 3.10 x10^6/kg on 7/28/20. Recieved post transplant cyclophosphamide. Engrafted neutrophils 8/10/20 day +13  - Today is Day  +72  - Tacro level pending (goal 7-9). Currently taking 5 mg/5mg.  - stopped Ursodiol Day +30 (8/27/20)  - patient requests Rx for Valium and Oxycodone for pre-procedure in clinic bone marrow biopsies.  - had BMBx in clinic on 8/28/20 showing no definitive morphologic or immunophenotypic evidence of residual CML. Chimerisms unable to be obtained due to insufficient sample; drawn peripherally on 9/18.      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 Achieved morphologic CR  - Started on daily Ponatinib 30 mg daily, which he continued to take outpatient. Held Ponatinib during admission; may not need to restart per recent literature. BBMT 2020(90): 646-46  - Had BMBx at [facility] on 7/1/2020 showing no morphologic or immunophenotypic evidence of AML/CML  - Admitted 7/21/20 for Flu/Cy/TBI haplo allo SCT    GVHD  - had rash inpatient that was believed to be drug eruption. Resolved  - diffuse erythema to legs, lower back, arms, and abdomen presented on 9/15. Started 1mg/kg (90mg, will round to 100mg for dosing) of prednisone daily (9/15/20); decreased to 90 mg (4.5 tabs) on 9/21/2020  - rash much improved 9/21/2020; resolved to arms; improved to legs; abdomen stable; decrease to 3.5 tabs (70 mg) on 10/6/20 as rash has now resolved  - will wean slowly by 10 mg every week per Dr. Hodges  - Tacro as above   - GVHD charting with each visit     ID  - Last CMV and EBV undetected; continue twice weekly CMV, weekly EBV, today's pending  - Acyclovir (Zoster prophylaxis) until Day +365  - Diflucan (Fungal prophylaxis) until off tacrolimus.  - Bactrim (PJP prophylaxis) until off tacrolimus.     Hypomagnesemia/hyperphosphatemia   - Mag; Currently on mag 800mg TID and increased magnesium in diet     Mucositis due to chemotherapy/Thrush  - Continue prn [facility]; patient also uses Aloe oral rinses  -  Recommend baking soda and warm water rinses  - Started nystatin x10 days 8/18 (completed 8/27)     CINV (chemotherapy-induced nausea and vomiting)  - reports always in the morning  - c/w prn zofran and compazine; discussed taking dose at night or in the middle of the night when he is up to use the bathroom     Blurry vision  - C/o worsening blurry vision 7/30/20  - May be contributing to headaches  - Ophtho consulted and saw patient 7/30  - Ophtho dilated pupils and performed fundal exam. Fundal exam neg.  - Patient had been viewing laptop screen for ~ 2 hours prior to experiencing blurry vision  - Per ophtho, blurry vision due to dry eyes. rec'd artificial tears QID prn and warm compresses BID prn. Can escalate artificial tears to ointment if needed  - improved     Antineoplastic chemotherapy-induced cytopenias: anemia  - Transfuse Hgb <7g and plt <10K  - BMBx showing no residual disease.     Headache/ Neck pain/ Cervical stenosis of spine  - Hx of spinal fusion C3-C5  - Started having headaches when he started Ponatinib at diagnosis. Onset seems to correspond to when patient quit smoking and when he started ponatinib  - Had been off of ponatinib for a few days but HAs persisted during transplant admission  - Pattern is like cluster headaches (onset during sleep), but not unilateral versus tension HA   - O2 via non-rebreather at 12 L over 15 minutes seemed to help but developed hot flash so pt stopped  - Ultram, Flexeril, Oxycodone, lidoderm, neck pillow, c-collar did not help  - Imitrex PRN q2hr (max 200mg in 24hrs). Not contraindicated per pharm D.  - Patient reporting that headache may be sinus in nature. Maxillary tenderness noted. Taking claritin  - Saw optho and neuro inpatient, trialed IV mag  - Is now seeing pain mgt for this. Current headaches seem to be caused by muscle spasms 2/2 cervical stenosis  - Flexeril changed to zanaflex by pain management. Also on gabapentin. Restarted celebrex 8/18 as plt >50K;  however did not work so is no longer taking  - Recommended alternating heat and ice  - Xray 9/19 shows surgical changes of fusion from C3 through C5 with degenerative changed below fusion.   - Following with pain mgt and PT.  - Started PT on 8/26.  - No steroid injections at this time per pain management due to transplant.  - Pt feels neck pain started after hickmann was placed  - Has been mostly refractory to medications (zanaflex, gabapentin, celebrex, lidoderm patches). Some relief with oral morphine  - CT from 8/24/20 showing history of C3, C4, and C5 fusion and diffuse osteoarthritic changes  - Had virtual pain management appt on 8/25 to review CT results.  - Zanaflex switched to Robaxin per pain mgt.  - had medial branch block on 9/2/20 with Dr. Mcelroy (pain mgt). States that he got very little relief. Called Dr. Mcelroy's office requesting morphine. Was denied until he can see MD. Seen by Dr. Mcelroy 9/10/20. Per patient pain mgmnt signed off, as it is not a chronic issue, defer to BMT service for further pain control.  - will started MS contin 15mg BID 9/20/2020; use PRN morphine q6h for breakthrough; titrate up MS contin as needed  - Consider to remove his line in future, as he feels it's bothering and causing neck pain. Currently needing IV mag on occassion and has acute GVHD.  - Patient reports much improvement to pain since starting high dose prednisone for his skin GVHD     Tobacco abuse, in remission  - 35 pack year history  - Quit smoking 6/1/2020 using Chantix  - Patient completed course of Chantix and denies need for Nicotine patch    BPH (benign prostatic hyperplasia)  - Continue Flomax     GERD (gastroesophageal reflux disease)  - Continue nexium; discussed with patient can increase to 40mg if needed while on high dose steroids  - Can take TUMS prn     Essential hypertension  - Increased home amlodipine from 5 mg to 10 mg daily while inpatient due to BPs as high as 180s/110s   - normal BP  today  - continue to hold amlodipine    AUR[facility]A (on 9/4)  - creatinine pending; trending down at last visit from 1.5 to 1.3  - stopped cellcept on Day +35  - will continue to hold celebrex, will defer to restart to pain management  - will increase fluconazole back up to 400 mg daily  - continue increased PO fluid intake    Follow-up:   - CBC, CMP, type and screen, tacro, CMV, EBV (weekly only), mag, phos and appt with Dr. Hodges alternating with NP twice weekly.  - Labs should be in AMs while on tacro. Appts scheduled through end of October

## 2020-10-09 NOTE — PLAN OF CARE
[facility] - PHYSICAL THERAPY EVALUATION     Name: Kvng Jones Camron  Clinic Number: [id number]    Therapy Diagnosis:   Encounter Diagnoses   Name Primary?    Poor posture     Decreased strength of trunk and back      Physician: Stefani Harrington, *    Physician Orders: PT Eval and Treat   Medical Diagnosis from Referral:   M79.18 (ICD-10-CM) - Myofascial pain   M54.2 (ICD-10-CM) - Neck pain   M47.812 (ICD-10-CM) - Spondylosis of cervical region without myelopathy or radiculopathy       Evaluation Date: 10/9/2020  Authorization Period Expiration: 10/08/20  Plan of Care Expiration: 1/8/2021  Reassessment Due: 11/9/20  Visit # / Visits authorized: 1/ 20    Time In: 7  Time Out: 830  Total Billable Time: 90 minutes    Precautions: Standard/cervical fusion/leukemia/port right anterior chest    Pattern of pain determined: 1REP      Subjective   Date of onset: 6 months  History of current condition - Kvng reports: 2010 symptoms began without incident and exacerbated. Pt eventually underwent a cervical fusion of C3-C5 in 2015.  Pt reports the surgery improved his intense ,sharp pains. Patient underwent pain management ,PT, target injections, with some improvement, however pt states he was on a lot of pain meds.  2019 pt stopped all pain meds. 2020 diagnosed with Leukemia, went to get port 7/21/20, at which point he reports his pain was stable.  Pt reports that once port was placed  pain intensified in his neck as soon as he woke up from anesthesia. P t reports at that time he would experience sharp pains when moving his neck.  Recently went on steroids at a high dose secondary to bone marrow transplant and has noted his neck is feeling better.  However, pt reports he comes off steroids in 5 weeks and is fearful sharp pain will increase.   Pt currently experiencing HA's.  Intermittent pain, increases with movement, and changing positions.  Pain is R>L cervical region, upper shoulder blade R, Left  collar bone.  Turning to left increases, driving, lack of movement, staying in one position too long, looking down. No weakness or tingling in UE's.  Worse first thing in AM better with steroids, which he takes during the day.     Medical History:   Past Medical History:   Diagnosis Date    CML (chronic myelocytic leukemia)     Hx of psychiatric care     valium, ativan    Melanoma in situ of external ear, right    As per MD:   Mr Jones is a 61 yo male Sent in consultation by Dr. Mcelroy  for evaluation for the healthy back cervical program.  he has had neck pain for 10 years with on and off pain.  He had improvement after surgery in 2015, and then flared 3 months ago after he got his picc line. The pain has improved recently with prednisone, and fearful of weaning off.   The pain is neck dominant, and goes to the right shoulder blade and head and left collar bone. The pain is sharp with movement.  It is stabbing when still, and achy.  There is no tingling, burning, numbness, or weakness. The pain is constant 3-5/10.  It is worse with looking down, sitting, turning to both sides, and morning.  It Is better lying on stomach and back, pillow, walking, narcotics, afternoon, evening and bedtime.  He had C4,5,6 MBB with pain management 9/2/20 with no relief.  He did PT in 2015 with no relief, and then 3 visits in august which increased pain.  He had a fusion.  He did not go to chiropractor.  Goals are turning to left side, sitting, and looking down. Pattern 1       Surgical History:   Kvng Jones   has a past surgical history that includes Tonsillectomy; Neck surgery (2015); Cholecystectomy; and Injection of anesthetic agent around nerve (Bilateral, 9/2/2020).    Medications:   Kvng has a current medication list which includes the following prescription(s): acyclovir, chlorhexidine, cyclobenzaprine, dextran 70-hypromellose, esomeprazole, famotidine, fluconazole, fluticasone propionate, gabapentin,  loratadine, magic mouthwash diphen/antac/lidoc/nysta, magnesium oxide, morphine, morphine, ondansetron, prednisone, prochlorperazine, sulfamethoxazole-trimethoprim 800-160mg, tacrolimus, tamsulosin, and triamcinolone acetonide 0.1%, and the following Facility-Administered Medications: artificial tears and diphenhydramine.    Allergies:   Review of patient's allergies indicates:   Allergen Reactions    Posaconazole Hallucinations    Unclassified drug Other (See Comments)     Pt reports that he has an allergic reaction to an Antifungal medication, but is unsure of the name of the drug. Will obtain name prior to arrival in am and notify Pre-op RN.    Vancomycin analogues Other (See Comments)     Pt reports he had rigors    Codeine Hives    Iodine Hives and Rash    Iodinated contrast media Hives        Imaging, bone scan films:    Status post anterior cervical fusion of C3-C4 and C5.     No appreciable findings that would suggest a laminectomy at any level.     Diffuse osteoarthritic changes.          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  Where is your pain the worst? cervical  2.  Is your pain constant or intermittent? intermittent  3.  Does bending forward make your typical pain worse? yes  4.  Since the start of  "your neck pain, has there been a change in your bowel or bladder? no  5.  What can't you do now that you use to be able to do? Drive without pain      Pts goals: "get back to normal function"      Red Flag Screening:   Cough  Sneeze  Strain: (--)  Bladder/ bowel: (--)  Falls: (--)  Night pain: (--)  Unexplained weight loss: (--)  General health: good    OBJECTIVE   Postural examination/scapula alignment: Rounded shoulder and Head forward/ R side bend of cervical region/ R shoulder elevated  Joint integrity: Firm end feeling  Skin integrity: intact X port R anterior chest  Edema: none noted  Sitting: poor  Standing: poor  Correction of posture: better with lumbar roll    Range of Motion - MOVEMENT LOSS    ROM Loss   Flexion moderate loss   Extension moderate loss   Side bending Right major loss   Side bending Left major loss   Rotation Right moderate loss   Rotation Left moderate loss   Protraction minimal loss   Retraction  moderate loss       Upper Extremity Strength  (R) UE  (L) UE    Shoulder flexion: 4+/5 Shoulder flexion: 4+/5   Shoulder Abduction: 4+/5 Shoulder abduction: 4+/5   Elbow flexion: 4+/5 Elbow flexion: 4+/5   Elbow extension: 4+/5 Elbow extension: 4+/5   Wrist flexion: 4+/5 Wrist flexion: 4+/5   Wrist extension: 4+/5 Wrist extension: 4+/5    5/5 : 5/5     NEUROLOGICAL SCREEN    Sensory deficit: intact BUE    Special Tests:   Test Name  Testing Result   Compression (--)   Distraction (+)   Neural Tension Test (--)   Saddle Sensation (--)     Reflexes:    Left Right   Biceps  1+ 1+   Brachioradialis 7 1+ 1+   Clonus (--) (--)   Babinski (--) (--)       REPEATED TEST MOVEMENTS:   Repeated Protraction in Sitting end range pain  no worse   Repeated Flexion in Sitting end range pain  pain during motion   Repeated Retraction in Sitting  end range pain  no worse   Repeated Retraction Extension in Sitting pain during motion  better   Repeated Protraction in lying no worse   Repeated Flexion in lying " no worse     Baseline Isometric Testing on Med X equipment:  Testing administered by PT  Date of testing: 10/9/20  ROM 42-78 deg   Max Peak Torque 288    Min Peak Torque 214   Flex/Ext Ratio 34   % below normative data 34%       GAIT:  Assistive Device used: none  Level of Assistance: independent  Patient displays the following gait deviations:  no gait deviations observed.         Limitation/Restriction for FOTO cervical Survey    Therapist reviewed FOTO scores for Kvng Jones SrCamron on 10/9/2020.   FOTO documents entered into [employer] - see Media section.    Limitation Score: 51%             Treatment   Treatment Time In: 8  Treatment Time Out: 830  Total Treatment time separate from Evaluation: 30 minutes      Kvng received therapeutic exercises to develop/improve posture, lumbar/cervical ROM, strength and muscular endurance for 30 minutes including the following exercises:   Med x dynamic exercise and baseline IM testing  [facility] 10/9/2020   Visit Number 1   VAS Pain Rating 0   Seat Adjustment 418   Top Dead Center 66   Counterweight 2.6   Cervical Flexion 78   Cervical Extension 42   Cervical Peak Torque 288   Ice - Z Lie (in min.) 10       Cervical retraction  cervical retraction and extension  Cervical rotation    Kvng received the following manual therapy techniques: Manual traction were applied to the: cervical for 5 minutes.         Written Home Exercises Provided: yes.  Exercises were reviewed and Kvng was able to demonstrate them prior to the end of the session.  Kvng demonstrated good  understanding of the education provided.     See EMR under Patient Instructions for exercises provided 10/9/2020.      Education provided:   - Patient received education regarding proper posture and body mechanics.  Patient was given top [facility] Visit 1 handouts which discuss what to expect in therapy, the purpose and opportunity for health coaching, the program,   wellness when discharged from therapy, back education and care specifically for posture seated, standing, lifting correctly, components of exercise, importance of nutrition and hydration, and importance of sleep.   Information on lumbar rolls provided.  Patient received education regarding proper posture and body mechanics.  - Leny roll tried, recommended, and purchase information was provided.    - Patient received a handout regarding anticipated muscular soreness following the isometric test and strategies for management were reviewed with patient including stretching, using ice and scheduled rest.   - Patient received education on the Healthy Back program, purpose of the isometric test, progression of neck strengthening as well as wellness approach and systemic strengthening.  Details of the program were discussed.  Reviewed that patient should feel support/pressure from med ex restraints but no pain or discomfort and patient expressed understanding.      Kvng received cold pack for 10 minutes to cervical.    Assessment   Kvng is a 60 y.o. male referred to [facility] with a medical diagnosis of .  M79.18 (ICD-10-CM) - Myofascial pain   M54.2 (ICD-10-CM) - Neck pain   M47.812 (ICD-10-CM) - Spondylosis of cervical region without myelopathy or radiculopathy    Pt presents with poor mobility/ROM of the cervical spine, decreased postural strength, poor posture, decreased endurance, 51% FOTO disability score, previous cervical fusion, HA's and decreased functional mobility.  Pts posture with right cervical side bending may be due to increased pain following port placement and protecting that side of his body.  Pt was unaware of his posture and educated to monitor his position especially in sitting.  Pt will be in [place] till November and if needed can continue at clinic in  after Nov 2020.  Pt is currently on steroids and is fearful intense pain will return when he is weaned off.   Pain  Pattern: 1 REP       Pt prognosis is Good.   Pt will benefit from skilled outpatient Physical Therapy to address the deficits stated above and in the chart below, provide pt/family education, and to maximize pt's level of independence.     Plan of care discussed with patient: Yes  Pt's spiritual, cultural and educational needs considered and patient is agreeable to the plan of care and goals as stated below:     Anticipated Barriers for therapy: port    PT Evaluation Completed? Yes    Medical necessity is demonstrated by the following problem list.    Pt presents with the following impairments:     History  Co-morbidities and personal factors that may impact the plan of care Co-morbidities:   cervical fusion/leukemia    Personal Factors:   no deficits     low   Examination  Body Structures and Functions, activity limitations and participation restrictions that may impact the plan of care Body Regions:   neck  trunk    Body Systems:    gross symmetry  ROM  strength  gross coordinated movement  motor control    Participation Restrictions:   Port R ant chest    Activity limitations:   Learning and applying knowledge  no deficits    General Tasks and Commands  no deficits    Communication  no deficits    Mobility  driving (bike, car, motorcycle)    Self care  no deficits    Domestic Life  no deficits    Interactions/Relationships  no deficits    Life Areas  no deficits    Community and Social Life  community life  recreation and leisure         low   Clinical Presentation stable and uncomplicated low   Decision Making/ Complexity Score: low       GOALS: Pt is in agreement with the following goals.    Short term goals: 6 weeks or 10 visits   1.  Pt will demonstratte increased cervical ROM as measured by med ex by 6 degrees from initial test which results in improved  ROM of neck for ease with ADLs and driving  2. Pt will demonstrate independence with reducing or controlling symptoms with ther ex, movement, or position  "independently, able to reduce pain 1-2 points on pain scale using strategies taught in therapy  3. Pt will demonstrate increased MedX average isometric strength value by 10% with  when compared to the initial testing resulting in improved ability to perform bending, lifting, and carrying activities safely, confidently.        Long term goals: 10 weeks or 20 visits  1. Pt will demonstratte increased cervical ROM as measured by med ex by 12 degrees from initial test which results in functional ROM of neck for ease with ADLs and driving  2. Pt will demonstrate increased MedX average isometric strength value  by 30% from initial test to improve ability to lift and carry, and sustain good posture while performing ADL's  3.Pt will demonstrate reduced pain and improved functional outcomes as reported on the FOTO by reaching a limitation score of < or = 40% or less in order to demonstrate subjective improvement in pt's condition.    4. Pt will demonstrate independence with reducing or controlling symptoms with ther ex, movement, or position independently, able to reduce pain 2-4 points on pain scale using strategies taught in therapy  5. Pt will demonstrate independence with the HEP at discharge.   6.  Pt(patient goal)will be able to drive without pain and fish without pain    Plan   Outpatient physical therapy 2x week for 10 weeks or 20 visits to include the following:   - Patient education  - Therapeutic exercise  - Manual therapy  - Performance testing   - Neuromuscular Re-education  - Therapeutic activity   - Modalities    Pt may be seen by PTA as part of the rehabilitation team.     Therapist: Mary Ann Miramontes, PT  10/9/2020      "I certify the need for these services furnished under this plan of treatment and while under my care."    ____________________________________  Physician/Referring Practitioner    _______________  Date of Signature          "

## 2020-10-10 LAB — CMV DNA SERPL NAA+PROBE-ACNC: NORMAL IU/ML

## 2020-10-12 ENCOUNTER — OFFICE VISIT (OUTPATIENT)
Dept: HEMATOLOGY/ONCOLOGY | Facility: CLINIC | Age: 60
End: 2020-10-12
Payer: COMMERCIAL

## 2020-10-12 ENCOUNTER — CLINICAL SUPPORT (OUTPATIENT)
Dept: HEMATOLOGY/ONCOLOGY | Facility: CLINIC | Age: 60
End: 2020-10-12
Payer: COMMERCIAL

## 2020-10-12 ENCOUNTER — INFUSION (OUTPATIENT)
Dept: INFUSION THERAPY | Facility: HOSPITAL | Age: 60
End: 2020-10-12
Attending: NURSE PRACTITIONER
Payer: COMMERCIAL

## 2020-10-12 VITALS
DIASTOLIC BLOOD PRESSURE: 80 MMHG | BODY MASS INDEX: 33.06 KG/M2 | SYSTOLIC BLOOD PRESSURE: 149 MMHG | WEIGHT: 205.69 LBS | RESPIRATION RATE: 18 BRPM | HEIGHT: 66 IN | HEART RATE: 75 BPM | TEMPERATURE: 98 F | OXYGEN SATURATION: 95 %

## 2020-10-12 DIAGNOSIS — C92.10 CML (CHRONIC MYELOCYTIC LEUKEMIA): ICD-10-CM

## 2020-10-12 DIAGNOSIS — M48.02 CERVICAL STENOSIS OF SPINE: ICD-10-CM

## 2020-10-12 DIAGNOSIS — K21.9 GASTROESOPHAGEAL REFLUX DISEASE WITHOUT ESOPHAGITIS: ICD-10-CM

## 2020-10-12 DIAGNOSIS — Z76.82 STEM CELL TRANSPLANT CANDIDATE: Primary | ICD-10-CM

## 2020-10-12 DIAGNOSIS — T86.09 ACUTE GVHD COMPLICATING BONE MARROW TRANSPLANTATION: ICD-10-CM

## 2020-10-12 DIAGNOSIS — Z94.84 HISTORY OF ALLOGENEIC STEM CELL TRANSPLANT: Primary | ICD-10-CM

## 2020-10-12 DIAGNOSIS — K59.03 DRUG INDUCED CONSTIPATION: ICD-10-CM

## 2020-10-12 DIAGNOSIS — E83.42 HYPOMAGNESEMIA: ICD-10-CM

## 2020-10-12 DIAGNOSIS — Z94.84 HISTORY OF ALLOGENEIC STEM CELL TRANSPLANT: ICD-10-CM

## 2020-10-12 DIAGNOSIS — C92.01 ACUTE MYELOID LEUKEMIA IN REMISSION: ICD-10-CM

## 2020-10-12 DIAGNOSIS — I10 ESSENTIAL HYPERTENSION: ICD-10-CM

## 2020-10-12 DIAGNOSIS — D89.810 ACUTE GVHD COMPLICATING BONE MARROW TRANSPLANTATION: ICD-10-CM

## 2020-10-12 LAB
ALBUMIN SERPL BCP-MCNC: 4.1 G/DL (ref 3.5–5.2)
ALP SERPL-CCNC: 47 U/L (ref 55–135)
ALT SERPL W/O P-5'-P-CCNC: 24 U/L (ref 10–44)
ANION GAP SERPL CALC-SCNC: 13 MMOL/L (ref 8–16)
ANISOCYTOSIS BLD QL SMEAR: SLIGHT
AST SERPL-CCNC: 14 U/L (ref 10–40)
BASOPHILS # BLD AUTO: ABNORMAL K/UL (ref 0–0.2)
BASOPHILS NFR BLD: 2 % (ref 0–1.9)
BILIRUB SERPL-MCNC: 0.3 MG/DL (ref 0.1–1)
BUN SERPL-MCNC: 19 MG/DL (ref 8–23)
CALCIUM SERPL-MCNC: 9 MG/DL (ref 8.7–10.5)
CHLORIDE SERPL-SCNC: 100 MMOL/L (ref 95–110)
CO2 SERPL-SCNC: 24 MMOL/L (ref 23–29)
CREAT SERPL-MCNC: 1.1 MG/DL (ref 0.5–1.4)
DIFFERENTIAL METHOD: ABNORMAL
EOSINOPHIL # BLD AUTO: ABNORMAL K/UL (ref 0–0.5)
EOSINOPHIL NFR BLD: 0 % (ref 0–8)
ERYTHROCYTE [DISTWIDTH] IN BLOOD BY AUTOMATED COUNT: 17.2 % (ref 11.5–14.5)
EST. GFR  (AFRICAN AMERICAN): >60 ML/MIN/1.73 M^2
EST. GFR  (NON AFRICAN AMERICAN): >60 ML/MIN/1.73 M^2
GLUCOSE SERPL-MCNC: 168 MG/DL (ref 70–110)
HCT VFR BLD AUTO: 37.8 % (ref 40–54)
HGB BLD-MCNC: 11.7 G/DL (ref 14–18)
HYPOCHROMIA BLD QL SMEAR: ABNORMAL
IMM GRANULOCYTES # BLD AUTO: ABNORMAL K/UL (ref 0–0.04)
IMM GRANULOCYTES NFR BLD AUTO: ABNORMAL % (ref 0–0.5)
LYMPHOCYTES # BLD AUTO: ABNORMAL K/UL (ref 1–4.8)
LYMPHOCYTES NFR BLD: 6 % (ref 18–48)
MAGNESIUM SERPL-MCNC: 1.6 MG/DL (ref 1.6–2.6)
MCH RBC QN AUTO: 33.6 PG (ref 27–31)
MCHC RBC AUTO-ENTMCNC: 31 G/DL (ref 32–36)
MCV RBC AUTO: 109 FL (ref 82–98)
MONOCYTES # BLD AUTO: ABNORMAL K/UL (ref 0.3–1)
MONOCYTES NFR BLD: 5 % (ref 4–15)
NEUTROPHILS NFR BLD: 84 % (ref 38–73)
NEUTS BAND NFR BLD MANUAL: 3 %
NRBC BLD-RTO: 0 /100 WBC
OVALOCYTES BLD QL SMEAR: ABNORMAL
PHOSPHATE SERPL-MCNC: 3.2 MG/DL (ref 2.7–4.5)
PLATELET # BLD AUTO: 127 K/UL (ref 150–350)
PMV BLD AUTO: 9.7 FL (ref 9.2–12.9)
POIKILOCYTOSIS BLD QL SMEAR: SLIGHT
POLYCHROMASIA BLD QL SMEAR: ABNORMAL
POTASSIUM SERPL-SCNC: 4.4 MMOL/L (ref 3.5–5.1)
PROT SERPL-MCNC: 6.5 G/DL (ref 6–8.4)
RBC # BLD AUTO: 3.48 M/UL (ref 4.6–6.2)
SODIUM SERPL-SCNC: 137 MMOL/L (ref 136–145)
TACROLIMUS BLD-MCNC: 8.2 NG/ML (ref 5–15)
WBC # BLD AUTO: 6.22 K/UL (ref 3.9–12.7)

## 2020-10-12 PROCEDURE — 99215 OFFICE O/P EST HI 40 MIN: CPT | Mod: BMT,S$GLB,, | Performed by: NURSE PRACTITIONER

## 2020-10-12 PROCEDURE — A4216 STERILE WATER/SALINE, 10 ML: HCPCS | Performed by: NURSE PRACTITIONER

## 2020-10-12 PROCEDURE — 85007 BL SMEAR W/DIFF WBC COUNT: CPT

## 2020-10-12 PROCEDURE — 99215 PR OFFICE/OUTPT VISIT, EST, LEVL V, 40-54 MIN: ICD-10-PCS | Mod: BMT,S$GLB,, | Performed by: NURSE PRACTITIONER

## 2020-10-12 PROCEDURE — 99999 PR PBB SHADOW E&M-EST. PATIENT-LVL V: ICD-10-PCS | Mod: PBBFAC,BMT,, | Performed by: NURSE PRACTITIONER

## 2020-10-12 PROCEDURE — 80197 ASSAY OF TACROLIMUS: CPT

## 2020-10-12 PROCEDURE — 3008F PR BODY MASS INDEX (BMI) DOCUMENTED: ICD-10-PCS | Mod: BMT,CPTII,S$GLB, | Performed by: NURSE PRACTITIONER

## 2020-10-12 PROCEDURE — 80053 COMPREHEN METABOLIC PANEL: CPT

## 2020-10-12 PROCEDURE — 87799 DETECT AGENT NOS DNA QUANT: CPT

## 2020-10-12 PROCEDURE — 3008F BODY MASS INDEX DOCD: CPT | Mod: BMT,CPTII,S$GLB, | Performed by: NURSE PRACTITIONER

## 2020-10-12 PROCEDURE — 25000003 PHARM REV CODE 250: Performed by: NURSE PRACTITIONER

## 2020-10-12 PROCEDURE — 85027 COMPLETE CBC AUTOMATED: CPT

## 2020-10-12 PROCEDURE — 63600175 PHARM REV CODE 636 W HCPCS: Performed by: NURSE PRACTITIONER

## 2020-10-12 PROCEDURE — 84100 ASSAY OF PHOSPHORUS: CPT

## 2020-10-12 PROCEDURE — 99999 PR PBB SHADOW E&M-EST. PATIENT-LVL I: CPT | Mod: PBBFAC,BMT,,

## 2020-10-12 PROCEDURE — 99999 PR PBB SHADOW E&M-EST. PATIENT-LVL I: ICD-10-PCS | Mod: PBBFAC,BMT,,

## 2020-10-12 PROCEDURE — 99999 PR PBB SHADOW E&M-EST. PATIENT-LVL V: CPT | Mod: PBBFAC,BMT,, | Performed by: NURSE PRACTITIONER

## 2020-10-12 PROCEDURE — 83735 ASSAY OF MAGNESIUM: CPT

## 2020-10-12 PROCEDURE — 36592 COLLECT BLOOD FROM PICC: CPT

## 2020-10-12 RX ORDER — MORPHINE SULFATE 15 MG/1
15 TABLET, FILM COATED, EXTENDED RELEASE ORAL 2 TIMES DAILY
Qty: 60 TABLET | Refills: 0 | Status: SHIPPED | OUTPATIENT
Start: 2020-10-12 | End: 2020-11-18

## 2020-10-12 RX ORDER — SODIUM CHLORIDE 0.9 % (FLUSH) 0.9 %
10 SYRINGE (ML) INJECTION
Status: COMPLETED | OUTPATIENT
Start: 2020-10-12 | End: 2020-10-12

## 2020-10-12 RX ORDER — HEPARIN 100 UNIT/ML
500 SYRINGE INTRAVENOUS
Status: COMPLETED | OUTPATIENT
Start: 2020-10-12 | End: 2020-10-12

## 2020-10-12 RX ORDER — SODIUM CHLORIDE 0.9 % (FLUSH) 0.9 %
10 SYRINGE (ML) INJECTION
Status: CANCELLED | OUTPATIENT
Start: 2020-10-12

## 2020-10-12 RX ORDER — HEPARIN 100 UNIT/ML
500 SYRINGE INTRAVENOUS
Status: CANCELLED | OUTPATIENT
Start: 2020-10-12

## 2020-10-12 RX ORDER — TAMSULOSIN HYDROCHLORIDE 0.4 MG/1
0.4 CAPSULE ORAL DAILY
Qty: 30 CAPSULE | Refills: 3 | Status: SHIPPED | OUTPATIENT
Start: 2020-10-12 | End: 2020-12-02

## 2020-10-12 RX ADMIN — HEPARIN 500 UNITS: 100 SYRINGE at 09:10

## 2020-10-12 RX ADMIN — Medication 10 ML: at 09:10

## 2020-10-12 NOTE — Clinical Note
Disregard last message, patient requests Ivon, please block her urgent care slot on 11/2 at 1pm for bone marrow biopsy for this patient

## 2020-10-12 NOTE — PROGRESS NOTES
BMT Pharmacist Medication Review Note     All current medications were reviewed with the patient.      We discussed the changes made by the physician including decrease in prednisone to 3 tablets (60mg).  The patient reports taking his prn morphine about twice per day and using his nausea medication about once per day. He feels that the prednisone is helping his pain control but also thinks that removing his line today will help.     The patient has ample supply of all medications.      All questions were answered.      Medication Indication Morning Lunch/Afternoon Evening Night   Acyclovir 800mg  Viral infection prevention  1 tablet  1 tablet   Fluconazole 200mg  Fungal infection prevention  2 tablets     Sulfamethoxazole-trimethoprim (Bactrim) 800-160mg PCP pneumonia prevention 1 tablet on Mon., Wed., and Fri.      Tacrolimus (Prograf) 0.5mg* GVHD prevention - take AFTER labs on clinic days* 10 capsules   10 capsules   Prednisone 20mg GVHD skin rash 3 tablets      ----------------------------------------        Esomeprazole (Nexium) 20mg Acid reflux 2 capsules      Gabapentin 300mg Neuropathy/pain 1 capsule   1 capsule   Loratadine (Claritin) 10mg Allergies 1 tablet      Magnesium oxide 400mg Magnesium supplement 2 tablets 2 tablets 2 tablets 2 tablets   Tamsulosin (Flomax) 0.4mg BPH/urinary issues 1 capsule      Morphine ER (MSContin) 15mg Pain 1 tablet   1 tablet   *Dose may change at each appointment    AS NEEDED MEDICATIONS:  Ondansetron (Zofran) 8mg every 8 hours as needed for nausea  Prochlorperazine (Compazine) 10mg four times a day as needed for nausea (2nd choice)  Chlorhexidine solution - swish and spit two times a day as needed for mouth sores  Senakot-S (senna-docusate) 8.6-50mg constipation - 1 tablet daily as needed for constipation  Morphine 15mg - 2 tablets every 6 hours as needed for severe pain  Cyclobenzaprine (Flexeril) 10mg three times a day as needed for muscle spasms  Triamcinolone 0.1%  cream twice a day as needed for rash  Artificial tears into each eye as needed for dry eyes  Fluticasone nasal spray 2 sprays twice a day as needed for allergies  Tums - 2 tablets as needed for indigestion  Famotidine (Pepsid) 20mg twice a day as needed for indigestion        Allie Soliman, PharmD, BCPS, BCOP  Clinical Pharmacy Specialist   BMT/Hematology Oncology  SpectraLink: [id number]

## 2020-10-12 NOTE — PROGRESS NOTES
Subjective:    Patient ID: Kvng Jones Sr. is a 60 y.o. male.    Chief Complaint: Follow-up (post allo) and Leukemia    Oncologic hx:  60 y.o. male admitted to [facility] for CML presenting with blast crisis and lewis disease. He was admitted for evaluation and induction chemotherapy with FLAG-Addis. Complications of therapy include epididymal orchitis with negative testicular ultrasound, neutropenic fever, dyspnea, and GGO of bilateral lungs on CT chest. Fever and chest findings were covered with broad spectrum antimicrobials. He did have hallucinations and vivid dreams with posaconazole. Chemotherapy was administered by left arm PICC line that was removed during hospital stay for MSSA infection treated with vancomycin. Hospital admission was 3/30/2020 to 5/1/2020 achieving morphologic CR with induction chemotherapy. He then started Ponatinib 30mg daily.     Studies performed during his hospital stay include pre-chemotherapy ECHO with normal ejection fraction of 60-65%. HIV, HBV, and HCV tests were negative. CBC at admission was WBC 38.6, Hgb 14.6, and platelet 416K. CT of the head without contrast was normal 4/20/2020. US of abdomen performed for abdominal distention and neutropenic fever had hepatic steatosis, liver 19cm, and spleen 11 cm. He does have a history of alcohol use and pancreatitis.    Patient is a . He is  to wife Guillermina who is his caregiver post transplant. He has a 45 pack year smoking history. He quit 6/1/2020, now on Chantix. He drinks 3-4 alcohol beverages nightly. He has 3 children. He has several family members in the [facility] area.    Transplant Course: Admitted 7/21/20 for a Flu/Cy/TBI haplo allo SCT. Son was donor. Received 2 bags and a total CD34 dose of 3.10 x10^6/kg on 7/28/20. Tolerated stem cells well. C/o headaches prior to admission, which persisted throughout admission. Neuro ultimately consulted for rec's. Was a daily drinker prior to  admission. No s/s of alcolhol withdrawal during admission. Transplant further complicated by hypertension, heart burn, c-diff neg diarrhea, nausea, sinus congestion, mild peripheral edema, blurry vision (ophtho consulted), mucositis, electrolyte abnormalities, and neck pain 2/2 spinal stenosis (referred to pain mgt at discharge). He engrafted on 8/10/20 with an ANC of 1280. He was discharged on 8/12/2020.    Interval History:  Patient presents for follow up clinic visit post Flu/Cy/TBI allogeneic stem cell transplant. Today is Day +76. Neck and back pain are no worse and he starts the healthy back physical therapy program Thursday, neck sore. Pain mildly improved. Rash has resolved and prednisone dose decreased to 70 mg Monday (3.5 tabs). Appetite is stable. No diarrhea. Liver enzymes are normal. tacro at goal of 7.9 (7-9).  Has some indigestion with steroids. Has some constipation.      Review of Systems   Constitutional: Negative for fatigue, appetite change, chills, diaphoresis, fever and unexpected weight change.   HENT: Negative for congestion, dental problem, mouth sores, nosebleeds, postnasal drip, rhinorrhea, sinus pressure and trouble swallowing.    Eyes: Negative.  Negative for photophobia and visual disturbance.   Respiratory: Negative.  Negative for cough and shortness of breath.    Cardiovascular: Negative.  Negative for chest pain, palpitations and leg swelling.   Gastrointestinal: Negative for abdominal distention, abdominal pain, blood in stool, constipation, diarrhea, nausea and vomiting.   Endocrine: Negative.    Genitourinary: Negative.  Negative for dysuria, frequency, hematuria and urgency.   Musculoskeletal: Positive for neck pain and neck stiffness.        History of cervical spinal stenosis. See hpi   Skin: Negative for rash or pallor.   Allergic/Immunologic: Negative for environmental allergies, food allergies and immunocompromised state.   Neurological: Negative for dizziness, tremors,  syncope, weakness, numbness and headaches.   Hematological: Negative for adenopathy. Does not bruise/bleed easily.   Psychiatric/Behavioral: Negative.  Negative for confusion. The patient is not nervous/anxious.        Objective:     Physical Exam  Vitals signs and nursing note reviewed.   Constitutional:       Appearance: Normal appearance. He is well-developed.   HENT:      Head: Normocephalic and atraumatic.      Mouth/Throat:      Mouth: Mucous membranes are clear and moist, no lesions noted   Eyes:      General: No scleral icterus.  Neck:      Musculoskeletal: Neck supple. Decreased range of motion.   Cardiovascular:      Rate and Rhythm: Normal rate and regular rhythm.      Pulses: Normal pulses.      Heart sounds: Normal heart sounds.   Pulmonary:      Effort: Pulmonary effort is normal. No respiratory distress.      Breath sounds: Normal breath sounds.   Abdominal:      General: Bowel sounds are normal. There is no distension.      Palpations: Abdomen is soft.      Tenderness: There is no abdominal tenderness.   Musculoskeletal:         General: No tenderness or swelling.      Right lower leg: No edema.      Left lower leg: No edema.   Skin:     General: Skin is warm and dry.      Coloration: Skin is not pale.      Findings: Rash (resolved). No erythema or petechiae.      Comments: Dalal intact to right CW with no redness or drainage   Neurological:      Mental Status: He is alert and oriented to person, place, and time.   Psychiatric:         Attention and Perception: Attention normal.         Speech: Speech normal.         Thought Content: Thought content normal.         Cognition and Memory: Cognition and memory normal.         Assessment:       1. History of allogeneic stem cell transplant    2. Acute myeloid leukemia in remission    3. CML (chronic myelocytic leukemia)    4. Acute GVHD complicating bone marrow transplantation    5. Cervical stenosis of spine    6. Hypomagnesemia    7. Essential  hypertension    8. Gastroesophageal reflux disease without esophagitis    9. Drug induced constipation        Plan:         History of allogeneic stem cell transplant  - Admitted 7/21/20 for planned Flu/Cy/TBI haplo allo SCT. Son was the donor. Received 2 bags and a total CD34 dose of 3.10 x10^6/kg on 7/28/20. Recieved post transplant cyclophosphamide. Engrafted neutrophils 8/10/20 day +13  - Today is Day +76  - Tacro level was 8.2 (goal 7-9). Continue 5 mg/5mg.  - stopped Ursodiol Day +30 (8/27/20)  - patient requests Rx for Valium and Oxycodone for pre-procedure in clinic bone marrow biopsies.  - had BMBx in clinic on 8/28/20 showing no definitive morphologic or immunophenotypic evidence of residual CML. Chimerisms unable to be obtained due to insufficient sample; drawn peripherally on 9/18. CD3-positive T-cells:  Insufficient DNA extracted for reliable analysis. CD33-positive myeloid cells:  The CD33-positive fraction contains approximately 100% donor DNA and approximately 0% recipient DNA.   - will schedule day 100 Bm bx 11/2 at 11 am   - will schedule Dalal removal      CML in remission/ Acute myeloid leukemia in remission  - Presented to 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 Achieved morphologic CR  - Started on daily Ponatinib 30 mg daily, which he continued to take outpatient. Held Ponatinib during admission; may not need to restart per recent literature. BBMT 2020(26): 477-49  - Had BMBx at [facility] on 7/1/2020 showing no morphologic or immunophenotypic evidence of AML/CML  - Admitted 7/21/20 for Flu/Cy/TBI haplo allo SCT    GVHD  - had rash inpatient that was believed to be drug eruption. Resolved  - diffuse erythema to legs, lower back, arms, and abdomen presented on 9/15. Started 1mg/kg (90mg, will round to 100mg for dosing) of prednisone daily (9/15/20); decreased to 90 mg (4.5 tabs) on 9/21/2020  - rash much improved  9/21/2020; resolved to arms; improved to legs; abdomen stable; decreased to 3.5 tabs (70 mg) on 10/6/20 as rash has now resolved  - will wean slowly by 10 mg every week per Dr. Hodges, decrease to 60 mg daily today  - Tacro as above   - GVHD charting with each visit     ID  - Last CMV and EBV undetected; continue twice weekly CMV, weekly EBV, today's pending  - Acyclovir (Zoster prophylaxis) until Day +365  - Diflucan (Fungal prophylaxis) until off tacrolimus.  - Bactrim (PJP prophylaxis) until off tacrolimus.     Hypomagnesemia/hyperphosphatemia   - Mag; Currently on mag 800 mg QID and increased magnesium in diet     Antineoplastic chemotherapy-induced cytopenias: anemia  - Transfuse Hgb <7g and plt <10K  - BMBx showing no residual disease.     Neck pain/ Cervical stenosis of spine   - Hx of spinal fusion C3-C5  - Xray 9/19 shows surgical changes of fusion from C3 through C5 with degenerative changed below fusion.   - Started PT on 8/26.  - Pt feels neck pain started after dalal was placed  - Has been mostly refractory to medications (zanaflex, gabapentin, celebrex, lidoderm patches). Some relief with oral morphine  - CT from 8/24/20 showing history of C3, C4, and C5 fusion and diffuse osteoarthritic changes  - Had virtual pain management appt on 8/25 to review CT results.  - Zanaflex switched to Robaxin per pain mgt.  - had medial branch block on 9/2/20 with Dr. Mcelroy (pain mgt). Per patient pain mgmnt signed off, as it is not a chronic issue, defer to BMT service for further pain control.  - started MS contin 15mg BID 9/20/2020; use PRN morphine q6h for breakthrough; titrate up MS contin as needed  - Patient reports much improvement to pain since starting high dose prednisone for his skin GVHD  - will schedule Dalal removal which may help with pain    BPH (benign prostatic hyperplasia)  - Continue Flomax     GERD (gastroesophageal reflux disease)  - Continue nexium; discussed with patient can increase to  40mg if needed while on high dose steroids  - Can take TUMS prn     Essential hypertension  - Increased home amlodipine from 5 mg to 10 mg daily while inpatient due to BPs as high as 180s/110s   - /90 today, discussed restarting BP meds  - continue to monitor and possibly restart in the near future.     Constipation  - continue Senna PRN    Follow-up:   - CBC, CMP, type and screen, tacro, CMV, EBV (weekly only), mag, phos and appt with Dr. Hodges alternating with NP twice weekly.  - Labs should be in AMs while on tacro. Appts scheduled through end of October  - schedule Mulugeta Little NP  Hematology/BMT

## 2020-10-13 ENCOUNTER — TELEPHONE (OUTPATIENT)
Dept: INTERVENTIONAL RADIOLOGY/VASCULAR | Facility: HOSPITAL | Age: 60
End: 2020-10-13

## 2020-10-13 ENCOUNTER — PATIENT MESSAGE (OUTPATIENT)
Dept: HEMATOLOGY/ONCOLOGY | Facility: CLINIC | Age: 60
End: 2020-10-13

## 2020-10-14 LAB
CMV DNA SERPL NAA+PROBE-ACNC: NORMAL IU/ML
EBV DNA BY PCR: NORMAL IU/ML

## 2020-10-15 ENCOUNTER — INFUSION (OUTPATIENT)
Dept: INFUSION THERAPY | Facility: HOSPITAL | Age: 60
End: 2020-10-15
Attending: NURSE PRACTITIONER
Payer: COMMERCIAL

## 2020-10-15 ENCOUNTER — OFFICE VISIT (OUTPATIENT)
Dept: HEMATOLOGY/ONCOLOGY | Facility: CLINIC | Age: 60
End: 2020-10-15
Payer: COMMERCIAL

## 2020-10-15 VITALS
HEART RATE: 84 BPM | HEIGHT: 66 IN | TEMPERATURE: 99 F | OXYGEN SATURATION: 96 % | SYSTOLIC BLOOD PRESSURE: 171 MMHG | BODY MASS INDEX: 33.24 KG/M2 | WEIGHT: 206.81 LBS | DIASTOLIC BLOOD PRESSURE: 89 MMHG | RESPIRATION RATE: 18 BRPM

## 2020-10-15 DIAGNOSIS — C92.01 ACUTE MYELOID LEUKEMIA IN REMISSION: ICD-10-CM

## 2020-10-15 DIAGNOSIS — Z94.84 HISTORY OF ALLOGENEIC STEM CELL TRANSPLANT: ICD-10-CM

## 2020-10-15 DIAGNOSIS — Z94.84 HISTORY OF ALLOGENEIC STEM CELL TRANSPLANT: Primary | ICD-10-CM

## 2020-10-15 LAB
ALBUMIN SERPL BCP-MCNC: 4.1 G/DL (ref 3.5–5.2)
ALP SERPL-CCNC: 51 U/L (ref 55–135)
ALT SERPL W/O P-5'-P-CCNC: 29 U/L (ref 10–44)
ANION GAP SERPL CALC-SCNC: 11 MMOL/L (ref 8–16)
ANISOCYTOSIS BLD QL SMEAR: SLIGHT
AST SERPL-CCNC: 20 U/L (ref 10–40)
BASOPHILS # BLD AUTO: ABNORMAL K/UL (ref 0–0.2)
BASOPHILS NFR BLD: 1 % (ref 0–1.9)
BILIRUB SERPL-MCNC: 0.3 MG/DL (ref 0.1–1)
BUN SERPL-MCNC: 20 MG/DL (ref 8–23)
CALCIUM SERPL-MCNC: 9.3 MG/DL (ref 8.7–10.5)
CHLORIDE SERPL-SCNC: 99 MMOL/L (ref 95–110)
CO2 SERPL-SCNC: 27 MMOL/L (ref 23–29)
CREAT SERPL-MCNC: 1 MG/DL (ref 0.5–1.4)
DACRYOCYTES BLD QL SMEAR: ABNORMAL
DIFFERENTIAL METHOD: ABNORMAL
EOSINOPHIL # BLD AUTO: ABNORMAL K/UL (ref 0–0.5)
EOSINOPHIL NFR BLD: 0 % (ref 0–8)
ERYTHROCYTE [DISTWIDTH] IN BLOOD BY AUTOMATED COUNT: 17.4 % (ref 11.5–14.5)
EST. GFR  (AFRICAN AMERICAN): >60 ML/MIN/1.73 M^2
EST. GFR  (NON AFRICAN AMERICAN): >60 ML/MIN/1.73 M^2
GLUCOSE SERPL-MCNC: 108 MG/DL (ref 70–110)
HCT VFR BLD AUTO: 39.7 % (ref 40–54)
HGB BLD-MCNC: 12.5 G/DL (ref 14–18)
HYPOCHROMIA BLD QL SMEAR: ABNORMAL
IMM GRANULOCYTES # BLD AUTO: ABNORMAL K/UL (ref 0–0.04)
IMM GRANULOCYTES NFR BLD AUTO: ABNORMAL % (ref 0–0.5)
LYMPHOCYTES # BLD AUTO: ABNORMAL K/UL (ref 1–4.8)
LYMPHOCYTES NFR BLD: 6 % (ref 18–48)
MAGNESIUM SERPL-MCNC: 1.7 MG/DL (ref 1.6–2.6)
MCH RBC QN AUTO: 34.1 PG (ref 27–31)
MCHC RBC AUTO-ENTMCNC: 31.5 G/DL (ref 32–36)
MCV RBC AUTO: 108 FL (ref 82–98)
MONOCYTES # BLD AUTO: ABNORMAL K/UL (ref 0.3–1)
MONOCYTES NFR BLD: 9 % (ref 4–15)
MYELOCYTES NFR BLD MANUAL: 1 %
NEUTROPHILS NFR BLD: 82 % (ref 38–73)
NEUTS BAND NFR BLD MANUAL: 1 %
NRBC BLD-RTO: 0 /100 WBC
OVALOCYTES BLD QL SMEAR: ABNORMAL
PHOSPHATE SERPL-MCNC: 3.3 MG/DL (ref 2.7–4.5)
PLATELET # BLD AUTO: 122 K/UL (ref 150–350)
PMV BLD AUTO: 9.6 FL (ref 9.2–12.9)
POIKILOCYTOSIS BLD QL SMEAR: SLIGHT
POLYCHROMASIA BLD QL SMEAR: ABNORMAL
POTASSIUM SERPL-SCNC: 5.2 MMOL/L (ref 3.5–5.1)
PROT SERPL-MCNC: 6.7 G/DL (ref 6–8.4)
RBC # BLD AUTO: 3.67 M/UL (ref 4.6–6.2)
SODIUM SERPL-SCNC: 137 MMOL/L (ref 136–145)
TACROLIMUS BLD-MCNC: 7 NG/ML (ref 5–15)
WBC # BLD AUTO: 6.52 K/UL (ref 3.9–12.7)

## 2020-10-15 PROCEDURE — 63600175 PHARM REV CODE 636 W HCPCS: Performed by: NURSE PRACTITIONER

## 2020-10-15 PROCEDURE — 84100 ASSAY OF PHOSPHORUS: CPT

## 2020-10-15 PROCEDURE — 3008F BODY MASS INDEX DOCD: CPT | Mod: BMT,CPTII,S$GLB, | Performed by: INTERNAL MEDICINE

## 2020-10-15 PROCEDURE — 99999 PR PBB SHADOW E&M-EST. PATIENT-LVL V: ICD-10-PCS | Mod: PBBFAC,BMT,, | Performed by: INTERNAL MEDICINE

## 2020-10-15 PROCEDURE — 99215 PR OFFICE/OUTPT VISIT, EST, LEVL V, 40-54 MIN: ICD-10-PCS | Mod: BMT,S$GLB,, | Performed by: INTERNAL MEDICINE

## 2020-10-15 PROCEDURE — 36592 COLLECT BLOOD FROM PICC: CPT

## 2020-10-15 PROCEDURE — 85007 BL SMEAR W/DIFF WBC COUNT: CPT

## 2020-10-15 PROCEDURE — 80197 ASSAY OF TACROLIMUS: CPT

## 2020-10-15 PROCEDURE — A4216 STERILE WATER/SALINE, 10 ML: HCPCS | Performed by: NURSE PRACTITIONER

## 2020-10-15 PROCEDURE — 25000003 PHARM REV CODE 250: Performed by: NURSE PRACTITIONER

## 2020-10-15 PROCEDURE — 83735 ASSAY OF MAGNESIUM: CPT

## 2020-10-15 PROCEDURE — 85027 COMPLETE CBC AUTOMATED: CPT

## 2020-10-15 PROCEDURE — 99999 PR PBB SHADOW E&M-EST. PATIENT-LVL V: CPT | Mod: PBBFAC,BMT,, | Performed by: INTERNAL MEDICINE

## 2020-10-15 PROCEDURE — 80053 COMPREHEN METABOLIC PANEL: CPT

## 2020-10-15 PROCEDURE — 3008F PR BODY MASS INDEX (BMI) DOCUMENTED: ICD-10-PCS | Mod: BMT,CPTII,S$GLB, | Performed by: INTERNAL MEDICINE

## 2020-10-15 PROCEDURE — 99215 OFFICE O/P EST HI 40 MIN: CPT | Mod: BMT,S$GLB,, | Performed by: INTERNAL MEDICINE

## 2020-10-15 RX ORDER — HEPARIN 100 UNIT/ML
500 SYRINGE INTRAVENOUS
Status: COMPLETED | OUTPATIENT
Start: 2020-10-15 | End: 2020-10-15

## 2020-10-15 RX ORDER — SODIUM CHLORIDE 0.9 % (FLUSH) 0.9 %
10 SYRINGE (ML) INJECTION
Status: CANCELLED | OUTPATIENT
Start: 2020-10-15

## 2020-10-15 RX ORDER — SODIUM CHLORIDE 0.9 % (FLUSH) 0.9 %
10 SYRINGE (ML) INJECTION
Status: COMPLETED | OUTPATIENT
Start: 2020-10-15 | End: 2020-10-15

## 2020-10-15 RX ORDER — PROCHLORPERAZINE MALEATE 10 MG
10 TABLET ORAL 4 TIMES DAILY PRN
Qty: 30 TABLET | Refills: 1 | Status: SHIPPED | OUTPATIENT
Start: 2020-10-15 | End: 2021-01-15 | Stop reason: SDUPTHER

## 2020-10-15 RX ORDER — HEPARIN 100 UNIT/ML
500 SYRINGE INTRAVENOUS
Status: CANCELLED | OUTPATIENT
Start: 2020-10-15

## 2020-10-15 RX ADMIN — HEPARIN 500 UNITS: 100 SYRINGE at 09:10

## 2020-10-15 RX ADMIN — Medication 10 ML: at 09:10

## 2020-10-15 NOTE — PROGRESS NOTES
Subjective:    Patient ID: Kvng Jones Sr. is a 60 y.o. male.    Chief Complaint: Nausea    Oncologic hx:  60 y.o. male admitted to [facility] for CML presenting with blast crisis and lewis disease. He was admitted for evaluation and induction chemotherapy with FLAG-Addis. Complications of therapy include epididymal orchitis with negative testicular ultrasound, neutropenic fever, dyspnea, and GGO of bilateral lungs on CT chest. Fever and chest findings were covered with broad spectrum antimicrobials. He did have hallucinations and vivid dreams with posaconazole. Chemotherapy was administered by left arm PICC line that was removed during hospital stay for MSSA infection treated with vancomycin. Hospital admission was 3/30/2020 to 5/1/2020 achieving morphologic CR with induction chemotherapy. He then started Ponatinib 30mg daily.     Studies performed during his hospital stay include pre-chemotherapy ECHO with normal ejection fraction of 60-65%. HIV, HBV, and HCV tests were negative. CBC at admission was WBC 38.6, Hgb 14.6, and platelet 416K. CT of the head without contrast was normal 4/20/2020. US of abdomen performed for abdominal distention and neutropenic fever had hepatic steatosis, liver 19cm, and spleen 11 cm. He does have a history of alcohol use and pancreatitis.    Patient is a . He is  to wife Guillermina who is his caregiver post transplant. He has a 45 pack year smoking history. He quit 6/1/2020, now on Chantix. He drinks 3-4 alcohol beverages nightly. He has 3 children. He has several family members in the [facility] area.    Transplant Course: Admitted 7/21/20 for a Flu/Cy/TBI haplo allo SCT. Son was donor. Received 2 bags and a total CD34 dose of 3.10 x10^6/kg on 7/28/20. Tolerated stem cells well. C/o headaches prior to admission, which persisted throughout admission. Neuro ultimately consulted for rec's. Was a daily drinker prior to admission. No s/s of alcolhol  withdrawal during admission. Transplant further complicated by hypertension, heart burn, c-diff neg diarrhea, nausea, sinus congestion, mild peripheral edema, blurry vision (ophtho consulted), mucositis, electrolyte abnormalities, and neck pain 2/2 spinal stenosis (referred to pain mgt at discharge). He engrafted on 8/10/20 with an ANC of 1280. He was discharged on 8/12/2020.    Interval History:  Patient presents for follow up clinic visit post Flu/Cy/TBI allogeneic stem cell transplant. Today is Day +79. Neck and back pain are no worse and he starts the healthy back physical therapy program exercises Tuesday 10/20. Pain stable. Rash has resolved and prednisone dose decreased to 60 mg Monday (3 tabs). Appetite is stable. No diarrhea. Liver enzymes are normal. tacro at goal of 7.9 (7-9). Some nausea this morning, requesting compazine refill.      Review of Systems   Constitutional: Negative for fatigue, appetite change, chills, diaphoresis, fever and unexpected weight change.   HENT: Negative for congestion, dental problem, mouth sores, nosebleeds, postnasal drip, rhinorrhea, sinus pressure and trouble swallowing.    Eyes: Negative.  Negative for photophobia and visual disturbance.   Respiratory: Negative.  Negative for cough and shortness of breath.    Cardiovascular: Negative.  Negative for chest pain, palpitations and leg swelling.   Gastrointestinal: Negative for abdominal distention, abdominal pain, blood in stool, constipation, diarrhea, nausea and vomiting.   Endocrine: Negative.    Genitourinary: Negative.  Negative for dysuria, frequency, hematuria and urgency.   Musculoskeletal: Positive for neck pain and neck stiffness.        History of cervical spinal stenosis. See hpi   Skin: Negative for rash or pallor.   Allergic/Immunologic: Negative for environmental allergies, food allergies and immunocompromised state.   Neurological: Negative for dizziness, tremors, syncope, weakness, numbness and headaches.    Hematological: Negative for adenopathy. Does not bruise/bleed easily.   Psychiatric/Behavioral: Negative.  Negative for confusion. The patient is not nervous/anxious.        Objective:     Physical Exam  Vitals signs and nursing note reviewed.   Constitutional:       Appearance: Normal appearance. He is well-developed.   HENT:      Head: Normocephalic and atraumatic.      Mouth/Throat:      Mouth: Mucous membranes are clear and moist, no lesions noted   Eyes:      General: No scleral icterus.  Neck:      Musculoskeletal: Neck supple. Decreased range of motion.   Cardiovascular:      Rate and Rhythm: Normal rate and regular rhythm.      Pulses: Normal pulses.      Heart sounds: Normal heart sounds.   Pulmonary:      Effort: Pulmonary effort is normal. No respiratory distress.      Breath sounds: Normal breath sounds.   Abdominal:      General: Bowel sounds are normal. There is no distension.      Palpations: Abdomen is soft.      Tenderness: There is no abdominal tenderness.   Musculoskeletal:         General: No tenderness or swelling.      Right lower leg: No edema.      Left lower leg: No edema.   Skin:     General: Skin is warm and dry.      Coloration: Skin is not pale.      Findings: Rash (resolved). No erythema or petechiae.      Comments: Dalal intact to right CW with no redness or drainage   Neurological:      Mental Status: He is alert and oriented to person, place, and time.   Psychiatric:         Attention and Perception: Attention normal.         Speech: Speech normal.         Thought Content: Thought content normal.         Cognition and Memory: Cognition and memory normal.         Assessment:       1. History of allogeneic stem cell transplant        Plan:         History of allogeneic stem cell transplant  - Admitted 7/21/20 for planned Flu/Cy/TBI haplo allo SCT. Son was the donor. Received 2 bags and a total CD34 dose of 3.10 x10^6/kg on 7/28/20. Recieved post transplant cyclophosphamide.  Engrafted neutrophils 8/10/20 day +13  - Today is Day +79  - Tacro level was 7 (goal 7-9). Continue 5 mg/5mg.  - stopped Ursodiol Day +30 (8/27/20)  - patient requests Rx for Valium and Oxycodone for pre-procedure in clinic bone marrow biopsies.  - had BMBx in clinic on 8/28/20 showing no definitive morphologic or immunophenotypic evidence of residual CML. Chimerisms unable to be obtained due to insufficient sample; drawn peripherally on 9/18. CD3-positive T-cells:  Insufficient DNA extracted for reliable analysis. CD33-positive myeloid cells:  The CD33-positive fraction contains approximately 100% donor DNA and approximately 0% recipient DNA.   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 Achieved morphologic CR  - Started on daily Ponatinib 30 mg daily, which he continued to take outpatient. Held Ponatinib during admission; may not need to restart per recent literature. BBMT 2020(26): 624-06  - Had BMBx at [facility] on 7/1/2020 showing no morphologic or immunophenotypic evidence of AML/CML  - Admitted 7/21/20 for Flu/Cy/TBI haplo allo SCT    GVHD  - had rash inpatient that was believed to be drug eruption. Resolved  - diffuse erythema to legs, lower back, arms, and abdomen presented on 9/15. Started 1mg/kg (90mg, will round to 100mg for dosing) of prednisone daily (9/15/20); decreased to 90 mg (4.5 tabs) on 9/21/2020  - rash much improved 9/21/2020; resolved to arms; improved to legs; abdomen stable; decreased to 3.5 tabs (70 mg) on 10/6/20 as rash has now resolved  - will wean slowly by 10 mg every week per Dr. Hodges, decrease to 60 mg daily today  - Tacro as above   - GVHD charting with each visit     ID  - Last CMV and EBV undetected; continue twice weekly CMV, weekly EBV, today's pending  -  Acyclovir (Zoster prophylaxis) until Day +365  - Diflucan (Fungal prophylaxis) until off tacrolimus.  - Bactrim (PJP prophylaxis) until off tacrolimus.     Hypomagnesemia/hyperphosphatemia   - Mag; Currently on mag 800 mg QID and increased magnesium in diet     Antineoplastic chemotherapy-induced cytopenias: anemia  - Transfuse Hgb <7g and plt <10K  - BMBx showing no residual disease.     Neck pain/ Cervical stenosis of spine   - Hx of spinal fusion C3-C5  - Xray 9/19 shows surgical changes of fusion from C3 through C5 with degenerative changed below fusion.   - Started PT on 8/26.  - Pt feels neck pain started after dalal was placed  - Has been mostly refractory to medications (zanaflex, gabapentin, celebrex, lidoderm patches). Some relief with oral morphine  - CT from 8/24/20 showing history of C3, C4, and C5 fusion and diffuse osteoarthritic changes  - Had virtual pain management appt on 8/25 to review CT results.  - Zanaflex switched to Robaxin per pain mgt.  - had medial branch block on 9/2/20 with Dr. Mcelroy (pain mgt). Per patient pain mgmnt signed off, as it is not a chronic issue, defer to BMT service for further pain control.  - started MS contin 15mg BID 9/20/2020; use PRN morphine q6h for breakthrough; titrate up MS contin as needed  - Patient reports much improvement to pain since starting high dose prednisone for his skin GVHD  - will schedule Dalal removal which may help with pain    BPH (benign prostatic hyperplasia)  - Continue Flomax     GERD (gastroesophageal reflux disease)  - Continue nexium; discussed with patient can increase to 40mg if needed while on high dose steroids  - Can take TUMS prn     Essential hypertension  - Increased home amlodipine from 5 mg to 10 mg daily while inpatient due to BPs as high as 180s/110s   - /90 today, discussed restarting BP meds  - continue to monitor and possibly restart in the near future.     Constipation  - continue Senna PRN    Follow-up:   -  CBC, CMP, type and screen, tacro, CMV, EBV (weekly only), mag, phos and appt with Dr. Hodges alternating with NP twice weekly.  - Labs should be in AMs while on tacro. Appts scheduled through end of October. Central line removal planned for 10/19

## 2020-10-16 LAB — CMV DNA SERPL NAA+PROBE-ACNC: NORMAL IU/ML

## 2020-10-19 ENCOUNTER — HOSPITAL ENCOUNTER (OUTPATIENT)
Facility: OTHER | Age: 60
Discharge: HOME | End: 2020-10-19
Attending: RADIOLOGY | Admitting: RADIOLOGY
Payer: COMMERCIAL

## 2020-10-19 VITALS
TEMPERATURE: 98 F | SYSTOLIC BLOOD PRESSURE: 153 MMHG | DIASTOLIC BLOOD PRESSURE: 73 MMHG | HEART RATE: 80 BPM | RESPIRATION RATE: 18 BRPM | OXYGEN SATURATION: 98 %

## 2020-10-19 DIAGNOSIS — C92.01 ACUTE MYELOID LEUKEMIA IN REMISSION: ICD-10-CM

## 2020-10-19 DIAGNOSIS — Z94.84 HISTORY OF ALLOGENEIC STEM CELL TRANSPLANT: ICD-10-CM

## 2020-10-19 DIAGNOSIS — C92.01: ICD-10-CM

## 2020-10-19 DIAGNOSIS — Z94.81 S/P ALLOGENEIC BONE MARROW TRANSPLANT: ICD-10-CM

## 2020-10-19 DIAGNOSIS — C92.00 AML (ACUTE MYELOBLASTIC LEUKEMIA): ICD-10-CM

## 2020-10-19 NOTE — PLAN OF CARE
Kvng Jones Sr. has met all discharge criteria from Phase II. Vital Signs are stable, ambulating  without difficulty.Pain is now under control and tolerable for the pt. Pain [id number] this time.  Discharge instructions given, patient verbalized understanding. Discharged from facility via wheelchair in stable condition.

## 2020-10-19 NOTE — PROCEDURES
Radiology Post-Procedure Note    Pre Op Diagnosis: AML, remission  Post Op Diagnosis: Same    Procedure: tunneled catheter removal    Procedure performed by: Ovidio Chaidez MD    Written Informed Consent Obtained: Yes  Specimen Removed: YES tunneled catheter  Estimated Blood Loss: Minimal    Findings:   Successful removal of tunneled catheter.    Patient tolerated procedure well.    @SIG@

## 2020-10-19 NOTE — DISCHARGE INSTRUCTIONS
Anesthesia: Monitored Anesthesia Care (MAC)    Anesthesia Safety  · Have an adult family member or friend drive you home after the procedure.  · For the first 24 hours after your surgery:  ¨ Do not drive or use heavy equipment.  ¨ Do not make important decisions or sign documents.  ¨ Avoid alcohol.  ¨ Have someone stay with you, if possible. They can watch for problems and help keep you safe.      Anesthesia: Monitored Anesthesia Care (MAC)    Anesthesia Safety  · Have an adult family member or friend drive you home after the procedure.  · For the first 24 hours after your surgery:  ¨ Do not drive or use heavy equipment.  ¨ Do not make important decisions or sign documents.  ¨ Avoid alcohol.  ¨ Have someone stay with you, if possible. They can watch for problems and help keep you safe.      FOLLOW ANY OTHER INSTRUCTIONS GIVEN TO YOU BY DR JOVEL

## 2020-10-19 NOTE — PROGRESS NOTES
Subjective:    Patient ID: Kvng Jones Sr. is a 60 y.o. male.    Chief Complaint: No chief complaint on file.    Oncologic hx:  60 y.o. 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 He was admitted for evaluation and induction chemotherapy with FLAG-Addis. Complications of therapy include epididymal orchitis with negative testicular ultrasound, neutropenic fever, dyspnea, and GGO of bilateral lungs on CT chest. Fever and chest findings were covered with broad spectrum antimicrobials. He did have hallucinations and vivid dreams with posaconazole. Chemotherapy was administered by left arm PICC line that was removed during hospital stay for MSSA infection treated with vancomycin. Hospital admission was 3/30/2020 to 5/1/2020 achieving morphologic CR with induction chemotherapy. He then started Ponatinib 30mg daily.     Studies performed during his hospital stay include pre-chemotherapy ECHO with normal ejection fraction of 60-65%. HIV, HBV, and HCV tests were negative. CBC at admission was WBC 38.6, Hgb 14.6, and platelet 416K. CT of the head without contrast was normal 4/20/2020. US of abdomen performed for abdominal distention and neutropenic fever had hepatic steatosis, liver 19cm, and spleen 11 cm. He does have a history of alcohol use and pancreatitis.    Patient is a . He is  to wife Guillermina who is his caregiver post transplant. He has a 45 pack year smoking history. He quit 6/1/2020, now on Chantix. He drinks 3-4 alcohol beverages nightly. He has 3 children. He has several family members in the [facility] area.    Transplant Course: Admitted 7/21/20 for a Flu/Cy/TBI haplo allo SCT. Son was donor. Received 2 bags and a total CD34 dose of 3.10 x10^6/kg on 7/28/20. Tolerated stem cells well. C/o headaches prior to admission, which persisted throughout admission. Neuro ultimately consulted for rec's. Was a daily drinker prior to admission.  No s/s of alcolhol withdrawal during admission. Transplant further complicated by hypertension, heart burn, c-diff neg diarrhea, nausea, sinus congestion, mild peripheral edema, blurry vision (ophtho consulted), mucositis, electrolyte abnormalities, and neck pain 2/2 spinal stenosis (referred to pain mgt at discharge). He engrafted on 8/10/20 with an ANC of 1280. He was discharged on 8/12/2020.    Interval History:  Patient presents for follow up clinic visit post Flu/Cy/TBI allogeneic stem cell transplant. Today is Day +84. Continues tacro 5 mg bid. He had his line removed on 10/19/20, notes 30% improvement in neck pain since removing. Chronic pain is stable, he started the healthy back physical therapy program today and noticed improved ROM. Rash has resolved and prednisone dose decreased to 50 mg daily on 10/19/20. His appetite is stable with controlled nausea on compazine prn. He notes an episode of reflux last night accompanied by difficulty breathing and reports some residual SOB this AM too. He denies fevers, chills, chest pain.    Review of Systems   Constitutional: Negative for fatigue, appetite change, chills, diaphoresis, fever and unexpected weight change.   HENT: Negative for congestion, dental problem, mouth sores, nosebleeds, postnasal drip, rhinorrhea, sinus pressure and trouble swallowing.    Eyes: Negative.  Negative for photophobia and visual disturbance.   Respiratory: Negative.  Negative for cough and shortness of breath.    Cardiovascular: Negative.  Negative for chest pain, palpitations and leg swelling.   Gastrointestinal: Negative for abdominal distention, abdominal pain, blood in stool, constipation, diarrhea, nausea and vomiting. Reflux  Endocrine: Negative.    Genitourinary: Negative.  Negative for dysuria, frequency, hematuria and urgency.   Musculoskeletal: Positive for neck pain and neck stiffness.        History of cervical spinal stenosis. See hpi, improved   Skin: Negative for rash  or pallor.   Allergic/Immunologic: Negative for environmental allergies, food allergies and immunocompromised state.   Neurological: Negative for dizziness, tremors, syncope, weakness, numbness and headaches.   Hematological: Negative for adenopathy. Does not bruise/bleed easily.   Psychiatric/Behavioral: Negative.  Negative for confusion. The patient is not nervous/anxious.        Objective:     Physical Exam  Vitals:    10/20/20 1137   BP: (!) 148/79   Pulse: 92   Resp: 17   Temp: 98.6 °F (37 °C)     Constitutional:       Appearance: Normal appearance. He is well-developed.   HENT:      Head: Normocephalic and atraumatic.      Mouth/Throat:      Mouth: Mucous membranes are clear and moist, no lesions noted   Eyes:      General: No scleral icterus.  Neck:      Musculoskeletal: Neck supple. Decreased range of motion improved from prior exam.   Cardiovascular:      Rate and Rhythm: Normal rate and regular rhythm.      Pulses: Normal pulses.      Heart sounds: Normal heart sounds.   Pulmonary:      Effort: Pulmonary effort is normal. No respiratory distress.      Breath sounds: Normal breath sounds.   Abdominal:      General: Bowel sounds are normal. There is no distension.      Palpations: Abdomen is soft.      Tenderness: There is no abdominal tenderness.   Musculoskeletal:         General: No tenderness or swelling.      Right lower leg: No edema.      Left lower leg: No edema.   Skin:     General: Skin is warm and dry.      Coloration: Skin is not pale.      Findings: Rash (resolved). No erythema or petechiae.      Comments: Mulugeta removed 10/20/20 and has dressing in place which is c/d/i   Neurological:      Mental Status: He is alert and oriented to person, place, and time.   Psychiatric:         Attention and Perception: Attention normal.         Speech: Speech normal.         Thought Content: Thought content normal.         Cognition and Memory: Cognition and memory normal.         Assessment:       1.  History of allogeneic stem cell transplant    2. Acute myeloid leukemia in remission    3. CML (chronic myelocytic leukemia)    4. Acute GVHD complicating bone marrow transplantation    5. Hypomagnesemia    6. Antineoplastic chemotherapy induced anemia    7. Cervical stenosis of spine    8. Posterior neck pain    9. Benign prostatic hyperplasia without lower urinary tract symptoms    10. Gastroesophageal reflux disease without esophagitis    11. Essential hypertension    12. Drug induced constipation        Plan:         History of allogeneic stem cell transplant  - Admitted 7/21/20 for planned Flu/Cy/TBI haplo allo SCT. Son was the donor. Received 2 bags and a total CD34 dose of 3.10 x10^6/kg on 7/28/20. Recieved post transplant cyclophosphamide. Engrafted neutrophils 8/10/20 day +13  - Today is Day +84  - Tacro level was 6.7 yesterday but dose was not adjusted (goal 7-9). Will continue 5 mg/5mg and adjust at next visit if needed   - Stopped Ursodiol Day +30 (8/27/20)  - Patient requests Rx for Valium and Oxycodone for pre-procedure in clinic bone marrow biopsies.  - Had BMBx in clinic on 8/28/20 showing no definitive morphologic or immunophenotypic evidence of residual CML. Chimerisms unable to be obtained due to insufficient sample; drawn peripherally on 9/18. CD3-positive T-cells:  Insufficient DNA extracted for reliable analysis. CD33-positive myeloid cells:  The CD33-positive fraction contains approximately 100% donor DNA and approximately 0% recipient DNA.   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 Achieved morphologic CR  - Started on daily Ponatinib 30 mg daily, which he continued to take outpatient. Held Ponatinib during admission; may not need to restart per recent literature. BBMT  2020(26): 472-49  - Had BMBx at [facility] on 7/1/2020 showing no morphologic or immunophenotypic evidence of AML/CML  - Admitted 7/21/20 for Flu/Cy/TBI haplo allo SCT    GVHD  - Had rash inpatient that was believed to be drug eruption. Resolved  - Diffuse erythema to legs, lower back, arms, and abdomen presented on 9/15. Started 1mg/kg (100 mg) of prednisone daily (9/15/20).  Decreased to 50 mg daily on 10/19/20. Weaning slowly by 10 mg weekly.  - Tacro as above   - aGVHD charting with each visit and plan to begin chronic charting at day +100    ID  - Last CMV and EBV undetected; continue twice weekly CMV, weekly EBV, today's pending  - Acyclovir (Zoster prophylaxis) until Day +365  - Diflucan (Fungal prophylaxis) until off tacrolimus  - Bactrim (PJP prophylaxis) until off tacrolimus    Hypomagnesemia  - Currently on mag 800 mg QID   - Educated about an increased-magnesium diet    Antineoplastic chemotherapy-induced cytopenias: anemia  - Transfuse Hgb <7g and plt <10K or bleeding  - BMBx showing no residual disease     Neck pain/ Cervical stenosis of spine   - Hx of spinal fusion C3-C5  - Xray 9/19 shows surgical changes of fusion from C3 through C5 with degenerative changed below fusion.   - Started PT on 8/26.  - Pt feels neck pain started after grewal was placed (now removed 10/19/20)  - Has been mostly refractory to medications (zanaflex, gabapentin, celebrex, lidoderm patches). Some relief with oral morphine  - CT from 8/24/20 showing history of C3, C4, and C5 fusion and diffuse osteoarthritic changes  - Had virtual pain management appt on 8/25 to review CT results.  - Zanaflex switched to Robaxin per pain mgt.  - Had medial branch block on 9/2/20 with Dr. Mcelroy (pain mgt). Per patient pain mgmnt signed off, as it is not a chronic issue, defer to BMT service for further pain control.  - Started MS contin 15mg BID 9/20/2020; use PRN morphine q6h for breakthrough; titrate up MS contin as needed  - Patient reports  much improvement to pain since starting high dose prednisone for his skin GVHD  - Dalal was removed 10/19/20 and this helped with neck pain    BPH (benign prostatic hyperplasia)  - Continue Flomax     GERD (gastroesophageal reflux disease)  - Continue nexium; discussed with patient can increase to 40mg if needed while on high dose steroids  - Can take TUMS prn     Essential hypertension  - Currently not on any agents, will restart amlodipine at 5 mg daily    Constipation  - continue Senna PRN    Follow-up:   - CBC, CMP, type and screen, tacro, CMV, EBV (weekly only), mag, phos and appt with Dr. Hodges alternating with NP twice weekly.  - Labs should be in AMs while on tacro.   - Appts scheduled through end of October.  - Assured lab collect as central line has been removed.    Yasmine Goldsmith, PHUC, NP  Hematology/Oncology

## 2020-10-19 NOTE — H&P
Consult/H&P Note  Interventional Radiology    Consult Requested By: oncology    Reason for Consult: tunneled catheter no longer needed    SUBJECTIVE:     Chief Complaint: leukemia    History of Present Illness: 59 yo M with leukemia, in remission, for removal of tunneled catheter.    Past Medical History:   Diagnosis Date    CML (chronic myelocytic leukemia)     Hx of psychiatric care     valmarkell floydivan    Melanoma in situ of external ear, right      Past Surgical History:   Procedure Laterality Date    CHOLECYSTECTOMY      INJECTION OF ANESTHETIC AGENT AROUND NERVE Bilateral 2020    Procedure: BLOCK, NERVE bilateral C4,5,6 MBB;  Surgeon: Oscar Mcelroy MD;  Location: [facility];  Service: Pain Management;  Laterality: Bilateral;  bilateral C4,5,6 MBB   consent needed    NECK SURGERY      TONSILLECTOMY       History reviewed. No pertinent family history.  Social History     Tobacco Use    Smoking status: Former Smoker     Packs/day: 1.00     Years: 35.00     Pack years: 35.00     Start date: 1985     Quit date: 2020     Years since quittin.3    Smokeless tobacco: Never Used   Substance Use Topics    Alcohol use: Yes     Alcohol/week: 12.0 standard drinks     Types: 4 Glasses of wine, 4 Cans of beer, 4 Shots of liquor per week    Drug use: Yes     Types: Marijuana     Comment: medical marijuana       Review of Systems:  Constitutional/General:No fever, chills, change in appetite or weight loss.  Hematological/Immuno: no known coagulopathies  Respiratory: no shortness of breath  Cardiovascular: no chest pain  Gastrointestinal: no abdominal pain  Genito-Urinary: no dysuria  Musculoskeletal: negative  Skin: Negative for rash, itching, pigmentation changes, nail or hair changes.  Neurological: no TIA or stroke symptoms  Psychiatric: normal mood/affect, good insight/judgement      OBJECTIVE:     Vital Signs Range (Last 24H):  Temp:  [97.6 °F (36.4 °C)]   Pulse:  [78]   Resp:  [18]    BP: (143)/(90)   SpO2:  [97 %]     Physical Exam:  General- Patient alert and oriented x3 in NAD  ENT- PERRLA,  Neck- No masses  CV- Regular rate and rhythm  Resp-  No increased WOB  GI- Non tender/non-distended  Extrem- No cyanosis, clubbing, edema.   Derm- No rashes, masses, or lesions noted  Neuro-  No focal deficits noted.     Physical Exam  There is no height or weight on file to calculate BMI.    Scheduled Meds:   Continuous Infusions:   PRN Meds:    Allergies:   Review of patient's allergies indicates:   Allergen Reactions    Posaconazole Hallucinations    Unclassified drug Other (See Comments)     Pt reports that he has an allergic reaction to an Antifungal medication, but is unsure of the name of the drug. Will obtain name prior to arrival in am and notify Pre-op RN.    Vancomycin analogues Other (See Comments)     Pt reports he had rigors    Codeine Hives    Iodine Hives and Rash    Iodinated contrast media Hives       Labs:  No results for input(s): INR in the last 168 hours.    Invalid input(s):  PT,  PTT    Recent Labs   Lab 10/15/20  0933   WBC 6.52   HGB 12.5*   HCT 39.7*   *   *      Recent Labs   Lab 10/15/20  0933         K 5.2*   CL 99   CO2 27   BUN 20   CREATININE 1.0   CALCIUM 9.3   MG 1.7   ALT 29   AST 20   ALBUMIN 4.1   BILITOT 0.3       Vitals (Most Recent):  Temp: 97.6 °F (36.4 °C) (10/19/20 0907)  Pulse: 78 (10/19/20 0907)  Resp: 18 (10/19/20 0907)  BP: (!) 143/90 (10/19/20 0907)  SpO2: 97 % (10/19/20 0907)    ASA: 3  Mallampati: 2    Consent obtained    ASSESSMENT/PLAN:     Tunneled catheter removal.  Up to moderate sedation.    Active Hospital Problems    Diagnosis  POA    AML (acute myeloblastic leukemia) [C92.00]  Yes      Resolved Hospital Problems   No resolved problems to display.           Ovidio Chaidez MD

## 2020-10-19 NOTE — PROGRESS NOTES
"[facility]      Name: Kvng Jones .  Clinic Number: [id number]    Therapy Diagnosis:   Encounter Diagnoses   Name Primary?    Poor posture Yes    Decreased strength of trunk and back      Physician: Stefani Harrington, *    Visit Date: 10/20/2020      Physician Orders: PT Eval and Treat   Medical Diagnosis from Referral:   M79.18 (ICD-10-CM) - Myofascial pain   M54.2 (ICD-10-CM) - Neck pain   M47.812 (ICD-10-CM) - Spondylosis of cervical region without myelopathy or radiculopathy       Evaluation Date: 10/9/2020  Authorization Period Expiration: 10/08/21  Plan of Care Expiration: 1/8/2021  Reassessment Due: 11/9/20  Visit # / Visits authorized: 2 / 20    Time In: 10:20 am   Time Out: 11:10 am  Total Billable Time: 50 minutes    Precautions: Standard/cervical fusion/leukemia/port right anterior chest     Pattern of pain determined: 1REP    Subjective   Johner reports poor sleep last night.  Pt believes this was due to meds and initially laying down /c inc cervical pain and stiffness.  However, at arrival, pt reports sig dec in sx and feeling "looser".  Pt reports having PICC line removed yesterday.        Patient reports tolerating previous visit well /c mild soreness but not inhibiting him for completing HEP that night.   Patient reports their pain to be 3/10 on a 0-10 scale with 0 being no pain and 10 being the worst pain imaginable.  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    Objective     Baseline Isometric Testing on Med X equipment:  Testing administered by PT  Date of testing: 10/9/20  ROM 42-78 deg   Max Peak Torque 288    Min Peak Torque 214   Flex/Ext Ratio 34   % below normative data 34% "           Limitation/Restriction for FOTO cervical Survey     Therapist reviewed FOTO scores for Kvng Jones Sr. on 10/9/2020.   FOTO documents entered into [employer] - see Media section.     Limitation Score: 51%        Wound Assessment from PICC removal (10/20/20)   Waterproof bandages in place.  No observed redness.  Min tenderness to palpation      Treatment    Pt was instructed in and performed the following:     Kvng received therapeutic exercises to develop/improved posture, cardiovascular endurance, muscular endurance, lumbar/cervical ROM, strength and muscular endurance for 60 minutes including the following exercises:     Seated:   Cervical retractions x10 cue for chin level and slide back  Cervical retractions /c ext x 10  Cervical rotations 5x2 each direction   Thoracic ext over bolster x20 hands on head for chest stretch and head control    Standing:  Scap retractions  x10 cue for dec shoulder elevation       [facility] 10/20/2020   Visit Number 2   VAS Pain Rating 3   Treadmill Time (in min.) 5   Retraction in Sitting 10   Retraction with Extension 10   Rotation 5   Scapular Retraction 10   Seat Adjustment -   Top Dead Center -   Counterweight -   Cervical Flexion -   Cervical Extension -   Cervical Peak Torque -   Cervical Weight 216   Repetitions 15   Rating of Perceived Exertion 4   Ice - Z Lie (in min.) 0           Peripheral muscle strengthening which included 1 set of 15-20 repetitions at a slow, controlled 10-13 second per rep pace focused on strengthening supporting musculature for improved body mechanics and functional mobility.  Pt and therapist focused on proper form during treatment to ensure optimal strengthening of each targeted muscle group.  Machines were utilized including torso rotation, leg extension, leg curl.  Chest press, upright row. Tricep extension, bicep curl, leg press, and hip abduction added visit 3.      Kvng received the following manual therapy  techniques: NONE were applied to the: NONE for NONE minutes.         Home Exercises Provided and Patient Education Provided   Home exercises include:*  Cervical retractions   Cervical retraction /c extension  Cervical rotations    Cardio program: TBD    Education provided:   - Pt ed on importance of performing HEP on regular basis    Written Home Exercises Provided: Patient instructed to cont prior HEP.  Exercises were reviewed and Kvng was able to demonstrate them prior to the end of the session.  Kvng demonstrated good  understanding of the education provided.     See EMR under Patient Instructions for exercises provided 10/20/2020.          Assessment   R sided PICC removal site did not indicate irritation or infection, therefore, cont /c mobility and POC involving structures surrounding that area.  Pt demo HEP /c mod accuracy indicating HEP compliance.  However, need cue for chin position and structure of exercises (mobility before strengthening) and issued HEP /c pictures to assure inc understanding of technique.  Pt demo sig dec rotation ROM especially to R.  Recommend add towel self assist to rotation exs at near future visits.   During MedX cervical warm up pt demo inc ext and flex ROM.  Recommend at next visit to inc ROM for inc mobility challenge.  Did not inc this visit due to this being first visit /c multiple reps for strength.  Weight set at 75% of max torque.  Pt perform 15 reps at 4/10 RPE indicating appropriate challege level of present weight.  Did not perform UE/chest MedX peripheral machines so as not to stress recent wound from PICC removal. Plan to intro UE/chest exercises next visit.  Also, pt deny need for ice today in attempt to make appt across town.      Patient is making good progress towards established goals.  Pt will continue to benefit from skilled outpatient physical therapy to address the deficits stated in the impairment chart, provide pt/family education and to  maximize pt's level of independence in the home and community environment.     Anticipated Barriers for therapy: none, awareness of PICC port healing   Pt's spiritual, cultural and educational needs considered and pt agreeable to plan of care and goals as stated below:       GOALS: Pt is in agreement with the following goals.     Short term goals: 6 weeks or 10 visits   1.  Pt will demonstratte increased cervical ROM as measured by med ex by 6 degrees from initial test which results in improved  ROM of neck for ease with ADLs and driving (not met, progressing)  2. Pt will demonstrate independence with reducing or controlling symptoms with ther ex, movement, or position independently, able to reduce pain 1-2 points on pain scale using strategies taught in therapy   (not met, progressing)    3. Pt will demonstrate increased MedX average isometric strength value by 10% with  when compared to the initial testing resulting in improved ability to perform bending, lifting, and carrying activities safely, confidently.   (not met, progressing)             Long term goals: 10 weeks or 20 visits  1. Pt will demonstratte increased cervical ROM as measured by med ex by 12 degrees from initial test which results in functional ROM of neck for ease with ADLs and driving   (not met, progressing)    2. Pt will demonstrate increased MedX average isometric strength value  by 30% from initial test to improve ability to lift and carry, and sustain good posture while performing ADL's  (not met, progressing)    3.Pt will demonstrate reduced pain and improved functional outcomes as reported on the FOTO by reaching a limitation score of < or = 40% or less in order to demonstrate subjective improvement in pt's condition.   (not met, progressing)    4. Pt will demonstrate independence with reducing or controlling symptoms with ther ex, movement, or position independently, able to reduce pain 2-4 points on pain scale using strategies taught in  therapy  (not met, progressing)    5. Pt will demonstrate independence with the HEP at discharge.   (not met, progressing)    6.  Pt(patient goal)will be able to drive without pain and fish without pain  (not met, progressing)          Plan   Continue with established Plan of Care towards established PT goals.

## 2020-10-19 NOTE — DISCHARGE SUMMARY
Radiology Discharge Summary      Hospital Course: No complications    Admit Date: 10/19/2020  Discharge Date: 10/19/2020     Instructions Given to Patient: Yes  Diet: Resume prior diet  Activity: activity as tolerated    Description of Condition on Discharge: Stable  Vital Signs (Most Recent): Temp: 97.6 °F (36.4 °C) (10/19/20 0907)  Pulse: 76 (10/19/20 1000)  Resp: 17 (10/19/20 1000)  BP: (!) 147/94 (10/19/20 1000)  SpO2: 97 % (10/19/20 1000)    Discharge Disposition: Home    Discharge Diagnosis: AML, in remission     Follow-up: per oncology    @SIG@

## 2020-10-20 ENCOUNTER — CLINICAL SUPPORT (OUTPATIENT)
Dept: REHABILITATION | Facility: OTHER | Age: 60
End: 2020-10-20
Attending: PHYSICAL MEDICINE & REHABILITATION
Payer: COMMERCIAL

## 2020-10-20 ENCOUNTER — OFFICE VISIT (OUTPATIENT)
Dept: HEMATOLOGY/ONCOLOGY | Facility: CLINIC | Age: 60
End: 2020-10-20
Payer: COMMERCIAL

## 2020-10-20 VITALS
SYSTOLIC BLOOD PRESSURE: 148 MMHG | HEIGHT: 66 IN | DIASTOLIC BLOOD PRESSURE: 79 MMHG | TEMPERATURE: 99 F | RESPIRATION RATE: 17 BRPM | OXYGEN SATURATION: 94 % | WEIGHT: 208.88 LBS | BODY MASS INDEX: 33.57 KG/M2 | HEART RATE: 92 BPM

## 2020-10-20 DIAGNOSIS — K21.9 GASTROESOPHAGEAL REFLUX DISEASE WITHOUT ESOPHAGITIS: ICD-10-CM

## 2020-10-20 DIAGNOSIS — D64.81 ANTINEOPLASTIC CHEMOTHERAPY INDUCED ANEMIA: ICD-10-CM

## 2020-10-20 DIAGNOSIS — I10 ESSENTIAL HYPERTENSION: ICD-10-CM

## 2020-10-20 DIAGNOSIS — M48.02 CERVICAL STENOSIS OF SPINE: ICD-10-CM

## 2020-10-20 DIAGNOSIS — R29.3 POOR POSTURE: Primary | ICD-10-CM

## 2020-10-20 DIAGNOSIS — C92.10 CML (CHRONIC MYELOCYTIC LEUKEMIA): ICD-10-CM

## 2020-10-20 DIAGNOSIS — R29.898 DECREASED STRENGTH OF TRUNK AND BACK: ICD-10-CM

## 2020-10-20 DIAGNOSIS — K59.03 DRUG INDUCED CONSTIPATION: ICD-10-CM

## 2020-10-20 DIAGNOSIS — T45.1X5A ANTINEOPLASTIC CHEMOTHERAPY INDUCED ANEMIA: ICD-10-CM

## 2020-10-20 DIAGNOSIS — N40.0 BENIGN PROSTATIC HYPERPLASIA WITHOUT LOWER URINARY TRACT SYMPTOMS: ICD-10-CM

## 2020-10-20 DIAGNOSIS — E83.42 HYPOMAGNESEMIA: ICD-10-CM

## 2020-10-20 DIAGNOSIS — C92.01 ACUTE MYELOID LEUKEMIA IN REMISSION: ICD-10-CM

## 2020-10-20 DIAGNOSIS — T86.09 ACUTE GVHD COMPLICATING BONE MARROW TRANSPLANTATION: ICD-10-CM

## 2020-10-20 DIAGNOSIS — D89.810 ACUTE GVHD COMPLICATING BONE MARROW TRANSPLANTATION: ICD-10-CM

## 2020-10-20 DIAGNOSIS — M54.2 POSTERIOR NECK PAIN: ICD-10-CM

## 2020-10-20 DIAGNOSIS — Z94.84 HISTORY OF ALLOGENEIC STEM CELL TRANSPLANT: Primary | ICD-10-CM

## 2020-10-20 PROCEDURE — 99215 OFFICE O/P EST HI 40 MIN: CPT | Mod: BMT,S$GLB,, | Performed by: NURSE PRACTITIONER

## 2020-10-20 PROCEDURE — 99999 PR PBB SHADOW E&M-EST. PATIENT-LVL V: ICD-10-PCS | Mod: PBBFAC,BMT,, | Performed by: NURSE PRACTITIONER

## 2020-10-20 PROCEDURE — 99999 PR PBB SHADOW E&M-EST. PATIENT-LVL V: CPT | Mod: PBBFAC,BMT,, | Performed by: NURSE PRACTITIONER

## 2020-10-20 PROCEDURE — 3008F BODY MASS INDEX DOCD: CPT | Mod: BMT,CPTII,S$GLB, | Performed by: NURSE PRACTITIONER

## 2020-10-20 PROCEDURE — 97110 THERAPEUTIC EXERCISES: CPT

## 2020-10-20 PROCEDURE — 99215 PR OFFICE/OUTPT VISIT, EST, LEVL V, 40-54 MIN: ICD-10-PCS | Mod: BMT,S$GLB,, | Performed by: NURSE PRACTITIONER

## 2020-10-20 PROCEDURE — 3008F PR BODY MASS INDEX (BMI) DOCUMENTED: ICD-10-PCS | Mod: BMT,CPTII,S$GLB, | Performed by: NURSE PRACTITIONER

## 2020-10-20 RX ORDER — GABAPENTIN 300 MG/1
300 CAPSULE ORAL NIGHTLY
Qty: 30 CAPSULE | Refills: 11
Start: 2020-10-20 | End: 2020-11-11

## 2020-10-20 RX ORDER — AMLODIPINE BESYLATE 5 MG/1
5 TABLET ORAL DAILY
Qty: 30 TABLET | Refills: 11 | Status: SHIPPED | OUTPATIENT
Start: 2020-10-20 | End: 2020-11-09 | Stop reason: SDUPTHER

## 2020-10-20 RX ORDER — PREDNISONE 20 MG/1
50 TABLET ORAL DAILY
Start: 2020-10-20 | End: 2020-10-22 | Stop reason: SDUPTHER

## 2020-10-21 ENCOUNTER — TELEPHONE (OUTPATIENT)
Dept: HEMATOLOGY/ONCOLOGY | Facility: CLINIC | Age: 60
End: 2020-10-21

## 2020-10-22 ENCOUNTER — OFFICE VISIT (OUTPATIENT)
Dept: HEMATOLOGY/ONCOLOGY | Facility: CLINIC | Age: 60
End: 2020-10-22
Payer: COMMERCIAL

## 2020-10-22 VITALS
OXYGEN SATURATION: 95 % | TEMPERATURE: 98 F | BODY MASS INDEX: 33.7 KG/M2 | RESPIRATION RATE: 16 BRPM | WEIGHT: 209.69 LBS | HEIGHT: 66 IN | HEART RATE: 91 BPM | SYSTOLIC BLOOD PRESSURE: 132 MMHG | DIASTOLIC BLOOD PRESSURE: 74 MMHG

## 2020-10-22 DIAGNOSIS — T45.1X5A ANTINEOPLASTIC CHEMOTHERAPY INDUCED ANEMIA: ICD-10-CM

## 2020-10-22 DIAGNOSIS — D69.59 CHEMOTHERAPY-INDUCED THROMBOCYTOPENIA: ICD-10-CM

## 2020-10-22 DIAGNOSIS — C92.10 CML (CHRONIC MYELOCYTIC LEUKEMIA): ICD-10-CM

## 2020-10-22 DIAGNOSIS — Z94.84 HISTORY OF ALLOGENEIC STEM CELL TRANSPLANT: Primary | ICD-10-CM

## 2020-10-22 DIAGNOSIS — C92.01 ACUTE MYELOID LEUKEMIA IN REMISSION: ICD-10-CM

## 2020-10-22 DIAGNOSIS — M48.02 CERVICAL STENOSIS OF SPINE: ICD-10-CM

## 2020-10-22 DIAGNOSIS — D64.81 ANTINEOPLASTIC CHEMOTHERAPY INDUCED ANEMIA: ICD-10-CM

## 2020-10-22 DIAGNOSIS — T86.09 ACUTE GVHD COMPLICATING BONE MARROW TRANSPLANTATION: ICD-10-CM

## 2020-10-22 DIAGNOSIS — T45.1X5A CHEMOTHERAPY-INDUCED THROMBOCYTOPENIA: ICD-10-CM

## 2020-10-22 DIAGNOSIS — E83.42 HYPOMAGNESEMIA: ICD-10-CM

## 2020-10-22 DIAGNOSIS — D89.810 ACUTE GVHD COMPLICATING BONE MARROW TRANSPLANTATION: ICD-10-CM

## 2020-10-22 DIAGNOSIS — E87.5 HYPERKALEMIA: ICD-10-CM

## 2020-10-22 DIAGNOSIS — K21.9 GASTROESOPHAGEAL REFLUX DISEASE WITHOUT ESOPHAGITIS: ICD-10-CM

## 2020-10-22 DIAGNOSIS — I10 ESSENTIAL HYPERTENSION: ICD-10-CM

## 2020-10-22 PROCEDURE — 99999 PR PBB SHADOW E&M-EST. PATIENT-LVL IV: ICD-10-PCS | Mod: PBBFAC,BMT,, | Performed by: NURSE PRACTITIONER

## 2020-10-22 PROCEDURE — 3008F BODY MASS INDEX DOCD: CPT | Mod: BMT,CPTII,S$GLB, | Performed by: NURSE PRACTITIONER

## 2020-10-22 PROCEDURE — 99999 PR PBB SHADOW E&M-EST. PATIENT-LVL IV: CPT | Mod: PBBFAC,BMT,, | Performed by: NURSE PRACTITIONER

## 2020-10-22 PROCEDURE — 99215 PR OFFICE/OUTPT VISIT, EST, LEVL V, 40-54 MIN: ICD-10-PCS | Mod: BMT,S$GLB,, | Performed by: NURSE PRACTITIONER

## 2020-10-22 PROCEDURE — 99215 OFFICE O/P EST HI 40 MIN: CPT | Mod: BMT,S$GLB,, | Performed by: NURSE PRACTITIONER

## 2020-10-22 PROCEDURE — 3008F PR BODY MASS INDEX (BMI) DOCUMENTED: ICD-10-PCS | Mod: BMT,CPTII,S$GLB, | Performed by: NURSE PRACTITIONER

## 2020-10-22 RX ORDER — PREDNISONE 20 MG/1
50 TABLET ORAL DAILY
Qty: 60 TABLET | Refills: 0 | Status: SHIPPED | OUTPATIENT
Start: 2020-10-22 | End: 2020-11-18 | Stop reason: SDUPTHER

## 2020-10-22 RX ORDER — FLUTICASONE PROPIONATE 50 MCG
2 SPRAY, SUSPENSION (ML) NASAL DAILY PRN
Qty: 16 G | Refills: 0 | Status: SHIPPED | OUTPATIENT
Start: 2020-10-22 | End: 2020-12-10

## 2020-10-22 RX ORDER — TACROLIMUS 1 MG/1
CAPSULE ORAL
Qty: 300 CAPSULE | Refills: 0 | Status: SHIPPED | OUTPATIENT
Start: 2020-10-22 | End: 2020-11-27

## 2020-10-22 NOTE — PROGRESS NOTES
Subjective:    Patient ID: Kvng Jones Sr. is a 60 y.o. male.    Chief Complaint: Follow-up (post allo ) and Leukemia    Oncologic hx:  60 y.o. male admitted to [facility] for CML presenting with blast crisis and lewis disease. He was admitted for evaluation and induction chemotherapy with FLAG-Addis. Complications of therapy include epididymal orchitis with negative testicular ultrasound, neutropenic fever, dyspnea, and GGO of bilateral lungs on CT chest. Fever and chest findings were covered with broad spectrum antimicrobials. He did have hallucinations and vivid dreams with posaconazole. Chemotherapy was administered by left arm PICC line that was removed during hospital stay for MSSA infection treated with vancomycin. Hospital admission was 3/30/2020 to 5/1/2020 achieving morphologic CR with induction chemotherapy. He then started Ponatinib 30mg daily.     Studies performed during his hospital stay include pre-chemotherapy ECHO with normal ejection fraction of 60-65%. HIV, HBV, and HCV tests were negative. CBC at admission was WBC 38.6, Hgb 14.6, and platelet 416K. CT of the head without contrast was normal 4/20/2020. US of abdomen performed for abdominal distention and neutropenic fever had hepatic steatosis, liver 19cm, and spleen 11 cm. He does have a history of alcohol use and pancreatitis.    Patient is a . He is  to wife Guillermina who is his caregiver post transplant. He has a 45 pack year smoking history. He quit 6/1/2020, now on Chantix. He drinks 3-4 alcohol beverages nightly. He has 3 children. He has several family members in the [facility] area.    Transplant Course: Admitted 7/21/20 for a Flu/Cy/TBI haplo allo SCT. Son was donor. Received 2 bags and a total CD34 dose of 3.10 x10^6/kg on 7/28/20. Tolerated stem cells well. C/o headaches prior to admission, which persisted throughout admission. Neuro ultimately consulted for rec's. Was a daily drinker prior to  admission. No s/s of alcolhol withdrawal during admission. Transplant further complicated by hypertension, heart burn, c-diff neg diarrhea, nausea, sinus congestion, mild peripheral edema, blurry vision (ophtho consulted), mucositis, electrolyte abnormalities, and neck pain 2/2 spinal stenosis (referred to pain mgt at discharge). He engrafted on 8/10/20 with an ANC of 1280. He was discharged on 8/12/2020.    Interval History:  Patient presents for follow up clinic visit post Flu/Cy/TBI allogeneic stem cell transplant. Today is Day +86. Continues tacro 5 mg bid. He had his line removed on 10/19/20, states pain was much better that night but was then exacerbated again after PT the next day.Rash remains resolved and Prednisone dose was decreased to 50 mg daily on 10/19/20. He is eating well. He reports lots of nausea the past week and taking Compazine and Zofran. He has been having lots of indigestion and reflux the past week.states he took pepto, tums, pepcid, Nexium, gas-x. He denies diarrhea, but has cramps and had 6 normal BM's yesterday. He denies fevers, chills, chest pain.    Review of Systems   Constitutional: Negative for fatigue, appetite change, chills, diaphoresis, fever and unexpected weight change.   HENT: Negative for congestion, dental problem, mouth sores, nosebleeds, postnasal drip, rhinorrhea, sinus pressure and trouble swallowing.    Eyes: Negative.  Negative for photophobia and visual disturbance.   Respiratory: Negative.  Negative for cough and shortness of breath.    Cardiovascular: Negative.  Negative for chest pain, palpitations and leg swelling.   Gastrointestinal: Negative for abdominal distention, abdominal pain, blood in stool, constipation, diarrhea, and vomiting. Positive for nausea and GERD  Endocrine: Negative.    Genitourinary: Negative.  Negative for dysuria, frequency, hematuria and urgency.   Musculoskeletal: Positive for neck pain and neck stiffness.        History of cervical  spinal stenosis. See hpi, improved   Skin: Negative for rash or pallor.   Allergic/Immunologic: Negative for environmental allergies, food allergies and immunocompromised state.   Neurological: Negative for dizziness, tremors, syncope, weakness, numbness and headaches.   Hematological: Negative for adenopathy. Does not bruise/bleed easily.   Psychiatric/Behavioral: Negative.  Negative for confusion. The patient is not nervous/anxious.        Objective:     Physical Exam  Vitals:    10/22/20 0936   BP: 132/74   Pulse: 91   Resp: 16   Temp: 98.4 °F (36.9 °C)     Constitutional:       Appearance: Normal appearance. He is well-developed.   HENT:      Head: Normocephalic and atraumatic.      Mouth/Throat:      Mouth: Mucous membranes are clear and moist, no lesions noted   Eyes:      General: No scleral icterus.  Neck:      Musculoskeletal: Neck supple. Decreased range of motion improved from prior exam--improved   Cardiovascular:      Rate and Rhythm: Normal rate and regular rhythm.      Pulses: Normal pulses.      Heart sounds: Normal heart sounds.   Pulmonary:      Effort: Pulmonary effort is normal. No respiratory distress.      Breath sounds: Normal breath sounds.   Abdominal:      General: Bowel sounds are normal. There is no distension.      Palpations: Abdomen is soft.      Tenderness: There is no abdominal tenderness.   Musculoskeletal:         General: No tenderness or swelling.      Right lower leg: No edema.      Left lower leg: No edema.   Skin:     General: Skin is warm and dry.      Coloration: Skin is not pale.      Findings: No rash, erythema or petechiae.      Comments: Dalal removed 10/19/20, site with no redness or drainage   Neurological:      Mental Status: He is alert and oriented to person, place, and time.   Psychiatric:         Attention and Perception: Attention normal.         Speech: Speech normal.         Thought Content: Thought content normal.         Cognition and Memory: Cognition and  memory normal.         Assessment:       1. History of allogeneic stem cell transplant    2. Acute GVHD complicating bone marrow transplantation    3. Acute myeloid leukemia in remission    4. CML (chronic myelocytic leukemia)    5. Hypomagnesemia    6. Antineoplastic chemotherapy induced anemia    7. Chemotherapy-induced thrombocytopenia    8. Cervical stenosis of spine    9. Gastroesophageal reflux disease without esophagitis    10. Essential hypertension    11. Hyperkalemia        Plan:         History of allogeneic stem cell transplant  - Admitted 7/21/20 for planned Flu/Cy/TBI haplo allo SCT. Son was the donor. Received 2 bags and a total CD34 dose of 3.10 x10^6/kg on 7/28/20. Recieved post transplant cyclophosphamide. Engrafted neutrophils 8/10/20 day +13  - Today is Day +84  - Tacro level was 7.4 today (goal 7-9). Will continue 5 mg/5mg.  - Stopped Ursodiol Day +30 (8/27/20)  - Patient requests Rx for Valium and Oxycodone for pre-procedure in clinic bone marrow biopsies.  - Had BMBx in clinic on 8/28/20 showing no definitive morphologic or immunophenotypic evidence of residual CML. Chimerisms unable to be obtained due to insufficient sample; drawn peripherally on 9/18. CD3-positive T-cells:  Insufficient DNA extracted for reliable analysis. CD33-positive myeloid cells:  The CD33-positive fraction contains approximately 100% donor DNA and approximately 0% recipient DNA.   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 Achieved morphologic CR  - Started on daily Ponatinib 30 mg daily, which he continued to take outpatient. Held Ponatinib during admission; may not need to restart per recent literature. BBMT 2020(48): 598-82  - Had BMBx at [facility] on 7/1/2020 showing no morphologic or immunophenotypic  evidence of AML/CML  - Admitted 7/21/20 for Flu/Cy/TBI haplo allo SCT    GVHD  - Had rash inpatient that was believed to be drug eruption. Resolved  - Diffuse erythema to legs, lower back, arms, and abdomen presented on 9/15. Started 1mg/kg (100 mg) of prednisone daily (9/15/20).  Decreased to 50 mg daily on 10/19/20. Weaning slowly by 10 mg weekly.  - Tacro as above   - aGVHD charting with each visit and plan to begin chronic charting at day +100    ID  - Last CMV and EBV undetected; continue twice weekly CMV, weekly EBV, today's pending  - Acyclovir (Zoster prophylaxis) until Day +365  - Diflucan (Fungal prophylaxis) until off tacrolimus  - Bactrim (PJP prophylaxis) until off tacrolimus    Hypomagnesemia  - Currently on mag 800 mg QID   - high magnesium diet  - mag wnl today    Antineoplastic chemotherapy-induced cytopenias: anemia and thrombocytopenia   - hgb improved to 13.5 and platelets improved to 125k  - BMBx showing no residual disease     Neck pain/ Cervical stenosis of spine   - Hx of spinal fusion C3-C5  - Xray 9/19 shows surgical changes of fusion from C3 through C5 with degenerative changed below fusion.   - Started PT on 8/26.  - Pt feels neck pain started after dalal was placed (now removed 10/19/20)  - Has been mostly refractory to medications (zanaflex, gabapentin, celebrex, lidoderm patches). Some relief with oral morphine  - CT from 8/24/20 showing history of C3, C4, and C5 fusion and diffuse osteoarthritic changes  - Had medial branch block on 9/2/20 with Dr. Mcelroy (pain mgt). Per patient pain mgmnt signed off, as it is not a chronic issue, defer to BMT service for further pain control.  - Started MS contin 15mg BID 9/20/2020; use PRN morphine q6h for breakthrough; titrate up MS contin as needed  - Patient reports much improvement to pain since starting high dose prednisone for his skin GVHD  - Dalal was removed 10/19/20 and this helped with neck pain    BPH (benign prostatic  hyperplasia)  - Continue Flomax     GERD (gastroesophageal reflux disease)  - Continue Nexium bid and Prilosec PRN  - taking TUMS, Gas-X, and Pepto     Essential hypertension  - restarted amlodipine at 5 mg daily  - BP wnl today    Constipation  - continue Senna PRN    Hyperkalemia  - K 5.4 today  - educated on low potassium diet    Follow-up:   - CBC, CMP, type and screen, tacro, CMV, EBV (weekly only), mag, phos and appt with Dr. Hodges alternating with NP twice weekly.  - Labs should be in AMs while on tacro.   - Appts scheduled through end of October.    Yomaira Little NP  Hematology/Oncology

## 2020-10-26 ENCOUNTER — LAB VISIT (OUTPATIENT)
Dept: LAB | Facility: HOSPITAL | Age: 60
End: 2020-10-26
Attending: INTERNAL MEDICINE
Payer: COMMERCIAL

## 2020-10-26 ENCOUNTER — OFFICE VISIT (OUTPATIENT)
Dept: HEMATOLOGY/ONCOLOGY | Facility: CLINIC | Age: 60
End: 2020-10-26
Payer: COMMERCIAL

## 2020-10-26 ENCOUNTER — CLINICAL SUPPORT (OUTPATIENT)
Dept: HEMATOLOGY/ONCOLOGY | Facility: CLINIC | Age: 60
End: 2020-10-26
Payer: COMMERCIAL

## 2020-10-26 VITALS
HEART RATE: 88 BPM | WEIGHT: 210 LBS | SYSTOLIC BLOOD PRESSURE: 138 MMHG | DIASTOLIC BLOOD PRESSURE: 85 MMHG | TEMPERATURE: 99 F | OXYGEN SATURATION: 96 % | BODY MASS INDEX: 33.75 KG/M2 | HEIGHT: 66 IN | RESPIRATION RATE: 16 BRPM

## 2020-10-26 DIAGNOSIS — Z94.84 HISTORY OF ALLOGENEIC STEM CELL TRANSPLANT: Primary | ICD-10-CM

## 2020-10-26 DIAGNOSIS — C92.10 CML (CHRONIC MYELOCYTIC LEUKEMIA): ICD-10-CM

## 2020-10-26 DIAGNOSIS — Z94.81 S/P ALLOGENEIC BONE MARROW TRANSPLANT: ICD-10-CM

## 2020-10-26 DIAGNOSIS — T86.09 ACUTE GVHD COMPLICATING BONE MARROW TRANSPLANTATION: ICD-10-CM

## 2020-10-26 DIAGNOSIS — D89.810 ACUTE GVHD COMPLICATING BONE MARROW TRANSPLANTATION: ICD-10-CM

## 2020-10-26 DIAGNOSIS — Z76.82 STEM CELL TRANSPLANT CANDIDATE: ICD-10-CM

## 2020-10-26 DIAGNOSIS — C92.01 ACUTE MYELOID LEUKEMIA IN REMISSION: ICD-10-CM

## 2020-10-26 LAB
ABO + RH BLD: NORMAL
ABO GROUP BLD: NORMAL
ALBUMIN SERPL BCP-MCNC: 4.1 G/DL (ref 3.5–5.2)
ALP SERPL-CCNC: 53 U/L (ref 55–135)
ALT SERPL W/O P-5'-P-CCNC: 35 U/L (ref 10–44)
ANION GAP SERPL CALC-SCNC: 12 MMOL/L (ref 8–16)
AST SERPL-CCNC: 23 U/L (ref 10–40)
BASOPHILS # BLD AUTO: 0.05 K/UL (ref 0–0.2)
BASOPHILS NFR BLD: 0.5 % (ref 0–1.9)
BILIRUB SERPL-MCNC: 0.3 MG/DL (ref 0.1–1)
BLD GP AB SCN CELLS X3 SERPL QL: NORMAL
BUN SERPL-MCNC: 21 MG/DL (ref 8–23)
CALCIUM SERPL-MCNC: 9.7 MG/DL (ref 8.7–10.5)
CHLORIDE SERPL-SCNC: 102 MMOL/L (ref 95–110)
CO2 SERPL-SCNC: 27 MMOL/L (ref 23–29)
CREAT SERPL-MCNC: 1.3 MG/DL (ref 0.5–1.4)
DIFFERENTIAL METHOD: ABNORMAL
EOSINOPHIL # BLD AUTO: 0.1 K/UL (ref 0–0.5)
EOSINOPHIL NFR BLD: 0.6 % (ref 0–8)
ERYTHROCYTE [DISTWIDTH] IN BLOOD BY AUTOMATED COUNT: 16.1 % (ref 11.5–14.5)
EST. GFR  (AFRICAN AMERICAN): >60 ML/MIN/1.73 M^2
EST. GFR  (NON AFRICAN AMERICAN): 58.5 ML/MIN/1.73 M^2
GLUCOSE SERPL-MCNC: 119 MG/DL (ref 70–110)
HCT VFR BLD AUTO: 41.4 % (ref 40–54)
HGB BLD-MCNC: 13 G/DL (ref 14–18)
IMM GRANULOCYTES # BLD AUTO: 0.25 K/UL (ref 0–0.04)
IMM GRANULOCYTES NFR BLD AUTO: 2.5 % (ref 0–0.5)
LYMPHOCYTES # BLD AUTO: 0.6 K/UL (ref 1–4.8)
LYMPHOCYTES NFR BLD: 6.2 % (ref 18–48)
MAGNESIUM SERPL-MCNC: 1.4 MG/DL (ref 1.6–2.6)
MCH RBC QN AUTO: 33.6 PG (ref 27–31)
MCHC RBC AUTO-ENTMCNC: 31.4 G/DL (ref 32–36)
MCV RBC AUTO: 107 FL (ref 82–98)
MONOCYTES # BLD AUTO: 0.8 K/UL (ref 0.3–1)
MONOCYTES NFR BLD: 8.1 % (ref 4–15)
NEUTROPHILS # BLD AUTO: 8.1 K/UL (ref 1.8–7.7)
NEUTROPHILS NFR BLD: 82.1 % (ref 38–73)
NRBC BLD-RTO: 0 /100 WBC
PHOSPHATE SERPL-MCNC: 3.8 MG/DL (ref 2.7–4.5)
PLATELET # BLD AUTO: 121 K/UL (ref 150–350)
PMV BLD AUTO: 9.9 FL (ref 9.2–12.9)
POTASSIUM SERPL-SCNC: 5.2 MMOL/L (ref 3.5–5.1)
PROT SERPL-MCNC: 6.9 G/DL (ref 6–8.4)
RBC # BLD AUTO: 3.87 M/UL (ref 4.6–6.2)
RH BLD: NORMAL
SODIUM SERPL-SCNC: 141 MMOL/L (ref 136–145)
TACROLIMUS BLD-MCNC: 7.2 NG/ML (ref 5–15)
WBC # BLD AUTO: 9.85 K/UL (ref 3.9–12.7)

## 2020-10-26 PROCEDURE — 85025 COMPLETE CBC W/AUTO DIFF WBC: CPT

## 2020-10-26 PROCEDURE — 86900 BLOOD TYPING SEROLOGIC ABO: CPT

## 2020-10-26 PROCEDURE — 80197 ASSAY OF TACROLIMUS: CPT

## 2020-10-26 PROCEDURE — 99999 PR PBB SHADOW E&M-EST. PATIENT-LVL III: ICD-10-PCS | Mod: PBBFAC,BMT,,

## 2020-10-26 PROCEDURE — 83735 ASSAY OF MAGNESIUM: CPT

## 2020-10-26 PROCEDURE — 99999 PR PBB SHADOW E&M-EST. PATIENT-LVL V: ICD-10-PCS | Mod: PBBFAC,BMT,, | Performed by: INTERNAL MEDICINE

## 2020-10-26 PROCEDURE — 86901 BLOOD TYPING SEROLOGIC RH(D): CPT

## 2020-10-26 PROCEDURE — 99999 PR PBB SHADOW E&M-EST. PATIENT-LVL V: CPT | Mod: PBBFAC,BMT,, | Performed by: INTERNAL MEDICINE

## 2020-10-26 PROCEDURE — 3008F BODY MASS INDEX DOCD: CPT | Mod: BMT,CPTII,S$GLB, | Performed by: INTERNAL MEDICINE

## 2020-10-26 PROCEDURE — 87799 DETECT AGENT NOS DNA QUANT: CPT

## 2020-10-26 PROCEDURE — 86850 RBC ANTIBODY SCREEN: CPT

## 2020-10-26 PROCEDURE — 36415 COLL VENOUS BLD VENIPUNCTURE: CPT

## 2020-10-26 PROCEDURE — 99999 PR PBB SHADOW E&M-EST. PATIENT-LVL III: CPT | Mod: PBBFAC,BMT,,

## 2020-10-26 PROCEDURE — 99215 PR OFFICE/OUTPT VISIT, EST, LEVL V, 40-54 MIN: ICD-10-PCS | Mod: BMT,S$GLB,, | Performed by: INTERNAL MEDICINE

## 2020-10-26 PROCEDURE — 80053 COMPREHEN METABOLIC PANEL: CPT

## 2020-10-26 PROCEDURE — 99215 OFFICE O/P EST HI 40 MIN: CPT | Mod: BMT,S$GLB,, | Performed by: INTERNAL MEDICINE

## 2020-10-26 PROCEDURE — 84100 ASSAY OF PHOSPHORUS: CPT

## 2020-10-26 PROCEDURE — 3008F PR BODY MASS INDEX (BMI) DOCUMENTED: ICD-10-PCS | Mod: BMT,CPTII,S$GLB, | Performed by: INTERNAL MEDICINE

## 2020-10-26 NOTE — PROGRESS NOTES
BMT Pharmacist Medication Review Note     All current medications were reviewed with the patient and his wife. The patient brought in all of his medications with him to this visit.      We discussed the changes made by the physician including prednisone decreasing to 2 tablets (40mg) today.  The patient reports significant nausea, diarrhea, and indigestion. He has started taking simethicone to help with gas and stomach pains.     The patient has ample supply of all medications.     He also states that he does not believe the pain medication are working so he stopped taking the MSContin BID and was only taking it at night. We discussed that he should be taking this twice a day as prescribed.  All questions were answered.      Medication Indication Morning Lunch/Afternoon Evening Night   Acyclovir 800mg  Viral infection prevention  1 tablet  1 tablet   Fluconazole 200mg  Fungal infection prevention  2 tablets     Sulfamethoxazole-trimethoprim (Bactrim) 800-160mg PCP pneumonia prevention 1 tablet on Mon., Wed., and Fri.      Tacrolimus (Prograf) 1mg* GVHD prevention - take AFTER labs on clinic days* 5 capsules   5 capsules   Prednisone 20mg GVHD skin rash 2 tablets      ----------------------------------------        Amlodipine 5mg Blood pressure 1 tablet      Esomeprazole (Nexium) 20mg Acid reflux 2 capsules      Gabapentin 300mg Neuropathy/pain    1 capsule   Loratadine (Claritin) 10mg Allergies 1 tablet      Magnesium oxide 400mg Magnesium supplement 2 tablets 2 tablets 2 tablets 2 tablets   Tamsulosin (Flomax) 0.4mg BPH/urinary issues 1 capsule      Morphine ER (MSContin) 15mg Pain 1 tablet   1 tablet   *Dose may change at each appointment    AS NEEDED MEDICATIONS:  Ondansetron (Zofran) 8mg every 8 hours as needed for nausea  Prochlorperazine (Compazine) 10mg four times a day as needed for nausea (2nd choice)  Chlorhexidine solution - swish and spit two times a day as needed for mouth sores  Morphine 15mg - 2  tablets every 6 hours as needed for severe pain  Cyclobenzaprine (Flexeril) 10mg three times a day as needed for muscle spasms  Triamcinolone 0.1% cream twice a day as needed for rash  Artificial tears into each eye as needed for dry eyes  Fluticasone nasal spray 2 sprays twice a day as needed for allergies  Tums - 2 tablets as needed for indigestion  Famotidine (Pepsid) 20mg twice a day as needed for indigestion  Simethicone (GasX) 1 tablet as needed for gas/cramps        Allie Soliman, PharmD, BCPS, BCOP  Clinical Pharmacy Specialist   BMT/Hematology Oncology  SpectraLink: [phone]

## 2020-10-26 NOTE — PROGRESS NOTES
Subjective:    Patient ID: Kvng Jones Sr. is a 60 y.o. male.    Chief Complaint: No chief complaint on file.    Oncologic hx:  60 y.o. male admitted to [facility] for CML presenting with blast crisis and lewis disease. He was admitted for evaluation and induction chemotherapy with FLAG-Addis. Complications of therapy include epididymal orchitis with negative testicular ultrasound, neutropenic fever, dyspnea, and GGO of bilateral lungs on CT chest. Fever and chest findings were covered with broad spectrum antimicrobials. He did have hallucinations and vivid dreams with posaconazole. Chemotherapy was administered by left arm PICC line that was removed during hospital stay for MSSA infection treated with vancomycin. Hospital admission was 3/30/2020 to 5/1/2020 achieving morphologic CR with induction chemotherapy. He then started Ponatinib 30mg daily.     Studies performed during his hospital stay include pre-chemotherapy ECHO with normal ejection fraction of 60-65%. HIV, HBV, and HCV tests were negative. CBC at admission was WBC 38.6, Hgb 14.6, and platelet 416K. CT of the head without contrast was normal 4/20/2020. US of abdomen performed for abdominal distention and neutropenic fever had hepatic steatosis, liver 19cm, and spleen 11 cm. He does have a history of alcohol use and pancreatitis.    Patient is a . He is  to wife Guillermina who is his caregiver post transplant. He has a 45 pack year smoking history. He quit 6/1/2020, now on Chantix. He drinks 3-4 alcohol beverages nightly. He has 3 children. He has several family members in the [facility] area.    Transplant Course: Admitted 7/21/20 for a Flu/Cy/TBI haplo allo SCT. Son was donor. Received 2 bags and a total CD34 dose of 3.10 x10^6/kg on 7/28/20. Tolerated stem cells well. C/o headaches prior to admission, which persisted throughout admission. Neuro ultimately consulted for rec's. Was a daily drinker prior to admission.  No s/s of alcolhol withdrawal during admission. Transplant further complicated by hypertension, heart burn, c-diff neg diarrhea, nausea, sinus congestion, mild peripheral edema, blurry vision (ophtho consulted), mucositis, electrolyte abnormalities, and neck pain 2/2 spinal stenosis (referred to pain mgt at discharge). He engrafted on 8/10/20 with an ANC of 1280. He was discharged on 8/12/2020.    Interval History:  Patient presents for follow up clinic visit post Flu/Cy/TBI allogeneic stem cell transplant. Today is Day +90.   Neck and back pain are gradually progressing to baseline from pre-transplant as he does physical therapy and weens steroids. Questionable benefit from gabapentin and opioids.  Rash has resolved and prednisone dose decreased to 40 mg 10/26/2020 (2 tabs). Appetite is stable. No diarrhea. Liver enzymes are normal. tacro at goal of 7.9 (7-9).   Nausea daily, GERD increasing, abdominal cramping with diarrhea for past 24 hours. Backed off magnesium due to symptoms.  3lb weight gain since last visit with me.      Review of Systems   Constitutional: Negative for fatigue, appetite change, chills, diaphoresis, fever and unexpected weight change.   HENT: Negative for congestion, dental problem, mouth sores, nosebleeds, postnasal drip, rhinorrhea, sinus pressure and trouble swallowing.    Eyes: Negative.  Negative for photophobia and visual disturbance.   Respiratory: Negative.  Negative for cough and shortness of breath.    Cardiovascular: Negative.  Negative for chest pain, palpitations and leg swelling.   Gastrointestinal: Negative for abdominal distention, abdominal pain, blood in stool, constipation, diarrhea, nausea and vomiting.   Endocrine: Negative.    Genitourinary: Negative.  Negative for dysuria, frequency, hematuria and urgency.   Musculoskeletal: Positive for neck pain and neck stiffness.        History of cervical spinal stenosis. See hpi   Skin: Negative for rash or pallor.    Allergic/Immunologic: Negative for environmental allergies, food allergies and immunocompromised state.   Neurological: Negative for dizziness, tremors, syncope, weakness, numbness and headaches.   Hematological: Negative for adenopathy. Does not bruise/bleed easily.   Psychiatric/Behavioral: Negative.  Negative for confusion. The patient is not nervous/anxious.        Objective:     Physical Exam  Vitals signs and nursing note reviewed.   Constitutional:       Appearance: Normal appearance. He is well-developed.   HENT:      Head: Normocephalic and atraumatic.      Mouth/Throat:      Mouth: Mucous membranes are clear and moist, no lesions noted   Eyes:      General: No scleral icterus.  Neck:      Musculoskeletal: Neck supple. Decreased range of motion.   Cardiovascular:      Rate and Rhythm: Normal rate and regular rhythm.      Pulses: Normal pulses.      Heart sounds: Normal heart sounds.   Pulmonary:      Effort: Pulmonary effort is normal. No respiratory distress.      Breath sounds: Normal breath sounds.   Abdominal:      General: Bowel sounds are normal. There is no distension.      Palpations: Abdomen is soft.      Tenderness: There is no abdominal tenderness.   Musculoskeletal:         General: No tenderness or swelling.      Right lower leg: No edema.      Left lower leg: No edema.   Skin:     General: Skin is warm and dry.      Coloration: Skin is not pale.      Findings: Rash (resolved). No erythema or petechiae.      Comments: Dalal removed and site CDI.   Neurological:      Mental Status: He is alert and oriented to person, place, and time.   Psychiatric:         Attention and Perception: Attention normal.         Speech: Speech normal.         Thought Content: Thought content normal.         Cognition and Memory: Cognition and memory normal.         Assessment:       No diagnosis found.    Plan:         History of allogeneic stem cell transplant  - Admitted 7/21/20 for planned Flu/Cy/TBI haplo  allo SCT. Son was the donor. Received 2 bags and a total CD34 dose of 3.10 x10^6/kg on 7/28/20. Recieved post transplant cyclophosphamide. Engrafted neutrophils 8/10/20 day +13  - Today is Day +90  - Tacro level was 7 (goal 7-9). Continue 5 mg/5mg.  - stopped Ursodiol Day +30 (8/27/20)  - patient requests Rx for Valium and Oxycodone for pre-procedure in clinic bone marrow biopsies.  - had BMBx in clinic on 8/28/20 showing no definitive morphologic or immunophenotypic evidence of residual CML. Chimerisms unable to be obtained due to insufficient sample; drawn peripherally on 9/18. CD3-positive T-cells:  Insufficient DNA extracted for reliable analysis. CD33-positive myeloid cells:  The CD33-positive fraction contains approximately 100% donor DNA and approximately 0% recipient DNA.   - Day 100 Bm bx 11/2/2020 at 11 am   - Dalal removed 10/19       CML in remission/ Acute myeloid leukemia in remission  - Presented to 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 Achieved morphologic CR  - Started on daily Ponatinib 30 mg daily, which he continued to take outpatient. Held Ponatinib during admission; may not need to restart per recent literature. BBMT 2020(26): 472-49  - [place] [facility] [place] showing no morphologic or immunophenotypic evidence of AML/CML  - Admitted 7/21/20 for Flu/Cy/TBI haplo allo SCT    GVHD  - had rash inpatient that was believed to be drug eruption. Resolved  - diffuse erythema to legs, lower back, arms, and abdomen presented on 9/15. Started 1mg/kg (90mg, will round to 100mg for dosing) of prednisone daily (9/15/20); decreased to 90 mg (4.5 tabs) on 9/21/2020  - rash much improved 9/21/2020; resolved to arms; improved to legs; abdomen stable; decreased to 3.5 tabs (70 mg) on 10/6/20 as rash has now resolved  - will wean slowly by 10 mg every week per Dr. Hodges, decrease to 40 mg daily today; due to GI symptoms that may  be due to steroids next taper will be 10/29/2020 to 1 tab (20mg)  - Tacro as above   - GVHD charting with each visit     ID  - Last CMV and EBV undetected; continue twice weekly CMV, weekly EBV, today's pending  - Acyclovir (Zoster prophylaxis) until Day +365  - Diflucan (Fungal prophylaxis) until off tacrolimus.  - Bactrim (PJP prophylaxis) until off tacrolimus.     Hypomagnesemia/hyperphosphatemia   - Mag; Currently on mag 800 mg QID and increased magnesium in diet     Antineoplastic chemotherapy-induced cytopenias: anemia  - Transfuse Hgb <7g and plt <10K  - BMBx showing no residual disease.     Neck pain/ Cervical stenosis of spine   - Hx of spinal fusion C3-C5  - Xray 9/19 shows surgical changes of fusion from C3 through C5 with degenerative changed below fusion.   - Started PT on 8/26.  - Pt feels neck pain started after grewal was placed  - Has been mostly refractory to medications (zanaflex, gabapentin, celebrex, lidoderm patches). Some relief with oral morphine  - CT from 8/24/20 showing history of C3, C4, and C5 fusion and diffuse osteoarthritic changes  - Had virtual pain management appt on 8/25 to review CT results.  - Zanaflex switched to Robaxin per pain mgt.  - had medial branch block on 9/2/20 with Dr. Mcelroy (pain mgt). Per patient pain mgmnt signed off, as it is not a chronic issue, defer to BMT service for further pain control.  - started MS contin 15mg BID 9/20/2020; use PRN morphine q6h for breakthrough; titrate up MS contin as needed  - Patient reports much improvement to pain since starting high dose prednisone for his skin GVHD but pain returning as we taper steroids  - feeling lack of benefit from Gabapentin taken once nightly; start taper to every other night 10/26/2020    BPH (benign prostatic hyperplasia)  - Continue Flomax     GERD (gastroesophageal reflux disease)  - Continue nexium; discussed with patient can increase to 40mg if needed while on high dose steroids  - Can take TUMS  prn     Essential hypertension  - Increased home amlodipine from 5 mg to 10 mg daily while inpatient due to BPs as high as 180s/110s   - /85 today  - continue to monitor and possibly restart in the near future.     Constipation  - continue Senna PRN    Follow-up:   - CBC, CMP, type and screen, tacro, CMV, EBV (weekly only), mag, phos and appt with Dr. Hodges alternating with NP twice weekly.  - Next return visit 10/29/2020

## 2020-10-27 ENCOUNTER — CLINICAL SUPPORT (OUTPATIENT)
Dept: REHABILITATION | Facility: OTHER | Age: 60
End: 2020-10-27
Attending: PHYSICAL MEDICINE & REHABILITATION
Payer: COMMERCIAL

## 2020-10-27 DIAGNOSIS — R29.3 POOR POSTURE: Primary | ICD-10-CM

## 2020-10-27 DIAGNOSIS — R29.898 DECREASED STRENGTH OF TRUNK AND BACK: ICD-10-CM

## 2020-10-27 LAB — CMV DNA SERPL NAA+PROBE-ACNC: NORMAL IU/ML

## 2020-10-27 PROCEDURE — 97110 THERAPEUTIC EXERCISES: CPT | Mod: CQ

## 2020-10-27 NOTE — PROGRESS NOTES
"[facility]      Name: Kvng Jones Sr.  Clinic Number: [id number]    Therapy Diagnosis:   Encounter Diagnoses   Name Primary?    Poor posture Yes    Decreased strength of trunk and back      Physician: Stefani Harrington, *    Visit Date: 10/27/2020      Physician Orders: PT Eval and Treat   Medical Diagnosis from Referral:   M79.18 (ICD-10-CM) - Myofascial pain   M54.2 (ICD-10-CM) - Neck pain   M47.812 (ICD-10-CM) - Spondylosis of cervical region without myelopathy or radiculopathy       Evaluation Date: 10/9/2020  Authorization Period Expiration: 10/08/21  Plan of Care Expiration: 1/8/2021  Reassessment Due: 11/9/20  Visit # / Visits authorized: 3 / 20    Time In: 10:00 am   Time Out: 10:56 am  Total Billable Time: 46 minutes    Precautions: Standard/cervical fusion/leukemia/port right anterior chest     Pattern of pain determined: 1REP    Subjective   Kvng reports having his same B neck pain today.     Patient reports tolerating previous visit well with no soreness.   Patient reports their pain to be 3/10 on a 0-10 scale with 0 being no pain and 10 being the worst pain imaginable.  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    Objective     Baseline Isometric Testing on Med X equipment:  Testing administered by PT  Date of testing: 10/9/20  ROM 42-78 deg   Max Peak Torque 288    Min Peak Torque 214   Flex/Ext Ratio 34   % below normative data 34%           Limitation/Restriction for FOTO cervical Survey     Therapist reviewed FOTO scores for Kvng Jones SrCamron on 10/9/2020.   FOTO documents entered into [employer] - see Media section.     Limitation Score: 51%        Wound Assessment from PICC removal " (10/20/20)   Waterproof bandages in place.  No observed redness.  Min tenderness to palpation      Treatment    Pt was instructed in and performed the following:     Kvng received therapeutic exercises to develop/improved posture, cardiovascular endurance, muscular endurance, lumbar/cervical ROM, strength and muscular endurance for 56 minutes including the following exercises:     Seated:   Cervical retractions x10 cue for chin level and slide back  Cervical retractions /c ext x 10  Cervical rotations 5x2 each direction   Thoracic ext over bolster x20 hands on head for chest stretch and head control    Standing:  Scap retractions  x10 cue for dec shoulder elevation   [facility] 10/27/2020   Visit Number 3   VAS Pain Rating 3   Treadmill Time (in min.) 5   Retraction in Sitting -   Retraction with Extension -   Rotation -   Scapular Retraction -   Seat Adjustment -   Top Dead Center -   Counterweight -   Cervical Flexion -   Cervical Extension -   Cervical Peak Torque -   Cervical Weight 15   Repetitions 12   Rating of Perceived Exertion 6   Ice - Z Lie (in min.) 5           Peripheral muscle strengthening which included 1 set of 15-20 repetitions at a slow, controlled 10-13 second per rep pace focused on strengthening supporting musculature for improved body mechanics and functional mobility.  Pt and therapist focused on proper form during treatment to ensure optimal strengthening of each targeted muscle group.  Machines were utilized including torso rotation, leg extension, leg curl.  Chest press, upright row. Tricep extension, bicep curl, leg press, and hip abduction added visit 3.      Kvng received the following manual therapy techniques: NONE were applied to the: NONE for NONE minutes.         Home Exercises Provided and Patient Education Provided   Home exercises include:*  Cervical retractions   Cervical retraction /c extension  Cervical rotations    Cardio program: TBD    Education  provided:   - Pt ed on importance of performing HEP on regular basis    Written Home Exercises Provided: Patient instructed to cont prior HEP.  Exercises were reviewed and Kvng was able to demonstrate them prior to the end of the session.  Kvng demonstrated good  understanding of the education provided.     See EMR under Patient Instructions for exercises provided 10/20/2020.          Assessment   Pt felt the same post session.  Pt with increased nausea throughout session due to pt has to take 41 pills a day and his mask bothers him also.  Pt perform 12 reps only at 6/10 RPE indicating appropriate challege level of present weight. Pt needed to stop exercising due to increased nausea and weight difficult. May need to decrease weight next session.     Patient is making good progress towards established goals.  Pt will continue to benefit from skilled outpatient physical therapy to address the deficits stated in the impairment chart, provide pt/family education and to maximize pt's level of independence in the home and community environment.     Anticipated Barriers for therapy: none, awareness of PICC port healing   Pt's spiritual, cultural and educational needs considered and pt agreeable to plan of care and goals as stated below:       GOALS: Pt is in agreement with the following goals.     Short term goals: 6 weeks or 10 visits   1.  Pt will demonstratte increased cervical ROM as measured by med ex by 6 degrees from initial test which results in improved  ROM of neck for ease with ADLs and driving (not met, progressing)  2. Pt will demonstrate independence with reducing or controlling symptoms with ther ex, movement, or position independently, able to reduce pain 1-2 points on pain scale using strategies taught in therapy   (not met, progressing)    3. Pt will demonstrate increased MedX average isometric strength value by 10% with  when compared to the initial testing resulting in improved ability to  perform bending, lifting, and carrying activities safely, confidently.   (not met, progressing)             Long term goals: 10 weeks or 20 visits  1. Pt will demonstratte increased cervical ROM as measured by med ex by 12 degrees from initial test which results in functional ROM of neck for ease with ADLs and driving   (not met, progressing)    2. Pt will demonstrate increased MedX average isometric strength value  by 30% from initial test to improve ability to lift and carry, and sustain good posture while performing ADL's  (not met, progressing)    3.Pt will demonstrate reduced pain and improved functional outcomes as reported on the FOTO by reaching a limitation score of < or = 40% or less in order to demonstrate subjective improvement in pt's condition.   (not met, progressing)    4. Pt will demonstrate independence with reducing or controlling symptoms with ther ex, movement, or position independently, able to reduce pain 2-4 points on pain scale using strategies taught in therapy  (not met, progressing)    5. Pt will demonstrate independence with the HEP at discharge.   (not met, progressing)    6.  Pt(patient goal)will be able to drive without pain and fish without pain  (not met, progressing)          Plan   Continue with established Plan of Care towards established PT goals.

## 2020-10-28 LAB — EBV DNA SERPL NAA+PROBE-ACNC: NORMAL IU/ML

## 2020-10-29 ENCOUNTER — LAB VISIT (OUTPATIENT)
Dept: LAB | Facility: HOSPITAL | Age: 60
End: 2020-10-29
Payer: COMMERCIAL

## 2020-10-29 ENCOUNTER — OFFICE VISIT (OUTPATIENT)
Dept: HEMATOLOGY/ONCOLOGY | Facility: CLINIC | Age: 60
End: 2020-10-29
Payer: COMMERCIAL

## 2020-10-29 VITALS
OXYGEN SATURATION: 96 % | WEIGHT: 206.25 LBS | RESPIRATION RATE: 16 BRPM | BODY MASS INDEX: 33.15 KG/M2 | HEIGHT: 66 IN | SYSTOLIC BLOOD PRESSURE: 136 MMHG | HEART RATE: 95 BPM | DIASTOLIC BLOOD PRESSURE: 75 MMHG | TEMPERATURE: 97 F

## 2020-10-29 DIAGNOSIS — E87.5 HYPERKALEMIA: ICD-10-CM

## 2020-10-29 DIAGNOSIS — I10 ESSENTIAL HYPERTENSION: ICD-10-CM

## 2020-10-29 DIAGNOSIS — D89.810 ACUTE GVHD COMPLICATING BONE MARROW TRANSPLANTATION: ICD-10-CM

## 2020-10-29 DIAGNOSIS — C92.10 CML (CHRONIC MYELOCYTIC LEUKEMIA): ICD-10-CM

## 2020-10-29 DIAGNOSIS — T45.1X5A ANTINEOPLASTIC CHEMOTHERAPY INDUCED ANEMIA: ICD-10-CM

## 2020-10-29 DIAGNOSIS — D64.81 ANTINEOPLASTIC CHEMOTHERAPY INDUCED ANEMIA: ICD-10-CM

## 2020-10-29 DIAGNOSIS — M48.02 CERVICAL STENOSIS OF SPINE: ICD-10-CM

## 2020-10-29 DIAGNOSIS — K21.9 GASTROESOPHAGEAL REFLUX DISEASE WITHOUT ESOPHAGITIS: ICD-10-CM

## 2020-10-29 DIAGNOSIS — E83.42 HYPOMAGNESEMIA: ICD-10-CM

## 2020-10-29 DIAGNOSIS — Z94.84 HISTORY OF ALLOGENEIC STEM CELL TRANSPLANT: Primary | ICD-10-CM

## 2020-10-29 DIAGNOSIS — Z76.82 STEM CELL TRANSPLANT CANDIDATE: ICD-10-CM

## 2020-10-29 DIAGNOSIS — Z94.81 S/P ALLOGENEIC BONE MARROW TRANSPLANT: ICD-10-CM

## 2020-10-29 DIAGNOSIS — G47.01 INSOMNIA DUE TO MEDICAL CONDITION: ICD-10-CM

## 2020-10-29 DIAGNOSIS — C92.01 ACUTE MYELOID LEUKEMIA IN REMISSION: ICD-10-CM

## 2020-10-29 DIAGNOSIS — T86.09 ACUTE GVHD COMPLICATING BONE MARROW TRANSPLANTATION: ICD-10-CM

## 2020-10-29 LAB
ABO + RH BLD: NORMAL
ALBUMIN SERPL BCP-MCNC: 4.4 G/DL (ref 3.5–5.2)
ALP SERPL-CCNC: 53 U/L (ref 55–135)
ALT SERPL W/O P-5'-P-CCNC: 41 U/L (ref 10–44)
ANION GAP SERPL CALC-SCNC: 13 MMOL/L (ref 8–16)
AST SERPL-CCNC: 24 U/L (ref 10–40)
BASOPHILS # BLD AUTO: 0.04 K/UL (ref 0–0.2)
BASOPHILS NFR BLD: 0.5 % (ref 0–1.9)
BILIRUB SERPL-MCNC: 0.3 MG/DL (ref 0.1–1)
BLD GP AB SCN CELLS X3 SERPL QL: NORMAL
BUN SERPL-MCNC: 21 MG/DL (ref 8–23)
CALCIUM SERPL-MCNC: 10.1 MG/DL (ref 8.7–10.5)
CHLORIDE SERPL-SCNC: 104 MMOL/L (ref 95–110)
CO2 SERPL-SCNC: 27 MMOL/L (ref 23–29)
CREAT SERPL-MCNC: 1.2 MG/DL (ref 0.5–1.4)
DIFFERENTIAL METHOD: ABNORMAL
EOSINOPHIL # BLD AUTO: 0.1 K/UL (ref 0–0.5)
EOSINOPHIL NFR BLD: 0.6 % (ref 0–8)
ERYTHROCYTE [DISTWIDTH] IN BLOOD BY AUTOMATED COUNT: 15.7 % (ref 11.5–14.5)
EST. GFR  (AFRICAN AMERICAN): >60 ML/MIN/1.73 M^2
EST. GFR  (NON AFRICAN AMERICAN): >60 ML/MIN/1.73 M^2
GLUCOSE SERPL-MCNC: 109 MG/DL (ref 70–110)
HCT VFR BLD AUTO: 44.3 % (ref 40–54)
HGB BLD-MCNC: 14.1 G/DL (ref 14–18)
IMM GRANULOCYTES # BLD AUTO: 0.19 K/UL (ref 0–0.04)
IMM GRANULOCYTES NFR BLD AUTO: 2.5 % (ref 0–0.5)
LYMPHOCYTES # BLD AUTO: 1 K/UL (ref 1–4.8)
LYMPHOCYTES NFR BLD: 12.6 % (ref 18–48)
MAGNESIUM SERPL-MCNC: 1.5 MG/DL (ref 1.6–2.6)
MCH RBC QN AUTO: 33.6 PG (ref 27–31)
MCHC RBC AUTO-ENTMCNC: 31.8 G/DL (ref 32–36)
MCV RBC AUTO: 106 FL (ref 82–98)
MONOCYTES # BLD AUTO: 0.7 K/UL (ref 0.3–1)
MONOCYTES NFR BLD: 9 % (ref 4–15)
NEUTROPHILS # BLD AUTO: 5.8 K/UL (ref 1.8–7.7)
NEUTROPHILS NFR BLD: 74.8 % (ref 38–73)
NRBC BLD-RTO: 0 /100 WBC
PHOSPHATE SERPL-MCNC: 3.8 MG/DL (ref 2.7–4.5)
PLATELET # BLD AUTO: 131 K/UL (ref 150–350)
PMV BLD AUTO: 9.6 FL (ref 9.2–12.9)
POTASSIUM SERPL-SCNC: 5.1 MMOL/L (ref 3.5–5.1)
PROT SERPL-MCNC: 7.3 G/DL (ref 6–8.4)
RBC # BLD AUTO: 4.2 M/UL (ref 4.6–6.2)
SODIUM SERPL-SCNC: 144 MMOL/L (ref 136–145)
TACROLIMUS BLD-MCNC: 8.1 NG/ML (ref 5–15)
WBC # BLD AUTO: 7.7 K/UL (ref 3.9–12.7)

## 2020-10-29 PROCEDURE — 80197 ASSAY OF TACROLIMUS: CPT

## 2020-10-29 PROCEDURE — 84100 ASSAY OF PHOSPHORUS: CPT

## 2020-10-29 PROCEDURE — 36415 COLL VENOUS BLD VENIPUNCTURE: CPT

## 2020-10-29 PROCEDURE — 99999 PR PBB SHADOW E&M-EST. PATIENT-LVL V: ICD-10-PCS | Mod: PBBFAC,BMT,, | Performed by: NURSE PRACTITIONER

## 2020-10-29 PROCEDURE — 85025 COMPLETE CBC W/AUTO DIFF WBC: CPT

## 2020-10-29 PROCEDURE — 3008F BODY MASS INDEX DOCD: CPT | Mod: BMT,CPTII,S$GLB, | Performed by: NURSE PRACTITIONER

## 2020-10-29 PROCEDURE — 80053 COMPREHEN METABOLIC PANEL: CPT

## 2020-10-29 PROCEDURE — 3008F PR BODY MASS INDEX (BMI) DOCUMENTED: ICD-10-PCS | Mod: BMT,CPTII,S$GLB, | Performed by: NURSE PRACTITIONER

## 2020-10-29 PROCEDURE — 99215 OFFICE O/P EST HI 40 MIN: CPT | Mod: BMT,S$GLB,, | Performed by: NURSE PRACTITIONER

## 2020-10-29 PROCEDURE — 86901 BLOOD TYPING SEROLOGIC RH(D): CPT

## 2020-10-29 PROCEDURE — 99999 PR PBB SHADOW E&M-EST. PATIENT-LVL V: CPT | Mod: PBBFAC,BMT,, | Performed by: NURSE PRACTITIONER

## 2020-10-29 PROCEDURE — 99215 PR OFFICE/OUTPT VISIT, EST, LEVL V, 40-54 MIN: ICD-10-PCS | Mod: BMT,S$GLB,, | Performed by: NURSE PRACTITIONER

## 2020-10-29 PROCEDURE — 83735 ASSAY OF MAGNESIUM: CPT

## 2020-10-29 RX ORDER — DIAZEPAM 2 MG/1
2 TABLET ORAL
Qty: 1 TABLET | Refills: 0 | Status: SHIPPED | OUTPATIENT
Start: 2020-10-29 | End: 2020-11-11

## 2020-10-29 RX ORDER — MIRTAZAPINE 15 MG/1
15 TABLET, ORALLY DISINTEGRATING ORAL NIGHTLY
Qty: 30 TABLET | Refills: 2 | Status: SHIPPED | OUTPATIENT
Start: 2020-10-29 | End: 2020-12-10

## 2020-10-29 NOTE — Clinical Note
Please schedule CBC, CMP, type and screen, tacro, CMV, EBV, mag, phos--patient is lab draw on 11/11, 11/18, 11/25, 12/2, 12/9--alternating between NP and  and schedule same labs and appt with  in [place] on 12/18

## 2020-10-29 NOTE — PROGRESS NOTES
Subjective:    Patient ID: Kvng Jones Sr. is a 60 y.o. male.    Chief Complaint: Neck Pain, Nausea, Follow-up (post allo transplant), and Leukemia    Oncologic hx:  60 y.o. male admitted to [facility] for CML presenting with blast crisis and lewis disease. He was admitted for evaluation and induction chemotherapy with FLAG-Addis. Complications of therapy include epididymal orchitis with negative testicular ultrasound, neutropenic fever, dyspnea, and GGO of bilateral lungs on CT chest. Fever and chest findings were covered with broad spectrum antimicrobials. He did have hallucinations and vivid dreams with posaconazole. Chemotherapy was administered by left arm PICC line that was removed during hospital stay for MSSA infection treated with vancomycin. Hospital admission was 3/30/2020 to 5/1/2020 achieving morphologic CR with induction chemotherapy. He then started Ponatinib 30mg daily.     Studies performed during his hospital stay include pre-chemotherapy ECHO with normal ejection fraction of 60-65%. HIV, HBV, and HCV tests were negative. CBC at admission was WBC 38.6, Hgb 14.6, and platelet 416K. CT of the head without contrast was normal 4/20/2020. US of abdomen performed for abdominal distention and neutropenic fever had hepatic steatosis, liver 19cm, and spleen 11 cm. He does have a history of alcohol use and pancreatitis.    Patient is a . He is  to wife Guillerimna who is his caregiver post transplant. He has a 45 pack year smoking history. He quit 6/1/2020, now on Chantix. He drinks 3-4 alcohol beverages nightly. He has 3 children. He has several family members in the [facility] area.    Transplant Course: Admitted 7/21/20 for a Flu/Cy/TBI haplo allo SCT. Son was donor. Received 2 bags and a total CD34 dose of 3.10 x10^6/kg on 7/28/20. Tolerated stem cells well. C/o headaches prior to admission, which persisted throughout admission. Neuro ultimately consulted for rec's.  Was a daily drinker prior to admission. No s/s of alcolhol withdrawal during admission. Transplant further complicated by hypertension, heart burn, c-diff neg diarrhea, nausea, sinus congestion, mild peripheral edema, blurry vision (ophtho consulted), mucositis, electrolyte abnormalities, and neck pain 2/2 spinal stenosis (referred to pain mgt at discharge). He engrafted on 8/10/20 with an ANC of 1280. He was discharged on 8/12/2020.    Interval History:  Patient presents for follow up clinic visit post Flu/Cy/TBI allogeneic stem cell transplant. Today is Day +93. Neck pain is stable. He is weaning gabapentin. Rash resolved and prednisone dose decreasing to 20 mg today. He states his GI issues are worsening, continues to have nausea and GERD and diarrhea. He is taking pepto. He reports insomnia    Review of Systems   Constitutional: Negative for fatigue, appetite change, chills, diaphoresis, fever and unexpected weight change.   HENT: Negative for congestion, dental problem, mouth sores, nosebleeds, postnasal drip, rhinorrhea, sinus pressure and trouble swallowing.    Eyes: Negative.  Negative for photophobia and visual disturbance.   Respiratory: Negative.  Negative for cough and shortness of breath.    Cardiovascular: Negative.  Negative for chest pain, palpitations and leg swelling.   Gastrointestinal: Negative for abdominal distention, abdominal pain, blood in stool, constipation, diarrhea, nausea and vomiting.   Endocrine: Negative.    Genitourinary: Negative.  Negative for dysuria, frequency, hematuria and urgency.   Musculoskeletal: Positive for neck pain and neck stiffness.        History of cervical spinal stenosis. See hpi   Skin: Negative for rash or pallor.   Allergic/Immunologic: Negative for environmental allergies, food allergies and immunocompromised state.   Neurological: Negative for dizziness, tremors, syncope, weakness, numbness and headaches.   Hematological: Negative for adenopathy. Does not  bruise/bleed easily.   Psychiatric/Behavioral: Negative for confusion. The patient is not nervous/anxious.  Positive for insomnia       Objective:     Physical Exam  Vitals signs and nursing note reviewed.   Constitutional:       Appearance: Normal appearance. He is well-developed.   HENT:      Head: Normocephalic and atraumatic.      Mouth/Throat:      Mouth: Mucous membranes are clear and moist, no lesions noted   Eyes:      General: No scleral icterus.  Neck:      Musculoskeletal: Neck supple. Decreased range of motion.   Cardiovascular:      Rate and Rhythm: Normal rate and regular rhythm.      Pulses: Normal pulses.      Heart sounds: Normal heart sounds.   Pulmonary:      Effort: Pulmonary effort is normal. No respiratory distress.      Breath sounds: Normal breath sounds.   Abdominal:      General: Bowel sounds are normal. There is no distension.      Palpations: Abdomen is soft.      Tenderness: There is no abdominal tenderness.   Musculoskeletal:         General: No tenderness or swelling.      Right lower leg: No edema.      Left lower leg: No edema.   Skin:     General: Skin is warm and dry.      Coloration: Skin is not pale.      Findings: Rash (resolved). No erythema or petechiae.      Comments: Dalal removed and site CDI.   Neurological:      Mental Status: He is alert and oriented to person, place, and time.   Psychiatric:         Attention and Perception: Attention normal.         Speech: Speech normal.         Thought Content: Thought content normal.         Cognition and Memory: Cognition and memory normal.         Assessment:       1. History of allogeneic stem cell transplant    2. Acute myeloid leukemia in remission    3. CML (chronic myelocytic leukemia)    4. Acute GVHD complicating bone marrow transplantation    5. Hypomagnesemia    6. Antineoplastic chemotherapy induced anemia    7. Cervical stenosis of spine    8. Gastroesophageal reflux disease without esophagitis    9. Essential  hypertension    10. Hyperkalemia    11. Insomnia due to medical condition        Plan:         History of allogeneic stem cell transplant  - Admitted 7/21/20 for planned Flu/Cy/TBI haplo allo SCT. Son was the donor. Received 2 bags and a total CD34 dose of 3.10 x10^6/kg on 7/28/20. Recieved post transplant cyclophosphamide. Engrafted neutrophils 8/10/20 day +13  - Today is Day +93  - Tacro level 8.1 (goal 7-9). Continue 5 mg/5mg.  - stopped Ursodiol Day +30 (8/27/20)  - patient requests Rx for Valium and Oxycodone for pre-procedure in clinic bone marrow biopsies.  - had BMBx in clinic on 8/28/20 showing no definitive morphologic or immunophenotypic evidence of residual CML. Chimerisms unable to be obtained due to insufficient sample; drawn peripherally on 9/18. CD3-positive T-cells:  Insufficient DNA extracted for reliable analysis. CD33-positive myeloid cells:  The CD33-positive fraction contains approximately 100% donor DNA and approximately 0% recipient DNA.   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 Achieved morphologic CR  - Started on daily Ponatinib 30 mg daily, which he continued to take outpatient. Held Ponatinib during admission; may not need to restart per recent literature. BBMT 2020(26): 940-49  - [place] [facility] showing no morphologic or immunophenotypic evidence of AML/CML  - Admitted 7/21/20 for Flu/Cy/TBI haplo allo SCT    GVHD  - had rash inpatient that was believed to be drug eruption. Resolved  - diffuse erythema to legs, lower back, arms, and abdomen presented on 9/15. Started 1mg/kg (90mg, will round to 100mg for dosing) of prednisone daily (9/15/20); decreased to 90 mg (4.5 tabs) on 9/21/2020  - rash much improved 9/21/2020; resolved to arms; improved to legs; abdomen  stable; decreased to 3.5 tabs (70 mg) on 10/6/20 as rash has now resolved  - will wean slowly by 10 mg every week per Dr. Hodges, decrease to 20 mg daily today  - Tacro as above   - GVHD charting with each visit     ID  - Last CMV and EBV undetected; continue twice weekly CMV, weekly EBV, today's pending  - Acyclovir (Zoster prophylaxis) until Day +365  - Diflucan (Fungal prophylaxis) until off tacrolimus.  - Bactrim (PJP prophylaxis) until off tacrolimus.     Hypomagnesemia/hyperphosphatemia   - Mag; Currently on mag 800 mg QID but states he is taking TID due to GI distress and increased magnesium in diet     Antineoplastic chemotherapy-induced cytopenias: thrombocytopenia   - blood counts continue to improve  - BMBx showing no residual disease.     Neck pain/ Cervical stenosis of spine   - Hx of spinal fusion C3-C5  - Xray 9/19 shows surgical changes of fusion from C3 through C5 with degenerative changed below fusion.   - Started PT on 8/26.  - Pt feels neck pain started after grewal was placed  - Has been mostly refractory to medications (zanaflex, gabapentin, celebrex, lidoderm patches). Some relief with oral morphine  - CT from 8/24/20 showing history of C3, C4, and C5 fusion and diffuse osteoarthritic changes  - Had virtual pain management appt on 8/25 to review CT results.  - Zanaflex switched to Robaxin per pain mgt.  - had medial branch block on 9/2/20 with Dr. Mcelroy (pain mgt). Per patient pain mgmnt signed off, as it is not a chronic issue, defer to BMT service for further pain control.  - started MS contin 15mg BID 9/20/2020; use PRN morphine q6h for breakthrough; titrate up MS contin as needed  - Patient reports much improvement to pain since starting high dose prednisone for his skin GVHD but pain returning as we taper steroids  - feeling lack of benefit from Gabapentin taken once nightly; started taper to every other night 10/26/2020.     BPH (benign prostatic hyperplasia)  - Continue  Flomax     GERD (gastroesophageal reflux disease)  - Continue nexium; 40mg if needed while on high dose steroids  - TUMS prn and pepto PRN     Essential hypertension  - Increased home amlodipine from 5 mg to 10 mg daily while inpatient due to BPs as high as 180s/110s   - /75 today  - continue to monitor and possibly restart in the near future.     Constipation  - continue Senna PRN    Hyperkalemia  - K down to normal today    Follow-up:   - CBC, CMP, type and screen, tacro, CMV, EBV (weekly only), mag, phos and appt with Dr. Hodges on 11/2 and day 100 bone marrow biopsy.  - patient will transition to weekly labs and visits with NP and Dr. Hodges alternating. He will see Dr. Hodges in [place] on 12/18. For OM visits he would like to come on Wednesdays    Yomaira Little NP  Hematology/BMT

## 2020-10-30 ENCOUNTER — TELEPHONE (OUTPATIENT)
Dept: HEMATOLOGY/ONCOLOGY | Facility: CLINIC | Age: 60
End: 2020-10-30

## 2020-11-02 ENCOUNTER — OFFICE VISIT (OUTPATIENT)
Dept: HEMATOLOGY/ONCOLOGY | Facility: CLINIC | Age: 60
End: 2020-11-02
Payer: COMMERCIAL

## 2020-11-02 ENCOUNTER — LAB VISIT (OUTPATIENT)
Dept: LAB | Facility: HOSPITAL | Age: 60
End: 2020-11-02
Payer: COMMERCIAL

## 2020-11-02 ENCOUNTER — PROCEDURE VISIT (OUTPATIENT)
Dept: HEMATOLOGY/ONCOLOGY | Facility: CLINIC | Age: 60
End: 2020-11-02
Payer: COMMERCIAL

## 2020-11-02 VITALS
TEMPERATURE: 99 F | WEIGHT: 210 LBS | DIASTOLIC BLOOD PRESSURE: 70 MMHG | BODY MASS INDEX: 33.75 KG/M2 | HEIGHT: 66 IN | RESPIRATION RATE: 20 BRPM | HEART RATE: 96 BPM | SYSTOLIC BLOOD PRESSURE: 119 MMHG | OXYGEN SATURATION: 96 %

## 2020-11-02 DIAGNOSIS — T45.1X5A ANTINEOPLASTIC CHEMOTHERAPY INDUCED ANEMIA: ICD-10-CM

## 2020-11-02 DIAGNOSIS — Z94.81 S/P ALLOGENEIC BONE MARROW TRANSPLANT: ICD-10-CM

## 2020-11-02 DIAGNOSIS — Z94.84 HISTORY OF ALLOGENEIC STEM CELL TRANSPLANT: Primary | ICD-10-CM

## 2020-11-02 DIAGNOSIS — Z76.82 STEM CELL TRANSPLANT CANDIDATE: ICD-10-CM

## 2020-11-02 DIAGNOSIS — I10 ESSENTIAL HYPERTENSION: ICD-10-CM

## 2020-11-02 DIAGNOSIS — E87.5 HYPERKALEMIA: ICD-10-CM

## 2020-11-02 DIAGNOSIS — C92.01 ACUTE MYELOID LEUKEMIA IN REMISSION: ICD-10-CM

## 2020-11-02 DIAGNOSIS — K21.9 GASTROESOPHAGEAL REFLUX DISEASE WITHOUT ESOPHAGITIS: ICD-10-CM

## 2020-11-02 DIAGNOSIS — C92.10 CML (CHRONIC MYELOCYTIC LEUKEMIA): ICD-10-CM

## 2020-11-02 DIAGNOSIS — D64.81 ANTINEOPLASTIC CHEMOTHERAPY INDUCED ANEMIA: ICD-10-CM

## 2020-11-02 DIAGNOSIS — T86.09 ACUTE GVHD COMPLICATING BONE MARROW TRANSPLANTATION: ICD-10-CM

## 2020-11-02 DIAGNOSIS — M48.02 CERVICAL STENOSIS OF SPINE: ICD-10-CM

## 2020-11-02 DIAGNOSIS — D89.810 ACUTE GVHD COMPLICATING BONE MARROW TRANSPLANTATION: ICD-10-CM

## 2020-11-02 DIAGNOSIS — E83.42 HYPOMAGNESEMIA: ICD-10-CM

## 2020-11-02 DIAGNOSIS — N40.0 BENIGN PROSTATIC HYPERPLASIA WITHOUT LOWER URINARY TRACT SYMPTOMS: ICD-10-CM

## 2020-11-02 DIAGNOSIS — Z94.84 HISTORY OF ALLOGENEIC STEM CELL TRANSPLANT: ICD-10-CM

## 2020-11-02 DIAGNOSIS — C92.11 CML IN REMISSION: ICD-10-CM

## 2020-11-02 LAB
ABO + RH BLD: NORMAL
ALBUMIN SERPL BCP-MCNC: 4.3 G/DL (ref 3.5–5.2)
ALP SERPL-CCNC: 53 U/L (ref 55–135)
ALT SERPL W/O P-5'-P-CCNC: 40 U/L (ref 10–44)
ANION GAP SERPL CALC-SCNC: 12 MMOL/L (ref 8–16)
AST SERPL-CCNC: 29 U/L (ref 10–40)
BASOPHILS # BLD AUTO: 0.06 K/UL (ref 0–0.2)
BASOPHILS NFR BLD: 0.6 % (ref 0–1.9)
BILIRUB SERPL-MCNC: 0.3 MG/DL (ref 0.1–1)
BLD GP AB SCN CELLS X3 SERPL QL: NORMAL
BUN SERPL-MCNC: 25 MG/DL (ref 8–23)
CALCIUM SERPL-MCNC: 9.3 MG/DL (ref 8.7–10.5)
CHLORIDE SERPL-SCNC: 104 MMOL/L (ref 95–110)
CMV DNA SERPL NAA+PROBE-ACNC: NORMAL IU/ML
CO2 SERPL-SCNC: 24 MMOL/L (ref 23–29)
CREAT SERPL-MCNC: 1.2 MG/DL (ref 0.5–1.4)
DIFFERENTIAL METHOD: ABNORMAL
EOSINOPHIL # BLD AUTO: 0.1 K/UL (ref 0–0.5)
EOSINOPHIL NFR BLD: 0.5 % (ref 0–8)
ERYTHROCYTE [DISTWIDTH] IN BLOOD BY AUTOMATED COUNT: 15 % (ref 11.5–14.5)
EST. GFR  (AFRICAN AMERICAN): >60 ML/MIN/1.73 M^2
EST. GFR  (NON AFRICAN AMERICAN): >60 ML/MIN/1.73 M^2
GLUCOSE SERPL-MCNC: 114 MG/DL (ref 70–110)
HCT VFR BLD AUTO: 43.6 % (ref 40–54)
HGB BLD-MCNC: 14 G/DL (ref 14–18)
IMM GRANULOCYTES # BLD AUTO: 0.15 K/UL (ref 0–0.04)
IMM GRANULOCYTES NFR BLD AUTO: 1.6 % (ref 0–0.5)
LYMPHOCYTES # BLD AUTO: 0.8 K/UL (ref 1–4.8)
LYMPHOCYTES NFR BLD: 9 % (ref 18–48)
MAGNESIUM SERPL-MCNC: 1.4 MG/DL (ref 1.6–2.6)
MCH RBC QN AUTO: 34.3 PG (ref 27–31)
MCHC RBC AUTO-ENTMCNC: 32.1 G/DL (ref 32–36)
MCV RBC AUTO: 107 FL (ref 82–98)
MONOCYTES # BLD AUTO: 0.9 K/UL (ref 0.3–1)
MONOCYTES NFR BLD: 9.2 % (ref 4–15)
NEUTROPHILS # BLD AUTO: 7.4 K/UL (ref 1.8–7.7)
NEUTROPHILS NFR BLD: 79.1 % (ref 38–73)
NRBC BLD-RTO: 0 /100 WBC
PHOSPHATE SERPL-MCNC: 3.6 MG/DL (ref 2.7–4.5)
PLATELET # BLD AUTO: 135 K/UL (ref 150–350)
PMV BLD AUTO: 9.6 FL (ref 9.2–12.9)
POTASSIUM SERPL-SCNC: 4.8 MMOL/L (ref 3.5–5.1)
PROT SERPL-MCNC: 7.3 G/DL (ref 6–8.4)
RBC # BLD AUTO: 4.08 M/UL (ref 4.6–6.2)
SODIUM SERPL-SCNC: 140 MMOL/L (ref 136–145)
TACROLIMUS BLD-MCNC: 7.3 NG/ML (ref 5–15)
WBC # BLD AUTO: 9.34 K/UL (ref 3.9–12.7)

## 2020-11-02 PROCEDURE — 86901 BLOOD TYPING SEROLOGIC RH(D): CPT

## 2020-11-02 PROCEDURE — 88313 SPECIAL STAINS GROUP 2: CPT | Mod: 26,BMT,, | Performed by: PATHOLOGY

## 2020-11-02 PROCEDURE — 3008F PR BODY MASS INDEX (BMI) DOCUMENTED: ICD-10-PCS | Mod: BMT,CPTII,S$GLB, | Performed by: INTERNAL MEDICINE

## 2020-11-02 PROCEDURE — 88189 PR  FLOWCYTOMETRY/READ, 16 & > MARKERS: ICD-10-PCS | Mod: BMT,,, | Performed by: PATHOLOGY

## 2020-11-02 PROCEDURE — 99999 PR PBB SHADOW E&M-EST. PATIENT-LVL V: CPT | Mod: PBBFAC,BMT,, | Performed by: INTERNAL MEDICINE

## 2020-11-02 PROCEDURE — 85025 COMPLETE CBC W/AUTO DIFF WBC: CPT

## 2020-11-02 PROCEDURE — 88305 TISSUE EXAM BY PATHOLOGIST: ICD-10-PCS | Mod: 26,BMT,, | Performed by: PATHOLOGY

## 2020-11-02 PROCEDURE — 81206 BCR/ABL1 GENE MAJOR BP: CPT

## 2020-11-02 PROCEDURE — 99999 PR PBB SHADOW E&M-EST. PATIENT-LVL V: ICD-10-PCS | Mod: PBBFAC,BMT,, | Performed by: INTERNAL MEDICINE

## 2020-11-02 PROCEDURE — 88305 TISSUE EXAM BY PATHOLOGIST: CPT | Mod: 59 | Performed by: PATHOLOGY

## 2020-11-02 PROCEDURE — 99215 OFFICE O/P EST HI 40 MIN: CPT | Mod: BMT,S$GLB,, | Performed by: INTERNAL MEDICINE

## 2020-11-02 PROCEDURE — 87799 DETECT AGENT NOS DNA QUANT: CPT

## 2020-11-02 PROCEDURE — 81268 CHIMERISM ANAL W/CELL SELECT: CPT | Mod: 91

## 2020-11-02 PROCEDURE — 88311 DECALCIFY TISSUE: CPT | Mod: 26,BMT,, | Performed by: PATHOLOGY

## 2020-11-02 PROCEDURE — 83735 ASSAY OF MAGNESIUM: CPT

## 2020-11-02 PROCEDURE — 88237 TISSUE CULTURE BONE MARROW: CPT

## 2020-11-02 PROCEDURE — 88311 DECALCIFY TISSUE: CPT | Performed by: PATHOLOGY

## 2020-11-02 PROCEDURE — 88264 CHROMOSOME ANALYSIS 20-25: CPT

## 2020-11-02 PROCEDURE — 80053 COMPREHEN METABOLIC PANEL: CPT

## 2020-11-02 PROCEDURE — 85097 BONE MARROW INTERPRETATION: CPT | Mod: BMT,,, | Performed by: PATHOLOGY

## 2020-11-02 PROCEDURE — 88184 FLOWCYTOMETRY/ TC 1 MARKER: CPT | Performed by: PATHOLOGY

## 2020-11-02 PROCEDURE — 85097 PR  BONE MARROW,SMEAR INTERPRETATION: ICD-10-PCS | Mod: BMT,,, | Performed by: PATHOLOGY

## 2020-11-02 PROCEDURE — 99215 PR OFFICE/OUTPT VISIT, EST, LEVL V, 40-54 MIN: ICD-10-PCS | Mod: BMT,S$GLB,, | Performed by: INTERNAL MEDICINE

## 2020-11-02 PROCEDURE — 81268 CHIMERISM ANAL W/CELL SELECT: CPT

## 2020-11-02 PROCEDURE — 80197 ASSAY OF TACROLIMUS: CPT

## 2020-11-02 PROCEDURE — 3008F BODY MASS INDEX DOCD: CPT | Mod: BMT,CPTII,S$GLB, | Performed by: INTERNAL MEDICINE

## 2020-11-02 PROCEDURE — 88313 SPECIAL STAINS GROUP 2: CPT | Mod: 59 | Performed by: PATHOLOGY

## 2020-11-02 PROCEDURE — 38222 PR BONE MARROW BIOPSY(IES) W/ASPIRATION(S); DIAGNOSTIC: ICD-10-PCS | Mod: BMT,RT,S$GLB, | Performed by: NURSE PRACTITIONER

## 2020-11-02 PROCEDURE — 38222 DX BONE MARROW BX & ASPIR: CPT | Mod: BMT,RT,S$GLB, | Performed by: NURSE PRACTITIONER

## 2020-11-02 PROCEDURE — 36415 COLL VENOUS BLD VENIPUNCTURE: CPT

## 2020-11-02 PROCEDURE — 88185 FLOWCYTOMETRY/TC ADD-ON: CPT | Performed by: PATHOLOGY

## 2020-11-02 PROCEDURE — 88313 PR  SPECIAL STAINS,GROUP II: ICD-10-PCS | Mod: 26,BMT,, | Performed by: PATHOLOGY

## 2020-11-02 PROCEDURE — 84100 ASSAY OF PHOSPHORUS: CPT

## 2020-11-02 PROCEDURE — 88189 FLOWCYTOMETRY/READ 16 & >: CPT | Mod: BMT,,, | Performed by: PATHOLOGY

## 2020-11-02 PROCEDURE — 88311 PR  DECALCIFY TISSUE: ICD-10-PCS | Mod: 26,BMT,, | Performed by: PATHOLOGY

## 2020-11-02 PROCEDURE — 88305 TISSUE EXAM BY PATHOLOGIST: CPT | Mod: 26,BMT,, | Performed by: PATHOLOGY

## 2020-11-02 NOTE — PROGRESS NOTES
Subjective:    Patient ID: Kvng Jones Sr. is a 60 y.o. male.    Chief Complaint: Neck Pain    Oncologic hx:  60 y.o. male admitted to [facility] for CML presenting with blast crisis and lewis disease. He was admitted for evaluation and induction chemotherapy with FLAG-Addis. Complications of therapy include epididymal orchitis with negative testicular ultrasound, neutropenic fever, dyspnea, and GGO of bilateral lungs on CT chest. Fever and chest findings were covered with broad spectrum antimicrobials. He did have hallucinations and vivid dreams with posaconazole. Chemotherapy was administered by left arm PICC line that was removed during hospital stay for MSSA infection treated with vancomycin. Hospital admission was 3/30/2020 to 5/1/2020 achieving morphologic CR with induction chemotherapy. He then started Ponatinib 30mg daily.     Studies performed during his hospital stay include pre-chemotherapy ECHO with normal ejection fraction of 60-65%. HIV, HBV, and HCV tests were negative. CBC at admission was WBC 38.6, Hgb 14.6, and platelet 416K. CT of the head without contrast was normal 4/20/2020. US of abdomen performed for abdominal distention and neutropenic fever had hepatic steatosis, liver 19cm, and spleen 11 cm. He does have a history of alcohol use and pancreatitis.    Patient is a . He is  to wife Guillermina who is his caregiver post transplant. He has a 45 pack year smoking history. He quit 6/1/2020, now on Chantix. He drinks 3-4 alcohol beverages nightly. He has 3 children. He has several family members in the [facility] area.    Transplant Course: Admitted 7/21/20 for a Flu/Cy/TBI haplo allo SCT. Son was donor. Received 2 bags and a total CD34 dose of 3.10 x10^6/kg on 7/28/20. Tolerated stem cells well. C/o headaches prior to admission, which persisted throughout admission. Neuro ultimately consulted for rec's. Was a daily drinker prior to admission. No s/s of  alcolhol withdrawal during admission. Transplant further complicated by hypertension, heart burn, c-diff neg diarrhea, nausea, sinus congestion, mild peripheral edema, blurry vision (ophtho consulted), mucositis, electrolyte abnormalities, and neck pain 2/2 spinal stenosis (referred to pain mgt at discharge). He engrafted on 8/10/20 with an ANC of 1280. He was discharged on 8/12/2020.    Interval History:  Patient presents for follow up clinic visit post Flu/Cy/TBI allogeneic stem cell transplant. Today is Day +97. He underwent his D+100 marrow prior to visit today. 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 Currently on 20 mg daily, since his visit last week, his GI symptoms including early satiety, diarrhea, pain and nausea are reportedly improved. He has not had diarrhea in 3 days. However, he has a new truncal rash which is also on his proximal LEs, not present the week prior. It is non-tender, non-pruritic.     Review of Systems   Review of Systems   Constitutional: Positive for malaise/fatigue. Negative for chills, fever and weight loss.   Eyes: Negative for pain, discharge and redness.   Respiratory: Negative for cough and hemoptysis.    Cardiovascular: Negative for chest pain, palpitations and leg swelling.   Gastrointestinal: Positive for abdominal pain, heartburn and nausea. Negative for constipation, diarrhea and vomiting.   Genitourinary: Negative for dysuria and urgency.   Musculoskeletal: Positive for back pain and neck pain. Negative for myalgias.   Skin: Positive for rash. Negative for itching.   Neurological: Negative for dizziness, sensory change and speech change.   Endo/Heme/Allergies: Negative for environmental allergies and polydipsia.   Psychiatric/Behavioral: Negative for depression and suicidal ideas.           Objective:     Physical Exam   Constitutional: He is oriented to person, place, and time and well-developed, well-nourished, and in  no distress.   HENT:   Head: Normocephalic and atraumatic.   Right Ear: External ear normal.   Left Ear: External ear normal.   Nose: Nose normal.   No oral ulcers, mucositis or petechiae    Eyes: Pupils are equal, round, and reactive to light.   Neck: Normal range of motion.   Cardiovascular: Normal rate and regular rhythm.   Pulmonary/Chest: Effort normal and breath sounds normal. No respiratory distress. He has no wheezes. He has no rales.   Abdominal: Soft. Bowel sounds are normal.   Ventral hernia, mild epigastric discomfort on palpation    Musculoskeletal: Normal range of motion.         General: No edema.   Neurological: He is alert and oriented to person, place, and time. No cranial nerve deficit.   Skin: He is not diaphoretic.   Truncal pinpoint rash, likely GVH, over about 10-20% of BSA on the trunk and proximal lower extremities, non tender   Psychiatric: Affect and judgment normal.             Assessment:       1. History of allogeneic stem cell transplant    2. CML (chronic myelocytic leukemia)    3. Acute myeloid leukemia in remission    4. Acute GVHD complicating bone marrow transplantation    5. Hypomagnesemia    6. Antineoplastic chemotherapy induced anemia    7. Cervical stenosis of spine    8. Benign prostatic hyperplasia without lower urinary tract symptoms    9. Hyperkalemia    10. Essential hypertension    11. Gastroesophageal reflux disease without esophagitis        Plan:         History of allogeneic stem cell transplant  - Admitted 7/21/20 for planned Flu/Cy/TBI haplo allo SCT. Son was the donor. Received 2 bags and a total CD34 dose of 3.10 x10^6/kg on 7/28/20. Recieved post transplant cyclophosphamide. Engrafted neutrophils 8/10/20 day +13  - Today is Day +97  - Tacro level 7.3 (goal 7-9). Continue 5 mg/5mg.  - stopped Ursodiol Day +30 (8/27/20)  - patient requests Rx for Valium and Oxycodone for pre-procedure in clinic bone marrow biopsies.  - had BMBx in clinic on 8/28/20 showing no  definitive morphologic or immunophenotypic evidence of residual CML. Chimerisms unable to be obtained due to insufficient sample; drawn peripherally on 9/18. CD3-positive T-cells:  Insufficient DNA extracted for reliable analysis. CD33-positive myeloid cells:  The CD33-positive fraction contains approximately 100% donor DNA and approximately 0% recipient DNA.   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 Achieved morphologic CR  - Started on daily Ponatinib 30 mg daily, which he continued to take outpatient. Held Ponatinib during admission; may not need to restart per recent literature. BBMT 2020(09): 686-64  - Had BMBx at [facility] on 7/1/2020 showing no morphologic or immunophenotypic evidence of AML/CML  - Admitted 7/21/20 for Flu/Cy/TBI haplo allo SCT    GVHD  - had rash inpatient that was believed to be drug eruption. Resolved at that time  - diffuse erythema to legs, lower back, arms, and abdomen presented on 9/15. Started 1mg/kg (90mg, will round to 100mg for dosing) of prednisone daily (9/15/20); decreased to 90 mg (4.5 tabs) on 9/21/2020  - rash much improved 9/21/2020; resolved to arms; improved to legs; abdomen stable; decreased to 3.5 tabs (70 mg) on 10/6/20 as rash had resolved  - currently on 20 mg daily and will stay on that till next week f/u with rash reappearing  - continue with current tac dosing  - educated to use his TAC cream over affected area  - needs to let us know if rash gets worse    ID  - Last CMV and EBV undetected; continue twice weekly CMV, weekly EBV, today's pending  - Acyclovir (Zoster prophylaxis) until Day +365  - Diflucan (Fungal prophylaxis) until off tacrolimus.  - Bactrim (PJP prophylaxis) until off tacrolimus.     Hypomagnesemia/hyperphosphatemia   - Mag; Currently on mag  800 mg QID but states he is taking TID due to GI distress and increased magnesium in diet     Antineoplastic chemotherapy-induced cytopenias: thrombocytopenia   - blood counts continue to improve  - BMBx showing no residual disease.     Neck pain/ Cervical stenosis of spine   - Hx of spinal fusion C3-C5  - Xray 9/19 shows surgical changes of fusion from C3 through C5 with degenerative changed below fusion.   - Started PT on 8/26.  - Pt feels neck pain started after grewal was placed  - Has been mostly refractory to medications (zanaflex, gabapentin, celebrex, lidoderm patches). Some relief with oral morphine  - CT from 8/24/20 showing history of C3, C4, and C5 fusion and diffuse osteoarthritic changes  - Had virtual pain management appt on 8/25 to review CT results.  - Zanaflex switched to Robaxin per pain mgt.  - had medial branch block on 9/2/20 with Dr. Mcelroy (pain mgt). Per patient pain mgmnt signed off, as it is not a chronic issue, defer to BMT service for further pain control.  - started MS contin 15mg BID 9/20/2020; use PRN morphine q6h for breakthrough; titrate up MS contin as needed  - Patient reports much improvement to pain since starting high dose prednisone for his skin GVHD but pain returning as we taper steroids  - feeling lack of benefit from Gabapentin taken once nightly; started taper to every other night 10/26/2020  - continues taper     BPH (benign prostatic hyperplasia)  - Continue Flomax     GERD (gastroesophageal reflux disease)  - Continue nexium; 40mg if needed while on high dose steroids  - TUMS prn and pepto PRN  - per patient, decreased required amount      Essential hypertension  - Increased home amlodipine from 5 mg to 10 mg daily while inpatient due to BPs as high as 180s/110s   - baseline today  - continue to monitor and possibly restart in the near future.     Constipation  - continue Senna PRN    Hyperkalemia  - resolved    Follow-up:   - CBC, CMP, type and screen, tacro, CMV,  EBV (weekly only), mag, phos and appt with Dr. Hodgse on 11/9  - patient will transition to weekly labs and visits with NP and Dr. Hodges alternating. He will see Dr. Hodges in [place] on 12/18. For [facility] visits he would like to come on Wednesdays  - can utilize VV in bw in-person visits prn

## 2020-11-03 ENCOUNTER — CLINICAL SUPPORT (OUTPATIENT)
Dept: REHABILITATION | Facility: OTHER | Age: 60
End: 2020-11-03
Attending: PHYSICAL MEDICINE & REHABILITATION
Payer: COMMERCIAL

## 2020-11-03 ENCOUNTER — PATIENT MESSAGE (OUTPATIENT)
Dept: HEMATOLOGY/ONCOLOGY | Facility: CLINIC | Age: 60
End: 2020-11-03

## 2020-11-03 DIAGNOSIS — R29.898 DECREASED STRENGTH OF TRUNK AND BACK: ICD-10-CM

## 2020-11-03 DIAGNOSIS — R29.3 POOR POSTURE: Primary | ICD-10-CM

## 2020-11-03 PROCEDURE — 97110 THERAPEUTIC EXERCISES: CPT

## 2020-11-03 NOTE — PROGRESS NOTES
"[facility]      Name: Kvng Jones .  Clinic Number: [id number]    Therapy Diagnosis:   Encounter Diagnoses   Name Primary?    Poor posture Yes    Decreased strength of trunk and back      Physician: Stefani Harrington, *    Visit Date: 11/3/2020      Physician Orders: PT Eval and Treat   Medical Diagnosis from Referral:   M79.18 (ICD-10-CM) - Myofascial pain   M54.2 (ICD-10-CM) - Neck pain   M47.812 (ICD-10-CM) - Spondylosis of cervical region without myelopathy or radiculopathy       Evaluation Date: 10/9/2020  Authorization Period Expiration: 10/08/21  Plan of Care Expiration: 1/8/2021  Reassessment Due: 11/9/20  Visit # / Visits authorized: 4 / 20    Time In: 10:15 am   Time Out: 11 am  Total Billable Time: 45 minutes    Precautions: Standard/cervical fusion/leukemia/port right anterior chest     Pattern of pain determined: 1REP    Subjective   Johner reports poor sleep last night and nausea feeling due to medications.  Pt report R pelvic biopsy yesterday inc discomfort in sitting.  Pt reports he has moved to different city and is looking into continuing  in [place] but wants to visit MD first.  Pt reports completing moving tasks (lifting, loading, cleaning) and having inc soreness throughout body.       Patient reports tolerating previous visit well with no soreness.   Patient reports their pain to be 6/10 cervical /c movement 3/10 static on a 0-10 scale with 0 being no pain and 10 being the worst pain imaginable.  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    Objective     Baseline Isometric Testing on Med X equipment:  Testing administered by PT  Date of testing: 10/9/20  ROM 42-78 deg "   Max Peak Torque 288    Min Peak Torque 214   Flex/Ext Ratio 34   % below normative data 34%           Limitation/Restriction for FOTO cervical Survey     Therapist reviewed FOTO scores for Kvng Jones Sr. on 10/9/2020.   FOTO documents entered into [employer] - see Media section.     Limitation Score: 51%        Wound Assessment from PICC removal (10/20/20)   Waterproof bandages in place.  No observed redness.  Min tenderness to palpation    Cervical ext ROM 11/03/20  Seated  35 deg  Seated /c cervical retraction 40 deg (pt note dec pain /c movement)       Treatment    Pt was instructed in and performed the following:     Kvng received therapeutic exercises to develop/improved posture, cardiovascular endurance, muscular endurance, lumbar/cervical ROM, strength and muscular endurance for 45 minutes including the following exercises:     Supine:  DKTC x 10   LTR x 10  SL:   Open books x 10 each side limited rot  Seated:   Cervical retractions x10   Cervical retractions /c ext x 5  Cervical rotations x 5 each direction   Thoracic ext over bolster x20 hands on head for chest stretch and head control    NP:   Standing:  Scap retractions  x10 cue for dec shoulder elevation     [facility] 11/3/2020   Visit Number 4   VAS Pain Rating 6   Treadmill Time (in min.) -   Time 5   Retraction in Sitting 10   Retraction with Extension 5   Rotation 5   Scapular Retraction -   Seat Adjustment -   Top Dead Center -   Counterweight -   Cervical Flexion 84   Cervical Extension 36   Cervical Peak Torque -   Cervical Weight 216   Repetitions 15   Rating of Perceived Exertion 5   Ice - Z Lie (in min.) 5       Peripheral muscle strengthening which included 1 set of 15-20 repetitions at a slow, controlled 10-13 second per rep pace focused on strengthening supporting musculature for improved body mechanics and functional mobility.  Pt and therapist focused on proper form during treatment to ensure optimal strengthening of  "each targeted muscle group.  Machines were utilized including torso rotation, leg extension, leg curl.  Chest press, upright row. Tricep extension, bicep curl, leg press, and hip abduction added visit 3.      Kvng received the following manual therapy techniques: NONE were applied to the: NONE for NONE minutes.         Home Exercises Provided and Patient Education Provided   Home exercises include:*  Cervical retractions   Cervical retraction /c extension  Cervical rotations    Cardio program: TBD    Education provided:   - Pt ed on importance of performing HEP on regular basis    Written Home Exercises Provided: Patient instructed to cont prior HEP.  Exercises were reviewed and Kvng was able to demonstrate them prior to the end of the session.  Kvng demonstrated good  understanding of the education provided.     See EMR under Patient Instructions for exercises provided 10/20/2020.          Assessment   Pt /c dec tolerance to activity and inc full body "soreness", therefore, began tx /c full spine mobility exercises for inc participation in MedX peripheral exs.  Pt cont demo dec rotational ROM in cervical and thoracic region. Recoomend cont rotational focused mobility exercises.   Pt inc cervical ext /c retraction and report dec pain /c movement indicating likely mechanical susy to pt subjective pain report and resultant dec mobility.  MedX cervical performed /c no weight inc to allow for activity tolerance assessment.  However, pt demo inc cervical flex and ext ROM so limits of cervical mobility increased for inc challenge to cervical mobility.   Pt performed 15 reps at 5 RPE indicating appropriate strength challenge at present.  Pt report his job is moving and possibility to stopping HB due to move.  Pt ed on HB programming vs traditional PT including MedX objective measures, use of peripheral machines for "bigger picture" wellness/functionality and application of wellness coaching as " transition to independent exercise.  Pt advised to contact [facility] if intending to cont HB programming.  Pt advised to cont HEP especially rotational exercises.      Patient is making fair progress towards established goals.  Pt will continue to benefit from skilled outpatient physical therapy to address the deficits stated in the impairment chart, provide pt/family education and to maximize pt's level of independence in the home and community environment.     Anticipated Barriers for therapy: none, awareness of PICC port healing   Pt's spiritual, cultural and educational needs considered and pt agreeable to plan of care and goals as stated below:       GOALS: Pt is in agreement with the following goals.     Short term goals: 6 weeks or 10 visits   1.  Pt will demonstratte increased cervical ROM as measured by med ex by 6 degrees from initial test which results in improved  ROM of neck for ease with ADLs and driving (not met, progressing)  2. Pt will demonstrate independence with reducing or controlling symptoms with ther ex, movement, or position independently, able to reduce pain 1-2 points on pain scale using strategies taught in therapy   (not met, progressing)    3. Pt will demonstrate increased MedX average isometric strength value by 10% with  when compared to the initial testing resulting in improved ability to perform bending, lifting, and carrying activities safely, confidently.   (not met, progressing)             Long term goals: 10 weeks or 20 visits  1. Pt will demonstratte increased cervical ROM as measured by med ex by 12 degrees from initial test which results in functional ROM of neck for ease with ADLs and driving   (not met, progressing)    2. Pt will demonstrate increased MedX average isometric strength value  by 30% from initial test to improve ability to lift and carry, and sustain good posture while performing ADL's  (not met, progressing)    3.Pt will demonstrate reduced pain  and improved functional outcomes as reported on the FOTO by reaching a limitation score of < or = 40% or less in order to demonstrate subjective improvement in pt's condition.   (not met, progressing)    4. Pt will demonstrate independence with reducing or controlling symptoms with ther ex, movement, or position independently, able to reduce pain 2-4 points on pain scale using strategies taught in therapy  (not met, progressing)    5. Pt will demonstrate independence with the HEP at discharge.   (not met, progressing)    6.  Pt(patient goal)will be able to drive without pain and fish without pain  (not met, progressing)          Plan   Continue with established Plan of Care towards established PT goals.

## 2020-11-04 LAB
BCR/ABL,P210 RESULT: NORMAL
CMV DNA SERPL NAA+PROBE-ACNC: NORMAL IU/ML
PATH REPORT.FINAL DX SPEC: NORMAL
SPECIMEN TYPE: NORMAL

## 2020-11-05 ENCOUNTER — PATIENT MESSAGE (OUTPATIENT)
Dept: HEMATOLOGY/ONCOLOGY | Facility: CLINIC | Age: 60
End: 2020-11-05

## 2020-11-05 LAB
BODY SITE - BONE MARROW: NORMAL
CLINICAL DIAGNOSIS - BONE MARROW: NORMAL
EBV DNA SERPL NAA+PROBE-ACNC: NORMAL IU/ML
FLOW CYTOMETRY ANTIBODIES ANALYZED - BONE MARROW: NORMAL
FLOW CYTOMETRY COMMENT - BONE MARROW: NORMAL
FLOW CYTOMETRY INTERPRETATION - BONE MARROW: NORMAL

## 2020-11-06 ENCOUNTER — PATIENT MESSAGE (OUTPATIENT)
Dept: RESEARCH | Facility: OTHER | Age: 60
End: 2020-11-06

## 2020-11-09 ENCOUNTER — PATIENT MESSAGE (OUTPATIENT)
Dept: HEMATOLOGY/ONCOLOGY | Facility: CLINIC | Age: 60
End: 2020-11-09

## 2020-11-09 DIAGNOSIS — I10 ESSENTIAL HYPERTENSION: ICD-10-CM

## 2020-11-09 DIAGNOSIS — C92.10 CML (CHRONIC MYELOCYTIC LEUKEMIA): ICD-10-CM

## 2020-11-09 LAB
CHROM BANDING METHOD: NORMAL
CHROMOSOME ANALYSIS BM ADDITIONAL INFORMATION: NORMAL
CHROMOSOME ANALYSIS BM RELEASED BY: NORMAL
CHROMOSOME ANALYSIS BM RESULT SUMMARY: NORMAL
CLINICAL CYTOGENETICIST REVIEW: NORMAL
FINAL DIAGNOSIS - CHIMERISM SORT: NORMAL
KARYOTYP MAR: NORMAL
REASON FOR REFERRAL (NARRATIVE): NORMAL
REF LAB TEST METHOD: NORMAL
SPECIMEN SOURCE: NORMAL
SPECIMEN TYPE -CHIMERISM SORT: NORMAL
SPECIMEN: NORMAL

## 2020-11-09 RX ORDER — AMLODIPINE BESYLATE 5 MG/1
5 TABLET ORAL DAILY
Qty: 30 TABLET | Refills: 11 | Status: SHIPPED | OUTPATIENT
Start: 2020-11-09 | End: 2020-12-07 | Stop reason: SDUPTHER

## 2020-11-09 RX ORDER — FLUCONAZOLE 200 MG/1
400 TABLET ORAL DAILY
Qty: 60 TABLET | Refills: 6 | Status: SHIPPED | OUTPATIENT
Start: 2020-11-09 | End: 2021-03-31 | Stop reason: ALTCHOICE

## 2020-11-09 RX ORDER — ACYCLOVIR 800 MG/1
800 TABLET ORAL 2 TIMES DAILY
Qty: 60 TABLET | Refills: 11 | Status: SHIPPED | OUTPATIENT
Start: 2020-11-09 | End: 2020-12-07 | Stop reason: SDUPTHER

## 2020-11-10 PROBLEM — K59.00 CONSTIPATION: Status: ACTIVE | Noted: 2020-11-10

## 2020-11-10 NOTE — PROGRESS NOTES
Subjective:    Patient ID: Kvng Jones Sr. is a 60 y.o. male.    Chief Complaint: No chief complaint on file.    Oncologic hx: (from Dr. Hodges' previous documentation)  60 y.o. 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 He was admitted for evaluation and induction chemotherapy with FLAG-Addis. Complications of therapy include epididymal orchitis with negative testicular ultrasound, neutropenic fever, dyspnea, and GGO of bilateral lungs on CT chest. Fever and chest findings were covered with broad spectrum antimicrobials. He did have hallucinations and vivid dreams with posaconazole. Chemotherapy was administered by left arm PICC line that was removed during hospital stay for MSSA infection treated with vancomycin. Hospital admission was 3/30/2020 to 5/1/2020 achieving morphologic CR with induction chemotherapy. He then started Ponatinib 30mg daily.     Studies performed during his hospital stay include pre-chemotherapy ECHO with normal ejection fraction of 60-65%. HIV, HBV, and HCV tests were negative. CBC at admission was WBC 38.6, Hgb 14.6, and platelet 416K. CT of the head without contrast was normal 4/20/2020. US of abdomen performed for abdominal distention and neutropenic fever had hepatic steatosis, liver 19cm, and spleen 11 cm. He does have a history of alcohol use and pancreatitis.    Patient is a . He is  to wife Guillermina who is his caregiver post transplant. He has a 45 pack year smoking history. He quit 6/1/2020, now on Chantix. He drinks 3-4 alcohol beverages nightly. He has 3 children. He has several family members in the [facility] area.    Transplant Course: Admitted 7/21/20 for a Flu/Cy/TBI haplo allo SCT. Son was donor. Received 2 bags and a total CD34 dose of 3.10 x10^6/kg on 7/28/20. Tolerated stem cells well. C/o headaches prior to admission, which persisted throughout admission. Neuro ultimately consulted for  rec's. Was a daily drinker prior to admission. No s/s of alcolhol withdrawal during admission. Transplant further complicated by hypertension, heart burn, c-diff neg diarrhea, nausea, sinus congestion, mild peripheral edema, blurry vision (ophtho consulted), mucositis, electrolyte abnormalities, and neck pain 2/2 spinal stenosis (referred to pain mgt at discharge). He engrafted on 8/10/20 with an ANC of 1280. He was discharged on 8/12/2020.    Interval History:  Patient presents for follow up clinic visit post Flu/Cy/TBI allogeneic stem cell transplant. Today is Day +106. His Day+100 BMBx was on 11/2/20. Showing no definitive morphologic or immunophenotypic evidence of CML but there is evidence of MRD. Continues prednisone taper possible GVHD of the gut and skin. Currently on 20 mg daily. GI symptoms including early satiety, diarrhea, pain and nausea seem to be improving. He report between 1-4 soft stools daily, but states that he had bowel issues pre transplant since his cholecystectomy. At previous visit, rash to trunk was noted. Today rash persists. Also to tops of thighs near knees and backs of arms (was at that location previously). Appears like mild folliculitis. Denies fevers, chills, cough, chest pain, bruising, and bleeding.    Review of Systems   Review of Systems   Constitutional: Positive for malaise/fatigue. Negative for chills, fever and weight loss.   Eyes: Negative for pain, discharge and redness.   Respiratory: Negative for cough and hemoptysis.    Cardiovascular: Negative for chest pain, palpitations and leg swelling.   Gastrointestinal: Negative for constipation, diarrhea and vomiting.        1-4 soft stools daily   Genitourinary: Negative for dysuria and urgency.   Musculoskeletal: Positive for back pain and neck pain. Negative for myalgias.   Skin: Positive for rash. Negative for itching.   Neurological: Negative for dizziness, sensory change and speech change.   Endo/Heme/Allergies: Negative  for environmental allergies and polydipsia.   Psychiatric/Behavioral: Negative for depression and suicidal ideas.           Objective:     Physical Exam   Constitutional: He is oriented to person, place, and time and well-developed, well-nourished, and in no distress.   HENT:   Head: Normocephalic and atraumatic.   Right Ear: External ear normal.   Left Ear: External ear normal.   Nose: Nose normal.   No oral ulcers, mucositis or petechiae    Eyes: Pupils are equal, round, and reactive to light.   Neck: Normal range of motion.   Cardiovascular: Normal rate and regular rhythm.   Pulmonary/Chest: Effort normal and breath sounds normal. No respiratory distress. He has no wheezes. He has no rales.   Abdominal: Soft. Bowel sounds are normal.   Ventral hernia, mild epigastric discomfort on palpation    Musculoskeletal: Normal range of motion.         General: No edema.   Neurological: He is alert and oriented to person, place, and time. No cranial nerve deficit.   Skin: He is not diaphoretic.   Folliculitis-appearing rash to abdomen, backs of arms, and tops of thighs near knees   Psychiatric: Affect and judgment normal.             Assessment:       1. History of allogeneic stem cell transplant    2. CML in remission    3. Acute myeloid leukemia in remission    4. Acute GVHD    5. Hypomagnesemia    6. Spondylosis of cervicothoracic region w/o myelopathy or radiculopathy    7. Neck pain    8. Benign prostatic hyperplasia without lower urinary tract symptoms    9. Gastroesophageal reflux disease without esophagitis    10. Essential hypertension    11. Constipation, unspecified constipation type    12. Rash and nonspecific skin eruption    13. Diarrhea, unspecified type        Plan:         History of allogeneic stem cell transplant  - Admitted 7/21/20 for planned Flu/Cy/TBI haplo allo SCT. Son was the donor. Received 2 bags and a total CD34 dose of 3.10 x10^6/kg on 7/28/20. Recieved post transplant cyclophosphamide.  Engrafted neutrophils 8/10/20 day +13  - Today is Day +106  - Tacro level 11.1 (goal 7-9). Changed tacro dose from 5 mg/5mg to 5mg q am and 4mg q pm.  - stopped Ursodiol Day +30 (8/27/20)  - patient requests Rx for Valium and Oxycodone for pre-procedure in clinic bone marrow biopsies.  - had BMBx in clinic on 8/28/20 showing no definitive morphologic or immunophenotypic evidence of residual CML. Chimerisms unable to be obtained due to insufficient sample; drawn peripherally on 9/18. CD3-positive T-cells:  Insufficient DNA extracted for reliable analysis. CD33-positive myeloid cells:  The CD33-positive fraction contains approximately 100% donor DNA and approximately 0% recipient DNA.   - Dalal removed 10/19   - Day 100 Bm bx 11/2/2020 showing no definitive morphologic or immunophenotypic evidence of CML but there is evidence of MRD. Positive for BCR/ABL p210 (7.8%).  - will need to resume pre transplant TKI, Ponatinib 30 mg daily. Prescription sent by Dr. Hodges today.       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 Achieved morphologic CR  - Started on daily Ponatinib 30 mg daily, which he continued to take outpatient. Held Ponatinib during admission; may not need to restart per recent literature. BBMT 2020(26): 478-49  - Had BMBx at [facility] on 7/1/2020 showing no morphologic or immunophenotypic evidence of AML/CML  - Admitted 7/21/20 for Flu/Cy/TBI haplo allo SCT  - Day 100 Bm bx 11/2/2020 showing no definitive morphologic or immunophenotypic evidence of CML but there is evidence of MRD. Positive for BCR/ABL p210 (7.8%).  - will need to resume pre transplant TKI, Ponatinib 30 mg daily. Prescription sent by Dr. Hodges today.     GVHD  - had rash inpatient that was believed to be drug eruption. Resolved at that time  - diffuse erythema to legs, lower back, arms, and abdomen  presented on 9/15. Started 1mg/kg (90mg, will round to 100mg for dosing) of prednisone daily (9/15/20); decreased to 90 mg (4.5 tabs) on 9/21/2020  - rash much improved 9/21/2020; resolved to arms; improved to legs; abdomen stable; decreased to 3.5 tabs (70 mg) on 10/6/20 as rash had resolved  - currently on 20 mg daily. Decreased to 10 mg daily today. Will continue this dose x 1 week and decrease to 5 mg daily at next visit if symptoms improved/stable.  - changed tacro dose from 5 mg bid to 5 mg q am and 5 mg q pm today  - continue TAC cream over affected area. Refilled today.    ID  - Last CMV and EBV undetected; continue weekly CMV and EBV, today's pending  - Acyclovir (Zoster prophylaxis) until Day +365  - Diflucan (Fungal prophylaxis) until off tacrolimus.  - Bactrim (PJP prophylaxis) until off tacrolimus.     Hypomagnesemia  - Mag 1.4 today  - Currently on mag 800 mg QID but states he is taking TID due to GI distress and increased magnesium in diet     Antineoplastic chemotherapy-induced cytopenias: thrombocytopenia   - blood counts continue to improve  - plts are wnl today. hgb is 13.8, but hematocrit is normal.     Neck pain/ Cervical stenosis of spine   - Hx of spinal fusion C3-C5  - Xray 9/19 shows surgical changes of fusion from C3 through C5 with degenerative changed below fusion.   - Started PT on 8/26.  - Pt feels neck pain started after grewal was placed  - Has been mostly refractory to medications (zanaflex, gabapentin, celebrex, lidoderm patches). Some relief with oral morphine  - CT from 8/24/20 showing history of C3, C4, and C5 fusion and diffuse osteoarthritic changes  - Had virtual pain management appt on 8/25 to review CT results.  - Zanaflex switched to Robaxin per pain mgt.  - had medial branch block on 9/2/20 with Dr. Mcelroy (pain mgt). Per patient pain mgmnt signed off, as it is not a chronic issue, defer to BMT service for further pain control.  - started MS contin 15mg BID 9/20/2020;  use PRN morphine q6h for breakthrough; titrate up MS contin as needed  - Patient reports much improvement to pain since starting high dose prednisone for his skin GVHD but pain returning as we taper steroids  - feeling lack of benefit from Gabapentin taken once nightly; started taper to every other night 10/26/2020  - continues taper     BPH (benign prostatic hyperplasia)  - Continue Flomax     GERD (gastroesophageal reflux disease)  - Continue nexium; 40mg if needed while on high dose steroids  - TUMS prn and pepto PRN     Essential hypertension  - Increased home amlodipine from 5 mg to 10 mg daily while inpatient due to BPs as high as 180s/110s   - /76 today  - currently on amlodipine 5 mg daily. Will continue this dose.    Rash   - rash at site of what appears to be hair follicles. Suspect folliculitis.  - present to trunk, tops of thighs near knees, and backs of arms  - continue TAC cream. Refilled today.  - continue prednisone taper as above.    Diarrhea  - reports intermittent diarrhea since cholecystectomy pre transplant  - reports 1-4 soft stools daily  - patient states that he is taking pepto for this with some relief  - does not like to take imodium as this causes constipation  - currently on a prednisone taper for possible GVHD of the gut. Reduced prednisone dose from 20 mg daily to 10 mg daily today.    Follow-up:   - weekly CBC, CMP, type and screen, tacro, CMV, EBV mag, phos and appt with Dr. Hodges or NP. Scheduled through mid December.    Ivon Morrow, FNP  Hematology/Oncology/Bone Marrow Transplant

## 2020-11-11 ENCOUNTER — CLINICAL SUPPORT (OUTPATIENT)
Dept: HEMATOLOGY/ONCOLOGY | Facility: CLINIC | Age: 60
End: 2020-11-11
Payer: COMMERCIAL

## 2020-11-11 ENCOUNTER — LAB VISIT (OUTPATIENT)
Dept: LAB | Facility: HOSPITAL | Age: 60
End: 2020-11-11
Attending: INTERNAL MEDICINE
Payer: COMMERCIAL

## 2020-11-11 ENCOUNTER — OFFICE VISIT (OUTPATIENT)
Dept: HEMATOLOGY/ONCOLOGY | Facility: CLINIC | Age: 60
End: 2020-11-11
Payer: COMMERCIAL

## 2020-11-11 VITALS
HEIGHT: 66 IN | RESPIRATION RATE: 18 BRPM | WEIGHT: 208.31 LBS | HEART RATE: 91 BPM | OXYGEN SATURATION: 99 % | SYSTOLIC BLOOD PRESSURE: 117 MMHG | TEMPERATURE: 99 F | BODY MASS INDEX: 33.48 KG/M2 | DIASTOLIC BLOOD PRESSURE: 76 MMHG

## 2020-11-11 DIAGNOSIS — R21 RASH AND NONSPECIFIC SKIN ERUPTION: ICD-10-CM

## 2020-11-11 DIAGNOSIS — R19.7 DIARRHEA, UNSPECIFIED TYPE: ICD-10-CM

## 2020-11-11 DIAGNOSIS — K59.00 CONSTIPATION, UNSPECIFIED CONSTIPATION TYPE: ICD-10-CM

## 2020-11-11 DIAGNOSIS — D89.810 ACUTE GVHD: ICD-10-CM

## 2020-11-11 DIAGNOSIS — M54.2 NECK PAIN: ICD-10-CM

## 2020-11-11 DIAGNOSIS — M47.813 SPONDYLOSIS OF CERVICOTHORACIC REGION W/O MYELOPATHY OR RADICULOPATHY: ICD-10-CM

## 2020-11-11 DIAGNOSIS — N40.0 BENIGN PROSTATIC HYPERPLASIA WITHOUT LOWER URINARY TRACT SYMPTOMS: ICD-10-CM

## 2020-11-11 DIAGNOSIS — Z94.84 HISTORY OF ALLOGENEIC STEM CELL TRANSPLANT: Primary | ICD-10-CM

## 2020-11-11 DIAGNOSIS — E83.42 HYPOMAGNESEMIA: ICD-10-CM

## 2020-11-11 DIAGNOSIS — C92.01 ACUTE MYELOID LEUKEMIA IN REMISSION: ICD-10-CM

## 2020-11-11 DIAGNOSIS — I10 ESSENTIAL HYPERTENSION: ICD-10-CM

## 2020-11-11 DIAGNOSIS — Z94.81 S/P ALLOGENEIC BONE MARROW TRANSPLANT: ICD-10-CM

## 2020-11-11 DIAGNOSIS — C92.11 CML IN REMISSION: ICD-10-CM

## 2020-11-11 DIAGNOSIS — K21.9 GASTROESOPHAGEAL REFLUX DISEASE WITHOUT ESOPHAGITIS: ICD-10-CM

## 2020-11-11 LAB
ABO + RH BLD: NORMAL
ALBUMIN SERPL BCP-MCNC: 4.1 G/DL (ref 3.5–5.2)
ALP SERPL-CCNC: 55 U/L (ref 55–135)
ALT SERPL W/O P-5'-P-CCNC: 38 U/L (ref 10–44)
ANION GAP SERPL CALC-SCNC: 11 MMOL/L (ref 8–16)
AST SERPL-CCNC: 24 U/L (ref 10–40)
BASOPHILS # BLD AUTO: 0.07 K/UL (ref 0–0.2)
BASOPHILS NFR BLD: 0.8 % (ref 0–1.9)
BILIRUB SERPL-MCNC: 0.3 MG/DL (ref 0.1–1)
BLD GP AB SCN CELLS X3 SERPL QL: NORMAL
BUN SERPL-MCNC: 21 MG/DL (ref 8–23)
CALCIUM SERPL-MCNC: 9.8 MG/DL (ref 8.7–10.5)
CHLORIDE SERPL-SCNC: 106 MMOL/L (ref 95–110)
CO2 SERPL-SCNC: 24 MMOL/L (ref 23–29)
CREAT SERPL-MCNC: 1.3 MG/DL (ref 0.5–1.4)
DIFFERENTIAL METHOD: ABNORMAL
EOSINOPHIL # BLD AUTO: 0.1 K/UL (ref 0–0.5)
EOSINOPHIL NFR BLD: 0.8 % (ref 0–8)
ERYTHROCYTE [DISTWIDTH] IN BLOOD BY AUTOMATED COUNT: 14.6 % (ref 11.5–14.5)
EST. GFR  (AFRICAN AMERICAN): >60 ML/MIN/1.73 M^2
EST. GFR  (NON AFRICAN AMERICAN): 58.5 ML/MIN/1.73 M^2
GLUCOSE SERPL-MCNC: 153 MG/DL (ref 70–110)
HCT VFR BLD AUTO: 41.9 % (ref 40–54)
HGB BLD-MCNC: 13.8 G/DL (ref 14–18)
IMM GRANULOCYTES # BLD AUTO: 0.22 K/UL (ref 0–0.04)
IMM GRANULOCYTES NFR BLD AUTO: 2.6 % (ref 0–0.5)
LYMPHOCYTES # BLD AUTO: 1 K/UL (ref 1–4.8)
LYMPHOCYTES NFR BLD: 12.1 % (ref 18–48)
MAGNESIUM SERPL-MCNC: 1.4 MG/DL (ref 1.6–2.6)
MCH RBC QN AUTO: 33.7 PG (ref 27–31)
MCHC RBC AUTO-ENTMCNC: 32.9 G/DL (ref 32–36)
MCV RBC AUTO: 102 FL (ref 82–98)
MONOCYTES # BLD AUTO: 0.6 K/UL (ref 0.3–1)
MONOCYTES NFR BLD: 7.5 % (ref 4–15)
NEUTROPHILS # BLD AUTO: 6.3 K/UL (ref 1.8–7.7)
NEUTROPHILS NFR BLD: 76.2 % (ref 38–73)
NRBC BLD-RTO: 0 /100 WBC
PHOSPHATE SERPL-MCNC: 4 MG/DL (ref 2.7–4.5)
PLATELET # BLD AUTO: 188 K/UL (ref 150–350)
PMV BLD AUTO: 10 FL (ref 9.2–12.9)
POTASSIUM SERPL-SCNC: 5.2 MMOL/L (ref 3.5–5.1)
PROT SERPL-MCNC: 7.2 G/DL (ref 6–8.4)
RBC # BLD AUTO: 4.09 M/UL (ref 4.6–6.2)
SODIUM SERPL-SCNC: 141 MMOL/L (ref 136–145)
TACROLIMUS BLD-MCNC: 11.1 NG/ML (ref 5–15)
WBC # BLD AUTO: 8.32 K/UL (ref 3.9–12.7)

## 2020-11-11 PROCEDURE — 3008F BODY MASS INDEX DOCD: CPT | Mod: BMT,CPTII,S$GLB, | Performed by: NURSE PRACTITIONER

## 2020-11-11 PROCEDURE — 99999 PR PBB SHADOW E&M-EST. PATIENT-LVL IV: ICD-10-PCS | Mod: PBBFAC,BMT,, | Performed by: NURSE PRACTITIONER

## 2020-11-11 PROCEDURE — 87799 DETECT AGENT NOS DNA QUANT: CPT

## 2020-11-11 PROCEDURE — 80197 ASSAY OF TACROLIMUS: CPT

## 2020-11-11 PROCEDURE — 99999 PR PBB SHADOW E&M-EST. PATIENT-LVL I: ICD-10-PCS | Mod: PBBFAC,BMT,,

## 2020-11-11 PROCEDURE — 36415 COLL VENOUS BLD VENIPUNCTURE: CPT

## 2020-11-11 PROCEDURE — 83735 ASSAY OF MAGNESIUM: CPT

## 2020-11-11 PROCEDURE — 3008F PR BODY MASS INDEX (BMI) DOCUMENTED: ICD-10-PCS | Mod: BMT,CPTII,S$GLB, | Performed by: NURSE PRACTITIONER

## 2020-11-11 PROCEDURE — 84100 ASSAY OF PHOSPHORUS: CPT

## 2020-11-11 PROCEDURE — 80053 COMPREHEN METABOLIC PANEL: CPT

## 2020-11-11 PROCEDURE — 99215 PR OFFICE/OUTPT VISIT, EST, LEVL V, 40-54 MIN: ICD-10-PCS | Mod: BMT,S$GLB,, | Performed by: NURSE PRACTITIONER

## 2020-11-11 PROCEDURE — 86901 BLOOD TYPING SEROLOGIC RH(D): CPT

## 2020-11-11 PROCEDURE — 99999 PR PBB SHADOW E&M-EST. PATIENT-LVL I: CPT | Mod: PBBFAC,BMT,,

## 2020-11-11 PROCEDURE — 99999 PR PBB SHADOW E&M-EST. PATIENT-LVL IV: CPT | Mod: PBBFAC,BMT,, | Performed by: NURSE PRACTITIONER

## 2020-11-11 PROCEDURE — 99215 OFFICE O/P EST HI 40 MIN: CPT | Mod: BMT,S$GLB,, | Performed by: NURSE PRACTITIONER

## 2020-11-11 PROCEDURE — 85025 COMPLETE CBC W/AUTO DIFF WBC: CPT

## 2020-11-11 RX ORDER — ONDANSETRON 8 MG/1
8 TABLET, ORALLY DISINTEGRATING ORAL EVERY 8 HOURS PRN
Qty: 30 TABLET | Refills: 3 | Status: SHIPPED | OUTPATIENT
Start: 2020-11-11 | End: 2021-03-31

## 2020-11-11 RX ORDER — ONDANSETRON 8 MG/1
8 TABLET, ORALLY DISINTEGRATING ORAL EVERY 8 HOURS PRN
Qty: 30 TABLET | Refills: 1 | Status: SHIPPED | OUTPATIENT
Start: 2020-11-11 | End: 2020-11-11 | Stop reason: SDUPTHER

## 2020-11-11 RX ORDER — TRIAMCINOLONE ACETONIDE 0.25 MG/G
CREAM TOPICAL 2 TIMES DAILY
Qty: 1 TUBE | Refills: 3 | Status: SHIPPED | OUTPATIENT
Start: 2020-11-11 | End: 2020-12-10

## 2020-11-11 NOTE — PROGRESS NOTES
BMT Pharmacist Medication Review Note     All current medications were reviewed with the patient and his wife. The patient brought in all of his medications with him to this visit.      We discussed the changes made by the physician including stopping gabapentin and decreasing prednisone to 1 tablet (20mg) daily.  The patient reports taking zofran and compazine 1-2 times per day each; taking morphine prn about twice per day with good pain control; and less stomach pain now that the prednisone has been tapered..     We discussed that this will be his last pharmacy visit but if he has any questions or needs anything we are available.  All questions were answered.      Medication Indication Morning Lunch/Afternoon Night   Acyclovir 800mg  Viral infection prevention  1 tablet 1 tablet   Fluconazole 200mg  Fungal infection prevention  2 tablets    Sulfamethoxazole-trimethoprim (Bactrim) 800-160mg PCP pneumonia prevention 1 tablet on Mon., Wed., and Fri.     Tacrolimus (Prograf) 1mg* GVHD prevention - take AFTER labs on clinic days* 5 capsules  5 capsules   Prednisone 20mg GVHD skin rash 1 tablet     ----------------------------------------       Amlodipine 5mg Blood pressure 1 tablet     Esomeprazole (Nexium) 20mg Acid reflux 2 capsules     Loratadine (Claritin) 10mg Allergies 1 tablet     Magnesium oxide 400mg Magnesium supplement 2 tablets 2 tablets 2 tablets   Tamsulosin (Flomax) 0.4mg BPH/urinary issues 1 capsule     Morphine ER (MSContin) 15mg Pain 1 tablet  1 tablet   *Dose may change at each appointment    AS NEEDED MEDICATIONS:  Ondansetron (Zofran) 8mg every 8 hours as needed for nausea  Prochlorperazine (Compazine) 10mg four times a day as needed for nausea (2nd choice)  Chlorhexidine solution - swish and spit two times a day as needed for mouth sores  Morphine 15mg - 2 tablets every 6 hours as needed for severe pain  Triamcinolone 0.1% cream twice a day as needed for rash  Artificial tears into each eye as  needed for dry eyes  Fluticasone nasal spray 2 sprays twice a day as needed for allergies  Tums - 2 tablets as needed for indigestion  Famotidine (Pepsid) 20mg twice a day as needed for indigestion      NO LONGER TAKE:   Gabapentin        Allie Soliman, PharmD, BCPS, BCOP  Clinical Pharmacy Specialist   BMT/Hematology Oncology  Spect[employer]Link: [id number]

## 2020-11-13 LAB — CMV DNA SERPL NAA+PROBE-ACNC: NORMAL IU/ML

## 2020-11-15 LAB
COMMENT: NORMAL
FINAL PATHOLOGIC DIAGNOSIS: NORMAL
GROSS: NORMAL
MICROSCOPIC EXAM: NORMAL
SUPPLEMENTAL DIAGNOSIS: NORMAL

## 2020-11-16 ENCOUNTER — SPECIALTY PHARMACY (OUTPATIENT)
Dept: PHARMACY | Facility: CLINIC | Age: 60
End: 2020-11-16

## 2020-11-16 DIAGNOSIS — C92.01 ACUTE MYELOID LEUKEMIA IN REMISSION: Primary | ICD-10-CM

## 2020-11-16 LAB — EBV DNA SERPL NAA+PROBE-ACNC: NORMAL IU/ML

## 2020-11-17 NOTE — PROGRESS NOTES
Subjective:    Patient ID: Kvng Jones Sr. is a 60 y.o. male.    Chief Complaint: No chief complaint on file.    Oncologic hx: (from Dr. Hodges' previous documentation)  60 y.o. 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 He was admitted for evaluation and induction chemotherapy with FLAG-Addis. Complications of therapy include epididymal orchitis with negative testicular ultrasound, neutropenic fever, dyspnea, and GGO of bilateral lungs on CT chest. Fever and chest findings were covered with broad spectrum antimicrobials. He did have hallucinations and vivid dreams with posaconazole. Chemotherapy was administered by left arm PICC line that was removed during hospital stay for MSSA infection treated with vancomycin. Hospital admission was 3/30/2020 to 5/1/2020 achieving morphologic CR with induction chemotherapy. He then started Ponatinib 30mg daily.     Studies performed during his hospital stay include pre-chemotherapy ECHO with normal ejection fraction of 60-65%. HIV, HBV, and HCV tests were negative. CBC at admission was WBC 38.6, Hgb 14.6, and platelet 416K. CT of the head without contrast was normal 4/20/2020. US of abdomen performed for abdominal distention and neutropenic fever had hepatic steatosis, liver 19cm, and spleen 11 cm. He does have a history of alcohol use and pancreatitis.    Patient is a . He is  to wife Guillermina who is his caregiver post transplant. He has a 45 pack year smoking history. He quit 6/1/2020, now on Chantix. He drinks 3-4 alcohol beverages nightly. He has 3 children. He has several family members in the [facility] area.    Transplant Course: Admitted 7/21/20 for a Flu/Cy/TBI haplo allo SCT. Son was donor. Received 2 bags and a total CD34 dose of 3.10 x10^6/kg on 7/28/20. Tolerated stem cells well. C/o headaches prior to admission, which persisted throughout admission. Neuro ultimately consulted for  rec's. Was a daily drinker prior to admission. No s/s of alcolhol withdrawal during admission. Transplant further complicated by hypertension, heart burn, c-diff neg diarrhea, nausea, sinus congestion, mild peripheral edema, blurry vision (ophtho consulted), mucositis, electrolyte abnormalities, and neck pain 2/2 spinal stenosis (referred to pain mgt at discharge). He engrafted on 8/10/20 with an ANC of 1280. He was discharged on 8/12/2020.    Interval History:  Mr. Jones presents for follow up clinic visit post Flu/Cy/TBI allogeneic stem cell transplant. Today is Day +113. Comes to clinic alone. His Day+100 BMBx was on 11/2/20. Showing no definitive morphologic or immunophenotypic evidence of CML but there is evidence of MRD. Consequently, restarted Ponatinib 11/11/2020. Initially had GI side effects but those are improving. Has not yet heard from specialty pharmacy regarding Ponatinib. He is taking Ponatinib that he had left over from before transplant. Will f/u. Is on a prednisone taper for possible GVHD of the gut and skin. Currently on 10 mg daily. GI symptoms including early satiety, diarrhea, pain and nausea continue to improve, but rash is worse. Had appearance of mild folliculitis at last visit. Today is raised and has spread to flanks and upper back. Denies fevers, chills, cough, chest pain, bruising, and bleeding. Reports new onset hand and foot cramps.    Review of Systems   Review of Systems   Constitutional: Positive for malaise/fatigue. Negative for chills, fever and weight loss.   Eyes: Negative for pain, discharge and redness.   Respiratory: Negative for cough and hemoptysis.    Cardiovascular: Negative for chest pain, palpitations and leg swelling.   Gastrointestinal: Positive for nausea. Negative for constipation, diarrhea and vomiting.        1-4 soft stools daily   Genitourinary: Negative for dysuria and urgency.   Musculoskeletal: Positive for back pain and neck pain. Negative for myalgias.    Skin: Positive for rash. Negative for itching.   Neurological: Negative for dizziness, sensory change and speech change.   Endo/Heme/Allergies: Negative for environmental allergies and polydipsia.   Psychiatric/Behavioral: Negative for depression and suicidal ideas.           Objective:     Physical Exam   Constitutional: He is oriented to person, place, and time and well-developed, well-nourished, and in no distress.   HENT:   Head: Normocephalic and atraumatic.   Right Ear: External ear normal.   Left Ear: External ear normal.   Nose: Nose normal.   No oral ulcers, mucositis or petechiae    Eyes: Pupils are equal, round, and reactive to light.   Neck: Normal range of motion.   Cardiovascular: Normal rate and regular rhythm.   Pulmonary/Chest: Effort normal and breath sounds normal. No respiratory distress. He has no wheezes. He has no rales.   Abdominal: Soft. Bowel sounds are normal.   Ventral hernia, mild epigastric discomfort on palpation    Musculoskeletal: Normal range of motion.         General: No edema.   Neurological: He is alert and oriented to person, place, and time. No cranial nerve deficit.   Skin: He is not diaphoretic.   Papular rash to abdomen, chest, upper arms, flanks, and anterior thighs just above knees.   Psychiatric: Affect and judgment normal.             Assessment:       1. History of allogeneic stem cell transplant    2. Acute myeloid leukemia in remission    3. CML in remission    4. Acute GVHD    5. Hypomagnesemia    6. Chemotherapy-induced thrombocytopenia    7. Spondylosis of cervicothoracic region w/o myelopathy or radiculopathy    8. Neck pain    9. Benign prostatic hyperplasia without lower urinary tract symptoms    10. Gastroesophageal reflux disease without esophagitis    11. Essential hypertension    12. Rash and nonspecific skin eruption    13. Diarrhea, unspecified type    14. CML (chronic myelocytic leukemia)    15. Acute GVHD complicating bone marrow transplantation     16. Hand cramps    17. Foot cramps        Plan:         History of allogeneic stem cell transplant  - Admitted 7/21/20 for planned Flu/Cy/TBI haplo allo SCT. Son was the donor. Received 2 bags and a total CD34 dose of 3.10 x10^6/kg on 7/28/20. Recieved post transplant cyclophosphamide. Engrafted neutrophils 8/10/20 day +13  - Today is Day +113  - Tacro level 9.9 (goal 7-9). Will maintain current tacro dose of 5mg q am and 4mg q pm.  - stopped Ursodiol Day +30 (8/27/20)  - patient requests Rx for Valium and Oxycodone for pre-procedure in clinic bone marrow biopsies.  - had BMBx in clinic on 8/28/20 showing no definitive morphologic or immunophenotypic evidence of residual CML. Chimerisms unable to be obtained due to insufficient sample; drawn peripherally on 9/18. CD3-positive T-cells:  Insufficient DNA extracted for reliable analysis. CD33-positive myeloid cells:  The CD33-positive fraction contains approximately 100% donor DNA and approximately 0% recipient DNA.   - Dalal removed 10/19   - Day 100 Bm bx 11/2/2020 showing no definitive morphologic or immunophenotypic evidence of CML but there is evidence of MRD. Positive for BCR/ABL p210 (7.8%).  - resumed pre transplant TKI, Ponatinib 30 mg daily on 11/11/20.      CML in remission/ Acute myeloid leukemia in remission  - Presented to 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 Achieved morphologic CR  - Started on daily Ponatinib 30 mg daily, which he continued to take outpatient. Held Ponatinib during admission; may not need to restart per recent literature. BBMT 2020(26): 470-49  - Had BMBx at [facility] [place] [facility] on 7/1/2020 showing no morphologic or immunophenotypic evidence of AML/CML  - Admitted 7/21/20 for Flu/Cy/TBI haplo allo SCT  - Day 100 Bm bx 11/2/2020 showing no definitive morphologic or immunophenotypic evidence of CML but there is evidence of MRD. Positive for BCR/ABL p210 (7.8%).  -  resumed pre transplant TKI, Ponatinib 30 mg daily on 11/1120.    GVHD  - had rash inpatient that was believed to be drug eruption. Resolved at that time  - diffuse erythema to legs, lower back, arms, and abdomen presented on 9/15. Started 1mg/kg (90mg, will round to 100mg for dosing) of prednisone daily (9/15/20); decreased to 90 mg (4.5 tabs) on 9/21/2020  - rash is worse and more papular today. Now to abdomen, chest, upper arms, upper back, flanks, and anterior thighs just above knees  - continue TAC cream.   - decreased prednisone from 20 mg daily to 10 mg daily at previous visit due to improving rash. Increased back up to 20 mg daily today.  - rash first appeared ~ 9-91/2 weeks ago, which is 1 1/2-2 weeks after starting Bactrim. Switched Bactrim to Dapsone today. Will monitor for improvement.  - goal is to taper off of oral steroids asap  - changed tacro dose from 5 mg bid to 5 mg q am and 5 mg q pm today  - continue TAC cream over affected area. Refilled today.    ID  - Last CMV and EBV undetected; continue weekly CMV and EBV, today's pending  - Acyclovir (Zoster prophylaxis) until Day +365  - Diflucan (Fungal prophylaxis) until off tacrolimus.  - Bactrim (PJP prophylaxis) until off tacrolimus.     Hypomagnesemia  - Mag 1.6 today  - Currently on mag 800 mg TID     Antineoplastic chemotherapy-induced cytopenias: thrombocytopenia   - blood counts continue to improve  - plts are wnl today. hgb is 12.7.     Neck pain/ Cervical stenosis of spine   - Hx of spinal fusion C3-C5  - Xray 9/19 shows surgical changes of fusion from C3 through C5 with degenerative changed below fusion.   - Started PT on 8/26.  - Pt feels neck pain started after grewal was placed  - Has been mostly refractory to medications (zanaflex, gabapentin, celebrex, lidoderm patches). Some relief with oral morphine  - CT from 8/24/20 showing history of C3, C4, and C5 fusion and diffuse osteoarthritic changes  - Had virtual pain management appt on  8/25 to review CT results.  - Zanaflex switched to Robaxin per pain mgt.  - had medial branch block on 9/2/20 with Dr. Mcelroy (pain mgt). Per patient pain mgmnt signed off, as it is not a chronic issue, defer to BMT service for further pain control.  - started MS contin 15mg BID 9/20/2020; use PRN morphine q6h for breakthrough; titrate up MS contin as needed  - Patient reports much improvement to pain since starting high dose prednisone for his skin GVHD but pain returning as we taper steroids  - feeling lack of benefit from Gabapentin taken once nightly; started taper to every other night 10/26/2020  - continues taper per his preference    BPH (benign prostatic hyperplasia)  - Continue Flomax     GERD (gastroesophageal reflux disease)  - Continue nexium; 40mg if needed while on high dose steroids  - TUMS prn and pepto PRN     Essential hypertension  - Increased home amlodipine from 5 mg to 10 mg daily while inpatient due to BPs as high as 180s/110s   - /74 today  - currently on amlodipine 5 mg daily. Will continue this dose.    Rash   - rash is worse and more papular today. Now to abdomen, chest, upper arms, upper back, flanks, and anterior thighs just above knees  - continue TAC cream.   - decreased prednisone from 20 mg daily to 10 mg daily at previous visit due to improving rash. Increased back up to 20 mg daily today.  - rash first appeared ~ 9-91/2 weeks ago, which is 1 1/2-2 weeks after starting Bactrim. Switched Bactrim to Dapsone today. Will monitor for improvement.    Diarrhea  - reports intermittent diarrhea since cholecystectomy pre transplant  - reports 1-4 soft stools daily  - patient states that he is taking pepto for this with some relief  - does not like to take imodium as this causes constipation  - currently on a prednisone taper for possible GVHD of the gut. Reduced prednisone dose from 10 mg daily to 5 mg daily today.    Hand/foot cramps  - reports new onset hand and foot cramps  -  electrolytes wnl  - encouraged increased hydration  - encouraged stretching when able  - can try flexeril, which he already has at home    Follow-up:   - weekly CBC, CMP, type and screen, tacro, CMV, EBV mag, phos and appt with Dr. Hodges or NP. Scheduled through mid December.    Ivon Morrow, EVANP  Hematology/Oncology/Bone Marrow Transplant

## 2020-11-18 ENCOUNTER — OFFICE VISIT (OUTPATIENT)
Dept: HEMATOLOGY/ONCOLOGY | Facility: CLINIC | Age: 60
End: 2020-11-18
Payer: COMMERCIAL

## 2020-11-18 ENCOUNTER — LAB VISIT (OUTPATIENT)
Dept: LAB | Facility: HOSPITAL | Age: 60
End: 2020-11-18
Payer: COMMERCIAL

## 2020-11-18 VITALS
HEIGHT: 66 IN | RESPIRATION RATE: 18 BRPM | TEMPERATURE: 99 F | BODY MASS INDEX: 33.5 KG/M2 | SYSTOLIC BLOOD PRESSURE: 118 MMHG | DIASTOLIC BLOOD PRESSURE: 74 MMHG | WEIGHT: 208.44 LBS | HEART RATE: 87 BPM | OXYGEN SATURATION: 97 %

## 2020-11-18 DIAGNOSIS — D69.59 CHEMOTHERAPY-INDUCED THROMBOCYTOPENIA: ICD-10-CM

## 2020-11-18 DIAGNOSIS — M54.2 NECK PAIN: ICD-10-CM

## 2020-11-18 DIAGNOSIS — Z94.81 S/P ALLOGENEIC BONE MARROW TRANSPLANT: ICD-10-CM

## 2020-11-18 DIAGNOSIS — M47.813 SPONDYLOSIS OF CERVICOTHORACIC REGION W/O MYELOPATHY OR RADICULOPATHY: ICD-10-CM

## 2020-11-18 DIAGNOSIS — Z94.84 HISTORY OF ALLOGENEIC STEM CELL TRANSPLANT: Primary | ICD-10-CM

## 2020-11-18 DIAGNOSIS — C92.11 CML IN REMISSION: ICD-10-CM

## 2020-11-18 DIAGNOSIS — R25.2 HAND CRAMPS: ICD-10-CM

## 2020-11-18 DIAGNOSIS — D89.810 ACUTE GVHD COMPLICATING BONE MARROW TRANSPLANTATION: ICD-10-CM

## 2020-11-18 DIAGNOSIS — T45.1X5A CHEMOTHERAPY-INDUCED THROMBOCYTOPENIA: ICD-10-CM

## 2020-11-18 DIAGNOSIS — N40.0 BENIGN PROSTATIC HYPERPLASIA WITHOUT LOWER URINARY TRACT SYMPTOMS: ICD-10-CM

## 2020-11-18 DIAGNOSIS — R25.2 FOOT CRAMPS: ICD-10-CM

## 2020-11-18 DIAGNOSIS — C92.01 ACUTE MYELOID LEUKEMIA IN REMISSION: ICD-10-CM

## 2020-11-18 DIAGNOSIS — I10 ESSENTIAL HYPERTENSION: ICD-10-CM

## 2020-11-18 DIAGNOSIS — T86.09 ACUTE GVHD COMPLICATING BONE MARROW TRANSPLANTATION: ICD-10-CM

## 2020-11-18 DIAGNOSIS — E83.42 HYPOMAGNESEMIA: ICD-10-CM

## 2020-11-18 DIAGNOSIS — R21 RASH AND NONSPECIFIC SKIN ERUPTION: ICD-10-CM

## 2020-11-18 DIAGNOSIS — K21.9 GASTROESOPHAGEAL REFLUX DISEASE WITHOUT ESOPHAGITIS: ICD-10-CM

## 2020-11-18 DIAGNOSIS — R19.7 DIARRHEA, UNSPECIFIED TYPE: ICD-10-CM

## 2020-11-18 DIAGNOSIS — D89.810 ACUTE GVHD: ICD-10-CM

## 2020-11-18 DIAGNOSIS — C92.10 CML (CHRONIC MYELOCYTIC LEUKEMIA): ICD-10-CM

## 2020-11-18 LAB
ABO + RH BLD: NORMAL
ALBUMIN SERPL BCP-MCNC: 4.2 G/DL (ref 3.5–5.2)
ALP SERPL-CCNC: 72 U/L (ref 55–135)
ALT SERPL W/O P-5'-P-CCNC: 30 U/L (ref 10–44)
ANION GAP SERPL CALC-SCNC: 11 MMOL/L (ref 8–16)
AST SERPL-CCNC: 17 U/L (ref 10–40)
BASOPHILS # BLD AUTO: 0.06 K/UL (ref 0–0.2)
BASOPHILS NFR BLD: 0.5 % (ref 0–1.9)
BILIRUB SERPL-MCNC: 0.2 MG/DL (ref 0.1–1)
BLD GP AB SCN CELLS X3 SERPL QL: NORMAL
BUN SERPL-MCNC: 22 MG/DL (ref 8–23)
CALCIUM SERPL-MCNC: 9.4 MG/DL (ref 8.7–10.5)
CHLORIDE SERPL-SCNC: 106 MMOL/L (ref 95–110)
CO2 SERPL-SCNC: 25 MMOL/L (ref 23–29)
CREAT SERPL-MCNC: 1.3 MG/DL (ref 0.5–1.4)
DIFFERENTIAL METHOD: ABNORMAL
EOSINOPHIL # BLD AUTO: 0.1 K/UL (ref 0–0.5)
EOSINOPHIL NFR BLD: 0.5 % (ref 0–8)
ERYTHROCYTE [DISTWIDTH] IN BLOOD BY AUTOMATED COUNT: 14 % (ref 11.5–14.5)
EST. GFR  (AFRICAN AMERICAN): >60 ML/MIN/1.73 M^2
EST. GFR  (NON AFRICAN AMERICAN): 58.5 ML/MIN/1.73 M^2
GLUCOSE SERPL-MCNC: 93 MG/DL (ref 70–110)
HCT VFR BLD AUTO: 38.3 % (ref 40–54)
HGB BLD-MCNC: 12.7 G/DL (ref 14–18)
IMM GRANULOCYTES # BLD AUTO: 0.18 K/UL (ref 0–0.04)
IMM GRANULOCYTES NFR BLD AUTO: 1.6 % (ref 0–0.5)
LYMPHOCYTES # BLD AUTO: 0.8 K/UL (ref 1–4.8)
LYMPHOCYTES NFR BLD: 7 % (ref 18–48)
MAGNESIUM SERPL-MCNC: 1.6 MG/DL (ref 1.6–2.6)
MCH RBC QN AUTO: 34.1 PG (ref 27–31)
MCHC RBC AUTO-ENTMCNC: 33.2 G/DL (ref 32–36)
MCV RBC AUTO: 103 FL (ref 82–98)
MONOCYTES # BLD AUTO: 0.9 K/UL (ref 0.3–1)
MONOCYTES NFR BLD: 8.4 % (ref 4–15)
NEUTROPHILS # BLD AUTO: 9 K/UL (ref 1.8–7.7)
NEUTROPHILS NFR BLD: 82 % (ref 38–73)
NRBC BLD-RTO: 0 /100 WBC
PHOSPHATE SERPL-MCNC: 3.7 MG/DL (ref 2.7–4.5)
PLATELET # BLD AUTO: 173 K/UL (ref 150–350)
PMV BLD AUTO: 10 FL (ref 9.2–12.9)
POTASSIUM SERPL-SCNC: 4.7 MMOL/L (ref 3.5–5.1)
PROT SERPL-MCNC: 7.2 G/DL (ref 6–8.4)
RBC # BLD AUTO: 3.72 M/UL (ref 4.6–6.2)
SODIUM SERPL-SCNC: 142 MMOL/L (ref 136–145)
TACROLIMUS BLD-MCNC: 9.9 NG/ML (ref 5–15)
WBC # BLD AUTO: 10.92 K/UL (ref 3.9–12.7)

## 2020-11-18 PROCEDURE — 99215 PR OFFICE/OUTPT VISIT, EST, LEVL V, 40-54 MIN: ICD-10-PCS | Mod: BMT,S$GLB,, | Performed by: NURSE PRACTITIONER

## 2020-11-18 PROCEDURE — 84100 ASSAY OF PHOSPHORUS: CPT

## 2020-11-18 PROCEDURE — 99215 OFFICE O/P EST HI 40 MIN: CPT | Mod: BMT,S$GLB,, | Performed by: NURSE PRACTITIONER

## 2020-11-18 PROCEDURE — 1126F AMNT PAIN NOTED NONE PRSNT: CPT | Mod: BMT,S$GLB,, | Performed by: NURSE PRACTITIONER

## 2020-11-18 PROCEDURE — 80053 COMPREHEN METABOLIC PANEL: CPT

## 2020-11-18 PROCEDURE — 99999 PR PBB SHADOW E&M-EST. PATIENT-LVL IV: ICD-10-PCS | Mod: PBBFAC,BMT,, | Performed by: NURSE PRACTITIONER

## 2020-11-18 PROCEDURE — 3008F PR BODY MASS INDEX (BMI) DOCUMENTED: ICD-10-PCS | Mod: BMT,CPTII,S$GLB, | Performed by: NURSE PRACTITIONER

## 2020-11-18 PROCEDURE — 86901 BLOOD TYPING SEROLOGIC RH(D): CPT

## 2020-11-18 PROCEDURE — 99999 PR PBB SHADOW E&M-EST. PATIENT-LVL IV: CPT | Mod: PBBFAC,BMT,, | Performed by: NURSE PRACTITIONER

## 2020-11-18 PROCEDURE — 87799 DETECT AGENT NOS DNA QUANT: CPT

## 2020-11-18 PROCEDURE — 80197 ASSAY OF TACROLIMUS: CPT

## 2020-11-18 PROCEDURE — 85025 COMPLETE CBC W/AUTO DIFF WBC: CPT

## 2020-11-18 PROCEDURE — 1126F PR PAIN SEVERITY QUANTIFIED, NO PAIN PRESENT: ICD-10-PCS | Mod: BMT,S$GLB,, | Performed by: NURSE PRACTITIONER

## 2020-11-18 PROCEDURE — 3008F BODY MASS INDEX DOCD: CPT | Mod: BMT,CPTII,S$GLB, | Performed by: NURSE PRACTITIONER

## 2020-11-18 PROCEDURE — 83735 ASSAY OF MAGNESIUM: CPT

## 2020-11-18 RX ORDER — TRIAMCINOLONE ACETONIDE 1 MG/G
CREAM TOPICAL 2 TIMES DAILY
Qty: 1 TUBE | Refills: 4 | Status: SHIPPED | OUTPATIENT
Start: 2020-11-18 | End: 2020-12-10

## 2020-11-18 RX ORDER — LANOLIN ALCOHOL/MO/W.PET/CERES
800 CREAM (GRAM) TOPICAL 3 TIMES DAILY
Qty: 200 TABLET | Refills: 1
Start: 2020-11-18 | End: 2020-12-02

## 2020-11-18 RX ORDER — PREDNISONE 20 MG/1
20 TABLET ORAL DAILY
Qty: 30 TABLET | Refills: 0 | Status: SHIPPED | OUTPATIENT
Start: 2020-11-18 | End: 2020-12-07 | Stop reason: SDUPTHER

## 2020-11-18 RX ORDER — DAPSONE 100 MG/1
100 TABLET ORAL DAILY
Qty: 30 TABLET | Refills: 3 | Status: SHIPPED | OUTPATIENT
Start: 2020-11-18 | End: 2020-11-25

## 2020-11-19 LAB — CMV DNA SERPL NAA+PROBE-ACNC: NORMAL IU/ML

## 2020-11-21 ENCOUNTER — SPECIALTY PHARMACY (OUTPATIENT)
Dept: PHARMACY | Facility: CLINIC | Age: 60
End: 2020-11-21

## 2020-11-21 LAB — EBV DNA SERPL NAA+PROBE-ACNC: NORMAL IU/ML

## 2020-11-24 NOTE — PROGRESS NOTES
Subjective:    Patient ID: Kvng Jones Sr. is a 60 y.o. male.    Chief Complaint: No chief complaint on file.    Oncologic hx: (from Dr. Hodges' previous documentation)  60 y.o. 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 He was admitted for evaluation and induction chemotherapy with FLAG-Addis. Complications of therapy include epididymal orchitis with negative testicular ultrasound, neutropenic fever, dyspnea, and GGO of bilateral lungs on CT chest. Fever and chest findings were covered with broad spectrum antimicrobials. He did have hallucinations and vivid dreams with posaconazole. Chemotherapy was administered by left arm PICC line that was removed during hospital stay for MSSA infection treated with vancomycin. Hospital admission was 3/30/2020 to 5/1/2020 achieving morphologic CR with induction chemotherapy. He then started Ponatinib 30mg daily.     Studies performed during his hospital stay include pre-chemotherapy ECHO with normal ejection fraction of 60-65%. HIV, HBV, and HCV tests were negative. CBC at admission was WBC 38.6, Hgb 14.6, and platelet 416K. CT of the head without contrast was normal 4/20/2020. US of abdomen performed for abdominal distention and neutropenic fever had hepatic steatosis, liver 19cm, and spleen 11 cm. He does have a history of alcohol use and pancreatitis.    Patient is a . He is  to wife Guillermina who is his caregiver post transplant. He has a 45 pack year smoking history. He quit 6/1/2020, now on Chantix. He drinks 3-4 alcohol beverages nightly. He has 3 children. He has several family members in the [facility] area.    Transplant Course: Admitted 7/21/20 for a Flu/Cy/TBI haplo allo SCT. Son was donor. Received 2 bags and a total CD34 dose of 3.10 x10^6/kg on 7/28/20. Tolerated stem cells well. C/o headaches prior to admission, which persisted throughout admission. Neuro ultimately consulted for  rec's. Was a daily drinker prior to admission. No s/s of alcolhol withdrawal during admission. Transplant further complicated by hypertension, heart burn, c-diff neg diarrhea, nausea, sinus congestion, mild peripheral edema, blurry vision (ophtho consulted), mucositis, electrolyte abnormalities, and neck pain 2/2 spinal stenosis (referred to pain mgt at discharge). He engrafted on 8/10/20 with an ANC of 1280. He was discharged on 8/12/2020.    Interval History:  Mr. Jones presents for follow up clinic visit post Flu/Cy/TBI allogeneic stem cell transplant. Today is Day +120. Comes to clinic alone. Still has not heard from specialty pharmacy regarding Ponatinib. He is taking Ponatinib that he had left over from before transplant. Will f/u. Is on a prednisone for possible GVHD of the gut and skin. Currently on 20 mg daily. Was increased from 10 mg to 20 mg daily at last visit due to worsening rash. Rash has spread to forearms today. Quality is unchanged. Does not itch. GI symptoms including early satiety, diarrhea, pain and nausea persist. Controlled with prn meds but he requires them around the clock. Had appearance of mild folliculitis at last visit. Today is raised and has spread to flanks and upper back. Denies fevers, chills, cough, chest pain, bruising, and bleeding. Hydration is helping with hand/foot cramps. He reports that he has resumed cigarette smoking. Inquiring about Chantix, which ha worked for him in the past. Will clear through BMT pharmacist.    Review of Systems   Review of Systems   Constitutional: Positive for malaise/fatigue. Negative for chills, fever and weight loss.   Eyes: Negative for pain, discharge and redness.   Respiratory: Negative for cough and hemoptysis.    Cardiovascular: Negative for chest pain, palpitations and leg swelling.   Gastrointestinal: Positive for nausea. Negative for constipation, diarrhea and vomiting.        1-4 soft stools daily   Genitourinary: Negative for dysuria  and urgency.   Musculoskeletal: Positive for back pain and neck pain. Negative for myalgias.   Skin: Positive for rash. Negative for itching.   Neurological: Negative for dizziness, sensory change and speech change.   Endo/Heme/Allergies: Negative for environmental allergies and polydipsia.   Psychiatric/Behavioral: Negative for depression and suicidal ideas.           Objective:     Physical Exam   Constitutional: He is oriented to person, place, and time and well-developed, well-nourished, and in no distress.   HENT:   Head: Normocephalic and atraumatic.   Right Ear: External ear normal.   Left Ear: External ear normal.   Nose: Nose normal.   No oral ulcers, mucositis or petechiae    Eyes: Pupils are equal, round, and reactive to light.   Neck: Normal range of motion.   Cardiovascular: Normal rate and regular rhythm.   Pulmonary/Chest: Effort normal and breath sounds normal. No respiratory distress. He has no wheezes. He has no rales.   Abdominal: Soft. Bowel sounds are normal.   Ventral hernia, mild epigastric discomfort on palpation    Musculoskeletal: Normal range of motion.         General: No edema.   Neurological: He is alert and oriented to person, place, and time. No cranial nerve deficit.   Skin: He is not diaphoretic.   Papular rash to trunk, arms, and anterior thighs just above knees.   Psychiatric: Affect and judgment normal.             Assessment:       1. History of allogeneic stem cell transplant    2. CML in remission    3. Acute myeloid leukemia in remission    4. Acute GVHD    5. Hypomagnesemia    6. Antineoplastic chemotherapy induced anemia    7. Chemotherapy-induced thrombocytopenia    8. 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 Benign prostatic hyperplasia without lower urinary tract symptoms    10. Gastroesophageal reflux disease without esophagitis    11. Essential hypertension    12. Rash and nonspecific skin eruption    13. Diarrhea, unspecified type     14. Hand cramps    15. Foot cramps    16. Cigarette smoker        Plan:         History of allogeneic stem cell transplant  - Admitted 7/21/20 for planned Flu/Cy/TBI haplo allo SCT. Son was the donor. Received 2 bags and a total CD34 dose of 3.10 x10^6/kg on 7/28/20. Recieved post transplant cyclophosphamide. Engrafted neutrophils 8/10/20 day +13  - Today is Day +120  - Tacro level still pending (goal 7-9). Will maintain current tacro dose of 4mg q am and 4mg q pm until result.  - stopped Ursodiol Day +30 (8/27/20)  - patient requests Rx for Valium and Oxycodone for pre-procedure in clinic bone marrow biopsies.  - had BMBx in clinic on 8/28/20 showing no definitive morphologic or immunophenotypic evidence of residual CML. Chimerisms unable to be obtained due to insufficient sample; drawn peripherally on 9/18. CD3-positive T-cells:  Insufficient DNA extracted for reliable analysis. CD33-positive myeloid cells:  The CD33-positive fraction contains approximately 100% donor DNA and approximately 0% recipient DNA.   - Dalal removed 10/19   - Day 100 Bm bx 11/2/2020 showing no definitive morphologic or immunophenotypic evidence of CML but there is evidence of MRD. Positive for BCR/ABL p210 (7.8%).  - resumed pre transplant TKI, Ponatinib 30 mg daily on 11/11/20.      CML in remission/ Acute myeloid leukemia in remission  - Presented to 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 Achieved morphologic CR  - Started on daily Ponatinib 30 mg daily, which he continued to take outpatient. Held Ponatinib during admission; may not need to restart per recent literature. BBMT 2020(72): 240-05  - Had BMBx at [facility] on 7/1/2020 showing no morphologic or immunophenotypic evidence of AML/CML  - Admitted 7/21/20 for Flu/Cy/TBI haplo allo SCT  - Day 100 Bm bx 11/2/2020 showing no definitive morphologic or immunophenotypic evidence of CML but there is evidence of  MRD. Positive for BCR/ABL p210 (7.8%).  - resumed pre transplant TKI, Ponatinib 30 mg daily on 11/1120.    GVHD  - had rash inpatient that was believed to be drug eruption. Resolved at that time  - diffuse erythema to legs, lower back, arms, and abdomen presented on 9/15. Started 1mg/kg (90mg, will round to 100mg for dosing) of prednisone daily (9/15/20); decreased to 90 mg (4.5 tabs) on 9/21/2020  - rash is worse and more papular today. Now to abdomen, chest, upper arms, upper back, flanks, and anterior thighs just above knees  - continue TAC cream.   - Increased back up to 20 mg daily at previous rash due to worsening rash.  - rash first appeared ~ 9-91/2 weeks ago, which is 1 1/2-2 weeks after starting Bactrim. Switched Bactrim to Dapsone today. Will monitor for improvement.  - goal is to taper off of oral steroids asap  - changed tacro dose from 5 mg bid to 5 mg q am and 5 mg q pm today  - continue TAC cream over affected area.   - due to persistent GI symptoms and worsening rash, will refer for photo pheresis    ID  - Last CMV and EBV undetected; continue weekly CMV and EBV, today's pending  - Acyclovir (Zoster prophylaxis) until Day +365  - Diflucan (Fungal prophylaxis) until off tacrolimus.  - Dapsone (PJP prophylaxis) until off tacrolimus. (was on Bactrim but changed to Dapsone due to rash in event that rash was drug rash). Monitor LFTs.    Hypomagnesemia  - Mag 1.6 today  - Currently on mag 800 mg TID     Antineoplastic chemotherapy-induced cytopenias: thrombocytopenia   - blood counts continue to improve  - plts are wnl today. hgb is 12.7.     Neck pain/ Cervical stenosis of spine   - Hx of spinal fusion C3-C5  - Xray 9/19 shows surgical changes of fusion from C3 through C5 with degenerative changed below fusion.   - Started PT on 8/26.  - Pt feels neck pain started after grewal was placed  - Has been mostly refractory to medications (zanaflex, gabapentin, celebrex, lidoderm patches). Some relief with  oral morphine  - CT from 8/24/20 showing history of C3, C4, and C5 fusion and diffuse osteoarthritic changes  - Had virtual pain management appt on 8/25 to review CT results.  - Zanaflex switched to Robaxin per pain mgt.  - had medial branch block on 9/2/20 with Dr. Mcelroy (pain mgt). Per patient pain mgmnt signed off, as it is not a chronic issue, defer to BMT service for further pain control.  - started MS contin 15mg BID 9/20/2020; use PRN morphine q6h for breakthrough; titrate up MS contin as needed  - Patient reports much improvement to pain since starting high dose prednisone for his skin GVHD but pain returning as we taper steroids  - feeling lack of benefit from Gabapentin taken once nightly; started taper to every other night 10/26/2020  - continues taper per his preference    BPH (benign prostatic hyperplasia)  - Continue Flomax     GERD (gastroesophageal reflux disease)  - Continue nexium; 40mg if needed while on high dose steroids  - TUMS prn and pepto PRN     Essential hypertension  - Increased home amlodipine from 5 mg to 10 mg daily while inpatient due to BPs as high as 180s/110s   - /70 today  - currently on amlodipine 5 mg daily. Will continue this dose.    Rash   - rash is worse and more papular today. Now to abdomen, chest, upper arms, upper back, flanks, and anterior thighs just above knees  - continue TAC cream.   - decreased prednisone from 20 mg daily to 10 mg daily at previous visit due to improving rash. Increased back up to 20 mg daily today.  - rash first appeared ~ 9-91/2 weeks ago, which is 1 1/2-2 weeks after starting Bactrim. Switched Bactrim to Dapsone at last visit in the event that rash is drug rash. No improvement. Will switch back to bactrim.  - due to persistent rash, will refer for photo pheresis    Diarrhea  - reports intermittent diarrhea since cholecystectomy pre transplant  - reports 1-4 soft stools daily  - patient states that he is taking pepto for this with some  relief  - does not like to take imodium as this causes constipation  - currently on a prednisone taper for possible GVHD of the gut. Currently on 20 mg prednisone daily.   - GI issues pre-dated transplant  - due to persistent GI symptoms and need for prn meds around the clock, will refer for photo pheresis    Hand/foot cramps  - reported new onset hand and foot cramps at last visit  - electrolytes wnl  - encouraged increased hydration  - encouraged stretching when able  - recommended flexeril, which he already had at home.   - reports that hydration helps    Cigarette smoker  - has recently resumed cigarette smoking  - inquired about Chantix which has worked for him in the past  - will clear through [facility] pharmacist prior to prescribing    Follow-up:   - weekly CBC, CMP, type and screen, tacro, CMV, EBV mag, phos and appt with Dr. Hodges or NP. Schedule through end of December. Try to alternate appts between Dr. Hodges and NP.    Ivon Morrow, MARCELA  Hematology/Oncology/Bone Marrow Transplant

## 2020-11-25 ENCOUNTER — OFFICE VISIT (OUTPATIENT)
Dept: HEMATOLOGY/ONCOLOGY | Facility: CLINIC | Age: 60
End: 2020-11-25
Payer: COMMERCIAL

## 2020-11-25 ENCOUNTER — LAB VISIT (OUTPATIENT)
Dept: LAB | Facility: HOSPITAL | Age: 60
End: 2020-11-25
Payer: COMMERCIAL

## 2020-11-25 VITALS
DIASTOLIC BLOOD PRESSURE: 70 MMHG | WEIGHT: 207 LBS | HEIGHT: 66 IN | OXYGEN SATURATION: 97 % | SYSTOLIC BLOOD PRESSURE: 122 MMHG | RESPIRATION RATE: 17 BRPM | BODY MASS INDEX: 33.27 KG/M2 | HEART RATE: 92 BPM | TEMPERATURE: 99 F

## 2020-11-25 DIAGNOSIS — R21 RASH AND NONSPECIFIC SKIN ERUPTION: ICD-10-CM

## 2020-11-25 DIAGNOSIS — Z94.81 S/P ALLOGENEIC BONE MARROW TRANSPLANT: ICD-10-CM

## 2020-11-25 DIAGNOSIS — D89.810 ACUTE GVHD: ICD-10-CM

## 2020-11-25 DIAGNOSIS — T45.1X5A ANTINEOPLASTIC CHEMOTHERAPY INDUCED ANEMIA: ICD-10-CM

## 2020-11-25 DIAGNOSIS — D69.59 CHEMOTHERAPY-INDUCED THROMBOCYTOPENIA: ICD-10-CM

## 2020-11-25 DIAGNOSIS — R25.2 HAND CRAMPS: ICD-10-CM

## 2020-11-25 DIAGNOSIS — K21.9 GASTROESOPHAGEAL REFLUX DISEASE WITHOUT ESOPHAGITIS: ICD-10-CM

## 2020-11-25 DIAGNOSIS — R25.2 FOOT CRAMPS: ICD-10-CM

## 2020-11-25 DIAGNOSIS — Z94.84 HISTORY OF ALLOGENEIC STEM CELL TRANSPLANT: Primary | ICD-10-CM

## 2020-11-25 DIAGNOSIS — T45.1X5A CHEMOTHERAPY-INDUCED THROMBOCYTOPENIA: ICD-10-CM

## 2020-11-25 DIAGNOSIS — E83.42 HYPOMAGNESEMIA: ICD-10-CM

## 2020-11-25 DIAGNOSIS — R19.7 DIARRHEA, UNSPECIFIED TYPE: ICD-10-CM

## 2020-11-25 DIAGNOSIS — D64.81 ANTINEOPLASTIC CHEMOTHERAPY INDUCED ANEMIA: ICD-10-CM

## 2020-11-25 DIAGNOSIS — I10 ESSENTIAL HYPERTENSION: ICD-10-CM

## 2020-11-25 DIAGNOSIS — C92.01 ACUTE MYELOID LEUKEMIA IN REMISSION: ICD-10-CM

## 2020-11-25 DIAGNOSIS — Z94.84 HISTORY OF ALLOGENEIC STEM CELL TRANSPLANT: ICD-10-CM

## 2020-11-25 DIAGNOSIS — N40.0 BENIGN PROSTATIC HYPERPLASIA WITHOUT LOWER URINARY TRACT SYMPTOMS: ICD-10-CM

## 2020-11-25 DIAGNOSIS — M47.813 SPONDYLOSIS OF CERVICOTHORACIC REGION W/O MYELOPATHY OR RADICULOPATHY: ICD-10-CM

## 2020-11-25 DIAGNOSIS — C92.11 CML IN REMISSION: ICD-10-CM

## 2020-11-25 DIAGNOSIS — F17.210 CIGARETTE SMOKER: ICD-10-CM

## 2020-11-25 LAB
ABO + RH BLD: NORMAL
ALBUMIN SERPL BCP-MCNC: 4.2 G/DL (ref 3.5–5.2)
ALP SERPL-CCNC: 60 U/L (ref 55–135)
ALT SERPL W/O P-5'-P-CCNC: 32 U/L (ref 10–44)
ANION GAP SERPL CALC-SCNC: 12 MMOL/L (ref 8–16)
AST SERPL-CCNC: 22 U/L (ref 10–40)
BASOPHILS # BLD AUTO: 0.05 K/UL (ref 0–0.2)
BASOPHILS NFR BLD: 0.5 % (ref 0–1.9)
BILIRUB SERPL-MCNC: 0.2 MG/DL (ref 0.1–1)
BLD GP AB SCN CELLS X3 SERPL QL: NORMAL
BUN SERPL-MCNC: 26 MG/DL (ref 8–23)
CALCIUM SERPL-MCNC: 9.2 MG/DL (ref 8.7–10.5)
CHLORIDE SERPL-SCNC: 108 MMOL/L (ref 95–110)
CO2 SERPL-SCNC: 22 MMOL/L (ref 23–29)
CREAT SERPL-MCNC: 1.4 MG/DL (ref 0.5–1.4)
DIFFERENTIAL METHOD: ABNORMAL
EOSINOPHIL # BLD AUTO: 0 K/UL (ref 0–0.5)
EOSINOPHIL NFR BLD: 0.2 % (ref 0–8)
ERYTHROCYTE [DISTWIDTH] IN BLOOD BY AUTOMATED COUNT: 14.2 % (ref 11.5–14.5)
EST. GFR  (AFRICAN AMERICAN): >60 ML/MIN/1.73 M^2
EST. GFR  (NON AFRICAN AMERICAN): 53.5 ML/MIN/1.73 M^2
GLUCOSE SERPL-MCNC: 149 MG/DL (ref 70–110)
HCT VFR BLD AUTO: 39.2 % (ref 40–54)
HGB BLD-MCNC: 12.6 G/DL (ref 14–18)
IMM GRANULOCYTES # BLD AUTO: 0.2 K/UL (ref 0–0.04)
IMM GRANULOCYTES NFR BLD AUTO: 2 % (ref 0–0.5)
LYMPHOCYTES # BLD AUTO: 0.6 K/UL (ref 1–4.8)
LYMPHOCYTES NFR BLD: 6.2 % (ref 18–48)
MAGNESIUM SERPL-MCNC: 1.6 MG/DL (ref 1.6–2.6)
MCH RBC QN AUTO: 32.9 PG (ref 27–31)
MCHC RBC AUTO-ENTMCNC: 32.1 G/DL (ref 32–36)
MCV RBC AUTO: 102 FL (ref 82–98)
MONOCYTES # BLD AUTO: 0.4 K/UL (ref 0.3–1)
MONOCYTES NFR BLD: 4.4 % (ref 4–15)
NEUTROPHILS # BLD AUTO: 8.6 K/UL (ref 1.8–7.7)
NEUTROPHILS NFR BLD: 86.7 % (ref 38–73)
NRBC BLD-RTO: 0 /100 WBC
PHOSPHATE SERPL-MCNC: 3.5 MG/DL (ref 2.7–4.5)
PLATELET # BLD AUTO: 166 K/UL (ref 150–350)
PMV BLD AUTO: 9.8 FL (ref 9.2–12.9)
POTASSIUM SERPL-SCNC: 5.1 MMOL/L (ref 3.5–5.1)
PROT SERPL-MCNC: 6.8 G/DL (ref 6–8.4)
RBC # BLD AUTO: 3.83 M/UL (ref 4.6–6.2)
SODIUM SERPL-SCNC: 142 MMOL/L (ref 136–145)
WBC # BLD AUTO: 9.94 K/UL (ref 3.9–12.7)

## 2020-11-25 PROCEDURE — 99999 PR PBB SHADOW E&M-EST. PATIENT-LVL V: ICD-10-PCS | Mod: PBBFAC,BMT,, | Performed by: NURSE PRACTITIONER

## 2020-11-25 PROCEDURE — 99215 PR OFFICE/OUTPT VISIT, EST, LEVL V, 40-54 MIN: ICD-10-PCS | Mod: BMT,S$GLB,, | Performed by: NURSE PRACTITIONER

## 2020-11-25 PROCEDURE — 84100 ASSAY OF PHOSPHORUS: CPT

## 2020-11-25 PROCEDURE — 83735 ASSAY OF MAGNESIUM: CPT

## 2020-11-25 PROCEDURE — 99215 OFFICE O/P EST HI 40 MIN: CPT | Mod: BMT,S$GLB,, | Performed by: NURSE PRACTITIONER

## 2020-11-25 PROCEDURE — 80197 ASSAY OF TACROLIMUS: CPT

## 2020-11-25 PROCEDURE — 99999 PR PBB SHADOW E&M-EST. PATIENT-LVL V: CPT | Mod: PBBFAC,BMT,, | Performed by: NURSE PRACTITIONER

## 2020-11-25 PROCEDURE — 85025 COMPLETE CBC W/AUTO DIFF WBC: CPT

## 2020-11-25 PROCEDURE — 3008F BODY MASS INDEX DOCD: CPT | Mod: BMT,CPTII,S$GLB, | Performed by: NURSE PRACTITIONER

## 2020-11-25 PROCEDURE — 1126F AMNT PAIN NOTED NONE PRSNT: CPT | Mod: BMT,S$GLB,, | Performed by: NURSE PRACTITIONER

## 2020-11-25 PROCEDURE — 80053 COMPREHEN METABOLIC PANEL: CPT

## 2020-11-25 PROCEDURE — 86901 BLOOD TYPING SEROLOGIC RH(D): CPT

## 2020-11-25 PROCEDURE — 87799 DETECT AGENT NOS DNA QUANT: CPT

## 2020-11-25 PROCEDURE — 3008F PR BODY MASS INDEX (BMI) DOCUMENTED: ICD-10-PCS | Mod: BMT,CPTII,S$GLB, | Performed by: NURSE PRACTITIONER

## 2020-11-25 PROCEDURE — 1126F PR PAIN SEVERITY QUANTIFIED, NO PAIN PRESENT: ICD-10-PCS | Mod: BMT,S$GLB,, | Performed by: NURSE PRACTITIONER

## 2020-11-25 NOTE — Clinical Note
- weekly CBC, CMP, type and screen, tacro, CMV, EBV mag, phos and appt with Dr. Hodges or NP. Schedule through end of December. Try to alternate appts between Dr. Hodges and YUMIKO.

## 2020-11-26 ENCOUNTER — PATIENT MESSAGE (OUTPATIENT)
Dept: HEMATOLOGY/ONCOLOGY | Facility: CLINIC | Age: 60
End: 2020-11-26

## 2020-11-26 LAB — TACROLIMUS BLD-MCNC: 4.8 NG/ML (ref 5–15)

## 2020-11-27 DIAGNOSIS — C92.11 CML IN REMISSION: Primary | ICD-10-CM

## 2020-11-27 DIAGNOSIS — Z94.84 HISTORY OF ALLOGENEIC STEM CELL TRANSPLANT: ICD-10-CM

## 2020-11-27 LAB — CMV DNA SERPL NAA+PROBE-ACNC: NORMAL IU/ML

## 2020-11-27 RX ORDER — TACROLIMUS 1 MG/1
4 CAPSULE ORAL EVERY 12 HOURS
Qty: 300 CAPSULE | Refills: 0 | OUTPATIENT
Start: 2020-11-27 | End: 2020-12-02 | Stop reason: SDUPTHER

## 2020-11-28 LAB — EBV DNA SERPL NAA+PROBE-ACNC: NORMAL IU/ML

## 2020-11-29 NOTE — PROGRESS NOTES
Tacro dose adjusted to 4mg am and 5 mg pm starting on 11/27/20 for tacro level 4.8. Medication refilled on 11/27/20.    Aundrea Jackson NP  Hematology/Oncology/BMT

## 2020-12-01 NOTE — PROGRESS NOTES
Subjective:    Patient ID: Kvng Jones Sr. is a 60 y.o. male.    Chief Complaint: No chief complaint on file.    Oncologic hx: (from Dr. Hodges' previous documentation)  60 y.o. male admitted to [facility] for CML presenting with blast crisis and lewis disease. He was admitted for evaluation and induction chemotherapy with FLAG-Addis. Complications of therapy include epididymal orchitis with negative testicular ultrasound, neutropenic fever, dyspnea, and GGO of bilateral lungs on CT chest. Fever and chest findings were covered with broad spectrum antimicrobials. He did have hallucinations and vivid dreams with posaconazole. Chemotherapy was administered by left arm PICC line that was removed during hospital stay for MSSA infection treated with vancomycin. Hospital admission was 3/30/2020 to 5/1/2020 achieving morphologic CR with induction chemotherapy. He then started Ponatinib 30mg daily.     Studies performed during his hospital stay include pre-chemotherapy ECHO with normal ejection fraction of 60-65%. HIV, HBV, and HCV tests were negative. CBC at admission was WBC 38.6, Hgb 14.6, and platelet 416K. CT of the head without contrast was normal 4/20/2020. US of abdomen performed for abdominal distention and neutropenic fever had hepatic steatosis, liver 19cm, and spleen 11 cm. He does have a history of alcohol use and pancreatitis.    Patient is a . He is  to wife Guillermina who is his caregiver post transplant. He has a 45 pack year smoking history. He quit 6/1/2020, now on Chantix. He drinks 3-4 alcohol beverages nightly. He has 3 children. He has several family members in the [facility] area.    Transplant Course: Admitted 7/21/20 for a Flu/Cy/TBI haplo allo SCT. Son was donor. Received 2 bags and a total CD34 dose of 3.10 x10^6/kg on 7/28/20. Tolerated stem cells well. C/o headaches prior to admission, which persisted throughout admission. Neuro ultimately consulted for  rec's. Was a daily drinker prior to admission. No s/s of alcolhol withdrawal during admission. Transplant further complicated by hypertension, heart burn, c-diff neg diarrhea, nausea, sinus congestion, mild peripheral edema, blurry vision (ophtho consulted), mucositis, electrolyte abnormalities, and neck pain 2/2 spinal stenosis (referred to pain mgt at discharge). He engrafted on 8/10/20 with an ANC of 1280. He was discharged on 8/12/2020.    Interval History:  Mr. Jones presents for follow-up clinic visit post Flu/Cy/TBI allogeneic stem cell transplant. Today is Day +127. Comes to clinic alone. Still has not heard from specialty pharmacy regarding Ponatinib. He is taking Ponatinib that he had left over from before transplant. Will f/u. Is on a prednisone for possible GVHD of the gut and skin. Currently on 40 mg daily. Rash and GI symptoms including early satiety, diarrhea, pain and nausea persist. GI symptoms controlled with prn meds but he requires them around the clock. Discussed GVHD with Dr. Hodges after our last appt and she would like to try photopheresis. Orders placed after last visit and procedure discussed with patient today. He has not yet heard from IR regarding line placement nor from pheresis nurse. May be delay due to Thanksgiving holiday. Patient is very hesitant to start photopheresis--mainly because of all of the neck issues that he had with his previous central line. Is concerned about having similar issues with new line placement. Today he denies fevers, chills, cough, chest pain, bruising, and bleeding. Reported that he had resumed cigarette smoking at our last visit and inquired about Chantix, which had helped him quit smoking in the past. Per BMT pharmacist, there is no contraindication to Chantix on his current medications. He is going to talk to his PCP about represcribing and will let me know if he has any issues. He also c/o leg cramp originating at left lateral knee and radiating down legs  (R>L) that was present a few days ago and interfered with activity. Not as bothersome today. Also c/o urinary hesitancy today.    Review of Systems   Review of Systems   Constitutional: Positive for malaise/fatigue. Negative for chills, fever and weight loss.   Eyes: Negative for pain, discharge and redness.   Respiratory: Negative for cough and hemoptysis.    Cardiovascular: Negative for chest pain, palpitations and leg swelling.   Gastrointestinal: Positive for nausea. Negative for constipation, diarrhea and vomiting.        1-4 soft stools daily   Genitourinary: Negative for dysuria and urgency.   Musculoskeletal: Positive for back pain and neck pain. Negative for myalgias.   Skin: Positive for rash. Negative for itching.   Neurological: Negative for dizziness, sensory change and speech change.   Endo/Heme/Allergies: Negative for environmental allergies and polydipsia.   Psychiatric/Behavioral: Negative for depression and suicidal ideas.           Objective:     Physical Exam   Constitutional: He is oriented to person, place, and time and well-developed, well-nourished, and in no distress.   HENT:   Head: Normocephalic and atraumatic.   Right Ear: External ear normal.   Left Ear: External ear normal.   Nose: Nose normal.   No oral ulcers, mucositis or petechiae    Eyes: Pupils are equal, round, and reactive to light.   Neck: Normal range of motion.   Cardiovascular: Normal rate and regular rhythm.   Pulmonary/Chest: Effort normal and breath sounds normal. No respiratory distress. He has no wheezes. He has no rales.   Abdominal: Soft. Bowel sounds are normal.   Ventral hernia, mild epigastric discomfort on palpation    Musculoskeletal: Normal range of motion.         General: No edema.   Neurological: He is alert and oriented to person, place, and time. No cranial nerve deficit.   Skin: He is not diaphoretic.   Papular rash to trunk, arms, and anterior thighs just above knees.   Psychiatric: Affect and judgment  normal.         Assessment:       1. History of allogeneic stem cell transplant    2. CML in remission    3. Acute myeloid leukemia in remission    4. Acute GVHD    5. Hypomagnesemia    6. Chemotherapy-induced thrombocytopenia    7. Spondylosis of cervicothoracic region w/o myelopathy or radiculopathy    8. Neck pain    9. Benign prostatic hyperplasia without lower urinary tract symptoms    10. Gastroesophageal reflux disease without esophagitis    11. Essential hypertension    12. Rash and nonspecific skin eruption    13. Diarrhea, unspecified type    14. Hand cramps    15. Foot cramps    16. Tobacco abuse, in remission    17. CML (chronic myelocytic leukemia)    18. AURORA (acute kidney injury)    19. Urinary hesitancy        Plan:         History of allogeneic stem cell transplant  - Admitted 7/21/20 for planned Flu/Cy/TBI haplo allo SCT. Son was the donor. Received 2 bags and a total CD34 dose of 3.10 x10^6/kg on 7/28/20. Recieved post transplant cyclophosphamide. Engrafted neutrophils 8/10/20 day +13  - Today is Day +127  - Tacro level in process today (goal 7-9). Will likely not result until tomorrow. Currently on tacro dose of 4mg q am and 5mg q pm until result. Will call patient with tacro level and potential dose adjustment tomorrow.  - stopped Ursodiol Day +30 (8/27/20)  - patient requests Rx for Valium and Oxycodone for pre-procedure in clinic bone marrow biopsies.  - had BMBx in clinic on 8/28/20 showing no definitive morphologic or immunophenotypic evidence of residual CML. Chimerisms unable to be obtained due to insufficient sample; drawn peripherally on 9/18. CD3-positive T-cells:  Insufficient DNA extracted for reliable analysis. CD33-positive myeloid cells:  The CD33-positive fraction contains approximately 100% donor DNA and approximately 0% recipient DNA.   - Dalal removed 10/19   - Day 100 Bm bx 11/2/2020 showing no definitive morphologic or immunophenotypic evidence of CML but there is evidence  of MRD. Positive for BCR/ABL p210 (7.8%).  - resumed pre transplant TKI, Ponatinib 30 mg daily on 11/11/20.      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 Achieved morphologic CR  - Started on daily Ponatinib 30 mg daily, which he continued to take outpatient. Held Ponatinib during admission; may not need to restart per recent literature. BBMT 2020(26): 472-49  - Had BMBx at [facility] on 7/1/2020 showing no morphologic or immunophenotypic evidence of AML/CML  - Admitted 7/21/20 for Flu/Cy/TBI haplo allo SCT  - Day 100 Bm bx 11/2/2020 showing no definitive morphologic or immunophenotypic evidence of CML but there is evidence of MRD. Positive for BCR/ABL p210 (7.8%).  - resumed pre transplant TKI, Ponatinib 30 mg daily on 11/1120.    GVHD  - had rash inpatient that was believed to be drug eruption. Resolved at that time  - diffuse erythema to legs, lower back, arms, and abdomen presented on 9/15. Started 1mg/kg (90mg, will round to 100mg for dosing) of prednisone daily (9/15/20); decreased to 90 mg (4.5 tabs) on 9/21/2020  - rash is worse and more papular today. Now to abdomen, chest, upper arms, upper back, flanks, and anterior thighs just above knees  - continue TAC cream.   - Increased back up to 40 mg daily at previous rash due to worsening rash.  - rash first appeared ~ 9-91/2 weeks ago, which is 1 1/2-2 weeks after starting Bactrim. Switched Bactrim to Dapsone today. Will monitor for improvement.  - goal is to taper off of oral steroids asap  - changed tacro dose from 5 mg bid to 5 mg q am and 5 mg q pm today  - continue TAC cream over affected area.   - due to persistent GI symptoms and worsening rash, referred to derm and for photo pheresis.  - saw derm today. Will have biopsy tomorrow.  - patient wants to think about photopheresis. Main reservation is central line  placement.     ID  - Last CMV and EBV undetected; continue weekly CMV and EBV, today's pending  - Acyclovir (Zoster prophylaxis) until Day +365  - Diflucan (Fungal prophylaxis) until off tacrolimus.  - Dapsone (PJP prophylaxis) until off tacrolimus. (was on Bactrim but changed to Dapsone due to rash in event that rash was drug rash).   - changed back to Bactrim as rash persisted    Hypomagnesemia  - Mag 1.8 today  - Currently on mag 800 mg TID. Changed to 800 BID. Hopeful that this will improve GI symptoms.     Antineoplastic chemotherapy-induced cytopenias: thrombocytopenia   - blood counts continue to improve  - plts are wnl today. hgb is 14.1.     Neck pain/ Cervical stenosis of spine   - Hx of spinal fusion C3-C5  - Xray 9/19 shows surgical changes of fusion from C3 through C5 with degenerative changed below fusion.   - Started PT on 8/26.  - Pt feels neck pain started after grewal was placed  - Has been mostly refractory to medications (zanaflex, gabapentin, celebrex, lidoderm patches). Some relief with oral morphine  - CT from 8/24/20 showing history of C3, C4, and C5 fusion and diffuse osteoarthritic changes  - Had virtual pain management appt on 8/25 to review CT results.  - Zanaflex switched to Robaxin per pain mgt.  - had medial branch block on 9/2/20 with Dr. Mcelroy (pain mgt). Per patient pain mgmnt signed off, as it is not a chronic issue, defer to BMT service for further pain control.  - started MS contin 15mg BID 9/20/2020; use PRN morphine q6h for breakthrough; titrate up MS contin as needed  - Patient reports much improvement to pain since starting high dose prednisone for his skin GVHD but pain returning as we taper steroids  - feeling lack of benefit from Gabapentin taken once nightly; started taper to every other night 10/26/2020  - continues taper per his preference    BPH (benign prostatic hyperplasia)  - Currently on Flomax 0.4 mg dialy  - reports increased urinary hesitancy and bladder  pressure today  - instructed to increase flomax to 0.8 mg daily  - will refer to urology if no improvement     GERD (gastroesophageal reflux disease)  - Continue nexium; 40mg if needed while on high dose steroids  - TUMS prn and pepto PRN  - may be symptom of GVHD. Referred for photopheresis as above.     Essential hypertension  - Increased home amlodipine from 5 mg to 10 mg daily while inpatient due to BPs as high as 180s/110s   - /76 today  - currently on amlodipine 5 mg daily. Will continue this dose.    Rash   - rash is worse and more papular today. Now to abdomen, chest, upper arms, upper back, flanks, and anterior thighs just above knees  - continue TAC cream.   - decreased prednisone from 20 mg daily to 10 mg daily at previous visit due to improving rash. Increased back up to 20 mg daily today.  - rash first appeared ~ 9-91/2 weeks ago, which is 1 1/2-2 weeks after starting Bactrim. Switched Bactrim to Dapsone at last visit in the event that rash is drug rash. No improvement. Will switch back to bactrim.  - due to persistent rash, referred for photopheresis and to derm for biopsy. Will get biopsy tomorrow.    Diarrhea  - reports intermittent diarrhea since cholecystectomy pre transplant  - reports 1-4 soft stools daily  - patient states that he is taking pepto for this with some relief  - does not like to take imodium as this causes constipation  - currently on a prednisone taper for possible GVHD of the gut. Currently on 20 mg prednisone daily.   - GI issues pre-dated transplant  - due to persistent GI symptoms and need for prn meds around the clock, referred for photopheresis  - discussed photopheresis and provided written patient-specific info at this visit    Hand/foot cramps  - reported new onset hand and foot cramps at last visit  - electrolytes wnl  - encouraged increased hydration  - encouraged stretching when able  - recommended flexeril, which he already had at home.   - reports that hydration  helps    Cigarette smoker  - has recently resumed cigarette smoking  - inquired about Chantix which has worked for him in the past  - no contraindication to Chantix per [facility] pharmacist.   Per pharm D: The only drug interactions are category C which would only require monitoring of adverse effects.     1. Bactrim may increase the serum concentration of Chantix -- leading to increase side effects of Chantix   2. Famotidine may increase the serum concentration of Chantix -- leading to increase side effects of Chantix     - patient will talk to PCP (original prescriber) about represcribing. Okay to resume from [facility] perspective    AURORA  -creatinine up to 1.5 today  -drinking 60 oz of water daily, but also drinking 2 cups of coffee  -encouraged to increase water intake by 30%  -may be contributing to leg cramps    Leg cramp  -c/o of severe debilitating leg cramps a few days ago  -have since improved  -no evidence of DVT on exam  -may be 2/2 dehydration  -encouraged to increase water intake    Urinary hesitancy  -having urinary hesitancy today and feels sensation of not fully emptying bladder  -will increase flomax from 0.4 mg ot 0.8 mg daily today  -will refer to urology if no improvement    Follow-up:   - weekly CBC, CMP, type and screen, tacro, CMV, EBV mag, phos and appt with Dr. Hodges or NP. Scheduled through end of December. Change appts on 12/9/20 and 12/16/20 to before noon to allow for resulting of tacro levels on day of visit.    Ivon Morrow, FNP  Hematology/Oncology/Bone Marrow Transplant

## 2020-12-02 ENCOUNTER — LAB VISIT (OUTPATIENT)
Dept: LAB | Facility: HOSPITAL | Age: 60
End: 2020-12-02
Payer: COMMERCIAL

## 2020-12-02 ENCOUNTER — PATIENT MESSAGE (OUTPATIENT)
Dept: HEMATOLOGY/ONCOLOGY | Facility: CLINIC | Age: 60
End: 2020-12-02

## 2020-12-02 ENCOUNTER — OFFICE VISIT (OUTPATIENT)
Dept: DERMATOLOGY | Facility: CLINIC | Age: 60
End: 2020-12-02
Payer: COMMERCIAL

## 2020-12-02 ENCOUNTER — OFFICE VISIT (OUTPATIENT)
Dept: HEMATOLOGY/ONCOLOGY | Facility: CLINIC | Age: 60
End: 2020-12-02
Payer: COMMERCIAL

## 2020-12-02 VITALS
HEART RATE: 97 BPM | SYSTOLIC BLOOD PRESSURE: 122 MMHG | DIASTOLIC BLOOD PRESSURE: 76 MMHG | HEIGHT: 66 IN | OXYGEN SATURATION: 98 % | BODY MASS INDEX: 32.65 KG/M2 | RESPIRATION RATE: 18 BRPM | WEIGHT: 203.13 LBS | TEMPERATURE: 98 F

## 2020-12-02 DIAGNOSIS — Z94.84 HISTORY OF ALLOGENEIC STEM CELL TRANSPLANT: ICD-10-CM

## 2020-12-02 DIAGNOSIS — C92.11 CML IN REMISSION: ICD-10-CM

## 2020-12-02 DIAGNOSIS — R19.7 DIARRHEA, UNSPECIFIED TYPE: ICD-10-CM

## 2020-12-02 DIAGNOSIS — I10 ESSENTIAL HYPERTENSION: ICD-10-CM

## 2020-12-02 DIAGNOSIS — R21 RASH AND NONSPECIFIC SKIN ERUPTION: ICD-10-CM

## 2020-12-02 DIAGNOSIS — N40.0 BENIGN PROSTATIC HYPERPLASIA WITHOUT LOWER URINARY TRACT SYMPTOMS: ICD-10-CM

## 2020-12-02 DIAGNOSIS — Z94.81 S/P ALLOGENEIC BONE MARROW TRANSPLANT: ICD-10-CM

## 2020-12-02 DIAGNOSIS — M47.813 SPONDYLOSIS OF CERVICOTHORACIC REGION W/O MYELOPATHY OR RADICULOPATHY: ICD-10-CM

## 2020-12-02 DIAGNOSIS — R39.11 URINARY HESITANCY: ICD-10-CM

## 2020-12-02 DIAGNOSIS — F17.201 TOBACCO ABUSE, IN REMISSION: ICD-10-CM

## 2020-12-02 DIAGNOSIS — C92.01 ACUTE MYELOID LEUKEMIA IN REMISSION: ICD-10-CM

## 2020-12-02 DIAGNOSIS — D89.810 ACUTE GVHD: ICD-10-CM

## 2020-12-02 DIAGNOSIS — R25.2 HAND CRAMPS: ICD-10-CM

## 2020-12-02 DIAGNOSIS — K21.9 GASTROESOPHAGEAL REFLUX DISEASE WITHOUT ESOPHAGITIS: ICD-10-CM

## 2020-12-02 DIAGNOSIS — Z94.84 HISTORY OF ALLOGENEIC STEM CELL TRANSPLANT: Primary | ICD-10-CM

## 2020-12-02 DIAGNOSIS — M54.2 NECK PAIN: ICD-10-CM

## 2020-12-02 DIAGNOSIS — R25.2 FOOT CRAMPS: ICD-10-CM

## 2020-12-02 DIAGNOSIS — T45.1X5A CHEMOTHERAPY-INDUCED THROMBOCYTOPENIA: ICD-10-CM

## 2020-12-02 DIAGNOSIS — C92.10 CML (CHRONIC MYELOCYTIC LEUKEMIA): ICD-10-CM

## 2020-12-02 DIAGNOSIS — E83.42 HYPOMAGNESEMIA: ICD-10-CM

## 2020-12-02 DIAGNOSIS — D69.59 CHEMOTHERAPY-INDUCED THROMBOCYTOPENIA: ICD-10-CM

## 2020-12-02 DIAGNOSIS — N17.9 AKI (ACUTE KIDNEY INJURY): ICD-10-CM

## 2020-12-02 LAB
ABO + RH BLD: NORMAL
ALBUMIN SERPL BCP-MCNC: 4.8 G/DL (ref 3.5–5.2)
ALP SERPL-CCNC: 60 U/L (ref 55–135)
ALT SERPL W/O P-5'-P-CCNC: 32 U/L (ref 10–44)
ANION GAP SERPL CALC-SCNC: 15 MMOL/L (ref 8–16)
AST SERPL-CCNC: 16 U/L (ref 10–40)
BASOPHILS # BLD AUTO: 0.03 K/UL (ref 0–0.2)
BASOPHILS NFR BLD: 0.3 % (ref 0–1.9)
BILIRUB SERPL-MCNC: 0.2 MG/DL (ref 0.1–1)
BLD GP AB SCN CELLS X3 SERPL QL: NORMAL
BUN SERPL-MCNC: 25 MG/DL (ref 8–23)
CALCIUM SERPL-MCNC: 9.8 MG/DL (ref 8.7–10.5)
CHLORIDE SERPL-SCNC: 105 MMOL/L (ref 95–110)
CO2 SERPL-SCNC: 22 MMOL/L (ref 23–29)
CREAT SERPL-MCNC: 1.5 MG/DL (ref 0.5–1.4)
DIFFERENTIAL METHOD: ABNORMAL
EOSINOPHIL # BLD AUTO: 0 K/UL (ref 0–0.5)
EOSINOPHIL NFR BLD: 0.1 % (ref 0–8)
ERYTHROCYTE [DISTWIDTH] IN BLOOD BY AUTOMATED COUNT: 14.2 % (ref 11.5–14.5)
EST. GFR  (AFRICAN AMERICAN): 56.9 ML/MIN/1.73 M^2
EST. GFR  (NON AFRICAN AMERICAN): 49.2 ML/MIN/1.73 M^2
GLUCOSE SERPL-MCNC: 156 MG/DL (ref 70–110)
HCT VFR BLD AUTO: 42.3 % (ref 40–54)
HGB BLD-MCNC: 14.1 G/DL (ref 14–18)
IMM GRANULOCYTES # BLD AUTO: 0.14 K/UL (ref 0–0.04)
IMM GRANULOCYTES NFR BLD AUTO: 1.2 % (ref 0–0.5)
LYMPHOCYTES # BLD AUTO: 0.6 K/UL (ref 1–4.8)
LYMPHOCYTES NFR BLD: 5 % (ref 18–48)
MAGNESIUM SERPL-MCNC: 1.8 MG/DL (ref 1.6–2.6)
MCH RBC QN AUTO: 33.6 PG (ref 27–31)
MCHC RBC AUTO-ENTMCNC: 33.3 G/DL (ref 32–36)
MCV RBC AUTO: 101 FL (ref 82–98)
MONOCYTES # BLD AUTO: 0.3 K/UL (ref 0.3–1)
MONOCYTES NFR BLD: 2.6 % (ref 4–15)
NEUTROPHILS # BLD AUTO: 10.3 K/UL (ref 1.8–7.7)
NEUTROPHILS NFR BLD: 90.8 % (ref 38–73)
NRBC BLD-RTO: 0 /100 WBC
PHOSPHATE SERPL-MCNC: 3.3 MG/DL (ref 2.7–4.5)
PLATELET # BLD AUTO: 171 K/UL (ref 150–350)
PMV BLD AUTO: 9.8 FL (ref 9.2–12.9)
POTASSIUM SERPL-SCNC: 5.3 MMOL/L (ref 3.5–5.1)
PROT SERPL-MCNC: 7.5 G/DL (ref 6–8.4)
RBC # BLD AUTO: 4.2 M/UL (ref 4.6–6.2)
SODIUM SERPL-SCNC: 142 MMOL/L (ref 136–145)
WBC # BLD AUTO: 11.34 K/UL (ref 3.9–12.7)

## 2020-12-02 PROCEDURE — 86850 RBC ANTIBODY SCREEN: CPT

## 2020-12-02 PROCEDURE — 3008F BODY MASS INDEX DOCD: CPT | Mod: BMT,CPTII,S$GLB, | Performed by: NURSE PRACTITIONER

## 2020-12-02 PROCEDURE — 80197 ASSAY OF TACROLIMUS: CPT

## 2020-12-02 PROCEDURE — 1126F AMNT PAIN NOTED NONE PRSNT: CPT | Mod: BMT,S$GLB,, | Performed by: NURSE PRACTITIONER

## 2020-12-02 PROCEDURE — 3008F PR BODY MASS INDEX (BMI) DOCUMENTED: ICD-10-PCS | Mod: BMT,CPTII,S$GLB, | Performed by: NURSE PRACTITIONER

## 2020-12-02 PROCEDURE — 99215 PR OFFICE/OUTPT VISIT, EST, LEVL V, 40-54 MIN: ICD-10-PCS | Mod: BMT,S$GLB,, | Performed by: NURSE PRACTITIONER

## 2020-12-02 PROCEDURE — 1126F PR PAIN SEVERITY QUANTIFIED, NO PAIN PRESENT: ICD-10-PCS | Mod: BMT,S$GLB,, | Performed by: NURSE PRACTITIONER

## 2020-12-02 PROCEDURE — 36415 COLL VENOUS BLD VENIPUNCTURE: CPT

## 2020-12-02 PROCEDURE — 99999 PR PBB SHADOW E&M-EST. PATIENT-LVL III: CPT | Mod: PBBFAC,BMT,, | Performed by: NURSE PRACTITIONER

## 2020-12-02 PROCEDURE — 99202 OFFICE O/P NEW SF 15 MIN: CPT | Mod: BMT,95,, | Performed by: STUDENT IN AN ORGANIZED HEALTH CARE EDUCATION/TRAINING PROGRAM

## 2020-12-02 PROCEDURE — 99999 PR PBB SHADOW E&M-EST. PATIENT-LVL III: ICD-10-PCS | Mod: PBBFAC,BMT,, | Performed by: NURSE PRACTITIONER

## 2020-12-02 PROCEDURE — 87799 DETECT AGENT NOS DNA QUANT: CPT

## 2020-12-02 PROCEDURE — 99215 OFFICE O/P EST HI 40 MIN: CPT | Mod: BMT,S$GLB,, | Performed by: NURSE PRACTITIONER

## 2020-12-02 PROCEDURE — 85025 COMPLETE CBC W/AUTO DIFF WBC: CPT

## 2020-12-02 PROCEDURE — 84100 ASSAY OF PHOSPHORUS: CPT

## 2020-12-02 PROCEDURE — 83735 ASSAY OF MAGNESIUM: CPT

## 2020-12-02 PROCEDURE — 80053 COMPREHEN METABOLIC PANEL: CPT

## 2020-12-02 PROCEDURE — 99202 PR OFFICE/OUTPT VISIT, NEW, LEVL II, 15-29 MIN: ICD-10-PCS | Mod: BMT,95,, | Performed by: STUDENT IN AN ORGANIZED HEALTH CARE EDUCATION/TRAINING PROGRAM

## 2020-12-02 RX ORDER — TAMSULOSIN HYDROCHLORIDE 0.4 MG/1
0.8 CAPSULE ORAL DAILY
Qty: 30 CAPSULE | Refills: 3 | Status: SHIPPED | OUTPATIENT
Start: 2020-12-02 | End: 2021-02-08 | Stop reason: SDUPTHER

## 2020-12-02 RX ORDER — ONDANSETRON HYDROCHLORIDE 8 MG/1
8 TABLET, FILM COATED ORAL EVERY 12 HOURS PRN
Qty: 30 TABLET | Refills: 2 | Status: SHIPPED | OUTPATIENT
Start: 2020-12-02 | End: 2021-01-20 | Stop reason: SDUPTHER

## 2020-12-02 RX ORDER — TACROLIMUS 1 MG/1
4 CAPSULE ORAL EVERY 12 HOURS
Qty: 300 CAPSULE | Refills: 0 | OUTPATIENT
Start: 2020-12-02 | End: 2020-12-04 | Stop reason: SDUPTHER

## 2020-12-02 RX ORDER — TACROLIMUS 1 MG/1
CAPSULE ORAL
Qty: 300 CAPSULE | Refills: 0 | Status: CANCELLED | OUTPATIENT
Start: 2020-12-02

## 2020-12-02 RX ORDER — LANOLIN ALCOHOL/MO/W.PET/CERES
800 CREAM (GRAM) TOPICAL 2 TIMES DAILY
Qty: 200 TABLET | Refills: 1 | Status: SHIPPED | OUTPATIENT
Start: 2020-12-02 | End: 2021-03-31 | Stop reason: ALTCHOICE

## 2020-12-02 RX ORDER — SULFAMETHOXAZOLE AND TRIMETHOPRIM 800; 160 MG/1; MG/1
1 TABLET ORAL
Qty: 12 TABLET | Refills: 4 | Status: SHIPPED | OUTPATIENT
Start: 2020-12-02 | End: 2021-03-31 | Stop reason: ALTCHOICE

## 2020-12-02 NOTE — LETTER
December 2, 2020      Ivon Morrow, NP  [address]  [facility] [address]           [facility]  [address] [facility] [address]  Phone: [phone]  Fax: [phone]          Patient: Kvng Jones Sr.   MR Number: [id number]   YOB: 1960   Date of Visit: 12/2/2020       Dear Ivon Morrow:    Thank you for referring Kvng Jones to me for evaluation. Attached you will find relevant portions of my assessment and plan of care.    If you have questions, please do not hesitate to call me. I look forward to following Kvng Jones along with you.    Sincerely,    Andreas Chou MD    Enclosure  CC:  No Recipients    If you would like to receive this communication electronically, please contact [email or web address] or [phone] to request more information on [employer] Link access.    For providers and/or their staff who would like to refer a patient to [facility], please contact us through our one-stop-shop provider referral line, [facility], at [phone].    If you feel you have received this communication in error or would no longer like to receive these types of communications, please e-mail [email or web address]

## 2020-12-02 NOTE — PROGRESS NOTES
"The patient location is: home  The chief complaint leading to consultation is: rash    Visit type: audiovisual    Face to Face time with patient: 10mins  10 minutes of total time spent on the encounter, which includes face to face time and non-face to face time preparing to see the patient (eg, review of tests), Obtaining and/or reviewing separately obtained history, Documenting clinical information in the electronic or other health record, Independently interpreting results (not separately reported) and communicating results to the patient/family/caregiver, or Care coordination (not separately reported).         Each patient to whom he or she provides medical services by telemedicine is:  (1) informed of the relationship between the physician and patient and the respective role of any other health care provider with respect to management of the patient; and (2) notified that he or she may decline to receive medical services by telemedicine and may withdraw from such care at any time.    Notes: Patient with a history of CML and bone marrow biopsy with new rash located on the buttocks, abdomen, and arms. States that it starts out as "blackheads" that scab and crust over, and have spread to involve new places on the body. Denies any itching, burning, or other symptoms associated with the rash. Has not tried any treatments for symptoms.     ROS: otherwise negative     PE: Const/Neuro - AAOx3, NAD          Skin - follicular papules noted, difficult to discern epidermal changes via telemedicine     A/P: 1) Dermatitis- will have patient come in for in office evaluation for closer evaluation and possible biopsy of rash.       "

## 2020-12-02 NOTE — Clinical Note
Change appts on 12/9/20 and 12/16/20 to before noon to allow for resulting of tacro levels on day of visit.

## 2020-12-03 ENCOUNTER — OFFICE VISIT (OUTPATIENT)
Dept: DERMATOLOGY | Facility: CLINIC | Age: 60
End: 2020-12-03
Payer: COMMERCIAL

## 2020-12-03 ENCOUNTER — TELEPHONE (OUTPATIENT)
Dept: HEMATOLOGY/ONCOLOGY | Facility: CLINIC | Age: 60
End: 2020-12-03

## 2020-12-03 DIAGNOSIS — D89.810 ACUTE GVHD: ICD-10-CM

## 2020-12-03 DIAGNOSIS — Z94.84 HISTORY OF ALLOGENEIC STEM CELL TRANSPLANT: Primary | ICD-10-CM

## 2020-12-03 DIAGNOSIS — L30.9 DERMATITIS: Primary | ICD-10-CM

## 2020-12-03 LAB
CMV DNA SERPL NAA+PROBE-ACNC: NORMAL IU/ML
TACROLIMUS BLD-MCNC: 5.9 NG/ML (ref 5–15)

## 2020-12-03 PROCEDURE — 88305 TISSUE EXAM BY PATHOLOGIST: ICD-10-PCS | Mod: 26,BMT,, | Performed by: PATHOLOGY

## 2020-12-03 PROCEDURE — 99214 OFFICE O/P EST MOD 30 MIN: CPT | Mod: 25,BMT,S$GLB, | Performed by: STUDENT IN AN ORGANIZED HEALTH CARE EDUCATION/TRAINING PROGRAM

## 2020-12-03 PROCEDURE — 88312 PR  SPECIAL STAINS,GROUP I: ICD-10-PCS | Mod: 26,BMT,, | Performed by: PATHOLOGY

## 2020-12-03 PROCEDURE — 1126F AMNT PAIN NOTED NONE PRSNT: CPT | Mod: BMT,S$GLB,, | Performed by: STUDENT IN AN ORGANIZED HEALTH CARE EDUCATION/TRAINING PROGRAM

## 2020-12-03 PROCEDURE — 99999 PR PBB SHADOW E&M-EST. PATIENT-LVL III: ICD-10-PCS | Mod: PBBFAC,BMT,, | Performed by: STUDENT IN AN ORGANIZED HEALTH CARE EDUCATION/TRAINING PROGRAM

## 2020-12-03 PROCEDURE — 88312 SPECIAL STAINS GROUP 1: CPT | Mod: 26,BMT,, | Performed by: PATHOLOGY

## 2020-12-03 PROCEDURE — 1126F PR PAIN SEVERITY QUANTIFIED, NO PAIN PRESENT: ICD-10-PCS | Mod: BMT,S$GLB,, | Performed by: STUDENT IN AN ORGANIZED HEALTH CARE EDUCATION/TRAINING PROGRAM

## 2020-12-03 PROCEDURE — 11104 PR PUNCH BIOPSY, SKIN, SINGLE LESION: ICD-10-PCS | Mod: BMT,S$GLB,, | Performed by: STUDENT IN AN ORGANIZED HEALTH CARE EDUCATION/TRAINING PROGRAM

## 2020-12-03 PROCEDURE — 88305 TISSUE EXAM BY PATHOLOGIST: CPT | Mod: 26,BMT,, | Performed by: PATHOLOGY

## 2020-12-03 PROCEDURE — 99999 PR PBB SHADOW E&M-EST. PATIENT-LVL III: CPT | Mod: PBBFAC,BMT,, | Performed by: STUDENT IN AN ORGANIZED HEALTH CARE EDUCATION/TRAINING PROGRAM

## 2020-12-03 PROCEDURE — 11104 PUNCH BX SKIN SINGLE LESION: CPT | Mod: BMT,S$GLB,, | Performed by: STUDENT IN AN ORGANIZED HEALTH CARE EDUCATION/TRAINING PROGRAM

## 2020-12-03 PROCEDURE — 99214 PR OFFICE/OUTPT VISIT, EST, LEVL IV, 30-39 MIN: ICD-10-PCS | Mod: 25,BMT,S$GLB, | Performed by: STUDENT IN AN ORGANIZED HEALTH CARE EDUCATION/TRAINING PROGRAM

## 2020-12-03 PROCEDURE — 88312 SPECIAL STAINS GROUP 1: CPT | Mod: 59 | Performed by: PATHOLOGY

## 2020-12-03 PROCEDURE — 88305 TISSUE EXAM BY PATHOLOGIST: CPT | Performed by: PATHOLOGY

## 2020-12-03 RX ORDER — UREA 40 %
CREAM (GRAM) TOPICAL
Qty: 198.4 G | Refills: 3 | Status: SHIPPED | OUTPATIENT
Start: 2020-12-03 | End: 2021-01-20

## 2020-12-03 NOTE — PROGRESS NOTES
Subjective:       Patient ID:  Kvng Jones Sr. is a 60 y.o. male who presents for   Chief Complaint   Patient presents with    Rash     History of Present Illness: The patient presents for follow up of a rash and biopsy. Patient last seen in a virtual visit, here for biopsy of a rash that his referring provider is concerned may by GVHD given that the rash started after prednisone taper. Still itching and still with new pin point follicular bumps largely on the trunk.         Review of Systems   Constitutional: Negative for fever and chills.        Objective:    Physical Exam   Constitutional: He appears well-developed and well-nourished. No distress.   Neurological: He is alert and oriented to person, place, and time. He is not disoriented.   Psychiatric: He has a normal mood and affect.   Skin:   Areas Examined (abnormalities noted in diagram):   Head / Face Inspection Performed  Neck Inspection Performed  Chest / Axilla Inspection Performed  Abdomen Inspection Performed              Diagram Legend     Erythematous scaling macule/papule c/w actinic keratosis       Vascular papule c/w angioma      Pigmented verrucoid papule/plaque c/w seborrheic keratosis      Yellow umbilicated papule c/w sebaceous hyperplasia      Irregularly shaped tan macule c/w lentigo     1-2 mm smooth white papules consistent with Milia      Movable subcutaneous cyst with punctum c/w epidermal inclusion cyst      Subcutaneous movable cyst c/w pilar cyst      Firm pink to brown papule c/w dermatofibroma      Pedunculated fleshy papule(s) c/w skin tag(s)      Evenly pigmented macule c/w junctional nevus     Mildly variegated pigmented, slightly irregular-bordered macule c/w mildly atypical nevus      Flesh colored to evenly pigmented papule c/w intradermal nevus       Pink pearly papule/plaque c/w basal cell carcinoma      Erythematous hyperkeratotic cursted plaque c/w SCC      Surgical scar with no sign of skin cancer recurrence       Open and closed comedones      Inflammatory papules and pustules      Verrucoid papule consistent consistent with wart     Erythematous eczematous patches and plaques     Dystrophic onycholytic nail with subungual debris c/w onychomycosis     Umbilicated papule    Erythematous-base heme-crusted tan verrucoid plaque consistent with inflamed seborrheic keratosis     Erythematous Silvery Scaling Plaque c/w Psoriasis     See annotation          Assessment / Plan:      Pathology Orders:     Normal Orders This Visit    Specimen to Pathology, Dermatology     Comments:    Number of Specimens:->1  ------------------------->-------------------------  Spec 1 Procedure:->Biopsy  Spec 1 Clinical Impression:->GVHD vs follicluitis vs other  Spec 1 Source:->stomach    Questions:    Procedure Type: Dermatology and skin neoplasms    Number of Specimens: 1    ------------------------: -------------------------    Spec 1 Procedure: Biopsy    Spec 1 Clinical Impression: GVHD vs follicluitis vs other    Spec 1 Source: stomach        Dermatitis  -     Specimen to Pathology, Dermatology  -     urea (CARMOL) 40 % Crea; Apply to the affected area every day after soak  Dispense: 198.4 g; Refill: 3    Punch biopsy procedure note:  Punch biopsy performed after verbal consent obtained. Area marked and prepped with alcohol. Approximately 1cc of 1% lidocaine with epinephrine injected. 4 mm disposable punch used to remove lesion. Hemostasis obtained and biopsy site closed with 1 - 2 Prolene sutures. Wound care instructions reviewed with patient and handout given.             No follow-ups on file.

## 2020-12-03 NOTE — TELEPHONE ENCOUNTER
tacro level 5.9. Notified patient that dr. Hodges would like to increase his dose from 4 mg q am and 5 mg q pm to 5 mg bid. Further discussed photopheresis. Patient is still on the fence about procedure. Line placement planned for 12/10/20. If patient decides to proceed, and I encouraged him to do so, we will change his clinic appt on 12/9/20 to 12/10/20 for his convenience.    Ivon Morrow, EVANP  Hematology/Oncology/Bone Marrow Transplant

## 2020-12-04 ENCOUNTER — PATIENT MESSAGE (OUTPATIENT)
Dept: DERMATOLOGY | Facility: CLINIC | Age: 60
End: 2020-12-04

## 2020-12-04 ENCOUNTER — PATIENT MESSAGE (OUTPATIENT)
Dept: HEMATOLOGY/ONCOLOGY | Facility: CLINIC | Age: 60
End: 2020-12-04

## 2020-12-04 DIAGNOSIS — Z94.84 HISTORY OF ALLOGENEIC STEM CELL TRANSPLANT: ICD-10-CM

## 2020-12-04 LAB — EBV DNA SERPL NAA+PROBE-ACNC: NORMAL IU/ML

## 2020-12-04 RX ORDER — TACROLIMUS 1 MG/1
5 CAPSULE ORAL EVERY 12 HOURS
Qty: 300 CAPSULE | Refills: 0 | OUTPATIENT
Start: 2020-12-04 | End: 2020-12-04

## 2020-12-04 RX ORDER — TACROLIMUS 1 MG/1
1 CAPSULE ORAL EVERY 12 HOURS
Qty: 60 CAPSULE | Refills: 2 | Status: SHIPPED | OUTPATIENT
Start: 2020-12-04 | End: 2020-12-07 | Stop reason: SDUPTHER

## 2020-12-04 NOTE — PROGRESS NOTES
Sent prescription for Tacro to patient's local [facility]'s in [place] after confirming that they have the medication in stock.     Ivon Morrow, EVANP  Hematology/Oncology/Bone Marrow Transplant

## 2020-12-07 DIAGNOSIS — Z94.84 HISTORY OF ALLOGENEIC STEM CELL TRANSPLANT: ICD-10-CM

## 2020-12-07 DIAGNOSIS — I10 ESSENTIAL HYPERTENSION: ICD-10-CM

## 2020-12-07 DIAGNOSIS — T86.09 ACUTE GVHD COMPLICATING BONE MARROW TRANSPLANTATION: ICD-10-CM

## 2020-12-07 DIAGNOSIS — C92.10 CML (CHRONIC MYELOCYTIC LEUKEMIA): ICD-10-CM

## 2020-12-07 DIAGNOSIS — D89.810 ACUTE GVHD COMPLICATING BONE MARROW TRANSPLANTATION: ICD-10-CM

## 2020-12-08 RX ORDER — TACROLIMUS 1 MG/1
1 CAPSULE ORAL EVERY 12 HOURS
Qty: 120 CAPSULE | Refills: 2 | Status: SHIPPED | OUTPATIENT
Start: 2020-12-08 | End: 2020-12-10 | Stop reason: SDUPTHER

## 2020-12-08 RX ORDER — AMLODIPINE BESYLATE 5 MG/1
5 TABLET ORAL DAILY
Qty: 30 TABLET | Refills: 11 | Status: SHIPPED | OUTPATIENT
Start: 2020-12-08 | End: 2020-12-10 | Stop reason: SDUPTHER

## 2020-12-08 RX ORDER — ACYCLOVIR 800 MG/1
800 TABLET ORAL 2 TIMES DAILY
Qty: 60 TABLET | Refills: 11 | Status: SHIPPED | OUTPATIENT
Start: 2020-12-08 | End: 2021-11-05

## 2020-12-08 RX ORDER — PREDNISONE 20 MG/1
20 TABLET ORAL DAILY
Qty: 30 TABLET | Refills: 0 | Status: SHIPPED | OUTPATIENT
Start: 2020-12-08 | End: 2020-12-10

## 2020-12-09 DIAGNOSIS — Z88.8 ALLERGY TO IODINE: ICD-10-CM

## 2020-12-09 DIAGNOSIS — Z88.8 ALLERGY TO IODINE: Primary | ICD-10-CM

## 2020-12-09 RX ORDER — PREDNISONE 50 MG/1
TABLET ORAL
Qty: 3 TABLET | Refills: 0 | Status: SHIPPED | OUTPATIENT
Start: 2020-12-09 | End: 2020-12-09 | Stop reason: SDUPTHER

## 2020-12-09 RX ORDER — PREDNISONE 50 MG/1
TABLET ORAL
Qty: 3 TABLET | Refills: 0 | Status: SHIPPED | OUTPATIENT
Start: 2020-12-09 | End: 2020-12-10

## 2020-12-09 RX ORDER — DIPHENHYDRAMINE HCL 50 MG
50 CAPSULE ORAL ONCE
Qty: 1 CAPSULE | Refills: 0 | Status: SHIPPED | OUTPATIENT
Start: 2020-12-09 | End: 2020-12-09 | Stop reason: SDUPTHER

## 2020-12-09 RX ORDER — DIPHENHYDRAMINE HCL 50 MG
50 CAPSULE ORAL ONCE
Qty: 1 CAPSULE | Refills: 0 | Status: SHIPPED | OUTPATIENT
Start: 2020-12-09 | End: 2020-12-10

## 2020-12-09 NOTE — ASSESSMENT & PLAN NOTE
- Continue home Flexeril and ultram  - PRN oxy IR added for additional relief  - likely component of his neck pain this AM   Make appt with Dr. Garay [facility] for her knee injury.  Will send results of Xrays to [facility].  For now, rest, ice, elevate, naproxen for pain.

## 2020-12-09 NOTE — PROGRESS NOTES
Subjective:    Patient ID: Kvng Jones Sr. is a 60 y.o. male.    Chief Complaint: labs and f/u, Neck Pain, Nausea, and Gas    Oncologic hx: (from Dr. Hodges' previous documentation)  60 y.o. 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 He was admitted for evaluation and induction chemotherapy with FLAG-Addis. Complications of therapy include epididymal orchitis with negative testicular ultrasound, neutropenic fever, dyspnea, and GGO of bilateral lungs on CT chest. Fever and chest findings were covered with broad spectrum antimicrobials. He did have hallucinations and vivid dreams with posaconazole. Chemotherapy was administered by left arm PICC line that was removed during hospital stay for MSSA infection treated with vancomycin. Hospital admission was 3/30/2020 to 5/1/2020 achieving morphologic CR with induction chemotherapy. He then started Ponatinib 30mg daily.     Studies performed during his hospital stay include pre-chemotherapy ECHO with normal ejection fraction of 60-65%. HIV, HBV, and HCV tests were negative. CBC at admission was WBC 38.6, Hgb 14.6, and platelet 416K. CT of the head without contrast was normal 4/20/2020. US of abdomen performed for abdominal distention and neutropenic fever had hepatic steatosis, liver 19cm, and spleen 11 cm. He does have a history of alcohol use and pancreatitis.    Patient is a . He is  to wife Guillermina who is his caregiver post transplant. He has a 45 pack year smoking history. He quit 6/1/2020, now on Chantix. He drinks 3-4 alcohol beverages nightly. He has 3 children. He has several family members in the [facility] area.    Transplant Course: Admitted 7/21/20 for a Flu/Cy/TBI haplo allo SCT. Son was donor. Received 2 bags and a total CD34 dose of 3.10 x10^6/kg on 7/28/20. Tolerated stem cells well. C/o headaches prior to admission, which persisted throughout admission. Neuro ultimately  consulted for rec's. Was a daily drinker prior to admission. No s/s of alcolhol withdrawal during admission. Transplant further complicated by hypertension, heart burn, c-diff neg diarrhea, nausea, sinus congestion, mild peripheral edema, blurry vision (ophtho consulted), mucositis, electrolyte abnormalities, and neck pain 2/2 spinal stenosis (referred to pain mgt at discharge). He engrafted on 8/10/20 with an ANC of 1280. He was discharged on 8/12/2020.    Interval History:  Mr. Jones presents for follow-up clinic visit post Flu/Cy/TBI allogeneic stem cell transplant. Today is Day +135. Comes to clinic with wife.     Is on a prednisone for possible GVHD of the gut and skin. Currently on 40 mg daily. Rash noted to arms (minimal), abdomen, and thighs, biopsy done 12/3 negative for GVHD. Continues with intermittent nausea, but mostly reports gas/belching.     Denies fevers, chills, cough, chest pain, diarrhea, constipation, bruising, and bleeding.     He also c/o leg cramp originating at left lateral knee and radiating down legs (R>L) that was present a few days ago and interfered with activity. Reports he is not stretching.    Continues with urinary hesitancy today even after increase dose of flomax.      Review of Systems   Review of Systems   Constitutional: Positive for malaise/fatigue. Negative for chills, fever and weight loss.   HENT: Negative for congestion and nosebleeds.    Eyes: Negative for pain, discharge and redness.   Respiratory: Negative for cough, hemoptysis and shortness of breath.    Cardiovascular: Negative for chest pain, palpitations and leg swelling.   Gastrointestinal: Positive for diarrhea (mild; intermittent) and nausea. Negative for constipation and vomiting.   Genitourinary: Negative for dysuria and urgency.   Musculoskeletal: Positive for back pain and neck pain. Negative for myalgias.   Skin: Positive for rash. Negative for itching.   Neurological: Negative for dizziness, sensory change,  speech change and focal weakness.   Endo/Heme/Allergies: Negative for environmental allergies and polydipsia.   Psychiatric/Behavioral: Negative for depression and suicidal ideas.           Objective:     Physical Exam   Constitutional: He is oriented to person, place, and time and well-developed, well-nourished, and in no distress.   HENT:   Head: Normocephalic and atraumatic.   Right Ear: External ear normal.   Left Ear: External ear normal.   Nose: Nose normal.   No oral ulcers, mucositis or petechiae    Eyes: Conjunctivae and EOM are normal.   Neck: Normal range of motion.   Cardiovascular: Normal rate, regular rhythm, normal heart sounds and intact distal pulses.   No murmur heard.  Pulmonary/Chest: Effort normal and breath sounds normal. No respiratory distress. He has no wheezes. He has no rales.   Abdominal: Soft. Bowel sounds are normal. He exhibits no distension. There is no abdominal tenderness. There is no rebound.   Musculoskeletal: Normal range of motion.         General: No tenderness or edema.   Neurological: He is alert and oriented to person, place, and time. No cranial nerve deficit.   Skin: Skin is warm and dry. Rash noted. He is not diaphoretic. There is erythema.   Papular rash to trunk, arms, and anterior thighs just above knees.   Psychiatric: Mood, memory, affect and judgment normal.         Assessment:       1. History of allogeneic stem cell transplant    2. CML in remission    3. Acute myeloid leukemia in remission    4. Acute GVHD    5. Hypomagnesemia    6. Spondylosis of cervicothoracic region w/o myelopathy or radiculopathy    7. Neck pain    8. Benign prostatic hyperplasia without lower urinary tract symptoms    9. Gastroesophageal reflux disease without esophagitis    10. Essential hypertension    11. Rash and nonspecific skin eruption    12. Diarrhea, unspecified type    13. Hand cramps    14. Foot cramps    15. Tobacco abuse, in remission    16. AURORA (acute kidney injury)    17.  Urinary hesitancy    18. Insomnia, unspecified type    19. Acute GVHD complicating bone marrow transplantation    20. Nausea    21. Abdominal gas pain        Plan:         History of allogeneic stem cell transplant  - Admitted 7/21/20 for planned Flu/Cy/TBI haplo allo SCT. Son was the donor. Received 2 bags and a total CD34 dose of 3.10 x10^6/kg on 7/28/20. Recieved post transplant cyclophosphamide. Engrafted neutrophils 8/10/20 day +13  - Today is Day +135  - Tacro level 6.9 (goal 7-9). Currently on tacro dose 5mg BID  - stopped Ursodiol Day +30 (8/27/20)  - patient requests Rx for Valium and Oxycodone for pre-procedure in clinic bone marrow biopsies.  - had BMBx in clinic on 8/28/20 showing no definitive morphologic or immunophenotypic evidence of residual CML. Chimerisms unable to be obtained due to insufficient sample; drawn peripherally on 9/18. CD3-positive T-cells:  Insufficient DNA extracted for reliable analysis. CD33-positive myeloid cells:  The CD33-positive fraction contains approximately 100% donor DNA and approximately 0% recipient DNA.   - Dalal removed 10/19   - Day 100 Bm bx 11/2/2020 showing no definitive morphologic or immunophenotypic evidence of CML but there is evidence of MRD. Positive for BCR/ABL p210 (7.8%).  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 Achieved morphologic CR  - Started on daily Ponatinib 30 mg daily, which he continued to take outpatient. Held Ponatinib during admission; may not need to restart per recent literature. BBMT 2020(65): 245-67  - Had BMBx at [facility] on 7/1/2020 showing no morphologic or immunophenotypic evidence of AML/CML  - Admitted 7/21/20 for Flu/Cy/TBI haplo allo SCT  - Day 100 Bm bx 11/2/2020 showing no definitive morphologic or immunophenotypic  evidence of CML but there is evidence of MRD. Positive for BCR/ABL p210 (7.8%).  - resumed pre transplant TKI, Ponatinib 30 mg daily on 11/11/20.    GVHD  - had rash inpatient that was believed to be drug eruption. Resolved at that time  - diffuse erythema to legs, lower back, arms, and abdomen presented on 9/15. Started 1mg/kg (90mg, will round to 100mg for dosing) of prednisone daily (9/15/20); decreased to 90 mg (4.5 tabs) on 9/21/2020  - rash is worse and more papular today. Now to abdomen, chest, upper arms, upper back, flanks, and anterior thighs just above knees  - continue TAC cream.   - Increased back up to 40 mg daily at previous rash due to worsening rash; since not GVHD will decrease prednisone to 30mg today  - rash first appeared 1 1/2-2 weeks after starting Bactrim. Switched Bactrim to Dapsone. Will monitor for improvement.  - continue tacro and prednisone as above  - due to persistent GI symptoms and worsening rash, referred to derm  - saw derm; had skin bx 12/3-- not GVHD  - continue to follow up with derm; continue using urea cream as prescribed; no plan for pheresis at this time  - starting 3mg budesonide BID, can increase to TID at next appointment if minimal improvement    ID  - Last CMV and EBV undetected; continue weekly CMV and EBV, today's pending  - Acyclovir (Zoster prophylaxis) until Day +365  - Diflucan (Fungal prophylaxis) until off tacrolimus  - Dapsone (PJP prophylaxis) until off tacrolimus. (Was on Bactrim but changed to Dapsone due to rash in event that rash was drug rash)   - changed back to Bactrim as rash persisted    Hypomagnesemia  - Mag 1.8 today  - continue mag 800mg BID    Neck pain/ Cervical stenosis of spine  - Hx of spinal fusion C3-C5  - Xray 9/19 shows surgical changes of fusion from C3 through C5 with degenerative changed below fusion.   - Started PT on 8/26.  - Pt feels neck pain started after grewal was placed  - Has been mostly refractory to medications (zanaflex,  gabapentin, celebrex, lidoderm patches). Some relief with oral morphine  - CT from 8/24/20 showing history of C3, C4, and C5 fusion and diffuse osteoarthritic changes  - Had virtual pain management appt on 8/25 to review CT results.  - Zanaflex switched to Robaxin per pain mgt  - had medial branch block on 9/2/20 with Dr. Mcelroy (pain mgt). Per patient pain mgmnt signed off, as it is not a chronic issue, defer to BMT service for further pain control.  - started MS contin 15mg BID 9/20/2020; use PRN morphine q6h for breakthrough; titrate up MS contin as needed  - Patient reports much improvement to pain since starting high dose prednisone for his skin GVHD but pain returning as we taper steroids  - feeling lack of benefit from Gabapentin taken once nightly; started taper to every other night 10/26/2020  - continues taper per his preference    BPH (benign prostatic hyperplasia)  - Currently on Flomax 0.4 mg daily  - reports increased urinary hesitancy and bladder pressure today  - instructed to increase flomax to 0.8 mg daily  - is going to see his own urologist     GERD (gastroesophageal reflux disease)  - Continue nexium; 40mg if needed while on high dose steroids  - TUMS prn and pepto PRN  - may be symptom of GVHD. Referred for photopheresis as above     Essential hypertension  - Increased home amlodipine from 5mg to 10mg daily while inpatient due to BPs as high as 180s/110s   - /76 today   - currently on amlodipine 5 mg daily. Will continue this dose    Rash   - rash stable, continues to be papular. Now to abdomen, chest, upper arms, upper back, flanks, and anterior thighs just above knees  - continue urea cream per derm  - rash first appeared ~ 9-91/2 weeks ago, which is 1 1/2-2 weeks after starting Bactrim. Switched Bactrim to Dapsone at last visit in the event that rash is drug rash. No improvement. Will switch back to bactrim.  - due to persistent rash, referred for photopheresis and to derm for  biopsy. Bx completed 12/3-- results show no evidence of GVHD  - will hold off on pheresis at this time per Dr. Hodges based on bx results  - will decrease prednisone to 30mg daily today; continue follow up with derm for management    Diarrhea  - reports intermittent diarrhea since cholecystectomy pre transplant  - reports 1-4 soft stools daily  - patient states that he is taking pepto for this with some relief  - does not like to take imodium as this causes constipation  - currently on a prednisone taper for possible GVHD of the gut. Currently on 30 mg prednisone daily.   - GI issues pre-dated transplant  - has not yet tried budesonide, will start on BID 3mg, can increase to TID if needed    Hand/Foot cramps  - reported new onset hand and foot cramps at last visit  - electrolytes wnl  - encouraged increased hydration  - encouraged stretching when able  - recommended flexeril, which he already had at home.   - reports that hydration helps  - encouraged patient to stretch    Cigarette smoker  - has recently resumed cigarette smoking  - inquired about Chantix which has worked for him in the past  - no contraindication to Chantix per [facility] pharmacist.   Per pharm D: The only drug interactions are category C which would only require monitoring of adverse effects.     1. Bactrim may increase the serum concentration of Chantix -- leading to increase side effects of Chantix   2. Famotidine may increase the serum concentration of Chantix -- leading to increase side effects of Chantix   - patient will talk to PCP (original prescriber) about represcribing. Okay to resume from BMP perspective    AURORA  -creatinine down to 1.1 today; improved  -continue to trend    Urinary hesitancy  -having urinary hesitancy today and feels sensation of not fully emptying bladder  -flomax increased to 0.8mg at last visit with no change  -patient has own urology; will go see them before the end of the year        Follow-up:   - weekly CBC, CMP, type  and screen, tacro, CMV, EBV mag, phos and appt with Dr. Hodges or NP. Scheduled through end of December.        Marimar Brothers NP  Hematology/Oncology/BMT

## 2020-12-09 NOTE — PROGRESS NOTES
Original prescription sent to incorrect pharmacy. New prescription sent to patient's pharmacy of choice

## 2020-12-10 ENCOUNTER — PATIENT MESSAGE (OUTPATIENT)
Dept: HEMATOLOGY/ONCOLOGY | Facility: CLINIC | Age: 60
End: 2020-12-10

## 2020-12-10 ENCOUNTER — OFFICE VISIT (OUTPATIENT)
Dept: HEMATOLOGY/ONCOLOGY | Facility: CLINIC | Age: 60
End: 2020-12-10
Payer: COMMERCIAL

## 2020-12-10 ENCOUNTER — LAB VISIT (OUTPATIENT)
Dept: LAB | Facility: HOSPITAL | Age: 60
End: 2020-12-10
Attending: NURSE PRACTITIONER
Payer: COMMERCIAL

## 2020-12-10 VITALS
DIASTOLIC BLOOD PRESSURE: 80 MMHG | RESPIRATION RATE: 12 BRPM | HEART RATE: 79 BPM | BODY MASS INDEX: 32.49 KG/M2 | WEIGHT: 202.19 LBS | SYSTOLIC BLOOD PRESSURE: 140 MMHG | OXYGEN SATURATION: 97 % | HEIGHT: 66 IN | TEMPERATURE: 98 F

## 2020-12-10 DIAGNOSIS — D89.810 ACUTE GVHD COMPLICATING BONE MARROW TRANSPLANTATION: ICD-10-CM

## 2020-12-10 DIAGNOSIS — Z94.81 S/P ALLOGENEIC BONE MARROW TRANSPLANT: ICD-10-CM

## 2020-12-10 DIAGNOSIS — D89.810 ACUTE GVHD: ICD-10-CM

## 2020-12-10 DIAGNOSIS — N17.9 AKI (ACUTE KIDNEY INJURY): ICD-10-CM

## 2020-12-10 DIAGNOSIS — R14.1 ABDOMINAL GAS PAIN: ICD-10-CM

## 2020-12-10 DIAGNOSIS — M47.813 SPONDYLOSIS OF CERVICOTHORACIC REGION W/O MYELOPATHY OR RADICULOPATHY: ICD-10-CM

## 2020-12-10 DIAGNOSIS — I10 ESSENTIAL HYPERTENSION: ICD-10-CM

## 2020-12-10 DIAGNOSIS — R39.11 URINARY HESITANCY: ICD-10-CM

## 2020-12-10 DIAGNOSIS — R21 RASH AND NONSPECIFIC SKIN ERUPTION: ICD-10-CM

## 2020-12-10 DIAGNOSIS — Z94.84 HISTORY OF ALLOGENEIC STEM CELL TRANSPLANT: ICD-10-CM

## 2020-12-10 DIAGNOSIS — K21.9 GASTROESOPHAGEAL REFLUX DISEASE WITHOUT ESOPHAGITIS: ICD-10-CM

## 2020-12-10 DIAGNOSIS — R25.2 HAND CRAMPS: ICD-10-CM

## 2020-12-10 DIAGNOSIS — N40.0 BENIGN PROSTATIC HYPERPLASIA WITHOUT LOWER URINARY TRACT SYMPTOMS: ICD-10-CM

## 2020-12-10 DIAGNOSIS — Z94.84 HISTORY OF ALLOGENEIC STEM CELL TRANSPLANT: Primary | ICD-10-CM

## 2020-12-10 DIAGNOSIS — M54.2 NECK PAIN: ICD-10-CM

## 2020-12-10 DIAGNOSIS — R25.2 FOOT CRAMPS: ICD-10-CM

## 2020-12-10 DIAGNOSIS — E83.42 HYPOMAGNESEMIA: ICD-10-CM

## 2020-12-10 DIAGNOSIS — C92.01 ACUTE MYELOID LEUKEMIA IN REMISSION: ICD-10-CM

## 2020-12-10 DIAGNOSIS — C92.11 CML IN REMISSION: ICD-10-CM

## 2020-12-10 DIAGNOSIS — G47.00 INSOMNIA, UNSPECIFIED TYPE: ICD-10-CM

## 2020-12-10 DIAGNOSIS — R11.0 NAUSEA: ICD-10-CM

## 2020-12-10 DIAGNOSIS — T86.09 ACUTE GVHD COMPLICATING BONE MARROW TRANSPLANTATION: ICD-10-CM

## 2020-12-10 DIAGNOSIS — F17.201 TOBACCO ABUSE, IN REMISSION: ICD-10-CM

## 2020-12-10 DIAGNOSIS — R19.7 DIARRHEA, UNSPECIFIED TYPE: ICD-10-CM

## 2020-12-10 LAB
ABO + RH BLD: NORMAL
ALBUMIN SERPL BCP-MCNC: 4.7 G/DL (ref 3.5–5.2)
ALP SERPL-CCNC: 49 U/L (ref 55–135)
ALT SERPL W/O P-5'-P-CCNC: 27 U/L (ref 10–44)
ANION GAP SERPL CALC-SCNC: 11 MMOL/L (ref 8–16)
AST SERPL-CCNC: 15 U/L (ref 10–40)
BASOPHILS # BLD AUTO: 0.03 K/UL (ref 0–0.2)
BASOPHILS NFR BLD: 0.3 % (ref 0–1.9)
BILIRUB SERPL-MCNC: 0.3 MG/DL (ref 0.1–1)
BLD GP AB SCN CELLS X3 SERPL QL: NORMAL
BUN SERPL-MCNC: 25 MG/DL (ref 8–23)
CALCIUM SERPL-MCNC: 9.7 MG/DL (ref 8.7–10.5)
CHLORIDE SERPL-SCNC: 108 MMOL/L (ref 95–110)
CO2 SERPL-SCNC: 23 MMOL/L (ref 23–29)
CREAT SERPL-MCNC: 1.1 MG/DL (ref 0.5–1.4)
DIFFERENTIAL METHOD: ABNORMAL
EOSINOPHIL # BLD AUTO: 0 K/UL (ref 0–0.5)
EOSINOPHIL NFR BLD: 0 % (ref 0–8)
ERYTHROCYTE [DISTWIDTH] IN BLOOD BY AUTOMATED COUNT: 14.2 % (ref 11.5–14.5)
EST. GFR  (AFRICAN AMERICAN): >60 ML/MIN/1.73 M^2
EST. GFR  (NON AFRICAN AMERICAN): >60 ML/MIN/1.73 M^2
FINAL PATHOLOGIC DIAGNOSIS: NORMAL
GLUCOSE SERPL-MCNC: 129 MG/DL (ref 70–110)
GROSS: NORMAL
HCT VFR BLD AUTO: 43 % (ref 40–54)
HGB BLD-MCNC: 13.8 G/DL (ref 14–18)
IMM GRANULOCYTES # BLD AUTO: 0.18 K/UL (ref 0–0.04)
IMM GRANULOCYTES NFR BLD AUTO: 1.6 % (ref 0–0.5)
INR PPP: 0.9 (ref 0.8–1.2)
LYMPHOCYTES # BLD AUTO: 0.8 K/UL (ref 1–4.8)
LYMPHOCYTES NFR BLD: 6.7 % (ref 18–48)
MAGNESIUM SERPL-MCNC: 1.8 MG/DL (ref 1.6–2.6)
MCH RBC QN AUTO: 32.9 PG (ref 27–31)
MCHC RBC AUTO-ENTMCNC: 32.1 G/DL (ref 32–36)
MCV RBC AUTO: 102 FL (ref 82–98)
MICROSCOPIC EXAM: NORMAL
MONOCYTES # BLD AUTO: 0.3 K/UL (ref 0.3–1)
MONOCYTES NFR BLD: 2.2 % (ref 4–15)
NEUTROPHILS # BLD AUTO: 9.9 K/UL (ref 1.8–7.7)
NEUTROPHILS NFR BLD: 89.2 % (ref 38–73)
NRBC BLD-RTO: 0 /100 WBC
PHOSPHATE SERPL-MCNC: 2.9 MG/DL (ref 2.7–4.5)
PLATELET # BLD AUTO: 166 K/UL (ref 150–350)
PMV BLD AUTO: 9.9 FL (ref 9.2–12.9)
POTASSIUM SERPL-SCNC: 5.1 MMOL/L (ref 3.5–5.1)
PROT SERPL-MCNC: 7.4 G/DL (ref 6–8.4)
PROTHROMBIN TIME: 10 SEC (ref 9–12.5)
RBC # BLD AUTO: 4.2 M/UL (ref 4.6–6.2)
SODIUM SERPL-SCNC: 142 MMOL/L (ref 136–145)
SUPPLEMENTAL DIAGNOSIS: NORMAL
TACROLIMUS BLD-MCNC: 6.9 NG/ML (ref 5–15)
WBC # BLD AUTO: 11.14 K/UL (ref 3.9–12.7)

## 2020-12-10 PROCEDURE — 85610 PROTHROMBIN TIME: CPT

## 2020-12-10 PROCEDURE — 83735 ASSAY OF MAGNESIUM: CPT

## 2020-12-10 PROCEDURE — 3008F BODY MASS INDEX DOCD: CPT | Mod: BMT,CPTII,S$GLB, | Performed by: NURSE PRACTITIONER

## 2020-12-10 PROCEDURE — 1125F AMNT PAIN NOTED PAIN PRSNT: CPT | Mod: BMT,S$GLB,, | Performed by: NURSE PRACTITIONER

## 2020-12-10 PROCEDURE — 80053 COMPREHEN METABOLIC PANEL: CPT

## 2020-12-10 PROCEDURE — 87799 DETECT AGENT NOS DNA QUANT: CPT

## 2020-12-10 PROCEDURE — 99999 PR PBB SHADOW E&M-EST. PATIENT-LVL IV: ICD-10-PCS | Mod: PBBFAC,BMT,, | Performed by: NURSE PRACTITIONER

## 2020-12-10 PROCEDURE — 3008F PR BODY MASS INDEX (BMI) DOCUMENTED: ICD-10-PCS | Mod: BMT,CPTII,S$GLB, | Performed by: NURSE PRACTITIONER

## 2020-12-10 PROCEDURE — 99215 PR OFFICE/OUTPT VISIT, EST, LEVL V, 40-54 MIN: ICD-10-PCS | Mod: BMT,S$GLB,, | Performed by: NURSE PRACTITIONER

## 2020-12-10 PROCEDURE — 80197 ASSAY OF TACROLIMUS: CPT

## 2020-12-10 PROCEDURE — 99999 PR PBB SHADOW E&M-EST. PATIENT-LVL IV: CPT | Mod: PBBFAC,BMT,, | Performed by: NURSE PRACTITIONER

## 2020-12-10 PROCEDURE — 84100 ASSAY OF PHOSPHORUS: CPT

## 2020-12-10 PROCEDURE — 99215 OFFICE O/P EST HI 40 MIN: CPT | Mod: BMT,S$GLB,, | Performed by: NURSE PRACTITIONER

## 2020-12-10 PROCEDURE — 85025 COMPLETE CBC W/AUTO DIFF WBC: CPT

## 2020-12-10 PROCEDURE — 86900 BLOOD TYPING SEROLOGIC ABO: CPT

## 2020-12-10 PROCEDURE — 36415 COLL VENOUS BLD VENIPUNCTURE: CPT

## 2020-12-10 PROCEDURE — 1125F PR PAIN SEVERITY QUANTIFIED, PAIN PRESENT: ICD-10-PCS | Mod: BMT,S$GLB,, | Performed by: NURSE PRACTITIONER

## 2020-12-10 RX ORDER — TRAZODONE HYDROCHLORIDE 100 MG/1
100 TABLET ORAL NIGHTLY
Qty: 30 TABLET | Refills: 11 | Status: SHIPPED | OUTPATIENT
Start: 2020-12-10 | End: 2020-12-16

## 2020-12-10 RX ORDER — AMLODIPINE BESYLATE 5 MG/1
5 TABLET ORAL DAILY
Qty: 30 TABLET | Refills: 11 | Status: SHIPPED | OUTPATIENT
Start: 2020-12-10 | End: 2022-09-21

## 2020-12-10 RX ORDER — TACROLIMUS 1 MG/1
5 CAPSULE ORAL EVERY 12 HOURS
Qty: 120 CAPSULE | Refills: 8 | Status: SHIPPED | OUTPATIENT
Start: 2020-12-10 | End: 2021-03-31

## 2020-12-10 RX ORDER — TACROLIMUS 1 MG/1
1 CAPSULE ORAL EVERY 12 HOURS
Qty: 120 CAPSULE | Refills: 8 | Status: SHIPPED | OUTPATIENT
Start: 2020-12-10 | End: 2020-12-10

## 2020-12-10 RX ORDER — BUDESONIDE 3 MG/1
3 CAPSULE, COATED PELLETS ORAL 2 TIMES DAILY
Qty: 60 CAPSULE | Refills: 2 | Status: SHIPPED | OUTPATIENT
Start: 2020-12-10 | End: 2021-01-20

## 2020-12-10 RX ORDER — PREDNISONE 20 MG/1
40 TABLET ORAL DAILY
Qty: 60 TABLET | Refills: 6 | Status: SHIPPED | OUTPATIENT
Start: 2020-12-10 | End: 2020-12-16 | Stop reason: SDUPTHER

## 2020-12-11 LAB — CMV DNA SERPL NAA+PROBE-ACNC: NORMAL IU/ML

## 2020-12-14 ENCOUNTER — CLINICAL SUPPORT (OUTPATIENT)
Dept: DERMATOLOGY | Facility: CLINIC | Age: 60
End: 2020-12-14
Payer: COMMERCIAL

## 2020-12-14 DIAGNOSIS — Z48.02 VISIT FOR SUTURE REMOVAL: Primary | ICD-10-CM

## 2020-12-14 PROCEDURE — 99024 POSTOP FOLLOW-UP VISIT: CPT | Mod: BMT,S$GLB,, | Performed by: STUDENT IN AN ORGANIZED HEALTH CARE EDUCATION/TRAINING PROGRAM

## 2020-12-14 PROCEDURE — 99024 PR POST-OP FOLLOW-UP VISIT: ICD-10-PCS | Mod: BMT,S$GLB,, | Performed by: STUDENT IN AN ORGANIZED HEALTH CARE EDUCATION/TRAINING PROGRAM

## 2020-12-15 LAB — EBV DNA SERPL NAA+PROBE-ACNC: NORMAL IU/ML

## 2020-12-15 NOTE — PROGRESS NOTES
Subjective:    Patient ID: Kvng Jones Sr. is a 60 y.o. male.    Chief Complaint: Follow-up and Neck Pain    Oncologic hx: (from Dr. Hodges' previous documentation)  60 y.o. 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 He was admitted for evaluation and induction chemotherapy with FLAG-Addis. Complications of therapy include epididymal orchitis with negative testicular ultrasound, neutropenic fever, dyspnea, and GGO of bilateral lungs on CT chest. Fever and chest findings were covered with broad spectrum antimicrobials. He did have hallucinations and vivid dreams with posaconazole. Chemotherapy was administered by left arm PICC line that was removed during hospital stay for MSSA infection treated with vancomycin. Hospital admission was 3/30/2020 to 5/1/2020 achieving morphologic CR with induction chemotherapy. He then started Ponatinib 30mg daily.     Studies performed during his hospital stay include pre-chemotherapy ECHO with normal ejection fraction of 60-65%. HIV, HBV, and HCV tests were negative. CBC at admission was WBC 38.6, Hgb 14.6, and platelet 416K. CT of the head without contrast was normal 4/20/2020. US of abdomen performed for abdominal distention and neutropenic fever had hepatic steatosis, liver 19cm, and spleen 11 cm. He does have a history of alcohol use and pancreatitis.    Patient is a . He is  to wife Guillermina who is his caregiver post transplant. He has a 45 pack year smoking history. He quit 6/1/2020, now on Chantix. He drinks 3-4 alcohol beverages nightly. He has 3 children. He has several family members in the [facility] area.    Transplant Course: Admitted 7/21/20 for a Flu/Cy/TBI haplo allo SCT. Son was donor. Received 2 bags and a total CD34 dose of 3.10 x10^6/kg on 7/28/20. Tolerated stem cells well. C/o headaches prior to admission, which persisted throughout admission. Neuro ultimately consulted for rec's.  Was a daily drinker prior to admission. No s/s of alcolhol withdrawal during admission. Transplant further complicated by hypertension, heart burn, c-diff neg diarrhea, nausea, sinus congestion, mild peripheral edema, blurry vision (ophtho consulted), mucositis, electrolyte abnormalities, and neck pain 2/2 spinal stenosis (referred to pain mgt at discharge). He engrafted on 8/10/20 with an ANC of 1280. He was discharged on 8/12/2020.    Interval History:  Mr. Jones presents for follow-up clinic visit post Flu/Cy/TBI allogeneic stem cell transplant. Today is Day +141. Is on a prednisone for possible GVHD of the gut, currently on 30mg. Rash noted to arms (minimal), abdomen, and thighs, biopsy done 12/3 negative for GVHD. Continues with intermittent nausea, but mostly reports gas/belching which was worse over the weekend but patient states he feels ~75% better today. Denies fevers, chills, cough, chest pain, diarrhea, constipation, bruising, and bleeding.   Continues with urinary hesitancy today; on max dose of flomax; follow up appointment tomorrow with his own urology.    Review of Systems   Review of Systems   Constitutional: Positive for malaise/fatigue. Negative for chills, fever and weight loss.   HENT: Negative for congestion and nosebleeds.    Eyes: Negative for pain, discharge and redness.   Respiratory: Negative for cough, hemoptysis and shortness of breath.    Cardiovascular: Negative for chest pain, palpitations and leg swelling.   Gastrointestinal: Positive for diarrhea (mild; intermittent) and nausea. Negative for constipation and vomiting.   Genitourinary: Negative for dysuria and urgency.   Musculoskeletal: Positive for back pain and neck pain. Negative for myalgias.   Skin: Positive for rash. Negative for itching.   Neurological: Negative for dizziness, sensory change, speech change and focal weakness.   Endo/Heme/Allergies: Negative for environmental allergies and polydipsia.   Psychiatric/Behavioral:  Negative for depression and suicidal ideas.           Objective:     Physical Exam   Constitutional: He is oriented to person, place, and time and well-developed, well-nourished, and in no distress.   HENT:   Head: Normocephalic and atraumatic.   Right Ear: External ear normal.   Left Ear: External ear normal.   Nose: Nose normal.   No oral ulcers, mucositis or petechiae    Eyes: Conjunctivae and EOM are normal.   Neck: Normal range of motion.   Cardiovascular: Normal rate, regular rhythm, normal heart sounds and intact distal pulses.   No murmur heard.  Pulmonary/Chest: Effort normal and breath sounds normal. No respiratory distress. He has no wheezes. He has no rales.   Abdominal: Soft. Bowel sounds are normal. He exhibits no distension. There is no abdominal tenderness. There is no rebound.   Musculoskeletal: Normal range of motion.         General: No tenderness or edema.   Neurological: He is alert and oriented to person, place, and time. No cranial nerve deficit.   Skin: Skin is warm and dry. Rash noted. He is not diaphoretic. There is erythema.   Papular rash to trunk, arms, and anterior thighs just above knees.   Psychiatric: Mood, memory, affect and judgment normal.         Assessment:       1. Acute GVHD complicating bone marrow transplantation    2. History of allogeneic stem cell transplant    3. CML in remission    4. Acute myeloid leukemia in remission    5. Hypomagnesemia    6. Spondylosis of cervicothoracic region w/o myelopathy or radiculopathy    7. Neck pain    8. Gastroesophageal reflux disease without esophagitis    9. Essential hypertension    10. Rash and nonspecific skin eruption    11. Diarrhea, unspecified type    12. Hand cramps    13. Foot cramps    14. Tobacco abuse, in remission    15. AURORA (acute kidney injury)    16. Benign prostatic hyperplasia without lower urinary tract symptoms    17. Urinary hesitancy    18. Insomnia, unspecified type        Plan:         History of allogeneic  stem cell transplant  - Admitted 7/21/20 for planned Flu/Cy/TBI haplo allo SCT. Son was the donor. Received 2 bags and a total CD34 dose of 3.10 x10^6/kg on 7/28/20. Recieved post transplant cyclophosphamide. Engrafted neutrophils 8/10/20 day +13  - Today is Day +141  - Tacro level 4.6 (goal 7-9). Currently on tacro dose 5mg BID; will increase to 5mg AM and 6mg PM (currently has 1mg tabs)  - stopped Ursodiol Day +30 (8/27/20)  - patient requests Rx for Valium and Oxycodone for pre-procedure in clinic bone marrow biopsies.  - had BMBx in clinic on 8/28/20 showing no definitive morphologic or immunophenotypic evidence of residual CML. Chimerisms unable to be obtained due to insufficient sample; drawn peripherally on 9/18. CD3-positive T-cells:  Insufficient DNA extracted for reliable analysis. CD33-positive myeloid cells:  The CD33-positive fraction contains approximately 100% donor DNA and approximately 0% recipient DNA.   - Dalal removed 10/19   - Day 100 Bm bx 11/2/2020 showing no definitive morphologic or immunophenotypic evidence of CML but there is evidence of MRD. Positive for BCR/ABL p210 (7.8%).  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 Achieved morphologic CR  - Started on daily Ponatinib 30 mg daily, which he continued to take outpatient. Held Ponatinib during admission; may not need to restart per recent literature. BBMT 2020(26): 472-49  - Had BMBx at [facility] on 7/1/2020 showing no morphologic or immunophenotypic evidence of AML/CML  - Admitted 7/21/20 for Flu/Cy/TBI haplo allo SCT  - Day 100 Bm bx 11/2/2020 showing no definitive morphologic or immunophenotypic evidence of CML but there is evidence of MRD. Positive for BCR/ABL p210 (7.8%).  - resumed pre transplant TKI, Ponatinib 30 mg daily on  11/11/20.    GVHD  - had rash inpatient that was believed to be drug eruption. Resolved at that time  - diffuse erythema to legs, lower back, arms, and abdomen presented on 9/15. Started 1mg/kg (90mg, will round to 100mg for dosing) of prednisone daily (9/15/20); decreased to 90 mg (4.5 tabs) on 9/21/2020  - rash is worse and more papular today. Now to abdomen, chest, upper arms, upper back, flanks, and anterior thighs just above knees  - continue TAC cream.   - Increased back up to 40 mg daily at previous rash due to worsening rash; since not GVHD; decreased prednisone to 30mg 12/10; stable will decrease prednisone dose again today to 20mg (comcerned higher dose of steroids is making his GI symptoms worse; plan for EGD as below)  - rash first appeared 1 1/2-2 weeks after starting Bactrim. Switched Bactrim to Dapsone. Will monitor for improvement.  - continue tacro and prednisone as above  - due to persistent GI symptoms and worsening rash, referred to derm  - saw derm; had skin bx 12/3-- not GVHD  - continue to follow up with derm; continue using urea cream as prescribed; no plan for pheresis at this time  - started 3mg budesonide BID 12/10, can increase to TID if no improvement; states symptoms were worse over the weekend but 75% improved today  - will plan for EGD; first available in [place] ordered; to be scheduled  - continues on nexium and PRN pepcid for GI ppx    ID  - Last CMV and EBV undetected; continue weekly CMV and EBV, today's pending  - Acyclovir (Zoster prophylaxis) until Day +365  - Diflucan (Fungal prophylaxis) until off tacrolimus  - Dapsone (PJP prophylaxis) until off tacrolimus. (Was on Bactrim but changed to Dapsone due to rash in event that rash was drug rash)   - changed back to Bactrim as rash persisted    Hypomagnesemia  - Mag 1.6 today  - continue mag 800mg BID    Neck pain/ Cervical stenosis of spine  - Hx of spinal fusion C3-C5  - Xray 9/19 shows surgical changes of fusion from C3 through C5  with degenerative changed below fusion.   - Started PT on 8/26  - Pt feels neck pain started after grewal was placed  - Has been mostly refractory to medications (zanaflex, gabapentin, celebrex, lidoderm patches). Some relief with oral morphine  - CT from 8/24/20 showing history of C3, C4, and C5 fusion and diffuse osteoarthritic changes  - Had virtual pain management appt on 8/25 to review CT results.  - Zanaflex switched to Robaxin per pain mgt  - had medial branch block on 9/2/20 with Dr. Mcelroy (pain mgt). Per patient pain mgmnt signed off, as it is not a chronic issue, defer to BMT service for further pain control.  - started MS contin 15mg BID 9/20/2020; use PRN morphine q6h for breakthrough; titrate up MS contin as needed  - Patient reports much improvement to pain since starting high dose prednisone for his skin GVHD but pain returning as we taper steroids  - feeling lack of benefit from Gabapentin taken once nightly; started taper to every other night 10/26/2020  - continues taper per his preference    GERD (gastroesophageal reflux disease)  - Continue nexium; 40mgwhile on high dose steroids  - TUMS prn and pepcide PRN  - may be symptom of GVHD; continues on budesonide. Referred for EGD as above     Essential hypertension  - currently on amlodipine 5 mg daily. Will continue this dose    Rash   - rash stable, continues to be papular. Now to abdomen, chest, upper arms, upper back, flanks, and anterior thighs just above knees  - continue urea cream per derm  - rash first appeared ~ 9-91/2 weeks ago, which is 1 1/2-2 weeks after starting Bactrim. Switched Bactrim to Dapsone at last visit in the event that rash is drug rash. No improvement. Will switch back to bactrim.  - due to persistent rash, referred for photopheresis and to derm for biopsy. Bx completed 12/3-- results show no evidence of GVHD  - will hold off on pheresis at this time per Dr. Hodges based on bx results  - rash stable; reduce prednisone to  20mg daily today; continue follow up with derm for management; pt to make appointment    Diarrhea  - reports intermittent diarrhea since cholecystectomy pre transplant  - reports 1-4 soft stools daily  - patient states that he is taking pepto for this with some relief  - does not like to take imodium as this causes constipation  - GI issues pre-dated transplant    Hand/Foot cramps  - reported new onset hand and foot cramps at last visit  - electrolytes wnl  - encouraged increased hydration  - encouraged stretching when able  - recommended flexeril, which he already had at home.   - reports that hydration helps  - encouraged patient to stretch    Cigarette smoker  - has recently resumed cigarette smoking  - inquired about Chantix which has worked for him in the past  - no contraindication to Chantix per [facility] pharmacist.   Per pharm D: The only drug interactions are category C which would only require monitoring of adverse effects.     1. Bactrim may increase the serum concentration of Chantix -- leading to increase side effects of Chantix   2. Famotidine may increase the serum concentration of Chantix -- leading to increase side effects of Chantix   - patient will talk to PCP (original prescriber) about represcribing. Okay to resume from [facility] perspective    AURORA  -creatinine down to 1.1 today; improved  -continue to trend    BPH/Urinary hesitancy  -having urinary hesitancy today and feels sensation of not fully emptying bladder  -flomax increased to 0.8mg at last visit with no change  -patient has own urology; has follow up tomorrow 12/17    Insomnia  -tried trazodone 100mg; did not help (has tried Remeron, benadryl in past with no improvement)  -continues to be up frequently due to urination; follow up tomorrow as above        Follow-up:   - weekly CBC, CMP, type and screen, tacro, CMV, EBV mag, phos and appt with Dr. Hodges or NP. Scheduled through end of December.        Marimar Brothers,  NP  Hematology/Oncology/BMT

## 2020-12-16 ENCOUNTER — OFFICE VISIT (OUTPATIENT)
Dept: HEMATOLOGY/ONCOLOGY | Facility: CLINIC | Age: 60
End: 2020-12-16
Payer: COMMERCIAL

## 2020-12-16 VITALS
TEMPERATURE: 98 F | SYSTOLIC BLOOD PRESSURE: 139 MMHG | HEART RATE: 89 BPM | HEIGHT: 66 IN | DIASTOLIC BLOOD PRESSURE: 75 MMHG | RESPIRATION RATE: 12 BRPM | BODY MASS INDEX: 32.58 KG/M2 | WEIGHT: 202.69 LBS | OXYGEN SATURATION: 95 %

## 2020-12-16 DIAGNOSIS — G47.00 INSOMNIA, UNSPECIFIED TYPE: ICD-10-CM

## 2020-12-16 DIAGNOSIS — M47.813 SPONDYLOSIS OF CERVICOTHORACIC REGION W/O MYELOPATHY OR RADICULOPATHY: ICD-10-CM

## 2020-12-16 DIAGNOSIS — T86.09 ACUTE GVHD COMPLICATING BONE MARROW TRANSPLANTATION: Primary | ICD-10-CM

## 2020-12-16 DIAGNOSIS — D89.810 ACUTE GVHD COMPLICATING BONE MARROW TRANSPLANTATION: Primary | ICD-10-CM

## 2020-12-16 DIAGNOSIS — N40.0 BENIGN PROSTATIC HYPERPLASIA WITHOUT LOWER URINARY TRACT SYMPTOMS: ICD-10-CM

## 2020-12-16 DIAGNOSIS — N17.9 AKI (ACUTE KIDNEY INJURY): ICD-10-CM

## 2020-12-16 DIAGNOSIS — R39.11 URINARY HESITANCY: ICD-10-CM

## 2020-12-16 DIAGNOSIS — Z94.84 HISTORY OF ALLOGENEIC STEM CELL TRANSPLANT: ICD-10-CM

## 2020-12-16 DIAGNOSIS — R21 RASH AND NONSPECIFIC SKIN ERUPTION: ICD-10-CM

## 2020-12-16 DIAGNOSIS — R19.7 DIARRHEA, UNSPECIFIED TYPE: ICD-10-CM

## 2020-12-16 DIAGNOSIS — C92.11 CML IN REMISSION: ICD-10-CM

## 2020-12-16 DIAGNOSIS — E83.42 HYPOMAGNESEMIA: ICD-10-CM

## 2020-12-16 DIAGNOSIS — M54.2 NECK PAIN: ICD-10-CM

## 2020-12-16 DIAGNOSIS — K21.9 GASTROESOPHAGEAL REFLUX DISEASE WITHOUT ESOPHAGITIS: ICD-10-CM

## 2020-12-16 DIAGNOSIS — R25.2 HAND CRAMPS: ICD-10-CM

## 2020-12-16 DIAGNOSIS — I10 ESSENTIAL HYPERTENSION: ICD-10-CM

## 2020-12-16 DIAGNOSIS — F17.201 TOBACCO ABUSE, IN REMISSION: ICD-10-CM

## 2020-12-16 DIAGNOSIS — C92.01 ACUTE MYELOID LEUKEMIA IN REMISSION: ICD-10-CM

## 2020-12-16 DIAGNOSIS — R25.2 FOOT CRAMPS: ICD-10-CM

## 2020-12-16 PROCEDURE — 1125F PR PAIN SEVERITY QUANTIFIED, PAIN PRESENT: ICD-10-PCS | Mod: BMT,S$GLB,, | Performed by: NURSE PRACTITIONER

## 2020-12-16 PROCEDURE — 3008F BODY MASS INDEX DOCD: CPT | Mod: BMT,CPTII,S$GLB, | Performed by: NURSE PRACTITIONER

## 2020-12-16 PROCEDURE — 99999 PR PBB SHADOW E&M-EST. PATIENT-LVL V: CPT | Mod: PBBFAC,BMT,, | Performed by: NURSE PRACTITIONER

## 2020-12-16 PROCEDURE — 1125F AMNT PAIN NOTED PAIN PRSNT: CPT | Mod: BMT,S$GLB,, | Performed by: NURSE PRACTITIONER

## 2020-12-16 PROCEDURE — 99215 PR OFFICE/OUTPT VISIT, EST, LEVL V, 40-54 MIN: ICD-10-PCS | Mod: BMT,S$GLB,, | Performed by: NURSE PRACTITIONER

## 2020-12-16 PROCEDURE — 99215 OFFICE O/P EST HI 40 MIN: CPT | Mod: BMT,S$GLB,, | Performed by: NURSE PRACTITIONER

## 2020-12-16 PROCEDURE — 99999 PR PBB SHADOW E&M-EST. PATIENT-LVL V: ICD-10-PCS | Mod: PBBFAC,BMT,, | Performed by: NURSE PRACTITIONER

## 2020-12-16 PROCEDURE — 3008F PR BODY MASS INDEX (BMI) DOCUMENTED: ICD-10-PCS | Mod: BMT,CPTII,S$GLB, | Performed by: NURSE PRACTITIONER

## 2020-12-16 RX ORDER — VARENICLINE TARTRATE 0.5 (11)-1
KIT ORAL
COMMUNITY
Start: 2020-12-08 | End: 2021-01-20

## 2020-12-16 RX ORDER — PREDNISONE 20 MG/1
30 TABLET ORAL DAILY
Qty: 60 TABLET | Refills: 6 | Status: SHIPPED | OUTPATIENT
Start: 2020-12-16 | End: 2021-01-20

## 2020-12-17 ENCOUNTER — TELEPHONE (OUTPATIENT)
Dept: ENDOSCOPY | Facility: HOSPITAL | Age: 60
End: 2020-12-17

## 2020-12-17 NOTE — TELEPHONE ENCOUNTER
Pt left message on [employer]. Retrieved message and attempted to return pt call, no answer. Left pt a message to call our office, number provided.

## 2020-12-17 NOTE — TELEPHONE ENCOUNTER
Attempted to contact patient to schedule EGD procedure, no answer. Left message to call office, number provided. Message sent through patient portal.

## 2020-12-18 ENCOUNTER — PATIENT MESSAGE (OUTPATIENT)
Dept: ENDOSCOPY | Facility: HOSPITAL | Age: 60
End: 2020-12-18

## 2020-12-18 ENCOUNTER — ANESTHESIA EVENT (OUTPATIENT)
Dept: ENDOSCOPY | Facility: HOSPITAL | Age: 60
End: 2020-12-18
Payer: COMMERCIAL

## 2020-12-18 ENCOUNTER — TELEPHONE (OUTPATIENT)
Dept: PREADMISSION TESTING | Facility: HOSPITAL | Age: 60
End: 2020-12-18

## 2020-12-18 ENCOUNTER — TELEPHONE (OUTPATIENT)
Dept: ENDOSCOPY | Facility: HOSPITAL | Age: 60
End: 2020-12-18

## 2020-12-18 DIAGNOSIS — D89.810 ACUTE GVHD COMPLICATING BONE MARROW TRANSPLANTATION: Primary | ICD-10-CM

## 2020-12-18 DIAGNOSIS — T86.09 ACUTE GVHD COMPLICATING BONE MARROW TRANSPLANTATION: Primary | ICD-10-CM

## 2020-12-18 NOTE — ANESTHESIA PREPROCEDURE EVALUATION
12/18/2020  Kvng Jones Sr. is a 60 y.o., male.  Past Surgical History:   Procedure Laterality Date    CHOLECYSTECTOMY      INJECTION OF ANESTHETIC AGENT AROUND NERVE Bilateral 9/2/2020    Procedure: BLOCK, NERVE bilateral C4,5,6 MBB;  Surgeon: Oscar Mcelroy MD;  Location: [facility] [place] PAIN MGT;  Service: Pain Management;  Laterality: Bilateral;  bilateral C4,5,6 MBB   consent needed    MEDIPORT REMOVAL Right 10/19/2020    Procedure: REMOVAL, CATHETER, CENTRAL VENOUS, TUNNELED, WITH PORT;  Surgeon: Ovidio Chaidez MD;  Location: [facility] [place] CATH LAB;  Service: Radiology;  Laterality: Right;    NECK SURGERY  2015    TONSILLECTOMY       Past Medical History:   Diagnosis Date    CML (chronic myelocytic leukemia)     Encounter for blood transfusion     Hiatal hernia with gastroesophageal reflux     Hx of psychiatric care     valium, ativan    Hypertension     Melanoma in situ of external ear, right     PONV (postoperative nausea and vomiting)      Patient Active Problem List   Diagnosis    History of allogeneic stem cell transplant    CML in remission    Essential hypertension    GERD (gastroesophageal reflux disease)    BPH (benign prostatic hyperplasia)    Daily consumption of alcohol    Tobacco abuse, in remission    Headache    Abdominal gas pain    Rash and nonspecific skin eruption    Antineoplastic chemotherapy induced pancytopenia    Acute myeloid leukemia in remission    CINV (chemotherapy-induced nausea and vomiting)    Mucositis due to chemotherapy    Hypomagnesemia    Acute GVHD    Antineoplastic chemotherapy induced anemia    Chemotherapy-induced thrombocytopenia    Neck pain    Dry skin    Thrush    Spondylosis of cervicothoracic region w/o myelopathy or radiculopathy    AURORA (acute kidney injury)    Poor posture    Decreased strength of trunk and back    AML  (acute myeloblastic leukemia)    Constipation    Diarrhea    Hand cramps    Leg cramps    Cigarette smoker    Urinary hesitancy     Vent. Rate : 081 BPM     Atrial Rate : 081 BPM      P-R Int : 144 ms          QRS Dur : 106 ms       QT Int : 376 ms       P-R-T Axes : -25 -34 030 degrees      QTc Int : 436 ms     Normal sinus rhythm   Left axis deviation   Abnormal ECG   When compared with ECG of 01-JUL-2020 11:46,   Premature ventricular complexes are no longer Present   Confirmed by MARIA D GERARD MD ([id number]) on 7/24/2020 9:30:03 AM   Conclusion  TTE 7/2/2020    · Normal left ventricular systolic function. The estimated ejection fraction is 60%. The quantitatively dervived ejection fraction is 56%.  · Normal LV diastolic function.  · Normal right ventricular systolic function.  · The estimated PA systolic pressure is 25 mmHg.  · Normal central venous pressure (3 mmHg).  · The ascending aorta is mildly dilated (4.1 cm)         Anesthesia Evaluation    I have reviewed the Patient Summary Reports.   I have reviewed the NPO Status.   I have reviewed the Medications.     Review of Systems  Anesthesia Hx:  No problems with previous Anesthesia Hx of Anesthetic complications PONV Denies Family Hx of Anesthesia complications.   Denies Personal Hx of Anesthesia complications.   Social:  Smoker    Hematology/Oncology:  Hematology Normal   Oncology Normal   Oncology Comments: CML - in remission, s/p stem cell transplant     EENT/Dental:EENT/Dental Normal   Cardiovascular:   Hypertension    Pulmonary:  Pulmonary Normal    Renal/:   Chronic Renal Disease, ARF    Hepatic/GI:   GERD    Musculoskeletal:   Arthritis     Neurological:   Neuromuscular Disease, Headaches    Endocrine:  Endocrine Normal    Dermatological:  Skin Normal    Psych:   Psychiatric History          Physical Exam  General:  Well nourished    Airway/Jaw/Neck:  Airway Findings: Mouth Opening: Normal Tongue: Normal  General Airway Assessment: Adult   Mallampati: II  TM Distance: Normal, at least 6 cm  Jaw/Neck Findings:  Neck ROM: Normal ROM     Eyes/Ears/Nose:  EYES/EARS/NOSE FINDINGS: Normal   Dental:  Dental Findings: In tact   Chest/Lungs:  Chest/Lungs Clear    Heart/Vascular:  Heart Findings: Normal Heart murmur: negative Vascular Findings: Normal    Abdomen:  Abdomen Findings: Normal    Musculoskeletal:  Musculoskeletal Findings: Normal   Skin:  Skin Findings: Normal    Mental Status:  Mental Status Findings:  Cooperative, Alert and Oriented         Anesthesia Plan  Type of Anesthesia, risks & benefits discussed:  Anesthesia Type:  general  Patient's Preference:   Intra-op Monitoring Plan: standard ASA monitors  Intra-op Monitoring Plan Comments:   Post Op Pain Control Plan: per primary service following discharge from PACU  Post Op Pain Control Plan Comments:   Induction:   IV  Beta Blocker:  Patient is not currently on a Beta-Blocker (No further documentation required).       Informed Consent: Patient understands risks and agrees with Anesthesia plan.  Questions answered. Anesthesia consent signed with patient.  ASA Score: 3     Day of Surgery Review of History & Physical:    H&P update referred to the surgeon.         Ready For Surgery From Anesthesia Perspective.

## 2020-12-18 NOTE — TELEPHONE ENCOUNTER
Location Screening:    If answers yes to any of the following, schedule at [place] ONLY. If No, OK for either location.    1. Is there a diagnosis of heart failure, severe heart valve disease (aortic stenosis) or mechanical valve? no  a. Is the Left Ventricle Ejection Fraction <30% ? no    2. Does the pt have pulmonary hypertension? no   a. Is pulmonary arterial pressure gradient >50mmHg? no   b. Is there evidence of right ventricular dysfunction? no    3. Does the pt have achalasia? no    4. Any history of negative reaction to anesthesia? no   a. Myasthenia gravis? no   b. Malignant hyperthermia? no   c. Other? not applicable    5. Is procedure for esophageal banding? no      COVID Screening    1. Have you had a fever in the last 7 days or have you used fever reducing medicines for a fever in the last 7 days?  no    2. Are you experiencing shortness of breath, cough, muscle aches, loss of taste or loss of smell?  no    If answered yes to questions 1 and 2, the patient must seek medical attention with their PCP.  Do not schedule their procedure.     3. Are you residing with anyone who has tested positive for Covid?  no    If answered yes to question 3, recommend 14 day self-quarantine from the date of relative's positive test and place special needs note in the depot.  Wait to schedule.     ENDO screening    1. Have you been admitted for cardiac, kidney or pulmonary causes to the hospital in the past 3 months? no   If yes, schedule an appointment with PCP before scheduling endoscopic procedure.     2. Have you had a stent placed in the last 12 months? no   If yes, for a screening visit, cancel and message the ordering provider.  The patient will need a new order when the time is appropriate.     3. Have you had a stroke or heart attack in the past 6 months? no   If yes, cancel and refer patient to ordering provider for clearance, also message ordering provider to inform.     4. Have you had any chest pain in the past  "3 months? no   If yes, Have you been evaluated by your PCP and/or cardiologist and it was determined to not be heart related? not applicable   If No, Pt needs to be seen by PCP or Cardiologist .  Pt can be scheduled once clearance obtained by either of those providers.     5. Do you take prescription weight loss medications?  no   If yes, must stop for 2 weeks prior to procedure.     6. Have you been diagnosed with diverticulitis within the past 3 months? no   If yes, must have been seen by GI within the last 3 months, if not schedule with GI LUCIO.    If pt has been seen by GI, schedule procedure 8-12 weeks post antibiotic treatment.     7. Are you on Dialysis? no  If yes, schedule procedure for the day AFTER dialysis.  Appt time should be 9am or later, patient arrival time is 2 hours prior.  Nulytely or miralax prep for all patients with kidney disease.     8. Are you diabetic?  no   If yes, schedule morning appt. Advise pt to hold all diabetic meds day of procedure.     9. If pt is older than 80 years of age and HAS NOT been seen by GI or PCP within the last 6 months, needs appt with GI LUCIO.   If pt has been seen by the GI provider or PCP within the past 6  months AND meets criteria, schedule procedure AND send message to the endoscopist.     10. Is patient on a "high risk" medication (blood thinner/antiplatelet agent)?  no   If yes, has cardiac clearance been obtained within the last 60 days? N/A   If no, a new clearance needs to be obtained.     Final Questions:    1.I have reviewed the last colonoscopy for recommendations regarding next procedure bowel prep.  no  2. I have reviewed medications and allergies.  yes  3. I have verified the pharmacy information and appropriate prep sent if needed. yes  4. Prep instructions have been mailed or sent to portal per patient request. yes        All schedulers will have ability to reach out to the ordering GI provider to clarify any issues.     "

## 2020-12-18 NOTE — PRE-PROCEDURE INSTRUCTIONS
PAT call completed and patient educated on procedure instructions. Medical history discussed and patient informed of arrival time @0900am . Pt will be accompanied by wife and is made aware of limited-visitor policy, and that  is to remain during entire visit. All questions and concerns addressed. Endoscopy instructions reviewed. Informed to take AM medications of Norvasc. NPO after midnight. Patient verbalizes understanding of teaching and all instructions. Pre-procedure Covid testing on arrival at the [place]. Patient aware.

## 2020-12-18 NOTE — TELEPHONE ENCOUNTER
PAT call completed and patient educated on procedure instructions. Medical history discussed and patient informed of arrival time @0900am . Pt will be accompanied by wife and is made aware of limited-visitor policy, and that  is to remain during entire visit. All questions and concerns addressed. Endoscopy instructions reviewed. Informed to take AM medications of Norvasc. NPO after midnight. Patient verbalizes understanding of teaching and all instructions. Pre-procedure Covid testing on arrival at the [facility]. Patient aware.

## 2020-12-21 ENCOUNTER — ANESTHESIA (OUTPATIENT)
Dept: ENDOSCOPY | Facility: HOSPITAL | Age: 60
End: 2020-12-21
Payer: COMMERCIAL

## 2020-12-21 ENCOUNTER — HOSPITAL ENCOUNTER (OUTPATIENT)
Facility: HOSPITAL | Age: 60
Discharge: HOME | End: 2020-12-21
Attending: INTERNAL MEDICINE | Admitting: INTERNAL MEDICINE
Payer: COMMERCIAL

## 2020-12-21 VITALS
DIASTOLIC BLOOD PRESSURE: 85 MMHG | BODY MASS INDEX: 32.03 KG/M2 | HEART RATE: 78 BPM | WEIGHT: 199.31 LBS | SYSTOLIC BLOOD PRESSURE: 148 MMHG | HEIGHT: 66 IN | OXYGEN SATURATION: 98 % | TEMPERATURE: 99 F | RESPIRATION RATE: 18 BRPM

## 2020-12-21 DIAGNOSIS — D89.810 ACUTE GVHD: Primary | ICD-10-CM

## 2020-12-21 LAB — SARS-COV-2 RDRP RESP QL NAA+PROBE: NEGATIVE

## 2020-12-21 PROCEDURE — 27201012 HC FORCEPS, HOT/COLD, DISP: Performed by: INTERNAL MEDICINE

## 2020-12-21 PROCEDURE — D9220A PRA ANESTHESIA: Mod: BMT,ANES,, | Performed by: ANESTHESIOLOGY

## 2020-12-21 PROCEDURE — 43239 EGD BIOPSY SINGLE/MULTIPLE: CPT | Performed by: INTERNAL MEDICINE

## 2020-12-21 PROCEDURE — 43239 EGD BIOPSY SINGLE/MULTIPLE: CPT | Mod: BMT,,, | Performed by: INTERNAL MEDICINE

## 2020-12-21 PROCEDURE — D9220A PRA ANESTHESIA: ICD-10-PCS | Mod: BMT,CRNA,, | Performed by: NURSE ANESTHETIST, CERTIFIED REGISTERED

## 2020-12-21 PROCEDURE — D9220A PRA ANESTHESIA: ICD-10-PCS | Mod: BMT,ANES,, | Performed by: ANESTHESIOLOGY

## 2020-12-21 PROCEDURE — 25000003 PHARM REV CODE 250: Performed by: NURSE ANESTHETIST, CERTIFIED REGISTERED

## 2020-12-21 PROCEDURE — 88305 TISSUE EXAM BY PATHOLOGIST: CPT | Mod: 59 | Performed by: PATHOLOGY

## 2020-12-21 PROCEDURE — 37000009 HC ANESTHESIA EA ADD 15 MINS: Performed by: INTERNAL MEDICINE

## 2020-12-21 PROCEDURE — 43239 PR EGD, FLEX, W/BIOPSY, SGL/MULTI: ICD-10-PCS | Mod: BMT,,, | Performed by: INTERNAL MEDICINE

## 2020-12-21 PROCEDURE — U0002 COVID-19 LAB TEST NON-CDC: HCPCS

## 2020-12-21 PROCEDURE — 63600175 PHARM REV CODE 636 W HCPCS: Performed by: NURSE ANESTHETIST, CERTIFIED REGISTERED

## 2020-12-21 PROCEDURE — 37000008 HC ANESTHESIA 1ST 15 MINUTES: Performed by: INTERNAL MEDICINE

## 2020-12-21 PROCEDURE — 88305 TISSUE EXAM BY PATHOLOGIST: ICD-10-PCS | Mod: 26,BMT,, | Performed by: PATHOLOGY

## 2020-12-21 PROCEDURE — 88305 TISSUE EXAM BY PATHOLOGIST: CPT | Mod: 26,BMT,, | Performed by: PATHOLOGY

## 2020-12-21 PROCEDURE — D9220A PRA ANESTHESIA: Mod: BMT,CRNA,, | Performed by: NURSE ANESTHETIST, CERTIFIED REGISTERED

## 2020-12-21 RX ORDER — SODIUM CHLORIDE 0.9 % (FLUSH) 0.9 %
10 SYRINGE (ML) INJECTION
Status: DISCONTINUED | OUTPATIENT
Start: 2020-12-21 | End: 2020-12-21 | Stop reason: HOSPADM

## 2020-12-21 RX ORDER — PROPOFOL 10 MG/ML
INJECTION, EMULSION INTRAVENOUS
Status: DISCONTINUED | OUTPATIENT
Start: 2020-12-21 | End: 2020-12-21

## 2020-12-21 RX ORDER — LIDOCAINE HYDROCHLORIDE 20 MG/ML
INJECTION, SOLUTION EPIDURAL; INFILTRATION; INTRACAUDAL; PERINEURAL
Status: DISCONTINUED | OUTPATIENT
Start: 2020-12-21 | End: 2020-12-21

## 2020-12-21 RX ORDER — FENTANYL CITRATE 50 UG/ML
INJECTION, SOLUTION INTRAMUSCULAR; INTRAVENOUS
Status: DISCONTINUED | OUTPATIENT
Start: 2020-12-21 | End: 2020-12-21

## 2020-12-21 RX ORDER — SODIUM CHLORIDE, SODIUM LACTATE, POTASSIUM CHLORIDE, CALCIUM CHLORIDE 600; 310; 30; 20 MG/100ML; MG/100ML; MG/100ML; MG/100ML
INJECTION, SOLUTION INTRAVENOUS CONTINUOUS
Status: DISCONTINUED | OUTPATIENT
Start: 2020-12-21 | End: 2020-12-21 | Stop reason: HOSPADM

## 2020-12-21 RX ORDER — SODIUM CHLORIDE, SODIUM LACTATE, POTASSIUM CHLORIDE, CALCIUM CHLORIDE 600; 310; 30; 20 MG/100ML; MG/100ML; MG/100ML; MG/100ML
INJECTION, SOLUTION INTRAVENOUS CONTINUOUS PRN
Status: DISCONTINUED | OUTPATIENT
Start: 2020-12-21 | End: 2020-12-21

## 2020-12-21 RX ADMIN — PROPOFOL 50 MG: 10 INJECTION, EMULSION INTRAVENOUS at 10:12

## 2020-12-21 RX ADMIN — FENTANYL CITRATE 50 MCG: 50 INJECTION, SOLUTION INTRAMUSCULAR; INTRAVENOUS at 10:12

## 2020-12-21 RX ADMIN — LIDOCAINE HYDROCHLORIDE 50 MG: 20 INJECTION, SOLUTION EPIDURAL; INFILTRATION; INTRACAUDAL; PERINEURAL at 10:12

## 2020-12-21 RX ADMIN — SODIUM CHLORIDE, SODIUM LACTATE, POTASSIUM CHLORIDE, AND CALCIUM CHLORIDE: .6; .31; .03; .02 INJECTION, SOLUTION INTRAVENOUS at 10:12

## 2020-12-21 NOTE — [employer] PATIENT INSTRUCTIONS
Discharge Summary/Instructions after an Endoscopic Procedure  Patient Name: Kvng Jones  Patient MRN: [id number]  Patient YOB: 1960 Monday, December 21, 2020  Amelia Yoder MD  RESTRICTIONS:  During your procedure today, you received medications for sedation.  These   medications may affect your judgment, balance and coordination.  Therefore,   for 24 hours, you have the following restrictions:   - DO NOT drive a car, operate machinery, make legal/financial decisions,   sign important papers or drink alcohol.    ACTIVITY:  Today: no heavy lifting, straining or running due to procedural   sedation/anesthesia.  The following day: return to full activity including work.  DIET:  Eat and drink normally unless instructed otherwise.     TREATMENT FOR COMMON SIDE EFFECTS:  - Mild abdominal pain, nausea, belching, bloating or excessive gas:  rest,   eat lightly and use a heating pad.  - Sore Throat: treat with throat lozenges and/or gargle with warm salt   water.  - Because air was used during the procedure, expelling large amounts of air   from your rectum or belching is normal.  - If a bowel prep was taken, you may not have a bowel movement for 1-3 days.    This is normal.  SYMPTOMS TO WATCH FOR AND REPORT TO YOUR PHYSICIAN:  1. Abdominal pain or bloating, other than gas cramps.  2. Chest pain.  3. Back pain.  4. Signs of infection such as: chills or fever occurring within 24 hours   after the procedure.  5. Rectal bleeding, which would show as bright red, maroon, or black stools.   (A tablespoon of blood from the rectum is not serious, especially if   hemorrhoids are present.)  6. Vomiting.  7. Weakness or dizziness.  GO DIRECTLY TO THE NEAREST EMERGENCY ROOM IF YOU HAVE ANY OF THE FOLLOWING:      Difficulty breathing              Chills and/or fever over 101 F   Persistent vomiting and/or vomiting blood   Severe abdominal pain   Severe chest pain   Black, tarry stools   Bleeding- more than one  tablespoon   Any other symptom or condition that you feel may need urgent attention  Your doctor recommends these additional instructions:  If any biopsies were taken, your doctors clinic will contact you in 1 to 2   weeks with any results.  - Discharge patient to home (via wheelchair).   - Resume previous diet.   - Continue present medications.   - Await pathology results.   - Telephone GI clinic for pathology results in 2 weeks.   - Patient has a contact number available for emergencies.  The signs and   symptoms of potential delayed complications were discussed with the   patient.  Return to normal activities tomorrow.  Written discharge   instructions were provided to the patient.   - Resume anticoagulant at prior dose.  For questions, problems or results please call your physician Amelia Yoder MD at Work:  [phone]  If you have any questions about the above instructions, call the GI   department at [phone] or call the endoscopy unit at [phone]   from 7am until 3 pm.  [facility] [place], EMERGENCY ROOM PHONE NUMBER:   [phone]  IF A COMPLICATION OR EMERGENCY SITUATION ARISES AND YOU ARE UNABLE TO REACH   YOUR PHYSICIAN - GO DIRECTLY TO THE EMERGENCY ROOM.  I have read or have had read to me these discharge instructions for my   procedure and have received a written copy.  I understand these   instructions and will follow-up with my physician if I have any questions.     __________________________________       _____________________________________  Nurse Signature                                          Patient/Designated   Responsible Party Signature  MD Amelia Turner MD  12/21/2020 10:14:18 AM  [employer]

## 2020-12-21 NOTE — DISCHARGE SUMMARY
[facility]  Discharge Note  Short Stay    Procedure(s) (LRB):  ESOPHAGOGASTRODUODENOSCOPY (EGD) (N/A)    OUTCOME: Patient tolerated treatment/procedure well without complication and is now ready for discharge.    DISPOSITION: Home or Self Care    FINAL DIAGNOSIS:  Acute GVHD    FOLLOWUP: With primary care provider    DISCHARGE INSTRUCTIONS:  No discharge procedures on file.

## 2020-12-21 NOTE — H&P
Short Stay Endoscopy History and Physical    PCP - Primary Doctor No    Procedure - EGD  ASA - 2  Mallampati - per anesthesia  History of Anesthesia problems - no  Family history Anesthesia problems -  no     HPI:  This is a 60 y.o. male here for evaluation of :   Active Hospital Problems    Diagnosis  POA    *Acute GVHD [D89.810]  Yes      Resolved Hospital Problems   No resolved problems to display.         Reflux - no  Dysphagia - no  Abdominal pain - no  Diarrhea - no  Anemia - no  GI bleeding - no    ROS:  CONSTITUTIONAL: Denies weight change,  fatigue, fevers, chills, night sweats.  CARDIOVASCULAR: Denies chest pain, shortness of breath, orthopnea and edema.  RESPIRATORY: Denies cough, hemoptysis, dyspnea, and wheezing.  GI: See HPI.    Medical History:   Past Medical History:   Diagnosis Date    CML (chronic myelocytic leukemia)     Encounter for blood transfusion     Hiatal hernia with gastroesophageal reflux     Hx of psychiatric care     valium, ativan    Hypertension     Melanoma in situ of external ear, right     PONV (postoperative nausea and vomiting)        Surgical History:   Past Surgical History:   Procedure Laterality Date    CHOLECYSTECTOMY      INJECTION OF ANESTHETIC AGENT AROUND NERVE Bilateral 9/2/2020    Procedure: BLOCK, NERVE bilateral C4,5,6 MBB;  Surgeon: Oscar Mcelroy MD;  Location: [facility] PAIN MGT;  Service: Pain Management;  Laterality: Bilateral;  bilateral C4,5,6 MBB   consent needed    MEDIPORT REMOVAL Right 10/19/2020    Procedure: REMOVAL, CATHETER, CENTRAL VENOUS, TUNNELED, WITH PORT;  Surgeon: Ovidio Chaidez MD;  Location: [facility] CATH LAB;  Service: Radiology;  Laterality: Right;    NECK SURGERY  2015    TONSILLECTOMY         Family History:  History reviewed. No pertinent family history.    Social History:   Social History     Tobacco Use    Smoking status: Current Every Day Smoker     Packs/day: 1.00     Years: 35.00     Pack years: 35.00     Start date:  11/26/2020    Smokeless tobacco: Never Used    Tobacco comment: starts chantix 12/11/2020   Substance Use Topics    Alcohol use: Yes     Alcohol/week: 12.0 standard drinks     Types: 4 Glasses of wine, 4 Cans of beer, 4 Shots of liquor per week    Drug use: Yes     Types: Marijuana     Comment: medical marijuana       Allergy  Review of patient's allergies indicates:   Allergen Reactions    Posaconazole Hallucinations    Unclassified drug Other (See Comments)     Pt reports that he has an allergic reaction to an Antifungal medication, but is unsure of the name of the drug. Will obtain name prior to arrival in am and notify Pre-op RN.    Vancomycin analogues Other (See Comments)     Pt reports he had rigors    Codeine Hives    Iodine Hives and Rash    Iodinated contrast media Hives       Medications:   No current facility-administered medications on file prior to encounter.      Current Outpatient Medications on File Prior to Encounter   Medication Sig Dispense Refill    acyclovir (ZOVIRAX) 800 MG Tab Take 1 tablet (800 mg total) by mouth 2 (two) times daily. 60 tablet 11    amLODIPine (NORVASC) 5 MG tablet Take 1 tablet (5 mg total) by mouth once daily. 30 tablet 11    budesonide (ENTOCORT EC) 3 mg capsule Take 1 capsule (3 mg total) by mouth 2 (two) times a day. 60 capsule 2    CHANTIX STARTING MONTH BOX 0.5 mg (11)- 1 mg (42) tablet TAKE AS DIRECTED ON PACKAGE      esomeprazole (NEXIUM) 40 MG capsule Take 40 mg by mouth before breakfast.      fluconazole (DIFLUCAN) 200 MG Tab Take 2 tablets (400 mg total) by mouth once daily. 60 tablet 6    loratadine (CLARITIN) 10 mg tablet Take 10 mg by mouth once daily.      magnesium oxide (MAGOX) 400 mg (241.3 mg magnesium) tablet Take 2 tablets (800 mg total) by mouth 2 (two) times daily. 200 tablet 1    multivit-min/FA/lycopen/lutein (CENTRUM SILVER MEN ORAL) Take by mouth.      ondansetron (ZOFRAN) 8 MG tablet Take 1 tablet (8 mg total) by mouth every  12 (twelve) hours as needed for Nausea. 30 tablet 2    PONATinib 15 mg Tab Take 30 mg by mouth once daily. 60 tablet 5    predniSONE (DELTASONE) 20 MG tablet Take 1.5 tablets (30 mg total) by mouth once daily. 60 tablet 6    prochlorperazine (COMPAZINE) 10 MG tablet Take 1 tablet (10 mg total) by mouth 4 (four) times daily as needed. 30 tablet 1    sulfamethoxazole-trimethoprim 800-160mg (BACTRIM DS) 800-160 mg Tab Take 1 tablet by mouth every Mon, Wed, Fri. 12 tablet 4    tacrolimus (PROGRAF) 1 MG Cap Take 5 capsules (5 mg total) by mouth every 12 (twelve) hours. Take 5 tablets in the morning and 5 tablets in the evening. Take morning dose after labs on lab days. 120 capsule 8    tamsulosin (FLOMAX) 0.4 mg Cap Take 2 capsules (0.8 mg total) by mouth once daily. 30 capsule 3    famotidine (PEPCID) 20 MG tablet Take 20 mg by mouth 2 (two) times daily as needed for Heartburn.       morphine (MSIR) 15 MG tablet Take 2 tablets (30 mg total) by mouth every 6 (six) hours as needed for Pain. 90 tablet 0    ondansetron (ZOFRAN-ODT) 8 MG TbDL Dissolve 1 tablet (8 mg total) by mouth every 8 (eight) hours as needed (nausea). 30 tablet 3    urea (CARMOL) 40 % Crea Apply to the affected area every day after soak 198.4 g 3       Physical Exam:  Vital Signs:   Vitals:    12/21/20 0911   BP: (!) 145/87   Pulse: 84   Resp: 16   Temp: 98.8 °F (37.1 °C)     General Appearance: Well appearing in no acute distress  ENT: OP clear  Chest: CTA B  CV: RRR, no m/r/g  Abd: s/nt/nd/nabs  Ext: no edema    Labs:  Reviewed    IMP:  Active Hospital Problems    Diagnosis  POA    *Acute GVHD [D89.810]  Yes      Resolved Hospital Problems   No resolved problems to display.         Plan:  I have explained the risks and benefits of endoscopy procedures to the patient including but not limited to bleeding, perforation, infection, and death. The patient wishes to proceed.

## 2020-12-21 NOTE — ANESTHESIA POSTPROCEDURE EVALUATION
Anesthesia Post Evaluation    Patient: Kvng Jones     Procedure(s) Performed: Procedure(s) (LRB):  ESOPHAGOGASTRODUODENOSCOPY (EGD) (N/A)    Final Anesthesia Type: general      Patient location during evaluation: PACU  Patient participation: Yes- Able to Participate  Level of consciousness: awake and alert and oriented  Post-procedure vital signs: reviewed and stable  Pain management: adequate  Airway patency: patent    PONV status at discharge: No PONV  Anesthetic complications: no      Cardiovascular status: blood pressure returned to baseline, stable and hemodynamically stable  Respiratory status: unassisted  Hydration status: euvolemic  Follow-up not needed.          Vitals Value Taken Time   /85 12/21/20 1045   Temp 37.2 °C (99 °F) 12/21/20 1015   Pulse 78 12/21/20 1045   Resp 18 12/21/20 1045   SpO2 98 % 12/21/20 1045         Event Time   Out of Recovery 10:46:00         Pain/Kenyon Score: Kenyon Score: 10 (12/21/2020 10:45 AM)

## 2020-12-21 NOTE — TRANSFER OF CARE
"Anesthesia Transfer of Care Note    Patient: Kvng Jones Sr.    Procedure(s) Performed: Procedure(s) (LRB):  ESOPHAGOGASTRODUODENOSCOPY (EGD) (N/A)    Patient location: PACU    Anesthesia Type: MAC    Transport from OR: Transported from OR on room air with adequate spontaneous ventilation    Post pain: adequate analgesia    Post assessment: no apparent anesthetic complications and tolerated procedure well    Post vital signs: stable    Level of consciousness: sedated    Nausea/Vomiting: no nausea/vomiting    Complications: none    Transfer of care protocol was followed      Last vitals:   Visit Vitals  BP (!) 146/83 (Patient Position: Lying)   Pulse 78   Temp 37.2 °C (99 °F) (Temporal)   Resp 18   Ht 5' 6" (1.676 m)   Wt 90.4 kg (199 lb 4.7 oz)   SpO2 95%   BMI 32.17 kg/m²     "

## 2020-12-22 ENCOUNTER — PATIENT MESSAGE (OUTPATIENT)
Dept: HEMATOLOGY/ONCOLOGY | Facility: CLINIC | Age: 60
End: 2020-12-22

## 2020-12-24 ENCOUNTER — TELEPHONE (OUTPATIENT)
Dept: ADMINISTRATIVE | Facility: CLINIC | Age: 60
End: 2020-12-24

## 2020-12-29 LAB
FINAL PATHOLOGIC DIAGNOSIS: NORMAL
GROSS: NORMAL
Lab: NORMAL

## 2020-12-30 ENCOUNTER — PATIENT MESSAGE (OUTPATIENT)
Dept: HEMATOLOGY/ONCOLOGY | Facility: CLINIC | Age: 60
End: 2020-12-30

## 2020-12-30 ENCOUNTER — OFFICE VISIT (OUTPATIENT)
Dept: HEMATOLOGY/ONCOLOGY | Facility: CLINIC | Age: 60
End: 2020-12-30
Payer: COMMERCIAL

## 2020-12-30 ENCOUNTER — LAB VISIT (OUTPATIENT)
Dept: LAB | Facility: HOSPITAL | Age: 60
End: 2020-12-30
Payer: COMMERCIAL

## 2020-12-30 VITALS
BODY MASS INDEX: 31.94 KG/M2 | WEIGHT: 198.75 LBS | SYSTOLIC BLOOD PRESSURE: 135 MMHG | OXYGEN SATURATION: 95 % | HEART RATE: 87 BPM | DIASTOLIC BLOOD PRESSURE: 85 MMHG | HEIGHT: 66 IN | RESPIRATION RATE: 16 BRPM

## 2020-12-30 DIAGNOSIS — Z94.81 S/P ALLOGENEIC BONE MARROW TRANSPLANT: ICD-10-CM

## 2020-12-30 DIAGNOSIS — D89.810 ACUTE GVHD COMPLICATING BONE MARROW TRANSPLANTATION: Primary | ICD-10-CM

## 2020-12-30 DIAGNOSIS — C92.11 CML IN REMISSION: ICD-10-CM

## 2020-12-30 DIAGNOSIS — Z94.84 HISTORY OF ALLOGENEIC STEM CELL TRANSPLANT: ICD-10-CM

## 2020-12-30 DIAGNOSIS — C92.01 ACUTE MYELOID LEUKEMIA IN REMISSION: ICD-10-CM

## 2020-12-30 DIAGNOSIS — T86.09 ACUTE GVHD COMPLICATING BONE MARROW TRANSPLANTATION: Primary | ICD-10-CM

## 2020-12-30 LAB
ABO + RH BLD: NORMAL
ALBUMIN SERPL BCP-MCNC: 4.2 G/DL (ref 3.5–5.2)
ALP SERPL-CCNC: 51 U/L (ref 55–135)
ALT SERPL W/O P-5'-P-CCNC: 34 U/L (ref 10–44)
ANION GAP SERPL CALC-SCNC: 12 MMOL/L (ref 8–16)
AST SERPL-CCNC: 21 U/L (ref 10–40)
BASOPHILS # BLD AUTO: 0.04 K/UL (ref 0–0.2)
BASOPHILS NFR BLD: 0.4 % (ref 0–1.9)
BILIRUB SERPL-MCNC: 0.3 MG/DL (ref 0.1–1)
BLD GP AB SCN CELLS X3 SERPL QL: NORMAL
BUN SERPL-MCNC: 27 MG/DL (ref 8–23)
CALCIUM SERPL-MCNC: 9.8 MG/DL (ref 8.7–10.5)
CHLORIDE SERPL-SCNC: 103 MMOL/L (ref 95–110)
CO2 SERPL-SCNC: 26 MMOL/L (ref 23–29)
CREAT SERPL-MCNC: 1.1 MG/DL (ref 0.5–1.4)
DIFFERENTIAL METHOD: ABNORMAL
EOSINOPHIL # BLD AUTO: 0.1 K/UL (ref 0–0.5)
EOSINOPHIL NFR BLD: 1 % (ref 0–8)
ERYTHROCYTE [DISTWIDTH] IN BLOOD BY AUTOMATED COUNT: 13.9 % (ref 11.5–14.5)
EST. GFR  (AFRICAN AMERICAN): >60 ML/MIN/1.73 M^2
EST. GFR  (NON AFRICAN AMERICAN): >60 ML/MIN/1.73 M^2
GLUCOSE SERPL-MCNC: 103 MG/DL (ref 70–110)
HCT VFR BLD AUTO: 39.6 % (ref 40–54)
HGB BLD-MCNC: 13 G/DL (ref 14–18)
IMM GRANULOCYTES # BLD AUTO: 0.07 K/UL (ref 0–0.04)
IMM GRANULOCYTES NFR BLD AUTO: 0.8 % (ref 0–0.5)
LYMPHOCYTES # BLD AUTO: 0.7 K/UL (ref 1–4.8)
LYMPHOCYTES NFR BLD: 8.2 % (ref 18–48)
MAGNESIUM SERPL-MCNC: 1.6 MG/DL (ref 1.6–2.6)
MCH RBC QN AUTO: 32.6 PG (ref 27–31)
MCHC RBC AUTO-ENTMCNC: 32.8 G/DL (ref 32–36)
MCV RBC AUTO: 99 FL (ref 82–98)
MONOCYTES # BLD AUTO: 0.9 K/UL (ref 0.3–1)
MONOCYTES NFR BLD: 9.8 % (ref 4–15)
NEUTROPHILS # BLD AUTO: 7.2 K/UL (ref 1.8–7.7)
NEUTROPHILS NFR BLD: 79.8 % (ref 38–73)
NRBC BLD-RTO: 0 /100 WBC
PHOSPHATE SERPL-MCNC: 4.2 MG/DL (ref 2.7–4.5)
PLATELET # BLD AUTO: 144 K/UL (ref 150–350)
PMV BLD AUTO: 9.8 FL (ref 9.2–12.9)
POTASSIUM SERPL-SCNC: 5.7 MMOL/L (ref 3.5–5.1)
PROT SERPL-MCNC: 6.8 G/DL (ref 6–8.4)
RBC # BLD AUTO: 3.99 M/UL (ref 4.6–6.2)
SODIUM SERPL-SCNC: 141 MMOL/L (ref 136–145)
TACROLIMUS BLD-MCNC: 8 NG/ML (ref 5–15)
WBC # BLD AUTO: 8.99 K/UL (ref 3.9–12.7)

## 2020-12-30 PROCEDURE — 99999 PR PBB SHADOW E&M-EST. PATIENT-LVL IV: CPT | Mod: PBBFAC,BMT,, | Performed by: INTERNAL MEDICINE

## 2020-12-30 PROCEDURE — 84100 ASSAY OF PHOSPHORUS: CPT

## 2020-12-30 PROCEDURE — 85025 COMPLETE CBC W/AUTO DIFF WBC: CPT

## 2020-12-30 PROCEDURE — 87799 DETECT AGENT NOS DNA QUANT: CPT

## 2020-12-30 PROCEDURE — 80197 ASSAY OF TACROLIMUS: CPT

## 2020-12-30 PROCEDURE — 36415 COLL VENOUS BLD VENIPUNCTURE: CPT

## 2020-12-30 PROCEDURE — 99215 OFFICE O/P EST HI 40 MIN: CPT | Mod: BMT,S$GLB,, | Performed by: INTERNAL MEDICINE

## 2020-12-30 PROCEDURE — 99999 PR PBB SHADOW E&M-EST. PATIENT-LVL IV: ICD-10-PCS | Mod: PBBFAC,BMT,, | Performed by: INTERNAL MEDICINE

## 2020-12-30 PROCEDURE — 99215 PR OFFICE/OUTPT VISIT, EST, LEVL V, 40-54 MIN: ICD-10-PCS | Mod: BMT,S$GLB,, | Performed by: INTERNAL MEDICINE

## 2020-12-30 PROCEDURE — 86900 BLOOD TYPING SEROLOGIC ABO: CPT

## 2020-12-30 PROCEDURE — 80053 COMPREHEN METABOLIC PANEL: CPT

## 2020-12-30 PROCEDURE — 83735 ASSAY OF MAGNESIUM: CPT

## 2020-12-30 NOTE — Clinical Note
- return visit in 1 week with CBC, CMP, [place] level, CMV and EBV, mag and Phose with MD or NP  - then schedule return visits with same labs every 2 weeks with MD or NP through end of March 2021

## 2020-12-30 NOTE — PROGRESS NOTES
Subjective:    Patient ID: Kvng Jones Sr. is a 60 y.o. male.    Chief Complaint: No chief complaint on file.    Oncologic hx: (from Dr. Hodges' previous documentation)  60 y.o. 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 He was admitted for evaluation and induction chemotherapy with FLAG-Addis. Complications of therapy include epididymal orchitis with negative testicular ultrasound, neutropenic fever, dyspnea, and GGO of bilateral lungs on CT chest. Fever and chest findings were covered with broad spectrum antimicrobials. He did have hallucinations and vivid dreams with posaconazole. Chemotherapy was administered by left arm PICC line that was removed during hospital stay for MSSA infection treated with vancomycin. Hospital admission was 3/30/2020 to 5/1/2020 achieving morphologic CR with induction chemotherapy. He then started Ponatinib 30mg daily.     Studies performed during his hospital stay include pre-chemotherapy ECHO with normal ejection fraction of 60-65%. HIV, HBV, and HCV tests were negative. CBC at admission was WBC 38.6, Hgb 14.6, and platelet 416K. CT of the head without contrast was normal 4/20/2020. US of abdomen performed for abdominal distention and neutropenic fever had hepatic steatosis, liver 19cm, and spleen 11 cm. He does have a history of alcohol use and pancreatitis.    Patient is a . He is  to wife Guillermina who is his caregiver post transplant. He has a 45 pack year smoking history. He quit 6/1/2020, now on Chantix. He drinks 3-4 alcohol beverages nightly. He has 3 children. He has several family members in the [facility] area.    Transplant Course: Admitted 7/21/20 for a Flu/Cy/TBI haplo allo SCT. Son was donor. Received 2 bags and a total CD34 dose of 3.10 x10^6/kg on 7/28/20. Tolerated stem cells well. C/o headaches prior to admission, which persisted throughout admission. Neuro ultimately consulted for  rec's. Was a daily drinker prior to admission. No s/s of alcolhol withdrawal during admission. Transplant further complicated by hypertension, heart burn, c-diff neg diarrhea, nausea, sinus congestion, mild peripheral edema, blurry vision (ophtho consulted), mucositis, electrolyte abnormalities, and neck pain 2/2 spinal stenosis (referred to pain mgt at discharge). He engrafted on 8/10/20 with an ANC of 1280. He was discharged on 8/12/2020.    Interval History:  Mr. Jones presents for follow-up clinic visit post Flu/Cy/TBI allogeneic stem cell transplant. Today is Day +155. 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 Rash noted to arms (minimal), abdomen, and thighs, biopsy done 12/3 negative for GVHD; consistent with folliculitis. Continues with intermittent nausea, but mostly reports gas/belching which was worse over the weekend but patient states he feels ~75% better today. EGD from 12/21 is negative for GVHD or H pylori, gastritis in antrum present. Denies fevers, chills, cough, chest pain, diarrhea, constipation, bruising, and bleeding. No acute interval event. Mom and Dad both have COVID- Mom 85, and admitted here at [facility].      Review of Systems   Review of Systems   Constitutional: Positive for malaise/fatigue. Negative for chills, fever and weight loss.   HENT: Negative for congestion and nosebleeds.    Eyes: Negative for pain, discharge and redness.   Respiratory: Negative for cough, hemoptysis and shortness of breath.    Cardiovascular: Negative for chest pain, palpitations and leg swelling.   Gastrointestinal: Positive for diarrhea (mild; intermittent) and nausea. Negative for constipation and vomiting.   Genitourinary: Negative for dysuria and urgency.   Musculoskeletal: Positive for back pain and neck pain. Negative for myalgias.   Skin: Positive for rash. Negative for itching.   Neurological: Negative for dizziness, sensory change, speech change and focal weakness.    Endo/Heme/Allergies: Negative for environmental allergies and polydipsia.   Psychiatric/Behavioral: Negative for depression and suicidal ideas.           Objective:     Physical Exam   Constitutional: He is oriented to person, place, and time and well-developed, well-nourished, and in no distress.   HENT:   Head: Normocephalic and atraumatic.   Right Ear: External ear normal.   Left Ear: External ear normal.   Nose: Nose normal.   No oral ulcers, mucositis or petechiae    Eyes: Conjunctivae and EOM are normal.   Neck: Normal range of motion.   Cardiovascular: Normal rate, regular rhythm, normal heart sounds and intact distal pulses.   No murmur heard.  Pulmonary/Chest: Effort normal and breath sounds normal. No respiratory distress. He has no wheezes. He has no rales.   Abdominal: Soft. Bowel sounds are normal. He exhibits no distension. There is no abdominal tenderness. There is no rebound.   Musculoskeletal: Normal range of motion.         General: No tenderness or edema.   Neurological: He is alert and oriented to person, place, and time. No cranial nerve deficit.   Skin: Skin is warm and dry. Rash noted. He is not diaphoretic. There is erythema.   Papular rash to trunk, arms, and anterior thighs just above knees.   Psychiatric: Mood, memory, affect and judgment normal.         Assessment:       No diagnosis found.    Plan:         History of allogeneic stem cell transplant  - Admitted 7/21/20 for planned Flu/Cy/TBI haplo allo SCT. Son was the donor. Received 2 bags and a total CD34 dose of 3.10 x10^6/kg on 7/28/20. Recieved post transplant cyclophosphamide. Engrafted neutrophils 8/10/20 day +13  - Today is Day +155  - Tacro level 4.6 (goal 7-9). Currently on tacro dose 5mg/6mg (currently has 1mg tabs). Since skin and GI biopsies negative for GVHD start to taper prograft 50% every 2 weeks at next visit in 1 week (first January 2021 visit)  - patient requests Rx for Valium and Oxycodone for pre-procedure in  clinic bone marrow biopsies.  - had BMBx in clinic on 8/28/20 showing no definitive morphologic or immunophenotypic evidence of residual CML. Chimerisms unable to be obtained due to insufficient sample; drawn peripherally on 9/18. CD3-positive T-cells:  Insufficient DNA extracted for reliable analysis. CD33-positive myeloid cells:  The CD33-positive fraction contains approximately 100% donor DNA and approximately 0% recipient DNA.   - Day 100 Bm bx 11/2/2020 showing no definitive morphologic or immunophenotypic evidence of CML but there is evidence of MRD. Positive for BCR/ABL p210 (7.8%).  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 Achieved morphologic CR  - resumed pre transplant TKI, Ponatinib 30 mg daily on 11/11/20 due ot 7.8% p210 on day 100 marrow biopsy    GVHD  - had rash inpatient that was believed to be drug eruption. Resolved at that time  - diffuse erythema to legs, lower back, arms, and abdomen presented on 9/15. Started 1mg/kg (90mg, will round to 100mg for dosing) of prednisone daily (9/15/20); decreased to 90 mg (4.5 tabs) on 9/21/2020  - rash is worse and more papular today. Now to abdomen, chest, upper arms, upper back, flanks, and anterior thighs just above knees  - continue TAC cream.   - Increased back up to 40 mg daily at previous rash due to worsening rash; since not GVHD; steroids tapered and stopped 12/30/2020 (concerned higher dose of steroids is making his GI symptoms worse; plan for EGD as below)  - rash first appeared 1 1/2-2 weeks after starting Bactrim. Switched Bactrim to Dapsone. Will monitor for improvement.  - saw derm; had skin bx 12/3-- not GVHD; folliculitis  - continue to follow up with derm; continue using urea cream as  prescribed; no plan for pheresis at this time  - started 3mg budesonide BID 12/10 gor GERD; 12/21 EGD not GVHD, antral gastritis  - continues on nexium and PRN pepcid for GI ppx    ID  - Last CMV and EBV undetected; continue weekly CMV and EBV, today's pending  - Acyclovir (Zoster prophylaxis) until Day +365  - Diflucan (Fungal prophylaxis) until off tacrolimus  - Dapsone (PJP prophylaxis) until off tacrolimus. (Was on Bactrim but changed to Dapsone due to rash in event that rash was drug rash)   - changed back to Bactrim as rash persisted    Hypomagnesemia  - Mag 1.6 today  - continue mag 800mg BID    Neck pain/ Cervical stenosis of spine  - Hx of spinal fusion C3-C5  - Xray 9/19 shows surgical changes of fusion from C3 through C5 with degenerative changed below fusion.   - Started PT on 8/26  - Pt feels neck pain started after grewal was placed  - Has been mostly refractory to medications (zanaflex, gabapentin, celebrex, lidoderm patches). Some relief with oral morphine  - CT from 8/24/20 showing history of C3, C4, and C5 fusion and diffuse osteoarthritic changes  - Had virtual pain management appt on 8/25 to review CT results.  - Zanaflex switched to Robaxin per pain mgt  - had medial branch block on 9/2/20 with Dr. Mcelroy (pain mgt). Per patient pain mgmnt signed off, as it is not a chronic issue, defer to BMT service for further pain control.  - started MS contin 15mg BID 9/20/2020; use PRN morphine q6h for breakthrough; titrate up MS contin as needed  - Patient reports much improvement to pain since starting high dose prednisone for his skin GVHD but pain returning as we taper steroids  - feeling lack of benefit from Gabapentin taken once nightly; started taper to every other night 10/26/2020  - continues taper per his preference    GERD (gastroesophageal reflux disease)  - Continue nexium; 40mgwhile on high dose steroids  - TUMS prn and pepcide PRN  - may be symptom of GVHD; continues on budesonide.  Referred for EGD as above     Essential hypertension  - currently on amlodipine 5 mg daily. Will continue this dose    Rash   - rash stable, continues to be papular. Now to abdomen, chest, upper arms, upper back, flanks, and anterior thighs just above knees  - continue urea cream per derm  - rash first appeared ~ 9-91/2 weeks ago, which is 1 1/2-2 weeks after starting Bactrim. Switched Bactrim to Dapsone at last visit in the event that rash is drug rash. No improvement. Will switch back to bactrim.  - due to persistent rash, referred for photopheresis and to derm for biopsy. Bx completed 12/3-- results show no evidence of GVHD  - will hold off on pheresis at this time per Dr. Hodges based on bx results  - rash stable; prednisone stopped 12/30/2020    Diarrhea  - reports intermittent diarrhea since cholecystectomy pre transplant  - reports 1-4 soft stools daily  - patient states that he is taking pepto for this with some relief  - does not like to take imodium as this causes constipation  - GI issues pre-dated transplant    Hand/Foot cramps  - reported new onset hand and foot cramps at last visit  - electrolytes wnl  - encouraged increased hydration  - encouraged stretching when able  - recommended flexeril, which he already had at home.   - reports that hydration helps  - encouraged patient to stretch    Cigarette smoker  - has recently resumed cigarette smoking  - inquired about Chantix which has worked for him in the past  - no contraindication to Chantix per [facility] pharmacist.   Per pharm D: The only drug interactions are category C which would only require monitoring of adverse effects.     1. Bactrim may increase the serum concentration of Chantix -- leading to increase side effects of Chantix   2. Famotidine may increase the serum concentration of Chantix -- leading to increase side effects of Chantix   - patient will talk to PCP (original prescriber) about represcribing. Okay to resume from BMP  perspective    BPH/Urinary hesitancy  -having urinary hesitancy today and feels sensation of not fully emptying bladder  -flomax increased to 0.8mg at last visit with no change  -patient has own urology; has follow up tomorrow 12/17    Insomnia  -tried trazodone 100mg; did not help (has tried Remeron, benadryl in past with no improvement)  -continues to be up frequently due to urination; follow up tomorrow as above        Follow-up:   - return visit in 1 week with CBC, CMP, Tacro level, CMV and EBV, mag and Phose with MD or NP  - then schedule return visits with same labs every 2 weeks with MD or NP through end of March 2021

## 2021-01-02 LAB — CMV DNA SERPL NAA+PROBE-ACNC: NORMAL IU/ML

## 2021-01-03 LAB — EBV DNA SERPL NAA+PROBE-ACNC: NORMAL IU/ML

## 2021-01-06 ENCOUNTER — LAB VISIT (OUTPATIENT)
Dept: LAB | Facility: HOSPITAL | Age: 61
End: 2021-01-06
Attending: NURSE PRACTITIONER
Payer: COMMERCIAL

## 2021-01-06 ENCOUNTER — OFFICE VISIT (OUTPATIENT)
Dept: HEMATOLOGY/ONCOLOGY | Facility: CLINIC | Age: 61
End: 2021-01-06
Payer: COMMERCIAL

## 2021-01-06 VITALS
OXYGEN SATURATION: 100 % | RESPIRATION RATE: 16 BRPM | BODY MASS INDEX: 31.83 KG/M2 | DIASTOLIC BLOOD PRESSURE: 76 MMHG | WEIGHT: 198.06 LBS | SYSTOLIC BLOOD PRESSURE: 115 MMHG | HEART RATE: 88 BPM | HEIGHT: 66 IN

## 2021-01-06 DIAGNOSIS — D64.89 ANEMIA DUE TO MEDICATION: ICD-10-CM

## 2021-01-06 DIAGNOSIS — R19.7 DIARRHEA, UNSPECIFIED TYPE: ICD-10-CM

## 2021-01-06 DIAGNOSIS — K21.9 GASTROESOPHAGEAL REFLUX DISEASE WITHOUT ESOPHAGITIS: ICD-10-CM

## 2021-01-06 DIAGNOSIS — T86.09 ACUTE GVHD COMPLICATING BONE MARROW TRANSPLANTATION: ICD-10-CM

## 2021-01-06 DIAGNOSIS — Z94.84 HISTORY OF ALLOGENEIC STEM CELL TRANSPLANT: Primary | ICD-10-CM

## 2021-01-06 DIAGNOSIS — C92.10 CML (CHRONIC MYELOCYTIC LEUKEMIA): ICD-10-CM

## 2021-01-06 DIAGNOSIS — I10 ESSENTIAL HYPERTENSION: ICD-10-CM

## 2021-01-06 DIAGNOSIS — D89.810 ACUTE GVHD COMPLICATING BONE MARROW TRANSPLANTATION: ICD-10-CM

## 2021-01-06 DIAGNOSIS — Z94.81 S/P ALLOGENEIC BONE MARROW TRANSPLANT: ICD-10-CM

## 2021-01-06 DIAGNOSIS — F17.201 TOBACCO ABUSE, IN REMISSION: ICD-10-CM

## 2021-01-06 DIAGNOSIS — M47.813 SPONDYLOSIS OF CERVICOTHORACIC REGION W/O MYELOPATHY OR RADICULOPATHY: ICD-10-CM

## 2021-01-06 DIAGNOSIS — G47.00 INSOMNIA, UNSPECIFIED TYPE: ICD-10-CM

## 2021-01-06 DIAGNOSIS — R63.0 DECREASED APPETITE: ICD-10-CM

## 2021-01-06 DIAGNOSIS — E83.42 HYPOMAGNESEMIA: ICD-10-CM

## 2021-01-06 DIAGNOSIS — M54.2 NECK PAIN: ICD-10-CM

## 2021-01-06 LAB
ALBUMIN SERPL BCP-MCNC: 4 G/DL (ref 3.5–5.2)
ALP SERPL-CCNC: 67 U/L (ref 55–135)
ALT SERPL W/O P-5'-P-CCNC: 41 U/L (ref 10–44)
ANION GAP SERPL CALC-SCNC: 9 MMOL/L (ref 8–16)
AST SERPL-CCNC: 19 U/L (ref 10–40)
BASOPHILS # BLD AUTO: 0.04 K/UL (ref 0–0.2)
BASOPHILS NFR BLD: 0.8 % (ref 0–1.9)
BILIRUB SERPL-MCNC: 0.5 MG/DL (ref 0.1–1)
BUN SERPL-MCNC: 25 MG/DL (ref 8–23)
CALCIUM SERPL-MCNC: 9.3 MG/DL (ref 8.7–10.5)
CHLORIDE SERPL-SCNC: 105 MMOL/L (ref 95–110)
CO2 SERPL-SCNC: 23 MMOL/L (ref 23–29)
CREAT SERPL-MCNC: 1.1 MG/DL (ref 0.5–1.4)
DIFFERENTIAL METHOD: ABNORMAL
EOSINOPHIL # BLD AUTO: 0.1 K/UL (ref 0–0.5)
EOSINOPHIL NFR BLD: 2.5 % (ref 0–8)
ERYTHROCYTE [DISTWIDTH] IN BLOOD BY AUTOMATED COUNT: 13.8 % (ref 11.5–14.5)
EST. GFR  (AFRICAN AMERICAN): >60 ML/MIN/1.73 M^2
EST. GFR  (NON AFRICAN AMERICAN): >60 ML/MIN/1.73 M^2
GLUCOSE SERPL-MCNC: 106 MG/DL (ref 70–110)
HCT VFR BLD AUTO: 35.4 % (ref 40–54)
HGB BLD-MCNC: 12.1 G/DL (ref 14–18)
IMM GRANULOCYTES # BLD AUTO: 0.05 K/UL (ref 0–0.04)
IMM GRANULOCYTES NFR BLD AUTO: 1 % (ref 0–0.5)
LYMPHOCYTES # BLD AUTO: 0.8 K/UL (ref 1–4.8)
LYMPHOCYTES NFR BLD: 16.1 % (ref 18–48)
MAGNESIUM SERPL-MCNC: 1.4 MG/DL (ref 1.6–2.6)
MCH RBC QN AUTO: 33.1 PG (ref 27–31)
MCHC RBC AUTO-ENTMCNC: 34.2 G/DL (ref 32–36)
MCV RBC AUTO: 97 FL (ref 82–98)
MONOCYTES # BLD AUTO: 0.7 K/UL (ref 0.3–1)
MONOCYTES NFR BLD: 15 % (ref 4–15)
NEUTROPHILS # BLD AUTO: 3.1 K/UL (ref 1.8–7.7)
NEUTROPHILS NFR BLD: 64.6 % (ref 38–73)
NRBC BLD-RTO: 0 /100 WBC
PHOSPHATE SERPL-MCNC: 3.8 MG/DL (ref 2.7–4.5)
PLATELET # BLD AUTO: 156 K/UL (ref 150–350)
PMV BLD AUTO: 9.8 FL (ref 9.2–12.9)
POTASSIUM SERPL-SCNC: 4.6 MMOL/L (ref 3.5–5.1)
PROT SERPL-MCNC: 6.6 G/DL (ref 6–8.4)
RBC # BLD AUTO: 3.66 M/UL (ref 4.6–6.2)
SODIUM SERPL-SCNC: 137 MMOL/L (ref 136–145)
TACROLIMUS BLD-MCNC: 8.2 NG/ML (ref 5–15)
WBC # BLD AUTO: 4.79 K/UL (ref 3.9–12.7)

## 2021-01-06 PROCEDURE — 99215 OFFICE O/P EST HI 40 MIN: CPT | Mod: BMT,S$GLB,, | Performed by: NURSE PRACTITIONER

## 2021-01-06 PROCEDURE — 99999 PR PBB SHADOW E&M-EST. PATIENT-LVL V: ICD-10-PCS | Mod: PBBFAC,BMT,, | Performed by: NURSE PRACTITIONER

## 2021-01-06 PROCEDURE — 84100 ASSAY OF PHOSPHORUS: CPT

## 2021-01-06 PROCEDURE — 87799 DETECT AGENT NOS DNA QUANT: CPT

## 2021-01-06 PROCEDURE — 99215 PR OFFICE/OUTPT VISIT, EST, LEVL V, 40-54 MIN: ICD-10-PCS | Mod: BMT,S$GLB,, | Performed by: NURSE PRACTITIONER

## 2021-01-06 PROCEDURE — 80197 ASSAY OF TACROLIMUS: CPT

## 2021-01-06 PROCEDURE — 99999 PR PBB SHADOW E&M-EST. PATIENT-LVL V: CPT | Mod: PBBFAC,BMT,, | Performed by: NURSE PRACTITIONER

## 2021-01-06 PROCEDURE — 83735 ASSAY OF MAGNESIUM: CPT

## 2021-01-06 PROCEDURE — 80053 COMPREHEN METABOLIC PANEL: CPT

## 2021-01-06 PROCEDURE — 85025 COMPLETE CBC W/AUTO DIFF WBC: CPT

## 2021-01-08 LAB
CMV DNA SERPL NAA+PROBE-ACNC: NORMAL IU/ML
EBV DNA SERPL NAA+PROBE-ACNC: NORMAL IU/ML

## 2021-01-13 ENCOUNTER — PATIENT MESSAGE (OUTPATIENT)
Dept: HEMATOLOGY/ONCOLOGY | Facility: CLINIC | Age: 61
End: 2021-01-13

## 2021-01-13 DIAGNOSIS — G47.00 INSOMNIA, UNSPECIFIED TYPE: Primary | ICD-10-CM

## 2021-01-13 RX ORDER — TRAZODONE HYDROCHLORIDE 50 MG/1
50 TABLET ORAL NIGHTLY
Qty: 30 TABLET | Refills: 2 | Status: SHIPPED | OUTPATIENT
Start: 2021-01-13 | End: 2021-01-20

## 2021-01-14 RX ORDER — ESZOPICLONE 1 MG/1
1 TABLET, FILM COATED ORAL NIGHTLY
Qty: 30 TABLET | Refills: 0 | Status: SHIPPED | OUTPATIENT
Start: 2021-01-14 | End: 2021-02-01 | Stop reason: SDUPTHER

## 2021-01-15 ENCOUNTER — PATIENT MESSAGE (OUTPATIENT)
Dept: HEMATOLOGY/ONCOLOGY | Facility: CLINIC | Age: 61
End: 2021-01-15

## 2021-01-15 DIAGNOSIS — Z94.84 HISTORY OF ALLOGENEIC STEM CELL TRANSPLANT: ICD-10-CM

## 2021-01-15 RX ORDER — PROCHLORPERAZINE MALEATE 10 MG
10 TABLET ORAL 4 TIMES DAILY PRN
Qty: 30 TABLET | Refills: 1 | Status: SHIPPED | OUTPATIENT
Start: 2021-01-15 | End: 2021-01-26

## 2021-01-20 ENCOUNTER — CLINICAL SUPPORT (OUTPATIENT)
Dept: HEMATOLOGY/ONCOLOGY | Facility: CLINIC | Age: 61
End: 2021-01-20
Payer: COMMERCIAL

## 2021-01-20 ENCOUNTER — OFFICE VISIT (OUTPATIENT)
Dept: HEMATOLOGY/ONCOLOGY | Facility: CLINIC | Age: 61
End: 2021-01-20
Payer: COMMERCIAL

## 2021-01-20 VITALS
SYSTOLIC BLOOD PRESSURE: 139 MMHG | WEIGHT: 195.13 LBS | HEART RATE: 87 BPM | DIASTOLIC BLOOD PRESSURE: 78 MMHG | OXYGEN SATURATION: 96 % | RESPIRATION RATE: 12 BRPM | HEIGHT: 66 IN | BODY MASS INDEX: 31.36 KG/M2 | TEMPERATURE: 98 F

## 2021-01-20 DIAGNOSIS — R63.0 DECREASED APPETITE: ICD-10-CM

## 2021-01-20 DIAGNOSIS — C92.10 CML (CHRONIC MYELOCYTIC LEUKEMIA): Primary | ICD-10-CM

## 2021-01-20 DIAGNOSIS — K21.9 GASTROESOPHAGEAL REFLUX DISEASE WITHOUT ESOPHAGITIS: ICD-10-CM

## 2021-01-20 DIAGNOSIS — R19.7 DIARRHEA, UNSPECIFIED TYPE: ICD-10-CM

## 2021-01-20 DIAGNOSIS — M47.813 SPONDYLOSIS OF CERVICOTHORACIC REGION W/O MYELOPATHY OR RADICULOPATHY: ICD-10-CM

## 2021-01-20 DIAGNOSIS — C92.10 CML (CHRONIC MYELOCYTIC LEUKEMIA): ICD-10-CM

## 2021-01-20 DIAGNOSIS — N40.0 BENIGN PROSTATIC HYPERPLASIA WITHOUT LOWER URINARY TRACT SYMPTOMS: ICD-10-CM

## 2021-01-20 DIAGNOSIS — R39.11 URINARY HESITANCY: ICD-10-CM

## 2021-01-20 DIAGNOSIS — Z94.84 HISTORY OF ALLOGENEIC STEM CELL TRANSPLANT: ICD-10-CM

## 2021-01-20 DIAGNOSIS — Z71.3 NUTRITIONAL COUNSELING: ICD-10-CM

## 2021-01-20 DIAGNOSIS — M54.2 NECK PAIN: ICD-10-CM

## 2021-01-20 DIAGNOSIS — I10 ESSENTIAL HYPERTENSION: ICD-10-CM

## 2021-01-20 DIAGNOSIS — F41.9 ANXIETY DUE TO INVASIVE PROCEDURE: ICD-10-CM

## 2021-01-20 DIAGNOSIS — F17.201 TOBACCO ABUSE, IN REMISSION: ICD-10-CM

## 2021-01-20 DIAGNOSIS — G47.00 INSOMNIA, UNSPECIFIED TYPE: ICD-10-CM

## 2021-01-20 DIAGNOSIS — D64.89 ANEMIA DUE TO MEDICATION: ICD-10-CM

## 2021-01-20 DIAGNOSIS — Z94.84 HISTORY OF ALLOGENEIC STEM CELL TRANSPLANT: Primary | ICD-10-CM

## 2021-01-20 DIAGNOSIS — E83.42 HYPOMAGNESEMIA: ICD-10-CM

## 2021-01-20 DIAGNOSIS — M48.02 CERVICAL STENOSIS OF SPINE: ICD-10-CM

## 2021-01-20 PROCEDURE — 97802 MEDICAL NUTRITION INDIV IN: CPT | Mod: BMT,S$GLB,, | Performed by: DIETITIAN, REGISTERED

## 2021-01-20 PROCEDURE — 99215 OFFICE O/P EST HI 40 MIN: CPT | Mod: BMT,S$GLB,, | Performed by: NURSE PRACTITIONER

## 2021-01-20 PROCEDURE — 99999 PR PBB SHADOW E&M-EST. PATIENT-LVL IV: ICD-10-PCS | Mod: PBBFAC,BMT,, | Performed by: NURSE PRACTITIONER

## 2021-01-20 PROCEDURE — 3075F SYST BP GE 130 - 139MM HG: CPT | Mod: BMT,CPTII,S$GLB, | Performed by: NURSE PRACTITIONER

## 2021-01-20 PROCEDURE — 1125F AMNT PAIN NOTED PAIN PRSNT: CPT | Mod: BMT,S$GLB,, | Performed by: NURSE PRACTITIONER

## 2021-01-20 PROCEDURE — 1125F PR PAIN SEVERITY QUANTIFIED, PAIN PRESENT: ICD-10-PCS | Mod: BMT,S$GLB,, | Performed by: NURSE PRACTITIONER

## 2021-01-20 PROCEDURE — 99215 PR OFFICE/OUTPT VISIT, EST, LEVL V, 40-54 MIN: ICD-10-PCS | Mod: BMT,S$GLB,, | Performed by: NURSE PRACTITIONER

## 2021-01-20 PROCEDURE — 99999 PR PBB SHADOW E&M-EST. PATIENT-LVL I: ICD-10-PCS | Mod: PBBFAC,BMT,, | Performed by: DIETITIAN, REGISTERED

## 2021-01-20 PROCEDURE — 99999 PR PBB SHADOW E&M-EST. PATIENT-LVL I: CPT | Mod: PBBFAC,BMT,, | Performed by: DIETITIAN, REGISTERED

## 2021-01-20 PROCEDURE — 3008F BODY MASS INDEX DOCD: CPT | Mod: BMT,CPTII,S$GLB, | Performed by: NURSE PRACTITIONER

## 2021-01-20 PROCEDURE — 97802 PR MED NUTR THER, 1ST, INDIV, EA 15 MIN: ICD-10-PCS | Mod: BMT,S$GLB,, | Performed by: DIETITIAN, REGISTERED

## 2021-01-20 PROCEDURE — 3075F PR MOST RECENT SYSTOLIC BLOOD PRESS GE 130-139MM HG: ICD-10-PCS | Mod: BMT,CPTII,S$GLB, | Performed by: NURSE PRACTITIONER

## 2021-01-20 PROCEDURE — 3078F DIAST BP <80 MM HG: CPT | Mod: BMT,CPTII,S$GLB, | Performed by: NURSE PRACTITIONER

## 2021-01-20 PROCEDURE — 3008F PR BODY MASS INDEX (BMI) DOCUMENTED: ICD-10-PCS | Mod: BMT,CPTII,S$GLB, | Performed by: NURSE PRACTITIONER

## 2021-01-20 PROCEDURE — 99999 PR PBB SHADOW E&M-EST. PATIENT-LVL IV: CPT | Mod: PBBFAC,BMT,, | Performed by: NURSE PRACTITIONER

## 2021-01-20 PROCEDURE — 3078F PR MOST RECENT DIASTOLIC BLOOD PRESSURE < 80 MM HG: ICD-10-PCS | Mod: BMT,CPTII,S$GLB, | Performed by: NURSE PRACTITIONER

## 2021-01-20 RX ORDER — ONDANSETRON HYDROCHLORIDE 8 MG/1
8 TABLET, FILM COATED ORAL EVERY 8 HOURS PRN
Qty: 30 TABLET | Refills: 4
Start: 2021-01-20 | End: 2021-03-03 | Stop reason: SDUPTHER

## 2021-01-20 RX ORDER — MORPHINE SULFATE 15 MG/1
30 TABLET ORAL EVERY 6 HOURS PRN
Qty: 90 TABLET | Refills: 0 | Status: SHIPPED | OUTPATIENT
Start: 2021-01-20 | End: 2021-03-04 | Stop reason: SDUPTHER

## 2021-01-20 RX ORDER — TRAZODONE HYDROCHLORIDE 100 MG/1
TABLET ORAL
COMMUNITY
Start: 2021-01-07 | End: 2021-01-20

## 2021-01-20 RX ORDER — DIAZEPAM 5 MG/1
5 TABLET ORAL
Qty: 2 TABLET | Refills: 0 | Status: SHIPPED | OUTPATIENT
Start: 2021-01-20 | End: 2021-03-03

## 2021-01-20 RX ORDER — DIAZEPAM 5 MG/1
5 TABLET ORAL EVERY 6 HOURS PRN
Qty: 2 TABLET | Refills: 0 | Status: CANCELLED | OUTPATIENT
Start: 2021-01-20 | End: 2021-01-21

## 2021-01-20 RX ORDER — TACROLIMUS 0.5 MG/1
1.5 CAPSULE ORAL EVERY 12 HOURS
Qty: 60 CAPSULE | Refills: 2 | Status: SHIPPED | OUTPATIENT
Start: 2021-01-20 | End: 2021-02-03 | Stop reason: CLARIF

## 2021-01-28 ENCOUNTER — TELEPHONE (OUTPATIENT)
Dept: HEMATOLOGY/ONCOLOGY | Facility: CLINIC | Age: 61
End: 2021-01-28

## 2021-02-01 ENCOUNTER — PATIENT MESSAGE (OUTPATIENT)
Dept: HEMATOLOGY/ONCOLOGY | Facility: CLINIC | Age: 61
End: 2021-02-01

## 2021-02-01 DIAGNOSIS — G47.00 INSOMNIA, UNSPECIFIED TYPE: ICD-10-CM

## 2021-02-01 RX ORDER — ESZOPICLONE 1 MG/1
1 TABLET, FILM COATED ORAL NIGHTLY
Qty: 30 TABLET | Refills: 3 | Status: SHIPPED | OUTPATIENT
Start: 2021-02-01 | End: 2021-02-03 | Stop reason: SDUPTHER

## 2021-02-02 DIAGNOSIS — C92.10 CML (CHRONIC MYELOCYTIC LEUKEMIA): Primary | ICD-10-CM

## 2021-02-02 DIAGNOSIS — Z94.84 HISTORY OF ALLOGENEIC STEM CELL TRANSPLANT: ICD-10-CM

## 2021-02-03 ENCOUNTER — OFFICE VISIT (OUTPATIENT)
Dept: HEMATOLOGY/ONCOLOGY | Facility: CLINIC | Age: 61
End: 2021-02-03
Payer: COMMERCIAL

## 2021-02-03 ENCOUNTER — LAB VISIT (OUTPATIENT)
Dept: LAB | Facility: HOSPITAL | Age: 61
End: 2021-02-03
Attending: NURSE PRACTITIONER
Payer: COMMERCIAL

## 2021-02-03 ENCOUNTER — PROCEDURE VISIT (OUTPATIENT)
Dept: HEMATOLOGY/ONCOLOGY | Facility: CLINIC | Age: 61
End: 2021-02-03
Payer: COMMERCIAL

## 2021-02-03 VITALS
OXYGEN SATURATION: 97 % | BODY MASS INDEX: 31.36 KG/M2 | HEART RATE: 90 BPM | RESPIRATION RATE: 16 BRPM | SYSTOLIC BLOOD PRESSURE: 119 MMHG | HEIGHT: 66 IN | DIASTOLIC BLOOD PRESSURE: 66 MMHG | WEIGHT: 195.13 LBS

## 2021-02-03 VITALS
DIASTOLIC BLOOD PRESSURE: 66 MMHG | OXYGEN SATURATION: 97 % | SYSTOLIC BLOOD PRESSURE: 119 MMHG | HEART RATE: 90 BPM | HEIGHT: 66 IN | BODY MASS INDEX: 31.36 KG/M2 | WEIGHT: 195.13 LBS | RESPIRATION RATE: 16 BRPM

## 2021-02-03 DIAGNOSIS — D63.0 ANEMIA IN NEOPLASTIC DISEASE: ICD-10-CM

## 2021-02-03 DIAGNOSIS — F17.210 CIGARETTE SMOKER: ICD-10-CM

## 2021-02-03 DIAGNOSIS — C92.10 CML (CHRONIC MYELOCYTIC LEUKEMIA): ICD-10-CM

## 2021-02-03 DIAGNOSIS — R19.7 DIARRHEA, UNSPECIFIED TYPE: ICD-10-CM

## 2021-02-03 DIAGNOSIS — Z94.84 HISTORY OF ALLOGENEIC STEM CELL TRANSPLANT: ICD-10-CM

## 2021-02-03 DIAGNOSIS — D64.89 ANEMIA DUE TO MEDICATION: ICD-10-CM

## 2021-02-03 DIAGNOSIS — C92.01 ACUTE MYELOID LEUKEMIA IN REMISSION: Primary | ICD-10-CM

## 2021-02-03 DIAGNOSIS — E83.42 HYPOMAGNESEMIA: ICD-10-CM

## 2021-02-03 DIAGNOSIS — D84.9 IMMUNOCOMPROMISED PATIENT: ICD-10-CM

## 2021-02-03 DIAGNOSIS — Z94.81 S/P ALLOGENEIC BONE MARROW TRANSPLANT: ICD-10-CM

## 2021-02-03 DIAGNOSIS — Z94.84 HISTORY OF ALLOGENEIC STEM CELL TRANSPLANT: Primary | ICD-10-CM

## 2021-02-03 DIAGNOSIS — N40.0 BENIGN PROSTATIC HYPERPLASIA WITHOUT LOWER URINARY TRACT SYMPTOMS: ICD-10-CM

## 2021-02-03 DIAGNOSIS — R63.0 DECREASED APPETITE: ICD-10-CM

## 2021-02-03 DIAGNOSIS — I10 ESSENTIAL HYPERTENSION: ICD-10-CM

## 2021-02-03 DIAGNOSIS — G47.00 INSOMNIA, UNSPECIFIED TYPE: ICD-10-CM

## 2021-02-03 DIAGNOSIS — M54.2 NECK PAIN: ICD-10-CM

## 2021-02-03 DIAGNOSIS — D89.813 GVH (GRAFT VERSUS HOST DISEASE): ICD-10-CM

## 2021-02-03 DIAGNOSIS — R39.11 URINARY HESITANCY: ICD-10-CM

## 2021-02-03 DIAGNOSIS — K21.9 GASTROESOPHAGEAL REFLUX DISEASE WITHOUT ESOPHAGITIS: ICD-10-CM

## 2021-02-03 LAB
ABO + RH BLD: NORMAL
ALBUMIN SERPL BCP-MCNC: 4.2 G/DL (ref 3.5–5.2)
ALP SERPL-CCNC: 93 U/L (ref 55–135)
ALT SERPL W/O P-5'-P-CCNC: 49 U/L (ref 10–44)
ANION GAP SERPL CALC-SCNC: 13 MMOL/L (ref 8–16)
AST SERPL-CCNC: 24 U/L (ref 10–40)
BASOPHILS # BLD AUTO: 0.07 K/UL (ref 0–0.2)
BASOPHILS NFR BLD: 0.8 % (ref 0–1.9)
BILIRUB SERPL-MCNC: 0.3 MG/DL (ref 0.1–1)
BLD GP AB SCN CELLS X3 SERPL QL: NORMAL
BUN SERPL-MCNC: 15 MG/DL (ref 8–23)
CALCIUM SERPL-MCNC: 9.5 MG/DL (ref 8.7–10.5)
CHLORIDE SERPL-SCNC: 105 MMOL/L (ref 95–110)
CO2 SERPL-SCNC: 24 MMOL/L (ref 23–29)
CREAT SERPL-MCNC: 1.2 MG/DL (ref 0.5–1.4)
DIFFERENTIAL METHOD: ABNORMAL
EOSINOPHIL # BLD AUTO: 0.4 K/UL (ref 0–0.5)
EOSINOPHIL NFR BLD: 4.1 % (ref 0–8)
ERYTHROCYTE [DISTWIDTH] IN BLOOD BY AUTOMATED COUNT: 14 % (ref 11.5–14.5)
EST. GFR  (AFRICAN AMERICAN): >60 ML/MIN/1.73 M^2
EST. GFR  (NON AFRICAN AMERICAN): >60 ML/MIN/1.73 M^2
GLUCOSE SERPL-MCNC: 100 MG/DL (ref 70–110)
HCT VFR BLD AUTO: 35.5 % (ref 40–54)
HGB BLD-MCNC: 11.7 G/DL (ref 14–18)
IMM GRANULOCYTES # BLD AUTO: 0.09 K/UL (ref 0–0.04)
IMM GRANULOCYTES NFR BLD AUTO: 1 % (ref 0–0.5)
LYMPHOCYTES # BLD AUTO: 1.1 K/UL (ref 1–4.8)
LYMPHOCYTES NFR BLD: 12.1 % (ref 18–48)
MAGNESIUM SERPL-MCNC: 1.8 MG/DL (ref 1.6–2.6)
MCH RBC QN AUTO: 33.1 PG (ref 27–31)
MCHC RBC AUTO-ENTMCNC: 33 G/DL (ref 32–36)
MCV RBC AUTO: 101 FL (ref 82–98)
MONOCYTES # BLD AUTO: 1.1 K/UL (ref 0.3–1)
MONOCYTES NFR BLD: 12.2 % (ref 4–15)
NEUTROPHILS # BLD AUTO: 6.4 K/UL (ref 1.8–7.7)
NEUTROPHILS NFR BLD: 69.8 % (ref 38–73)
NRBC BLD-RTO: 0 /100 WBC
PHOSPHATE SERPL-MCNC: 4.1 MG/DL (ref 2.7–4.5)
PLATELET # BLD AUTO: 253 K/UL (ref 150–350)
PMV BLD AUTO: 10.1 FL (ref 9.2–12.9)
POTASSIUM SERPL-SCNC: 4.7 MMOL/L (ref 3.5–5.1)
PROT SERPL-MCNC: 7.1 G/DL (ref 6–8.4)
RBC # BLD AUTO: 3.53 M/UL (ref 4.6–6.2)
SODIUM SERPL-SCNC: 142 MMOL/L (ref 136–145)
TACROLIMUS BLD-MCNC: 3.5 NG/ML (ref 5–15)
WBC # BLD AUTO: 9.09 K/UL (ref 3.9–12.7)

## 2021-02-03 PROCEDURE — 88305 TISSUE EXAM BY PATHOLOGIST: CPT | Performed by: PATHOLOGY

## 2021-02-03 PROCEDURE — 38222 DX BONE MARROW BX & ASPIR: CPT | Mod: BMT,LT,, | Performed by: NURSE PRACTITIONER

## 2021-02-03 PROCEDURE — 81268 CHIMERISM ANAL W/CELL SELECT: CPT | Mod: 91

## 2021-02-03 PROCEDURE — 87799 DETECT AGENT NOS DNA QUANT: CPT

## 2021-02-03 PROCEDURE — 81450 HL NEO GSAP 5-50DNA/DNA&RNA: CPT

## 2021-02-03 PROCEDURE — 99215 OFFICE O/P EST HI 40 MIN: CPT | Mod: BMT,S$GLB,, | Performed by: NURSE PRACTITIONER

## 2021-02-03 PROCEDURE — 88365 PR  TISSUE HYBRIDIZATION: ICD-10-PCS | Mod: 26,BMT,, | Performed by: PATHOLOGY

## 2021-02-03 PROCEDURE — 85097 BONE MARROW INTERPRETATION: CPT | Mod: BMT,,, | Performed by: PATHOLOGY

## 2021-02-03 PROCEDURE — 85025 COMPLETE CBC W/AUTO DIFF WBC: CPT

## 2021-02-03 PROCEDURE — 88342 IMHCHEM/IMCYTCHM 1ST ANTB: CPT | Performed by: PATHOLOGY

## 2021-02-03 PROCEDURE — 3008F PR BODY MASS INDEX (BMI) DOCUMENTED: ICD-10-PCS | Mod: BMT,CPTII,S$GLB, | Performed by: NURSE PRACTITIONER

## 2021-02-03 PROCEDURE — 88185 FLOWCYTOMETRY/TC ADD-ON: CPT | Performed by: PATHOLOGY

## 2021-02-03 PROCEDURE — 3074F PR MOST RECENT SYSTOLIC BLOOD PRESSURE < 130 MM HG: ICD-10-PCS | Mod: BMT,CPTII,S$GLB, | Performed by: NURSE PRACTITIONER

## 2021-02-03 PROCEDURE — 88365 INSITU HYBRIDIZATION (FISH): CPT | Mod: 26,BMT,, | Performed by: PATHOLOGY

## 2021-02-03 PROCEDURE — 88311 DECALCIFY TISSUE: CPT | Mod: 26,BMT,, | Performed by: PATHOLOGY

## 2021-02-03 PROCEDURE — 84100 ASSAY OF PHOSPHORUS: CPT

## 2021-02-03 PROCEDURE — 88305 TISSUE EXAM BY PATHOLOGIST: ICD-10-PCS | Mod: 26,BMT,, | Performed by: PATHOLOGY

## 2021-02-03 PROCEDURE — 88311 DECALCIFY TISSUE: CPT | Performed by: PATHOLOGY

## 2021-02-03 PROCEDURE — 88271 CYTOGENETICS DNA PROBE: CPT | Mod: 59

## 2021-02-03 PROCEDURE — 88275 CYTOGENETICS 100-300: CPT | Mod: 59

## 2021-02-03 PROCEDURE — 88342 IMHCHEM/IMCYTCHM 1ST ANTB: CPT | Mod: 26,59,BMT, | Performed by: PATHOLOGY

## 2021-02-03 PROCEDURE — 88313 PR  SPECIAL STAINS,GROUP II: ICD-10-PCS | Mod: 26,BMT,, | Performed by: PATHOLOGY

## 2021-02-03 PROCEDURE — 81268 CHIMERISM ANAL W/CELL SELECT: CPT

## 2021-02-03 PROCEDURE — 1126F PR PAIN SEVERITY QUANTIFIED, NO PAIN PRESENT: ICD-10-PCS | Mod: BMT,S$GLB,, | Performed by: NURSE PRACTITIONER

## 2021-02-03 PROCEDURE — 3008F BODY MASS INDEX DOCD: CPT | Mod: BMT,CPTII,S$GLB, | Performed by: NURSE PRACTITIONER

## 2021-02-03 PROCEDURE — 30000890 MAYO MISCELLANEOUS TEST (REFLEX)

## 2021-02-03 PROCEDURE — 83735 ASSAY OF MAGNESIUM: CPT

## 2021-02-03 PROCEDURE — 88313 SPECIAL STAINS GROUP 2: CPT | Mod: 59 | Performed by: PATHOLOGY

## 2021-02-03 PROCEDURE — 81207 BCR/ABL1 GENE MINOR BP: CPT

## 2021-02-03 PROCEDURE — 99215 PR OFFICE/OUTPT VISIT, EST, LEVL V, 40-54 MIN: ICD-10-PCS | Mod: BMT,S$GLB,, | Performed by: NURSE PRACTITIONER

## 2021-02-03 PROCEDURE — 88237 TISSUE CULTURE BONE MARROW: CPT

## 2021-02-03 PROCEDURE — 38222 PR BONE MARROW BIOPSY(IES) W/ASPIRATION(S); DIAGNOSTIC: ICD-10-PCS | Mod: BMT,LT,, | Performed by: NURSE PRACTITIONER

## 2021-02-03 PROCEDURE — 88341 IMHCHEM/IMCYTCHM EA ADD ANTB: CPT | Mod: 59 | Performed by: PATHOLOGY

## 2021-02-03 PROCEDURE — 3074F SYST BP LT 130 MM HG: CPT | Mod: BMT,CPTII,S$GLB, | Performed by: NURSE PRACTITIONER

## 2021-02-03 PROCEDURE — 88189 FLOWCYTOMETRY/READ 16 & >: CPT | Mod: BMT,,, | Performed by: PATHOLOGY

## 2021-02-03 PROCEDURE — 1126F AMNT PAIN NOTED NONE PRSNT: CPT | Mod: BMT,S$GLB,, | Performed by: NURSE PRACTITIONER

## 2021-02-03 PROCEDURE — 88365 INSITU HYBRIDIZATION (FISH): CPT | Mod: 59 | Performed by: PATHOLOGY

## 2021-02-03 PROCEDURE — 88313 SPECIAL STAINS GROUP 2: CPT | Mod: 26,BMT,, | Performed by: PATHOLOGY

## 2021-02-03 PROCEDURE — 88342 CHG IMMUNOCYTOCHEMISTRY: ICD-10-PCS | Mod: 26,59,BMT, | Performed by: PATHOLOGY

## 2021-02-03 PROCEDURE — 3078F DIAST BP <80 MM HG: CPT | Mod: BMT,CPTII,S$GLB, | Performed by: NURSE PRACTITIONER

## 2021-02-03 PROCEDURE — 88341 IMHCHEM/IMCYTCHM EA ADD ANTB: CPT | Mod: 26,BMT,, | Performed by: PATHOLOGY

## 2021-02-03 PROCEDURE — 88184 FLOWCYTOMETRY/ TC 1 MARKER: CPT | Performed by: PATHOLOGY

## 2021-02-03 PROCEDURE — 86900 BLOOD TYPING SEROLOGIC ABO: CPT

## 2021-02-03 PROCEDURE — 88189 PR  FLOWCYTOMETRY/READ, 16 & > MARKERS: ICD-10-PCS | Mod: BMT,,, | Performed by: PATHOLOGY

## 2021-02-03 PROCEDURE — 88264 CHROMOSOME ANALYSIS 20-25: CPT

## 2021-02-03 PROCEDURE — 88311 PR  DECALCIFY TISSUE: ICD-10-PCS | Mod: 26,BMT,, | Performed by: PATHOLOGY

## 2021-02-03 PROCEDURE — 99999 PR PBB SHADOW E&M-EST. PATIENT-LVL IV: ICD-10-PCS | Mod: PBBFAC,BMT,, | Performed by: NURSE PRACTITIONER

## 2021-02-03 PROCEDURE — 85097 PR  BONE MARROW,SMEAR INTERPRETATION: ICD-10-PCS | Mod: BMT,,, | Performed by: PATHOLOGY

## 2021-02-03 PROCEDURE — 36415 COLL VENOUS BLD VENIPUNCTURE: CPT

## 2021-02-03 PROCEDURE — 88341 PR IHC OR ICC EACH ADD'L SINGLE ANTIBODY  STAINPR: ICD-10-PCS | Mod: 26,BMT,, | Performed by: PATHOLOGY

## 2021-02-03 PROCEDURE — 80053 COMPREHEN METABOLIC PANEL: CPT

## 2021-02-03 PROCEDURE — 99999 PR PBB SHADOW E&M-EST. PATIENT-LVL IV: CPT | Mod: PBBFAC,BMT,, | Performed by: NURSE PRACTITIONER

## 2021-02-03 PROCEDURE — 80197 ASSAY OF TACROLIMUS: CPT

## 2021-02-03 PROCEDURE — 3078F PR MOST RECENT DIASTOLIC BLOOD PRESSURE < 80 MM HG: ICD-10-PCS | Mod: BMT,CPTII,S$GLB, | Performed by: NURSE PRACTITIONER

## 2021-02-03 PROCEDURE — 88305 TISSUE EXAM BY PATHOLOGIST: CPT | Mod: 26,BMT,, | Performed by: PATHOLOGY

## 2021-02-03 RX ORDER — ESZOPICLONE 1 MG/1
2 TABLET, FILM COATED ORAL NIGHTLY
Qty: 60 TABLET | Refills: 3 | Status: SHIPPED | OUTPATIENT
Start: 2021-02-03 | End: 2021-03-05

## 2021-02-03 RX ORDER — TACROLIMUS 0.5 MG/1
0.5 CAPSULE ORAL EVERY 12 HOURS
Qty: 60 CAPSULE | Refills: 2 | Status: SHIPPED | OUTPATIENT
Start: 2021-02-03 | End: 2021-02-08 | Stop reason: SDUPTHER

## 2021-02-05 ENCOUNTER — PATIENT MESSAGE (OUTPATIENT)
Dept: HEMATOLOGY/ONCOLOGY | Facility: CLINIC | Age: 61
End: 2021-02-05

## 2021-02-05 PROCEDURE — 36415 COLL VENOUS BLD VENIPUNCTURE: CPT

## 2021-02-08 ENCOUNTER — PATIENT MESSAGE (OUTPATIENT)
Dept: HEMATOLOGY/ONCOLOGY | Facility: CLINIC | Age: 61
End: 2021-02-08

## 2021-02-08 DIAGNOSIS — Z94.84 HISTORY OF ALLOGENEIC STEM CELL TRANSPLANT: ICD-10-CM

## 2021-02-08 LAB
AML FISH ADDITIONAL INFORMATION (BM): NORMAL
AML FISH DISCLAIMER (BM): NORMAL
AML FISH REASON FOR REFERRAL (BM): NORMAL
AML FISH RELEASED BY (BM): NORMAL
AML FISH RESULT (BM): NORMAL
AML FISH RESULT SUMMARY (BM): NORMAL
AML FISH RESULT TABLE (BM): NORMAL
CLINICAL CYTOGENETICIST REVIEW: NORMAL
CMV DNA SERPL NAA+PROBE-ACNC: NORMAL IU/ML
FAMLB SPECIMEN: NORMAL
REF LAB TEST METHOD: NORMAL
SPECIMEN SOURCE: NORMAL

## 2021-02-08 RX ORDER — TAMSULOSIN HYDROCHLORIDE 0.4 MG/1
0.8 CAPSULE ORAL DAILY
Qty: 30 CAPSULE | Refills: 3 | Status: SHIPPED | OUTPATIENT
Start: 2021-02-08 | End: 2021-04-07

## 2021-02-08 RX ORDER — TACROLIMUS 0.5 MG/1
0.5 CAPSULE ORAL EVERY 12 HOURS
Qty: 60 CAPSULE | Refills: 2 | Status: SHIPPED | OUTPATIENT
Start: 2021-02-08 | End: 2021-03-31 | Stop reason: ALTCHOICE

## 2021-02-09 ENCOUNTER — PATIENT MESSAGE (OUTPATIENT)
Dept: HEMATOLOGY/ONCOLOGY | Facility: CLINIC | Age: 61
End: 2021-02-09

## 2021-02-09 LAB
CHROM BANDING METHOD: NORMAL
CHROMOSOME ANALYSIS BM ADDITIONAL INFORMATION: NORMAL
CHROMOSOME ANALYSIS BM RELEASED BY: NORMAL
CHROMOSOME ANALYSIS BM RESULT SUMMARY: NORMAL
CLINICAL CYTOGENETICIST REVIEW: NORMAL
DIAGNOSTIC BCR/ABL 1 RESULT: NORMAL
KARYOTYP MAR: NORMAL
NARRATIVE DIAGNOSTIC REPORT-IMP: NORMAL
REASON FOR REFERRAL (NARRATIVE): NORMAL
REF LAB TEST METHOD: NORMAL
SPECIMEN SOURCE: NORMAL
SPECIMEN SOURCE: NORMAL
SPECIMEN: NORMAL

## 2021-02-10 LAB
FINAL DIAGNOSIS - CHIMERISM SORT: NORMAL
SPECIMEN TYPE -CHIMERISM SORT: NORMAL

## 2021-02-11 LAB
EBV DNA SERPL NAA+PROBE-ACNC: NOT DETECTED IU/ML
MAYO MISCELLANEOUS RESULT (REF): NORMAL

## 2021-02-16 LAB
ANNOTATION COMMENT IMP: NORMAL
NGS CLINCIAL TRIALS: NORMAL
NGS INDICATION OF TEST: NORMAL
NGS INTERPRETATION: NORMAL
NGS ONCOHEME PANEL GENE LIST: NORMAL
NGS PATHOGENIC MUTATIONS DETECTED: NORMAL
NGS REVIEWED BY:: NORMAL
NGS VARIANTS OF UNKNOWN SIGNIFICANCE: NORMAL
NGSHM RESULT, BONE MARROW: NORMAL
REF LAB TEST METHOD: NORMAL
SPECIMEN SOURCE: NORMAL
TEST PERFORMANCE INFO SPEC: NORMAL

## 2021-02-17 ENCOUNTER — OFFICE VISIT (OUTPATIENT)
Dept: HEMATOLOGY/ONCOLOGY | Facility: CLINIC | Age: 61
End: 2021-02-17
Payer: COMMERCIAL

## 2021-02-17 VITALS
HEIGHT: 66 IN | BODY MASS INDEX: 31.16 KG/M2 | HEART RATE: 78 BPM | RESPIRATION RATE: 16 BRPM | OXYGEN SATURATION: 97 % | WEIGHT: 193.88 LBS | DIASTOLIC BLOOD PRESSURE: 73 MMHG | SYSTOLIC BLOOD PRESSURE: 114 MMHG

## 2021-02-17 DIAGNOSIS — Z94.84 HISTORY OF ALLOGENEIC STEM CELL TRANSPLANT: ICD-10-CM

## 2021-02-17 DIAGNOSIS — C92.10 CML (CHRONIC MYELOCYTIC LEUKEMIA): ICD-10-CM

## 2021-02-17 DIAGNOSIS — D89.813 GVH (GRAFT VERSUS HOST DISEASE): ICD-10-CM

## 2021-02-17 DIAGNOSIS — C92.01 ACUTE MYELOID LEUKEMIA IN REMISSION: Primary | ICD-10-CM

## 2021-02-17 PROCEDURE — 3074F PR MOST RECENT SYSTOLIC BLOOD PRESSURE < 130 MM HG: ICD-10-PCS | Mod: BMT,CPTII,S$GLB, | Performed by: INTERNAL MEDICINE

## 2021-02-17 PROCEDURE — 1126F PR PAIN SEVERITY QUANTIFIED, NO PAIN PRESENT: ICD-10-PCS | Mod: BMT,S$GLB,, | Performed by: INTERNAL MEDICINE

## 2021-02-17 PROCEDURE — 3008F BODY MASS INDEX DOCD: CPT | Mod: BMT,CPTII,S$GLB, | Performed by: INTERNAL MEDICINE

## 2021-02-17 PROCEDURE — 3078F DIAST BP <80 MM HG: CPT | Mod: BMT,CPTII,S$GLB, | Performed by: INTERNAL MEDICINE

## 2021-02-17 PROCEDURE — 99215 PR OFFICE/OUTPT VISIT, EST, LEVL V, 40-54 MIN: ICD-10-PCS | Mod: BMT,S$GLB,, | Performed by: INTERNAL MEDICINE

## 2021-02-17 PROCEDURE — 3078F PR MOST RECENT DIASTOLIC BLOOD PRESSURE < 80 MM HG: ICD-10-PCS | Mod: BMT,CPTII,S$GLB, | Performed by: INTERNAL MEDICINE

## 2021-02-17 PROCEDURE — 99999 PR PBB SHADOW E&M-EST. PATIENT-LVL IV: CPT | Mod: PBBFAC,BMT,, | Performed by: INTERNAL MEDICINE

## 2021-02-17 PROCEDURE — 1126F AMNT PAIN NOTED NONE PRSNT: CPT | Mod: BMT,S$GLB,, | Performed by: INTERNAL MEDICINE

## 2021-02-17 PROCEDURE — 3074F SYST BP LT 130 MM HG: CPT | Mod: BMT,CPTII,S$GLB, | Performed by: INTERNAL MEDICINE

## 2021-02-17 PROCEDURE — 99999 PR PBB SHADOW E&M-EST. PATIENT-LVL IV: ICD-10-PCS | Mod: PBBFAC,BMT,, | Performed by: INTERNAL MEDICINE

## 2021-02-17 PROCEDURE — 99215 OFFICE O/P EST HI 40 MIN: CPT | Mod: BMT,S$GLB,, | Performed by: INTERNAL MEDICINE

## 2021-02-17 PROCEDURE — 3008F PR BODY MASS INDEX (BMI) DOCUMENTED: ICD-10-PCS | Mod: BMT,CPTII,S$GLB, | Performed by: INTERNAL MEDICINE

## 2021-02-22 ENCOUNTER — PATIENT MESSAGE (OUTPATIENT)
Dept: HEMATOLOGY/ONCOLOGY | Facility: CLINIC | Age: 61
End: 2021-02-22

## 2021-03-03 ENCOUNTER — LAB VISIT (OUTPATIENT)
Dept: LAB | Facility: HOSPITAL | Age: 61
End: 2021-03-03
Attending: NURSE PRACTITIONER
Payer: COMMERCIAL

## 2021-03-03 ENCOUNTER — OFFICE VISIT (OUTPATIENT)
Dept: HEMATOLOGY/ONCOLOGY | Facility: CLINIC | Age: 61
End: 2021-03-03
Payer: COMMERCIAL

## 2021-03-03 VITALS
DIASTOLIC BLOOD PRESSURE: 70 MMHG | BODY MASS INDEX: 30.09 KG/M2 | HEART RATE: 83 BPM | SYSTOLIC BLOOD PRESSURE: 123 MMHG | RESPIRATION RATE: 16 BRPM | HEIGHT: 66 IN | TEMPERATURE: 98 F | OXYGEN SATURATION: 98 % | WEIGHT: 187.25 LBS

## 2021-03-03 DIAGNOSIS — Z94.81 S/P ALLOGENEIC BONE MARROW TRANSPLANT: ICD-10-CM

## 2021-03-03 DIAGNOSIS — N40.0 BENIGN PROSTATIC HYPERPLASIA WITHOUT LOWER URINARY TRACT SYMPTOMS: ICD-10-CM

## 2021-03-03 DIAGNOSIS — Z94.84 HISTORY OF ALLOGENEIC STEM CELL TRANSPLANT: Primary | ICD-10-CM

## 2021-03-03 DIAGNOSIS — D89.813 GVH (GRAFT VERSUS HOST DISEASE): ICD-10-CM

## 2021-03-03 DIAGNOSIS — E83.42 HYPOMAGNESEMIA: ICD-10-CM

## 2021-03-03 DIAGNOSIS — C92.10 CML (CHRONIC MYELOCYTIC LEUKEMIA): ICD-10-CM

## 2021-03-03 DIAGNOSIS — K21.9 GASTROESOPHAGEAL REFLUX DISEASE WITHOUT ESOPHAGITIS: ICD-10-CM

## 2021-03-03 DIAGNOSIS — M48.02 CERVICAL STENOSIS OF SPINE: ICD-10-CM

## 2021-03-03 DIAGNOSIS — I10 ESSENTIAL HYPERTENSION: ICD-10-CM

## 2021-03-03 DIAGNOSIS — D64.89 ANEMIA DUE TO MEDICATION: ICD-10-CM

## 2021-03-03 LAB
ABO + RH BLD: NORMAL
ALBUMIN SERPL BCP-MCNC: 4.4 G/DL (ref 3.5–5.2)
ALP SERPL-CCNC: 72 U/L (ref 55–135)
ALT SERPL W/O P-5'-P-CCNC: 36 U/L (ref 10–44)
ANION GAP SERPL CALC-SCNC: 10 MMOL/L (ref 8–16)
AST SERPL-CCNC: 19 U/L (ref 10–40)
BASOPHILS # BLD AUTO: 0.06 K/UL (ref 0–0.2)
BASOPHILS NFR BLD: 0.8 % (ref 0–1.9)
BILIRUB SERPL-MCNC: 0.2 MG/DL (ref 0.1–1)
BLD GP AB SCN CELLS X3 SERPL QL: NORMAL
BUN SERPL-MCNC: 12 MG/DL (ref 8–23)
CALCIUM SERPL-MCNC: 10.2 MG/DL (ref 8.7–10.5)
CHLORIDE SERPL-SCNC: 108 MMOL/L (ref 95–110)
CO2 SERPL-SCNC: 26 MMOL/L (ref 23–29)
CREAT SERPL-MCNC: 1 MG/DL (ref 0.5–1.4)
DIFFERENTIAL METHOD: ABNORMAL
EOSINOPHIL # BLD AUTO: 0.3 K/UL (ref 0–0.5)
EOSINOPHIL NFR BLD: 3.9 % (ref 0–8)
ERYTHROCYTE [DISTWIDTH] IN BLOOD BY AUTOMATED COUNT: 13.5 % (ref 11.5–14.5)
EST. GFR  (AFRICAN AMERICAN): >60 ML/MIN/1.73 M^2
EST. GFR  (NON AFRICAN AMERICAN): >60 ML/MIN/1.73 M^2
GLUCOSE SERPL-MCNC: 100 MG/DL (ref 70–110)
HCT VFR BLD AUTO: 37.7 % (ref 40–54)
HGB BLD-MCNC: 12.4 G/DL (ref 14–18)
IMM GRANULOCYTES # BLD AUTO: 0.02 K/UL (ref 0–0.04)
IMM GRANULOCYTES NFR BLD AUTO: 0.3 % (ref 0–0.5)
LYMPHOCYTES # BLD AUTO: 1.4 K/UL (ref 1–4.8)
LYMPHOCYTES NFR BLD: 19.1 % (ref 18–48)
MAGNESIUM SERPL-MCNC: 2 MG/DL (ref 1.6–2.6)
MCH RBC QN AUTO: 32.2 PG (ref 27–31)
MCHC RBC AUTO-ENTMCNC: 32.9 G/DL (ref 32–36)
MCV RBC AUTO: 98 FL (ref 82–98)
MONOCYTES # BLD AUTO: 0.7 K/UL (ref 0.3–1)
MONOCYTES NFR BLD: 9.8 % (ref 4–15)
NEUTROPHILS # BLD AUTO: 4.7 K/UL (ref 1.8–7.7)
NEUTROPHILS NFR BLD: 66.1 % (ref 38–73)
NRBC BLD-RTO: 0 /100 WBC
PHOSPHATE SERPL-MCNC: 2.9 MG/DL (ref 2.7–4.5)
PLATELET # BLD AUTO: 248 K/UL (ref 150–350)
PMV BLD AUTO: 10 FL (ref 9.2–12.9)
POTASSIUM SERPL-SCNC: 4.9 MMOL/L (ref 3.5–5.1)
PROT SERPL-MCNC: 7.2 G/DL (ref 6–8.4)
RBC # BLD AUTO: 3.85 M/UL (ref 4.6–6.2)
SODIUM SERPL-SCNC: 144 MMOL/L (ref 136–145)
TACROLIMUS BLD-MCNC: 3.6 NG/ML (ref 5–15)
WBC # BLD AUTO: 7.17 K/UL (ref 3.9–12.7)

## 2021-03-03 PROCEDURE — 99999 PR PBB SHADOW E&M-EST. PATIENT-LVL IV: CPT | Mod: PBBFAC,BMT,, | Performed by: NURSE PRACTITIONER

## 2021-03-03 PROCEDURE — 86900 BLOOD TYPING SEROLOGIC ABO: CPT

## 2021-03-03 PROCEDURE — 99215 OFFICE O/P EST HI 40 MIN: CPT | Mod: BMT,S$GLB,, | Performed by: NURSE PRACTITIONER

## 2021-03-03 PROCEDURE — 3008F PR BODY MASS INDEX (BMI) DOCUMENTED: ICD-10-PCS | Mod: BMT,CPTII,S$GLB, | Performed by: NURSE PRACTITIONER

## 2021-03-03 PROCEDURE — 80053 COMPREHEN METABOLIC PANEL: CPT

## 2021-03-03 PROCEDURE — 87799 DETECT AGENT NOS DNA QUANT: CPT

## 2021-03-03 PROCEDURE — 3074F PR MOST RECENT SYSTOLIC BLOOD PRESSURE < 130 MM HG: ICD-10-PCS | Mod: BMT,CPTII,S$GLB, | Performed by: NURSE PRACTITIONER

## 2021-03-03 PROCEDURE — 3078F PR MOST RECENT DIASTOLIC BLOOD PRESSURE < 80 MM HG: ICD-10-PCS | Mod: BMT,CPTII,S$GLB, | Performed by: NURSE PRACTITIONER

## 2021-03-03 PROCEDURE — 3008F BODY MASS INDEX DOCD: CPT | Mod: BMT,CPTII,S$GLB, | Performed by: NURSE PRACTITIONER

## 2021-03-03 PROCEDURE — 80197 ASSAY OF TACROLIMUS: CPT

## 2021-03-03 PROCEDURE — 83735 ASSAY OF MAGNESIUM: CPT

## 2021-03-03 PROCEDURE — 3074F SYST BP LT 130 MM HG: CPT | Mod: BMT,CPTII,S$GLB, | Performed by: NURSE PRACTITIONER

## 2021-03-03 PROCEDURE — 99215 PR OFFICE/OUTPT VISIT, EST, LEVL V, 40-54 MIN: ICD-10-PCS | Mod: BMT,S$GLB,, | Performed by: NURSE PRACTITIONER

## 2021-03-03 PROCEDURE — 36415 COLL VENOUS BLD VENIPUNCTURE: CPT

## 2021-03-03 PROCEDURE — 85025 COMPLETE CBC W/AUTO DIFF WBC: CPT

## 2021-03-03 PROCEDURE — 1125F AMNT PAIN NOTED PAIN PRSNT: CPT | Mod: BMT,S$GLB,, | Performed by: NURSE PRACTITIONER

## 2021-03-03 PROCEDURE — 99999 PR PBB SHADOW E&M-EST. PATIENT-LVL IV: ICD-10-PCS | Mod: PBBFAC,BMT,, | Performed by: NURSE PRACTITIONER

## 2021-03-03 PROCEDURE — 3078F DIAST BP <80 MM HG: CPT | Mod: BMT,CPTII,S$GLB, | Performed by: NURSE PRACTITIONER

## 2021-03-03 PROCEDURE — 84100 ASSAY OF PHOSPHORUS: CPT

## 2021-03-03 PROCEDURE — 1125F PR PAIN SEVERITY QUANTIFIED, PAIN PRESENT: ICD-10-PCS | Mod: BMT,S$GLB,, | Performed by: NURSE PRACTITIONER

## 2021-03-03 RX ORDER — HYDROCORTISONE 25 MG/G
1 CREAM TOPICAL 2 TIMES DAILY PRN
COMMUNITY
Start: 2021-03-02

## 2021-03-03 RX ORDER — TRIAMCINOLONE ACETONIDE 1 MG/G
CREAM TOPICAL 2 TIMES DAILY
COMMUNITY
End: 2022-08-02

## 2021-03-03 RX ORDER — DIPHENHYDRAMINE HCL 25 MG
25 CAPSULE ORAL EVERY 6 HOURS PRN
COMMUNITY
End: 2023-02-06

## 2021-03-04 RX ORDER — MORPHINE SULFATE 15 MG/1
30 TABLET ORAL EVERY 6 HOURS PRN
Qty: 90 TABLET | Refills: 0 | Status: SHIPPED | OUTPATIENT
Start: 2021-03-04 | End: 2021-04-16 | Stop reason: SDUPTHER

## 2021-03-05 LAB — CMV DNA SERPL NAA+PROBE-ACNC: NORMAL IU/ML

## 2021-03-10 LAB — EBV DNA SERPL NAA+PROBE-ACNC: NORMAL IU/ML

## 2021-03-17 ENCOUNTER — OFFICE VISIT (OUTPATIENT)
Dept: HEMATOLOGY/ONCOLOGY | Facility: CLINIC | Age: 61
End: 2021-03-17
Payer: COMMERCIAL

## 2021-03-17 VITALS
TEMPERATURE: 98 F | RESPIRATION RATE: 16 BRPM | BODY MASS INDEX: 29.88 KG/M2 | WEIGHT: 185.94 LBS | HEART RATE: 72 BPM | OXYGEN SATURATION: 96 % | DIASTOLIC BLOOD PRESSURE: 75 MMHG | HEIGHT: 66 IN | SYSTOLIC BLOOD PRESSURE: 119 MMHG

## 2021-03-17 DIAGNOSIS — C92.10 CML (CHRONIC MYELOCYTIC LEUKEMIA): ICD-10-CM

## 2021-03-17 DIAGNOSIS — C92.01 ACUTE MYELOID LEUKEMIA IN REMISSION: Primary | ICD-10-CM

## 2021-03-17 DIAGNOSIS — Z94.84 HISTORY OF ALLOGENEIC STEM CELL TRANSPLANT: ICD-10-CM

## 2021-03-17 PROCEDURE — 3008F PR BODY MASS INDEX (BMI) DOCUMENTED: ICD-10-PCS | Mod: BMT,CPTII,S$GLB, | Performed by: INTERNAL MEDICINE

## 2021-03-17 PROCEDURE — 3078F DIAST BP <80 MM HG: CPT | Mod: BMT,CPTII,S$GLB, | Performed by: INTERNAL MEDICINE

## 2021-03-17 PROCEDURE — 1125F PR PAIN SEVERITY QUANTIFIED, PAIN PRESENT: ICD-10-PCS | Mod: BMT,S$GLB,, | Performed by: INTERNAL MEDICINE

## 2021-03-17 PROCEDURE — 3078F PR MOST RECENT DIASTOLIC BLOOD PRESSURE < 80 MM HG: ICD-10-PCS | Mod: BMT,CPTII,S$GLB, | Performed by: INTERNAL MEDICINE

## 2021-03-17 PROCEDURE — 99215 OFFICE O/P EST HI 40 MIN: CPT | Mod: BMT,S$GLB,, | Performed by: INTERNAL MEDICINE

## 2021-03-17 PROCEDURE — 99999 PR PBB SHADOW E&M-EST. PATIENT-LVL IV: ICD-10-PCS | Mod: PBBFAC,BMT,, | Performed by: INTERNAL MEDICINE

## 2021-03-17 PROCEDURE — 99999 PR PBB SHADOW E&M-EST. PATIENT-LVL IV: CPT | Mod: PBBFAC,BMT,, | Performed by: INTERNAL MEDICINE

## 2021-03-17 PROCEDURE — 3008F BODY MASS INDEX DOCD: CPT | Mod: BMT,CPTII,S$GLB, | Performed by: INTERNAL MEDICINE

## 2021-03-17 PROCEDURE — 99215 PR OFFICE/OUTPT VISIT, EST, LEVL V, 40-54 MIN: ICD-10-PCS | Mod: BMT,S$GLB,, | Performed by: INTERNAL MEDICINE

## 2021-03-17 PROCEDURE — 3074F PR MOST RECENT SYSTOLIC BLOOD PRESSURE < 130 MM HG: ICD-10-PCS | Mod: BMT,CPTII,S$GLB, | Performed by: INTERNAL MEDICINE

## 2021-03-17 PROCEDURE — 3074F SYST BP LT 130 MM HG: CPT | Mod: BMT,CPTII,S$GLB, | Performed by: INTERNAL MEDICINE

## 2021-03-17 PROCEDURE — 1125F AMNT PAIN NOTED PAIN PRSNT: CPT | Mod: BMT,S$GLB,, | Performed by: INTERNAL MEDICINE

## 2021-03-17 RX ORDER — ESZOPICLONE 1 MG/1
TABLET, FILM COATED ORAL
COMMUNITY
Start: 2021-03-10 | End: 2021-05-27 | Stop reason: SDUPTHER

## 2021-03-17 RX ORDER — TRAZODONE HYDROCHLORIDE 100 MG/1
100 TABLET ORAL NIGHTLY
COMMUNITY
Start: 2021-03-16 | End: 2021-03-31

## 2021-03-29 ENCOUNTER — DOCUMENTATION ONLY (OUTPATIENT)
Dept: REHABILITATION | Facility: OTHER | Age: 61
End: 2021-03-29

## 2021-03-30 ENCOUNTER — TELEPHONE (OUTPATIENT)
Dept: HEMATOLOGY/ONCOLOGY | Facility: CLINIC | Age: 61
End: 2021-03-30

## 2021-03-31 ENCOUNTER — OFFICE VISIT (OUTPATIENT)
Dept: HEMATOLOGY/ONCOLOGY | Facility: CLINIC | Age: 61
End: 2021-03-31
Payer: COMMERCIAL

## 2021-03-31 VITALS
WEIGHT: 185.31 LBS | TEMPERATURE: 98 F | BODY MASS INDEX: 29.78 KG/M2 | DIASTOLIC BLOOD PRESSURE: 68 MMHG | HEART RATE: 82 BPM | RESPIRATION RATE: 12 BRPM | SYSTOLIC BLOOD PRESSURE: 118 MMHG | HEIGHT: 66 IN | OXYGEN SATURATION: 97 %

## 2021-03-31 DIAGNOSIS — E83.42 HYPOMAGNESEMIA: ICD-10-CM

## 2021-03-31 DIAGNOSIS — R63.4 WEIGHT LOSS: ICD-10-CM

## 2021-03-31 DIAGNOSIS — Z94.84 HISTORY OF ALLOGENEIC STEM CELL TRANSPLANT: Primary | ICD-10-CM

## 2021-03-31 DIAGNOSIS — D89.813 GVH (GRAFT VERSUS HOST DISEASE): ICD-10-CM

## 2021-03-31 DIAGNOSIS — K21.9 GASTROESOPHAGEAL REFLUX DISEASE WITHOUT ESOPHAGITIS: ICD-10-CM

## 2021-03-31 DIAGNOSIS — N40.0 BENIGN PROSTATIC HYPERPLASIA WITHOUT LOWER URINARY TRACT SYMPTOMS: ICD-10-CM

## 2021-03-31 DIAGNOSIS — C92.10 CML (CHRONIC MYELOCYTIC LEUKEMIA): ICD-10-CM

## 2021-03-31 DIAGNOSIS — I10 ESSENTIAL HYPERTENSION: ICD-10-CM

## 2021-03-31 DIAGNOSIS — M48.02 CERVICAL STENOSIS OF SPINE: ICD-10-CM

## 2021-03-31 PROCEDURE — 3074F SYST BP LT 130 MM HG: CPT | Mod: BMT,CPTII,S$GLB, | Performed by: NURSE PRACTITIONER

## 2021-03-31 PROCEDURE — 1125F AMNT PAIN NOTED PAIN PRSNT: CPT | Mod: BMT,S$GLB,, | Performed by: NURSE PRACTITIONER

## 2021-03-31 PROCEDURE — 3078F DIAST BP <80 MM HG: CPT | Mod: BMT,CPTII,S$GLB, | Performed by: NURSE PRACTITIONER

## 2021-03-31 PROCEDURE — 1125F PR PAIN SEVERITY QUANTIFIED, PAIN PRESENT: ICD-10-PCS | Mod: BMT,S$GLB,, | Performed by: NURSE PRACTITIONER

## 2021-03-31 PROCEDURE — 3008F PR BODY MASS INDEX (BMI) DOCUMENTED: ICD-10-PCS | Mod: BMT,CPTII,S$GLB, | Performed by: NURSE PRACTITIONER

## 2021-03-31 PROCEDURE — 99999 PR PBB SHADOW E&M-EST. PATIENT-LVL IV: ICD-10-PCS | Mod: PBBFAC,BMT,, | Performed by: NURSE PRACTITIONER

## 2021-03-31 PROCEDURE — 99215 PR OFFICE/OUTPT VISIT, EST, LEVL V, 40-54 MIN: ICD-10-PCS | Mod: BMT,S$GLB,, | Performed by: NURSE PRACTITIONER

## 2021-03-31 PROCEDURE — 99215 OFFICE O/P EST HI 40 MIN: CPT | Mod: BMT,S$GLB,, | Performed by: NURSE PRACTITIONER

## 2021-03-31 PROCEDURE — 3074F PR MOST RECENT SYSTOLIC BLOOD PRESSURE < 130 MM HG: ICD-10-PCS | Mod: BMT,CPTII,S$GLB, | Performed by: NURSE PRACTITIONER

## 2021-03-31 PROCEDURE — 3078F PR MOST RECENT DIASTOLIC BLOOD PRESSURE < 80 MM HG: ICD-10-PCS | Mod: BMT,CPTII,S$GLB, | Performed by: NURSE PRACTITIONER

## 2021-03-31 PROCEDURE — 3008F BODY MASS INDEX DOCD: CPT | Mod: BMT,CPTII,S$GLB, | Performed by: NURSE PRACTITIONER

## 2021-03-31 PROCEDURE — 99999 PR PBB SHADOW E&M-EST. PATIENT-LVL IV: CPT | Mod: PBBFAC,BMT,, | Performed by: NURSE PRACTITIONER

## 2021-04-16 ENCOUNTER — OFFICE VISIT (OUTPATIENT)
Dept: HEMATOLOGY/ONCOLOGY | Facility: CLINIC | Age: 61
End: 2021-04-16
Payer: COMMERCIAL

## 2021-04-16 ENCOUNTER — LAB VISIT (OUTPATIENT)
Dept: LAB | Facility: HOSPITAL | Age: 61
End: 2021-04-16
Attending: INTERNAL MEDICINE
Payer: COMMERCIAL

## 2021-04-16 VITALS
RESPIRATION RATE: 14 BRPM | DIASTOLIC BLOOD PRESSURE: 85 MMHG | SYSTOLIC BLOOD PRESSURE: 129 MMHG | HEIGHT: 66 IN | HEART RATE: 81 BPM | TEMPERATURE: 97 F | BODY MASS INDEX: 29.44 KG/M2 | WEIGHT: 183.19 LBS | OXYGEN SATURATION: 100 %

## 2021-04-16 DIAGNOSIS — C92.10 CML (CHRONIC MYELOCYTIC LEUKEMIA): ICD-10-CM

## 2021-04-16 DIAGNOSIS — M48.02 CERVICAL STENOSIS OF SPINE: ICD-10-CM

## 2021-04-16 DIAGNOSIS — G89.3 CANCER RELATED PAIN: Primary | ICD-10-CM

## 2021-04-16 DIAGNOSIS — Z94.81 S/P ALLOGENEIC BONE MARROW TRANSPLANT: ICD-10-CM

## 2021-04-16 DIAGNOSIS — Z76.82 STEM CELL TRANSPLANT CANDIDATE: ICD-10-CM

## 2021-04-16 LAB
ALBUMIN SERPL BCP-MCNC: 4.4 G/DL (ref 3.5–5.2)
ALP SERPL-CCNC: 102 U/L (ref 55–135)
ALT SERPL W/O P-5'-P-CCNC: 83 U/L (ref 10–44)
ANION GAP SERPL CALC-SCNC: 6 MMOL/L (ref 8–16)
AST SERPL-CCNC: 36 U/L (ref 10–40)
BASOPHILS # BLD AUTO: 0.05 K/UL (ref 0–0.2)
BASOPHILS NFR BLD: 0.8 % (ref 0–1.9)
BILIRUB SERPL-MCNC: 0.3 MG/DL (ref 0.1–1)
BUN SERPL-MCNC: 13 MG/DL (ref 8–23)
CALCIUM SERPL-MCNC: 9.6 MG/DL (ref 8.7–10.5)
CHLORIDE SERPL-SCNC: 107 MMOL/L (ref 95–110)
CO2 SERPL-SCNC: 29 MMOL/L (ref 23–29)
CREAT SERPL-MCNC: 1 MG/DL (ref 0.5–1.4)
DIFFERENTIAL METHOD: ABNORMAL
EOSINOPHIL # BLD AUTO: 0.2 K/UL (ref 0–0.5)
EOSINOPHIL NFR BLD: 2.5 % (ref 0–8)
ERYTHROCYTE [DISTWIDTH] IN BLOOD BY AUTOMATED COUNT: 13.2 % (ref 11.5–14.5)
EST. GFR  (AFRICAN AMERICAN): >60 ML/MIN/1.73 M^2
EST. GFR  (NON AFRICAN AMERICAN): >60 ML/MIN/1.73 M^2
GLUCOSE SERPL-MCNC: 111 MG/DL (ref 70–110)
HCT VFR BLD AUTO: 39.2 % (ref 40–54)
HGB BLD-MCNC: 13.3 G/DL (ref 14–18)
IMM GRANULOCYTES # BLD AUTO: 0.01 K/UL (ref 0–0.04)
IMM GRANULOCYTES NFR BLD AUTO: 0.2 % (ref 0–0.5)
LYMPHOCYTES # BLD AUTO: 1.4 K/UL (ref 1–4.8)
LYMPHOCYTES NFR BLD: 22.3 % (ref 18–48)
MAGNESIUM SERPL-MCNC: 2 MG/DL (ref 1.6–2.6)
MCH RBC QN AUTO: 32.4 PG (ref 27–31)
MCHC RBC AUTO-ENTMCNC: 33.9 G/DL (ref 32–36)
MCV RBC AUTO: 95 FL (ref 82–98)
MONOCYTES # BLD AUTO: 0.5 K/UL (ref 0.3–1)
MONOCYTES NFR BLD: 8.8 % (ref 4–15)
NEUTROPHILS # BLD AUTO: 4 K/UL (ref 1.8–7.7)
NEUTROPHILS NFR BLD: 65.4 % (ref 38–73)
NRBC BLD-RTO: 0 /100 WBC
PLATELET # BLD AUTO: 229 K/UL (ref 150–450)
PMV BLD AUTO: 8.9 FL (ref 9.2–12.9)
POTASSIUM SERPL-SCNC: 4.5 MMOL/L (ref 3.5–5.1)
PROT SERPL-MCNC: 6.9 G/DL (ref 6–8.4)
RBC # BLD AUTO: 4.11 M/UL (ref 4.6–6.2)
SODIUM SERPL-SCNC: 142 MMOL/L (ref 136–145)
WBC # BLD AUTO: 6.11 K/UL (ref 3.9–12.7)

## 2021-04-16 PROCEDURE — 99215 OFFICE O/P EST HI 40 MIN: CPT | Mod: BMT,S$GLB,, | Performed by: INTERNAL MEDICINE

## 2021-04-16 PROCEDURE — 87799 DETECT AGENT NOS DNA QUANT: CPT | Performed by: NURSE PRACTITIONER

## 2021-04-16 PROCEDURE — 3008F PR BODY MASS INDEX (BMI) DOCUMENTED: ICD-10-PCS | Mod: BMT,CPTII,S$GLB, | Performed by: INTERNAL MEDICINE

## 2021-04-16 PROCEDURE — 83735 ASSAY OF MAGNESIUM: CPT | Performed by: INTERNAL MEDICINE

## 2021-04-16 PROCEDURE — 99215 PR OFFICE/OUTPT VISIT, EST, LEVL V, 40-54 MIN: ICD-10-PCS | Mod: BMT,S$GLB,, | Performed by: INTERNAL MEDICINE

## 2021-04-16 PROCEDURE — 1126F PR PAIN SEVERITY QUANTIFIED, NO PAIN PRESENT: ICD-10-PCS | Mod: BMT,S$GLB,, | Performed by: INTERNAL MEDICINE

## 2021-04-16 PROCEDURE — 85025 COMPLETE CBC W/AUTO DIFF WBC: CPT | Performed by: INTERNAL MEDICINE

## 2021-04-16 PROCEDURE — 3008F BODY MASS INDEX DOCD: CPT | Mod: BMT,CPTII,S$GLB, | Performed by: INTERNAL MEDICINE

## 2021-04-16 PROCEDURE — 1126F AMNT PAIN NOTED NONE PRSNT: CPT | Mod: BMT,S$GLB,, | Performed by: INTERNAL MEDICINE

## 2021-04-16 PROCEDURE — 36415 COLL VENOUS BLD VENIPUNCTURE: CPT | Performed by: INTERNAL MEDICINE

## 2021-04-16 PROCEDURE — 99999 PR PBB SHADOW E&M-EST. PATIENT-LVL IV: ICD-10-PCS | Mod: PBBFAC,BMT,, | Performed by: INTERNAL MEDICINE

## 2021-04-16 PROCEDURE — 80053 COMPREHEN METABOLIC PANEL: CPT | Performed by: INTERNAL MEDICINE

## 2021-04-16 PROCEDURE — 99999 PR PBB SHADOW E&M-EST. PATIENT-LVL IV: CPT | Mod: PBBFAC,BMT,, | Performed by: INTERNAL MEDICINE

## 2021-04-16 RX ORDER — ALPRAZOLAM 0.5 MG/1
0.5 TABLET ORAL 3 TIMES DAILY PRN
Qty: 30 TABLET | Refills: 0 | Status: SHIPPED | OUTPATIENT
Start: 2021-04-16 | End: 2021-05-27

## 2021-04-16 RX ORDER — MORPHINE SULFATE 15 MG/1
30 TABLET ORAL EVERY 6 HOURS PRN
Qty: 90 TABLET | Refills: 0 | Status: SHIPPED | OUTPATIENT
Start: 2021-04-16 | End: 2021-05-27 | Stop reason: SDUPTHER

## 2021-04-16 RX ORDER — MORPHINE SULFATE 30 MG/1
30 TABLET, FILM COATED, EXTENDED RELEASE ORAL EVERY 8 HOURS PRN
Qty: 20 TABLET | Refills: 0 | Status: SHIPPED | OUTPATIENT
Start: 2021-04-16 | End: 2021-04-29 | Stop reason: SDUPTHER

## 2021-04-19 ENCOUNTER — PATIENT MESSAGE (OUTPATIENT)
Dept: HEMATOLOGY/ONCOLOGY | Facility: CLINIC | Age: 61
End: 2021-04-19

## 2021-04-22 LAB
CMV DNA SERPL NAA+PROBE-ACNC: NORMAL IU/ML
EBV DNA SERPL NAA+PROBE-ACNC: NORMAL IU/ML

## 2021-04-28 ENCOUNTER — TELEPHONE (OUTPATIENT)
Dept: HEMATOLOGY/ONCOLOGY | Facility: CLINIC | Age: 61
End: 2021-04-28

## 2021-04-29 ENCOUNTER — OFFICE VISIT (OUTPATIENT)
Dept: HEMATOLOGY/ONCOLOGY | Facility: CLINIC | Age: 61
End: 2021-04-29
Payer: COMMERCIAL

## 2021-04-29 ENCOUNTER — LAB VISIT (OUTPATIENT)
Dept: LAB | Facility: HOSPITAL | Age: 61
End: 2021-04-29
Attending: NURSE PRACTITIONER
Payer: COMMERCIAL

## 2021-04-29 VITALS
RESPIRATION RATE: 16 BRPM | DIASTOLIC BLOOD PRESSURE: 79 MMHG | WEIGHT: 182.19 LBS | TEMPERATURE: 98 F | HEIGHT: 66 IN | HEART RATE: 89 BPM | BODY MASS INDEX: 29.28 KG/M2 | SYSTOLIC BLOOD PRESSURE: 150 MMHG | OXYGEN SATURATION: 98 %

## 2021-04-29 DIAGNOSIS — C92.10 CML (CHRONIC MYELOCYTIC LEUKEMIA): Primary | ICD-10-CM

## 2021-04-29 DIAGNOSIS — M48.02 CERVICAL STENOSIS OF SPINE: ICD-10-CM

## 2021-04-29 DIAGNOSIS — C92.01 ACUTE MYELOID LEUKEMIA IN REMISSION: ICD-10-CM

## 2021-04-29 DIAGNOSIS — Z94.84 HISTORY OF ALLOGENEIC STEM CELL TRANSPLANT: ICD-10-CM

## 2021-04-29 DIAGNOSIS — Z94.81 S/P ALLOGENEIC BONE MARROW TRANSPLANT: ICD-10-CM

## 2021-04-29 DIAGNOSIS — G89.3 CANCER RELATED PAIN: ICD-10-CM

## 2021-04-29 LAB
ALBUMIN SERPL BCP-MCNC: 4.3 G/DL (ref 3.5–5.2)
ALP SERPL-CCNC: 76 U/L (ref 55–135)
ALT SERPL W/O P-5'-P-CCNC: 27 U/L (ref 10–44)
ANION GAP SERPL CALC-SCNC: 7 MMOL/L (ref 8–16)
AST SERPL-CCNC: 19 U/L (ref 10–40)
BASOPHILS # BLD AUTO: 0.08 K/UL (ref 0–0.2)
BASOPHILS NFR BLD: 0.9 % (ref 0–1.9)
BILIRUB SERPL-MCNC: 0.3 MG/DL (ref 0.1–1)
BUN SERPL-MCNC: 11 MG/DL (ref 8–23)
CALCIUM SERPL-MCNC: 9.7 MG/DL (ref 8.7–10.5)
CHLORIDE SERPL-SCNC: 104 MMOL/L (ref 95–110)
CO2 SERPL-SCNC: 29 MMOL/L (ref 23–29)
CREAT SERPL-MCNC: 1 MG/DL (ref 0.5–1.4)
DIFFERENTIAL METHOD: ABNORMAL
EOSINOPHIL # BLD AUTO: 0.1 K/UL (ref 0–0.5)
EOSINOPHIL NFR BLD: 1.2 % (ref 0–8)
ERYTHROCYTE [DISTWIDTH] IN BLOOD BY AUTOMATED COUNT: 13.4 % (ref 11.5–14.5)
EST. GFR  (AFRICAN AMERICAN): >60 ML/MIN/1.73 M^2
EST. GFR  (NON AFRICAN AMERICAN): >60 ML/MIN/1.73 M^2
GLUCOSE SERPL-MCNC: 101 MG/DL (ref 70–110)
HCT VFR BLD AUTO: 41.3 % (ref 40–54)
HGB BLD-MCNC: 13.9 G/DL (ref 14–18)
IMM GRANULOCYTES # BLD AUTO: 0.04 K/UL (ref 0–0.04)
IMM GRANULOCYTES NFR BLD AUTO: 0.5 % (ref 0–0.5)
LYMPHOCYTES # BLD AUTO: 1.4 K/UL (ref 1–4.8)
LYMPHOCYTES NFR BLD: 15.3 % (ref 18–48)
MAGNESIUM SERPL-MCNC: 1.8 MG/DL (ref 1.6–2.6)
MCH RBC QN AUTO: 31.7 PG (ref 27–31)
MCHC RBC AUTO-ENTMCNC: 33.7 G/DL (ref 32–36)
MCV RBC AUTO: 94 FL (ref 82–98)
MONOCYTES # BLD AUTO: 0.6 K/UL (ref 0.3–1)
MONOCYTES NFR BLD: 7 % (ref 4–15)
NEUTROPHILS # BLD AUTO: 6.6 K/UL (ref 1.8–7.7)
NEUTROPHILS NFR BLD: 75.1 % (ref 38–73)
NRBC BLD-RTO: 0 /100 WBC
PHOSPHATE SERPL-MCNC: 3.4 MG/DL (ref 2.7–4.5)
PLATELET # BLD AUTO: 251 K/UL (ref 150–450)
PMV BLD AUTO: 9.7 FL (ref 9.2–12.9)
POTASSIUM SERPL-SCNC: 4.9 MMOL/L (ref 3.5–5.1)
PROT SERPL-MCNC: 7 G/DL (ref 6–8.4)
RBC # BLD AUTO: 4.39 M/UL (ref 4.6–6.2)
SODIUM SERPL-SCNC: 140 MMOL/L (ref 136–145)
WBC # BLD AUTO: 8.82 K/UL (ref 3.9–12.7)

## 2021-04-29 PROCEDURE — 99999 PR PBB SHADOW E&M-EST. PATIENT-LVL IV: ICD-10-PCS | Mod: PBBFAC,BMT,, | Performed by: INTERNAL MEDICINE

## 2021-04-29 PROCEDURE — 3008F BODY MASS INDEX DOCD: CPT | Mod: BMT,CPTII,S$GLB, | Performed by: INTERNAL MEDICINE

## 2021-04-29 PROCEDURE — 83735 ASSAY OF MAGNESIUM: CPT | Performed by: NURSE PRACTITIONER

## 2021-04-29 PROCEDURE — 99999 PR PBB SHADOW E&M-EST. PATIENT-LVL IV: CPT | Mod: PBBFAC,BMT,, | Performed by: INTERNAL MEDICINE

## 2021-04-29 PROCEDURE — 1126F PR PAIN SEVERITY QUANTIFIED, NO PAIN PRESENT: ICD-10-PCS | Mod: BMT,S$GLB,, | Performed by: INTERNAL MEDICINE

## 2021-04-29 PROCEDURE — 1126F AMNT PAIN NOTED NONE PRSNT: CPT | Mod: BMT,S$GLB,, | Performed by: INTERNAL MEDICINE

## 2021-04-29 PROCEDURE — 99215 PR OFFICE/OUTPT VISIT, EST, LEVL V, 40-54 MIN: ICD-10-PCS | Mod: BMT,S$GLB,, | Performed by: INTERNAL MEDICINE

## 2021-04-29 PROCEDURE — 87799 DETECT AGENT NOS DNA QUANT: CPT | Performed by: NURSE PRACTITIONER

## 2021-04-29 PROCEDURE — 3008F PR BODY MASS INDEX (BMI) DOCUMENTED: ICD-10-PCS | Mod: BMT,CPTII,S$GLB, | Performed by: INTERNAL MEDICINE

## 2021-04-29 PROCEDURE — 85025 COMPLETE CBC W/AUTO DIFF WBC: CPT | Performed by: NURSE PRACTITIONER

## 2021-04-29 PROCEDURE — 80053 COMPREHEN METABOLIC PANEL: CPT | Performed by: NURSE PRACTITIONER

## 2021-04-29 PROCEDURE — 84100 ASSAY OF PHOSPHORUS: CPT | Performed by: NURSE PRACTITIONER

## 2021-04-29 PROCEDURE — 99215 OFFICE O/P EST HI 40 MIN: CPT | Mod: BMT,S$GLB,, | Performed by: INTERNAL MEDICINE

## 2021-04-30 ENCOUNTER — PATIENT MESSAGE (OUTPATIENT)
Dept: HEMATOLOGY/ONCOLOGY | Facility: CLINIC | Age: 61
End: 2021-04-30

## 2021-04-30 LAB — CMV DNA SERPL NAA+PROBE-ACNC: NORMAL IU/ML

## 2021-04-30 RX ORDER — MORPHINE SULFATE 30 MG/1
30 TABLET, FILM COATED, EXTENDED RELEASE ORAL 2 TIMES DAILY
Qty: 60 TABLET | Refills: 0 | Status: SHIPPED | OUTPATIENT
Start: 2021-04-30 | End: 2021-05-27 | Stop reason: SDUPTHER

## 2021-05-03 ENCOUNTER — PATIENT MESSAGE (OUTPATIENT)
Dept: HEMATOLOGY/ONCOLOGY | Facility: CLINIC | Age: 61
End: 2021-05-03

## 2021-05-03 LAB — EBV DNA SERPL NAA+PROBE-ACNC: NORMAL IU/ML

## 2021-05-12 ENCOUNTER — PATIENT MESSAGE (OUTPATIENT)
Dept: RESEARCH | Facility: HOSPITAL | Age: 61
End: 2021-05-12

## 2021-05-20 DIAGNOSIS — C92.11 CML IN REMISSION: ICD-10-CM

## 2021-05-26 ENCOUNTER — TELEPHONE (OUTPATIENT)
Dept: HEMATOLOGY/ONCOLOGY | Facility: CLINIC | Age: 61
End: 2021-05-26

## 2021-05-27 ENCOUNTER — LAB VISIT (OUTPATIENT)
Dept: LAB | Facility: HOSPITAL | Age: 61
End: 2021-05-27
Attending: NURSE PRACTITIONER
Payer: COMMERCIAL

## 2021-05-27 ENCOUNTER — OFFICE VISIT (OUTPATIENT)
Dept: HEMATOLOGY/ONCOLOGY | Facility: CLINIC | Age: 61
End: 2021-05-27
Payer: COMMERCIAL

## 2021-05-27 VITALS
OXYGEN SATURATION: 95 % | DIASTOLIC BLOOD PRESSURE: 71 MMHG | TEMPERATURE: 98 F | SYSTOLIC BLOOD PRESSURE: 142 MMHG | HEART RATE: 76 BPM | BODY MASS INDEX: 29.37 KG/M2 | RESPIRATION RATE: 18 BRPM | HEIGHT: 66 IN | WEIGHT: 182.75 LBS

## 2021-05-27 DIAGNOSIS — Z94.81 S/P ALLOGENEIC BONE MARROW TRANSPLANT: ICD-10-CM

## 2021-05-27 DIAGNOSIS — Z94.84 HISTORY OF ALLOGENEIC STEM CELL TRANSPLANT: ICD-10-CM

## 2021-05-27 DIAGNOSIS — G47.00 INSOMNIA, UNSPECIFIED TYPE: ICD-10-CM

## 2021-05-27 DIAGNOSIS — Z94.84 HISTORY OF ALLOGENEIC STEM CELL TRANSPLANT: Primary | ICD-10-CM

## 2021-05-27 DIAGNOSIS — I10 ESSENTIAL HYPERTENSION: ICD-10-CM

## 2021-05-27 DIAGNOSIS — Z76.82 STEM CELL TRANSPLANT CANDIDATE: ICD-10-CM

## 2021-05-27 DIAGNOSIS — C92.10 CML (CHRONIC MYELOCYTIC LEUKEMIA): ICD-10-CM

## 2021-05-27 DIAGNOSIS — E83.42 HYPOMAGNESEMIA: ICD-10-CM

## 2021-05-27 DIAGNOSIS — K21.9 GASTROESOPHAGEAL REFLUX DISEASE WITHOUT ESOPHAGITIS: ICD-10-CM

## 2021-05-27 DIAGNOSIS — M48.02 CERVICAL STENOSIS OF SPINE: ICD-10-CM

## 2021-05-27 DIAGNOSIS — G89.3 CANCER RELATED PAIN: ICD-10-CM

## 2021-05-27 DIAGNOSIS — N40.0 BENIGN PROSTATIC HYPERPLASIA WITHOUT LOWER URINARY TRACT SYMPTOMS: ICD-10-CM

## 2021-05-27 DIAGNOSIS — C92.01 ACUTE MYELOID LEUKEMIA IN REMISSION: ICD-10-CM

## 2021-05-27 DIAGNOSIS — J30.2 SEASONAL ALLERGIES: ICD-10-CM

## 2021-05-27 LAB
ALBUMIN SERPL BCP-MCNC: 4.1 G/DL (ref 3.5–5.2)
ALP SERPL-CCNC: 59 U/L (ref 55–135)
ALT SERPL W/O P-5'-P-CCNC: 15 U/L (ref 10–44)
ANION GAP SERPL CALC-SCNC: 9 MMOL/L (ref 8–16)
AST SERPL-CCNC: 15 U/L (ref 10–40)
BASOPHILS # BLD AUTO: 0.06 K/UL (ref 0–0.2)
BASOPHILS NFR BLD: 0.8 % (ref 0–1.9)
BILIRUB SERPL-MCNC: 0.3 MG/DL (ref 0.1–1)
BUN SERPL-MCNC: 16 MG/DL (ref 8–23)
CALCIUM SERPL-MCNC: 9.8 MG/DL (ref 8.7–10.5)
CHLORIDE SERPL-SCNC: 108 MMOL/L (ref 95–110)
CO2 SERPL-SCNC: 23 MMOL/L (ref 23–29)
CREAT SERPL-MCNC: 1 MG/DL (ref 0.5–1.4)
DIFFERENTIAL METHOD: ABNORMAL
EOSINOPHIL # BLD AUTO: 0.1 K/UL (ref 0–0.5)
EOSINOPHIL NFR BLD: 1.9 % (ref 0–8)
ERYTHROCYTE [DISTWIDTH] IN BLOOD BY AUTOMATED COUNT: 14.5 % (ref 11.5–14.5)
EST. GFR  (AFRICAN AMERICAN): >60 ML/MIN/1.73 M^2
EST. GFR  (NON AFRICAN AMERICAN): >60 ML/MIN/1.73 M^2
GLUCOSE SERPL-MCNC: 107 MG/DL (ref 70–110)
HCT VFR BLD AUTO: 41.5 % (ref 40–54)
HGB BLD-MCNC: 13.8 G/DL (ref 14–18)
IMM GRANULOCYTES # BLD AUTO: 0.02 K/UL (ref 0–0.04)
IMM GRANULOCYTES NFR BLD AUTO: 0.3 % (ref 0–0.5)
LYMPHOCYTES # BLD AUTO: 1.1 K/UL (ref 1–4.8)
LYMPHOCYTES NFR BLD: 15.7 % (ref 18–48)
MAGNESIUM SERPL-MCNC: 1.8 MG/DL (ref 1.6–2.6)
MCH RBC QN AUTO: 31.4 PG (ref 27–31)
MCHC RBC AUTO-ENTMCNC: 33.3 G/DL (ref 32–36)
MCV RBC AUTO: 95 FL (ref 82–98)
MONOCYTES # BLD AUTO: 0.6 K/UL (ref 0.3–1)
MONOCYTES NFR BLD: 8.2 % (ref 4–15)
NEUTROPHILS # BLD AUTO: 5.3 K/UL (ref 1.8–7.7)
NEUTROPHILS NFR BLD: 73.1 % (ref 38–73)
NRBC BLD-RTO: 0 /100 WBC
PHOSPHATE SERPL-MCNC: 3.4 MG/DL (ref 2.7–4.5)
PLATELET # BLD AUTO: 261 K/UL (ref 150–450)
PMV BLD AUTO: 9.4 FL (ref 9.2–12.9)
POTASSIUM SERPL-SCNC: 4.9 MMOL/L (ref 3.5–5.1)
PROT SERPL-MCNC: 6.8 G/DL (ref 6–8.4)
RBC # BLD AUTO: 4.39 M/UL (ref 4.6–6.2)
SODIUM SERPL-SCNC: 140 MMOL/L (ref 136–145)
WBC # BLD AUTO: 7.19 K/UL (ref 3.9–12.7)

## 2021-05-27 PROCEDURE — 99215 OFFICE O/P EST HI 40 MIN: CPT | Mod: BMT,S$GLB,, | Performed by: NURSE PRACTITIONER

## 2021-05-27 PROCEDURE — 81206 BCR/ABL1 GENE MAJOR BP: CPT | Performed by: INTERNAL MEDICINE

## 2021-05-27 PROCEDURE — 1125F AMNT PAIN NOTED PAIN PRSNT: CPT | Mod: BMT,S$GLB,, | Performed by: NURSE PRACTITIONER

## 2021-05-27 PROCEDURE — 99999 PR PBB SHADOW E&M-EST. PATIENT-LVL III: CPT | Mod: PBBFAC,BMT,, | Performed by: NURSE PRACTITIONER

## 2021-05-27 PROCEDURE — 3008F PR BODY MASS INDEX (BMI) DOCUMENTED: ICD-10-PCS | Mod: BMT,CPTII,S$GLB, | Performed by: NURSE PRACTITIONER

## 2021-05-27 PROCEDURE — 99215 PR OFFICE/OUTPT VISIT, EST, LEVL V, 40-54 MIN: ICD-10-PCS | Mod: BMT,S$GLB,, | Performed by: NURSE PRACTITIONER

## 2021-05-27 PROCEDURE — 1125F PR PAIN SEVERITY QUANTIFIED, PAIN PRESENT: ICD-10-PCS | Mod: BMT,S$GLB,, | Performed by: NURSE PRACTITIONER

## 2021-05-27 PROCEDURE — 83735 ASSAY OF MAGNESIUM: CPT | Performed by: NURSE PRACTITIONER

## 2021-05-27 PROCEDURE — 3008F BODY MASS INDEX DOCD: CPT | Mod: BMT,CPTII,S$GLB, | Performed by: NURSE PRACTITIONER

## 2021-05-27 PROCEDURE — 85025 COMPLETE CBC W/AUTO DIFF WBC: CPT | Performed by: INTERNAL MEDICINE

## 2021-05-27 PROCEDURE — 84100 ASSAY OF PHOSPHORUS: CPT | Performed by: NURSE PRACTITIONER

## 2021-05-27 PROCEDURE — 80053 COMPREHEN METABOLIC PANEL: CPT | Performed by: INTERNAL MEDICINE

## 2021-05-27 PROCEDURE — 87799 DETECT AGENT NOS DNA QUANT: CPT | Performed by: NURSE PRACTITIONER

## 2021-05-27 PROCEDURE — 99999 PR PBB SHADOW E&M-EST. PATIENT-LVL III: ICD-10-PCS | Mod: PBBFAC,BMT,, | Performed by: NURSE PRACTITIONER

## 2021-05-27 RX ORDER — MORPHINE SULFATE 15 MG/1
30 TABLET ORAL EVERY 6 HOURS PRN
Qty: 90 TABLET | Refills: 0 | Status: SHIPPED | OUTPATIENT
Start: 2021-05-27 | End: 2021-08-19

## 2021-05-27 RX ORDER — ESZOPICLONE 1 MG/1
1 TABLET, FILM COATED ORAL NIGHTLY PRN
Qty: 60 TABLET | Refills: 0 | Status: SHIPPED | OUTPATIENT
Start: 2021-05-27 | End: 2021-08-19

## 2021-05-27 RX ORDER — MORPHINE SULFATE 30 MG/1
30 TABLET, FILM COATED, EXTENDED RELEASE ORAL 2 TIMES DAILY
Qty: 60 TABLET | Refills: 0 | Status: SHIPPED | OUTPATIENT
Start: 2021-05-27 | End: 2021-08-19

## 2021-05-29 LAB — CMV DNA SERPL NAA+PROBE-ACNC: NORMAL IU/ML

## 2021-06-01 ENCOUNTER — PATIENT MESSAGE (OUTPATIENT)
Dept: HEMATOLOGY/ONCOLOGY | Facility: CLINIC | Age: 61
End: 2021-06-01

## 2021-06-01 LAB
BCR/ABL,P210 RESULT: NORMAL
EBV DNA SERPL NAA+PROBE-ACNC: NORMAL IU/ML
PATH REPORT.FINAL DX SPEC: NORMAL
SPECIMEN TYPE: NORMAL

## 2021-06-02 ENCOUNTER — PATIENT MESSAGE (OUTPATIENT)
Dept: HEMATOLOGY/ONCOLOGY | Facility: CLINIC | Age: 61
End: 2021-06-02

## 2021-06-02 DIAGNOSIS — Z94.84 HISTORY OF ALLOGENEIC STEM CELL TRANSPLANT: ICD-10-CM

## 2021-06-03 DIAGNOSIS — J31.0 RHINITIS, UNSPECIFIED TYPE: Primary | ICD-10-CM

## 2021-06-03 RX ORDER — FLUTICASONE PROPIONATE 50 MCG
2 SPRAY, SUSPENSION (ML) NASAL DAILY PRN
Qty: 16 G | Refills: 0 | Status: SHIPPED | OUTPATIENT
Start: 2021-06-03 | End: 2021-07-02

## 2021-07-07 ENCOUNTER — OFFICE VISIT (OUTPATIENT)
Dept: INFECTIOUS DISEASES | Facility: CLINIC | Age: 61
End: 2021-07-07
Payer: COMMERCIAL

## 2021-07-07 ENCOUNTER — LAB VISIT (OUTPATIENT)
Dept: LAB | Facility: HOSPITAL | Age: 61
End: 2021-07-07
Payer: COMMERCIAL

## 2021-07-07 ENCOUNTER — OFFICE VISIT (OUTPATIENT)
Dept: HEMATOLOGY/ONCOLOGY | Facility: CLINIC | Age: 61
End: 2021-07-07
Payer: COMMERCIAL

## 2021-07-07 VITALS
DIASTOLIC BLOOD PRESSURE: 68 MMHG | WEIGHT: 181.69 LBS | BODY MASS INDEX: 29.2 KG/M2 | HEIGHT: 66 IN | TEMPERATURE: 98 F | HEART RATE: 81 BPM | SYSTOLIC BLOOD PRESSURE: 113 MMHG | OXYGEN SATURATION: 95 % | RESPIRATION RATE: 12 BRPM

## 2021-07-07 VITALS
HEIGHT: 66 IN | DIASTOLIC BLOOD PRESSURE: 78 MMHG | SYSTOLIC BLOOD PRESSURE: 116 MMHG | TEMPERATURE: 98 F | BODY MASS INDEX: 29.37 KG/M2 | HEART RATE: 85 BPM | WEIGHT: 182.75 LBS

## 2021-07-07 DIAGNOSIS — Z94.84 HISTORY OF ALLOGENEIC STEM CELL TRANSPLANT: ICD-10-CM

## 2021-07-07 DIAGNOSIS — C92.10 CML (CHRONIC MYELOCYTIC LEUKEMIA): ICD-10-CM

## 2021-07-07 DIAGNOSIS — Z94.84 HISTORY OF ALLOGENEIC STEM CELL TRANSPLANT: Primary | ICD-10-CM

## 2021-07-07 DIAGNOSIS — C92.10 CML (CHRONIC MYELOCYTIC LEUKEMIA): Primary | ICD-10-CM

## 2021-07-07 DIAGNOSIS — C92.01 ACUTE MYELOID LEUKEMIA IN REMISSION: ICD-10-CM

## 2021-07-07 LAB
ALBUMIN SERPL BCP-MCNC: 4.2 G/DL (ref 3.5–5.2)
ALP SERPL-CCNC: 65 U/L (ref 55–135)
ALT SERPL W/O P-5'-P-CCNC: 14 U/L (ref 10–44)
ANION GAP SERPL CALC-SCNC: 10 MMOL/L (ref 8–16)
AST SERPL-CCNC: 15 U/L (ref 10–40)
BASOPHILS # BLD AUTO: 0.07 K/UL (ref 0–0.2)
BASOPHILS NFR BLD: 1 % (ref 0–1.9)
BILIRUB SERPL-MCNC: 0.5 MG/DL (ref 0.1–1)
BUN SERPL-MCNC: 13 MG/DL (ref 8–23)
CALCIUM SERPL-MCNC: 10 MG/DL (ref 8.7–10.5)
CHLORIDE SERPL-SCNC: 105 MMOL/L (ref 95–110)
CO2 SERPL-SCNC: 27 MMOL/L (ref 23–29)
CREAT SERPL-MCNC: 0.9 MG/DL (ref 0.5–1.4)
DIFFERENTIAL METHOD: ABNORMAL
EOSINOPHIL # BLD AUTO: 0.2 K/UL (ref 0–0.5)
EOSINOPHIL NFR BLD: 2.1 % (ref 0–8)
ERYTHROCYTE [DISTWIDTH] IN BLOOD BY AUTOMATED COUNT: 14.1 % (ref 11.5–14.5)
EST. GFR  (AFRICAN AMERICAN): >60 ML/MIN/1.73 M^2
EST. GFR  (NON AFRICAN AMERICAN): >60 ML/MIN/1.73 M^2
GLUCOSE SERPL-MCNC: 81 MG/DL (ref 70–110)
HCT VFR BLD AUTO: 43.1 % (ref 40–54)
HGB BLD-MCNC: 14.2 G/DL (ref 14–18)
IMM GRANULOCYTES # BLD AUTO: 0.01 K/UL (ref 0–0.04)
IMM GRANULOCYTES NFR BLD AUTO: 0.1 % (ref 0–0.5)
LYMPHOCYTES # BLD AUTO: 1.5 K/UL (ref 1–4.8)
LYMPHOCYTES NFR BLD: 21.4 % (ref 18–48)
MAGNESIUM SERPL-MCNC: 1.9 MG/DL (ref 1.6–2.6)
MCH RBC QN AUTO: 31.2 PG (ref 27–31)
MCHC RBC AUTO-ENTMCNC: 32.9 G/DL (ref 32–36)
MCV RBC AUTO: 95 FL (ref 82–98)
MONOCYTES # BLD AUTO: 0.6 K/UL (ref 0.3–1)
MONOCYTES NFR BLD: 8.3 % (ref 4–15)
NEUTROPHILS # BLD AUTO: 4.8 K/UL (ref 1.8–7.7)
NEUTROPHILS NFR BLD: 67.1 % (ref 38–73)
NRBC BLD-RTO: 0 /100 WBC
PHOSPHATE SERPL-MCNC: 2.9 MG/DL (ref 2.7–4.5)
PLATELET # BLD AUTO: 301 K/UL (ref 150–450)
PMV BLD AUTO: 9 FL (ref 9.2–12.9)
POTASSIUM SERPL-SCNC: 4.6 MMOL/L (ref 3.5–5.1)
PROT SERPL-MCNC: 7 G/DL (ref 6–8.4)
RBC # BLD AUTO: 4.55 M/UL (ref 4.6–6.2)
SODIUM SERPL-SCNC: 142 MMOL/L (ref 136–145)
WBC # BLD AUTO: 7.1 K/UL (ref 3.9–12.7)

## 2021-07-07 PROCEDURE — 90700 DTAP (5 PERTUSSIS ANTIGENS) VACCINE LESS THAN 7YO IM: ICD-10-PCS | Mod: BMT,S$GLB,, | Performed by: INTERNAL MEDICINE

## 2021-07-07 PROCEDURE — 90648 HIB PRP-T VACCINE 4 DOSE IM: CPT | Mod: BMT,S$GLB,, | Performed by: INTERNAL MEDICINE

## 2021-07-07 PROCEDURE — 90632 HEPATITIS A VACCINE ADULT IM: ICD-10-PCS | Mod: BMT,S$GLB,, | Performed by: INTERNAL MEDICINE

## 2021-07-07 PROCEDURE — 90471 IMMUNIZATION ADMIN: CPT | Mod: BMT,S$GLB,, | Performed by: INTERNAL MEDICINE

## 2021-07-07 PROCEDURE — 90739 HEPATITIS B (RECOMBINANT) ADJUVANTED, 2 DOSE: ICD-10-PCS | Mod: BMT,S$GLB,, | Performed by: INTERNAL MEDICINE

## 2021-07-07 PROCEDURE — 85025 COMPLETE CBC W/AUTO DIFF WBC: CPT | Performed by: NURSE PRACTITIONER

## 2021-07-07 PROCEDURE — 90734 MENINGOCOCCAL CONJUGATE VACCINE 4-VALENT IM (MENVEO): ICD-10-PCS | Mod: BMT,S$GLB,, | Performed by: INTERNAL MEDICINE

## 2021-07-07 PROCEDURE — 84100 ASSAY OF PHOSPHORUS: CPT | Performed by: NURSE PRACTITIONER

## 2021-07-07 PROCEDURE — 99215 PR OFFICE/OUTPT VISIT, EST, LEVL V, 40-54 MIN: ICD-10-PCS | Mod: BMT,S$GLB,, | Performed by: INTERNAL MEDICINE

## 2021-07-07 PROCEDURE — 3008F PR BODY MASS INDEX (BMI) DOCUMENTED: ICD-10-PCS | Mod: BMT,CPTII,S$GLB, | Performed by: INTERNAL MEDICINE

## 2021-07-07 PROCEDURE — 90713 POLIOVIRUS IPV SC/IM: CPT | Mod: BMT,S$GLB,, | Performed by: INTERNAL MEDICINE

## 2021-07-07 PROCEDURE — 1126F PR PAIN SEVERITY QUANTIFIED, NO PAIN PRESENT: ICD-10-PCS | Mod: BMT,S$GLB,, | Performed by: INTERNAL MEDICINE

## 2021-07-07 PROCEDURE — 83735 ASSAY OF MAGNESIUM: CPT | Performed by: NURSE PRACTITIONER

## 2021-07-07 PROCEDURE — 90713 POLIOVIRUS VACCINE IPV SQ/IM: ICD-10-PCS | Mod: BMT,S$GLB,, | Performed by: INTERNAL MEDICINE

## 2021-07-07 PROCEDURE — 81206 BCR/ABL1 GENE MAJOR BP: CPT | Performed by: NURSE PRACTITIONER

## 2021-07-07 PROCEDURE — 87799 DETECT AGENT NOS DNA QUANT: CPT | Mod: 59 | Performed by: NURSE PRACTITIONER

## 2021-07-07 PROCEDURE — 99999 PR PBB SHADOW E&M-EST. PATIENT-LVL III: ICD-10-PCS | Mod: PBBFAC,BMT,, | Performed by: INTERNAL MEDICINE

## 2021-07-07 PROCEDURE — 99999 PR PBB SHADOW E&M-EST. PATIENT-LVL IV: ICD-10-PCS | Mod: PBBFAC,BMT,, | Performed by: INTERNAL MEDICINE

## 2021-07-07 PROCEDURE — 90471 HEPATITIS B (RECOMBINANT) ADJUVANTED, 2 DOSE: ICD-10-PCS | Mod: BMT,S$GLB,, | Performed by: INTERNAL MEDICINE

## 2021-07-07 PROCEDURE — 90734 MENACWYD/MENACWYCRM VACC IM: CPT | Mod: BMT,S$GLB,, | Performed by: INTERNAL MEDICINE

## 2021-07-07 PROCEDURE — 90632 HEPA VACCINE ADULT IM: CPT | Mod: BMT,S$GLB,, | Performed by: INTERNAL MEDICINE

## 2021-07-07 PROCEDURE — 90472 DTAP (5 PERTUSSIS ANTIGENS) VACCINE LESS THAN 7YO IM: ICD-10-PCS | Mod: BMT,S$GLB,, | Performed by: INTERNAL MEDICINE

## 2021-07-07 PROCEDURE — 99215 OFFICE O/P EST HI 40 MIN: CPT | Mod: BMT,S$GLB,, | Performed by: INTERNAL MEDICINE

## 2021-07-07 PROCEDURE — 90472 IMMUNIZATION ADMIN EACH ADD: CPT | Mod: BMT,S$GLB,, | Performed by: INTERNAL MEDICINE

## 2021-07-07 PROCEDURE — 90648 HIB PRP-T CONJUGATE VACCINE 4 DOSE IM: ICD-10-PCS | Mod: BMT,S$GLB,, | Performed by: INTERNAL MEDICINE

## 2021-07-07 PROCEDURE — 90670 PCV13 VACCINE IM: CPT | Mod: BMT,S$GLB,, | Performed by: INTERNAL MEDICINE

## 2021-07-07 PROCEDURE — 3008F BODY MASS INDEX DOCD: CPT | Mod: BMT,CPTII,S$GLB, | Performed by: INTERNAL MEDICINE

## 2021-07-07 PROCEDURE — 1126F AMNT PAIN NOTED NONE PRSNT: CPT | Mod: BMT,S$GLB,, | Performed by: INTERNAL MEDICINE

## 2021-07-07 PROCEDURE — 99999 PR PBB SHADOW E&M-EST. PATIENT-LVL III: CPT | Mod: PBBFAC,BMT,, | Performed by: INTERNAL MEDICINE

## 2021-07-07 PROCEDURE — 90620 MENB-4C VACCINE IM: CPT | Mod: BMT,S$GLB,, | Performed by: INTERNAL MEDICINE

## 2021-07-07 PROCEDURE — 99205 PR OFFICE/OUTPT VISIT, NEW, LEVL V, 60-74 MIN: ICD-10-PCS | Mod: 25,BMT,S$GLB, | Performed by: INTERNAL MEDICINE

## 2021-07-07 PROCEDURE — 90700 DTAP VACCINE < 7 YRS IM: CPT | Mod: BMT,S$GLB,, | Performed by: INTERNAL MEDICINE

## 2021-07-07 PROCEDURE — 99205 OFFICE O/P NEW HI 60 MIN: CPT | Mod: 25,BMT,S$GLB, | Performed by: INTERNAL MEDICINE

## 2021-07-07 PROCEDURE — 99999 PR PBB SHADOW E&M-EST. PATIENT-LVL IV: CPT | Mod: PBBFAC,BMT,, | Performed by: INTERNAL MEDICINE

## 2021-07-07 PROCEDURE — 90739 HEPB VACC 2/4 DOSE ADULT IM: CPT | Mod: BMT,S$GLB,, | Performed by: INTERNAL MEDICINE

## 2021-07-07 PROCEDURE — 90620 MENINGOCOCCAL B, OMV VACCINE: ICD-10-PCS | Mod: BMT,S$GLB,, | Performed by: INTERNAL MEDICINE

## 2021-07-07 PROCEDURE — 80053 COMPREHEN METABOLIC PANEL: CPT | Performed by: NURSE PRACTITIONER

## 2021-07-07 PROCEDURE — 90670 PNEUMOCOCCAL CONJUGATE VACCINE 13-VALENT LESS THAN 5YO & GREATER THAN: ICD-10-PCS | Mod: BMT,S$GLB,, | Performed by: INTERNAL MEDICINE

## 2021-07-07 RX ORDER — DIAZEPAM 2 MG/1
2 TABLET ORAL SEE ADMIN INSTRUCTIONS
Qty: 1 TABLET | Refills: 0 | Status: SHIPPED | OUTPATIENT
Start: 2021-07-07 | End: 2021-08-19

## 2021-07-09 LAB
CMV DNA SERPL NAA+PROBE-ACNC: NORMAL IU/ML
EBV DNA SERPL NAA+PROBE-ACNC: NORMAL IU/ML

## 2021-07-12 LAB
BCR/ABL,P210 RESULT: NORMAL
PATH REPORT.FINAL DX SPEC: NORMAL
SPECIMEN TYPE: NORMAL

## 2021-07-26 ENCOUNTER — PATIENT MESSAGE (OUTPATIENT)
Dept: HEMATOLOGY/ONCOLOGY | Facility: CLINIC | Age: 61
End: 2021-07-26

## 2021-08-05 ENCOUNTER — PATIENT MESSAGE (OUTPATIENT)
Dept: HEMATOLOGY/ONCOLOGY | Facility: CLINIC | Age: 61
End: 2021-08-05

## 2021-08-10 DIAGNOSIS — Z94.84 HISTORY OF ALLOGENEIC STEM CELL TRANSPLANT: Primary | ICD-10-CM

## 2021-08-11 ENCOUNTER — LAB VISIT (OUTPATIENT)
Dept: LAB | Facility: HOSPITAL | Age: 61
End: 2021-08-11
Attending: INTERNAL MEDICINE
Payer: COMMERCIAL

## 2021-08-11 ENCOUNTER — PROCEDURE VISIT (OUTPATIENT)
Dept: HEMATOLOGY/ONCOLOGY | Facility: CLINIC | Age: 61
End: 2021-08-11
Payer: COMMERCIAL

## 2021-08-11 VITALS
SYSTOLIC BLOOD PRESSURE: 128 MMHG | HEART RATE: 81 BPM | DIASTOLIC BLOOD PRESSURE: 67 MMHG | RESPIRATION RATE: 16 BRPM | TEMPERATURE: 98 F | OXYGEN SATURATION: 97 %

## 2021-08-11 DIAGNOSIS — Z94.81 S/P ALLOGENEIC BONE MARROW TRANSPLANT: ICD-10-CM

## 2021-08-11 DIAGNOSIS — C92.01 ACUTE MYELOID LEUKEMIA IN REMISSION: ICD-10-CM

## 2021-08-11 DIAGNOSIS — Z94.84 HISTORY OF ALLOGENEIC STEM CELL TRANSPLANT: ICD-10-CM

## 2021-08-11 DIAGNOSIS — C92.10 CML (CHRONIC MYELOCYTIC LEUKEMIA): ICD-10-CM

## 2021-08-11 DIAGNOSIS — Z76.82 STEM CELL TRANSPLANT CANDIDATE: ICD-10-CM

## 2021-08-11 LAB
ALBUMIN SERPL BCP-MCNC: 4.1 G/DL (ref 3.5–5.2)
ALP SERPL-CCNC: 70 U/L (ref 55–135)
ALT SERPL W/O P-5'-P-CCNC: 15 U/L (ref 10–44)
ANION GAP SERPL CALC-SCNC: 9 MMOL/L (ref 8–16)
AST SERPL-CCNC: 13 U/L (ref 10–40)
BASOPHILS # BLD AUTO: 0.06 K/UL (ref 0–0.2)
BASOPHILS NFR BLD: 0.9 % (ref 0–1.9)
BILIRUB SERPL-MCNC: 0.2 MG/DL (ref 0.1–1)
BUN SERPL-MCNC: 16 MG/DL (ref 8–23)
CALCIUM SERPL-MCNC: 9.2 MG/DL (ref 8.7–10.5)
CHLORIDE SERPL-SCNC: 105 MMOL/L (ref 95–110)
CO2 SERPL-SCNC: 22 MMOL/L (ref 23–29)
CREAT SERPL-MCNC: 0.9 MG/DL (ref 0.5–1.4)
DIFFERENTIAL METHOD: ABNORMAL
EOSINOPHIL # BLD AUTO: 0.2 K/UL (ref 0–0.5)
EOSINOPHIL NFR BLD: 2.2 % (ref 0–8)
ERYTHROCYTE [DISTWIDTH] IN BLOOD BY AUTOMATED COUNT: 14.1 % (ref 11.5–14.5)
EST. GFR  (AFRICAN AMERICAN): >60 ML/MIN/1.73 M^2
EST. GFR  (NON AFRICAN AMERICAN): >60 ML/MIN/1.73 M^2
GLUCOSE SERPL-MCNC: 110 MG/DL (ref 70–110)
HCT VFR BLD AUTO: 42.2 % (ref 40–54)
HGB BLD-MCNC: 14.2 G/DL (ref 14–18)
IMM GRANULOCYTES # BLD AUTO: 0.07 K/UL (ref 0–0.04)
IMM GRANULOCYTES NFR BLD AUTO: 1 % (ref 0–0.5)
LYMPHOCYTES # BLD AUTO: 1.9 K/UL (ref 1–4.8)
LYMPHOCYTES NFR BLD: 27.2 % (ref 18–48)
MAGNESIUM SERPL-MCNC: 1.8 MG/DL (ref 1.6–2.6)
MCH RBC QN AUTO: 32.3 PG (ref 27–31)
MCHC RBC AUTO-ENTMCNC: 33.6 G/DL (ref 32–36)
MCV RBC AUTO: 96 FL (ref 82–98)
MONOCYTES # BLD AUTO: 0.5 K/UL (ref 0.3–1)
MONOCYTES NFR BLD: 7.3 % (ref 4–15)
NEUTROPHILS # BLD AUTO: 4.3 K/UL (ref 1.8–7.7)
NEUTROPHILS NFR BLD: 61.4 % (ref 38–73)
NRBC BLD-RTO: 0 /100 WBC
PHOSPHATE SERPL-MCNC: 2.1 MG/DL (ref 2.7–4.5)
PLATELET # BLD AUTO: 295 K/UL (ref 150–450)
PMV BLD AUTO: 9.8 FL (ref 9.2–12.9)
POTASSIUM SERPL-SCNC: 3.9 MMOL/L (ref 3.5–5.1)
PROT SERPL-MCNC: 6.5 G/DL (ref 6–8.4)
RBC # BLD AUTO: 4.39 M/UL (ref 4.6–6.2)
SODIUM SERPL-SCNC: 136 MMOL/L (ref 136–145)
WBC # BLD AUTO: 6.95 K/UL (ref 3.9–12.7)

## 2021-08-11 PROCEDURE — 88189 PR  FLOWCYTOMETRY/READ, 16 & > MARKERS: ICD-10-PCS | Mod: BMT,,, | Performed by: PATHOLOGY

## 2021-08-11 PROCEDURE — 81268 CHIMERISM ANAL W/CELL SELECT: CPT | Mod: 91 | Performed by: INTERNAL MEDICINE

## 2021-08-11 PROCEDURE — 80053 COMPREHEN METABOLIC PANEL: CPT | Performed by: INTERNAL MEDICINE

## 2021-08-11 PROCEDURE — 88365 INSITU HYBRIDIZATION (FISH): CPT | Mod: 26,BMT,, | Performed by: PATHOLOGY

## 2021-08-11 PROCEDURE — 88342 IMHCHEM/IMCYTCHM 1ST ANTB: CPT | Performed by: PATHOLOGY

## 2021-08-11 PROCEDURE — 88341 IMHCHEM/IMCYTCHM EA ADD ANTB: CPT | Performed by: PATHOLOGY

## 2021-08-11 PROCEDURE — 84100 ASSAY OF PHOSPHORUS: CPT | Performed by: NURSE PRACTITIONER

## 2021-08-11 PROCEDURE — 88305 TISSUE EXAM BY PATHOLOGIST: CPT | Mod: 59 | Performed by: PATHOLOGY

## 2021-08-11 PROCEDURE — 88311 DECALCIFY TISSUE: CPT | Mod: 26,BMT,, | Performed by: PATHOLOGY

## 2021-08-11 PROCEDURE — 88264 CHROMOSOME ANALYSIS 20-25: CPT | Performed by: INTERNAL MEDICINE

## 2021-08-11 PROCEDURE — 81206 BCR/ABL1 GENE MAJOR BP: CPT | Performed by: INTERNAL MEDICINE

## 2021-08-11 PROCEDURE — 88305 TISSUE EXAM BY PATHOLOGIST: ICD-10-PCS | Mod: 26,BMT,, | Performed by: PATHOLOGY

## 2021-08-11 PROCEDURE — 88342 IMHCHEM/IMCYTCHM 1ST ANTB: CPT | Mod: 26,BMT,59, | Performed by: PATHOLOGY

## 2021-08-11 PROCEDURE — 38222 DX BONE MARROW BX & ASPIR: CPT | Mod: BMT,RT,, | Performed by: NURSE PRACTITIONER

## 2021-08-11 PROCEDURE — 85097 PR  BONE MARROW,SMEAR INTERPRETATION: ICD-10-PCS | Mod: BMT,,, | Performed by: PATHOLOGY

## 2021-08-11 PROCEDURE — 87799 DETECT AGENT NOS DNA QUANT: CPT | Performed by: NURSE PRACTITIONER

## 2021-08-11 PROCEDURE — 81268 CHIMERISM ANAL W/CELL SELECT: CPT | Mod: 59 | Performed by: INTERNAL MEDICINE

## 2021-08-11 PROCEDURE — 88184 FLOWCYTOMETRY/ TC 1 MARKER: CPT | Performed by: PATHOLOGY

## 2021-08-11 PROCEDURE — 81206 BCR/ABL1 GENE MAJOR BP: CPT | Mod: 91 | Performed by: INTERNAL MEDICINE

## 2021-08-11 PROCEDURE — 88365 PR  TISSUE HYBRIDIZATION: ICD-10-PCS | Mod: 26,BMT,, | Performed by: PATHOLOGY

## 2021-08-11 PROCEDURE — 88341 IMHCHEM/IMCYTCHM EA ADD ANTB: CPT | Mod: 26,BMT,59, | Performed by: PATHOLOGY

## 2021-08-11 PROCEDURE — 38222 PR BONE MARROW BIOPSY(IES) W/ASPIRATION(S); DIAGNOSTIC: ICD-10-PCS | Mod: BMT,RT,, | Performed by: NURSE PRACTITIONER

## 2021-08-11 PROCEDURE — 88342 CHG IMMUNOCYTOCHEMISTRY: ICD-10-PCS | Mod: 26,BMT,59, | Performed by: PATHOLOGY

## 2021-08-11 PROCEDURE — 88189 FLOWCYTOMETRY/READ 16 & >: CPT | Mod: BMT,,, | Performed by: PATHOLOGY

## 2021-08-11 PROCEDURE — 36415 COLL VENOUS BLD VENIPUNCTURE: CPT | Performed by: NURSE PRACTITIONER

## 2021-08-11 PROCEDURE — 88275 CYTOGENETICS 100-300: CPT | Mod: 59 | Performed by: INTERNAL MEDICINE

## 2021-08-11 PROCEDURE — 88313 SPECIAL STAINS GROUP 2: CPT | Mod: 26,BMT,, | Performed by: PATHOLOGY

## 2021-08-11 PROCEDURE — 88271 CYTOGENETICS DNA PROBE: CPT | Mod: 59 | Performed by: INTERNAL MEDICINE

## 2021-08-11 PROCEDURE — 88305 TISSUE EXAM BY PATHOLOGIST: CPT | Mod: 26,BMT,, | Performed by: PATHOLOGY

## 2021-08-11 PROCEDURE — 88311 DECALCIFY TISSUE: CPT | Performed by: PATHOLOGY

## 2021-08-11 PROCEDURE — 88311 PR  DECALCIFY TISSUE: ICD-10-PCS | Mod: 26,BMT,, | Performed by: PATHOLOGY

## 2021-08-11 PROCEDURE — 85025 COMPLETE CBC W/AUTO DIFF WBC: CPT | Performed by: INTERNAL MEDICINE

## 2021-08-11 PROCEDURE — 88313 PR  SPECIAL STAINS,GROUP II: ICD-10-PCS | Mod: 26,BMT,, | Performed by: PATHOLOGY

## 2021-08-11 PROCEDURE — 88185 FLOWCYTOMETRY/TC ADD-ON: CPT | Performed by: PATHOLOGY

## 2021-08-11 PROCEDURE — 88237 TISSUE CULTURE BONE MARROW: CPT | Performed by: NURSE PRACTITIONER

## 2021-08-11 PROCEDURE — 83735 ASSAY OF MAGNESIUM: CPT | Performed by: NURSE PRACTITIONER

## 2021-08-11 PROCEDURE — 88313 SPECIAL STAINS GROUP 2: CPT | Mod: 59 | Performed by: PATHOLOGY

## 2021-08-11 PROCEDURE — 88341 PR IHC OR ICC EACH ADD'L SINGLE ANTIBODY  STAINPR: ICD-10-PCS | Mod: 26,BMT,59, | Performed by: PATHOLOGY

## 2021-08-11 PROCEDURE — 85097 BONE MARROW INTERPRETATION: CPT | Mod: BMT,,, | Performed by: PATHOLOGY

## 2021-08-11 PROCEDURE — 81450 HL NEO GSAP 5-50DNA/DNA&RNA: CPT | Performed by: INTERNAL MEDICINE

## 2021-08-11 RX ORDER — LIDOCAINE HYDROCHLORIDE 20 MG/ML
10 INJECTION, SOLUTION EPIDURAL; INFILTRATION; INTRACAUDAL; PERINEURAL ONCE
Status: COMPLETED | OUTPATIENT
Start: 2021-08-11 | End: 2021-08-11

## 2021-08-11 RX ADMIN — LIDOCAINE HYDROCHLORIDE 200 MG: 20 INJECTION, SOLUTION EPIDURAL; INFILTRATION; INTRACAUDAL; PERINEURAL at 10:08

## 2021-08-13 LAB
AML FISH ADDITIONAL INFORMATION (BM): NORMAL
AML FISH DISCLAIMER (BM): NORMAL
AML FISH REASON FOR REFERRAL (BM): NORMAL
AML FISH RELEASED BY (BM): NORMAL
AML FISH RESULT (BM): NORMAL
AML FISH RESULT SUMMARY (BM): NORMAL
AML FISH RESULT TABLE (BM): NORMAL
BCR/ABL,P210 RESULT: NORMAL
BCR/ABL,P210 RESULT: NORMAL
CLINICAL CYTOGENETICIST REVIEW: NORMAL
CMV DNA SERPL NAA+PROBE-ACNC: NORMAL IU/ML
FAMLB SPECIMEN: NORMAL
PATH REPORT.FINAL DX SPEC: NORMAL
PATH REPORT.FINAL DX SPEC: NORMAL
REF LAB TEST METHOD: NORMAL
SPECIMEN SOURCE: NORMAL
SPECIMEN TYPE: NORMAL
SPECIMEN TYPE: NORMAL

## 2021-08-14 LAB — EBV DNA SERPL NAA+PROBE-ACNC: NORMAL IU/ML

## 2021-08-16 LAB
FINAL DIAGNOSIS - CHIMERISM SORT: NORMAL
SPECIMEN TYPE -CHIMERISM SORT: NORMAL

## 2021-08-18 ENCOUNTER — TELEPHONE (OUTPATIENT)
Dept: HEMATOLOGY/ONCOLOGY | Facility: CLINIC | Age: 61
End: 2021-08-18

## 2021-08-19 ENCOUNTER — OFFICE VISIT (OUTPATIENT)
Dept: HEMATOLOGY/ONCOLOGY | Facility: CLINIC | Age: 61
End: 2021-08-19
Payer: COMMERCIAL

## 2021-08-19 VITALS
SYSTOLIC BLOOD PRESSURE: 116 MMHG | RESPIRATION RATE: 16 BRPM | WEIGHT: 185.44 LBS | TEMPERATURE: 98 F | BODY MASS INDEX: 29.8 KG/M2 | HEIGHT: 66 IN | OXYGEN SATURATION: 99 % | DIASTOLIC BLOOD PRESSURE: 69 MMHG | HEART RATE: 90 BPM

## 2021-08-19 DIAGNOSIS — Z94.84 HISTORY OF ALLOGENEIC STEM CELL TRANSPLANT: ICD-10-CM

## 2021-08-19 DIAGNOSIS — C92.01 ACUTE MYELOID LEUKEMIA IN REMISSION: Primary | ICD-10-CM

## 2021-08-19 PROCEDURE — 1160F PR REVIEW ALL MEDS BY PRESCRIBER/CLIN PHARMACIST DOCUMENTED: ICD-10-PCS | Mod: BMT,CPTII,S$GLB, | Performed by: INTERNAL MEDICINE

## 2021-08-19 PROCEDURE — 3008F PR BODY MASS INDEX (BMI) DOCUMENTED: ICD-10-PCS | Mod: BMT,CPTII,S$GLB, | Performed by: INTERNAL MEDICINE

## 2021-08-19 PROCEDURE — 1160F RVW MEDS BY RX/DR IN RCRD: CPT | Mod: BMT,CPTII,S$GLB, | Performed by: INTERNAL MEDICINE

## 2021-08-19 PROCEDURE — 99999 PR PBB SHADOW E&M-EST. PATIENT-LVL IV: CPT | Mod: PBBFAC,BMT,, | Performed by: INTERNAL MEDICINE

## 2021-08-19 PROCEDURE — 3078F PR MOST RECENT DIASTOLIC BLOOD PRESSURE < 80 MM HG: ICD-10-PCS | Mod: BMT,CPTII,S$GLB, | Performed by: INTERNAL MEDICINE

## 2021-08-19 PROCEDURE — 3078F DIAST BP <80 MM HG: CPT | Mod: BMT,CPTII,S$GLB, | Performed by: INTERNAL MEDICINE

## 2021-08-19 PROCEDURE — 3008F BODY MASS INDEX DOCD: CPT | Mod: BMT,CPTII,S$GLB, | Performed by: INTERNAL MEDICINE

## 2021-08-19 PROCEDURE — 1125F PR PAIN SEVERITY QUANTIFIED, PAIN PRESENT: ICD-10-PCS | Mod: BMT,CPTII,S$GLB, | Performed by: INTERNAL MEDICINE

## 2021-08-19 PROCEDURE — 1125F AMNT PAIN NOTED PAIN PRSNT: CPT | Mod: BMT,CPTII,S$GLB, | Performed by: INTERNAL MEDICINE

## 2021-08-19 PROCEDURE — 1159F PR MEDICATION LIST DOCUMENTED IN MEDICAL RECORD: ICD-10-PCS | Mod: BMT,CPTII,S$GLB, | Performed by: INTERNAL MEDICINE

## 2021-08-19 PROCEDURE — 99999 PR PBB SHADOW E&M-EST. PATIENT-LVL IV: ICD-10-PCS | Mod: PBBFAC,BMT,, | Performed by: INTERNAL MEDICINE

## 2021-08-19 PROCEDURE — 99215 OFFICE O/P EST HI 40 MIN: CPT | Mod: BMT,S$GLB,, | Performed by: INTERNAL MEDICINE

## 2021-08-19 PROCEDURE — 3074F PR MOST RECENT SYSTOLIC BLOOD PRESSURE < 130 MM HG: ICD-10-PCS | Mod: BMT,CPTII,S$GLB, | Performed by: INTERNAL MEDICINE

## 2021-08-19 PROCEDURE — 1159F MED LIST DOCD IN RCRD: CPT | Mod: BMT,CPTII,S$GLB, | Performed by: INTERNAL MEDICINE

## 2021-08-19 PROCEDURE — 99215 PR OFFICE/OUTPT VISIT, EST, LEVL V, 40-54 MIN: ICD-10-PCS | Mod: BMT,S$GLB,, | Performed by: INTERNAL MEDICINE

## 2021-08-19 PROCEDURE — 3074F SYST BP LT 130 MM HG: CPT | Mod: BMT,CPTII,S$GLB, | Performed by: INTERNAL MEDICINE

## 2021-09-07 ENCOUNTER — TELEPHONE (OUTPATIENT)
Dept: INFECTIOUS DISEASES | Facility: CLINIC | Age: 61
End: 2021-09-07

## 2021-09-20 ENCOUNTER — CLINICAL SUPPORT (OUTPATIENT)
Dept: INFECTIOUS DISEASES | Facility: CLINIC | Age: 61
End: 2021-09-20
Payer: COMMERCIAL

## 2021-09-20 DIAGNOSIS — Z94.84 HISTORY OF ALLOGENEIC STEM CELL TRANSPLANT: ICD-10-CM

## 2021-09-20 PROCEDURE — 90471 IMMUNIZATION ADMIN: CPT | Mod: BMT,S$GLB,, | Performed by: INTERNAL MEDICINE

## 2021-09-20 PROCEDURE — 90739 HEPATITIS B (RECOMBINANT) ADJUVANTED, 2 DOSE: ICD-10-PCS | Mod: BMT,S$GLB,, | Performed by: INTERNAL MEDICINE

## 2021-09-20 PROCEDURE — 90471 HEPATITIS B (RECOMBINANT) ADJUVANTED, 2 DOSE: ICD-10-PCS | Mod: BMT,S$GLB,, | Performed by: INTERNAL MEDICINE

## 2021-09-20 PROCEDURE — 90739 HEPB VACC 2/4 DOSE ADULT IM: CPT | Mod: BMT,S$GLB,, | Performed by: INTERNAL MEDICINE

## 2021-09-20 PROCEDURE — 90713 POLIOVIRUS IPV SC/IM: CPT | Mod: BMT,S$GLB,, | Performed by: INTERNAL MEDICINE

## 2021-09-20 PROCEDURE — 90472 IMMUNIZATION ADMIN EACH ADD: CPT | Mod: BMT,S$GLB,, | Performed by: INTERNAL MEDICINE

## 2021-09-20 PROCEDURE — 90620 MENB-4C VACCINE IM: CPT | Mod: BMT,S$GLB,, | Performed by: INTERNAL MEDICINE

## 2021-09-20 PROCEDURE — 90713 POLIOVIRUS VACCINE IPV SQ/IM: ICD-10-PCS | Mod: BMT,S$GLB,, | Performed by: INTERNAL MEDICINE

## 2021-09-20 PROCEDURE — 90700 DTAP (5 PERTUSSIS ANTIGENS) VACCINE LESS THAN 7YO IM: ICD-10-PCS | Mod: BMT,S$GLB,, | Performed by: INTERNAL MEDICINE

## 2021-09-20 PROCEDURE — 90670 PCV13 VACCINE IM: CPT | Mod: BMT,S$GLB,, | Performed by: INTERNAL MEDICINE

## 2021-09-20 PROCEDURE — 90648 HIB PRP-T VACCINE 4 DOSE IM: CPT | Mod: BMT,S$GLB,, | Performed by: INTERNAL MEDICINE

## 2021-09-20 PROCEDURE — 90670 PNEUMOCOCCAL CONJUGATE VACCINE 13-VALENT LESS THAN 5YO & GREATER THAN: ICD-10-PCS | Mod: BMT,S$GLB,, | Performed by: INTERNAL MEDICINE

## 2021-09-20 PROCEDURE — 90700 DTAP VACCINE < 7 YRS IM: CPT | Mod: BMT,S$GLB,, | Performed by: INTERNAL MEDICINE

## 2021-09-20 PROCEDURE — 90472 MENINGOCOCCAL B, OMV VACCINE: ICD-10-PCS | Mod: BMT,S$GLB,, | Performed by: INTERNAL MEDICINE

## 2021-09-20 PROCEDURE — 90620 MENINGOCOCCAL B, OMV VACCINE: ICD-10-PCS | Mod: BMT,S$GLB,, | Performed by: INTERNAL MEDICINE

## 2021-09-20 PROCEDURE — 90734 MENACWYD/MENACWYCRM VACC IM: CPT | Mod: BMT,S$GLB,, | Performed by: INTERNAL MEDICINE

## 2021-09-20 PROCEDURE — 90734 MENINGOCOCCAL CONJUGATE VACCINE 4-VALENT IM (MENVEO): ICD-10-PCS | Mod: BMT,S$GLB,, | Performed by: INTERNAL MEDICINE

## 2021-09-20 PROCEDURE — 90648 HIB PRP-T CONJUGATE VACCINE 4 DOSE IM: ICD-10-PCS | Mod: BMT,S$GLB,, | Performed by: INTERNAL MEDICINE

## 2021-10-28 ENCOUNTER — HISTORICAL (OUTPATIENT)
Dept: HEMATOLOGY/ONCOLOGY | Facility: CLINIC | Age: 61
End: 2021-10-28

## 2021-10-28 LAB
ABS NEUT (OLG): 5.35 X10(3)/MCL (ref 2.1–9.2)
ALBUMIN SERPL-MCNC: 4.6 GM/DL (ref 3.4–4.8)
ALBUMIN/GLOB SERPL: 2 RATIO (ref 1.1–2)
ALP SERPL-CCNC: 81 UNIT/L (ref 40–150)
ALT SERPL-CCNC: 17 UNIT/L (ref 0–55)
AST SERPL-CCNC: 17 UNIT/L (ref 5–34)
BASOPHILS # BLD AUTO: 0 X10(3)/MCL (ref 0–0.2)
BASOPHILS NFR BLD AUTO: 0.6 %
BILIRUB SERPL-MCNC: 0.3 MG/DL
BILIRUBIN DIRECT+TOT PNL SERPL-MCNC: 0.1 MG/DL (ref 0–0.5)
BILIRUBIN DIRECT+TOT PNL SERPL-MCNC: 0.2 MG/DL (ref 0–0.8)
BUN SERPL-MCNC: 11.8 MG/DL (ref 8.4–25.7)
CALCIUM SERPL-MCNC: 9.7 MG/DL (ref 8.7–10.5)
CHLORIDE SERPL-SCNC: 103 MMOL/L (ref 98–107)
CO2 SERPL-SCNC: 24 MMOL/L (ref 23–31)
CREAT SERPL-MCNC: 0.92 MG/DL (ref 0.73–1.18)
EOSINOPHIL # BLD AUTO: 0.2 X10(3)/MCL (ref 0–0.9)
EOSINOPHIL NFR BLD AUTO: 1.9 %
ERYTHROCYTE [DISTWIDTH] IN BLOOD BY AUTOMATED COUNT: 13.6 % (ref 11.5–17)
GLOBULIN SER-MCNC: 2.3 GM/DL (ref 2.4–3.5)
GLUCOSE SERPL-MCNC: 83 MG/DL (ref 82–115)
HCT VFR BLD AUTO: 47.6 % (ref 42–52)
HGB BLD-MCNC: 15.6 GM/DL (ref 14–18)
LYMPHOCYTES # BLD AUTO: 2.4 X10(3)/MCL (ref 0.6–4.6)
LYMPHOCYTES NFR BLD AUTO: 28.6 %
MCH RBC QN AUTO: 32 PG (ref 27–31)
MCHC RBC AUTO-ENTMCNC: 32.8 GM/DL (ref 33–36)
MCV RBC AUTO: 97.5 FL (ref 80–94)
MONOCYTES # BLD AUTO: 0.5 X10(3)/MCL (ref 0.1–1.3)
MONOCYTES NFR BLD AUTO: 5.4 %
NEUTROPHILS # BLD AUTO: 5.4 X10(3)/MCL (ref 2.1–9.2)
NEUTROPHILS NFR BLD AUTO: 63.3 %
PLATELET # BLD AUTO: 290 X10(3)/MCL (ref 130–400)
PMV BLD AUTO: 8.2 FL (ref 9.4–12.4)
POTASSIUM SERPL-SCNC: 5 MMOL/L (ref 3.5–5.1)
PROT SERPL-MCNC: 6.9 GM/DL (ref 5.8–7.6)
RBC # BLD AUTO: 4.88 X10(6)/MCL (ref 4.7–6.1)
SODIUM SERPL-SCNC: 140 MMOL/L (ref 136–145)
WBC # SPEC AUTO: 8.5 X10(3)/MCL (ref 4.5–11.5)

## 2021-11-08 ENCOUNTER — CLINICAL SUPPORT (OUTPATIENT)
Dept: INFECTIOUS DISEASES | Facility: CLINIC | Age: 61
End: 2021-11-08
Payer: COMMERCIAL

## 2021-11-08 DIAGNOSIS — Z94.84 HISTORY OF ALLOGENEIC STEM CELL TRANSPLANT: ICD-10-CM

## 2021-11-08 PROCEDURE — 90713 POLIOVIRUS VACCINE IPV SQ/IM: ICD-10-PCS | Mod: BMT,S$GLB,, | Performed by: INTERNAL MEDICINE

## 2021-11-08 PROCEDURE — 90648 HIB PRP-T VACCINE 4 DOSE IM: CPT | Mod: BMT,S$GLB,, | Performed by: INTERNAL MEDICINE

## 2021-11-08 PROCEDURE — 90648 HIB PRP-T CONJUGATE VACCINE 4 DOSE IM: ICD-10-PCS | Mod: BMT,S$GLB,, | Performed by: INTERNAL MEDICINE

## 2021-11-08 PROCEDURE — 90670 PCV13 VACCINE IM: CPT | Mod: BMT,S$GLB,, | Performed by: INTERNAL MEDICINE

## 2021-11-08 PROCEDURE — 90670 PNEUMOCOCCAL CONJUGATE VACCINE 13-VALENT LESS THAN 5YO & GREATER THAN: ICD-10-PCS | Mod: BMT,S$GLB,, | Performed by: INTERNAL MEDICINE

## 2021-11-08 PROCEDURE — 90713 POLIOVIRUS IPV SC/IM: CPT | Mod: BMT,S$GLB,, | Performed by: INTERNAL MEDICINE

## 2021-11-08 PROCEDURE — 90472 HIB PRP-T CONJUGATE VACCINE 4 DOSE IM: ICD-10-PCS | Mod: BMT,S$GLB,, | Performed by: INTERNAL MEDICINE

## 2021-11-08 PROCEDURE — 90471 DTAP (5 PERTUSSIS ANTIGENS) VACCINE LESS THAN 7YO IM: ICD-10-PCS | Mod: BMT,S$GLB,, | Performed by: INTERNAL MEDICINE

## 2021-11-08 PROCEDURE — 90700 DTAP VACCINE < 7 YRS IM: CPT | Mod: BMT,S$GLB,, | Performed by: INTERNAL MEDICINE

## 2021-11-08 PROCEDURE — 90700 DTAP (5 PERTUSSIS ANTIGENS) VACCINE LESS THAN 7YO IM: ICD-10-PCS | Mod: BMT,S$GLB,, | Performed by: INTERNAL MEDICINE

## 2021-11-08 PROCEDURE — 90472 IMMUNIZATION ADMIN EACH ADD: CPT | Mod: BMT,S$GLB,, | Performed by: INTERNAL MEDICINE

## 2021-11-08 PROCEDURE — 90471 IMMUNIZATION ADMIN: CPT | Mod: BMT,S$GLB,, | Performed by: INTERNAL MEDICINE

## 2021-12-09 ENCOUNTER — HISTORICAL (OUTPATIENT)
Dept: ADMINISTRATIVE | Facility: HOSPITAL | Age: 61
End: 2021-12-09

## 2021-12-11 ENCOUNTER — PATIENT MESSAGE (OUTPATIENT)
Dept: HEMATOLOGY/ONCOLOGY | Facility: CLINIC | Age: 61
End: 2021-12-11
Payer: COMMERCIAL

## 2022-01-03 ENCOUNTER — HISTORICAL (OUTPATIENT)
Dept: ADMINISTRATIVE | Facility: HOSPITAL | Age: 62
End: 2022-01-03

## 2022-01-03 LAB
ABS NEUT (OLG): 3.49 X10(3)/MCL (ref 2.1–9.2)
ALBUMIN SERPL-MCNC: 4.1 GM/DL (ref 3.4–4.8)
ALBUMIN/GLOB SERPL: 1.6 RATIO (ref 1.1–2)
ALP SERPL-CCNC: 72 UNIT/L (ref 40–150)
ALT SERPL-CCNC: 21 UNIT/L (ref 0–55)
AST SERPL-CCNC: 18 UNIT/L (ref 5–34)
BASOPHILS # BLD AUTO: 0.1 X10(3)/MCL (ref 0–0.2)
BASOPHILS NFR BLD AUTO: 0.8 %
BILIRUB SERPL-MCNC: 0.2 MG/DL
BILIRUBIN DIRECT+TOT PNL SERPL-MCNC: <0.1 MG/DL (ref 0–0.5)
BILIRUBIN DIRECT+TOT PNL SERPL-MCNC: >0.1 MG/DL (ref 0–0.8)
BUN SERPL-MCNC: 18.6 MG/DL (ref 8.4–25.7)
CALCIUM SERPL-MCNC: 9.7 MG/DL (ref 8.7–10.5)
CHLORIDE SERPL-SCNC: 107 MMOL/L (ref 98–107)
CO2 SERPL-SCNC: 23 MMOL/L (ref 23–31)
CREAT SERPL-MCNC: 0.97 MG/DL (ref 0.73–1.18)
EOSINOPHIL # BLD AUTO: 0.8 X10(3)/MCL (ref 0–0.9)
EOSINOPHIL NFR BLD AUTO: 10.7 %
ERYTHROCYTE [DISTWIDTH] IN BLOOD BY AUTOMATED COUNT: 13.3 % (ref 11.5–17)
EST CREAT CLEARANCE SER (OHS): 72.7 ML/MIN
FERRITIN SERPL-MCNC: 256.36 NG/ML (ref 21.81–274.66)
GLOBULIN SER-MCNC: 2.5 GM/DL (ref 2.4–3.5)
GLUCOSE SERPL-MCNC: 84 MG/DL (ref 82–115)
HCT VFR BLD AUTO: 37.9 % (ref 42–52)
HGB BLD-MCNC: 12.6 GM/DL (ref 14–18)
IRON SATN MFR SERPL: 38 % (ref 20–50)
IRON SERPL-MCNC: 107 UG/DL (ref 65–175)
LYMPHOCYTES # BLD AUTO: 2.4 X10(3)/MCL (ref 0.6–4.6)
LYMPHOCYTES NFR BLD AUTO: 32.3 %
MCH RBC QN AUTO: 31.3 PG (ref 27–31)
MCHC RBC AUTO-ENTMCNC: 33.2 GM/DL (ref 33–36)
MCV RBC AUTO: 94.3 FL (ref 80–94)
MONOCYTES # BLD AUTO: 0.6 X10(3)/MCL (ref 0.1–1.3)
MONOCYTES NFR BLD AUTO: 7.8 %
NEUTROPHILS # BLD AUTO: 3.5 X10(3)/MCL (ref 2.1–9.2)
NEUTROPHILS NFR BLD AUTO: 47.3 %
PLATELET # BLD AUTO: 309 X10(3)/MCL (ref 130–400)
PMV BLD AUTO: 8.2 FL (ref 9.4–12.4)
POTASSIUM SERPL-SCNC: 5.1 MMOL/L (ref 3.5–5.1)
PROT SERPL-MCNC: 6.6 GM/DL (ref 5.8–7.6)
RBC # BLD AUTO: 4.02 X10(6)/MCL (ref 4.7–6.1)
SODIUM SERPL-SCNC: 139 MMOL/L (ref 136–145)
TIBC SERPL-MCNC: 178 UG/DL (ref 69–240)
TIBC SERPL-MCNC: 285 UG/DL (ref 250–450)
TRANSFERRIN SERPL-MCNC: 250 MG/DL (ref 163–344)
VIT B12 SERPL-MCNC: 779 PG/ML (ref 213–816)
WBC # SPEC AUTO: 7.4 X10(3)/MCL (ref 4.5–11.5)

## 2022-01-07 ENCOUNTER — LAB VISIT (OUTPATIENT)
Dept: LAB | Facility: HOSPITAL | Age: 62
End: 2022-01-07
Attending: INTERNAL MEDICINE
Payer: COMMERCIAL

## 2022-01-07 ENCOUNTER — CLINICAL SUPPORT (OUTPATIENT)
Dept: INFECTIOUS DISEASES | Facility: CLINIC | Age: 62
End: 2022-01-07
Payer: COMMERCIAL

## 2022-01-07 DIAGNOSIS — Z94.84 HISTORY OF ALLOGENEIC STEM CELL TRANSPLANT: ICD-10-CM

## 2022-01-07 PROCEDURE — 90471 IMMUNIZATION ADMIN: CPT | Mod: S$GLB,,, | Performed by: INTERNAL MEDICINE

## 2022-01-07 PROCEDURE — 99999 PR PBB SHADOW E&M-EST. PATIENT-LVL II: ICD-10-PCS | Mod: PBBFAC,,,

## 2022-01-07 PROCEDURE — 36415 COLL VENOUS BLD VENIPUNCTURE: CPT | Performed by: INTERNAL MEDICINE

## 2022-01-07 PROCEDURE — 90472 IMMUNIZATION ADMIN EACH ADD: CPT | Mod: S$GLB,,, | Performed by: INTERNAL MEDICINE

## 2022-01-07 PROCEDURE — 90670 PNEUMOCOCCAL CONJUGATE VACCINE 13-VALENT LESS THAN 5YO & GREATER THAN: ICD-10-PCS | Mod: S$GLB,,, | Performed by: INTERNAL MEDICINE

## 2022-01-07 PROCEDURE — 90472 HEPATITIS A VACCINE ADULT IM: ICD-10-PCS | Mod: S$GLB,,, | Performed by: INTERNAL MEDICINE

## 2022-01-07 PROCEDURE — 90632 HEPATITIS A VACCINE ADULT IM: ICD-10-PCS | Mod: S$GLB,,, | Performed by: INTERNAL MEDICINE

## 2022-01-07 PROCEDURE — 90670 PCV13 VACCINE IM: CPT | Mod: S$GLB,,, | Performed by: INTERNAL MEDICINE

## 2022-01-07 PROCEDURE — 99999 PR PBB SHADOW E&M-EST. PATIENT-LVL II: CPT | Mod: PBBFAC,,,

## 2022-01-07 PROCEDURE — 90471 PNEUMOCOCCAL CONJUGATE VACCINE 13-VALENT LESS THAN 5YO & GREATER THAN: ICD-10-PCS | Mod: S$GLB,,, | Performed by: INTERNAL MEDICINE

## 2022-01-07 PROCEDURE — 90632 HEPA VACCINE ADULT IM: CPT | Mod: S$GLB,,, | Performed by: INTERNAL MEDICINE

## 2022-01-07 PROCEDURE — 86648 DIPHTHERIA ANTIBODY: CPT | Performed by: INTERNAL MEDICINE

## 2022-01-07 PROCEDURE — 86774 TETANUS ANTIBODY: CPT | Performed by: INTERNAL MEDICINE

## 2022-01-07 NOTE — PROGRESS NOTES
Patient received 2 vaccines IM to the left deltoid, Prevnar and Hep A #2.  Tolerated well and left in NAD

## 2022-01-13 LAB
C DIPHTHERIAE AB SER IA-ACNC: 0.68 IU/ML
C TETANI TOXOID AB SER-ACNC: >5.33 IU/ML
DEPRECATED S PNEUM23 IGG SER-MCNC: 0.4 UG/ML
DEPRECATED S PNEUM4 IGG SER-MCNC: 3.2 UG/ML
HAEM INFLU B IGG SER IA-MCNC: >9 MCG/ML
S PN DA SERO 19F IGG SER-MCNC: 3.5 UG/ML
S PNEUM DA 1 IGG SER-MCNC: 3.4 UG/ML
S PNEUM DA 14 IGG SER-MCNC: 7.1
S PNEUM DA 18C IGG SER-MCNC: 8.8
S PNEUM DA 19A IGG SER-MCNC: 4.2 UG/ML
S PNEUM DA 3 IGG SER-MCNC: 10.3 UG/ML
S PNEUM DA 5 IGG SER-MCNC: 18.8 UG/ML
S PNEUM DA 6A IGG SER-MCNC: 21.5 UG/ML
S PNEUM DA 6B IGG SER-MCNC: 7.1 UG/ML
S PNEUM DA 7F IGG SER-MCNC: 11.3 UG/ML
S PNEUM DA 9V IGG SER-MCNC: 7.9 UG/ML

## 2022-02-09 DIAGNOSIS — D84.9 IMMUNOSUPPRESSED STATUS: ICD-10-CM

## 2022-02-17 ENCOUNTER — HISTORICAL (OUTPATIENT)
Dept: HEMATOLOGY/ONCOLOGY | Facility: CLINIC | Age: 62
End: 2022-02-17

## 2022-02-17 LAB
ABS NEUT (OLG): 4.16 (ref 2.1–9.2)
ALBUMIN SERPL-MCNC: 4.4 G/DL (ref 3.4–4.8)
ALBUMIN/GLOB SERPL: 1.8 {RATIO} (ref 1.1–2)
ALP SERPL-CCNC: 67 U/L (ref 40–150)
ALT SERPL-CCNC: 22 U/L (ref 0–55)
AST SERPL-CCNC: 18 U/L (ref 5–34)
BASOPHILS # BLD AUTO: 0.1 10*3/UL (ref 0–0.2)
BASOPHILS NFR BLD AUTO: 1 %
BILIRUB SERPL-MCNC: 0.4 MG/DL
BILIRUBIN DIRECT+TOT PNL SERPL-MCNC: 0.2 (ref 0–0.5)
BILIRUBIN DIRECT+TOT PNL SERPL-MCNC: 0.2 (ref 0–0.8)
BUN SERPL-MCNC: 15.9 MG/DL (ref 8.4–25.7)
CALCIUM SERPL-MCNC: 10.5 MG/DL (ref 8.7–10.5)
CHLORIDE SERPL-SCNC: 105 MMOL/L (ref 98–107)
CO2 SERPL-SCNC: 28 MMOL/L (ref 23–31)
CREAT SERPL-MCNC: 1.2 MG/DL (ref 0.73–1.18)
EOSINOPHIL # BLD AUTO: 0.6 10*3/UL (ref 0–0.9)
EOSINOPHIL NFR BLD AUTO: 7.5 %
ERYTHROCYTE [DISTWIDTH] IN BLOOD BY AUTOMATED COUNT: 13.5 % (ref 11.5–17)
GLOBULIN SER-MCNC: 2.4 G/DL (ref 2.4–3.5)
GLUCOSE SERPL-MCNC: 92 MG/DL (ref 82–115)
HCT VFR BLD AUTO: 43.2 % (ref 42–52)
HEMOLYSIS INTERF INDEX SERPL-ACNC: 1
HGB BLD-MCNC: 14.1 G/DL (ref 14–18)
ICTERIC INTERF INDEX SERPL-ACNC: 0
LIPEMIC INTERF INDEX SERPL-ACNC: 5
LYMPHOCYTES # BLD AUTO: 2.6 10*3/UL (ref 0.6–4.6)
LYMPHOCYTES NFR BLD AUTO: 32.2 %
MANUAL DIFF? (OHS): NO
MCH RBC QN AUTO: 32.2 PG (ref 27–31)
MCHC RBC AUTO-ENTMCNC: 32.6 G/DL (ref 33–36)
MCV RBC AUTO: 98.6 FL (ref 80–94)
MONOCYTES # BLD AUTO: 0.7 10*3/UL (ref 0.1–1.3)
MONOCYTES NFR BLD AUTO: 8.2 %
NEUTROPHILS # BLD AUTO: 4.2 10*3/UL (ref 2.1–9.2)
NEUTROPHILS NFR BLD AUTO: 50.9 %
PLATELET # BLD AUTO: 288 10*3/UL (ref 130–400)
PMV BLD AUTO: 8 FL (ref 9.4–12.4)
POTASSIUM SERPL-SCNC: 5.3 MMOL/L (ref 3.5–5.1)
PROT SERPL-MCNC: 6.8 G/DL (ref 5.8–7.6)
RBC # BLD AUTO: 4.38 10*6/UL (ref 4.7–6.1)
SODIUM SERPL-SCNC: 142 MMOL/L (ref 136–145)
WBC # SPEC AUTO: 8.2 10*3/UL (ref 4.5–11.5)

## 2022-02-23 ENCOUNTER — HISTORICAL (OUTPATIENT)
Dept: ADMINISTRATIVE | Facility: HOSPITAL | Age: 62
End: 2022-02-23

## 2022-02-24 ENCOUNTER — INFUSION (OUTPATIENT)
Dept: INFUSION THERAPY | Facility: HOSPITAL | Age: 62
End: 2022-02-24
Payer: COMMERCIAL

## 2022-02-24 VITALS
TEMPERATURE: 98 F | OXYGEN SATURATION: 99 % | SYSTOLIC BLOOD PRESSURE: 106 MMHG | HEART RATE: 77 BPM | DIASTOLIC BLOOD PRESSURE: 69 MMHG | RESPIRATION RATE: 19 BRPM

## 2022-02-24 DIAGNOSIS — C92.00 AML (ACUTE MYELOBLASTIC LEUKEMIA): Primary | ICD-10-CM

## 2022-02-24 PROCEDURE — 63600175 PHARM REV CODE 636 W HCPCS: Performed by: INTERNAL MEDICINE

## 2022-02-24 PROCEDURE — M0220 HC INJECTION ADMIN, TIXAGEVIMAB-CILGAVIMAB, INCL POST ADMIN MONIT: HCPCS | Performed by: INTERNAL MEDICINE

## 2022-02-24 RX ORDER — PREDNISONE 20 MG/1
40 TABLET ORAL ONCE AS NEEDED
Status: DISCONTINUED | OUTPATIENT
Start: 2022-02-24 | End: 2022-02-24 | Stop reason: HOSPADM

## 2022-02-24 RX ORDER — EPINEPHRINE 0.3 MG/.3ML
0.3 INJECTION SUBCUTANEOUS
Status: DISCONTINUED | OUTPATIENT
Start: 2022-02-24 | End: 2022-02-24 | Stop reason: HOSPADM

## 2022-02-24 RX ORDER — EPINEPHRINE 0.3 MG/.3ML
0.3 INJECTION SUBCUTANEOUS
Status: CANCELLED | OUTPATIENT
Start: 2022-02-24

## 2022-02-24 RX ORDER — ACETAMINOPHEN 325 MG/1
650 TABLET ORAL ONCE AS NEEDED
Status: DISCONTINUED | OUTPATIENT
Start: 2022-02-24 | End: 2022-02-24 | Stop reason: HOSPADM

## 2022-02-24 RX ORDER — ONDANSETRON 4 MG/1
4 TABLET, ORALLY DISINTEGRATING ORAL ONCE AS NEEDED
Status: DISCONTINUED | OUTPATIENT
Start: 2022-02-24 | End: 2022-02-24 | Stop reason: HOSPADM

## 2022-02-24 RX ORDER — DIPHENHYDRAMINE HCL 25 MG
25 CAPSULE ORAL ONCE AS NEEDED
Status: CANCELLED | OUTPATIENT
Start: 2022-02-24

## 2022-02-24 RX ORDER — PREDNISONE 20 MG/1
40 TABLET ORAL ONCE AS NEEDED
Status: CANCELLED | OUTPATIENT
Start: 2022-02-24

## 2022-02-24 RX ORDER — ALBUTEROL SULFATE 90 UG/1
2 AEROSOL, METERED RESPIRATORY (INHALATION) ONCE AS NEEDED
Status: CANCELLED | OUTPATIENT
Start: 2022-02-24

## 2022-02-24 RX ORDER — ALBUTEROL SULFATE 90 UG/1
2 AEROSOL, METERED RESPIRATORY (INHALATION) ONCE AS NEEDED
Status: DISCONTINUED | OUTPATIENT
Start: 2022-02-24 | End: 2022-02-24 | Stop reason: HOSPADM

## 2022-02-24 RX ORDER — ACETAMINOPHEN 325 MG/1
650 TABLET ORAL ONCE AS NEEDED
Status: CANCELLED | OUTPATIENT
Start: 2022-02-24

## 2022-02-24 RX ORDER — DIPHENHYDRAMINE HCL 25 MG
25 CAPSULE ORAL ONCE AS NEEDED
Status: DISCONTINUED | OUTPATIENT
Start: 2022-02-24 | End: 2022-02-24 | Stop reason: HOSPADM

## 2022-02-24 RX ORDER — ONDANSETRON 4 MG/1
4 TABLET, ORALLY DISINTEGRATING ORAL ONCE AS NEEDED
Status: CANCELLED | OUTPATIENT
Start: 2022-02-24

## 2022-02-24 RX ADMIN — Medication 150 MG: at 08:02

## 2022-04-09 ENCOUNTER — HISTORICAL (OUTPATIENT)
Dept: ADMINISTRATIVE | Facility: HOSPITAL | Age: 62
End: 2022-04-09
Payer: COMMERCIAL

## 2022-04-25 VITALS
SYSTOLIC BLOOD PRESSURE: 116 MMHG | BODY MASS INDEX: 28.09 KG/M2 | WEIGHT: 179 LBS | HEIGHT: 67 IN | DIASTOLIC BLOOD PRESSURE: 74 MMHG

## 2022-04-30 NOTE — PROGRESS NOTES
Patient:   Kvng Jones            MRN: [id number]            FIN: [id number]               Age:   61 years     Sex:  Male     :  1960   Associated Diagnoses:   CML (chronic myeloid leukemia); Granulocytic sarcoma; AML (acute myeloblastic leukemia); Pulmonary embolism   Author:   Evon Carrillo MD      Primary Care: Dr. Ben Doga [facility] transplant Oncologist: Dr. 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  Left axillary lymph node biopsy done 3/24/20--preliminary results c/w granulocytic sarcoma.  Bone marrow biopsy day 100 20 with MRD, positive for BCR-ABL p210 7.8%.  Bone marrow biopsy day 180 2/3/21 s/p transplant showed stringent CR (undetectable BCR-ABL), chimerism 100% donor CD33 and 90% donor CD3.  Bone marrow biopsy at 1 year 21--stringent CR (negative BCR-ABL in blood and marrow), 100% donor CD33 and CD3 chimerism.    Work-up:  Peripheral blood RT PCR BCR-ABL done 3/17/20 positive for major p210, 49.5974%.    Imagin. CT soft tissue neck/C/A/P done 3/23/20--Bilateral cervical lymphadenopathy concerning for metastatic disease or lymphoproliferative disorder, pathologic adenopathy of the bilateral axillary and inguinal  regions, several nonenlarged mediastinal lymph nodes are identified, nonenlarged retroperitoneal lymph nodes.  2. CT chest w/ contrast done at [facility] 12/10/21--no large central PE, findings suggest tiny bilateral pulmonary emboli in lower lobe territory branches R>L, patchy areas of atelectasis and effusion pneumonitis, scattered peripheral hazy nodularities one of which was seen on prior RUL 1cm, others are new, follow-up recommended, enlarged liver with hepatic steatosis, low-density lesions in left lobe 1-2cm range, correlate with US, bilateral adrenal gland hyperplasia nodular  changes on left, small 2-4mm calculi right upper kidney, dilated small bowel loop LUQ, r/o obstruction, follow-up recommended.     Treatment history:  FLAG-Addis induction chemotherapy done at [facility] in [place], [place] 3/30/20--5/1/20.  Ponatinib 30mg daily started after induction, held for transplant.  Flu/Cy/TBI 7/21/20 and haploidentical allogeneic SCT(son was donor) done in [place], [place] at [facility].  Engrafted on 8/10/21 and discharged on 8/12/20.  Restarted Ponatinib on 11/11/20 after day 100 marrow showed MRD.  Ponatinib stopped at day 267 post-transplant due to GI side effects.    GVHD treatment history:  Diffuse erythema to legs, lower back, arms and abdomen 9/15/20, started on Prednisone, worsened despite steroids and tacrolimus cream. Biopsy c/w folliculitis and steroids stopped.  Budesonide 3mg BID started 12/10/20 for GERD; EGD negative for GVHD and c/w antral gastritis, stopped oral steroids.  Tacrolimus taper started every 2 weeks 1/6/21, slow due to ongoing rash, stopped Tacrolimus on 3/31/21.    Current treatment plan:   1. Observation  2. Acyclovir prophylaxis to continue until 4/2022 (1 year post-Tacrolimus)         Visit Information   Visit type:  Scheduled follow-up.    Accompanied by:  No one.       Chief Complaint   1/3/2022 8:59 CST        nausea, heart racing, difficulty sleeping. Pt states he thinks his medicines are causing these symptoms.        Interval History   Current complaint.   Patient presents for hospital follow-up AML/CML. He was recently hospitalized at [facility] for respiratory failure with CT showing pneumonitis from parainfluenza virus and also suggestive of tiny pulmonary embolism. Patient was on the ventilator for 2 days, noted drop in EF on ECHO to 18-20% and elevated troponin. But he underwent angiogram which showed improved EF 50-60% and non-critical CAD. Patient is slowly recovering, now getting his strength back and exercising. But he is scared to death of this happening  again. He did receive his COVID booster and flu vaccine in November. Patient denies any chest pain or SOB. He is having some nausea and palpitations and difficulty sleeping which he thinks is from the new medications that he is taking. He has no FH of DVT or PE. He has follow-up with the cardiologist on 1/17/22. Of note, he was seen by oncology while inpatient and was not believed to have any relapse of his disease.       Review of Systems   Constitutional:  No fever, No chills, No weakness, No fatigue.    Eye:  No recent visual problem.    Ear/Nose/Mouth/Throat:  No decreased hearing, No nasal congestion, No sore throat.    Respiratory:  No shortness of breath, No cough, No wheezing.    Cardiovascular:  Palpitations, No chest pain, No peripheral edema.    Gastrointestinal:  Nausea, No vomiting, No diarrhea, No constipation, No abdominal pain.    Genitourinary:  No change in urine stream.    Hematology/Lymphatics:  No bruising tendency, No bleeding tendency.    Musculoskeletal:  No back pain, No neck pain, No joint pain.    Integumentary:  No rash, No skin lesion.    Neurologic:  No confusion, No headache.    Psychiatric:  No anxiety, No depression.    All other systems are negative      Health Status   Allergies:    Allergies (2) Active Reaction  codeine hives  Iodine hives     Current medications:  (Selected)   Outpatient Medications  Ordered  midazolam 1 mg/mL injectable solution: 1 mg, form: Injection, IV Push, Once, first dose 03/17/20 11:00:00 CDT, stop date 03/17/20 11:00:00 CDT  Prescriptions  Prescribed  Albuterol (Eqv-ProAir HFA) 90 mcg/inh inhalation aerosol: 2 puff(s), INH, q6hr, # 6.7 gm, 1 Refill(s), Pharmacy: [facility] #[id number], 170, cm, Height/Length Dosing, 12/09/21 10:20:00 CST, 88, kg, Weight Dosing, 12/09/21 10:20:00 CST  BuPROPion (Eqv-Zyban Advantage Pack) 150 mg/12 hours oral tablet, extended release: 150 mg = 1 tab(s), Oral, BID, # 60 tab(s), 2 Refill(s), Pharmacy: [facility] #[id number], 170, cm, Height/Length Dosing, 10/20/21 14:04:00 CDT, 87.9, kg, Weight Dosing, 10/20/21 14:04:00 CDT  KlonoPIN 0.5 mg oral tablet: 0.5 mg = 1 tab(s), Oral, BID, do not fill before 30 days, # 60 tab(s), 1 Refill(s), Pharmacy: [facility] #[id number], 170, cm, Height/Length Dosing, 12/23/21 14:07:00 CST, 81.2, kg, Weight Dosing, 12/23/21 14:07:00 CST  ondansetron 8 mg oral tablet: 8 mg = 1 tab(s), Oral, TID, PRN PRN nausea, # 30 tab(s), 0 Refill(s), Pharmacy: [facility] #[id number], 170, cm, Height/Length Dosing, 12/23/21 14:07:00 CST, 81.2, kg, Weight Dosing, 12/23/21 14:07:00 CST  Documented Medications  Documented  Nexium 40 mg oral delayed release capsule: 40 mg = 1 cap(s), Oral, Daily, 0 Refill(s)  RisaQuad oral capsule: = 1 cap(s), Oral, Daily  Xarelto 15mg Tablet: 15 mg = 1 tab(s), Oral, BID  Zyrtec 10 mg oral tablet: 10 mg = 1 tab(s), Oral, Daily, # 90 tab(s), 0 Refill(s)  acyclovir 200 mg oral capsule: 400 mg = 2 cap(s), Oral, Daily  amLODIPine 10 mg oral tablet: 10 mg = 1 tab(s), Oral, Daily  carvedilol 6.25 mg oral tablet: 6.25 mg = 1 tab(s), Oral, BID  fluticasone 50 mcg/inh nasal spray: 1 spray(s), Daily  lisinopril 10 mg oral tablet: 10 mg = 1 tab(s), Oral, Daily  multivitamin with minerals (Adult Tab): 1 tab(s), Oral, Daily, # 30 tab(s), 0 Refill(s)  spironolactone 25 mg oral tablet: 25 mg = 1 tab(s), Oral, Daily  sulfamethoxazole-trimethoprim 800 mg-160 mg oral tablet: 1 tab(s), Oral, BID  tamsulosin 0.4 mg oral capsule: 0.4 mg = 1 cap(s), Oral, Daily  traZODone 100 mg oral tablet: 100 mg = 1 tab(s), Oral, qPM   Problem list:    Active Problems (8)  Acid reflux   CML in remission   HTN (hypertension)   Lymphadenopathy of head and neck region   Myeloid sarcoma   Obesity   Pulmonary emboli   Tobacco user         Histories   Past Medical History:    Active  Acid reflux (9074712660)  Resolved  Able to lie down (842080117):  Resolved.  Exercise tolerance (725520403):   Resolved.  Bilateral inguinal hernia (158166008):  Resolved.  Wears glasses (056375972):  Resolved.  Mole (7830028328):  Resolved.  Comments:  7/16/2015 CDT 13:53 CDT Pao Atwood RN, Bettie AGUILAR.  on Rt ear lobe, removed  Smoker (063680175):  Resolved.  Hiatal hernia (664128208):  Resolved.  Kidney stone (680670347):  Resolved.  Melanoma (4580503):  Resolved.  Comments:  9/22/2015 CDT 9:33 MIKA Agrawal RN, Cristina Rangel  right ear  Spinal stenosis (309487631):  Resolved.  Abnormal CT scan ([id number]):  Resolved.  Abdominal pain (88041618):  Resolved.  Leukocytosis (893031335):  Resolved.   Family History:    Heart disease  Grandfather  Grandmother  Primary malignant neoplasm of breast  Sister  Sister     Procedure history:    BMT - Bone marrow transplant ([id number]) on 8/1/2020 at 60 Years.  Biopsy Bone Marrow Aspiration (Right) on 3/17/2020 at 59 Years.  Comments:  3/17/2020 10:22 Shawanda Godfrey RN  auto-populated from documented surgical case  Endoscopic Ultrasonography (Upper) on 9/30/2015 at 55 Years.  Comments:  9/30/2015 9:38 Corky Mayberry RN  auto-populated from documented surgical case  Esophagogastroduodenoscopy on 9/30/2015 at 55 Years.  Comments:  9/30/2015 9:38 Corky Mayberry RN  auto-populated from documented surgical case  Biopsy Gastrointestional on 9/30/2015 at 55 Years.  Comments:  9/30/2015 9:38 Corky Mayberry RN  auto-populated from documented surgical case  Hernia Repair Inguinal Laparoscopic (Bilateral) on 7/17/2015 at 54 Years.  Comments:  7/17/2015 10:39 Sondra Costa RN  auto-populated from documented surgical case  Colonoscopy (238195445) on 3/23/2011 at 50 Years.  Esophagogastroduodenoscopy (967421031) on 3/23/2011 at 50 Years.  Cholecystectomy (86142456).  Hemorrhoidectomy (07508266).  Tonsillectomy (366784181).  Cervical spinal fusion (669616712).  Extracorporeal shockwave lithotripsy for renal calculus (8734325181).   Social  History        Social & Psychosocial Habits    Alcohol  12/09/2021  Use: Current    Frequency: 3-5 times per week    Employment/School  07/16/2015  Status: Employed    Description: [employer]    Highest education: University degree(s)    03/17/2020  Status: Employed    Description:     Home/Environment  07/16/2015  Lives with: Spouse    Nutrition/Health  03/17/2020  Home Diet Regular    Substance Use  07/16/2015 Risk Assessment: Denies Substance Abuse    03/17/2020  Use: Never    Tobacco  01/03/2022  Use: 10 or more cigarettes (1/    Patient Wants Consult For Cessation Counseling No    Comment: 1 pack a day. Has been smoking for approx 25 years - 03/17/2020 13:28 - Francesca Elias    01/03/2022  Use: Former smoker, quit more    Patient Wants Consult For Cessation Counseling N/A    Abuse/Neglect  01/03/2022  SHX Any signs of abuse or neglect No    Feels unsafe at home: No    Safe place to go: Yes  .        Physical Examination   Vital Signs   1/3/2022 8:59 CST        Temperature Oral          36.6 DegC                             Temperature Oral (calculated)             97.88 DegF                             Peripheral Pulse Rate     87 bpm                             SpO2                      97 %                             Systolic Blood Pressure   130 mmHg                             Diastolic Blood Pressure  81 mmHg     General:  Alert and oriented, No acute distress, pleasant white male.    Eye:  Vision unchanged.    HENT:  Normocephalic, Normal hearing, Oral mucosa is moist.    Neck:  Supple, No jugular venous distention, No thyromegaly, No adenopathy.    Respiratory:  Lungs are clear to auscultation, Breath sounds are equal.    Cardiovascular:  Normal rate, Regular rhythm, No murmur, No gallop, Normal peripheral perfusion, No edema.    Gastrointestinal:  Soft, Non-tender, Non-distended, Normal bowel sounds, No organomegaly.    Lymphatics:  No lymphadenopathy neck, axilla, groin.     Musculoskeletal:  Normal range of motion, Normal strength, No deformity, Normal gait.    Integumentary:  Warm, Intact.    Neurologic:  Alert, Oriented, Normal sensory, Normal motor function.    Psychiatric:  Cooperative, Appropriate mood & affect.       Review / Management   Laboratory Results   Pending for today      Impression and Plan   Diagnosis     CML (chronic myeloid leukemia) (WYK34-RG C92.10).     Granulocytic sarcoma (VYR41-LL C92.30).     AML (acute myeloblastic leukemia) (HNG84-HU C92.00).     Pulmonary embolism (ZAT85-XA I26).     Plan   Patient presented with marked neutrophilia and painful neck and axillary adenopathy.  BCR-ABL+ c/w CML and also lymph node biopsy c/w granulocytic sarcoma, diagnosis CML with blast crisis in 3/2020.  Completed FLAG-Addis induction chemotherapy done at [facility] in [place], [place] 3/30/20--5/1/20 with CR.  Ponatinib 30mg daily started after induction, held for transplant.  s/p Flu/Cy/TBI 7/21/20 and haploidentical allogeneic SCT(son was donor) done in [place], [place] at [facility].  Engrafted on 8/10/21 and discharged on 8/12/20.  Restarted Ponatinib on 11/11/20 after day 100 marrow showed MRD.  Ponatinib stopped at day 267 post-transplant due to GI side effects.    Had some mild GVHD problems but weaned off Tacrolimus stopped 3/2021.  Most recent BCR-ABL undetectable and labs from 10/2021 all good.     Patient s/p recent hospitalization with respiratory failure, pneumonitis from parainfluenza, and suspected tiny pulmonary embolism.   Suspect respiratory failure was all related to infection likely and his drop in EF, acute CHF. This resolved on its own and he was found to have non-critical CAD.  He has follow-up with cardiology soon.   I have recommended to continue with Xarelto for now. Suspect PE may have been provoked by his infection.    Will repeat CT chest w/ contrast in 2 months to f/u PE and to also re-evaluate lung nodules as seen on CT with his history of  smoking.  Will check labs today including BCR-ABL to r/o relapsed disease.  RTC 2 months for follow-up and repeat labs will be ordered at that time as well.  May consider stopping Xarelto after 3 months since PE was questionable.    Continue Acyclovir prophylaxis until 4/2022.      All questions answered at this time.    Evon Carrillo MD

## 2022-05-02 NOTE — HISTORICAL [facility]ER
This is a historical note converted from Tony. Formatting and pictures may have been removed.  Please reference Tony for original formatting and attached multimedia. Chief Complaint  SINCE SATURDAY PND, CHEST TIGHTNESS, COUGH, FATIGUE, SOB  History of Present Illness  60 yo CM with a history of?CML and a current smoker presents here today with c/o chest tightness, productive cough, fatigue, chills, and mild shortness of breath since?Saturday.? Patient has a prescription of Wellbutrin but?he has not started it yet to help with smoking cessation. Patient is on Flonase and Zyrtec daily.?He started?feeling better last night, but then woke up this morning?feeling worse. Cough is productive with clear sputum. ?He denies fever or body aches. ?Has been off of work all week. No rhinorrhea today. Taking Mucinex DM and Delsym, but still coughing?and was?taking?Sudafed up until yesterday. ?Blood pressure stable. ?Patient is fully vaccinated for Covid?and recently got the booster in November.  Review of Systems  Constitutional:?No weight gain, No fever?+ fatigue.?+?Patient notes he has had chills since diagnosis of CML--now in remission  Eyes:?No blurring, No visual disturbances.?  Ear/Nose/Mouth/Throat:?No decreased hearing, No ear pain, No nasal congestion, No sore throat.??See HPI  Respiratory:?+ mild?shortness of breath--+smoker, +cough, No wheezing.?  Cardiovascular:?No chest pain, but chest tightness, No palpitations, No peripheral edema.?  Gastrointestinal:?No nausea, No vomiting, No diarrhea.  Integumentary:?No rash, No pruritus.?  Neurologic:?No abnormal balance, No confusion, No headache.?  Psychiatric:?Not suicidal, No hallucinations. ?  Physical Exam  Vitals & Measurements  T:?36.8? ?C (Oral)? HR:?64(Peripheral)? BP:?124/70?  HT:?170.00?cm? HT:?170?cm? WT:?88.000?kg? WT:?88?kg? BMI:?30.45?  General:?Alert and oriented, No acute distress.?  Eye:?Normal conjunctiva without exudate.  HENMT:?Normocephalic/AT, Normal  hearing, Oral mucosa is moist and pink?without exudate or mouth sores. ?+ Pharyngeal erythema with cobblestoning.? Sinuses nontender to palpation.? + Submandibular LAD.? Bilateral TMs clear with positive LR.? No nasal exudate.? Nasal turbinates nonedematous.  Neck:?No goiter visualized.??No nodules palpated. ?No LAD.  Respiratory:?Lungs CTAB?without rhonchi or wheezing, Respirations are non-labored, Breath sounds are equal, Symmetrical chest wall expansion.  Cardiovascular:?Normal rate, Regular rhythm, No murmur, No edema.?  Gastrointestinal:?Non-distended.?Soft, non ttp.  Genitourinary:?Deferred.  Musculoskeletal:?Normal ROM, Normal gait, No deformities or amputations.  Integumentary:?Warm, Dry, Intact. No diaphoresis, or flushing.  Neurologic:?No focal deficits, Cranial Nerves II-XII are grossly intact.?  Psychiatric:?Cooperative, Appropriate mood & affect, Normal judgment, Non-suicidal.  Assessment/Plan  1.?Allergic sinusitis?J30.9  Can cont. Mucinex DM  Rx. Tessalon Perles  Rx. albuterol.  cont. Zyrtec and Flonase  Warm salt water gargles if throat is sore  Increase fluid intake?  Take multiple hot showers  Consider saline sinus rinse/spray prior to using nasal sprays  Vitamin C 1 gm OTC daily?  Rest?  Steroid shot in office today  Ordered:  Clinic Follow-up PRN, 12/09/21 10:58:00 CST, [facility], Future Order  Office/Outpatient Visit Level 3 Established 48541 PC, Allergic sinusitis  SOB (shortness of breath)  Tobacco user, "[employer] AMB - [facility], 12/09/21 10:58:00 CST  ?  2.?SOB (shortness of breath)?R06.02  CXR--waiting read from radiology  Steroid shot in office today  Rx. ?Albuterol as needed  Ordered:  albuterol, 2 puff(s), INH, q6hr, # 6.7 gm, 1 Refill(s), Pharmacy: [facility] #[id number], 170, cm, Height/Length Dosing, 12/09/21 10:20:00 CST, 88, kg, Weight Dosing, 12/09/21 10:20:00 CST  Office/Outpatient Visit Level 3 Established 20249 PC, Allergic sinusitis  SOB (shortness of breath)  Tobacco  user, HLMARISA AMB - [facility], 12/09/21 10:58:00 CST  XR Chest 2 Views, Routine, 12/09/21 10:20:00 CST, Other (please specify), None, Stretcher, Patient Has IV?, Rad Type, SOB (shortness of breath), Not Scheduled, [facility], 12/09/21 10:20:00 CST  ?  3.?Cough?R05  Rx. Tessalon Perles  Pt. is allergic to codeine  ?  4.?Tobacco user?Z72.0  ?We will start Wellbutrin in the near future.  Ordered:  Office/Outpatient Visit Level 3 Established 98140 PC, Allergic sinusitis  SOB (shortness of breath)  Tobacco user, HLMARISA AMB - [facility], 12/09/21 10:58:00 CST  ?  Education and counseling done face to face regarding medical conditions, current and new medications including risk/benefit and side effects/adverse events, over the counter medications-uses/doses, home self-care and red flags and indications for immediate medical attention. Call if symptoms change or new ones develop. Should any symptoms ever significantly worsen, go to ER. Follow up as scheduled for wellness or sooner PRN. Will call with any results. The patient is receptive, expresses understanding and is agreeable to plan. All questions have been answered.?  Referrals  Clinic Follow-up PRN, 12/09/21 10:58:00 CST, [facility] - [facility], Future Order   Problem List/Past Medical History  Ongoing  Acid reflux  CML in remission  HTN (hypertension)  Lymphadenopathy of head and neck region  Myeloid sarcoma  Obesity  Historical  Bilateral inguinal hernia  Hiatal hernia  Kidney stone  Melanoma  Procedure/Surgical History  BMT - Bone marrow transplant (08/01/2020)  Biopsy Bone Marrow Aspiration (Right) (03/17/2020)  Diagnostic bone marrow; biopsy(ies) and aspiration(s) (03/17/2020)  Drainage of Bone Marrow, Percutaneous Approach, Diagnostic (03/17/2020)  Extraction of Iliac Bone Marrow, Percutaneous Approach, Diagnostic (03/17/2020)  Biopsy Gastrointestional (09/30/2015)  Diagnostic ultrasound of digestive system (09/30/2015)  Endoscopic Ultrasonography (Upper)  (09/30/2015)  Esophagogastroduodenoscopy (09/30/2015)  Esophagogastroduodenoscopy [EGD] with closed biopsy (09/30/2015)  Esophagogastroduodenoscopy, flexible, transoral; with biopsy, single or multiple (09/30/2015)  Esophagogastroduodenoscopy, flexible, transoral; with endoscopic ultrasound examination, including the esophagus, stomach, and either the duodenum or a surgically altered stomach where the jejunum is examined distal to the anastomosis (09/30/2015)  Hernia Repair Inguinal Laparoscopic (Bilateral) (07/17/2015)  Laparoscopic bilateral repair of indirect inguinal hernia with graft or prosthesis (07/17/2015)  Laparoscopy, surgical; repair initial inguinal hernia (07/17/2015)  Colonoscopy (03/23/2011)  Esophagogastroduodenoscopy (03/23/2011)  Cervical spinal fusion  Cholecystectomy  Extracorporeal shockwave lithotripsy for renal calculus  Hemorrhoidectomy  Tonsillectomy   Medications  acyclovir 800 mg oral tablet, 800 mg= 1 tab(s), Oral, BID  BuPROPion (Eqv-Zyban Advantage Pack) 150 mg/12 hours oral tablet, extended release, 150 mg= 1 tab(s), Oral, BID, 2 refills  fluticasone 50 mcg/inh nasal spray, 1 spray(s), Daily  midazolam 1 mg/mL injectable solution, 1 mg= 1 mL, IV Push, Once  multivitamin with minerals (Adult Tab), 1 tab(s), Oral, Daily  Nexium 40 mg oral delayed release capsule, 40 mg= 1 cap(s), Oral, Daily  ondansetron 8 mg oral tablet, 8 mg= 1 tab(s), Oral, TID  Zyrtec 10 mg oral tablet, 10 mg= 1 tab(s), Oral, Daily  Allergies  Iodine?(hives)  codeine?(hives)  Social History  Abuse/Neglect  No, No, Yes, 12/09/2021  Alcohol  Current, 3-5 times per week, 12/09/2021  Employment/School  Employed, Work/School description: ., 03/17/2020  Employed, Work/School description: [employer] Highest education level: University degree(s)., 07/16/2015  Home/Environment  Lives with Spouse., 07/16/2015  Nutrition/Health  Regular, 03/17/2020  Substance Use - Denies Substance Abuse, 07/16/2015  Never,  03/17/2020  Tobacco  10 or more cigarettes (1/2 pack or more)/day in last 30 days, No, 12/09/2021  Family History  Heart disease: Grandfather and Grandmother.  Primary malignant neoplasm of breast: Sister and Sister.  Father: History is negative  Mother: History is negative  Immunizations  Vaccine Date Status Comments   COVID-19 mRNA, LNP-S, PF - Moderna 11/18/2021 Recorded    poliovirus vaccine, inactivated 11/08/2021 Recorded 2021-12-09: VIS DATE: 08/06/2021   pneumococcal 13-valent conjugate vaccine 11/08/2021 Recorded 2021-12-09: VIS DATE: 08/06/2021   haemophilus b conjugate (PRP-T) vaccine 11/08/2021 Recorded 2021-12-09: VIS DATE: 08/06/2021   poliovirus vaccine, inactivated 09/20/2021 Recorded 2021-12-09: VIS DATE: 08/06/2021   pneumococcal 13-valent conjugate vaccine 09/20/2021 Recorded 2021-12-09: VIS DATE: 08/06/2021   meningococcal group B vaccine 09/20/2021 Recorded 2021-12-09: VIS DATE: 08/06/2021   meningococcal conjugate vaccine 09/20/2021 Recorded 2021-12-09: VIS DATE: 08/06/2021   hepatitis B adult vaccine 09/20/2021 Recorded 2021-12-09: VIS DATE: 8/15/2019   haemophilus b conjugate (PRP-T) vaccine 09/20/2021 Recorded 2021-12-09: VIS DATE: 08/06/2021   COVID-19 mRNA, LNP-S, PF - Moderna 08/13/2021 Recorded    COVID-19 mRNA, LNP-S, PF - Moderna 07/14/2021 Recorded    poliovirus vaccine, inactivated 07/07/2021 Recorded 2021-12-09: VIS DATE: 10/30/2019   pneumococcal 13-valent conjugate vaccine 07/07/2021 Recorded 2021-12-09: VIS DATE: 10/30/2019   meningococcal group B vaccine 07/07/2021 Recorded 2021-12-09: VIS DATE: 08/15/2019   meningococcal conjugate vaccine 07/07/2021 Recorded 2021-12-09: VIS DATE: 08/15/2019   hepatitis B adult vaccine 07/07/2021 Recorded 2021-12-09: VIS DATE: 8/15/2019   hepatitis A adult vaccine 07/07/2021 Recorded 2021-12-09: VIS DATE: 07/28/2020   haemophilus b conjugate (PRP-T) vaccine 07/07/2021 Recorded 2021-12-09: VIS DATE: 10/30/2019   [facility]  Maintenance  ???Pending?(in the next year)  ??? ??OverDue  ??? ? ? ?Alcohol Misuse Screening due??01/02/21??and every 1??year(s)  ??? ? ? ?Colorectal Screening due??03/20/21??and every 10??year(s)  ??? ??Due?  ??? ? ? ?ADL Screening due??12/09/21??and every 1??year(s)  ??? ? ? ?Hypertension Maintenance-Medication Prescribed due??12/09/21??and every 1??year(s)  ??? ? ? ?Hypertension Management-Education due??12/09/21??and every 1??year(s)  ??? ? ? ?Lung Cancer Screening due??12/09/21??and every 1??year(s)  ??? ? ? ?Zoster Vaccine due??12/09/21??Unknown Frequency  ??? ??Refused?  ??? ? ? ?Smoking Cessation due??01/01/21??Variable frequency  ??? ? ? ?Tetanus Vaccine due??12/09/21??and every 10??year(s)  ??? ??Due In Future?  ??? ? ? ?Obesity Screening not due until??01/01/22??and every 1??year(s)  ??? ? ? ?Diabetes Screening not due until??10/28/22??and every 1??year(s)  ??? ? ? ?Hypertension Management-BMP not due until??10/28/22??and every 1??year(s)  ???Satisfied?(in the past 1 year)  ??? ??Satisfied?  ??? ? ? ?Aspirin Therapy for CVD Prevention on??12/09/21.??Satisfied by Jenelle Keith LPN  ??? ? ? ?Blood Pressure Screening on??12/09/21.??Satisfied by Jenelle Keith LPN  ??? ? ? ?Body Mass Index Check on??12/09/21.??Satisfied by Jenelle Keith LPN  ??? ? ? ?Depression Screening on??12/09/21.??Satisfied by Jenelle Keith LPN  ??? ? ? ?Diabetes Screening on??10/28/21.??Satisfied by Rick Castellanos  ??? ? ? ?Hypertension Management-BMP on??10/28/21.??Satisfied by Rick Castellanos  ??? ? ? ?Hypertension Management-Blood Pressure on??12/09/21.??Satisfied by Jenelle Keith LPN  ??? ? ? ?Influenza Vaccine on??12/09/21.??Satisfied by Jenelle Keith LPN  ??? ? ? ?Obesity Screening on??12/09/21.??Satisfied by Jenelle Keith LPN  ??? ??Refused?  ??? ? ? ?Smoking Cessation on??11/11/21.??Recorded by Cordelia Plasencia  ??? ? ? ?Tetanus Vaccine on??12/09/21.??Recorded by Jenelle Keith LPN??Reason:  Patient Refuses  ??? ? ? ?Zoster Vaccine on??12/09/21.??Recorded by Jenelle Keith LPN??Reason: Patient Refuses  ?      Patient condition discussed?in detail with nurse practitioner.? Agree with plan of care?and follow-up.

## 2022-05-26 DIAGNOSIS — C92.11 CML IN REMISSION: Primary | ICD-10-CM

## 2022-05-26 DIAGNOSIS — C92.00 ACUTE MYELOSIS: ICD-10-CM

## 2022-05-26 DIAGNOSIS — C92.31 MYELOID SARCOMA IN REMISSION: Primary | ICD-10-CM

## 2022-05-26 DIAGNOSIS — C92.10 CML (CHRONIC MYELOCYTIC LEUKEMIA): ICD-10-CM

## 2022-05-31 ENCOUNTER — PATIENT MESSAGE (OUTPATIENT)
Dept: HEMATOLOGY/ONCOLOGY | Facility: CLINIC | Age: 62
End: 2022-05-31
Payer: COMMERCIAL

## 2022-05-31 PROCEDURE — 81207 BCR/ABL1 GENE MINOR BP: CPT | Performed by: NURSE PRACTITIONER

## 2022-06-07 DIAGNOSIS — C92.01 ACUTE MYELOID LEUKEMIA IN REMISSION: Primary | ICD-10-CM

## 2022-06-07 PROBLEM — J96.01 ACUTE RESPIRATORY FAILURE WITH HYPOXIA: Status: ACTIVE | Noted: 2021-12-14

## 2022-06-07 PROBLEM — L85.3 DRY SKIN: Status: RESOLVED | Noted: 2020-08-26 | Resolved: 2022-06-07

## 2022-06-07 PROBLEM — R25.2 HAND CRAMPS: Status: RESOLVED | Noted: 2020-11-18 | Resolved: 2022-06-07

## 2022-06-07 PROBLEM — R25.2 LEG CRAMPS: Status: RESOLVED | Noted: 2020-11-18 | Resolved: 2022-06-07

## 2022-06-07 PROBLEM — T45.1X5A CHEMOTHERAPY-INDUCED THROMBOCYTOPENIA: Status: RESOLVED | Noted: 2020-08-12 | Resolved: 2022-06-07

## 2022-06-07 PROBLEM — J30.9 ALLERGIC RHINITIS: Status: ACTIVE | Noted: 2022-06-07

## 2022-06-07 PROBLEM — I42.9 CARDIOMYOPATHY: Status: ACTIVE | Noted: 2021-12-15

## 2022-06-07 PROBLEM — R29.3 POOR POSTURE: Status: RESOLVED | Noted: 2020-10-09 | Resolved: 2022-06-07

## 2022-06-07 PROBLEM — R21 RASH AND NONSPECIFIC SKIN ERUPTION: Status: RESOLVED | Noted: 2020-07-29 | Resolved: 2022-06-07

## 2022-06-07 PROBLEM — E78.5 HYPERLIPIDEMIA: Status: ACTIVE | Noted: 2022-06-07

## 2022-06-07 PROBLEM — C95.90 LEUKEMIA: Status: ACTIVE | Noted: 2022-06-07

## 2022-06-07 PROBLEM — K12.31 MUCOSITIS DUE TO CHEMOTHERAPY: Status: RESOLVED | Noted: 2020-08-05 | Resolved: 2022-06-07

## 2022-06-07 PROBLEM — I26.99 PULMONARY EMBOLISM: Status: ACTIVE | Noted: 2022-06-07

## 2022-06-07 PROBLEM — T45.1X5A PANCYTOPENIA DUE TO ANTINEOPLASTIC CHEMOTHERAPY: Status: ACTIVE | Noted: 2020-05-07

## 2022-06-07 PROBLEM — T45.1X5A CINV (CHEMOTHERAPY-INDUCED NAUSEA AND VOMITING): Status: RESOLVED | Noted: 2020-08-04 | Resolved: 2022-06-07

## 2022-06-07 PROBLEM — C95.90 LEUKEMIA: Status: RESOLVED | Noted: 2022-06-07 | Resolved: 2022-06-07

## 2022-06-07 PROBLEM — D69.59 CHEMOTHERAPY-INDUCED THROMBOCYTOPENIA: Status: RESOLVED | Noted: 2020-08-12 | Resolved: 2022-06-07

## 2022-06-07 PROBLEM — R59.0 LYMPHADENOPATHY OF HEAD AND NECK REGION: Status: ACTIVE | Noted: 2022-06-07

## 2022-06-07 PROBLEM — E66.9 OBESITY: Status: ACTIVE | Noted: 2022-06-07

## 2022-06-07 PROBLEM — R11.2 CINV (CHEMOTHERAPY-INDUCED NAUSEA AND VOMITING): Status: RESOLVED | Noted: 2020-08-04 | Resolved: 2022-06-07

## 2022-06-07 PROBLEM — N17.0 ACUTE RENAL FAILURE WITH TUBULAR NECROSIS: Status: ACTIVE | Noted: 2021-12-10

## 2022-06-07 PROBLEM — D61.810 PANCYTOPENIA DUE TO ANTINEOPLASTIC CHEMOTHERAPY: Status: ACTIVE | Noted: 2020-05-07

## 2022-06-07 NOTE — PROGRESS NOTES
Subjective:       Patient ID: [id number] Jonse Sr. is a 61 y.o. male.    Primary Care: Dr. Ben Doga [facility] transplant Oncologist: Dr. 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  Left axillary lymph node biopsy done 3/24/20--preliminary results c/w granulocytic sarcoma.  Bone marrow biopsy day 100 20 with MRD, positive for BCR-ABL p210 7.8%.  Bone marrow biopsy day 180 2/3/21 s/p transplant showed stringent CR (undetectable BCR-ABL), chimerism 100% donor CD33 and 90% donor CD3.  Bone marrow biopsy at 1 year 21--stringent CR (negative BCR-ABL in blood and marrow), 100% donor CD33 and CD3 chimerism.    Work-up:  Peripheral blood RT PCR BCR-ABL done 3/17/20 positive for major p210, 49.5974%.    Imagin. CT soft tissue neck/C/A/P done 3/23/20--Bilateral cervical lymphadenopathy concerning for metastatic disease or lymphoproliferative disorder, pathologic adenopathy of the bilateral axillary and inguinal  regions, several nonenlarged mediastinal lymph nodes are identified, nonenlarged retroperitoneal lymph nodes.  2. CT chest w/ contrast done at [facility] 12/10/21--no large central PE, findings suggest tiny bilateral pulmonary emboli in lower lobe territory branches R>L, patchy areas of atelectasis and effusion pneumonitis, scattered peripheral hazy nodularities one of which was seen on prior RUL 1cm, others are new, follow-up recommended, enlarged liver with hepatic steatosis, low-density lesions in left lobe 1-2cm range, correlate with US, bilateral adrenal gland hyperplasia nodular changes on left, small 2-4mm calculi right upper kidney, dilated small bowel loop LUQ, r/o obstruction, follow-up recommended.   3. CT chest w/ contrast done at [facility] 22--Near complete resolution of previously seen ground glass nodules from  12/10/2021. Favor infectious/inflammatory etiology.    Treatment history:  FLAG-Addis induction chemotherapy done at [facility] in [place], [place] 3/30/20--5/1/20.  Ponatinib 30mg daily started after induction, held for transplant.  Flu/Cy/TBI 7/21/20 and haploidentical allogeneic SCT(son was donor) done in [place], [place] at [facility].  Engrafted on 8/10/21 and discharged on 8/12/20.  Restarted Ponatinib on 11/11/20 after day 100 marrow showed MRD.  Ponatinib stopped at day 267 post-transplant due to GI side effects.    GVHD treatment history:  Diffuse erythema to legs, lower back, arms and abdomen 9/15/20, started on Prednisone, worsened despite steroids and tacrolimus cream. Biopsy c/w folliculitis and steroids stopped.  Budesonide 3mg BID started 12/10/20 for GERD; EGD negative for GVHD and c/w antral gastritis, stopped oral steroids.  Tacrolimus taper started every 2 weeks 1/6/21, slow due to ongoing rash, stopped Tacrolimus on 3/31/21.    Current treatment plan:   1. Observation  2. Acyclovir prophylaxis to continue until 8/2022 (1 year post-Tacrolimus)    Chief Complaint: Other Misc and Fatigue (Pt reports that his nipples have hurt for the last 6 wks. )    HPI   Patient presents for follow-up AML/CML. He is doing well. Complains of fatigue and nipple soreness. He did change to a different soap so I recommended he switch back to his previous. Recent labs all good including BCR-ABL. He is now followed by Dr. Macario for PAD and doing physical therapy. He is scheduled to return to Dr. Hodges in August but he may want to do his labs and visit here. No other problems reported.     Past Medical History:   Diagnosis Date    CML (chronic myelocytic leukemia)     Encounter for blood transfusion     Hiatal hernia with gastroesophageal reflux     Hx of psychiatric care     valium, ativan    Hypertension     Melanoma in situ of external ear, right     PONV (postoperative nausea and vomiting)     Pulmonary embolism 6/7/2022       Review of patient's allergies indicates:   Allergen Reactions    Posaconazole Hallucinations    Unclassified drug Other (See Comments)     Pt reports that he has an allergic reaction to an Antifungal medication, but is unsure of the name of the drug. Will obtain name prior to arrival in am and notify Pre-op RN.    Vancomycin analogues Other (See Comments)     Pt reports he had rigors    Codeine Hives    Iodine Hives and Rash    Iodinated contrast media Hives        Current Outpatient Medications:     acyclovir (ZOVIRAX) 800 MG Tab, TAKE 1 TABLET(800 MG) BY MOUTH TWICE DAILY, Disp: 60 tablet, Rfl: 11    amLODIPine (NORVASC) 5 MG tablet, Take 1 tablet (5 mg total) by mouth once daily., Disp: 30 tablet, Rfl: 11    diphenhydrAMINE (BENADRYL) 25 mg capsule, Take 25 mg by mouth every 6 (six) hours as needed for Itching. Patient takes once daily, Disp: , Rfl:     esomeprazole (NEXIUM) 40 MG capsule, Take 40 mg by mouth before breakfast., Disp: , Rfl:     fluticasone propionate (FLONASE) 50 mcg/actuation nasal spray, SHAKE LIQUID AND USE 2 SPRAYS(100 MCG) IN EACH NOSTRIL DAILY AS NEEDED FOR RHINITIS, Disp: 16 g, Rfl: 0    fluticasone propionate (FLONASE) 50 mcg/actuation nasal spray,  1 spray(s), Daily, Disp: , Rfl:     hydrocortisone 2.5 % cream, , Disp: , Rfl:     loratadine (CLARITIN) 10 mg tablet, Take 10 mg by mouth once daily., Disp: , Rfl:     multivit-min/FA/lycopen/lutein (CENTRUM SILVER MEN ORAL), Take by mouth., Disp: , Rfl:     ondansetron (ZOFRAN) 8 MG tablet, Take 1 tablet (8 mg total) by mouth every 8 (eight) hours as needed for Nausea., Disp: 30 tablet, Rfl: 4    tamsulosin (FLOMAX) 0.4 mg Cap, TAKE 2 CAPSULES(0.8 MG) BY MOUTH EVERY DAY, Disp: 30 capsule, Rfl: 3    triamcinolone acetonide 0.1% (KENALOG) 0.1 % cream, Apply topically 2 (two) times daily., Disp: , Rfl:     atorvastatin (LIPITOR) 40 MG tablet, Take 40 mg by mouth once daily., Disp: , Rfl:     carvediloL (COREG) 6.25 MG  tablet, Take 6.25 mg by mouth 2 (two) times daily., Disp: , Rfl:     cetirizine (ZYRTEC) 10 mg Cap, Take 10 mg by mouth once daily., Disp: , Rfl:     ketoconazole (NIZORAL) 2 % shampoo, APPLY 1 APPLICATION ON THE SKIN 3 TIMES WEEKLY. WASH TRUNK LEAVING MEDICATION ON THE SKIN 5 TO 10 MINUTES MINUTES THEN RINSE, Disp: , Rfl:     spironolactone (ALDACTONE) 25 MG tablet, Take 25 mg by mouth once daily., Disp: , Rfl:   Review of Systems   Constitutional: Positive for fatigue. Negative for activity change, fever and unexpected weight change.   Eyes: Negative for visual disturbance.   Respiratory: Negative for cough and shortness of breath.    Cardiovascular: Negative for chest pain.   Gastrointestinal: Negative for abdominal pain, blood in stool, constipation, diarrhea, nausea and vomiting.   Genitourinary: Negative for difficulty urinating.   Musculoskeletal: Negative for back pain.   Integumentary:  Negative for rash.        Nipple soreness   Neurological: Negative for dizziness, weakness and headaches.   Psychiatric/Behavioral: Negative for behavioral problems and suicidal ideas.            Vitals:    06/15/22 1109   BP: 107/74   Pulse: 76   Resp: 14   Temp: 98.4 °F (36.9 °C)      Physical Exam  Constitutional:       General: He is awake.      Appearance: Normal appearance. He is overweight.   HENT:      Head: Normocephalic.   Eyes:      General: Lids are normal. Vision grossly intact.      Extraocular Movements: Extraocular movements intact.      Conjunctiva/sclera: Conjunctivae normal.   Cardiovascular:      Rate and Rhythm: Normal rate and regular rhythm.      Pulses: Normal pulses.      Heart sounds: Normal heart sounds.   Pulmonary:      Effort: Pulmonary effort is normal.      Breath sounds: Normal breath sounds and air entry.   Abdominal:      General: Abdomen is flat. Bowel sounds are normal.      Palpations: Abdomen is soft.   Musculoskeletal:      Cervical back: Normal, normal range of motion and neck  supple.      Thoracic back: Normal.      Lumbar back: Normal.   Feet:      Right foot:      Skin integrity: Skin integrity normal.   Lymphadenopathy:      Comments: No palpable adenopathy   Skin:     General: Skin is warm.   Neurological:      Mental Status: He is alert.   Psychiatric:         Behavior: Behavior is cooperative.       ECOG SCORE    1 - Restricted in strenuous activity-ambulatory and able to carry out work of a light nature       No visits with results within 1 Week(s) from this visit.   Latest known visit with results is:   Lab Visit on 05/31/2022   Component Date Value    Sodium Level 05/31/2022 142     Potassium Level 05/31/2022 4.9     Chloride 05/31/2022 111 (A)    Carbon Dioxide 05/31/2022 22 (A)    Glucose Level 05/31/2022 111     Blood Urea Nitrogen 05/31/2022 22.4     Creatinine 05/31/2022 1.06     Calcium Level Total 05/31/2022 9.7     Protein Total 05/31/2022 6.8     Albumin Level 05/31/2022 4.4     Globulin 05/31/2022 2.4     Albumin/Globulin Ratio 05/31/2022 1.8     Bilirubin Total 05/31/2022 0.5     Alkaline Phosphatase 05/31/2022 75     Alanine Aminotransferase 05/31/2022 19     Aspartate Aminotransfera* 05/31/2022 16     Estimated GFR-Non Becky* 05/31/2022 >60     WBC 05/31/2022 6.8     RBC 05/31/2022 4.58 (A)    Hgb 05/31/2022 14.1     Hct 05/31/2022 42.5     MCV 05/31/2022 92.8     MCH 05/31/2022 30.8     MCHC 05/31/2022 33.2     RDW 05/31/2022 12.9     Platelet 05/31/2022 308     MPV 05/31/2022 8.5 (A)    Neut % 05/31/2022 54.3     Lymph % 05/31/2022 30.6     Mono % 05/31/2022 7.8     Eos % 05/31/2022 6.3     Basophil % 05/31/2022 0.7     Lymph # 05/31/2022 2.09     Neut # 05/31/2022 3.7     Mono # 05/31/2022 0.53     Eos # 05/31/2022 0.43     Baso # 05/31/2022 0.05     IG# 05/31/2022 0.02 (A)    IG% 05/31/2022 0.3           Assessment:       Problem List Items Addressed This Visit        Oncology    History of allogeneic stem cell transplant     Relevant Orders    Hepatitis B Surface Ab, Qualitative    Comprehensive Metabolic Panel    Lactate Dehydrogenase    CBC Auto Differential    P190 BCR ABL1    Varicella Zoster Antibody, IgG    Miscellaneous Test, Sendout [id number] - Humoral Immune Eval    Chronic myeloid leukemia (CML), BCR/ABL1-positive, in remission    Relevant Orders    Hepatitis B Surface Ab, Qualitative    Comprehensive Metabolic Panel    Lactate Dehydrogenase    CBC Auto Differential    P190 BCR ABL1    Varicella Zoster Antibody, IgG    Miscellaneous Test, Sendout [id number] - Humoral Immune Eval    Acute myeloid leukemia in remission - Primary    Relevant Orders    Hepatitis B Surface Ab, Qualitative    Comprehensive Metabolic Panel    Lactate Dehydrogenase    CBC Auto Differential    P190 BCR ABL1    Varicella Zoster Antibody, IgG    Miscellaneous Test, Sendout [id number] - Humoral Immune Eval             Plan:       Patient presented with marked neutrophilia and painful neck and axillary adenopathy.  BCR-ABL+ c/w CML and also lymph node biopsy c/w granulocytic sarcoma, diagnosis CML with blast crisis in 3/2020.  Completed FLAG-Addis induction chemotherapy done at [facility] in [place], [place] 3/30/20--5/1/20 with CR.  Ponatinib 30mg daily started after induction, held for transplant.  s/p Flu/Cy/TBI 7/21/20 and haploidentical allogeneic SCT(son was donor) done in [place], [place] at [facility].  Engrafted on 8/10/21 and discharged on 8/12/20.  Restarted Ponatinib on 11/11/20 after day 100 marrow showed MRD.  Ponatinib stopped at day 267 post-transplant due to GI side effects.  Had some mild GVHD problems but weaned off Tacrolimus stopped 3/2021.    Patient had hospitalization in 12/2021 with respiratory failure, pneumonitis from parainfluenza, and suspected tiny pulmonary embolism.   Suspect respiratory failure was all related to infection likely and his drop in EF, acute CHF. This resolved on its own and he was found to have non-critical CAD.  CT chest  repeated 2/23/22 shows resolved infection, no PE.    Most recent BCR-ABL undetectable and labs all good.  Patient has continued follow-up with Dr. Groves in 7/2022.   I have recommended to continue with Xarelto for now and possibly for life. Suspect PE may have been provoked by his infection.  Consider decreasing Xarelto to the 10mg dose after 6 months which will be next month. He prefers to discuss with Dr. Groves before decreasing dose.   Continue Acyclovir prophylaxis until 8/2022.  Continue observation.  No need to repeat CT unless any new symptoms.  Scheduled to return to Dr. Hodges in August for follow-up with repeat labs including BCR-ABL. He is considering doing the labs and visit here instead. I will go ahead and schedule the labs including vaccine titers. Should he decide to go to Dr. Hodges, we will cancel and reschedule for November 2022.   All questions answered at this time.      Abhilash Hill, [facility]

## 2022-06-15 ENCOUNTER — PATIENT MESSAGE (OUTPATIENT)
Dept: HEMATOLOGY/ONCOLOGY | Facility: CLINIC | Age: 62
End: 2022-06-15

## 2022-06-15 ENCOUNTER — OFFICE VISIT (OUTPATIENT)
Dept: HEMATOLOGY/ONCOLOGY | Facility: CLINIC | Age: 62
End: 2022-06-15
Payer: COMMERCIAL

## 2022-06-15 VITALS
HEIGHT: 66 IN | BODY MASS INDEX: 36.21 KG/M2 | SYSTOLIC BLOOD PRESSURE: 107 MMHG | WEIGHT: 225.31 LBS | HEART RATE: 76 BPM | TEMPERATURE: 98 F | OXYGEN SATURATION: 97 % | RESPIRATION RATE: 14 BRPM | DIASTOLIC BLOOD PRESSURE: 74 MMHG

## 2022-06-15 DIAGNOSIS — C92.01 ACUTE MYELOID LEUKEMIA IN REMISSION: Primary | ICD-10-CM

## 2022-06-15 DIAGNOSIS — C92.11 CHRONIC MYELOID LEUKEMIA (CML), BCR/ABL1-POSITIVE, IN REMISSION: ICD-10-CM

## 2022-06-15 DIAGNOSIS — Z94.84 HISTORY OF ALLOGENEIC STEM CELL TRANSPLANT: ICD-10-CM

## 2022-06-15 PROCEDURE — 3074F SYST BP LT 130 MM HG: CPT | Mod: CPTII,S$GLB,, | Performed by: NURSE PRACTITIONER

## 2022-06-15 PROCEDURE — 3008F BODY MASS INDEX DOCD: CPT | Mod: CPTII,S$GLB,, | Performed by: NURSE PRACTITIONER

## 2022-06-15 PROCEDURE — 1159F MED LIST DOCD IN RCRD: CPT | Mod: CPTII,S$GLB,, | Performed by: NURSE PRACTITIONER

## 2022-06-15 PROCEDURE — 3008F PR BODY MASS INDEX (BMI) DOCUMENTED: ICD-10-PCS | Mod: CPTII,S$GLB,, | Performed by: NURSE PRACTITIONER

## 2022-06-15 PROCEDURE — 99214 OFFICE O/P EST MOD 30 MIN: CPT | Mod: S$GLB,,, | Performed by: NURSE PRACTITIONER

## 2022-06-15 PROCEDURE — 3078F PR MOST RECENT DIASTOLIC BLOOD PRESSURE < 80 MM HG: ICD-10-PCS | Mod: CPTII,S$GLB,, | Performed by: NURSE PRACTITIONER

## 2022-06-15 PROCEDURE — 3078F DIAST BP <80 MM HG: CPT | Mod: CPTII,S$GLB,, | Performed by: NURSE PRACTITIONER

## 2022-06-15 PROCEDURE — 1160F PR REVIEW ALL MEDS BY PRESCRIBER/CLIN PHARMACIST DOCUMENTED: ICD-10-PCS | Mod: CPTII,S$GLB,, | Performed by: NURSE PRACTITIONER

## 2022-06-15 PROCEDURE — 99214 PR OFFICE/OUTPT VISIT, EST, LEVL IV, 30-39 MIN: ICD-10-PCS | Mod: S$GLB,,, | Performed by: NURSE PRACTITIONER

## 2022-06-15 PROCEDURE — 99999 PR PBB SHADOW E&M-EST. PATIENT-LVL V: CPT | Mod: PBBFAC,,, | Performed by: NURSE PRACTITIONER

## 2022-06-15 PROCEDURE — 1160F RVW MEDS BY RX/DR IN RCRD: CPT | Mod: CPTII,S$GLB,, | Performed by: NURSE PRACTITIONER

## 2022-06-15 PROCEDURE — 99999 PR PBB SHADOW E&M-EST. PATIENT-LVL V: ICD-10-PCS | Mod: PBBFAC,,, | Performed by: NURSE PRACTITIONER

## 2022-06-15 PROCEDURE — 3074F PR MOST RECENT SYSTOLIC BLOOD PRESSURE < 130 MM HG: ICD-10-PCS | Mod: CPTII,S$GLB,, | Performed by: NURSE PRACTITIONER

## 2022-06-15 PROCEDURE — 1159F PR MEDICATION LIST DOCUMENTED IN MEDICAL RECORD: ICD-10-PCS | Mod: CPTII,S$GLB,, | Performed by: NURSE PRACTITIONER

## 2022-06-15 RX ORDER — CETIRIZINE HYDROCHLORIDE 10 MG/1
10 CAPSULE, LIQUID FILLED ORAL DAILY
COMMUNITY
End: 2023-04-13

## 2022-06-15 RX ORDER — SPIRONOLACTONE 25 MG/1
25 TABLET ORAL DAILY
COMMUNITY
Start: 2022-05-31 | End: 2023-02-06

## 2022-06-15 RX ORDER — ATORVASTATIN CALCIUM 40 MG/1
40 TABLET, FILM COATED ORAL DAILY
COMMUNITY
Start: 2022-05-31

## 2022-06-15 RX ORDER — CARVEDILOL 6.25 MG/1
3.12 TABLET ORAL 2 TIMES DAILY
COMMUNITY
Start: 2022-05-31 | End: 2022-09-21

## 2022-06-15 RX ORDER — AMLODIPINE BESYLATE 10 MG/1
10 TABLET ORAL DAILY
COMMUNITY
Start: 2022-05-26 | End: 2022-06-15

## 2022-06-15 RX ORDER — FLUTICASONE PROPIONATE 50 MCG
SPRAY, SUSPENSION (ML) NASAL
COMMUNITY
Start: 2021-10-20 | End: 2022-08-02

## 2022-06-15 RX ORDER — KETOCONAZOLE 20 MG/ML
SHAMPOO, SUSPENSION TOPICAL
COMMUNITY
Start: 2022-02-05 | End: 2022-08-02

## 2022-07-05 ENCOUNTER — PATIENT MESSAGE (OUTPATIENT)
Dept: HEMATOLOGY/ONCOLOGY | Facility: CLINIC | Age: 62
End: 2022-07-05
Payer: COMMERCIAL

## 2022-07-05 DIAGNOSIS — C92.11 CHRONIC MYELOID LEUKEMIA (CML), BCR/ABL1-POSITIVE, IN REMISSION: ICD-10-CM

## 2022-07-05 DIAGNOSIS — C92.01 ACUTE MYELOID LEUKEMIA IN REMISSION: Primary | ICD-10-CM

## 2022-08-25 ENCOUNTER — OFFICE VISIT (OUTPATIENT)
Dept: HEMATOLOGY/ONCOLOGY | Facility: CLINIC | Age: 62
End: 2022-08-25
Payer: COMMERCIAL

## 2022-08-25 ENCOUNTER — LAB VISIT (OUTPATIENT)
Dept: LAB | Facility: HOSPITAL | Age: 62
End: 2022-08-25
Attending: INTERNAL MEDICINE
Payer: COMMERCIAL

## 2022-08-25 VITALS
SYSTOLIC BLOOD PRESSURE: 133 MMHG | HEART RATE: 84 BPM | BODY MASS INDEX: 36.67 KG/M2 | OXYGEN SATURATION: 95 % | RESPIRATION RATE: 16 BRPM | DIASTOLIC BLOOD PRESSURE: 79 MMHG | WEIGHT: 228.19 LBS | HEIGHT: 66 IN

## 2022-08-25 DIAGNOSIS — C92.11 CML IN REMISSION: ICD-10-CM

## 2022-08-25 DIAGNOSIS — Z94.84 HISTORY OF ALLOGENEIC STEM CELL TRANSPLANT: ICD-10-CM

## 2022-08-25 DIAGNOSIS — C92.01 ACUTE MYELOID LEUKEMIA IN REMISSION: Primary | ICD-10-CM

## 2022-08-25 DIAGNOSIS — C92.11 CHRONIC MYELOID LEUKEMIA (CML), BCR/ABL1-POSITIVE, IN REMISSION: ICD-10-CM

## 2022-08-25 LAB
ALBUMIN SERPL BCP-MCNC: 4.2 G/DL (ref 3.5–5.2)
ALP SERPL-CCNC: 66 U/L (ref 55–135)
ALT SERPL W/O P-5'-P-CCNC: 23 U/L (ref 10–44)
ANION GAP SERPL CALC-SCNC: 9 MMOL/L (ref 8–16)
AST SERPL-CCNC: 19 U/L (ref 10–40)
BASOPHILS # BLD AUTO: 0.06 K/UL (ref 0–0.2)
BASOPHILS NFR BLD: 0.9 % (ref 0–1.9)
BILIRUB SERPL-MCNC: 0.4 MG/DL (ref 0.1–1)
BUN SERPL-MCNC: 14 MG/DL (ref 8–23)
CALCIUM SERPL-MCNC: 9.5 MG/DL (ref 8.7–10.5)
CHLORIDE SERPL-SCNC: 108 MMOL/L (ref 95–110)
CO2 SERPL-SCNC: 25 MMOL/L (ref 23–29)
CREAT SERPL-MCNC: 1.3 MG/DL (ref 0.5–1.4)
DIFFERENTIAL METHOD: ABNORMAL
EOSINOPHIL # BLD AUTO: 0.2 K/UL (ref 0–0.5)
EOSINOPHIL NFR BLD: 3.4 % (ref 0–8)
ERYTHROCYTE [DISTWIDTH] IN BLOOD BY AUTOMATED COUNT: 13.3 % (ref 11.5–14.5)
EST. GFR  (NO RACE VARIABLE): >60 ML/MIN/1.73 M^2
GLUCOSE SERPL-MCNC: 141 MG/DL (ref 70–110)
HCT VFR BLD AUTO: 46.2 % (ref 40–54)
HGB BLD-MCNC: 15.6 G/DL (ref 14–18)
IMM GRANULOCYTES # BLD AUTO: 0.01 K/UL (ref 0–0.04)
IMM GRANULOCYTES NFR BLD AUTO: 0.2 % (ref 0–0.5)
LYMPHOCYTES # BLD AUTO: 2.2 K/UL (ref 1–4.8)
LYMPHOCYTES NFR BLD: 34.2 % (ref 18–48)
MCH RBC QN AUTO: 31.5 PG (ref 27–31)
MCHC RBC AUTO-ENTMCNC: 33.8 G/DL (ref 32–36)
MCV RBC AUTO: 93 FL (ref 82–98)
MONOCYTES # BLD AUTO: 0.5 K/UL (ref 0.3–1)
MONOCYTES NFR BLD: 8.3 % (ref 4–15)
NEUTROPHILS # BLD AUTO: 3.4 K/UL (ref 1.8–7.7)
NEUTROPHILS NFR BLD: 53 % (ref 38–73)
NRBC BLD-RTO: 0 /100 WBC
PLATELET # BLD AUTO: 281 K/UL (ref 150–450)
PMV BLD AUTO: 8.8 FL (ref 9.2–12.9)
POTASSIUM SERPL-SCNC: 4.9 MMOL/L (ref 3.5–5.1)
PROT SERPL-MCNC: 7 G/DL (ref 6–8.4)
RBC # BLD AUTO: 4.96 M/UL (ref 4.6–6.2)
SODIUM SERPL-SCNC: 142 MMOL/L (ref 136–145)
WBC # BLD AUTO: 6.4 K/UL (ref 3.9–12.7)

## 2022-08-25 PROCEDURE — 1160F PR REVIEW ALL MEDS BY PRESCRIBER/CLIN PHARMACIST DOCUMENTED: ICD-10-PCS | Mod: CPTII,S$GLB,, | Performed by: INTERNAL MEDICINE

## 2022-08-25 PROCEDURE — 3078F PR MOST RECENT DIASTOLIC BLOOD PRESSURE < 80 MM HG: ICD-10-PCS | Mod: CPTII,S$GLB,, | Performed by: INTERNAL MEDICINE

## 2022-08-25 PROCEDURE — 36415 COLL VENOUS BLD VENIPUNCTURE: CPT | Performed by: INTERNAL MEDICINE

## 2022-08-25 PROCEDURE — 3075F PR MOST RECENT SYSTOLIC BLOOD PRESS GE 130-139MM HG: ICD-10-PCS | Mod: CPTII,S$GLB,, | Performed by: INTERNAL MEDICINE

## 2022-08-25 PROCEDURE — 99999 PR PBB SHADOW E&M-EST. PATIENT-LVL IV: ICD-10-PCS | Mod: PBBFAC,,, | Performed by: INTERNAL MEDICINE

## 2022-08-25 PROCEDURE — 3075F SYST BP GE 130 - 139MM HG: CPT | Mod: CPTII,S$GLB,, | Performed by: INTERNAL MEDICINE

## 2022-08-25 PROCEDURE — 80053 COMPREHEN METABOLIC PANEL: CPT | Performed by: INTERNAL MEDICINE

## 2022-08-25 PROCEDURE — 3008F PR BODY MASS INDEX (BMI) DOCUMENTED: ICD-10-PCS | Mod: CPTII,S$GLB,, | Performed by: INTERNAL MEDICINE

## 2022-08-25 PROCEDURE — 99999 PR PBB SHADOW E&M-EST. PATIENT-LVL IV: CPT | Mod: PBBFAC,,, | Performed by: INTERNAL MEDICINE

## 2022-08-25 PROCEDURE — 1160F RVW MEDS BY RX/DR IN RCRD: CPT | Mod: CPTII,S$GLB,, | Performed by: INTERNAL MEDICINE

## 2022-08-25 PROCEDURE — 1159F MED LIST DOCD IN RCRD: CPT | Mod: CPTII,S$GLB,, | Performed by: INTERNAL MEDICINE

## 2022-08-25 PROCEDURE — 99215 OFFICE O/P EST HI 40 MIN: CPT | Mod: S$GLB,,, | Performed by: INTERNAL MEDICINE

## 2022-08-25 PROCEDURE — 81206 BCR/ABL1 GENE MAJOR BP: CPT | Performed by: INTERNAL MEDICINE

## 2022-08-25 PROCEDURE — 85025 COMPLETE CBC W/AUTO DIFF WBC: CPT | Performed by: INTERNAL MEDICINE

## 2022-08-25 PROCEDURE — 3008F BODY MASS INDEX DOCD: CPT | Mod: CPTII,S$GLB,, | Performed by: INTERNAL MEDICINE

## 2022-08-25 PROCEDURE — 3078F DIAST BP <80 MM HG: CPT | Mod: CPTII,S$GLB,, | Performed by: INTERNAL MEDICINE

## 2022-08-25 PROCEDURE — 99215 PR OFFICE/OUTPT VISIT, EST, LEVL V, 40-54 MIN: ICD-10-PCS | Mod: S$GLB,,, | Performed by: INTERNAL MEDICINE

## 2022-08-25 PROCEDURE — 1159F PR MEDICATION LIST DOCUMENTED IN MEDICAL RECORD: ICD-10-PCS | Mod: CPTII,S$GLB,, | Performed by: INTERNAL MEDICINE

## 2022-08-25 NOTE — PROGRESS NOTES
Subjective:    Patient ID: Kvng Jones Sr. is a 62 y.o. male.    Chief Complaint: No chief complaint on file.    Oncologic hx: (from Dr. Hodges' previous documentation)  62 y.o. 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 He was admitted for evaluation and induction chemotherapy with FLAG-Addis. Complications of therapy included epididymal orchitis with negative testicular ultrasound, neutropenic fever, dyspnea, and GGO of bilateral lungs on CT chest. Fever and chest findings were covered with broad spectrum antimicrobials. He did have hallucinations and vivid dreams with posaconazole. Chemotherapy was administered by left arm PICC line that was removed during hospital stay for MSSA infection treated with vancomycin. Hospital admission was 3/30/2020 to 5/1/2020 achieving morphologic CR with induction chemotherapy. He then started Ponatinib 30mg daily.     Studies performed during his hospital stay include pre-chemotherapy ECHO with normal ejection fraction of 60-65%. HIV, HBV, and HCV tests were negative. CBC at admission was WBC 38.6, Hgb 14.6, and platelet 416K. CT of the head without contrast was normal 4/20/2020. US of abdomen performed for abdominal distention and neutropenic fever had hepatic steatosis, liver 19cm, and spleen 11 cm. He does have a history of alcohol use and pancreatitis.    Patient is a . He is  to wife Guillermina who is his caregiver post transplant. He has a 45 pack year smoking history. He quit 6/1/2020, now on Chantix. He drinks 3-4 alcohol beverages nightly. He has 3 children. He has several family members in the [facility] area.    Transplant Course: Admitted 7/21/20 for a Flu/Cy/TBI haplo allo SCT. Son was donor. Received 2 bags and a total CD34 dose of 3.10 x10^6/kg on 7/28/20. Tolerated stem cells well. C/o headaches prior to admission, which persisted throughout admission. Neuro ultimately consulted for rec's. Was a  daily drinker prior to admission. No s/s of alcolhol withdrawal during admission. Transplant further complicated by hypertension, heart burn, c-diff neg diarrhea, nausea, sinus congestion, mild peripheral edema, blurry vision (ophtho consulted), mucositis, electrolyte abnormalities, and neck pain 2/2 spinal stenosis (referred to pain mgt at discharge). He engrafted on 8/10/20 with an ANC of 1280. He was discharged on 8/12/2020.    Interval History:  Mr. 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 Today is day + 2 years (7/28/2020). Day 180 bone marrow showed stringent CR (even undetectable BCRABL), Chimerism 100% donor CD33 and 90% donor CD3). 1 year bone marrow completed 8/11/2021 shows stringent CR (negative BCRABL in blood and marrow) and 100% CD33 and CD3 chimerism. CBC normal today with P210 pending. No evidence of GVHD today.    Significant interval event  Presented to outside ED 12/10/21 with complaint of cough, sinus congestion, and shortness of breath. He was in respiratory distress and required intubation/mechanical ventilation. CTA noted possible tiny distal bilateral pulmonary artery emboli, ascending thoracic aortic aneurysm 4.5 x 4.1 cm range, and bilateral pneumonitis.Troponin, WBC, lactic acid, and procalcitonin were elevated on admission. Patient tested positive for parainfluenza virus. Infectious disease was consulted for antimicrobial management and patient has been on doxycycline, diflucan, zyvox, merrem, and Bactrim DS. Cardiologist was consulted due to NSTEMI. Echo revealed EF18-20%. He underwent left hearth cath on 12/15/21 which revealed left ventricular systolic function EF of 50 to 55%. Calcified arteries. Mild to moderate coronary artery disease. He was placed on Xarelto per cardiology recommendations, with follow-up after scheduled after event. Hematologist/oncologist was also consulted and did not feel  patient is having a recurrence of his underlying malignancy and did not feel previous chemo regimen contributed to current low ejection fraction. Lactic acid and CBC normalized.Troponin trended down on last labs. Patient was extubated 12/12/21 and has maintained adequate oxygenation on nasal cannula. He was deemed medically stable for discharge to home. He will be discharged on Diflucan and Bactrim for a total of seven days.      Review of Systems   Review of Systems   Constitutional: Negative for chills, fever, malaise/fatigue and weight loss.   HENT: Negative for congestion and nosebleeds.    Eyes: Negative for pain, discharge and redness.   Respiratory: Negative for cough, hemoptysis and shortness of breath.    Cardiovascular: Negative for chest pain, palpitations and leg swelling.   Gastrointestinal: Negative for constipation, nausea and vomiting.   Genitourinary: Negative for dysuria and urgency.   Musculoskeletal: Positive for joint pain and myalgias.   Skin: Negative for itching and rash.   Neurological: Positive for tingling (intermittent to right foot/ankle). Negative for dizziness, sensory change, speech change and focal weakness.   Endo/Heme/Allergies: Negative for environmental allergies and polydipsia.   Psychiatric/Behavioral: Negative for depression and suicidal ideas. The patient has insomnia. The patient is not nervous/anxious.          Objective:     Vitals:    08/25/22 0923   BP: 133/79   Pulse: 84   Resp: 16     Physical Exam  Constitutional:       Appearance: He is not diaphoretic.   HENT:      Head: Normocephalic and atraumatic.      Right Ear: External ear normal.      Left Ear: External ear normal.      Nose: Nose normal.   Eyes:      Conjunctiva/sclera: Conjunctivae normal.   Cardiovascular:      Rate and Rhythm: Normal rate and regular rhythm.      Heart sounds: Normal heart sounds. No murmur heard.  Pulmonary:      Effort: Pulmonary effort is normal. No respiratory distress.      Breath  sounds: Normal breath sounds. No wheezing or rales.   Abdominal:      General: Bowel sounds are normal. There is no distension.      Palpations: Abdomen is soft.      Tenderness: There is no abdominal tenderness. There is no rebound.   Musculoskeletal:         General: No tenderness. Normal range of motion.      Cervical back: Normal range of motion.   Skin:     General: Skin is warm and dry.      Findings: Erythema (mild to neck; dry skin) present. No rash.   Neurological:      Mental Status: He is alert and oriented to person, place, and time.      Cranial Nerves: No cranial nerve deficit.   Psychiatric:         Mood and Affect: Mood and affect normal.         Cognition and Memory: Memory normal.         Judgment: Judgment normal.           Assessment:       No diagnosis found.    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 Son was the donor. Received 2 bags and a total CD34 dose of 3.10 x10^6/kg on 7/28/20. Recieved post transplant cyclophosphamide. Engrafted neutrophils 8/10/20 day +13  - Today is day + 2 years (7/28/20)  - Day 30 BMBX without CML. Chimerisms unable to be obtained but done peripherally 9/18 and were 100 donor at CD33, unable to get CD3   - Day 100 BMBX 11/2/2020 with MRD. Positive for BCR/ABL p210 (7.8%).  - Day 180 BMBX MRD 2/3/21 negative. Negative for BCRABL. CD 33 100% and CD3 90%.  - Day 365 BMBX 8/11/2021 MRD negative. Negative for BCRABL in marrow aspirate and blood. No evidence of chronic or acute myeloid leukemia. CD33 100% and CD3 100% donor.  - Resumed pre transplant TKI, Ponatinib 30 mg daily on 11/11/20- planned to continue until day 365- but stopped at day 276 due to side effects (GI and MSK)    CML   - Referred to [facility] from [facility] with CMP in blast crisis and with nodular disease (myeloid sarcoma = AML) on 3/30/2020  - Induced with FLAG-Addis. Achieved morphologic CR  - Resumed pre  transplant TKI, Ponatinib 30 mg daily on 11/11/20 due ot 7.8% p210 on day 100 marrow biopsy; stopped as above  - Day 180 BCRABL negative. Ponatinib as above  - Day 365 BCRABL negative off Ponatinib for 3 months    GVHD   - Had rash inpatient that was believed to be drug eruption. Resolved before discharge.  - Diffuse erythema to legs, lower back, arms, and abdomen presented on 9/15 and pt was started on pred 1 mg/kg which worsened despite steroids and tac cream  - Skin bx negative for GVHD on 12/3 and c/w folliculitis so steroids stopped  - Started 3mg budesonide BID 12/10 for GERD although 12/21 EGD was negative for GVHD and c/w antral gastritis. Stopped this as it was ineffective and bx was negative   - Continues on nexium GI ppx  - Started tacro taper by 50% every 2 weeks on 1/6/21. Decreased to 0.5 mg bid 2/17/2021 with plan to decrease to 0.5 mg daily today. However patient had new maculopaular rash to left arm and neck cw GVHD 3/3/2021. Continued Tacro 0.5 mg bid 3/3/2021. Rash resolved as of 3/17/2021 and dermatology determined it was severe dry skin, NOT GVHD. Tapered to 0.5mg daily Tacrolimus 3/17/2021. No evidence of GVHD, stopped tacrolimus 3/31/2017.   -stopped diflucan, bactrim, and mag oxide after tacro off  - once Tacro was off for 2 months, set up post transplant vaccine appointment with ID and started post-transplant vaccinations (completed COVID vaccines)  - stop acyclovir 4/1/2022 (once off tacrolimus for 1 year)     ID  - Last CMV and EBV undetected; continue weekly CMV and EBV, today's pending  - Acyclovir (Zoster prophylaxis) until 1 year post tacroimus (4/1/2022)  - Diflucan (Fungal prophylaxis) until off tacrolimus  - Dapsone (PJP prophylaxis) until off tacrolimus. (Was on Bactrim but changed to Dapsone due to rash in event that rash was drug rash)   - Changed back to Bactrim as rash persisted; off all antibiotics as of 4/16/2021    Neck pain/ Cervical stenosis of spine  - Hx of spinal fusion  C3-C5  - Xray 9/19 shows surgical changes of fusion from C3 through C5 with degenerative changed below fusion.   - Started PT on 8/26  - Pt felt neck pain started after grewal was placed  - Has been mostly refractory to medications (zanaflex, gabapentin, celebrex, lidoderm patches). Some relief with oral morphine  - CT from 8/24/20 showing history of C3, C4, and C5 fusion and diffuse osteoarthritic changes  - Had virtual pain management appt on 8/25 to review CT results.  - Zanaflex switched to Robaxin per pain mgt  - Had medial branch block on 9/2/20 with Dr. Mcelroy (pain mgt). Per patient pain mgmnt signed off, as it is not a chronic issue, defer to BMT service for further pain control.  - MS IR 15mg (2 tabs) BID PRN morphine q6h for breakthrough; added MS Contin 30mg bid 4/16; but patient started weaning and now off all opioid pain medications.    -added Xanax 0.5mg tid prn anxiety/tearfullness 4/16/2021; now off as symptoms improved  - Patient reported improvement to pain while on high dose prednisone for his skin GVHD but pain returned after steroid taper  - Lack of benefit from Gabapentin taken once nightly; started taper to every other night 10/26/2020 and discontinued.    GERD (gastroesophageal reflux disease)  - Nexium 40mg while when on high dose steroids  - TUMS prn and pepcide PRN       Essential hypertension  - Currently on amlodipine 5 mg daily. Will continue this dose  - Interested in getting off BP meds. BP today 116/69 so instructed to discuss with PCP.    Cigarette smoker  - Recently resumed cigarette smoking  - Inquired about Chantix and pt preferred to speak with PCP about this  - Self discontinued chantix and does not wish to restart at this time    BPH/Urinary hesitancy   - Having urinary hesitancy today and feels sensation of not fully emptying bladder  - Flomax increased to 0.8mg at last visit with no change, still not working  - Patient has own urologist and last f/u was 12/17, cystoscopy  on 02/02, unremarkable per patient.   - Urologist mentioned urolift, patient now interested since clinically stable post transplant.    Insomnia  - Tried trazodone 100mg which did not help  - Has tried Remeron and benadryl in past with no improvement  - Trialed lunesta with minima benefit and since weaned off  - Sleeping about 5 hours nightly with cat naps during day    Seasonal allergies  - continue antihistamines and OTC mucinex/robitussin/etc    Cardiac Event 12/2021  - follow-up with cardiology established      Follow-up:   - PRN return visit  - Discussed indication to return for any concern of GVHD    A total of 30 minutes was spent in pre-visit chart review, personal interpretation of labs and imaging, and medication review. Total visit time 20 minutes, >50 % counseling.

## 2022-08-29 LAB
BCR/ABL,P210 RESULT: NORMAL
PATH REPORT.FINAL DX SPEC: NORMAL
SPECIMEN TYPE: NORMAL

## 2022-08-30 ENCOUNTER — PATIENT MESSAGE (OUTPATIENT)
Dept: HEMATOLOGY/ONCOLOGY | Facility: CLINIC | Age: 62
End: 2022-08-30
Payer: COMMERCIAL

## 2022-08-30 DIAGNOSIS — C92.01 ACUTE MYELOID LEUKEMIA IN REMISSION: Primary | ICD-10-CM

## 2022-08-31 ENCOUNTER — PATIENT MESSAGE (OUTPATIENT)
Dept: HEMATOLOGY/ONCOLOGY | Facility: CLINIC | Age: 62
End: 2022-08-31
Payer: COMMERCIAL

## 2022-09-06 ENCOUNTER — LAB VISIT (OUTPATIENT)
Dept: LAB | Facility: HOSPITAL | Age: 62
End: 2022-09-06
Attending: INTERNAL MEDICINE
Payer: COMMERCIAL

## 2022-09-06 DIAGNOSIS — C92.01 ACUTE MYELOID LEUKEMIA IN REMISSION: ICD-10-CM

## 2022-09-06 DIAGNOSIS — C92.11 CHRONIC MYELOID LEUKEMIA (CML), BCR/ABL1-POSITIVE, IN REMISSION: ICD-10-CM

## 2022-09-06 DIAGNOSIS — Z94.84 HISTORY OF ALLOGENEIC STEM CELL TRANSPLANT: ICD-10-CM

## 2022-09-06 LAB
ALBUMIN SERPL-MCNC: 4.6 GM/DL (ref 3.4–4.8)
ALBUMIN/GLOB SERPL: 1.8 RATIO (ref 1.1–2)
ALP SERPL-CCNC: 78 UNIT/L (ref 40–150)
ALT SERPL-CCNC: 24 UNIT/L (ref 0–55)
AST SERPL-CCNC: 21 UNIT/L (ref 5–34)
BASOPHILS # BLD AUTO: 0.06 X10(3)/MCL (ref 0–0.2)
BASOPHILS NFR BLD AUTO: 1 %
BILIRUBIN DIRECT+TOT PNL SERPL-MCNC: 0.8 MG/DL
BUN SERPL-MCNC: 15.2 MG/DL (ref 8.4–25.7)
CALCIUM SERPL-MCNC: 10 MG/DL (ref 8.8–10)
CHLORIDE SERPL-SCNC: 105 MMOL/L (ref 98–107)
CO2 SERPL-SCNC: 24 MMOL/L (ref 23–31)
CREAT SERPL-MCNC: 1.11 MG/DL (ref 0.73–1.18)
EOSINOPHIL # BLD AUTO: 0.24 X10(3)/MCL (ref 0–0.9)
EOSINOPHIL NFR BLD AUTO: 3.9 %
ERYTHROCYTE [DISTWIDTH] IN BLOOD BY AUTOMATED COUNT: 12.9 % (ref 11.5–17)
GFR SERPLBLD CREATININE-BSD FMLA CKD-EPI: >60 MLS/MIN/1.73/M2
GLOBULIN SER-MCNC: 2.6 GM/DL (ref 2.4–3.5)
GLUCOSE SERPL-MCNC: 101 MG/DL (ref 82–115)
HCT VFR BLD AUTO: 48.3 % (ref 42–52)
HGB BLD-MCNC: 15.7 GM/DL (ref 14–18)
IMM GRANULOCYTES # BLD AUTO: 0.01 X10(3)/MCL (ref 0–0.04)
IMM GRANULOCYTES NFR BLD AUTO: 0.2 %
LDH SERPL-CCNC: 155 U/L (ref 125–220)
LYMPHOCYTES # BLD AUTO: 2.23 X10(3)/MCL (ref 0.6–4.6)
LYMPHOCYTES NFR BLD AUTO: 36.4 %
MCH RBC QN AUTO: 29.9 PG (ref 27–31)
MCHC RBC AUTO-ENTMCNC: 32.5 MG/DL (ref 33–36)
MCV RBC AUTO: 92 FL (ref 80–94)
MONOCYTES # BLD AUTO: 0.47 X10(3)/MCL (ref 0.1–1.3)
MONOCYTES NFR BLD AUTO: 7.7 %
NEUTROPHILS # BLD AUTO: 3.1 X10(3)/MCL (ref 2.1–9.2)
NEUTROPHILS NFR BLD AUTO: 50.8 %
PLATELET # BLD AUTO: 275 X10(3)/MCL (ref 130–400)
PMV BLD AUTO: 8.4 FL (ref 7.4–10.4)
POTASSIUM SERPL-SCNC: 4.9 MMOL/L (ref 3.5–5.1)
PROT SERPL-MCNC: 7.2 GM/DL (ref 5.8–7.6)
RBC # BLD AUTO: 5.25 X10(6)/MCL (ref 4.7–6.1)
SODIUM SERPL-SCNC: 138 MMOL/L (ref 136–145)
WBC # SPEC AUTO: 6.1 X10(3)/MCL (ref 4.5–11.5)

## 2022-09-06 PROCEDURE — 36415 COLL VENOUS BLD VENIPUNCTURE: CPT

## 2022-09-06 PROCEDURE — 86787 VARICELLA-ZOSTER ANTIBODY: CPT

## 2022-09-06 PROCEDURE — 83615 LACTATE (LD) (LDH) ENZYME: CPT

## 2022-09-06 PROCEDURE — 80053 COMPREHEN METABOLIC PANEL: CPT

## 2022-09-06 PROCEDURE — 81207 BCR/ABL1 GENE MINOR BP: CPT

## 2022-09-06 PROCEDURE — 85025 COMPLETE CBC W/AUTO DIFF WBC: CPT

## 2022-09-06 PROCEDURE — 86706 HEP B SURFACE ANTIBODY: CPT

## 2022-09-07 LAB
HBV SURFACE AB SER-ACNC: 2911.91 MIU/ML
HBV SURFACE AB SERPL IA-ACNC: REACTIVE M[IU]/ML

## 2022-09-08 LAB
VZV IGG SER IA-ACNC: 3.5
VZV IGG SER QL IA: POSITIVE

## 2022-09-09 LAB
GENETICIST REVIEW: NORMAL
M BCR/ABL1 P190 RESULT: NORMAL
SPECIMEN SOURCE: NORMAL

## 2022-09-13 NOTE — PROGRESS NOTES
Subjective:       Patient ID: Kvng Jones Sr. is a 62 y.o. male.    Primary Care: Dr. Ben Doga [facility] transplant Oncologist: Dr. 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  Left axillary lymph node biopsy done 3/24/20--preliminary results c/w granulocytic sarcoma.  Bone marrow biopsy day 100 20 with MRD, positive for BCR-ABL p210 7.8%.  Bone marrow biopsy day 180 2/3/21 s/p transplant showed stringent CR (undetectable BCR-ABL), chimerism 100% donor CD33 and 90% donor CD3.  Bone marrow biopsy at 1 year 21--stringent CR (negative BCR-ABL in blood and marrow), 100% donor CD33 and CD3 chimerism.    Work-up:  Peripheral blood RT PCR BCR-ABL done 3/17/20 positive for major p210, 49.5974%.    Imagin. CT soft tissue neck/C/A/P done 3/23/20--Bilateral cervical lymphadenopathy concerning for metastatic disease or lymphoproliferative disorder, pathologic adenopathy of the bilateral axillary and inguinal  regions, several nonenlarged mediastinal lymph nodes are identified, nonenlarged retroperitoneal lymph nodes.  2. CT chest w/ contrast done at [facility] 12/10/21--no large central PE, findings suggest tiny bilateral pulmonary emboli in lower lobe territory branches R>L, patchy areas of atelectasis and effusion pneumonitis, scattered peripheral hazy nodularities one of which was seen on prior RUL 1cm, others are new, follow-up recommended, enlarged liver with hepatic steatosis, low-density lesions in left lobe 1-2cm range, correlate with US, bilateral adrenal gland hyperplasia nodular changes on left, small 2-4mm calculi right upper kidney, dilated small bowel loop LUQ, r/o obstruction, follow-up recommended.   3. CT chest w/ contrast done at [facility] 22--Near complete resolution of previously seen ground glass nodules from  12/10/2021. Favor infectious/inflammatory etiology.    Treatment history:  FLAG-Addis induction chemotherapy done at [facility] in [place], [place] 3/30/20--5/1/20.  Ponatinib 30mg daily started after induction, held for transplant.  Flu/Cy/TBI 7/21/20 and haploidentical allogeneic SCT(son was donor) done in [place], [place] at [facility].  Engrafted on 8/10/21 and discharged on 8/12/20.  Restarted Ponatinib on 11/11/20 after day 100 marrow showed MRD.  Ponatinib stopped at day 267 post-transplant due to GI side effects.    GVHD treatment history:  Diffuse erythema to legs, lower back, arms and abdomen 9/15/20, started on Prednisone, worsened despite steroids and tacrolimus cream. Biopsy c/w folliculitis and steroids stopped.  Budesonide 3mg BID started 12/10/20 for GERD; EGD negative for GVHD and c/w antral gastritis, stopped oral steroids.  Tacrolimus taper started every 2 weeks 1/6/21, slow due to ongoing rash, stopped Tacrolimus on 3/31/21.    Current treatment plan:   1. Observation  2. Acyclovir prophylaxis to continue until 8/2022 (1 year post-Tacrolimus).     Chief Complaint: Other Misc (Pt reports no new concerns today. )    HPI   Patient presents for follow-up AML/CML. He is doing well. Complains of fatigue. Recent labs all good including BCR-ABL. He returned to see Dr. Hodges last month and he was doing well. Mentions when he had labs drawn in August before Dr. Hodges's appointment, he had a positive BCR-ABL p210 (<0.003%) and she wanted to repeat this which he is scheduled to have drawn tomorrow. He did have the BCR-ABL p190 drawn here which was negative. Will await further recommendations from Dr. Hodges regarding test results tomorrow. Mentions he is off the Xarelto per Dr. Cano. He is now followed by Dr. Macario for PAD and doing physical therapy. No other problems reported.     Past Medical History:   Diagnosis Date    CML (chronic myelocytic leukemia)     Encounter for blood transfusion     Hiatal hernia with  gastroesophageal reflux     Hx of psychiatric care     valium, ativan    Hypertension     Melanoma in situ of external ear, right     PONV (postoperative nausea and vomiting)     Pulmonary embolism 6/7/2022      Review of patient's allergies indicates:   Allergen Reactions    Posaconazole Hallucinations    Unclassified drug Other (See Comments)     Pt reports that he has an allergic reaction to an Antifungal medication, but is unsure of the name of the drug. Will obtain name prior to arrival in am and notify Pre-op RN.    Vancomycin analogues Other (See Comments)     Pt reports he had rigors    Codeine Hives    Iodine Hives and Rash    Iodinated contrast media Hives        Current Outpatient Medications:     atorvastatin (LIPITOR) 40 MG tablet, Take 40 mg by mouth once daily., Disp: , Rfl:     cetirizine (ZYRTEC) 10 mg Cap, Take 10 mg by mouth once daily., Disp: , Rfl:     diphenhydrAMINE (BENADRYL) 25 mg capsule, Take 25 mg by mouth every 6 (six) hours as needed for Itching. Patient takes once daily, Disp: , Rfl:     esomeprazole (NEXIUM) 40 MG capsule, Take 40 mg by mouth before breakfast., Disp: , Rfl:     hydrocortisone 2.5 % cream, , Disp: , Rfl:     multivit-min/FA/lycopen/lutein (CENTRUM SILVER MEN ORAL), Take by mouth., Disp: , Rfl:     spironolactone (ALDACTONE) 25 MG tablet, Take 25 mg by mouth once daily., Disp: , Rfl:     tamsulosin (FLOMAX) 0.4 mg Cap, TAKE 2 CAPSULES(0.8 MG) BY MOUTH EVERY DAY, Disp: 30 capsule, Rfl: 3    amLODIPine (NORVASC) 10 MG tablet, Take 10 mg by mouth once daily., Disp: , Rfl:     carvediloL (COREG) 3.125 MG tablet, Take 3.125 mg by mouth 2 (two) times daily., Disp: , Rfl:   Review of Systems   Constitutional:  Positive for fatigue. Negative for activity change, appetite change, fever and unexpected weight change.   HENT:  Negative for mouth sores.    Eyes:  Negative for visual disturbance.   Respiratory:  Negative for cough and shortness of breath.    Cardiovascular:  Negative  for chest pain.   Gastrointestinal:  Negative for abdominal pain, blood in stool, constipation, diarrhea, nausea and vomiting.   Genitourinary:  Positive for frequency. Negative for difficulty urinating.   Musculoskeletal:  Negative for back pain.   Integumentary:  Negative for rash.        Nipple soreness   Neurological:  Negative for dizziness, weakness and headaches.   Hematological:  Negative for adenopathy.   Psychiatric/Behavioral:  Negative for behavioral problems and suicidal ideas. The patient is not nervous/anxious.           Vitals:    09/21/22 0959   BP: (!) 152/91   Pulse: (!) 118   Resp: 14   Temp: 98.1 °F (36.7 °C)        Physical Exam  Constitutional:       General: He is awake.      Appearance: Normal appearance. He is overweight.   HENT:      Head: Normocephalic.   Eyes:      General: Lids are normal. Vision grossly intact.      Extraocular Movements: Extraocular movements intact.      Conjunctiva/sclera: Conjunctivae normal.   Cardiovascular:      Rate and Rhythm: Normal rate and regular rhythm.      Pulses: Normal pulses.      Heart sounds: Normal heart sounds.   Pulmonary:      Effort: Pulmonary effort is normal.      Breath sounds: Normal breath sounds and air entry.   Abdominal:      General: Abdomen is flat. Bowel sounds are normal.      Palpations: Abdomen is soft.   Musculoskeletal:      Cervical back: Normal, normal range of motion and neck supple.      Thoracic back: Normal.      Lumbar back: Normal.   Feet:      Right foot:      Skin integrity: Skin integrity normal.   Lymphadenopathy:      Comments: No palpable adenopathy   Skin:     General: Skin is warm and dry.      Comments: Faint macular papular marlen rash to bilateral upper arms   Neurological:      General: No focal deficit present.      Mental Status: He is alert and oriented to person, place, and time.   Psychiatric:         Attention and Perception: Attention normal.         Mood and Affect: Mood normal.         Speech: Speech  normal.         Behavior: Behavior is cooperative.         Thought Content: Thought content normal.     ECOG SCORE    0 - Fully active-able to carry on all pre-disease performance without restriction       No visits with results within 1 Week(s) from this visit.   Latest known visit with results is:   Lab Visit on 09/06/2022   Component Date Value    Hepatitis B Surface Anti* 09/06/2022 Reactive (A)     Hepatitis B Surface Anti* 09/06/2022 2,911.91     Sodium Level 09/06/2022 138     Potassium Level 09/06/2022 4.9     Chloride 09/06/2022 105     Carbon Dioxide 09/06/2022 24     Glucose Level 09/06/2022 101     Blood Urea Nitrogen 09/06/2022 15.2     Creatinine 09/06/2022 1.11     Calcium Level Total 09/06/2022 10.0     Protein Total 09/06/2022 7.2     Albumin Level 09/06/2022 4.6     Globulin 09/06/2022 2.6     Albumin/Globulin Ratio 09/06/2022 1.8     Bilirubin Total 09/06/2022 0.8     Alkaline Phosphatase 09/06/2022 78     Alanine Aminotransferase 09/06/2022 24     Aspartate Aminotransfera* 09/06/2022 21     eGFR 09/06/2022 >60     Lactate Dehydrogenase 09/06/2022 155     Varicella-Zoster Ab, IgG* 09/06/2022 Positive     Varicella IgG Antibody I* 09/06/2022 3.5     WBC 09/06/2022 6.1     RBC 09/06/2022 5.25     Hgb 09/06/2022 15.7     Hct 09/06/2022 48.3     MCV 09/06/2022 92.0     MCH 09/06/2022 29.9     MCHC 09/06/2022 32.5 (L)     RDW 09/06/2022 12.9     Platelet 09/06/2022 275     MPV 09/06/2022 8.4     Neut % 09/06/2022 50.8     Lymph % 09/06/2022 36.4     Mono % 09/06/2022 7.7     Eos % 09/06/2022 3.9     Basophil % 09/06/2022 1.0     Lymph # 09/06/2022 2.23     Neut # 09/06/2022 3.1     Mono # 09/06/2022 0.47     Eos # 09/06/2022 0.24     Baso # 09/06/2022 0.06     IG# 09/06/2022 0.01     IG% 09/06/2022 0.2     BCR/ABL1 p190 Result 09/06/2022 see interpretation     Specimen Type 09/06/2022 Blood     Interpretation 09/06/2022 SEE COMMENTS           Assessment:       Problem List Items Addressed This Visit           Oncology    Chronic myeloid leukemia (CML), BCR/ABL1-positive, in remission - Primary    Acute myeloid leukemia in remission          Plan:       Patient presented with marked neutrophilia and painful neck and axillary adenopathy.  BCR-ABL+ c/w CML and also lymph node biopsy c/w granulocytic sarcoma, diagnosis CML with blast crisis in 3/2020.  Completed FLAG-Addis induction chemotherapy done at [facility] in [place], [place] 3/30/20--5/1/20 with CR.  Ponatinib 30mg daily started after induction, held for transplant.  s/p Flu/Cy/TBI 7/21/20 and haploidentical allogeneic SCT(son was donor) done in [place], [place] at [facility].  Engrafted on 8/10/21 and discharged on 8/12/20.  Restarted Ponatinib on 11/11/20 after day 100 marrow showed MRD.  Ponatinib stopped at day 267 post-transplant due to GI side effects.  Had some mild GVHD problems but weaned off Tacrolimus stopped 3/2021.    Patient had hospitalization in 12/2021 with respiratory failure, pneumonitis from parainfluenza, and suspected tiny pulmonary embolism.   Suspect respiratory failure was all related to infection likely and his drop in EF, acute CHF. This resolved on its own and he was found to have non-critical CAD.  CT chest repeated 2/23/22 shows resolved infection, no PE.    Returned to Dr. Hodges in August he had a positive BCR-ABL p210 (<0.003%) and she wanted to repeat this which he is scheduled to have drawn tomorrow.   He did have the BCR-ABL p190 drawn here which was negative.  Will await results from his testing tomorrow.   No need to repeat CT unless any new symptoms.  Patient had a follow-up with Dr. Groves in 7/2022 and he took him off the Xarelto.   Will reach out to the transplant team regarding vaccines per his request.   Completed Acyclovir prophylaxis until 8/2022.  Continue observation.  All questions answered at this time.      [facility]       Addendum:  From: Devora Conteh DO  Sent: 9/21/2022   1:03 PM CDT  To: Cheryl  MD Carroll, Abhilash Hill, MARCELA    Hi - his titers look good. He does need 1 dose of pneumovax and then will be complete.     If he hasn't had his flu vaccine yet, I recommend he get the high dose flu vaccine annually.  He is due for the bivalent COVID booster     We do need to consider a couple other immunizations now that he is 2+ years post-transplant. Copying Dr. Hodges here.     1. MMR vaccine can be done once is has been off all immunosuppression for 1 year - it looks like prograf was stopped at the end of March 2022, so he will be eligible for MMR x 2 doses in April 2023 if he continues to remain off immunosuppression    2. Shingrix vaccine can be done once he has been off all immunosuppression for 8 months, so he will be eligible for shingrix x 2 doses in December 2022.    Dr. Hodges - can you let us know if the patient is currently receiving any immunosuppression that would be a contraindication to the MMR and shingrix vaccine, or if he is restarted on any immunosuppression or develops GVHD flare prior to those vaccine dates? Otherwise, we will plan on continuing with the vaccine plan above.

## 2022-09-21 ENCOUNTER — OFFICE VISIT (OUTPATIENT)
Dept: HEMATOLOGY/ONCOLOGY | Facility: CLINIC | Age: 62
End: 2022-09-21
Payer: COMMERCIAL

## 2022-09-21 VITALS
TEMPERATURE: 98 F | HEIGHT: 66 IN | RESPIRATION RATE: 14 BRPM | BODY MASS INDEX: 35.86 KG/M2 | DIASTOLIC BLOOD PRESSURE: 91 MMHG | WEIGHT: 223.13 LBS | SYSTOLIC BLOOD PRESSURE: 152 MMHG | HEART RATE: 118 BPM | OXYGEN SATURATION: 95 %

## 2022-09-21 DIAGNOSIS — C92.11 CHRONIC MYELOID LEUKEMIA (CML), BCR/ABL1-POSITIVE, IN REMISSION: Primary | ICD-10-CM

## 2022-09-21 DIAGNOSIS — C92.01 ACUTE MYELOID LEUKEMIA IN REMISSION: ICD-10-CM

## 2022-09-21 PROCEDURE — 1160F RVW MEDS BY RX/DR IN RCRD: CPT | Mod: CPTII,S$GLB,, | Performed by: NURSE PRACTITIONER

## 2022-09-21 PROCEDURE — 99999 PR PBB SHADOW E&M-EST. PATIENT-LVL IV: ICD-10-PCS | Mod: PBBFAC,,, | Performed by: NURSE PRACTITIONER

## 2022-09-21 PROCEDURE — 1159F PR MEDICATION LIST DOCUMENTED IN MEDICAL RECORD: ICD-10-PCS | Mod: CPTII,S$GLB,, | Performed by: NURSE PRACTITIONER

## 2022-09-21 PROCEDURE — 3077F PR MOST RECENT SYSTOLIC BLOOD PRESSURE >= 140 MM HG: ICD-10-PCS | Mod: CPTII,S$GLB,, | Performed by: NURSE PRACTITIONER

## 2022-09-21 PROCEDURE — 99999 PR PBB SHADOW E&M-EST. PATIENT-LVL IV: CPT | Mod: PBBFAC,,, | Performed by: NURSE PRACTITIONER

## 2022-09-21 PROCEDURE — 3077F SYST BP >= 140 MM HG: CPT | Mod: CPTII,S$GLB,, | Performed by: NURSE PRACTITIONER

## 2022-09-21 PROCEDURE — 99215 OFFICE O/P EST HI 40 MIN: CPT | Mod: S$GLB,,, | Performed by: NURSE PRACTITIONER

## 2022-09-21 PROCEDURE — 1160F PR REVIEW ALL MEDS BY PRESCRIBER/CLIN PHARMACIST DOCUMENTED: ICD-10-PCS | Mod: CPTII,S$GLB,, | Performed by: NURSE PRACTITIONER

## 2022-09-21 PROCEDURE — 3080F PR MOST RECENT DIASTOLIC BLOOD PRESSURE >= 90 MM HG: ICD-10-PCS | Mod: CPTII,S$GLB,, | Performed by: NURSE PRACTITIONER

## 2022-09-21 PROCEDURE — 3080F DIAST BP >= 90 MM HG: CPT | Mod: CPTII,S$GLB,, | Performed by: NURSE PRACTITIONER

## 2022-09-21 PROCEDURE — 99215 PR OFFICE/OUTPT VISIT, EST, LEVL V, 40-54 MIN: ICD-10-PCS | Mod: S$GLB,,, | Performed by: NURSE PRACTITIONER

## 2022-09-21 PROCEDURE — 1159F MED LIST DOCD IN RCRD: CPT | Mod: CPTII,S$GLB,, | Performed by: NURSE PRACTITIONER

## 2022-09-21 PROCEDURE — 3008F PR BODY MASS INDEX (BMI) DOCUMENTED: ICD-10-PCS | Mod: CPTII,S$GLB,, | Performed by: NURSE PRACTITIONER

## 2022-09-21 PROCEDURE — 3008F BODY MASS INDEX DOCD: CPT | Mod: CPTII,S$GLB,, | Performed by: NURSE PRACTITIONER

## 2022-09-21 RX ORDER — AMLODIPINE BESYLATE 10 MG/1
10 TABLET ORAL DAILY
COMMUNITY
Start: 2022-09-03 | End: 2023-04-13

## 2022-09-21 RX ORDER — CARVEDILOL 3.12 MG/1
3.12 TABLET ORAL 2 TIMES DAILY
COMMUNITY
Start: 2022-08-26 | End: 2023-04-13 | Stop reason: SDUPTHER

## 2022-09-22 ENCOUNTER — LAB VISIT (OUTPATIENT)
Dept: LAB | Facility: HOSPITAL | Age: 62
End: 2022-09-22
Attending: INTERNAL MEDICINE
Payer: COMMERCIAL

## 2022-09-22 DIAGNOSIS — C92.01 ACUTE MYELOID LEUKEMIA IN REMISSION: ICD-10-CM

## 2022-09-22 DIAGNOSIS — C92.10 CML (CHRONIC MYELOCYTIC LEUKEMIA): ICD-10-CM

## 2022-09-22 DIAGNOSIS — Z94.84 HISTORY OF ALLOGENEIC STEM CELL TRANSPLANT: ICD-10-CM

## 2022-09-22 PROCEDURE — 81206 BCR/ABL1 GENE MAJOR BP: CPT

## 2022-09-26 ENCOUNTER — INFUSION (OUTPATIENT)
Dept: INFUSION THERAPY | Facility: HOSPITAL | Age: 62
End: 2022-09-26
Attending: NURSE PRACTITIONER
Payer: COMMERCIAL

## 2022-09-26 VITALS
HEART RATE: 85 BPM | SYSTOLIC BLOOD PRESSURE: 138 MMHG | BODY MASS INDEX: 36 KG/M2 | WEIGHT: 224 LBS | RESPIRATION RATE: 18 BRPM | TEMPERATURE: 98 F | DIASTOLIC BLOOD PRESSURE: 64 MMHG | HEIGHT: 66 IN

## 2022-09-26 DIAGNOSIS — Z94.84 HISTORY OF ALLOGENEIC STEM CELL TRANSPLANT: Primary | ICD-10-CM

## 2022-09-26 LAB
BCR/ABL1 KINASE DOMAIN MUT ANL BLD/T: NORMAL
PATH REPORT.FINAL DX SPEC: NORMAL
SPECIMEN SOURCE: NORMAL

## 2022-09-26 PROCEDURE — 90732 PPSV23 VACC 2 YRS+ SUBQ/IM: CPT | Performed by: NURSE PRACTITIONER

## 2022-09-26 PROCEDURE — 96372 THER/PROPH/DIAG INJ SC/IM: CPT

## 2022-09-26 PROCEDURE — 63600175 PHARM REV CODE 636 W HCPCS: Performed by: NURSE PRACTITIONER

## 2022-09-26 PROCEDURE — 90471 IMMUNIZATION ADMIN: CPT | Performed by: NURSE PRACTITIONER

## 2022-09-26 RX ADMIN — PNEUMOCOCCAL VACCINE POLYVALENT 0.5 ML
25; 25; 25; 25; 25; 25; 25; 25; 25; 25; 25; 25; 25; 25; 25; 25; 25; 25; 25; 25; 25; 25; 25 INJECTION, SOLUTION INTRAMUSCULAR; SUBCUTANEOUS at 04:09

## 2022-10-03 ENCOUNTER — PATIENT MESSAGE (OUTPATIENT)
Dept: HEMATOLOGY/ONCOLOGY | Facility: CLINIC | Age: 62
End: 2022-10-03
Payer: COMMERCIAL

## 2022-10-25 PROCEDURE — 89055 LEUKOCYTE ASSESSMENT FECAL: CPT | Performed by: STUDENT IN AN ORGANIZED HEALTH CARE EDUCATION/TRAINING PROGRAM

## 2022-10-25 PROCEDURE — 87329 GIARDIA AG IA: CPT | Performed by: STUDENT IN AN ORGANIZED HEALTH CARE EDUCATION/TRAINING PROGRAM

## 2022-10-25 PROCEDURE — 87045 FECES CULTURE AEROBIC BACT: CPT | Performed by: STUDENT IN AN ORGANIZED HEALTH CARE EDUCATION/TRAINING PROGRAM

## 2022-12-06 ENCOUNTER — LAB VISIT (OUTPATIENT)
Dept: LAB | Facility: HOSPITAL | Age: 62
End: 2022-12-06
Attending: FAMILY MEDICINE
Payer: COMMERCIAL

## 2022-12-06 DIAGNOSIS — C92.01 ACUTE MYELOID LEUKEMIA IN REMISSION: ICD-10-CM

## 2022-12-06 DIAGNOSIS — C92.11 CHRONIC MYELOID LEUKEMIA (CML), BCR/ABL1-POSITIVE, IN REMISSION: ICD-10-CM

## 2022-12-06 LAB
ALBUMIN SERPL-MCNC: 4 GM/DL (ref 3.4–4.8)
ALBUMIN/GLOB SERPL: 1.7 RATIO (ref 1.1–2)
ALP SERPL-CCNC: 87 UNIT/L (ref 40–150)
ALT SERPL-CCNC: 22 UNIT/L (ref 0–55)
AST SERPL-CCNC: 17 UNIT/L (ref 5–34)
BASOPHILS # BLD AUTO: 0.01 X10(3)/MCL (ref 0–0.2)
BASOPHILS NFR BLD AUTO: 0.2 %
BILIRUBIN DIRECT+TOT PNL SERPL-MCNC: 0.7 MG/DL
BUN SERPL-MCNC: 17.5 MG/DL (ref 8.4–25.7)
CALCIUM SERPL-MCNC: 9.9 MG/DL (ref 8.8–10)
CHLORIDE SERPL-SCNC: 108 MMOL/L (ref 98–107)
CO2 SERPL-SCNC: 27 MMOL/L (ref 23–31)
CREAT SERPL-MCNC: 1.19 MG/DL (ref 0.73–1.18)
EOSINOPHIL # BLD AUTO: 0.14 X10(3)/MCL (ref 0–0.9)
EOSINOPHIL NFR BLD AUTO: 2.3 %
ERYTHROCYTE [DISTWIDTH] IN BLOOD BY AUTOMATED COUNT: 12.9 % (ref 11.5–17)
GFR SERPLBLD CREATININE-BSD FMLA CKD-EPI: >60 MLS/MIN/1.73/M2
GLOBULIN SER-MCNC: 2.4 GM/DL (ref 2.4–3.5)
GLUCOSE SERPL-MCNC: 139 MG/DL (ref 82–115)
HCT VFR BLD AUTO: 48.4 % (ref 42–52)
HGB BLD-MCNC: 15.9 GM/DL (ref 14–18)
IMM GRANULOCYTES # BLD AUTO: 0.03 X10(3)/MCL (ref 0–0.04)
IMM GRANULOCYTES NFR BLD AUTO: 0.5 %
LDH SERPL-CCNC: 146 U/L (ref 125–220)
LYMPHOCYTES # BLD AUTO: 2.12 X10(3)/MCL (ref 0.6–4.6)
LYMPHOCYTES NFR BLD AUTO: 35.5 %
MCH RBC QN AUTO: 30 PG (ref 27–31)
MCHC RBC AUTO-ENTMCNC: 32.9 MG/DL (ref 33–36)
MCV RBC AUTO: 91.3 FL (ref 80–94)
MONOCYTES # BLD AUTO: 0.56 X10(3)/MCL (ref 0.1–1.3)
MONOCYTES NFR BLD AUTO: 9.4 %
NEUTROPHILS # BLD AUTO: 3.1 X10(3)/MCL (ref 2.1–9.2)
NEUTROPHILS NFR BLD AUTO: 52.1 %
PLATELET # BLD AUTO: 289 X10(3)/MCL (ref 130–400)
PMV BLD AUTO: 8.6 FL (ref 7.4–10.4)
POTASSIUM SERPL-SCNC: 4.1 MMOL/L (ref 3.5–5.1)
PROT SERPL-MCNC: 6.4 GM/DL (ref 5.8–7.6)
RBC # BLD AUTO: 5.3 X10(6)/MCL (ref 4.7–6.1)
SODIUM SERPL-SCNC: 145 MMOL/L (ref 136–145)
WBC # SPEC AUTO: 6 X10(3)/MCL (ref 4.5–11.5)

## 2022-12-06 PROCEDURE — 83615 LACTATE (LD) (LDH) ENZYME: CPT

## 2022-12-06 PROCEDURE — 81207 BCR/ABL1 GENE MINOR BP: CPT

## 2022-12-06 PROCEDURE — 85025 COMPLETE CBC W/AUTO DIFF WBC: CPT

## 2022-12-06 PROCEDURE — 36415 COLL VENOUS BLD VENIPUNCTURE: CPT

## 2022-12-06 PROCEDURE — 81206 BCR/ABL1 GENE MAJOR BP: CPT

## 2022-12-06 PROCEDURE — 80053 COMPREHEN METABOLIC PANEL: CPT

## 2022-12-09 LAB
BCR/ABL1 KINASE DOMAIN MUT ANL BLD/T: NORMAL
GENETICIST REVIEW: NORMAL
M BCR/ABL1 P190 RESULT: NORMAL
PATH REPORT.FINAL DX SPEC: NORMAL
SPECIMEN SOURCE: NORMAL
SPECIMEN SOURCE: NORMAL

## 2022-12-14 NOTE — PROGRESS NOTES
Subjective:       Patient ID: Kvng Jones Sr. is a 62 y.o. male.    Primary Care: Dr. Ben Doga [facility] transplant Oncologist: Dr. 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  Left axillary lymph node biopsy done 3/24/20--preliminary results c/w granulocytic sarcoma.  Bone marrow biopsy day 100 20 with MRD, positive for BCR-ABL p210 7.8%.  Bone marrow biopsy day 180 2/3/21 s/p transplant showed stringent CR (undetectable BCR-ABL), chimerism 100% donor CD33 and 90% donor CD3.  Bone marrow biopsy at 1 year 21--stringent CR (negative BCR-ABL in blood and marrow), 100% donor CD33 and CD3 chimerism.    Work-up:  Peripheral blood RT PCR BCR-ABL done 3/17/20 positive for major p210, 49.5974%.    Imagin. CT soft tissue neck/C/A/P done 3/23/20--Bilateral cervical lymphadenopathy concerning for metastatic disease or lymphoproliferative disorder, pathologic adenopathy of the bilateral axillary and inguinal  regions, several nonenlarged mediastinal lymph nodes are identified, nonenlarged retroperitoneal lymph nodes.  2. CT chest w/ contrast done at [facility] 12/10/21--no large central PE, findings suggest tiny bilateral pulmonary emboli in lower lobe territory branches R>L, patchy areas of atelectasis and effusion pneumonitis, scattered peripheral hazy nodularities one of which was seen on prior RUL 1cm, others are new, follow-up recommended, enlarged liver with hepatic steatosis, low-density lesions in left lobe 1-2cm range, correlate with US, bilateral adrenal gland hyperplasia nodular changes on left, small 2-4mm calculi right upper kidney, dilated small bowel loop LUQ, r/o obstruction, follow-up recommended.   3. CT chest w/ contrast done at [facility] 22--Near complete resolution of previously seen ground glass nodules from  12/10/2021. Favor infectious/inflammatory etiology.    Treatment history:  FLAG-Addis induction chemotherapy done at [facility] in [place], [place] 3/30/20--5/1/20.  Ponatinib 30mg daily started after induction, held for transplant.  Flu/Cy/TBI 7/21/20 and haploidentical allogeneic SCT(son was donor) done in [place], [place] at [facility].  Engrafted on 8/10/21 and discharged on 8/12/20.  Restarted Ponatinib on 11/11/20 after day 100 marrow showed MRD.  Ponatinib stopped at day 267 post-transplant due to GI side effects.    GVHD treatment history:  Diffuse erythema to legs, lower back, arms and abdomen 9/15/20, started on Prednisone, worsened despite steroids and tacrolimus cream. Biopsy c/w folliculitis and steroids stopped.  Budesonide 3mg BID started 12/10/20 for GERD; EGD negative for GVHD and c/w antral gastritis, stopped oral steroids.  Tacrolimus taper started every 2 weeks 1/6/21, slow due to ongoing rash, stopped Tacrolimus on 3/31/21.    Current treatment plan:   1. Observation  2. Acyclovir prophylaxis to continue until 8/2022 (1 year post-Tacrolimus).     Chief Complaint: Other Misc (Pt reports that he had the flu a couple of weeks ago. No new concerns today.)      HPI   Patient presents for follow-up AML/CML. He is doing well. He started doing intermittent fasting after his last appointment and lost 10 lbs. Denies any fevers, chills or night sweats. Recent labs all good with exception of increasing BCR-ABL at 0.004%. Dr. Hodges notified and recommends continued observation. Will check labs again in 3 months. Remains off the Xarelto per Dr. Cano. He is now followed by Dr. Macario for PAD. No other problems reported.     Past Medical History:   Diagnosis Date    CML (chronic myelocytic leukemia)     Encounter for blood transfusion     Hiatal hernia with gastroesophageal reflux     Hx of psychiatric care     valium, ativan    Hypertension     Melanoma in situ of external ear, right     PONV (postoperative nausea and  vomiting)     Pulmonary embolism 6/7/2022      Review of patient's allergies indicates:   Allergen Reactions    Posaconazole Hallucinations and Other (See Comments)    Unclassified drug Other (See Comments) and Rash     Pt reports that he has an allergic reaction to an Antifungal medication, but is unsure of the name of the drug. Will obtain name prior to arrival in am and notify Pre-op RN.  Pt reports that he has an allergic reaction to an Antifungal medication, but is unsure of the name of the drug. Will obtain name prior to arrival in am and notify Pre-op RN.    Vancomycin analogues Other (See Comments)     Pt reports he had rigors    Codeine Hives    Iodine Hives and Rash    Iodinated contrast media Hives        Current Outpatient Medications:     amLODIPine (NORVASC) 10 MG tablet, Take 10 mg by mouth once daily., Disp: , Rfl:     atorvastatin (LIPITOR) 40 MG tablet, Take 40 mg by mouth once daily., Disp: , Rfl:     carvediloL (COREG) 3.125 MG tablet, Take 3.125 mg by mouth 2 (two) times daily., Disp: , Rfl:     cetirizine (ZYRTEC) 10 mg Cap, Take 10 mg by mouth once daily., Disp: , Rfl:     diphenhydrAMINE (BENADRYL) 25 mg capsule, Take 25 mg by mouth every 6 (six) hours as needed for Itching. Patient takes once daily, Disp: , Rfl:     esomeprazole (NEXIUM) 40 MG capsule, Take 40 mg by mouth before breakfast., Disp: , Rfl:     hydrocortisone 2.5 % cream, , Disp: , Rfl:     multivit-min/FA/lycopen/lutein (CENTRUM SILVER MEN ORAL), Take by mouth., Disp: , Rfl:     ondansetron (ZOFRAN) 8 MG tablet, Take 1 tablet (8 mg total) by mouth 2 (two) times daily., Disp: 30 tablet, Rfl: 0    spironolactone (ALDACTONE) 25 MG tablet, Take 25 mg by mouth once daily., Disp: , Rfl:     tamsulosin (FLOMAX) 0.4 mg Cap, TAKE 2 CAPSULES(0.8 MG) BY MOUTH EVERY DAY, Disp: 30 capsule, Rfl: 3  Review of Systems   Constitutional:  Positive for fatigue. Negative for activity change, appetite change, fever and unexpected weight change.    HENT:  Negative for mouth sores.    Eyes:  Negative for visual disturbance.   Respiratory:  Negative for cough and shortness of breath.    Cardiovascular:  Negative for chest pain.   Gastrointestinal:  Negative for abdominal pain, blood in stool, constipation, diarrhea, nausea and vomiting.   Genitourinary:  Negative for difficulty urinating.   Musculoskeletal:  Negative for back pain.   Integumentary:  Negative for rash.   Neurological:  Negative for dizziness, weakness and headaches.   Hematological:  Negative for adenopathy.   Psychiatric/Behavioral:  Negative for behavioral problems and suicidal ideas. The patient is not nervous/anxious.        Vitals:    12/21/22 0855   BP: 121/87   Pulse: 74   Resp: 14   Temp: 98.1 °F (36.7 °C)     Physical Exam  Constitutional:       General: He is awake.      Appearance: Normal appearance. He is overweight.   HENT:      Head: Normocephalic.   Eyes:      General: Lids are normal. Vision grossly intact.      Extraocular Movements: Extraocular movements intact.      Conjunctiva/sclera: Conjunctivae normal.   Cardiovascular:      Rate and Rhythm: Normal rate and regular rhythm.      Pulses: Normal pulses.      Heart sounds: Normal heart sounds.   Pulmonary:      Effort: Pulmonary effort is normal.      Breath sounds: Normal breath sounds and air entry.   Abdominal:      General: Abdomen is protuberant. Bowel sounds are normal.      Palpations: Abdomen is soft.   Musculoskeletal:      Cervical back: Normal range of motion and neck supple.   Feet:      Right foot:      Skin integrity: Skin integrity normal.   Lymphadenopathy:      Comments: No palpable adenopathy   Skin:     General: Skin is warm and dry.   Neurological:      General: No focal deficit present.      Mental Status: He is alert and oriented to person, place, and time.   Psychiatric:         Attention and Perception: Attention normal.         Mood and Affect: Mood normal.         Speech: Speech normal.          Behavior: Behavior is cooperative.         Thought Content: Thought content normal.     ECOG SCORE    0 - Fully active-able to carry on all pre-disease performance without restriction       No visits with results within 1 Week(s) from this visit.   Latest known visit with results is:   Lab Visit on 12/06/2022   Component Date Value    BCR/ABL1, p210 Result 12/06/2022 see interpretation     Specimen Type 12/06/2022 peripheral blood     Final Diagnosis 12/06/2022 SEE COMMENTS     BCR/ABL1 p190 Result 12/06/2022 see interpretation     Specimen Type 12/06/2022 peripheral blood     Interpretation 12/06/2022 SEE COMMENTS     Sodium Level 12/06/2022 145     Potassium Level 12/06/2022 4.1     Chloride 12/06/2022 108 (H)     Carbon Dioxide 12/06/2022 27     Glucose Level 12/06/2022 139 (H)     Blood Urea Nitrogen 12/06/2022 17.5     Creatinine 12/06/2022 1.19 (H)     Calcium Level Total 12/06/2022 9.9     Protein Total 12/06/2022 6.4     Albumin Level 12/06/2022 4.0     Globulin 12/06/2022 2.4     Albumin/Globulin Ratio 12/06/2022 1.7     Bilirubin Total 12/06/2022 0.7     Alkaline Phosphatase 12/06/2022 87     Alanine Aminotransferase 12/06/2022 22     Aspartate Aminotransfera* 12/06/2022 17     eGFR 12/06/2022 >60     Lactate Dehydrogenase 12/06/2022 146     WBC 12/06/2022 6.0     RBC 12/06/2022 5.30     Hgb 12/06/2022 15.9     Hct 12/06/2022 48.4     MCV 12/06/2022 91.3     MCH 12/06/2022 30.0     MCHC 12/06/2022 32.9 (L)     RDW 12/06/2022 12.9     Platelet 12/06/2022 289     MPV 12/06/2022 8.6     Neut % 12/06/2022 52.1     Lymph % 12/06/2022 35.5     Mono % 12/06/2022 9.4     Eos % 12/06/2022 2.3     Basophil % 12/06/2022 0.2     Lymph # 12/06/2022 2.12     Neut # 12/06/2022 3.1     Mono # 12/06/2022 0.56     Eos # 12/06/2022 0.14     Baso # 12/06/2022 0.01     IG# 12/06/2022 0.03     IG% 12/06/2022 0.5           Assessment:       Problem List Items Addressed This Visit          Oncology    History of allogeneic stem  cell transplant    Chronic myeloid leukemia (CML), BCR/ABL1-positive, in remission - Primary          Plan:       Patient presented with marked neutrophilia and painful neck and axillary adenopathy.  BCR-ABL+ c/w CML and also lymph node biopsy c/w granulocytic sarcoma, diagnosis CML with blast crisis in 3/2020.  Completed FLAG-Addis induction chemotherapy done at [facility] in [place], [place] 3/30/20--5/1/20 with CR.  Ponatinib 30mg daily started after induction, held for transplant.  s/p Flu/Cy/TBI 7/21/20 and haploidentical allogeneic SCT(son was donor) done in [place], [place] at [facility].  Engrafted on 8/10/21 and discharged on 8/12/20.  Restarted Ponatinib on 11/11/20 after day 100 marrow showed MRD.  Ponatinib stopped at day 267 post-transplant due to GI side effects.  Had some mild GVHD problems but weaned off Tacrolimus stopped 3/2021.    Patient had hospitalization in 12/2021 with respiratory failure, pneumonitis from parainfluenza, and suspected tiny pulmonary embolism.   Suspect respiratory failure was all related to infection likely and his drop in EF, acute CHF. This resolved on its own and he was found to have non-critical CAD.  CT chest repeated 2/23/22 shows resolved infection, no PE.    Returned to Dr. Hodges in August he had a positive BCR-ABL p210 (<0.003%) and she wanted to repeat in September and it was stable - <0.003%.   Recent labs show BCR-ABL now 0.004%. Dr. Hodges was notified and recommends continued observation.   No need to repeat CT unless any new symptoms.  Will continue to monitor and repeat labs again in 3 months.   Patient had a follow-up with Dr. Groves in 7/2022 and he took him off the Xarelto.   We reached out to his transplant team regarding vaccines and recommendations are for [facility]ingles [facility] this [facility] then MMR x 2 doses to start in April 2023.   Given Pneumovax here on 9/26/22.   Completed Acyclovir prophylaxis until 8/2022.  Continue observation.  All  questions answered at this time.      Abhilash Hill, [facility]

## 2022-12-21 ENCOUNTER — TELEPHONE (OUTPATIENT)
Dept: HEMATOLOGY/ONCOLOGY | Facility: CLINIC | Age: 62
End: 2022-12-21

## 2022-12-21 ENCOUNTER — OFFICE VISIT (OUTPATIENT)
Dept: HEMATOLOGY/ONCOLOGY | Facility: CLINIC | Age: 62
End: 2022-12-21
Payer: COMMERCIAL

## 2022-12-21 VITALS
OXYGEN SATURATION: 95 % | RESPIRATION RATE: 14 BRPM | WEIGHT: 212.69 LBS | DIASTOLIC BLOOD PRESSURE: 87 MMHG | HEIGHT: 67 IN | TEMPERATURE: 98 F | HEART RATE: 74 BPM | BODY MASS INDEX: 33.38 KG/M2 | SYSTOLIC BLOOD PRESSURE: 121 MMHG

## 2022-12-21 DIAGNOSIS — C92.11 CHRONIC MYELOID LEUKEMIA (CML), BCR/ABL1-POSITIVE, IN REMISSION: Primary | ICD-10-CM

## 2022-12-21 DIAGNOSIS — Z94.84 HISTORY OF ALLOGENEIC STEM CELL TRANSPLANT: ICD-10-CM

## 2022-12-21 PROCEDURE — 3074F SYST BP LT 130 MM HG: CPT | Mod: CPTII,S$GLB,, | Performed by: NURSE PRACTITIONER

## 2022-12-21 PROCEDURE — 1160F PR REVIEW ALL MEDS BY PRESCRIBER/CLIN PHARMACIST DOCUMENTED: ICD-10-PCS | Mod: CPTII,S$GLB,, | Performed by: NURSE PRACTITIONER

## 2022-12-21 PROCEDURE — 3008F PR BODY MASS INDEX (BMI) DOCUMENTED: ICD-10-PCS | Mod: CPTII,S$GLB,, | Performed by: NURSE PRACTITIONER

## 2022-12-21 PROCEDURE — 3074F PR MOST RECENT SYSTOLIC BLOOD PRESSURE < 130 MM HG: ICD-10-PCS | Mod: CPTII,S$GLB,, | Performed by: NURSE PRACTITIONER

## 2022-12-21 PROCEDURE — 3079F PR MOST RECENT DIASTOLIC BLOOD PRESSURE 80-89 MM HG: ICD-10-PCS | Mod: CPTII,S$GLB,, | Performed by: NURSE PRACTITIONER

## 2022-12-21 PROCEDURE — 1159F MED LIST DOCD IN RCRD: CPT | Mod: CPTII,S$GLB,, | Performed by: NURSE PRACTITIONER

## 2022-12-21 PROCEDURE — 99214 OFFICE O/P EST MOD 30 MIN: CPT | Mod: S$GLB,,, | Performed by: NURSE PRACTITIONER

## 2022-12-21 PROCEDURE — 3008F BODY MASS INDEX DOCD: CPT | Mod: CPTII,S$GLB,, | Performed by: NURSE PRACTITIONER

## 2022-12-21 PROCEDURE — 1160F RVW MEDS BY RX/DR IN RCRD: CPT | Mod: CPTII,S$GLB,, | Performed by: NURSE PRACTITIONER

## 2022-12-21 PROCEDURE — 99214 PR OFFICE/OUTPT VISIT, EST, LEVL IV, 30-39 MIN: ICD-10-PCS | Mod: S$GLB,,, | Performed by: NURSE PRACTITIONER

## 2022-12-21 PROCEDURE — 3079F DIAST BP 80-89 MM HG: CPT | Mod: CPTII,S$GLB,, | Performed by: NURSE PRACTITIONER

## 2022-12-21 PROCEDURE — 1159F PR MEDICATION LIST DOCUMENTED IN MEDICAL RECORD: ICD-10-PCS | Mod: CPTII,S$GLB,, | Performed by: NURSE PRACTITIONER

## 2022-12-21 PROCEDURE — 99999 PR PBB SHADOW E&M-EST. PATIENT-LVL IV: ICD-10-PCS | Mod: PBBFAC,,, | Performed by: NURSE PRACTITIONER

## 2022-12-21 PROCEDURE — 99999 PR PBB SHADOW E&M-EST. PATIENT-LVL IV: CPT | Mod: PBBFAC,,, | Performed by: NURSE PRACTITIONER

## 2023-02-06 PROBLEM — R14.1 ABDOMINAL GAS PAIN: Status: RESOLVED | Noted: 2020-07-29 | Resolved: 2023-02-06

## 2023-02-06 PROBLEM — N17.0 ACUTE RENAL FAILURE WITH TUBULAR NECROSIS: Status: RESOLVED | Noted: 2021-12-10 | Resolved: 2023-02-06

## 2023-02-06 PROBLEM — R59.0 LYMPHADENOPATHY OF HEAD AND NECK REGION: Status: RESOLVED | Noted: 2022-06-07 | Resolved: 2023-02-06

## 2023-02-06 PROBLEM — K86.0 ALCOHOL-INDUCED CHRONIC PANCREATITIS: Status: ACTIVE | Noted: 2023-02-06

## 2023-02-06 PROBLEM — Z00.00 WELLNESS EXAMINATION: Status: ACTIVE | Noted: 2023-02-06

## 2023-02-06 PROBLEM — D89.810 ACUTE GVHD: Status: RESOLVED | Noted: 2020-08-12 | Resolved: 2023-02-06

## 2023-02-06 PROBLEM — F17.201 TOBACCO ABUSE, IN REMISSION: Status: RESOLVED | Noted: 2020-07-21 | Resolved: 2023-02-06

## 2023-02-06 PROBLEM — I26.99 PULMONARY EMBOLISM: Status: RESOLVED | Noted: 2022-06-07 | Resolved: 2023-02-06

## 2023-02-06 PROBLEM — F17.210 CIGARETTE SMOKER: Status: RESOLVED | Noted: 2020-11-25 | Resolved: 2023-02-06

## 2023-02-06 PROBLEM — Z85.820 HISTORY OF MELANOMA: Status: ACTIVE | Noted: 2023-02-06

## 2023-02-06 PROBLEM — J96.01 ACUTE RESPIRATORY FAILURE WITH HYPOXIA: Status: RESOLVED | Noted: 2021-12-14 | Resolved: 2023-02-06

## 2023-02-06 PROBLEM — C92.10 CML (CHRONIC MYELOCYTIC LEUKEMIA): Status: ACTIVE | Noted: 2020-07-21

## 2023-02-06 PROBLEM — I71.40 AAA (ABDOMINAL AORTIC ANEURYSM): Status: ACTIVE | Noted: 2023-02-06

## 2023-03-14 ENCOUNTER — LAB VISIT (OUTPATIENT)
Dept: LAB | Facility: HOSPITAL | Age: 63
End: 2023-03-14
Attending: INTERNAL MEDICINE
Payer: COMMERCIAL

## 2023-03-14 DIAGNOSIS — C92.11 CHRONIC MYELOID LEUKEMIA (CML), BCR/ABL1-POSITIVE, IN REMISSION: ICD-10-CM

## 2023-03-14 DIAGNOSIS — Z94.84 HISTORY OF ALLOGENEIC STEM CELL TRANSPLANT: ICD-10-CM

## 2023-03-14 LAB
ALBUMIN SERPL-MCNC: 4.6 G/DL (ref 3.4–4.8)
ALBUMIN/GLOB SERPL: 1.9 RATIO (ref 1.1–2)
ALP SERPL-CCNC: 74 UNIT/L (ref 40–150)
ALT SERPL-CCNC: 16 UNIT/L (ref 0–55)
AST SERPL-CCNC: 15 UNIT/L (ref 5–34)
BASOPHILS # BLD AUTO: 0.05 X10(3)/MCL (ref 0–0.2)
BASOPHILS NFR BLD AUTO: 0.5 %
BILIRUBIN DIRECT+TOT PNL SERPL-MCNC: 0.8 MG/DL
BUN SERPL-MCNC: 12 MG/DL (ref 8.4–25.7)
CALCIUM SERPL-MCNC: 10.3 MG/DL (ref 8.8–10)
CHLORIDE SERPL-SCNC: 106 MMOL/L (ref 98–107)
CO2 SERPL-SCNC: 25 MMOL/L (ref 23–31)
CREAT SERPL-MCNC: 1.06 MG/DL (ref 0.73–1.18)
EOSINOPHIL # BLD AUTO: 0.21 X10(3)/MCL (ref 0–0.9)
EOSINOPHIL NFR BLD AUTO: 2.2 %
ERYTHROCYTE [DISTWIDTH] IN BLOOD BY AUTOMATED COUNT: 13 % (ref 11.5–17)
GFR SERPLBLD CREATININE-BSD FMLA CKD-EPI: >60 MLS/MIN/1.73/M2
GLOBULIN SER-MCNC: 2.4 GM/DL (ref 2.4–3.5)
GLUCOSE SERPL-MCNC: 104 MG/DL (ref 82–115)
HCT VFR BLD AUTO: 50 % (ref 42–52)
HGB BLD-MCNC: 16.3 G/DL (ref 14–18)
IMM GRANULOCYTES # BLD AUTO: 0.01 X10(3)/MCL (ref 0–0.04)
IMM GRANULOCYTES NFR BLD AUTO: 0.1 %
LDH SERPL-CCNC: 191 U/L (ref 125–220)
LYMPHOCYTES # BLD AUTO: 2.34 X10(3)/MCL (ref 0.6–4.6)
LYMPHOCYTES NFR BLD AUTO: 25 %
MCH RBC QN AUTO: 31.1 PG
MCHC RBC AUTO-ENTMCNC: 32.6 G/DL (ref 33–36)
MCV RBC AUTO: 95.4 FL (ref 80–94)
MONOCYTES # BLD AUTO: 0.52 X10(3)/MCL (ref 0.1–1.3)
MONOCYTES NFR BLD AUTO: 5.6 %
NEUTROPHILS # BLD AUTO: 6.22 X10(3)/MCL (ref 2.1–9.2)
NEUTROPHILS NFR BLD AUTO: 66.6 %
PLATELET # BLD AUTO: 264 X10(3)/MCL (ref 130–400)
PMV BLD AUTO: 8.3 FL (ref 7.4–10.4)
POTASSIUM SERPL-SCNC: 5.2 MMOL/L (ref 3.5–5.1)
PROT SERPL-MCNC: 7 GM/DL (ref 5.8–7.6)
RBC # BLD AUTO: 5.24 X10(6)/MCL (ref 4.7–6.1)
SODIUM SERPL-SCNC: 139 MMOL/L (ref 136–145)
WBC # SPEC AUTO: 9.4 X10(3)/MCL (ref 4.5–11.5)

## 2023-03-14 PROCEDURE — 36415 COLL VENOUS BLD VENIPUNCTURE: CPT

## 2023-03-14 PROCEDURE — 80053 COMPREHEN METABOLIC PANEL: CPT

## 2023-03-14 PROCEDURE — 85025 COMPLETE CBC W/AUTO DIFF WBC: CPT

## 2023-03-14 PROCEDURE — 81206 BCR/ABL1 GENE MAJOR BP: CPT | Mod: 90

## 2023-03-14 PROCEDURE — 83615 LACTATE (LD) (LDH) ENZYME: CPT

## 2023-03-16 ENCOUNTER — TELEPHONE (OUTPATIENT)
Dept: INFECTIOUS DISEASES | Facility: CLINIC | Age: 63
End: 2023-03-16
Payer: COMMERCIAL

## 2023-03-16 NOTE — TELEPHONE ENCOUNTER
----- Message from Devora Conteh DO sent at 3/16/2023  8:54 AM CDT -----  Hi - can you call patient and let him know he is due for a second dose of shingles - he can get it at his local pharmacy. This will complete his shingles vaccines

## 2023-03-23 NOTE — PROGRESS NOTES
Subjective:       Patient ID: Kvng Jones Sr. is a 62 y.o. male.    Primary Care: Dr. Ben Doga [facility] transplant Oncologist: Dr. 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  Left axillary lymph node biopsy done 3/24/20--preliminary results c/w granulocytic sarcoma.  Bone marrow biopsy day 100 20 with MRD, positive for BCR-ABL p210 7.8%.  Bone marrow biopsy day 180 2/3/21 s/p transplant showed stringent CR (undetectable BCR-ABL), chimerism 100% donor CD33 and 90% donor CD3.  Bone marrow biopsy at 1 year 21--stringent CR (negative BCR-ABL in blood and marrow), 100% donor CD33 and CD3 chimerism.    Work-up:  Peripheral blood RT PCR BCR-ABL done 3/17/20 positive for major p210, 49.5974%.    Imagin. CT soft tissue neck/C/A/P done 3/23/20--Bilateral cervical lymphadenopathy concerning for metastatic disease or lymphoproliferative disorder, pathologic adenopathy of the bilateral axillary and inguinal  regions, several nonenlarged mediastinal lymph nodes are identified, nonenlarged retroperitoneal lymph nodes.  2. CT chest w/ contrast done at [facility] 12/10/21--no large central PE, findings suggest tiny bilateral pulmonary emboli in lower lobe territory branches R>L, patchy areas of atelectasis and effusion pneumonitis, scattered peripheral hazy nodularities one of which was seen on prior RUL 1cm, others are new, follow-up recommended, enlarged liver with hepatic steatosis, low-density lesions in left lobe 1-2cm range, correlate with US, bilateral adrenal gland hyperplasia nodular changes on left, small 2-4mm calculi right upper kidney, dilated small bowel loop LUQ, r/o obstruction, follow-up recommended.   3. CT chest w/ contrast done at [facility] 22--Near complete resolution of previously seen ground glass nodules from  12/10/2021. Favor infectious/inflammatory etiology.    Treatment history:  FLAG-Addis induction chemotherapy done at [facility] in [place], [place] 3/30/20--5/1/20.  Ponatinib 30mg daily started after induction, held for transplant.  Flu/Cy/TBI 7/21/20 and haploidentical allogeneic SCT(son was donor) done in [place], [place] at [facility].  Engrafted on 8/10/21 and discharged on 8/12/20.  Restarted Ponatinib on 11/11/20 after day 100 marrow showed MRD.  Ponatinib stopped at day 267 post-transplant due to GI side effects.    GVHD treatment history:  Diffuse erythema to legs, lower back, arms and abdomen 9/15/20, started on Prednisone, worsened despite steroids and tacrolimus cream. Biopsy c/w folliculitis and steroids stopped.  Budesonide 3mg BID started 12/10/20 for GERD; EGD negative for GVHD and c/w antral gastritis, stopped oral steroids.  Tacrolimus taper started every 2 weeks 1/6/21, slow due to ongoing rash, stopped Tacrolimus on 3/31/21.    Current treatment plan:   1. Observation  2. Acyclovir prophylaxis to continue until 8/2022 (1 year post-Tacrolimus).     Chief Complaint: Shortness of Breath (Pt reports having a head cold. Denies seeing a dr.)    HPI   Patient presents for follow-up AML/CML. He is doing okay. He started doing intermittent fasting in October 2022 and has lost 20 lbs. Mentions having upper respiratory symptoms for the past 3 weeks - cough, nasal congestion, post-nasal drip. Denies any fevers, chills or night sweats. Also states he started having diarrhea yesterday. He has not been seen by anyone as of yet. Recent labs all good with exception of increasing BCR-ABL at 0.006%. Dr. Hodges notified and recommends continued observation. Will check labs again in 3 months. Remains off the Xarelto per Dr. Cano. He is now followed by Dr. Macario for PAD. No other problems reported.     Past Medical History:   Diagnosis Date    CML (chronic myelocytic leukemia)     Encounter for blood transfusion     Melanoma  in situ of external ear, right     PONV (postoperative nausea and vomiting)     Pulmonary embolism 6/7/2022      Review of patient's allergies indicates:   Allergen Reactions    Posaconazole Hallucinations and Other (See Comments)    Unclassified drug Other (See Comments) and Rash     Pt reports that he has an allergic reaction to an Antifungal medication, but is unsure of the name of the drug. Will obtain name prior to arrival in am and notify Pre-op RN.  Pt reports that he has an allergic reaction to an Antifungal medication, but is unsure of the name of the drug. Will obtain name prior to arrival in am and notify Pre-op RN.    Vancomycin analogues Other (See Comments)     Pt reports he had rigors    Codeine Hives    Iodine Hives and Rash    Codeine sulfate     Iodinated contrast media Hives        Current Outpatient Medications:     amLODIPine (NORVASC) 10 MG tablet, Take 10 mg by mouth once daily., Disp: , Rfl:     atorvastatin (LIPITOR) 40 MG tablet, Take 40 mg by mouth once daily., Disp: , Rfl:     carvediloL (COREG) 3.125 MG tablet, Take 3.125 mg by mouth 2 (two) times daily., Disp: , Rfl:     cetirizine (ZYRTEC) 10 mg Cap, Take 10 mg by mouth once daily., Disp: , Rfl:     esomeprazole (NEXIUM) 40 MG capsule, Take 40 mg by mouth before breakfast., Disp: , Rfl:     hydrocortisone 2.5 % cream, 1 g 2 (two) times daily as needed., Disp: , Rfl:     multivit-min/FA/lycopen/lutein (CENTRUM SILVER MEN ORAL), Take by mouth., Disp: , Rfl:     tamsulosin (FLOMAX) 0.4 mg Cap, Take 1 capsule (0.4 mg total) by mouth once daily., Disp: 30 capsule, Rfl: 5    albuterol (VENTOLIN HFA) 90 mcg/actuation inhaler, Inhale 2 puffs into the lungs every 6 (six) hours as needed for Wheezing. Rescue, Disp: 18 g, Rfl: 1  Review of Systems   Constitutional:  Negative for appetite change.        Intentional weight loss with intermittent fasting   HENT:  Positive for nasal congestion, postnasal drip and rhinorrhea. Negative for mouth sores.     Eyes:  Negative for visual disturbance.   Respiratory:  Positive for cough and shortness of breath.    Cardiovascular:  Negative for chest pain.   Gastrointestinal:  Positive for diarrhea. Negative for abdominal pain.   Musculoskeletal:  Negative for back pain.   Neurological:  Negative for headaches.   Hematological:  Negative for adenopathy.   Psychiatric/Behavioral:  The patient is not nervous/anxious.        Vitals:    03/30/23 1003   BP: 130/87   Pulse: 81   Resp: 14   Temp: 97.7 °F (36.5 °C)     Physical Exam  Constitutional:       General: He is awake.      Appearance: Normal appearance. He is overweight.   HENT:      Head: Normocephalic.   Eyes:      General: Lids are normal. Vision grossly intact.      Extraocular Movements: Extraocular movements intact.      Conjunctiva/sclera: Conjunctivae normal.   Cardiovascular:      Rate and Rhythm: Normal rate and regular rhythm.      Pulses: Normal pulses.      Heart sounds: Normal heart sounds.   Pulmonary:      Effort: Pulmonary effort is normal.      Breath sounds: Normal air entry. Examination of the right-middle field reveals wheezing. Examination of the left-middle field reveals wheezing. Examination of the right-lower field reveals wheezing. Examination of the left-lower field reveals wheezing. Wheezing present.      Comments: Posterior wheezing  Abdominal:      General: Abdomen is protuberant. Bowel sounds are normal.      Palpations: Abdomen is soft.   Musculoskeletal:      Cervical back: Normal range of motion and neck supple.   Lymphadenopathy:      Comments: No palpable adenopathy   Skin:     General: Skin is warm and dry.   Neurological:      General: No focal deficit present.      Mental Status: He is alert and oriented to person, place, and time.   Psychiatric:         Attention and Perception: Attention normal.         Mood and Affect: Mood normal.         Speech: Speech normal.         Behavior: Behavior is cooperative.         Thought Content:  Thought content normal.     ECOG SCORE    1 - Restricted in strenuous activity-ambulatory and able to carry out work of a light nature       No visits with results within 1 Week(s) from this visit.   Latest known visit with results is:   Lab Visit on 03/14/2023   Component Date Value    Sodium Level 03/14/2023 139     Potassium Level 03/14/2023 5.2 (H)     Chloride 03/14/2023 106     Carbon Dioxide 03/14/2023 25     Glucose Level 03/14/2023 104     Blood Urea Nitrogen 03/14/2023 12.0     Creatinine 03/14/2023 1.06     Calcium Level Total 03/14/2023 10.3 (H)     Protein Total 03/14/2023 7.0     Albumin Level 03/14/2023 4.6     Globulin 03/14/2023 2.4     Albumin/Globulin Ratio 03/14/2023 1.9     Bilirubin Total 03/14/2023 0.8     Alkaline Phosphatase 03/14/2023 74     Alanine Aminotransferase 03/14/2023 16     Aspartate Aminotransfera* 03/14/2023 15     eGFR 03/14/2023 >60     Lactate Dehydrogenase 03/14/2023 191     BCR/ABL1, p210 Result 03/14/2023 see interpretation     Specimen Type 03/14/2023 blood     Final Diagnosis 03/14/2023 SEE COMMENTS     WBC 03/14/2023 9.4     RBC 03/14/2023 5.24     Hgb 03/14/2023 16.3     Hct 03/14/2023 50.0     MCV 03/14/2023 95.4 (H)     MCH 03/14/2023 31.1     MCHC 03/14/2023 32.6 (L)     RDW 03/14/2023 13.0     Platelet 03/14/2023 264     MPV 03/14/2023 8.3     Neut % 03/14/2023 66.6     Lymph % 03/14/2023 25.0     Mono % 03/14/2023 5.6     Eos % 03/14/2023 2.2     Basophil % 03/14/2023 0.5     Lymph # 03/14/2023 2.34     Neut # 03/14/2023 6.22     Mono # 03/14/2023 0.52     Eos # 03/14/2023 0.21     Baso # 03/14/2023 0.05     IG# 03/14/2023 0.01     IG% 03/14/2023 0.1           Assessment:       Problem List Items Addressed This Visit          Oncology    CML (chronic myelocytic leukemia) - Primary    AML (acute myeloblastic leukemia)     Other Visit Diagnoses       Acute cough        Relevant Medications    albuterol (VENTOLIN HFA) 90 mcg/actuation inhaler    Other Relevant Orders     X-Ray Chest PA And Lateral    Wheezing        Relevant Medications    albuterol (VENTOLIN HFA) 90 mcg/actuation inhaler    Other Relevant Orders    X-Ray Chest PA And Lateral               Plan:       Patient presented with marked neutrophilia and painful neck and axillary adenopathy.  BCR-ABL+ c/w CML and also lymph node biopsy c/w granulocytic sarcoma, diagnosis CML with blast crisis in 3/2020.  Completed FLAG-Addis induction chemotherapy done at [facility] in [place], [place] 3/30/20--5/1/20 with CR.  Ponatinib 30mg daily started after induction, held for transplant.  s/p Flu/Cy/TBI 7/21/20 and haploidentical allogeneic SCT(son was donor) done in [place], [place] at [facility].  Engrafted on 8/10/21 and discharged on 8/12/20.  Restarted Ponatinib on 11/11/20 after day 100 marrow showed MRD.  Ponatinib stopped at day 267 post-transplant due to GI side effects.  Had some mild GVHD problems but weaned off Tacrolimus stopped 3/2021.    Patient had hospitalization in 12/2021 with respiratory failure, pneumonitis from parainfluenza, and suspected tiny pulmonary embolism.   Suspect respiratory failure was all related to infection likely and his drop in EF, acute CHF. This resolved on its own and he was found to have non-critical CAD.  CT chest repeated 2/23/22 shows resolved infection, no PE.    Returned to Dr. Hodges in August he had a positive BCR-ABL p210 (<0.003%) and she wanted to repeat in September and it was stable - <0.003%.   Recent labs show BCR-ABL now 0.006%. Dr. Hodges was notified and recommends continued observation unless the  BCR-ABL >0.1%.   No need to repeat CT unless any new symptoms.  Will continue to monitor and repeat labs again in 3 months.   Patient had a follow-up with Dr. Groves in 7/2022 and he took him off the Xarelto.   We reached out to his transplant team regarding vaccines and he will need to start MMR x 2 doses in April 2023. He is due for his 2nd dose of Shingles vaccine now as well.   Given  Pneumovax here on 9/26/22.   Completed Acyclovir prophylaxis until 8/2022.  Continue observation.  Patient with upper respiratory symptoms x 3 weeks. No fevers. He does have lower posterior wheezing upon exam today. Will obtain chest XR today. Albuterol HFA called in to his pharmacy. He was instructed to contact his PCP if symptoms do not improve.   All questions answered at this time.      MARIO Mcelroy

## 2023-03-30 ENCOUNTER — HOSPITAL ENCOUNTER (OUTPATIENT)
Dept: RADIOLOGY | Facility: HOSPITAL | Age: 63
Discharge: HOME | End: 2023-03-30
Attending: INTERNAL MEDICINE
Payer: COMMERCIAL

## 2023-03-30 ENCOUNTER — OFFICE VISIT (OUTPATIENT)
Dept: HEMATOLOGY/ONCOLOGY | Facility: CLINIC | Age: 63
End: 2023-03-30
Payer: COMMERCIAL

## 2023-03-30 VITALS
DIASTOLIC BLOOD PRESSURE: 87 MMHG | HEART RATE: 81 BPM | WEIGHT: 197.81 LBS | BODY MASS INDEX: 31.05 KG/M2 | SYSTOLIC BLOOD PRESSURE: 130 MMHG | TEMPERATURE: 98 F | OXYGEN SATURATION: 95 % | RESPIRATION RATE: 14 BRPM | HEIGHT: 67 IN

## 2023-03-30 DIAGNOSIS — R05.1 ACUTE COUGH: ICD-10-CM

## 2023-03-30 DIAGNOSIS — R06.2 WHEEZING: ICD-10-CM

## 2023-03-30 DIAGNOSIS — C92.10 CML (CHRONIC MYELOCYTIC LEUKEMIA): Primary | ICD-10-CM

## 2023-03-30 DIAGNOSIS — C92.01 ACUTE MYELOID LEUKEMIA IN REMISSION: ICD-10-CM

## 2023-03-30 PROCEDURE — 71046 X-RAY EXAM CHEST 2 VIEWS: CPT | Mod: TC

## 2023-03-30 PROCEDURE — 99215 PR OFFICE/OUTPT VISIT, EST, LEVL V, 40-54 MIN: ICD-10-PCS | Mod: S$GLB,,, | Performed by: NURSE PRACTITIONER

## 2023-03-30 PROCEDURE — 99999 PR PBB SHADOW E&M-EST. PATIENT-LVL V: ICD-10-PCS | Mod: PBBFAC,,, | Performed by: NURSE PRACTITIONER

## 2023-03-30 PROCEDURE — 99999 PR PBB SHADOW E&M-EST. PATIENT-LVL V: CPT | Mod: PBBFAC,,, | Performed by: NURSE PRACTITIONER

## 2023-03-30 PROCEDURE — 99215 OFFICE O/P EST HI 40 MIN: CPT | Mod: S$GLB,,, | Performed by: NURSE PRACTITIONER

## 2023-03-30 RX ORDER — ALBUTEROL SULFATE 90 UG/1
2 AEROSOL, METERED RESPIRATORY (INHALATION) EVERY 6 HOURS PRN
Qty: 18 G | Refills: 1 | Status: SHIPPED | OUTPATIENT
Start: 2023-03-30 | End: 2023-05-02

## 2023-03-31 ENCOUNTER — TELEPHONE (OUTPATIENT)
Dept: HEMATOLOGY/ONCOLOGY | Facility: CLINIC | Age: 63
End: 2023-03-31
Payer: COMMERCIAL

## 2023-04-18 ENCOUNTER — PATIENT MESSAGE (OUTPATIENT)
Dept: HEMATOLOGY/ONCOLOGY | Facility: CLINIC | Age: 63
End: 2023-04-18
Payer: COMMERCIAL

## 2023-04-26 ENCOUNTER — INFUSION (OUTPATIENT)
Dept: INFUSION THERAPY | Facility: HOSPITAL | Age: 63
End: 2023-04-26
Attending: NURSE PRACTITIONER
Payer: COMMERCIAL

## 2023-04-26 VITALS
SYSTOLIC BLOOD PRESSURE: 135 MMHG | WEIGHT: 199 LBS | TEMPERATURE: 98 F | HEIGHT: 67 IN | DIASTOLIC BLOOD PRESSURE: 80 MMHG | BODY MASS INDEX: 31.23 KG/M2 | HEART RATE: 75 BPM | RESPIRATION RATE: 20 BRPM

## 2023-04-26 DIAGNOSIS — Z94.84 HISTORY OF ALLOGENEIC STEM CELL TRANSPLANT: ICD-10-CM

## 2023-04-26 DIAGNOSIS — C92.10 CML (CHRONIC MYELOCYTIC LEUKEMIA): Primary | ICD-10-CM

## 2023-04-26 PROCEDURE — 96372 THER/PROPH/DIAG INJ SC/IM: CPT

## 2023-04-26 PROCEDURE — 63600175 PHARM REV CODE 636 W HCPCS: Performed by: NURSE PRACTITIONER

## 2023-04-26 PROCEDURE — 90710 MMRV VACCINE SC: CPT | Performed by: NURSE PRACTITIONER

## 2023-04-26 PROCEDURE — 90471 IMMUNIZATION ADMIN: CPT | Performed by: NURSE PRACTITIONER

## 2023-04-26 RX ADMIN — MEASLES, MUMPS, AND RUBELLA VIRUS VACCINE LIVE 0.5 ML: 1000; 12500; 1000 INJECTION, POWDER, LYOPHILIZED, FOR SUSPENSION SUBCUTANEOUS at 08:04

## 2023-05-08 PROBLEM — Z00.00 WELLNESS EXAMINATION: Status: RESOLVED | Noted: 2023-02-06 | Resolved: 2023-05-08

## 2023-05-26 ENCOUNTER — INFUSION (OUTPATIENT)
Dept: INFUSION THERAPY | Facility: HOSPITAL | Age: 63
End: 2023-05-26
Attending: NURSE PRACTITIONER
Payer: COMMERCIAL

## 2023-05-26 DIAGNOSIS — Z94.84 HISTORY OF ALLOGENEIC STEM CELL TRANSPLANT: ICD-10-CM

## 2023-05-26 DIAGNOSIS — C92.10 CML (CHRONIC MYELOCYTIC LEUKEMIA): Primary | ICD-10-CM

## 2023-05-26 PROCEDURE — 90710 MMRV VACCINE SC: CPT | Performed by: NURSE PRACTITIONER

## 2023-05-26 PROCEDURE — 63600175 PHARM REV CODE 636 W HCPCS: Performed by: NURSE PRACTITIONER

## 2023-05-26 PROCEDURE — 96372 THER/PROPH/DIAG INJ SC/IM: CPT

## 2023-05-26 PROCEDURE — 90471 IMMUNIZATION ADMIN: CPT | Performed by: NURSE PRACTITIONER

## 2023-05-26 RX ADMIN — MEASLES, MUMPS, AND RUBELLA VIRUS VACCINE LIVE 0.5 ML: 1000; 12500; 1000 INJECTION, POWDER, LYOPHILIZED, FOR SUSPENSION SUBCUTANEOUS at 01:05

## 2023-06-07 PROBLEM — R91.1 PULMONARY NODULE: Status: ACTIVE | Noted: 2023-06-07

## 2023-06-07 PROBLEM — J18.9 PNEUMONIA: Status: ACTIVE | Noted: 2023-06-07

## 2023-07-05 NOTE — PROGRESS NOTES
Subjective:       Patient ID: Kvng Jones Sr. is a 62 y.o. male.    Primary Care: Dr. Ben Doga [facility] transplant Oncologist: Dr. 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  Left axillary lymph node biopsy done 3/24/20--preliminary results c/w granulocytic sarcoma.  Bone marrow biopsy day 100 20 with MRD, positive for BCR-ABL p210 7.8%.  Bone marrow biopsy day 180 2/3/21 s/p transplant showed stringent CR (undetectable BCR-ABL), chimerism 100% donor CD33 and 90% donor CD3.  Bone marrow biopsy at 1 year 21--stringent CR (negative BCR-ABL in blood and marrow), 100% donor CD33 and CD3 chimerism.    Work-up:  Peripheral blood RT PCR BCR-ABL done 3/17/20 positive for major p210, 49.5974%.    Imagin. CT soft tissue neck/C/A/P done 3/23/20--Bilateral cervical lymphadenopathy concerning for metastatic disease or lymphoproliferative disorder, pathologic adenopathy of the bilateral axillary and inguinal  regions, several nonenlarged mediastinal lymph nodes are identified, nonenlarged retroperitoneal lymph nodes.  2. CT chest w/ contrast done at [facility] 12/10/21--no large central PE, findings suggest tiny bilateral pulmonary emboli in lower lobe territory branches R>L, patchy areas of atelectasis and effusion pneumonitis, scattered peripheral hazy nodularities one of which was seen on prior RUL 1cm, others are new, follow-up recommended, enlarged liver with hepatic steatosis, low-density lesions in left lobe 1-2cm range, correlate with US, bilateral adrenal gland hyperplasia nodular changes on left, small 2-4mm calculi right upper kidney, dilated small bowel loop LUQ, r/o obstruction, follow-up recommended.   3. CT chest w/ contrast done at [facility] 22--Near complete resolution of previously seen ground glass nodules from  12/10/2021. Favor infectious/inflammatory etiology.    Treatment history:  FLAG-Addis induction chemotherapy done at [facility] in [place], [place] 3/30/20--5/1/20.  Ponatinib 30mg daily started after induction, held for transplant.  Flu/Cy/TBI 7/21/20 and haploidentical allogeneic SCT(son was donor) done in [place], [place] at [facility].  Engrafted on 8/10/21 and discharged on 8/12/20.  Restarted Ponatinib on 11/11/20 after day 100 marrow showed MRD.  Ponatinib stopped at day 267 post-transplant due to GI side effects.    GVHD treatment history:  Diffuse erythema to legs, lower back, arms and abdomen 9/15/20, started on Prednisone, worsened despite steroids and tacrolimus cream. Biopsy c/w folliculitis and steroids stopped.  Budesonide 3mg BID started 12/10/20 for GERD; EGD negative for GVHD and c/w antral gastritis, stopped oral steroids.  Tacrolimus taper started every 2 weeks 1/6/21, slow due to ongoing rash, stopped Tacrolimus on 3/31/21.    Current treatment plan:   1. Observation  2. Acyclovir prophylaxis to continue until 8/2022 (1 year post-Tacrolimus).     Chief Complaint: Other Misc (Pt reports no new concerns today.)    HPI   Patient presents for follow-up AML/CML. He is doing okay. He started doing intermittent fasting in October 2022 and had lost 20 lbs. He has gained back a few lbs. Denies any night sweats or fevers. Recent labs all good. The BCR-ABL is still pending. Dr. Hodges notified and recommends continued observation unless level goes >0.1%. Will check labs again in 3 months. Remains off the Xarelto per Dr. Cano. He is now followed by Dr. Macario for PAD. He was also seen by Pulmonology last month who recommended CT scan which is scheduled for next month. No other problems reported.     Past Medical History:   Diagnosis Date    CML (chronic myelocytic leukemia)     Encounter for blood transfusion     Melanoma in situ of external ear, right     PONV (postoperative nausea and vomiting)     Pulmonary  embolism 6/7/2022      Review of patient's allergies indicates:   Allergen Reactions    Posaconazole Hallucinations and Other (See Comments)    Unclassified drug Other (See Comments) and Rash     Pt reports that he has an allergic reaction to an Antifungal medication, but is unsure of the name of the drug. Will obtain name prior to arrival in am and notify Pre-op RN.  Pt reports that he has an allergic reaction to an Antifungal medication, but is unsure of the name of the drug. Will obtain name prior to arrival in am and notify Pre-op RN.    Vancomycin analogues Other (See Comments)     Pt reports he had rigors    Codeine Hives    Iodine Hives and Rash    Codeine sulfate     Iodinated contrast media Hives        Current Outpatient Medications:     amLODIPine (NORVASC) 10 MG tablet, Take 1 tablet by mouth once daily., Disp: , Rfl:     atorvastatin (LIPITOR) 40 MG tablet, Take 40 mg by mouth once daily., Disp: , Rfl:     azelastine (ASTELIN) 137 mcg (0.1 %) nasal spray, 1 spray (137 mcg total) by Nasal route 2 (two) times daily., Disp: 30 mL, Rfl: 3    budesonide-formoterol 160-4.5 mcg (SYMBICORT) 160-4.5 mcg/actuation HFAA, Inhale 2 puffs into the lungs every 12 (twelve) hours. Controller, Disp: 10.2 g, Rfl: 3    carvediloL (COREG) 3.125 MG tablet, Take 1 tablet by mouth 2 (two) times a day., Disp: , Rfl:     esomeprazole (NEXIUM) 40 MG capsule, Take 40 mg by mouth before breakfast., Disp: , Rfl:     fluticasone propionate (FLONASE ALLERGY RELIEF NASL), , Disp: , Rfl:     hydrocortisone 2.5 % cream, 1 g 2 (two) times daily as needed., Disp: , Rfl:     montelukast (SINGULAIR) 10 mg tablet, Take 1 tablet (10 mg total) by mouth every evening., Disp: 90 tablet, Rfl: 3    multivit-min/FA/lycopen/lutein (CENTRUM SILVER MEN ORAL), Take by mouth., Disp: , Rfl:   Review of Systems   Constitutional:  Negative for appetite change.        Intentional weight loss with intermittent fasting   HENT:  Negative for mouth sores.     Eyes:  Negative for visual disturbance.   Respiratory:  Positive for cough and shortness of breath.    Cardiovascular:  Negative for chest pain.   Gastrointestinal:  Negative for abdominal pain.   Musculoskeletal:  Negative for back pain.   Neurological:  Negative for headaches.   Hematological:  Negative for adenopathy.   Psychiatric/Behavioral:  The patient is not nervous/anxious.        Vitals:    07/17/23 1422   BP: 118/76   Pulse: 79   Resp: 14   Temp: 98.7 °F (37.1 °C)     Physical Exam  Constitutional:       General: He is awake.      Appearance: Normal appearance. He is overweight.   HENT:      Head: Normocephalic.   Eyes:      General: Lids are normal. Vision grossly intact.      Extraocular Movements: Extraocular movements intact.      Conjunctiva/sclera: Conjunctivae normal.   Cardiovascular:      Rate and Rhythm: Normal rate and regular rhythm.      Pulses: Normal pulses.      Heart sounds: Normal heart sounds.   Pulmonary:      Effort: Pulmonary effort is normal.      Breath sounds: Normal air entry. Wheezing present.      Comments: Very faint expiratory wheezing in posterior lobes today  Abdominal:      General: Abdomen is protuberant. Bowel sounds are normal.      Palpations: Abdomen is soft.   Musculoskeletal:      Cervical back: Normal range of motion and neck supple.   Lymphadenopathy:      Comments: No palpable adenopathy   Skin:     General: Skin is warm and dry.   Neurological:      General: No focal deficit present.      Mental Status: He is alert and oriented to person, place, and time.   Psychiatric:         Attention and Perception: Attention normal.         Mood and Affect: Mood normal.         Speech: Speech normal.         Behavior: Behavior is cooperative.         Thought Content: Thought content normal.     ECOG SCORE    1 - Restricted in strenuous activity-ambulatory and able to carry out work of a light nature       Lab Visit on 07/13/2023   Component Date Value    Sodium Level  07/13/2023 140     Potassium Level 07/13/2023 4.6     Chloride 07/13/2023 107     Carbon Dioxide 07/13/2023 22 (L)     Glucose Level 07/13/2023 98     Blood Urea Nitrogen 07/13/2023 16.9     Creatinine 07/13/2023 1.17     Calcium Level Total 07/13/2023 9.7     Protein Total 07/13/2023 7.1     Albumin Level 07/13/2023 4.5     Globulin 07/13/2023 2.6     Albumin/Globulin Ratio 07/13/2023 1.7     Bilirubin Total 07/13/2023 0.6     Alkaline Phosphatase 07/13/2023 67     Alanine Aminotransferase 07/13/2023 15     Aspartate Aminotransfera* 07/13/2023 17     eGFR 07/13/2023 >60     Lactate Dehydrogenase 07/13/2023 189     WBC 07/13/2023 6.52     RBC 07/13/2023 5.38     Hgb 07/13/2023 16.7     Hct 07/13/2023 50.9     MCV 07/13/2023 94.6 (H)     MCH 07/13/2023 31.0     MCHC 07/13/2023 32.8 (L)     RDW 07/13/2023 12.8     Platelet 07/13/2023 245     MPV 07/13/2023 8.3     Neut % 07/13/2023 54.3     Lymph % 07/13/2023 33.4     Mono % 07/13/2023 8.7     Eos % 07/13/2023 2.8     Basophil % 07/13/2023 0.6     Lymph # 07/13/2023 2.18     Neut # 07/13/2023 3.54     Mono # 07/13/2023 0.57     Eos # 07/13/2023 0.18     Baso # 07/13/2023 0.04     IG# 07/13/2023 0.01     IG% 07/13/2023 0.2           Assessment:       Problem List Items Addressed This Visit          Oncology    History of allogeneic stem cell transplant    CML (chronic myelocytic leukemia) - Primary    AML (acute myeloblastic leukemia)          Plan:       Patient presented with marked neutrophilia and painful neck and axillary adenopathy.  BCR-ABL+ c/w CML and also lymph node biopsy c/w granulocytic sarcoma, diagnosis CML with blast crisis in 3/2020.  Completed FLAG-Addis induction chemotherapy done at [facility] in [place], [place] 3/30/20--5/1/20 with CR.  Ponatinib 30mg daily started after induction, held for transplant.  s/p Flu/Cy/TBI 7/21/20 and haploidentical allogeneic SCT(son was donor) done in [place], [place] at [facility].  Engrafted on 8/10/21 and discharged on  8/12/20.  Restarted Ponatinib on 11/11/20 after day 100 marrow showed MRD.  Ponatinib stopped at day 267 post-transplant due to GI side effects.  Had some mild GVHD problems but weaned off Tacrolimus stopped 3/2021.    Patient had hospitalization in 12/2021 with respiratory failure, pneumonitis from parainfluenza, and suspected tiny pulmonary embolism.   Suspect respiratory failure was all related to infection likely and his drop in EF, acute CHF. This resolved on its own and he was found to have non-critical CAD.  CT chest repeated 2/23/22 shows resolved infection, no PE.  CT chest PE protocol repeated on 4/14/23--No evidence of pulmonary thromboembolic disease. Dilated mid ascending aorta. Bronchial wall thickening in the left lower lobe, with large band of atelectasis at the diaphragmatic surface of the left lower lobe. Heavy coronary calcium.  He was referred to Pulmonology who recommends repeating again next month.     Returned to Dr. Hodges in August he had a positive BCR-ABL p210 (<0.003%) and she wanted to repeat in September and it was stable - <0.003%.   Recent labs show BCR-ABL now 0.006%. Dr. Hodges was notified and recommends continued observation unless the  BCR-ABL >0.1%.   Will continue to monitor and repeat labs again in 3 months.   Patient had a follow-up with Dr. Groves in 7/2022 and he took him off the Xarelto.   Completed the MMR series x 2 doses on 4/26/23 and 5/26/23. Completed Shingles vaccine also in May 2023.   Given Pneumovax here on 9/26/22.   Completed Acyclovir prophylaxis 8/2022.  Continue observation.  All questions answered at this time.      Abhilash Hill, [facility]      Addendum:   BCR-ABL returned at 0.007%. Stable. Will continue with monitoring every 3 months at this time.

## 2023-07-13 ENCOUNTER — LAB VISIT (OUTPATIENT)
Dept: LAB | Facility: HOSPITAL | Age: 63
End: 2023-07-13
Attending: INTERNAL MEDICINE
Payer: COMMERCIAL

## 2023-07-13 ENCOUNTER — TELEPHONE (OUTPATIENT)
Dept: HEMATOLOGY/ONCOLOGY | Facility: CLINIC | Age: 63
End: 2023-07-13
Payer: COMMERCIAL

## 2023-07-13 DIAGNOSIS — C92.01 ACUTE MYELOID LEUKEMIA IN REMISSION: ICD-10-CM

## 2023-07-13 DIAGNOSIS — C92.10 CML (CHRONIC MYELOCYTIC LEUKEMIA): ICD-10-CM

## 2023-07-13 DIAGNOSIS — R06.2 WHEEZING: ICD-10-CM

## 2023-07-13 DIAGNOSIS — R05.1 ACUTE COUGH: ICD-10-CM

## 2023-07-13 LAB
ALBUMIN SERPL-MCNC: 4.5 G/DL (ref 3.4–4.8)
ALBUMIN/GLOB SERPL: 1.7 RATIO (ref 1.1–2)
ALP SERPL-CCNC: 67 UNIT/L (ref 40–150)
ALT SERPL-CCNC: 15 UNIT/L (ref 0–55)
AST SERPL-CCNC: 17 UNIT/L (ref 5–34)
BASOPHILS # BLD AUTO: 0.04 X10(3)/MCL
BASOPHILS NFR BLD AUTO: 0.6 %
BILIRUBIN DIRECT+TOT PNL SERPL-MCNC: 0.6 MG/DL
BUN SERPL-MCNC: 16.9 MG/DL (ref 8.4–25.7)
CALCIUM SERPL-MCNC: 9.7 MG/DL (ref 8.8–10)
CHLORIDE SERPL-SCNC: 107 MMOL/L (ref 98–107)
CO2 SERPL-SCNC: 22 MMOL/L (ref 23–31)
CREAT SERPL-MCNC: 1.17 MG/DL (ref 0.73–1.18)
EOSINOPHIL # BLD AUTO: 0.18 X10(3)/MCL (ref 0–0.9)
EOSINOPHIL NFR BLD AUTO: 2.8 %
ERYTHROCYTE [DISTWIDTH] IN BLOOD BY AUTOMATED COUNT: 12.8 % (ref 11.5–17)
GFR SERPLBLD CREATININE-BSD FMLA CKD-EPI: >60 MLS/MIN/1.73/M2
GLOBULIN SER-MCNC: 2.6 GM/DL (ref 2.4–3.5)
GLUCOSE SERPL-MCNC: 98 MG/DL (ref 82–115)
HCT VFR BLD AUTO: 50.9 % (ref 42–52)
HGB BLD-MCNC: 16.7 G/DL (ref 14–18)
IMM GRANULOCYTES # BLD AUTO: 0.01 X10(3)/MCL (ref 0–0.04)
IMM GRANULOCYTES NFR BLD AUTO: 0.2 %
LDH SERPL-CCNC: 189 U/L (ref 125–220)
LYMPHOCYTES # BLD AUTO: 2.18 X10(3)/MCL (ref 0.6–4.6)
LYMPHOCYTES NFR BLD AUTO: 33.4 %
MCH RBC QN AUTO: 31 PG (ref 27–31)
MCHC RBC AUTO-ENTMCNC: 32.8 G/DL (ref 33–36)
MCV RBC AUTO: 94.6 FL (ref 80–94)
MONOCYTES # BLD AUTO: 0.57 X10(3)/MCL (ref 0.1–1.3)
MONOCYTES NFR BLD AUTO: 8.7 %
NEUTROPHILS # BLD AUTO: 3.54 X10(3)/MCL (ref 2.1–9.2)
NEUTROPHILS NFR BLD AUTO: 54.3 %
PLATELET # BLD AUTO: 245 X10(3)/MCL (ref 130–400)
PMV BLD AUTO: 8.3 FL (ref 7.4–10.4)
POTASSIUM SERPL-SCNC: 4.6 MMOL/L (ref 3.5–5.1)
PROT SERPL-MCNC: 7.1 GM/DL (ref 5.8–7.6)
RBC # BLD AUTO: 5.38 X10(6)/MCL (ref 4.7–6.1)
SODIUM SERPL-SCNC: 140 MMOL/L (ref 136–145)
WBC # SPEC AUTO: 6.52 X10(3)/MCL (ref 4.5–11.5)

## 2023-07-13 PROCEDURE — 80053 COMPREHEN METABOLIC PANEL: CPT

## 2023-07-13 PROCEDURE — 83615 LACTATE (LD) (LDH) ENZYME: CPT

## 2023-07-13 PROCEDURE — 85025 COMPLETE CBC W/AUTO DIFF WBC: CPT

## 2023-07-13 PROCEDURE — 36415 COLL VENOUS BLD VENIPUNCTURE: CPT

## 2023-07-13 PROCEDURE — 81206 BCR/ABL1 GENE MAJOR BP: CPT

## 2023-07-13 NOTE — TELEPHONE ENCOUNTER
LM for pt to r/s n/s lab appt from Tuesday. Pt has appt with Abhilash next Monday. Notified pt to call me to do labs today or tomorrow to see Abhilash on Monday.

## 2023-07-13 NOTE — TELEPHONE ENCOUNTER
Pt returned my call. He will come in today at 3:00 to do his labs. Pt states that some of his labs are sent off. I told him that we would not have those results by Monday. Pt will come for appt on Monday and ask if Abhilash can call when the remaining results come back.

## 2023-07-17 ENCOUNTER — OFFICE VISIT (OUTPATIENT)
Dept: HEMATOLOGY/ONCOLOGY | Facility: CLINIC | Age: 63
End: 2023-07-17
Payer: COMMERCIAL

## 2023-07-17 VITALS
OXYGEN SATURATION: 97 % | DIASTOLIC BLOOD PRESSURE: 76 MMHG | RESPIRATION RATE: 14 BRPM | SYSTOLIC BLOOD PRESSURE: 118 MMHG | TEMPERATURE: 99 F | HEIGHT: 67 IN | BODY MASS INDEX: 31.81 KG/M2 | WEIGHT: 202.69 LBS | HEART RATE: 79 BPM

## 2023-07-17 DIAGNOSIS — C92.10 CML (CHRONIC MYELOCYTIC LEUKEMIA): Primary | ICD-10-CM

## 2023-07-17 DIAGNOSIS — C92.01 ACUTE MYELOID LEUKEMIA IN REMISSION: ICD-10-CM

## 2023-07-17 DIAGNOSIS — Z94.84 HISTORY OF ALLOGENEIC STEM CELL TRANSPLANT: ICD-10-CM

## 2023-07-17 PROCEDURE — 99214 PR OFFICE/OUTPT VISIT, EST, LEVL IV, 30-39 MIN: ICD-10-PCS | Mod: S$GLB,,, | Performed by: NURSE PRACTITIONER

## 2023-07-17 PROCEDURE — 99214 OFFICE O/P EST MOD 30 MIN: CPT | Mod: S$GLB,,, | Performed by: NURSE PRACTITIONER

## 2023-07-17 PROCEDURE — 99999 PR PBB SHADOW E&M-EST. PATIENT-LVL IV: CPT | Mod: PBBFAC,,, | Performed by: NURSE PRACTITIONER

## 2023-07-17 PROCEDURE — 99999 PR PBB SHADOW E&M-EST. PATIENT-LVL IV: ICD-10-PCS | Mod: PBBFAC,,, | Performed by: NURSE PRACTITIONER

## 2023-07-30 ENCOUNTER — PATIENT MESSAGE (OUTPATIENT)
Dept: HEMATOLOGY/ONCOLOGY | Facility: CLINIC | Age: 63
End: 2023-07-30
Payer: COMMERCIAL

## 2023-07-31 NOTE — TELEPHONE ENCOUNTER
Does not sound like anything of concern to us at this time. Recent labs good and as long as he does not have any - weight loss, night sweats, fevers or lymphadenopathy then we can keep his next scheduled appointment.

## 2023-08-29 ENCOUNTER — PATIENT MESSAGE (OUTPATIENT)
Dept: ADMINISTRATIVE | Facility: HOSPITAL | Age: 63
End: 2023-08-29
Payer: COMMERCIAL

## 2023-09-11 PROBLEM — J18.9 PNEUMONIA: Status: RESOLVED | Noted: 2023-06-07 | Resolved: 2023-09-11

## 2023-10-20 ENCOUNTER — LAB VISIT (OUTPATIENT)
Dept: LAB | Facility: HOSPITAL | Age: 63
End: 2023-10-20
Attending: INTERNAL MEDICINE
Payer: COMMERCIAL

## 2023-10-20 DIAGNOSIS — C92.10 CML (CHRONIC MYELOCYTIC LEUKEMIA): ICD-10-CM

## 2023-10-20 DIAGNOSIS — C92.01 ACUTE MYELOID LEUKEMIA IN REMISSION: ICD-10-CM

## 2023-10-20 DIAGNOSIS — Z94.84 HISTORY OF ALLOGENEIC STEM CELL TRANSPLANT: ICD-10-CM

## 2023-10-20 LAB — LDH SERPL-CCNC: 155 U/L (ref 125–220)

## 2023-10-20 PROCEDURE — 36415 COLL VENOUS BLD VENIPUNCTURE: CPT

## 2023-10-20 PROCEDURE — 83615 LACTATE (LD) (LDH) ENZYME: CPT

## 2023-10-20 PROCEDURE — 81206 BCR/ABL1 GENE MAJOR BP: CPT

## 2023-10-24 NOTE — PROGRESS NOTES
Subjective:       Patient ID: Kvng Jones Sr. is a 63 y.o. male.    Primary Care: Dr. Ben Doga [facility] transplant Oncologist: Dr. 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  Left axillary lymph node biopsy done 3/24/20--preliminary results c/w granulocytic sarcoma.  Bone marrow biopsy day 100 20 with MRD, positive for BCR-ABL p210 7.8%.  Bone marrow biopsy day 180 2/3/21 s/p transplant showed stringent CR (undetectable BCR-ABL), chimerism 100% donor CD33 and 90% donor CD3.  Bone marrow biopsy at 1 year 21--stringent CR (negative BCR-ABL in blood and marrow), 100% donor CD33 and CD3 chimerism.    Work-up:  Peripheral blood RT PCR BCR-ABL done 3/17/20 positive for major p210, 49.5974%.    Imagin. CT soft tissue neck/C/A/P done 3/23/20--Bilateral cervical lymphadenopathy concerning for metastatic disease or lymphoproliferative disorder, pathologic adenopathy of the bilateral axillary and inguinal  regions, several nonenlarged mediastinal lymph nodes are identified, nonenlarged retroperitoneal lymph nodes.  2. CT chest w/ contrast done at [facility] 12/10/21--no large central PE, findings suggest tiny bilateral pulmonary emboli in lower lobe territory branches R>L, patchy areas of atelectasis and effusion pneumonitis, scattered peripheral hazy nodularities one of which was seen on prior RUL 1cm, others are new, follow-up recommended, enlarged liver with hepatic steatosis, low-density lesions in left lobe 1-2cm range, correlate with US, bilateral adrenal gland hyperplasia nodular changes on left, small 2-4mm calculi right upper kidney, dilated small bowel loop LUQ, r/o obstruction, follow-up recommended.   3. CT chest w/ contrast done at [facility] 22--Near complete resolution of previously seen ground glass nodules from  12/10/2021. Favor infectious/inflammatory etiology.  4. CT chest at [facility] on 8/24/34--Suspected mild emphysema. Stable chronic bilateral focal scar and lingular subsegmental atelectasis. Stable moderate dilation of the ascending thoracic aorta. Stable mild diffuse hepatic steatosis.     Treatment history:  FLAG-Addis induction chemotherapy done at [facility] in [place], [place] 3/30/20--5/1/20.  Ponatinib 30mg daily started after induction, held for transplant.  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  Engrafted on 8/10/21 and discharged on 8/12/20.  Restarted Ponatinib on 11/11/20 after day 100 marrow showed MRD.  Ponatinib stopped at day 267 post-transplant due to GI side effects.    GVHD treatment history:  Diffuse erythema to legs, lower back, arms and abdomen 9/15/20, started on Prednisone, worsened despite steroids and tacrolimus cream. Biopsy c/w folliculitis and steroids stopped.  Budesonide 3mg BID started 12/10/20 for GERD; EGD negative for GVHD and c/w antral gastritis, stopped oral steroids.  Tacrolimus taper started every 2 weeks 1/6/21, slow due to ongoing rash, stopped Tacrolimus on 3/31/21.  Acyclovir prophylaxis until 8/2022 (1 year post-Tacrolimus).     Current treatment plan:   1. Observation    Chief Complaint: Other Misc (Pt reports no new concerns today.) and Peripheral Neuropathy    HPI   Patient presents for follow-up AML/CML. He is doing okay. He has gained a few lbs since his last visit. Denies any night sweats or fevers. Recent labs all good. The BCR-ABL is 0.003%. Dr. Hodges recommends continued observation unless level goes >0.1%. Will check labs again in 3 months. He complains of worsening peripheral neuropathy to bilateral lower extremities. Described as intermittent, sharp shooting pains which bother him more at night. Remains off the Xarelto per Dr. Cano. He is now followed by Dr. Macario for PAD. He was also seen by  Pulmonology who recommended CT scan which was done in August Mentions he was recently diagnosed with psoriasis by Dr. Gaming who is monitoring for now. No other problems reported.     Past Medical History:   Diagnosis Date    CML (chronic myelocytic leukemia)     Encounter for blood transfusion     Melanoma in situ of external ear, right     PONV (postoperative nausea and vomiting)     Pulmonary embolism 2022      Review of patient's allergies indicates:   Allergen Reactions    Posaconazole Hallucinations and Other (See Comments)    Unclassified drug Other (See Comments) and Rash     Pt reports that he has an allergic reaction to an Antifungal medication, but is unsure of the name of the drug. Will obtain name prior to arrival in am and notify Pre-op RN.  Pt reports that he has an allergic reaction to an Antifungal medication, but is unsure of the name of the drug. Will obtain name prior to arrival in am and notify Pre-op RN.    Vancomycin analogues Other (See Comments)     Pt reports he had rigors    Codeine Hives    Iodine Hives and Rash    Codeine sulfate     Iodinated contrast media Hives        Current Outpatient Medications:     amLODIPine (NORVASC) 10 MG tablet, Take 1 tablet by mouth once daily., Disp: , Rfl:     atorvastatin (LIPITOR) 40 MG tablet, Take 40 mg by mouth once daily., Disp: , Rfl:     azelastine (ASTELIN) 137 mcg (0.1 %) nasal spray, 1 spray (137 mcg total) by Nasal route 2 (two) times daily., Disp: 30 mL, Rfl: 3    budesonide-formoterol 160-4.5 mcg (SYMBICORT) 160-4.5 mcg/actuation HFAA, Inhale 2 puffs into the lungs every 12 (twelve) hours. Controller, Disp: 10.2 g, Rfl: 3    carvediloL (COREG) 3.125 MG tablet, Take 1 tablet by mouth 2 (two) times a day., Disp: , Rfl:     esomeprazole (NEXIUM) 40 MG capsule, Take 40 mg by mouth before breakfast., Disp: , Rfl:     fluocinolone and shower cap 0.01 % Oil, SMARTSI Milliliter(s) In Ear(s) Daily, Disp: , Rfl:     fluticasone propionate  (FLONASE ALLERGY RELIEF NASL), , Disp: , Rfl:     HYDROcodone-acetaminophen (NORCO) 7.5-325 mg per tablet, Take 1 tablet by mouth 3 (three) times daily., Disp: , Rfl:     hydrocortisone 2.5 % cream, 1 g 2 (two) times daily as needed., Disp: , Rfl:     montelukast (SINGULAIR) 10 mg tablet, Take 1 tablet (10 mg total) by mouth every evening., Disp: 90 tablet, Rfl: 3    multivit-min/FA/lycopen/lutein (CENTRUM SILVER MEN ORAL), Take by mouth., Disp: , Rfl:     tamsulosin (FLOMAX) 0.4 mg Cap, Take by mouth once daily., Disp: , Rfl:     gabapentin (NEURONTIN) 300 MG capsule, Take 1 capsule (300 mg total) by mouth 2 (two) times daily., Disp: 60 capsule, Rfl: 3  Review of Systems   Constitutional:  Negative for appetite change.   HENT:  Negative for mouth sores.    Eyes:  Negative for visual disturbance.   Cardiovascular:  Negative for chest pain.   Gastrointestinal:  Negative for abdominal pain.   Musculoskeletal:  Negative for back pain.   Neurological:  Negative for headaches.        Peripheral neuropathy   Hematological:  Negative for adenopathy.   Psychiatric/Behavioral:  The patient is not nervous/anxious.          Vitals:    10/31/23 1504   BP: 127/77   Pulse: 79   Resp: 14   Temp: 98.5 °F (36.9 °C)     Physical Exam  Constitutional:       General: He is awake.      Appearance: Normal appearance. He is overweight.   HENT:      Head: Normocephalic.   Eyes:      General: Lids are normal. Vision grossly intact.      Extraocular Movements: Extraocular movements intact.      Conjunctiva/sclera: Conjunctivae normal.   Cardiovascular:      Rate and Rhythm: Normal rate and regular rhythm.      Pulses: Normal pulses.      Heart sounds: Normal heart sounds.   Pulmonary:      Effort: Pulmonary effort is normal.      Breath sounds: Normal air entry.   Abdominal:      General: Abdomen is protuberant. Bowel sounds are normal.      Palpations: Abdomen is soft.   Musculoskeletal:      Cervical back: Normal range of motion and neck  supple.   Skin:     General: Skin is warm and dry.      Comments: Small, scaly rash to left wrist   Neurological:      General: No focal deficit present.      Mental Status: He is alert and oriented to person, place, and time.   Psychiatric:         Attention and Perception: Attention normal.         Mood and Affect: Mood normal.         Speech: Speech normal.         Behavior: Behavior is cooperative.         Thought Content: Thought content normal.       ECOG SCORE    1 - Restricted in strenuous activity-ambulatory and able to carry out work of a light nature       No visits with results within 1 Week(s) from this visit.   Latest known visit with results is:   Lab Visit on 10/20/2023   Component Date Value    Lactate Dehydrogenase 10/20/2023 155     BCR/ABL1, p210 Result 10/20/2023 see interpretation     Specimen Type 10/20/2023 bone marrow     Final Diagnosis 10/20/2023 SEE COMMENTS           Assessment:       Problem List Items Addressed This Visit          Oncology    History of allogeneic stem cell transplant    Relevant Medications    gabapentin (NEURONTIN) 300 MG capsule    Other Relevant Orders    CBC Auto Differential    Lactate Dehydrogenase    BCR/ABL, P210, MONITOR    Comprehensive Metabolic Panel    CML (chronic myelocytic leukemia) - Primary    Relevant Medications    gabapentin (NEURONTIN) 300 MG capsule    Other Relevant Orders    CBC Auto Differential    Lactate Dehydrogenase    BCR/ABL, P210, MONITOR    Comprehensive Metabolic Panel    AML (acute myeloblastic leukemia)    Relevant Medications    gabapentin (NEURONTIN) 300 MG capsule    Other Relevant Orders    CBC Auto Differential    Lactate Dehydrogenase    BCR/ABL, P210, MONITOR    Comprehensive Metabolic Panel          Plan:       Patient presented with marked neutrophilia and painful neck and axillary adenopathy.  BCR-ABL+ c/w CML and also lymph node biopsy c/w granulocytic sarcoma, diagnosis CML with blast crisis in 3/2020.  Completed  FLAG-Addis induction chemotherapy done at [facility] in [place], [place] 3/30/20--5/1/20 with CR.  Ponatinib 30mg daily started after induction, held for transplant.  s/p Flu/Cy/TBI 7/21/20 and haploidentical allogeneic SCT(son was donor) done in [place], [place] at [facility].  Engrafted on 8/10/21 and discharged on 8/12/20.  Restarted Ponatinib on 11/11/20 after day 100 marrow showed MRD.  Ponatinib stopped at day 267 post-transplant due to GI side effects.  Had some mild GVHD problems but weaned off Tacrolimus stopped 3/2021.    Patient had hospitalization in 12/2021 with respiratory failure, pneumonitis from parainfluenza, and suspected tiny pulmonary embolism.   Suspect respiratory failure was all related to infection likely and his drop in EF, acute CHF. This resolved on its own and he was found to have non-critical CAD.  CT chest repeated 2/23/22 shows resolved infection, no PE.  CT chest PE protocol repeated on 4/14/23--No evidence of pulmonary thromboembolic disease. Dilated mid ascending aorta. Bronchial wall thickening in the left lower lobe, with large band of atelectasis at the diaphragmatic surface of the left lower lobe. Heavy coronary calcium.  He was referred to Pulmonology who repeated in August. CT chest at [facility] on 8/24/34--Suspected mild emphysema. Stable chronic bilateral focal scar and lingular subsegmental atelectasis. Stable moderate dilation of the ascending thoracic aorta. Stable mild diffuse hepatic steatosis. Plan for repeat scan in 1 year for ongoing lung cancer surveillance.     Returned to Dr. Hodges in August he had a positive BCR-ABL p210 (<0.003%) and she wanted to repeat in September and it was stable - <0.003%.   Recent labs show BCR-ABL now 0.003%. LDH normal.   Dr. Hodges recommends continued observation unless the  BCR-ABL >0.1%.   Will continue to monitor and repeat labs again in 3 months. He returned to our clinic in 2022. He is requesting to decrease appointments. Consider checking every  6 months if labs still good at his next appointment.   Continue observation.  Patient had a follow-up with Dr. Groves in 7/2022 and he took him off the Xarelto.   Completed the MMR series x 2 doses on 4/26/23 and 5/26/23. Completed Shingles vaccine also in May 2023.   Given Pneumovax here on 9/26/22.   Completed Acyclovir prophylaxis 8/2022.  All questions answered at this time.      Abhilash Hill, MARIO

## 2023-10-31 ENCOUNTER — OFFICE VISIT (OUTPATIENT)
Dept: HEMATOLOGY/ONCOLOGY | Facility: CLINIC | Age: 63
End: 2023-10-31
Payer: COMMERCIAL

## 2023-10-31 VITALS
WEIGHT: 213.19 LBS | TEMPERATURE: 99 F | HEIGHT: 66 IN | HEART RATE: 79 BPM | BODY MASS INDEX: 34.26 KG/M2 | SYSTOLIC BLOOD PRESSURE: 127 MMHG | RESPIRATION RATE: 14 BRPM | OXYGEN SATURATION: 97 % | DIASTOLIC BLOOD PRESSURE: 77 MMHG

## 2023-10-31 DIAGNOSIS — C92.01 ACUTE MYELOID LEUKEMIA IN REMISSION: ICD-10-CM

## 2023-10-31 DIAGNOSIS — Z94.84 HISTORY OF ALLOGENEIC STEM CELL TRANSPLANT: ICD-10-CM

## 2023-10-31 DIAGNOSIS — C92.10 CML (CHRONIC MYELOCYTIC LEUKEMIA): Primary | ICD-10-CM

## 2023-10-31 PROCEDURE — 99214 OFFICE O/P EST MOD 30 MIN: CPT | Mod: S$GLB,,, | Performed by: NURSE PRACTITIONER

## 2023-10-31 PROCEDURE — 99999 PR PBB SHADOW E&M-EST. PATIENT-LVL IV: ICD-10-PCS | Mod: PBBFAC,,, | Performed by: NURSE PRACTITIONER

## 2023-10-31 PROCEDURE — 99214 PR OFFICE/OUTPT VISIT, EST, LEVL IV, 30-39 MIN: ICD-10-PCS | Mod: S$GLB,,, | Performed by: NURSE PRACTITIONER

## 2023-10-31 PROCEDURE — 99999 PR PBB SHADOW E&M-EST. PATIENT-LVL IV: CPT | Mod: PBBFAC,,, | Performed by: NURSE PRACTITIONER

## 2023-10-31 RX ORDER — FLUOCINOLONE ACETONIDE 0.11 MG/ML
OIL TOPICAL
COMMUNITY
Start: 2023-10-20

## 2023-10-31 RX ORDER — GABAPENTIN 300 MG/1
300 CAPSULE ORAL 2 TIMES DAILY
Qty: 60 CAPSULE | Refills: 3 | Status: SHIPPED | OUTPATIENT
Start: 2023-10-31 | End: 2024-02-01

## 2023-11-16 NOTE — ASSESSMENT & PLAN NOTE
Patient here for IV hydration of 1000 ml over 2 hr's,  Due to dehydration and decreased food intake. Per Dr. Ran Lu. 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 Achieved morphologic CR  Started on daily Ponatinib 30 mg daily, which he continues to take outpatient. Will hold Ponatinib on admission.  Referred to Dr. Hodges by Dr. Ramos for SCT consideration  [place] [facility] [place] showing no morphologic or immunophenotypic evidence of CML  Admitted 7/21/20 for planned Flu/Cy/TBI haplo allo SCT  See SCT candidate

## 2023-12-29 ENCOUNTER — TELEPHONE (OUTPATIENT)
Dept: HEMATOLOGY/ONCOLOGY | Facility: CLINIC | Age: 63
End: 2023-12-29
Payer: COMMERCIAL

## 2023-12-29 PROBLEM — R91.1 PULMONARY NODULE: Status: RESOLVED | Noted: 2023-06-07 | Resolved: 2023-12-29

## 2023-12-29 PROBLEM — U07.1 COVID-19: Status: ACTIVE | Noted: 2023-12-29

## 2023-12-29 PROBLEM — J02.0 STREP PHARYNGITIS: Status: ACTIVE | Noted: 2023-12-29

## 2023-12-29 NOTE — TELEPHONE ENCOUNTER
Anahi at Dr Manuel Black's office ([phone]) called and states that this pt tested positive for Covid and Strep today. Dr Black would like to know what is your preferred antiviral based upon pt's dx. Please advise.

## 2024-01-02 ENCOUNTER — TELEPHONE (OUTPATIENT)
Dept: HEMATOLOGY/ONCOLOGY | Facility: CLINIC | Age: 64
End: 2024-01-02
Payer: COMMERCIAL

## 2024-01-07 ENCOUNTER — PATIENT MESSAGE (OUTPATIENT)
Dept: HEMATOLOGY/ONCOLOGY | Facility: CLINIC | Age: 64
End: 2024-01-07
Payer: COMMERCIAL

## 2024-01-08 NOTE — TELEPHONE ENCOUNTER
Dr. Carrillo said if he is still running fever at 10 days he needs to be checked for additional infections and a CBC.

## 2024-01-09 ENCOUNTER — LAB VISIT (OUTPATIENT)
Dept: LAB | Facility: HOSPITAL | Age: 64
End: 2024-01-09
Attending: STUDENT IN AN ORGANIZED HEALTH CARE EDUCATION/TRAINING PROGRAM
Payer: COMMERCIAL

## 2024-01-09 DIAGNOSIS — R50.9 PERSISTENT FEVER: ICD-10-CM

## 2024-01-09 LAB
ALBUMIN SERPL-MCNC: 4 G/DL (ref 3.4–4.8)
ALBUMIN/GLOB SERPL: 1.1 RATIO (ref 1.1–2)
ALP SERPL-CCNC: 87 UNIT/L (ref 40–150)
ALT SERPL-CCNC: 17 UNIT/L (ref 0–55)
APPEARANCE UR: CLEAR
AST SERPL-CCNC: 15 UNIT/L (ref 5–34)
BACTERIA #/AREA URNS AUTO: ABNORMAL /HPF
BASOPHILS # BLD AUTO: 0.08 X10(3)/MCL
BASOPHILS NFR BLD AUTO: 0.7 %
BILIRUB SERPL-MCNC: 0.4 MG/DL
BILIRUB UR QL STRIP.AUTO: NEGATIVE
BILIRUBIN DIRECT+TOT PNL SERPL-MCNC: 0.2 MG/DL (ref 0–?)
BUN SERPL-MCNC: 15.1 MG/DL (ref 8.4–25.7)
CALCIUM SERPL-MCNC: 9.9 MG/DL (ref 8.8–10)
CHLORIDE SERPL-SCNC: 105 MMOL/L (ref 98–107)
CO2 SERPL-SCNC: 24 MMOL/L (ref 23–31)
COLOR UR AUTO: ABNORMAL
CREAT SERPL-MCNC: 0.9 MG/DL (ref 0.73–1.18)
EOSINOPHIL # BLD AUTO: 0.15 X10(3)/MCL (ref 0–0.9)
EOSINOPHIL NFR BLD AUTO: 1.3 %
ERYTHROCYTE [DISTWIDTH] IN BLOOD BY AUTOMATED COUNT: 12.4 % (ref 11.5–17)
GFR SERPLBLD CREATININE-BSD FMLA CKD-EPI: >60 MLS/MIN/1.73/M2
GLOBULIN SER-MCNC: 3.5 GM/DL (ref 2.4–3.5)
GLUCOSE SERPL-MCNC: 96 MG/DL (ref 82–115)
GLUCOSE UR QL STRIP.AUTO: NORMAL
HCT VFR BLD AUTO: 48.8 % (ref 42–52)
HGB BLD-MCNC: 15.9 G/DL (ref 14–18)
IMM GRANULOCYTES # BLD AUTO: 0.08 X10(3)/MCL (ref 0–0.04)
IMM GRANULOCYTES NFR BLD AUTO: 0.7 %
KETONES UR QL STRIP.AUTO: NEGATIVE
LEUKOCYTE ESTERASE UR QL STRIP.AUTO: NEGATIVE
LYMPHOCYTES # BLD AUTO: 2.57 X10(3)/MCL (ref 0.6–4.6)
LYMPHOCYTES NFR BLD AUTO: 22.8 %
MCH RBC QN AUTO: 30.3 PG (ref 27–31)
MCHC RBC AUTO-ENTMCNC: 32.6 G/DL (ref 33–36)
MCV RBC AUTO: 93.1 FL (ref 80–94)
MONOCYTES # BLD AUTO: 0.59 X10(3)/MCL (ref 0.1–1.3)
MONOCYTES NFR BLD AUTO: 5.2 %
MUCOUS THREADS URNS QL MICRO: ABNORMAL /LPF
NEUTROPHILS # BLD AUTO: 7.81 X10(3)/MCL (ref 2.1–9.2)
NEUTROPHILS NFR BLD AUTO: 69.3 %
NITRITE UR QL STRIP.AUTO: NEGATIVE
NRBC BLD AUTO-RTO: 0 %
PH UR STRIP.AUTO: 5 [PH]
PLATELET # BLD AUTO: 449 X10(3)/MCL (ref 130–400)
PMV BLD AUTO: 8.5 FL (ref 7.4–10.4)
POTASSIUM SERPL-SCNC: 4.4 MMOL/L (ref 3.5–5.1)
PROT SERPL-MCNC: 7.5 GM/DL (ref 5.8–7.6)
PROT UR QL STRIP.AUTO: NEGATIVE
RBC # BLD AUTO: 5.24 X10(6)/MCL (ref 4.7–6.1)
RBC #/AREA URNS AUTO: ABNORMAL /HPF
RBC UR QL AUTO: NEGATIVE
SODIUM SERPL-SCNC: 141 MMOL/L (ref 136–145)
SP GR UR STRIP.AUTO: 1.01 (ref 1–1.03)
SQUAMOUS #/AREA URNS LPF: ABNORMAL /HPF
UROBILINOGEN UR STRIP-ACNC: NORMAL
WBC # SPEC AUTO: 11.28 X10(3)/MCL (ref 4.5–11.5)
WBC #/AREA URNS AUTO: ABNORMAL /HPF

## 2024-01-09 PROCEDURE — 82248 BILIRUBIN DIRECT: CPT

## 2024-01-09 PROCEDURE — 87086 URINE CULTURE/COLONY COUNT: CPT

## 2024-01-09 PROCEDURE — 81001 URINALYSIS AUTO W/SCOPE: CPT

## 2024-01-09 PROCEDURE — 80053 COMPREHEN METABOLIC PANEL: CPT

## 2024-01-09 PROCEDURE — 87040 BLOOD CULTURE FOR BACTERIA: CPT | Mod: 91

## 2024-01-09 PROCEDURE — 36415 COLL VENOUS BLD VENIPUNCTURE: CPT

## 2024-01-09 PROCEDURE — 85025 COMPLETE CBC W/AUTO DIFF WBC: CPT

## 2024-01-10 NOTE — PROGRESS NOTES
Please notify patient of results.  Labs reviewed. No concerning values.  Urine culture negative times 24 hours.  Still awaiting blood cultures.  Please forward results to Dr. Evon Carrillo. Please notify if patient with any further questions or concerns.

## 2024-01-11 LAB — BACTERIA UR CULT: NO GROWTH

## 2024-01-14 LAB
BACTERIA BLD CULT: NORMAL
BACTERIA BLD CULT: NORMAL

## 2024-01-17 ENCOUNTER — OFFICE VISIT (OUTPATIENT)
Dept: HEMATOLOGY/ONCOLOGY | Facility: CLINIC | Age: 64
End: 2024-01-17
Payer: COMMERCIAL

## 2024-01-17 VITALS
HEIGHT: 66 IN | WEIGHT: 216.5 LBS | HEART RATE: 88 BPM | OXYGEN SATURATION: 97 % | TEMPERATURE: 98 F | DIASTOLIC BLOOD PRESSURE: 96 MMHG | RESPIRATION RATE: 14 BRPM | SYSTOLIC BLOOD PRESSURE: 149 MMHG | BODY MASS INDEX: 34.79 KG/M2

## 2024-01-17 DIAGNOSIS — C92.10 CML (CHRONIC MYELOCYTIC LEUKEMIA): Primary | ICD-10-CM

## 2024-01-17 PROCEDURE — 99999 PR PBB SHADOW E&M-EST. PATIENT-LVL IV: CPT | Mod: PBBFAC,,, | Performed by: INTERNAL MEDICINE

## 2024-01-17 PROCEDURE — 1159F MED LIST DOCD IN RCRD: CPT | Mod: CPTII,S$GLB,, | Performed by: INTERNAL MEDICINE

## 2024-01-17 PROCEDURE — 3288F FALL RISK ASSESSMENT DOCD: CPT | Mod: CPTII,S$GLB,, | Performed by: INTERNAL MEDICINE

## 2024-01-17 PROCEDURE — 1125F AMNT PAIN NOTED PAIN PRSNT: CPT | Mod: CPTII,S$GLB,, | Performed by: INTERNAL MEDICINE

## 2024-01-17 PROCEDURE — 3080F DIAST BP >= 90 MM HG: CPT | Mod: CPTII,S$GLB,, | Performed by: INTERNAL MEDICINE

## 2024-01-17 PROCEDURE — 3008F BODY MASS INDEX DOCD: CPT | Mod: CPTII,S$GLB,, | Performed by: INTERNAL MEDICINE

## 2024-01-17 PROCEDURE — 1160F RVW MEDS BY RX/DR IN RCRD: CPT | Mod: CPTII,S$GLB,, | Performed by: INTERNAL MEDICINE

## 2024-01-17 PROCEDURE — 99214 OFFICE O/P EST MOD 30 MIN: CPT | Mod: S$GLB,,, | Performed by: INTERNAL MEDICINE

## 2024-01-17 PROCEDURE — 3077F SYST BP >= 140 MM HG: CPT | Mod: CPTII,S$GLB,, | Performed by: INTERNAL MEDICINE

## 2024-01-17 PROCEDURE — 1101F PT FALLS ASSESS-DOCD LE1/YR: CPT | Mod: CPTII,S$GLB,, | Performed by: INTERNAL MEDICINE

## 2024-01-17 NOTE — PROGRESS NOTES
Subjective:       Patient ID: Kvng Jones Sr. is a 63 y.o. male.    Primary Care: Dr. Ben Doga [facility] transplant Oncologist: Dr. 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  Left axillary lymph node biopsy done 3/24/20--preliminary results c/w granulocytic sarcoma.  Bone marrow biopsy day 100 20 with MRD, positive for BCR-ABL p210 7.8%.  Bone marrow biopsy day 180 2/3/21 s/p transplant showed stringent CR (undetectable BCR-ABL), chimerism 100% donor CD33 and 90% donor CD3.  Bone marrow biopsy at 1 year 21--stringent CR (negative BCR-ABL in blood and marrow), 100% donor CD33 and CD3 chimerism.    Work-up:  Peripheral blood RT PCR BCR-ABL done 3/17/20 positive for major p210, 49.5974%.    Imagin. CT soft tissue neck/C/A/P done 3/23/20--Bilateral cervical lymphadenopathy concerning for metastatic disease or lymphoproliferative disorder, pathologic adenopathy of the bilateral axillary and inguinal  regions, several nonenlarged mediastinal lymph nodes are identified, nonenlarged retroperitoneal lymph nodes.  2. CT chest w/ contrast done at [facility] 12/10/21--no large central PE, findings suggest tiny bilateral pulmonary emboli in lower lobe territory branches R>L, patchy areas of atelectasis and effusion pneumonitis, scattered peripheral hazy nodularities one of which was seen on prior RUL 1cm, others are new, follow-up recommended, enlarged liver with hepatic steatosis, low-density lesions in left lobe 1-2cm range, correlate with US, bilateral adrenal gland hyperplasia nodular changes on left, small 2-4mm calculi right upper kidney, dilated small bowel loop LUQ, r/o obstruction, follow-up recommended.   3. CT chest w/ contrast done at [facility] 22--Near complete resolution of previously seen ground glass nodules from  12/10/2021. Favor infectious/inflammatory etiology.  4. CT chest at [facility] on 8/24/34--Suspected mild emphysema. Stable chronic bilateral focal scar and lingular subsegmental atelectasis. Stable moderate dilation of the ascending thoracic aorta. Stable mild diffuse hepatic steatosis.     Treatment history:  FLAG-Addis induction chemotherapy done at [facility] in [place], [place] 3/30/20--5/1/20.  Ponatinib 30mg daily started after induction, held for transplant.  Flu/Cy/TBI 7/21/20 and haploidentical allogeneic SCT(son was donor) done in [place], [place] at [facility].  Engrafted on 8/10/21 and discharged on 8/12/20.  Restarted Ponatinib on 11/11/20 after day 100 marrow showed MRD.  Ponatinib stopped at day 267 post-transplant due to GI side effects.    GVHD treatment history:  Diffuse erythema to legs, lower back, arms and abdomen 9/15/20, started on Prednisone, worsened despite steroids and tacrolimus cream. Biopsy c/w folliculitis and steroids stopped.  Budesonide 3mg BID started 12/10/20 for GERD; EGD negative for GVHD and c/w antral gastritis, stopped oral steroids.  Tacrolimus taper started every 2 weeks 1/6/21, slow due to ongoing rash, stopped Tacrolimus on 3/31/21.  Acyclovir prophylaxis until 8/2022 (1 year post-Tacrolimus).     Current treatment plan:   1. Observation    Chief Complaint: Other Misc (Pt reports that he is getting over COVID and strep throat. He still has a sore throat but denies having fever for the last 5 days.)    HPI   Patient presents for follow-up AML/CML. He had strep over Karthik and then Covid, back to back illnesses, was having fever, which just resolved on Friday. Reports swelling and tenderness in the glands in his neck, and called for an appointment to be seen. CBC is completely normal.     Past Medical History:   Diagnosis Date    CML (chronic myelocytic leukemia)     Encounter for blood transfusion     Melanoma in situ of external ear, right     PONV (postoperative nausea and  vomiting)     Pulmonary embolism 2022      Review of patient's allergies indicates:   Allergen Reactions    Posaconazole Hallucinations and Other (See Comments)    Unclassified drug Other (See Comments) and Rash     Pt reports that he has an allergic reaction to an Antifungal medication, but is unsure of the name of the drug. Will obtain name prior to arrival in am and notify Pre-op RN.  Pt reports that he has an allergic reaction to an Antifungal medication, but is unsure of the name of the drug. Will obtain name prior to arrival in am and notify Pre-op RN.    Vancomycin analogues Other (See Comments)     Pt reports he had rigors    Codeine Hives    Iodine Hives, Rash and Other (See Comments)    Codeine sulfate     Iodinated contrast media Hives        Current Outpatient Medications:     atorvastatin (LIPITOR) 40 MG tablet, Take 40 mg by mouth once daily., Disp: , Rfl:     azelastine (ASTELIN) 137 mcg (0.1 %) nasal spray, 1 spray (137 mcg total) by Nasal route 2 (two) times daily., Disp: 30 mL, Rfl: 3    budesonide-formoterol 160-4.5 mcg (SYMBICORT) 160-4.5 mcg/actuation HFAA, Inhale 2 puffs into the lungs every 12 (twelve) hours. Controller, Disp: 10.2 g, Rfl: 3    carvediloL (COREG) 3.125 MG tablet, Take 1 tablet by mouth 2 (two) times a day., Disp: , Rfl:     cetirizine (ZYRTEC) 10 MG tablet, 1 tablet Orally Once a day for 30 day(s), Disp: , Rfl:     esomeprazole (NEXIUM) 40 MG capsule, Take 40 mg by mouth before breakfast., Disp: , Rfl:     fluocinolone and shower cap 0.01 % Oil, SMARTSI Milliliter(s) In Ear(s) Daily, Disp: , Rfl:     fluticasone propionate (FLONASE ALLERGY RELIEF NASL), , Disp: , Rfl:     hydrocortisone 2.5 % cream, 1 g 2 (two) times daily as needed., Disp: , Rfl:     montelukast (SINGULAIR) 10 mg tablet, Take 1 tablet (10 mg total) by mouth every evening., Disp: 90 tablet, Rfl: 3    multivit-min/FA/lycopen/lutein (CENTRUM SILVER MEN ORAL), Take by mouth., Disp: , Rfl:     gabapentin  (NEURONTIN) 300 MG capsule, Take 1 capsule (300 mg total) by mouth 2 (two) times daily. (Patient not taking: Reported on 1/17/2024), Disp: 60 capsule, Rfl: 3  Review of Systems   Constitutional:  Negative for appetite change.   HENT:  Negative for mouth sores.         +swollen glands in neck, recent strep/Covid   Eyes:  Negative for visual disturbance.   Cardiovascular:  Negative for chest pain.   Gastrointestinal:  Negative for abdominal pain.   Musculoskeletal:  Negative for back pain.   Neurological:  Negative for headaches.        Peripheral neuropathy   Hematological:  Negative for adenopathy.   Psychiatric/Behavioral:  The patient is not nervous/anxious.          Vitals:    01/17/24 0950   BP: (!) 149/96   Pulse: 88   Resp: 14   Temp: 98.1 °F (36.7 °C)     Physical Exam  Constitutional:       General: He is awake.      Appearance: Normal appearance. He is overweight.   HENT:      Head: Normocephalic.   Eyes:      General: Lids are normal. Vision grossly intact.      Extraocular Movements: Extraocular movements intact.      Conjunctiva/sclera: Conjunctivae normal.   Neck:      Comments: +swelling bilateral submandibular glands, +tenderness  Cardiovascular:      Rate and Rhythm: Normal rate and regular rhythm.      Pulses: Normal pulses.      Heart sounds: Normal heart sounds.   Pulmonary:      Effort: Pulmonary effort is normal.      Breath sounds: Normal air entry.   Abdominal:      General: Abdomen is protuberant. Bowel sounds are normal.      Palpations: Abdomen is soft.   Musculoskeletal:      Cervical back: Neck supple.   Skin:     General: Skin is warm and dry.   Neurological:      General: No focal deficit present.      Mental Status: He is alert and oriented to person, place, and time.   Psychiatric:         Attention and Perception: Attention normal.         Mood and Affect: Mood normal.         Speech: Speech normal.         Behavior: Behavior is cooperative.         Thought Content: Thought content  normal.       ECOG SCORE           Lab Visit on 01/17/2024   Component Date Value    WBC 01/17/2024 10.43     RBC 01/17/2024 5.44     Hgb 01/17/2024 16.3     Hct 01/17/2024 49.0     MCV 01/17/2024 90.1     MCH 01/17/2024 30.0     MCHC 01/17/2024 33.3     RDW 01/17/2024 12.1     Platelet 01/17/2024 360     MPV 01/17/2024 7.9     Neut % 01/17/2024 59.3     Lymph % 01/17/2024 30.8     Mono % 01/17/2024 7.3     Eos % 01/17/2024 1.9     Basophil % 01/17/2024 0.4     Lymph # 01/17/2024 3.21     Neut # 01/17/2024 6.19     Mono # 01/17/2024 0.76     Eos # 01/17/2024 0.20     Baso # 01/17/2024 0.04     IG# 01/17/2024 0.03     IG% 01/17/2024 0.3           Assessment:       1. CML (chronic myelocytic leukemia)          Plan:       Patient presented with marked neutrophilia and painful neck and axillary adenopathy.  BCR-ABL+ c/w CML and also lymph node biopsy c/w granulocytic sarcoma, diagnosis CML with blast crisis in 3/2020.  Completed FLAG-Addis induction chemotherapy done at [facility] in [place], [place] 3/30/20--5/1/20 with CR.  Ponatinib 30mg daily started after induction, held for transplant.  s/p Flu/Cy/TBI 7/21/20 and haploidentical allogeneic SCT(son was donor) done in [place], [place] at [facility].  Engrafted on 8/10/21 and discharged on 8/12/20.  Restarted Ponatinib on 11/11/20 after day 100 marrow showed MRD.  Ponatinib stopped at day 267 post-transplant due to GI side effects.  Had some mild GVHD problems but weaned off Tacrolimus stopped 3/2021.    Patient had hospitalization in 12/2021 with respiratory failure, pneumonitis from parainfluenza, and suspected tiny pulmonary embolism.   Suspect respiratory failure was all related to infection likely and his drop in EF, acute CHF. This resolved on its own and he was found to have non-critical CAD.  CT chest repeated 2/23/22 shows resolved infection, no PE.  CT chest PE protocol repeated on 4/14/23--No evidence of pulmonary thromboembolic disease. Dilated mid ascending aorta.  Bronchial wall thickening in the left lower lobe, with large band of atelectasis at the diaphragmatic surface of the left lower lobe. Heavy coronary calcium.  He was referred to Pulmonology who repeated in August. CT chest at [facility] on 8/24/34--Suspected mild emphysema. Stable chronic bilateral focal scar and lingular subsegmental atelectasis. Stable moderate dilation of the ascending thoracic aorta. Stable mild diffuse hepatic steatosis. Plan for repeat scan in 1 year for ongoing lung cancer surveillance.     Returned to Dr. Hodges in August he had a positive BCR-ABL p210 (<0.003%) and she wanted to repeat in September and it was stable - <0.003%.   Dr. Hodges recommended continued observation unless the  BCR-ABL >0.1%.       Patient has been undergoing labs every 3 months. Last BCR-ABL 10/20/23 was 0.003%, stable.    Patient now with swelling in bilateral submandibular glands, suspect likely reactive from back to back recent febrile illnesses strep and Covid.  Patient also says the gland swelling has improved in the last 24 hours and he just stopped having fever on Friday.  I would like to give him some more time to see if the swelling will resolve.  CBC today is completely normal. Will await results from BCR-ABL.  Unless this is significantly increased, plan to monitor clinically.  F/u in 2 weeks for a visit, to check neck swelling again. If it is persistent, consider obtaining soft tissue CT neck w/ contrast.    Continue observation.    Patient had a follow-up with Dr. Groves in 7/2022 and he took him off the Xarelto.   Completed the MMR series x 2 doses on 4/26/23 and 5/26/23. Completed Shingles vaccine also in May 2023.   Given Pneumovax here on 9/26/22.   Completed Acyclovir prophylaxis 8/2022.  All questions answered at this time.      Evon Carrillo MD

## 2024-01-22 PROBLEM — J02.0 STREP PHARYNGITIS: Status: RESOLVED | Noted: 2023-12-29 | Resolved: 2024-01-22

## 2024-01-22 PROBLEM — R09.89 TENDER LYMPH NODE: Status: ACTIVE | Noted: 2024-01-22

## 2024-01-22 PROBLEM — U07.1 COVID-19: Status: RESOLVED | Noted: 2023-12-29 | Resolved: 2024-01-22

## 2024-01-22 PROBLEM — B37.0 THRUSH: Status: RESOLVED | Noted: 2020-08-26 | Resolved: 2024-01-22

## 2024-01-22 PROBLEM — R50.9 PERSISTENT FEVER: Status: RESOLVED | Noted: 2024-01-09 | Resolved: 2024-01-22

## 2024-01-22 PROBLEM — C92.00 AML (ACUTE MYELOBLASTIC LEUKEMIA): Status: RESOLVED | Noted: 2020-10-19 | Resolved: 2024-01-22

## 2024-01-22 PROBLEM — R29.898 DECREASED STRENGTH OF TRUNK AND BACK: Status: RESOLVED | Noted: 2020-10-09 | Resolved: 2024-01-22

## 2024-01-22 PROBLEM — K59.00 CONSTIPATION: Status: RESOLVED | Noted: 2020-11-10 | Resolved: 2024-01-22

## 2024-01-22 PROBLEM — Z78.9 DAILY CONSUMPTION OF ALCOHOL: Status: RESOLVED | Noted: 2020-07-21 | Resolved: 2024-01-22

## 2024-01-24 NOTE — PROGRESS NOTES
Subjective:       Patient ID: Kvng Jones Sr. is a 63 y.o. male.    Primary Care: Dr. Ben Doga [facility] transplant Oncologist: Dr. 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  Left axillary lymph node biopsy done 3/24/20--preliminary results c/w granulocytic sarcoma.  Bone marrow biopsy day 100 20 with MRD, positive for BCR-ABL p210 7.8%.  Bone marrow biopsy day 180 2/3/21 s/p transplant showed stringent CR (undetectable BCR-ABL), chimerism 100% donor CD33 and 90% donor CD3.  Bone marrow biopsy at 1 year 21--stringent CR (negative BCR-ABL in blood and marrow), 100% donor CD33 and CD3 chimerism.    Work-up:  Peripheral blood RT PCR BCR-ABL done 3/17/20 positive for major p210, 49.5974%.    Imagin. CT soft tissue neck/C/A/P done 3/23/20--Bilateral cervical lymphadenopathy concerning for metastatic disease or lymphoproliferative disorder, pathologic adenopathy of the bilateral axillary and inguinal  regions, several nonenlarged mediastinal lymph nodes are identified, nonenlarged retroperitoneal lymph nodes.  2. CT chest w/ contrast done at [facility] 12/10/21--no large central PE, findings suggest tiny bilateral pulmonary emboli in lower lobe territory branches R>L, patchy areas of atelectasis and effusion pneumonitis, scattered peripheral hazy nodularities one of which was seen on prior RUL 1cm, others are new, follow-up recommended, enlarged liver with hepatic steatosis, low-density lesions in left lobe 1-2cm range, correlate with US, bilateral adrenal gland hyperplasia nodular changes on left, small 2-4mm calculi right upper kidney, dilated small bowel loop LUQ, r/o obstruction, follow-up recommended.   3. CT chest w/ contrast done at [facility] 22--Near complete resolution of previously seen ground glass nodules from  12/10/2021. Favor infectious/inflammatory etiology.  4. CT chest at [facility] on 8/24/22--Suspected mild emphysema. Stable chronic bilateral focal scar and lingular subsegmental atelectasis. Stable moderate dilation of the ascending thoracic aorta. Stable mild diffuse hepatic steatosis.     Treatment history:  FLAG-Addis induction chemotherapy done at [facility] in [place], [place] 3/30/20--5/1/20.  Ponatinib 30mg daily started after induction, held for transplant.  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  Engrafted on 8/10/21 and discharged on 8/12/20.  Restarted Ponatinib on 11/11/20 after day 100 marrow showed MRD.  Ponatinib stopped at day 267 post-transplant due to GI side effects.    GVHD treatment history:  Diffuse erythema to legs, lower back, arms and abdomen 9/15/20, started on Prednisone, worsened despite steroids and tacrolimus cream. Biopsy c/w folliculitis and steroids stopped.  Budesonide 3mg BID started 12/10/20 for GERD; EGD negative for GVHD and c/w antral gastritis, stopped oral steroids.  Tacrolimus taper started every 2 weeks 1/6/21, slow due to ongoing rash, stopped Tacrolimus on 3/31/21.  Acyclovir prophylaxis until 8/2022 (1 year post-Tacrolimus).     Current treatment plan:   1. Observation    Chief Complaint: Other Misc (Pt reports that he still has a sore throat.)    HPI   Patient presents for follow-up AML/CML. He had strep and then Covid, back to back illnesses in December and early January. He was seen ~ 2 weeks ago and reported swelling and tenderness in the glands in his neck, and called for an appointment. CBC was completely normal. His BCR-ABL is undetectable. He was feeling better but then last Monday his lymphadenopathy returned along with throat pain. His PCP gave him a steroid and antibiotic injection which had helped. States he started feeling bad again 2 days ago. Denies any fevers. Complains of neck lymphadenopathy and  sore throat. Appetite is good and weight increased. Bowels moving well. Dr. Carrillo had mentioned obtaining a CT scan if his symptoms persisted.     Past Medical History:   Diagnosis Date    CML (chronic myelocytic leukemia)     Encounter for blood transfusion     Melanoma in situ of external ear, right     PONV (postoperative nausea and vomiting)     Pulmonary embolism 2022      Review of patient's allergies indicates:   Allergen Reactions    Posaconazole Hallucinations and Other (See Comments)    Unclassified drug Other (See Comments) and Rash     Pt reports that he has an allergic reaction to an Antifungal medication, but is unsure of the name of the drug. Will obtain name prior to arrival in am and notify Pre-op RN.  Pt reports that he has an allergic reaction to an Antifungal medication, but is unsure of the name of the drug. Will obtain name prior to arrival in am and notify Pre-op RN.    Vancomycin analogues Other (See Comments)     Pt reports he had rigors    Codeine Hives    Iodine Hives, Rash and Other (See Comments)    Codeine sulfate     Iodinated contrast media Hives        Current Outpatient Medications:     amLODIPine (NORVASC) 10 MG tablet, Take 10 mg by mouth once daily., Disp: , Rfl:     atorvastatin (LIPITOR) 40 MG tablet, Take 40 mg by mouth once daily., Disp: , Rfl:     azelastine (ASTELIN) 137 mcg (0.1 %) nasal spray, 1 spray (137 mcg total) by Nasal route 2 (two) times daily., Disp: 30 mL, Rfl: 3    budesonide-formoterol 160-4.5 mcg (SYMBICORT) 160-4.5 mcg/actuation HFAA, Inhale 2 puffs into the lungs every 12 (twelve) hours. Controller, Disp: 10.2 g, Rfl: 3    carvediloL (COREG) 3.125 MG tablet, Take 1 tablet by mouth 2 (two) times a day., Disp: , Rfl:     cetirizine (ZYRTEC) 10 MG tablet, 1 tablet Orally Once a day for 30 day(s), Disp: , Rfl:     esomeprazole (NEXIUM) 40 MG capsule, Take 40 mg by mouth before breakfast., Disp: , Rfl:     fluocinolone and shower cap 0.01 % Oil, SMARTSI  Milliliter(s) In Ear(s) Daily, Disp: , Rfl:     fluticasone propionate (FLONASE ALLERGY RELIEF NASL), , Disp: , Rfl:     hydrocortisone 2.5 % cream, 1 g 2 (two) times daily as needed., Disp: , Rfl:     montelukast (SINGULAIR) 10 mg tablet, Take 1 tablet (10 mg total) by mouth every evening., Disp: 90 tablet, Rfl: 3    multivit-min/FA/lycopen/lutein (CENTRUM SILVER MEN ORAL), Take by mouth., Disp: , Rfl:   Review of Systems   Constitutional:  Negative for appetite change, fever and unexpected weight change.   HENT:  Positive for postnasal drip and sore throat. Negative for mouth sores.         +swollen glands in neck, recent strep/Covid   Eyes:  Negative for visual disturbance.   Respiratory:  Negative for cough and shortness of breath.    Cardiovascular:  Negative for chest pain.   Gastrointestinal:  Negative for abdominal pain and diarrhea.   Musculoskeletal:  Negative for back pain.   Integumentary:  Negative for rash.   Neurological:  Negative for headaches.        Peripheral neuropathy   Hematological:  Positive for adenopathy.   Psychiatric/Behavioral:  The patient is not nervous/anxious.          Vitals:    02/01/24 1309   BP: 137/81   Pulse: 89   Resp: 14   Temp: 98.2 °F (36.8 °C)     Physical Exam  Constitutional:       General: He is awake.      Appearance: Normal appearance. He is overweight.   HENT:      Head: Normocephalic.   Eyes:      General: Lids are normal. Vision grossly intact.      Extraocular Movements: Extraocular movements intact.      Conjunctiva/sclera: Conjunctivae normal.   Neck:      Comments: +swelling bilateral submandibular glands, +tenderness  Cardiovascular:      Rate and Rhythm: Normal rate and regular rhythm.      Pulses: Normal pulses.      Heart sounds: Normal heart sounds.   Pulmonary:      Effort: Pulmonary effort is normal.      Breath sounds: Normal air entry.   Abdominal:      General: Abdomen is protuberant. Bowel sounds are normal.      Palpations: Abdomen is soft.    Musculoskeletal:      Cervical back: Neck supple.   Lymphadenopathy:      Cervical: Cervical adenopathy present.      Right cervical: Superficial cervical adenopathy present.      Left cervical: Superficial cervical adenopathy present.   Skin:     General: Skin is warm and dry.   Neurological:      General: No focal deficit present.      Mental Status: He is alert and oriented to person, place, and time.   Psychiatric:         Attention and Perception: Attention normal.         Mood and Affect: Mood normal.         Speech: Speech normal.         Behavior: Behavior is cooperative.         Thought Content: Thought content normal.       ECOG SCORE    1 - Restricted in strenuous activity-ambulatory and able to carry out work of a light nature       No visits with results within 1 Week(s) from this visit.   Latest known visit with results is:   Lab Visit on 01/17/2024   Component Date Value    Lactate Dehydrogenase 01/17/2024 186     BCR/ABL1, p210 Result 01/17/2024 see interpretation     Specimen Type 01/17/2024 Peripheral blood     Final Diagnosis 01/17/2024 SEE COMMENTS     Sodium Level 01/17/2024 144     Potassium Level 01/17/2024 5.4 (H)     Chloride 01/17/2024 103     Carbon Dioxide 01/17/2024 29     Glucose Level 01/17/2024 96     Blood Urea Nitrogen 01/17/2024 12.1     Creatinine 01/17/2024 1.14     Calcium Level Total 01/17/2024 10.0     Protein Total 01/17/2024 7.3     Albumin Level 01/17/2024 4.2     Globulin 01/17/2024 3.1     Albumin/Globulin Ratio 01/17/2024 1.4     Bilirubin Total 01/17/2024 0.6     Alkaline Phosphatase 01/17/2024 86     Alanine Aminotransferase 01/17/2024 20     Aspartate Aminotransfera* 01/17/2024 19     eGFR 01/17/2024 >60     WBC 01/17/2024 10.43     RBC 01/17/2024 5.44     Hgb 01/17/2024 16.3     Hct 01/17/2024 49.0     MCV 01/17/2024 90.1     MCH 01/17/2024 30.0     MCHC 01/17/2024 33.3     RDW 01/17/2024 12.1     Platelet 01/17/2024 360     MPV 01/17/2024 7.9     Neut % 01/17/2024  59.3     Lymph % 01/17/2024 30.8     Mono % 01/17/2024 7.3     Eos % 01/17/2024 1.9     Basophil % 01/17/2024 0.4     Lymph # 01/17/2024 3.21     Neut # 01/17/2024 6.19     Mono # 01/17/2024 0.76     Eos # 01/17/2024 0.20     Baso # 01/17/2024 0.04     IG# 01/17/2024 0.03     IG% 01/17/2024 0.3           Assessment:       1. CML (chronic myelocytic leukemia)    2. History of allogeneic stem cell transplant          Plan:       Patient presented with marked neutrophilia and painful neck and axillary adenopathy.  BCR-ABL+ c/w CML and also lymph node biopsy c/w granulocytic sarcoma, diagnosis CML with blast crisis in 3/2020.  Completed FLAG-Addis induction chemotherapy done at [facility] in [place], [place] 3/30/20--5/1/20 with CR.  Ponatinib 30mg daily started after induction, held for transplant.  s/p Flu/Cy/TBI 7/21/20 and haploidentical allogeneic SCT(son was donor) done in [place], [place] at [facility].  Engrafted on 8/10/21 and discharged on 8/12/20.  Restarted Ponatinib on 11/11/20 after day 100 marrow showed MRD.  Ponatinib stopped at day 267 post-transplant due to GI side effects.  Had some mild GVHD problems but weaned off Tacrolimus stopped 3/2021.    Patient had hospitalization in 12/2021 with respiratory failure, pneumonitis from parainfluenza, and suspected tiny pulmonary embolism.   Suspect respiratory failure was all related to infection likely and his drop in EF, acute CHF. This resolved on its own and he was found to have non-critical CAD.  CT chest repeated 2/23/22 shows resolved infection, no PE.  CT chest PE protocol repeated on 4/14/23--No evidence of pulmonary thromboembolic disease. Dilated mid ascending aorta. Bronchial wall thickening in the left lower lobe, with large band of atelectasis at the diaphragmatic surface of the left lower lobe. Heavy coronary calcium.  He was referred to Pulmonology who repeated in August. CT chest at [facility] on 8/24/34--Suspected mild emphysema. Stable chronic bilateral  focal scar and lingular subsegmental atelectasis. Stable moderate dilation of the ascending thoracic aorta. Stable mild diffuse hepatic steatosis. Plan for repeat scan in 1 year for ongoing lung cancer surveillance.     Returned to Dr. Hodges in August he had a positive BCR-ABL p210 (<0.003%) and she wanted to repeat in September and it was stable - <0.003%.   Dr. Hodges recommended continued observation unless the  BCR-ABL >0.1%.  Patient has been undergoing labs every 3 months.  Last BCR-ABL 1/17/24 was undetectable.    Patient developed swelling in bilateral submandibular glands in December 2023, suspect likely reactive from back to back recent febrile illnesses strep and Covid.  Recent labs were completely normal. BCR-ABL undetectable.   Since his symptoms returned again last week, will obtain CT soft tissue of neck. Patient prefers to have done at [facility].   Will also send referral to ENT for evaluation.   Will have him follow-up in 3 weeks after labs and scan completed.     Patient had a follow-up with Dr. Groves in 7/2022 and he took him off the Xarelto.   Completed the MMR series x 2 doses on 4/26/23 and 5/26/23. Completed Shingles vaccine also in May 2023.   Given Pneumovax here on 9/26/22.   Completed Acyclovir prophylaxis 8/2022.  All questions answered at this time.      MARIO Mcelroy

## 2024-02-01 ENCOUNTER — OFFICE VISIT (OUTPATIENT)
Dept: HEMATOLOGY/ONCOLOGY | Facility: CLINIC | Age: 64
End: 2024-02-01
Payer: COMMERCIAL

## 2024-02-01 VITALS
BODY MASS INDEX: 34.78 KG/M2 | OXYGEN SATURATION: 95 % | SYSTOLIC BLOOD PRESSURE: 137 MMHG | DIASTOLIC BLOOD PRESSURE: 81 MMHG | RESPIRATION RATE: 14 BRPM | HEART RATE: 89 BPM | HEIGHT: 66 IN | TEMPERATURE: 98 F | WEIGHT: 216.38 LBS

## 2024-02-01 DIAGNOSIS — Z94.84 HISTORY OF ALLOGENEIC STEM CELL TRANSPLANT: ICD-10-CM

## 2024-02-01 DIAGNOSIS — C92.10 CML (CHRONIC MYELOCYTIC LEUKEMIA): Primary | ICD-10-CM

## 2024-02-01 DIAGNOSIS — R59.0 ENLARGED LYMPH NODE IN NECK: ICD-10-CM

## 2024-02-01 DIAGNOSIS — R59.0 LOCALIZED ENLARGED LYMPH NODES: ICD-10-CM

## 2024-02-01 PROCEDURE — 99214 OFFICE O/P EST MOD 30 MIN: CPT | Mod: S$GLB,,, | Performed by: NURSE PRACTITIONER

## 2024-02-01 PROCEDURE — 1125F AMNT PAIN NOTED PAIN PRSNT: CPT | Mod: CPTII,S$GLB,, | Performed by: NURSE PRACTITIONER

## 2024-02-01 PROCEDURE — 3075F SYST BP GE 130 - 139MM HG: CPT | Mod: CPTII,S$GLB,, | Performed by: NURSE PRACTITIONER

## 2024-02-01 PROCEDURE — 3288F FALL RISK ASSESSMENT DOCD: CPT | Mod: CPTII,S$GLB,, | Performed by: NURSE PRACTITIONER

## 2024-02-01 PROCEDURE — 99999 PR PBB SHADOW E&M-EST. PATIENT-LVL V: CPT | Mod: PBBFAC,,, | Performed by: NURSE PRACTITIONER

## 2024-02-01 PROCEDURE — 1159F MED LIST DOCD IN RCRD: CPT | Mod: CPTII,S$GLB,, | Performed by: NURSE PRACTITIONER

## 2024-02-01 PROCEDURE — 1160F RVW MEDS BY RX/DR IN RCRD: CPT | Mod: CPTII,S$GLB,, | Performed by: NURSE PRACTITIONER

## 2024-02-01 PROCEDURE — 3008F BODY MASS INDEX DOCD: CPT | Mod: CPTII,S$GLB,, | Performed by: NURSE PRACTITIONER

## 2024-02-01 PROCEDURE — 3079F DIAST BP 80-89 MM HG: CPT | Mod: CPTII,S$GLB,, | Performed by: NURSE PRACTITIONER

## 2024-02-01 PROCEDURE — 1101F PT FALLS ASSESS-DOCD LE1/YR: CPT | Mod: CPTII,S$GLB,, | Performed by: NURSE PRACTITIONER

## 2024-02-01 RX ORDER — PREDNISONE 50 MG/1
TABLET ORAL
Qty: 3 TABLET | Refills: 0 | Status: SHIPPED | OUTPATIENT
Start: 2024-02-01 | End: 2024-05-20

## 2024-02-01 RX ORDER — DIPHENHYDRAMINE HCL 50 MG
CAPSULE ORAL
Qty: 1 CAPSULE | Refills: 0 | Status: SHIPPED | OUTPATIENT
Start: 2024-02-01 | End: 2024-05-20

## 2024-02-12 ENCOUNTER — PATIENT MESSAGE (OUTPATIENT)
Dept: HEMATOLOGY/ONCOLOGY | Facility: CLINIC | Age: 64
End: 2024-02-12
Payer: COMMERCIAL

## 2024-02-14 ENCOUNTER — PATIENT MESSAGE (OUTPATIENT)
Dept: HEMATOLOGY/ONCOLOGY | Facility: CLINIC | Age: 64
End: 2024-02-14
Payer: COMMERCIAL

## 2024-02-15 DIAGNOSIS — C92.10 CML (CHRONIC MYELOCYTIC LEUKEMIA): Primary | ICD-10-CM

## 2024-03-25 ENCOUNTER — LAB VISIT (OUTPATIENT)
Dept: LAB | Facility: HOSPITAL | Age: 64
End: 2024-03-25
Attending: INTERNAL MEDICINE
Payer: COMMERCIAL

## 2024-03-25 DIAGNOSIS — C92.10 CML (CHRONIC MYELOCYTIC LEUKEMIA): ICD-10-CM

## 2024-03-25 DIAGNOSIS — E78.5 HYPERLIPIDEMIA, UNSPECIFIED HYPERLIPIDEMIA TYPE: Primary | ICD-10-CM

## 2024-03-25 LAB
ALBUMIN SERPL-MCNC: 4.6 G/DL (ref 3.4–4.8)
ALBUMIN/GLOB SERPL: 1.8 RATIO (ref 1.1–2)
ALP SERPL-CCNC: 68 UNIT/L (ref 40–150)
ALT SERPL-CCNC: 33 UNIT/L (ref 0–55)
AST SERPL-CCNC: 22 UNIT/L (ref 5–34)
BASOPHILS # BLD AUTO: 0.07 X10(3)/MCL
BASOPHILS NFR BLD AUTO: 1.1 %
BILIRUB SERPL-MCNC: 0.7 MG/DL
BUN SERPL-MCNC: 21.3 MG/DL (ref 8.4–25.7)
CALCIUM SERPL-MCNC: 10.1 MG/DL (ref 8.8–10)
CHLORIDE SERPL-SCNC: 109 MMOL/L (ref 98–107)
CHOLEST SERPL-MCNC: 128 MG/DL
CHOLEST/HDLC SERPL: 3 {RATIO} (ref 0–5)
CO2 SERPL-SCNC: 25 MMOL/L (ref 23–31)
CREAT SERPL-MCNC: 1.15 MG/DL (ref 0.73–1.18)
EOSINOPHIL # BLD AUTO: 0.15 X10(3)/MCL (ref 0–0.9)
EOSINOPHIL NFR BLD AUTO: 2.3 %
ERYTHROCYTE [DISTWIDTH] IN BLOOD BY AUTOMATED COUNT: 13.5 % (ref 11.5–17)
GFR SERPLBLD CREATININE-BSD FMLA CKD-EPI: >60 MLS/MIN/1.73/M2
GLOBULIN SER-MCNC: 2.6 GM/DL (ref 2.4–3.5)
GLUCOSE SERPL-MCNC: 112 MG/DL (ref 82–115)
HCT VFR BLD AUTO: 48.6 % (ref 42–52)
HDLC SERPL-MCNC: 49 MG/DL (ref 35–60)
HGB BLD-MCNC: 16.4 G/DL (ref 14–18)
IMM GRANULOCYTES # BLD AUTO: 0.02 X10(3)/MCL (ref 0–0.04)
IMM GRANULOCYTES NFR BLD AUTO: 0.3 %
LDLC SERPL CALC-MCNC: 60 MG/DL (ref 50–140)
LYMPHOCYTES # BLD AUTO: 1.98 X10(3)/MCL (ref 0.6–4.6)
LYMPHOCYTES NFR BLD AUTO: 30.1 %
MCH RBC QN AUTO: 30.9 PG (ref 27–31)
MCHC RBC AUTO-ENTMCNC: 33.7 G/DL (ref 33–36)
MCV RBC AUTO: 91.7 FL (ref 80–94)
MONOCYTES # BLD AUTO: 0.44 X10(3)/MCL (ref 0.1–1.3)
MONOCYTES NFR BLD AUTO: 6.7 %
NEUTROPHILS # BLD AUTO: 3.92 X10(3)/MCL (ref 2.1–9.2)
NEUTROPHILS NFR BLD AUTO: 59.5 %
NRBC BLD AUTO-RTO: 0 %
PLATELET # BLD AUTO: 275 X10(3)/MCL (ref 130–400)
PMV BLD AUTO: 8.6 FL (ref 7.4–10.4)
POTASSIUM SERPL-SCNC: 5 MMOL/L (ref 3.5–5.1)
PROT SERPL-MCNC: 7.2 GM/DL (ref 5.8–7.6)
RBC # BLD AUTO: 5.3 X10(6)/MCL (ref 4.7–6.1)
SODIUM SERPL-SCNC: 143 MMOL/L (ref 136–145)
TRIGL SERPL-MCNC: 96 MG/DL (ref 34–140)
VLDLC SERPL CALC-MCNC: 19 MG/DL
WBC # SPEC AUTO: 6.58 X10(3)/MCL (ref 4.5–11.5)

## 2024-03-25 PROCEDURE — 80061 LIPID PANEL: CPT

## 2024-03-25 PROCEDURE — 36415 COLL VENOUS BLD VENIPUNCTURE: CPT

## 2024-03-25 PROCEDURE — 85025 COMPLETE CBC W/AUTO DIFF WBC: CPT

## 2024-03-25 PROCEDURE — 80053 COMPREHEN METABOLIC PANEL: CPT

## 2024-03-25 PROCEDURE — 81206 BCR/ABL1 GENE MAJOR BP: CPT

## 2024-03-26 ENCOUNTER — OFFICE VISIT (OUTPATIENT)
Dept: HEMATOLOGY/ONCOLOGY | Facility: CLINIC | Age: 64
End: 2024-03-26
Payer: COMMERCIAL

## 2024-03-26 VITALS
BODY MASS INDEX: 35.57 KG/M2 | SYSTOLIC BLOOD PRESSURE: 124 MMHG | HEART RATE: 81 BPM | WEIGHT: 221.31 LBS | RESPIRATION RATE: 18 BRPM | HEIGHT: 66 IN | OXYGEN SATURATION: 96 % | DIASTOLIC BLOOD PRESSURE: 85 MMHG | TEMPERATURE: 98 F

## 2024-03-26 DIAGNOSIS — C92.01 ACUTE MYELOID LEUKEMIA IN REMISSION: Primary | ICD-10-CM

## 2024-03-26 DIAGNOSIS — Z94.84 HISTORY OF ALLOGENEIC STEM CELL TRANSPLANT: ICD-10-CM

## 2024-03-26 PROCEDURE — 99213 OFFICE O/P EST LOW 20 MIN: CPT | Mod: S$GLB,,,

## 2024-03-26 PROCEDURE — 3074F SYST BP LT 130 MM HG: CPT | Mod: CPTII,S$GLB,,

## 2024-03-26 PROCEDURE — 3008F BODY MASS INDEX DOCD: CPT | Mod: CPTII,S$GLB,,

## 2024-03-26 PROCEDURE — 1159F MED LIST DOCD IN RCRD: CPT | Mod: CPTII,S$GLB,,

## 2024-03-26 PROCEDURE — 1101F PT FALLS ASSESS-DOCD LE1/YR: CPT | Mod: CPTII,S$GLB,,

## 2024-03-26 PROCEDURE — 3288F FALL RISK ASSESSMENT DOCD: CPT | Mod: CPTII,S$GLB,,

## 2024-03-26 PROCEDURE — 3079F DIAST BP 80-89 MM HG: CPT | Mod: CPTII,S$GLB,,

## 2024-03-26 PROCEDURE — 1126F AMNT PAIN NOTED NONE PRSNT: CPT | Mod: CPTII,S$GLB,,

## 2024-03-26 PROCEDURE — 99999 PR PBB SHADOW E&M-EST. PATIENT-LVL V: CPT | Mod: PBBFAC,,,

## 2024-03-26 NOTE — PROGRESS NOTES
Subjective:       Patient ID: Kvng Jones Sr. is a 63 y.o. male.    Primary Care: Dr. Ben Doga [facility] transplant Oncologist: Dr. 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  Left axillary lymph node biopsy done 3/24/20--preliminary results c/w granulocytic sarcoma.  Bone marrow biopsy day 100 20 with MRD, positive for BCR-ABL p210 7.8%.  Bone marrow biopsy day 180 2/3/21 s/p transplant showed stringent CR (undetectable BCR-ABL), chimerism 100% donor CD33 and 90% donor CD3.  Bone marrow biopsy at 1 year 21--stringent CR (negative BCR-ABL in blood and marrow), 100% donor CD33 and CD3 chimerism.    Work-up:  Peripheral blood RT PCR BCR-ABL done 3/17/20 positive for major p210, 49.5974%.    Imagin. CT soft tissue neck/C/A/P done 3/23/20--Bilateral cervical lymphadenopathy concerning for metastatic disease or lymphoproliferative disorder, pathologic adenopathy of the bilateral axillary and inguinal  regions, several nonenlarged mediastinal lymph nodes are identified, nonenlarged retroperitoneal lymph nodes.  2. CT chest w/ contrast done at [facility] 12/10/21--no large central PE, findings suggest tiny bilateral pulmonary emboli in lower lobe territory branches R>L, patchy areas of atelectasis and effusion pneumonitis, scattered peripheral hazy nodularities one of which was seen on prior RUL 1cm, others are new, follow-up recommended, enlarged liver with hepatic steatosis, low-density lesions in left lobe 1-2cm range, correlate with US, bilateral adrenal gland hyperplasia nodular changes on left, small 2-4mm calculi right upper kidney, dilated small bowel loop LUQ, r/o obstruction, follow-up recommended.   3. CT chest w/ contrast done at [facility] 22--Near complete resolution of previously seen ground glass nodules from  12/10/2021. Favor infectious/inflammatory etiology.  4. CT chest at [facility] on 8/24/22--Suspected mild emphysema. Stable chronic bilateral focal scar and lingular subsegmental atelectasis. Stable moderate dilation of the ascending thoracic aorta. Stable mild diffuse hepatic steatosis.     Treatment history:  FLAG-Addis induction chemotherapy done at [facility] in [place], [place] 3/30/20--5/1/20.  Ponatinib 30mg daily started after induction, held for transplant.  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  Engrafted on 8/10/21 and discharged on 8/12/20.  Restarted Ponatinib on 11/11/20 after day 100 marrow showed MRD.  Ponatinib stopped at day 267 post-transplant due to GI side effects.    GVHD treatment history:  Diffuse erythema to legs, lower back, arms and abdomen 9/15/20, started on Prednisone, worsened despite steroids and tacrolimus cream. Biopsy c/w folliculitis and steroids stopped.  Budesonide 3mg BID started 12/10/20 for GERD; EGD negative for GVHD and c/w antral gastritis, stopped oral steroids.  Tacrolimus taper started every 2 weeks 1/6/21, slow due to ongoing rash, stopped Tacrolimus on 3/31/21.  Acyclovir prophylaxis until 8/2022 (1 year post-Tacrolimus).     Current treatment plan:   1. Observation    Chief Complaint: 3 Week Follow Up    HPI   Patient presents for follow-up AML/CML. He had strep and then Covid, back to back illnesses in December and early January. He was seen in January and reported swelling and tenderness in the glands in his neck, and called for an appointment. CBC was completely normal. His last BCR-ABL is undetectable, today it is 0.004%. He was feeling better but then a few days later his lymphadenopathy returned along with throat pain. His PCP gave him a steroid and antibiotic injection which had helped.  Appetite is good and weight increased. Bowels moving well. Dr. Carrillo had mentioned obtaining a CT scan if his symptoms  persisted. Last month CT was ordered, but patient was feeling better and cancelled imaging. Today, he is feeling well and denies any fevers, lymphadenopathy, and sore throat.    Past Medical History:   Diagnosis Date    CML (chronic myelocytic leukemia)     Encounter for blood transfusion     Melanoma in situ of external ear, right     PONV (postoperative nausea and vomiting)     Pulmonary embolism 6/7/2022      Review of patient's allergies indicates:   Allergen Reactions    Posaconazole Hallucinations and Other (See Comments)    Unclassified drug Other (See Comments) and Rash     Pt reports that he has an allergic reaction to an Antifungal medication, but is unsure of the name of the drug. Will obtain name prior to arrival in am and notify Pre-op RN.  Pt reports that he has an allergic reaction to an Antifungal medication, but is unsure of the name of the drug. Will obtain name prior to arrival in am and notify Pre-op RN.    Vancomycin analogues Other (See Comments)     Pt reports he had rigors    Codeine Hives    Iodine Hives, Rash and Other (See Comments)    Codeine sulfate     Iodinated contrast media Hives        Current Outpatient Medications:     amLODIPine (NORVASC) 10 MG tablet, Take 10 mg by mouth once daily., Disp: , Rfl:     atorvastatin (LIPITOR) 40 MG tablet, Take 40 mg by mouth once daily., Disp: , Rfl:     azelastine (ASTELIN) 137 mcg (0.1 %) nasal spray, 1 spray (137 mcg total) by Nasal route 2 (two) times daily., Disp: 30 mL, Rfl: 3    budesonide-formoterol 160-4.5 mcg (SYMBICORT) 160-4.5 mcg/actuation HFAA, Inhale 2 puffs into the lungs every 12 (twelve) hours. Controller, Disp: 10.2 g, Rfl: 3    carvediloL (COREG) 3.125 MG tablet, Take 1 tablet by mouth 2 (two) times a day., Disp: , Rfl:     cetirizine (ZYRTEC) 10 MG tablet, 1 tablet Orally Once a day for 30 day(s), Disp: , Rfl:     diphenhydrAMINE (BENADRYL) 50 MG capsule, Take 50mg by mouth 1 hour before contrast administration., Disp: 1  capsule, Rfl: 0    esomeprazole (NEXIUM) 40 MG capsule, Take 40 mg by mouth before breakfast., Disp: , Rfl:     fluocinolone and shower cap 0.01 % Oil, SMARTSI Milliliter(s) In Ear(s) Daily, Disp: , Rfl:     fluticasone propionate (FLONASE ALLERGY RELIEF NASL), , Disp: , Rfl:     hydrocortisone 2.5 % cream, 1 g 2 (two) times daily as needed., Disp: , Rfl:     montelukast (SINGULAIR) 10 mg tablet, Take 1 tablet (10 mg total) by mouth every evening., Disp: 90 tablet, Rfl: 3    multivit-min/FA/lycopen/lutein (CENTRUM SILVER MEN ORAL), Take by mouth., Disp: , Rfl:     predniSONE (DELTASONE) 50 MG Tab, Take 50mg by mouth at 13 hours, 7 hours, and 1 hour before contrast administration., Disp: 3 tablet, Rfl: 0  Review of Systems   Constitutional:  Negative for appetite change, fever and unexpected weight change.   HENT:  Negative for mouth sores, postnasal drip and sore throat.    Eyes:  Negative for visual disturbance.   Respiratory:  Negative for cough and shortness of breath.    Cardiovascular:  Negative for chest pain.   Gastrointestinal:  Negative for abdominal pain and diarrhea.   Genitourinary:  Negative for frequency.   Musculoskeletal:  Negative for back pain.   Integumentary:  Negative for rash.   Neurological:  Negative for headaches.        Peripheral neuropathy   Hematological:  Negative for adenopathy.   Psychiatric/Behavioral:  The patient is not nervous/anxious.          Vitals:    24 1423   BP: 124/85   Pulse: 81   Resp: 18   Temp: 98.4 °F (36.9 °C)       Physical Exam  Constitutional:       General: He is awake.      Appearance: Normal appearance. He is overweight.   HENT:      Head: Normocephalic.   Eyes:      General: Lids are normal. Vision grossly intact.      Extraocular Movements: Extraocular movements intact.      Conjunctiva/sclera: Conjunctivae normal.   Cardiovascular:      Rate and Rhythm: Normal rate and regular rhythm.      Pulses: Normal pulses.      Heart sounds: Normal heart  sounds.   Pulmonary:      Effort: Pulmonary effort is normal.      Breath sounds: Normal air entry.   Abdominal:      General: Abdomen is protuberant. Bowel sounds are normal.      Palpations: Abdomen is soft.   Musculoskeletal:      Cervical back: Neck supple.   Lymphadenopathy:      Cervical: No cervical adenopathy.   Skin:     General: Skin is warm and dry.   Neurological:      General: No focal deficit present.      Mental Status: He is alert and oriented to person, place, and time.   Psychiatric:         Attention and Perception: Attention normal.         Mood and Affect: Mood normal.         Speech: Speech normal.         Behavior: Behavior is cooperative.         Thought Content: Thought content normal.       ECOG SCORE           Lab Visit on 03/25/2024   Component Date Value    Sodium Level 03/25/2024 143     Potassium Level 03/25/2024 5.0     Chloride 03/25/2024 109 (H)     Carbon Dioxide 03/25/2024 25     Glucose Level 03/25/2024 112     Blood Urea Nitrogen 03/25/2024 21.3     Creatinine 03/25/2024 1.15     Calcium Level Total 03/25/2024 10.1 (H)     Protein Total 03/25/2024 7.2     Albumin Level 03/25/2024 4.6     Globulin 03/25/2024 2.6     Albumin/Globulin Ratio 03/25/2024 1.8     Bilirubin Total 03/25/2024 0.7     Alkaline Phosphatase 03/25/2024 68     Alanine Aminotransferase 03/25/2024 33     Aspartate Aminotransfera* 03/25/2024 22     eGFR 03/25/2024 >60     WBC 03/25/2024 6.58     RBC 03/25/2024 5.30     Hgb 03/25/2024 16.4     Hct 03/25/2024 48.6     MCV 03/25/2024 91.7     MCH 03/25/2024 30.9     MCHC 03/25/2024 33.7     RDW 03/25/2024 13.5     Platelet 03/25/2024 275     MPV 03/25/2024 8.6     Neut % 03/25/2024 59.5     Lymph % 03/25/2024 30.1     Mono % 03/25/2024 6.7     Eos % 03/25/2024 2.3     Basophil % 03/25/2024 1.1     Lymph # 03/25/2024 1.98     Neut # 03/25/2024 3.92     Mono # 03/25/2024 0.44     Eos # 03/25/2024 0.15     Baso # 03/25/2024 0.07     IG# 03/25/2024 0.02     IG%  03/25/2024 0.3     NRBC% 03/25/2024 0.0     Cholesterol Total 03/25/2024 128     HDL Cholesterol 03/25/2024 49     Triglyceride 03/25/2024 96     Cholesterol/HDL Ratio 03/25/2024 3     Very Low Density Lipopro* 03/25/2024 19     LDL Cholesterol 03/25/2024 60.00           Assessment:       1. Acute myeloid leukemia in remission    2. History of allogeneic stem cell transplant            Plan:       Patient presented with marked neutrophilia and painful neck and axillary adenopathy.  BCR-ABL+ c/w CML and also lymph node biopsy c/w granulocytic sarcoma, diagnosis CML with blast crisis in 3/2020.  Completed FLAG-Addis induction chemotherapy done at [facility] in [place], [place] 3/30/20--5/1/20 with CR.  Ponatinib 30mg daily started after induction, held for transplant.  s/p Flu/Cy/TBI 7/21/20 and haploidentical allogeneic SCT(son was donor) done in [place], [place] at [facility].  Engrafted on 8/10/21 and discharged on 8/12/20.  Restarted Ponatinib on 11/11/20 after day 100 marrow showed MRD.  Ponatinib stopped at day 267 post-transplant due to GI side effects.  Had some mild GVHD problems but weaned off Tacrolimus stopped 3/2021.    Patient had hospitalization in 12/2021 with respiratory failure, pneumonitis from parainfluenza, and suspected tiny pulmonary embolism.   Suspect respiratory failure was all related to infection likely and his drop in EF, acute CHF. This resolved on its own and he was found to have non-critical CAD.  CT chest repeated 2/23/22 shows resolved infection, no PE.  CT chest PE protocol repeated on 4/14/23--No evidence of pulmonary thromboembolic disease. Dilated mid ascending aorta. Bronchial wall thickening in the left lower lobe, with large band of atelectasis at the diaphragmatic surface of the left lower lobe. Heavy coronary calcium.  He was referred to Pulmonology who repeated in August. CT chest at [facility] on 8/24/23--Suspected mild emphysema. Stable chronic bilateral focal scar and lingular  subsegmental atelectasis. Stable moderate dilation of the ascending thoracic aorta. Stable mild diffuse hepatic steatosis. Plan for repeat scan in 1 year for ongoing lung cancer surveillance.     Returned to Dr. Hodges in August he had a positive BCR-ABL p210 (<0.003%) and she wanted to repeat in September and it was stable - <0.003%.   Dr. Hodges recommended continued observation unless the  BCR-ABL >0.1%.  Patient has been undergoing labs every 3 months.  Last BCR-ABL 1/17/24 was undetectable.    Patient developed swelling in bilateral submandibular glands in December 2023, suspect likely reactive from back to back recent febrile illnesses strep and Covid.  Recent labs were completely normal. 3/25/24  BCR-ABL 0.004%.   Dr. Hodges recommended continued observation unless the  BCR-ABL >0.1%.    RTC in 3 months with labs CBC CMP BCR-ABL     Patient had a follow-up with Dr. Groves in 7/2022 and he took him off the Xarelto.   Completed the MMR series x 2 doses on 4/26/23 and 5/26/23. Completed Shingles vaccine also in May 2023.   Given Pneumovax here on 9/26/22.   Completed Acyclovir prophylaxis 8/2022.  All questions answered at this time.      Doris Cool, MARCELA-C  Oncology/Hematology  [facility]

## 2024-06-17 ENCOUNTER — LAB VISIT (OUTPATIENT)
Dept: LAB | Facility: HOSPITAL | Age: 64
End: 2024-06-17
Payer: COMMERCIAL

## 2024-06-17 DIAGNOSIS — Z94.84 HISTORY OF ALLOGENEIC STEM CELL TRANSPLANT: ICD-10-CM

## 2024-06-17 DIAGNOSIS — C92.01 ACUTE MYELOID LEUKEMIA IN REMISSION: ICD-10-CM

## 2024-06-17 LAB
ALBUMIN SERPL-MCNC: 4.5 G/DL (ref 3.4–4.8)
ALBUMIN/GLOB SERPL: 1.9 RATIO (ref 1.1–2)
ALP SERPL-CCNC: 68 UNIT/L (ref 40–150)
ALT SERPL-CCNC: 43 UNIT/L (ref 0–55)
ANION GAP SERPL CALC-SCNC: 9 MEQ/L
AST SERPL-CCNC: 25 UNIT/L (ref 5–34)
BASOPHILS # BLD AUTO: 0.06 X10(3)/MCL
BASOPHILS NFR BLD AUTO: 0.8 %
BILIRUB SERPL-MCNC: 0.7 MG/DL
BUN SERPL-MCNC: 10.6 MG/DL (ref 8.4–25.7)
CALCIUM SERPL-MCNC: 9.9 MG/DL (ref 8.8–10)
CHLORIDE SERPL-SCNC: 105 MMOL/L (ref 98–107)
CO2 SERPL-SCNC: 28 MMOL/L (ref 23–31)
CREAT SERPL-MCNC: 1.13 MG/DL (ref 0.73–1.18)
CREAT/UREA NIT SERPL: 9
EOSINOPHIL # BLD AUTO: 0.12 X10(3)/MCL (ref 0–0.9)
EOSINOPHIL NFR BLD AUTO: 1.7 %
ERYTHROCYTE [DISTWIDTH] IN BLOOD BY AUTOMATED COUNT: 12.4 % (ref 11.5–17)
GFR SERPLBLD CREATININE-BSD FMLA CKD-EPI: >60 ML/MIN/1.73/M2
GLOBULIN SER-MCNC: 2.4 GM/DL (ref 2.4–3.5)
GLUCOSE SERPL-MCNC: 93 MG/DL (ref 82–115)
HCT VFR BLD AUTO: 47.3 % (ref 42–52)
HGB BLD-MCNC: 16.2 G/DL (ref 14–18)
IMM GRANULOCYTES # BLD AUTO: 0.02 X10(3)/MCL (ref 0–0.04)
IMM GRANULOCYTES NFR BLD AUTO: 0.3 %
LYMPHOCYTES # BLD AUTO: 2.59 X10(3)/MCL (ref 0.6–4.6)
LYMPHOCYTES NFR BLD AUTO: 35.8 %
MCH RBC QN AUTO: 31.1 PG (ref 27–31)
MCHC RBC AUTO-ENTMCNC: 34.2 G/DL (ref 33–36)
MCV RBC AUTO: 90.8 FL (ref 80–94)
MONOCYTES # BLD AUTO: 0.51 X10(3)/MCL (ref 0.1–1.3)
MONOCYTES NFR BLD AUTO: 7.1 %
NEUTROPHILS # BLD AUTO: 3.93 X10(3)/MCL (ref 2.1–9.2)
NEUTROPHILS NFR BLD AUTO: 54.3 %
PLATELET # BLD AUTO: 239 X10(3)/MCL (ref 130–400)
PMV BLD AUTO: 7.8 FL (ref 7.4–10.4)
POTASSIUM SERPL-SCNC: 5.1 MMOL/L (ref 3.5–5.1)
PROT SERPL-MCNC: 6.9 GM/DL (ref 5.8–7.6)
RBC # BLD AUTO: 5.21 X10(6)/MCL (ref 4.7–6.1)
SODIUM SERPL-SCNC: 142 MMOL/L (ref 136–145)
WBC # BLD AUTO: 7.23 X10(3)/MCL (ref 4.5–11.5)

## 2024-06-17 PROCEDURE — 36415 COLL VENOUS BLD VENIPUNCTURE: CPT

## 2024-06-17 PROCEDURE — 81206 BCR/ABL1 GENE MAJOR BP: CPT

## 2024-06-17 PROCEDURE — 80053 COMPREHEN METABOLIC PANEL: CPT

## 2024-06-17 PROCEDURE — 85025 COMPLETE CBC W/AUTO DIFF WBC: CPT

## 2024-07-02 ENCOUNTER — OFFICE VISIT (OUTPATIENT)
Dept: HEMATOLOGY/ONCOLOGY | Facility: CLINIC | Age: 64
End: 2024-07-02
Payer: COMMERCIAL

## 2024-07-02 VITALS
OXYGEN SATURATION: 94 % | RESPIRATION RATE: 18 BRPM | WEIGHT: 227.69 LBS | BODY MASS INDEX: 36.59 KG/M2 | SYSTOLIC BLOOD PRESSURE: 146 MMHG | TEMPERATURE: 98 F | DIASTOLIC BLOOD PRESSURE: 87 MMHG | HEIGHT: 66 IN | HEART RATE: 86 BPM

## 2024-07-02 DIAGNOSIS — C92.01 ACUTE MYELOID LEUKEMIA IN REMISSION: Primary | ICD-10-CM

## 2024-07-02 PROCEDURE — 99999 PR PBB SHADOW E&M-EST. PATIENT-LVL V: CPT | Mod: PBBFAC,,,

## 2024-07-02 NOTE — PROGRESS NOTES
Subjective:       Patient ID: Kvng Jones Sr. is a 63 y.o. male.    Primary Care: Dr. Ben Doga [facility] transplant Oncologist: Dr. 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  Left axillary lymph node biopsy done 3/24/20--preliminary results c/w granulocytic sarcoma.  Bone marrow biopsy day 100 20 with MRD, positive for BCR-ABL p210 7.8%.  Bone marrow biopsy day 180 2/3/21 s/p transplant showed stringent CR (undetectable BCR-ABL), chimerism 100% donor CD33 and 90% donor CD3.  Bone marrow biopsy at 1 year 21--stringent CR (negative BCR-ABL in blood and marrow), 100% donor CD33 and CD3 chimerism.    Work-up:  Peripheral blood RT PCR BCR-ABL done 3/17/20 positive for major p210, 49.5974%.    Imagin. CT soft tissue neck/C/A/P done 3/23/20--Bilateral cervical lymphadenopathy concerning for metastatic disease or lymphoproliferative disorder, pathologic adenopathy of the bilateral axillary and inguinal  regions, several nonenlarged mediastinal lymph nodes are identified, nonenlarged retroperitoneal lymph nodes.  2. CT chest w/ contrast done at [facility] 12/10/21--no large central PE, findings suggest tiny bilateral pulmonary emboli in lower lobe territory branches R>L, patchy areas of atelectasis and effusion pneumonitis, scattered peripheral hazy nodularities one of which was seen on prior RUL 1cm, others are new, follow-up recommended, enlarged liver with hepatic steatosis, low-density lesions in left lobe 1-2cm range, correlate with US, bilateral adrenal gland hyperplasia nodular changes on left, small 2-4mm calculi right upper kidney, dilated small bowel loop LUQ, r/o obstruction, follow-up recommended.   3. CT chest w/ contrast done at [facility] 22--Near complete resolution of previously seen ground glass nodules from  12/10/2021. Favor infectious/inflammatory etiology.  4. CT chest at [facility] on 8/24/22--Suspected mild emphysema. Stable chronic bilateral focal scar and lingular subsegmental atelectasis. Stable moderate dilation of the ascending thoracic aorta. Stable mild diffuse hepatic steatosis.     Treatment history:  FLAG-Addis induction chemotherapy done at [facility] in [place], [place] 3/30/20--5/1/20.  Ponatinib 30mg daily started after induction, held for transplant.  Flu/Cy/TBI 7/21/20 and haploidentical allogeneic SCT(son was donor) done in [place], [place] at [facility].  Engrafted on 8/10/21 and discharged on 8/12/20.  Restarted Ponatinib on 11/11/20 after day 100 marrow showed MRD.  Ponatinib stopped at day 267 post-transplant due to GI side effects.    GVHD treatment history:  Diffuse erythema to legs, lower back, arms and abdomen 9/15/20, started on Prednisone, worsened despite steroids and tacrolimus cream. Biopsy c/w folliculitis and steroids stopped.  Budesonide 3mg BID started 12/10/20 for GERD; EGD negative for GVHD and c/w antral gastritis, stopped oral steroids.  Tacrolimus taper started every 2 weeks 1/6/21, slow due to ongoing rash, stopped Tacrolimus on 3/31/21.  Acyclovir prophylaxis until 8/2022 (1 year post-Tacrolimus).     Current treatment plan:   1. Observation    Chief Complaint: 3 Month Follow Up (PT is concern about a rash on his RT hand.)    HPI   Patient presents for follow-up AML/CML.Today, he is feeling well and denies any fevers, lymphadenopathy, and sore throat. His most recent BCR-ABL is undetectable. Appetite and weight are stable. Bowels moving well. He does report a rash to his right hand, followed by dermatologist Dr. Gaming. He continues with neuropathy to bilateral feet and right leg.      Past Medical History:   Diagnosis Date    CML (chronic myelocytic leukemia)     Encounter for blood transfusion     Melanoma in situ of external ear, right     PONV (postoperative nausea and vomiting)      Pulmonary embolism 2022      Review of patient's allergies indicates:   Allergen Reactions    Posaconazole Hallucinations and Other (See Comments)    Unclassified drug Other (See Comments) and Rash     Pt reports that he has an allergic reaction to an Antifungal medication, but is unsure of the name of the drug. Will obtain name prior to arrival in am and notify Pre-op RN.  Pt reports that he has an allergic reaction to an Antifungal medication, but is unsure of the name of the drug. Will obtain name prior to arrival in am and notify Pre-op RN.    Vancomycin analogues Other (See Comments)     Pt reports he had rigors    Codeine Hives    Iodine Hives, Rash and Other (See Comments)    Codeine sulfate     Iodinated contrast media Hives        Current Outpatient Medications:     amLODIPine (NORVASC) 10 MG tablet, Take 10 mg by mouth once daily., Disp: , Rfl:     atorvastatin (LIPITOR) 40 MG tablet, Take 40 mg by mouth once daily., Disp: , Rfl:     budesonide-formoterol 160-4.5 mcg (SYMBICORT) 160-4.5 mcg/actuation HFAA, Inhale 2 puffs into the lungs every 12 (twelve) hours. Controller, Disp: 10.2 g, Rfl: 3    carvediloL (COREG) 3.125 MG tablet, Take 1 tablet by mouth 2 (two) times a day., Disp: , Rfl:     cefdinir (OMNICEF) 300 MG capsule, Take 300 mg by mouth 2 (two) times daily., Disp: , Rfl:     cetirizine (ZYRTEC) 10 MG tablet, 1 tablet Orally Once a day for 30 day(s), Disp: , Rfl:     esomeprazole (NEXIUM) 40 MG capsule, Take 40 mg by mouth before breakfast., Disp: , Rfl:     fluocinolone and shower cap 0.01 % Oil, SMARTSI Milliliter(s) In Ear(s) Daily, Disp: , Rfl:     fluticasone propionate (FLONASE ALLERGY RELIEF NASL), , Disp: , Rfl:     hydrocodone-chlorpheniramine (TUSSIONEX) 10-8 mg/5 mL suspension, SHAKE LIQUID AND TAKE 5 ML BY MOUTH EVERY 12 HOURS AS NEEDED FOR COUGH, Disp: , Rfl:     hydrocortisone 2.5 % cream, 1 g 2 (two) times daily as needed., Disp: , Rfl:     multivit-min/FA/lycopen/lutein  (CENTRUM SILVER MEN ORAL), Take by mouth., Disp: , Rfl:     olopatadine-mometasone (RYALTRIS) 665-25 mcg/spray Spry, 1 spray by Nasal route 2 (two) times a day., Disp: 29 g, Rfl: 2  Review of Systems   Constitutional:  Negative for appetite change, fever and unexpected weight change.   HENT:  Negative for mouth sores, postnasal drip and sore throat.    Eyes:  Negative for visual disturbance.   Respiratory:  Negative for cough and shortness of breath.    Cardiovascular:  Negative for chest pain.   Gastrointestinal:  Negative for abdominal pain and diarrhea.   Genitourinary:  Negative for frequency.   Musculoskeletal:  Negative for back pain.   Integumentary:  Positive for rash.   Neurological:  Negative for headaches.        Peripheral neuropathy   Hematological:  Negative for adenopathy.   Psychiatric/Behavioral:  The patient is not nervous/anxious.          Vitals:    07/02/24 1258   BP: (!) 146/87   Pulse: 86   Resp: 18   Temp: 98.1 °F (36.7 °C)         Physical Exam  Constitutional:       General: He is awake.      Appearance: Normal appearance. He is overweight.   HENT:      Head: Normocephalic.   Eyes:      General: Lids are normal. Vision grossly intact.      Extraocular Movements: Extraocular movements intact.      Conjunctiva/sclera: Conjunctivae normal.   Cardiovascular:      Rate and Rhythm: Normal rate and regular rhythm.      Pulses: Normal pulses.      Heart sounds: Normal heart sounds.   Pulmonary:      Effort: Pulmonary effort is normal.      Breath sounds: Normal air entry.   Abdominal:      General: Abdomen is protuberant. Bowel sounds are normal.      Palpations: Abdomen is soft.   Musculoskeletal:      Cervical back: Neck supple.   Lymphadenopathy:      Cervical: No cervical adenopathy.   Skin:     General: Skin is warm and dry.      Comments: Erythematous rash noted to right hand    Neurological:      General: No focal deficit present.      Mental Status: He is alert and oriented to person,  place, and time.   Psychiatric:         Attention and Perception: Attention normal.         Mood and Affect: Mood normal.         Speech: Speech normal.         Behavior: Behavior is cooperative.         Thought Content: Thought content normal.       ECOG SCORE           No visits with results within 1 Week(s) from this visit.   Latest known visit with results is:   Lab Visit on 06/17/2024   Component Date Value    Sodium 06/17/2024 142     Potassium 06/17/2024 5.1     Chloride 06/17/2024 105     CO2 06/17/2024 28     Glucose 06/17/2024 93     Blood Urea Nitrogen 06/17/2024 10.6     Creatinine 06/17/2024 1.13     Calcium 06/17/2024 9.9     Protein Total 06/17/2024 6.9     Albumin 06/17/2024 4.5     Globulin 06/17/2024 2.4     Albumin/Globulin Ratio 06/17/2024 1.9     Bilirubin Total 06/17/2024 0.7     ALP 06/17/2024 68     ALT 06/17/2024 43     AST 06/17/2024 25     eGFR 06/17/2024 >60     Anion Gap 06/17/2024 9.0     BUN/Creatinine Ratio 06/17/2024 9     BCR/ABL1, p210 Result 06/17/2024 see interpretation     Specimen Type 06/17/2024 Blood     Final Diagnosis 06/17/2024 SEE COMMENTS     WBC 06/17/2024 7.23     RBC 06/17/2024 5.21     Hgb 06/17/2024 16.2     Hct 06/17/2024 47.3     MCV 06/17/2024 90.8     MCH 06/17/2024 31.1 (H)     MCHC 06/17/2024 34.2     RDW 06/17/2024 12.4     Platelet 06/17/2024 239     MPV 06/17/2024 7.8     Neut % 06/17/2024 54.3     Lymph % 06/17/2024 35.8     Mono % 06/17/2024 7.1     Eos % 06/17/2024 1.7     Basophil % 06/17/2024 0.8     Lymph # 06/17/2024 2.59     Neut # 06/17/2024 3.93     Mono # 06/17/2024 0.51     Eos # 06/17/2024 0.12     Baso # 06/17/2024 0.06     IG# 06/17/2024 0.02     IG% 06/17/2024 0.3           Assessment:       1. Acute myeloid leukemia in remission              Plan:       Patient presented with marked neutrophilia and painful neck and axillary adenopathy.  BCR-ABL+ c/w CML and also lymph node biopsy c/w granulocytic sarcoma, diagnosis CML with blast crisis in  3/2020.  Completed FLAG-Addis induction chemotherapy done at [facility] in [place], [place] 3/30/20--5/1/20 with CR.  Ponatinib 30mg daily started after induction, held for transplant.  s/p Flu/Cy/TBI 7/21/20 and haploidentical allogeneic SCT(son was donor) done in [place], [place] at [facility].  Engrafted on 8/10/21 and discharged on 8/12/20.  Restarted Ponatinib on 11/11/20 after day 100 marrow showed MRD.  Ponatinib stopped at day 267 post-transplant due to GI side effects.  Had some mild GVHD problems but weaned off Tacrolimus stopped 3/2021.    Patient had hospitalization in 12/2021 with respiratory failure, pneumonitis from parainfluenza, and suspected tiny pulmonary embolism.   Suspect respiratory failure was all related to infection likely and his drop in EF, acute CHF. This resolved on its own and he was found to have non-critical CAD.  CT chest repeated 2/23/22 shows resolved infection, no PE.  CT chest PE protocol repeated on 4/14/23--No evidence of pulmonary thromboembolic disease. Dilated mid ascending aorta. Bronchial wall thickening in the left lower lobe, with large band of atelectasis at the diaphragmatic surface of the left lower lobe. Heavy coronary calcium.  He was referred to Pulmonology who repeated in August. CT [facility] on [facility]/24[facility]23--Suspected mild emphysema. Stable chronic bilateral focal scar and lingular subsegmental atelectasis. Stable moderate dilation of the ascending thoracic aorta. Stable mild diffuse hepatic steatosis. Plan for repeat scan in 1 year for ongoing lung cancer surveillance.     Returned to Dr. Hodges in August he had a positive BCR-ABL p210 (<0.003%) and she wanted to repeat in September and it was stable - <0.003%.   Dr. Hodges recommended continued observation unless the  BCR-ABL >0.1%.  Patient has been undergoing labs every 3 months.  Last BCR-ABL on 6/17/24 was undetectable.    Patient developed swelling in bilateral submandibular glands in December 2023, suspect likely  reactive from back to back recent febrile illnesses strep and Covid.  Recent labs were completely normal. 3/25/24  BCR-ABL 0.004%.   Dr. Hodges recommended continued observation unless the  BCR-ABL >0.1%.    RTC in 3 months with labs CBC CMP BCR-ABL     Patient had a follow-up with Dr. Groves in 7/2022 and he took him off the Xarelto.   Completed the MMR series x 2 doses on 4/26/23 and 5/26/23. Completed Shingles vaccine also in May 2023.   Given Pneumovax here on 9/26/22.   Completed Acyclovir prophylaxis 8/2022.  All questions answered at this time.      Doris Cool, MARCELA-C  Oncology/Hematology  [facility]

## 2024-09-23 ENCOUNTER — LAB VISIT (OUTPATIENT)
Dept: LAB | Facility: HOSPITAL | Age: 64
End: 2024-09-23
Payer: COMMERCIAL

## 2024-09-23 DIAGNOSIS — C92.01 ACUTE MYELOID LEUKEMIA IN REMISSION: ICD-10-CM

## 2024-09-23 LAB
ALBUMIN SERPL-MCNC: 4.5 G/DL (ref 3.4–4.8)
ALBUMIN/GLOB SERPL: 1.9 RATIO (ref 1.1–2)
ALP SERPL-CCNC: 69 UNIT/L (ref 40–150)
ALT SERPL-CCNC: 29 UNIT/L (ref 0–55)
ANION GAP SERPL CALC-SCNC: 10 MEQ/L
AST SERPL-CCNC: 24 UNIT/L (ref 5–34)
BASOPHILS # BLD AUTO: 0.04 X10(3)/MCL
BASOPHILS NFR BLD AUTO: 0.6 %
BILIRUB SERPL-MCNC: 0.7 MG/DL
BUN SERPL-MCNC: 12.7 MG/DL (ref 8.4–25.7)
CALCIUM SERPL-MCNC: 10.1 MG/DL (ref 8.8–10)
CHLORIDE SERPL-SCNC: 107 MMOL/L (ref 98–107)
CO2 SERPL-SCNC: 24 MMOL/L (ref 23–31)
CREAT SERPL-MCNC: 1.04 MG/DL (ref 0.73–1.18)
CREAT/UREA NIT SERPL: 12
EOSINOPHIL # BLD AUTO: 0.12 X10(3)/MCL (ref 0–0.9)
EOSINOPHIL NFR BLD AUTO: 1.9 %
ERYTHROCYTE [DISTWIDTH] IN BLOOD BY AUTOMATED COUNT: 12.7 % (ref 11.5–17)
GFR SERPLBLD CREATININE-BSD FMLA CKD-EPI: >60 ML/MIN/1.73/M2
GLOBULIN SER-MCNC: 2.4 GM/DL (ref 2.4–3.5)
GLUCOSE SERPL-MCNC: 98 MG/DL (ref 82–115)
HCT VFR BLD AUTO: 47.9 % (ref 42–52)
HGB BLD-MCNC: 16.3 G/DL (ref 14–18)
IMM GRANULOCYTES # BLD AUTO: 0 X10(3)/MCL (ref 0–0.04)
IMM GRANULOCYTES NFR BLD AUTO: 0 %
LYMPHOCYTES # BLD AUTO: 2.78 X10(3)/MCL (ref 0.6–4.6)
LYMPHOCYTES NFR BLD AUTO: 43.4 %
MCH RBC QN AUTO: 31.2 PG (ref 27–31)
MCHC RBC AUTO-ENTMCNC: 34 G/DL (ref 33–36)
MCV RBC AUTO: 91.6 FL (ref 80–94)
MONOCYTES # BLD AUTO: 0.44 X10(3)/MCL (ref 0.1–1.3)
MONOCYTES NFR BLD AUTO: 6.9 %
NEUTROPHILS # BLD AUTO: 3.03 X10(3)/MCL (ref 2.1–9.2)
NEUTROPHILS NFR BLD AUTO: 47.2 %
PLATELET # BLD AUTO: 236 X10(3)/MCL (ref 130–400)
PMV BLD AUTO: 8.4 FL (ref 7.4–10.4)
POTASSIUM SERPL-SCNC: 5 MMOL/L (ref 3.5–5.1)
PROT SERPL-MCNC: 6.9 GM/DL (ref 5.8–7.6)
RBC # BLD AUTO: 5.23 X10(6)/MCL (ref 4.7–6.1)
SODIUM SERPL-SCNC: 141 MMOL/L (ref 136–145)
WBC # BLD AUTO: 6.41 X10(3)/MCL (ref 4.5–11.5)

## 2024-09-23 PROCEDURE — 81206 BCR/ABL1 GENE MAJOR BP: CPT

## 2024-09-23 PROCEDURE — 80053 COMPREHEN METABOLIC PANEL: CPT

## 2024-09-23 PROCEDURE — 85025 COMPLETE CBC W/AUTO DIFF WBC: CPT

## 2024-09-23 PROCEDURE — 36415 COLL VENOUS BLD VENIPUNCTURE: CPT

## 2024-09-30 NOTE — PROGRESS NOTES
Subjective:       Patient ID: Kvng Jones Sr. is a 64 y.o. male.    Primary Care: Dr. Ben Doga [facility] transplant Oncologist: Dr. 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  Left axillary lymph node biopsy done 3/24/20--preliminary results c/w granulocytic sarcoma.  Bone marrow biopsy day 100 20 with MRD, positive for BCR-ABL p210 7.8%.  Bone marrow biopsy day 180 2/3/21 s/p transplant showed stringent CR (undetectable BCR-ABL), chimerism 100% donor CD33 and 90% donor CD3.  Bone marrow biopsy at 1 year 21--stringent CR (negative BCR-ABL in blood and marrow), 100% donor CD33 and CD3 chimerism.    Work-up:  Peripheral blood RT PCR BCR-ABL done 3/17/20 positive for major p210, 49.5974%.    Imagin. CT soft tissue neck/C/A/P done 3/23/20--Bilateral cervical lymphadenopathy concerning for metastatic disease or lymphoproliferative disorder, pathologic adenopathy of the bilateral axillary and inguinal  regions, several nonenlarged mediastinal lymph nodes are identified, nonenlarged retroperitoneal lymph nodes.  2. CT chest w/ contrast done at [facility] 12/10/21--no large central PE, findings suggest tiny bilateral pulmonary emboli in lower lobe territory branches R>L, patchy areas of atelectasis and effusion pneumonitis, scattered peripheral hazy nodularities one of which was seen on prior RUL 1cm, others are new, follow-up recommended, enlarged liver with hepatic steatosis, low-density lesions in left lobe 1-2cm range, correlate with US, bilateral adrenal gland hyperplasia nodular changes on left, small 2-4mm calculi right upper kidney, dilated small bowel loop LUQ, r/o obstruction, follow-up recommended.   3. CT chest w/ contrast done at [facility] 22--Near complete resolution of previously seen ground glass nodules from  12/10/2021. Favor infectious/inflammatory etiology.  4. CT chest at [facility] on 8/24/22--Suspected mild emphysema. Stable chronic bilateral focal scar and lingular subsegmental atelectasis. Stable moderate dilation of the ascending thoracic aorta. Stable mild diffuse hepatic steatosis.     Treatment history:  FLAG-Addis induction chemotherapy done at [facility] in [place], [place] 3/30/20--5/1/20.  Ponatinib 30mg daily started after induction, held for transplant.  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  Engrafted on 8/10/21 and discharged on 8/12/20.  Restarted Ponatinib on 11/11/20 after day 100 marrow showed MRD.  Ponatinib stopped at day 267 post-transplant due to GI side effects.    GVHD treatment history:  Diffuse erythema to legs, lower back, arms and abdomen 9/15/20, started on Prednisone, worsened despite steroids and tacrolimus cream. Biopsy c/w folliculitis and steroids stopped.  Budesonide 3mg BID started 12/10/20 for GERD; EGD negative for GVHD and c/w antral gastritis, stopped oral steroids.  Tacrolimus taper started every 2 weeks 1/6/21, slow due to ongoing rash, stopped Tacrolimus on 3/31/21.  Acyclovir prophylaxis until 8/2022 (1 year post-Tacrolimus).     Current treatment plan:   1. Observation    Chief Complaint: Other Misc (Pt reports no concerns today.)    HPI   Patient presents for follow-up AML/CML.Today, he is feeling well and denies any fevers, lymphadenopathy, and sore throat. His most recent BCR-ABL is undetectable. Admits he has been more active and eating less, he has lost 18 lbs since his last appointment! Bowels moving well. He does report a rash to his right hand, followed by dermatologist Dr. Gaming. He continues with neuropathy to bilateral feet and right leg. No other new problems reported.      Past Medical History:   Diagnosis Date    CML (chronic myelocytic leukemia)     Encounter for blood transfusion     Melanoma in situ  of external ear, right     PONV (postoperative nausea and vomiting)     Pulmonary embolism 2022      Review of patient's allergies indicates:   Allergen Reactions    Posaconazole Hallucinations and Other (See Comments)    Unclassified drug Other (See Comments) and Rash     Pt reports that he has an allergic reaction to an Antifungal medication, but is unsure of the name of the drug. Will obtain name prior to arrival in am and notify Pre-op RN.  Pt reports that he has an allergic reaction to an Antifungal medication, but is unsure of the name of the drug. Will obtain name prior to arrival in am and notify Pre-op RN.    Vancomycin analogues Other (See Comments)     Pt reports he had rigors    Codeine Hives    Iodine Hives, Rash and Other (See Comments)    Codeine sulfate     Iodinated contrast media Hives        Current Outpatient Medications:     amLODIPine (NORVASC) 10 MG tablet, Take 10 mg by mouth once daily., Disp: , Rfl:     atorvastatin (LIPITOR) 40 MG tablet, Take 40 mg by mouth once daily., Disp: , Rfl:     budesonide-formoterol 160-4.5 mcg (SYMBICORT) 160-4.5 mcg/actuation HFAA, Inhale 2 puffs into the lungs every 12 (twelve) hours. Controller, Disp: 10.2 g, Rfl: 3    carvediloL (COREG) 3.125 MG tablet, Take 1 tablet by mouth 2 (two) times a day., Disp: , Rfl:     cetirizine (ZYRTEC) 10 MG tablet, 1 tablet Orally Once a day for 30 day(s), Disp: , Rfl:     esomeprazole (NEXIUM) 40 MG capsule, Take 40 mg by mouth before breakfast., Disp: , Rfl:     fluocinolone and shower cap 0.01 % Oil, SMARTSI Milliliter(s) In Ear(s) Daily, Disp: , Rfl:     fluticasone propionate (FLONASE ALLERGY RELIEF NASL), , Disp: , Rfl:     hydrocortisone 2.5 % cream, 1 g 2 (two) times daily as needed., Disp: , Rfl:     multivit-min/FA/lycopen/lutein (CENTRUM SILVER MEN ORAL), Take by mouth., Disp: , Rfl:     olopatadine-mometasone (RYALTRIS) 665-25 mcg/spray Spry, 1 spray by Nasal route 2 (two) times a day., Disp: 29 g, Rfl:  2  Review of Systems   Constitutional:  Negative for appetite change, fever and unexpected weight change.   HENT:  Negative for mouth sores, postnasal drip and sore throat.    Eyes:  Negative for visual disturbance.   Respiratory:  Negative for cough and shortness of breath.    Cardiovascular:  Negative for chest pain.   Gastrointestinal:  Negative for abdominal pain and diarrhea.   Genitourinary:  Negative for frequency.   Musculoskeletal:  Negative for back pain.   Neurological:  Negative for headaches.        Peripheral neuropathy   Hematological:  Negative for adenopathy.   Psychiatric/Behavioral:  The patient is not nervous/anxious.          Vitals:    10/07/24 1319   BP: (!) 118/59   Pulse: 83   Resp: 14   Temp: 98.4 °F (36.9 °C)     Physical Exam  Constitutional:       General: He is awake.      Appearance: Normal appearance. He is overweight.   HENT:      Head: Normocephalic.   Eyes:      General: Lids are normal. Vision grossly intact.      Extraocular Movements: Extraocular movements intact.      Conjunctiva/sclera: Conjunctivae normal.   Cardiovascular:      Rate and Rhythm: Normal rate and regular rhythm.      Pulses: Normal pulses.      Heart sounds: Normal heart sounds.   Pulmonary:      Effort: Pulmonary effort is normal.      Breath sounds: Normal air entry.   Abdominal:      General: Abdomen is protuberant. Bowel sounds are normal.      Palpations: Abdomen is soft.   Musculoskeletal:      Cervical back: Neck supple.   Lymphadenopathy:      Cervical: No cervical adenopathy.   Skin:     General: Skin is warm and dry.      Comments: Erythematous rash noted to right hand    Neurological:      General: No focal deficit present.      Mental Status: He is alert and oriented to person, place, and time.   Psychiatric:         Attention and Perception: Attention normal.         Mood and Affect: Mood normal.         Speech: Speech normal.         Behavior: Behavior is cooperative.         Thought Content:  Thought content normal.       ECOG SCORE    1 - Restricted in strenuous activity-ambulatory and able to carry out work of a light nature       No visits with results within 1 Week(s) from this visit.   Latest known visit with results is:   Lab Visit on 09/23/2024   Component Date Value    BCR/ABL1, p210 Result 09/23/2024 see interpretation     Specimen Type 09/23/2024 Blood     Final Diagnosis 09/23/2024 SEE COMMENTS     Sodium 09/23/2024 141     Potassium 09/23/2024 5.0     Chloride 09/23/2024 107     CO2 09/23/2024 24     Glucose 09/23/2024 98     Blood Urea Nitrogen 09/23/2024 12.7     Creatinine 09/23/2024 1.04     Calcium 09/23/2024 10.1 (H)     Protein Total 09/23/2024 6.9     Albumin 09/23/2024 4.5     Globulin 09/23/2024 2.4     Albumin/Globulin Ratio 09/23/2024 1.9     Bilirubin Total 09/23/2024 0.7     ALP 09/23/2024 69     ALT 09/23/2024 29     AST 09/23/2024 24     eGFR 09/23/2024 >60     Anion Gap 09/23/2024 10.0     BUN/Creatinine Ratio 09/23/2024 12     WBC 09/23/2024 6.41     RBC 09/23/2024 5.23     Hgb 09/23/2024 16.3     Hct 09/23/2024 47.9     MCV 09/23/2024 91.6     MCH 09/23/2024 31.2 (H)     MCHC 09/23/2024 34.0     RDW 09/23/2024 12.7     Platelet 09/23/2024 236     MPV 09/23/2024 8.4     Neut % 09/23/2024 47.2     Lymph % 09/23/2024 43.4     Mono % 09/23/2024 6.9     Eos % 09/23/2024 1.9     Basophil % 09/23/2024 0.6     Lymph # 09/23/2024 2.78     Neut # 09/23/2024 3.03     Mono # 09/23/2024 0.44     Eos # 09/23/2024 0.12     Baso # 09/23/2024 0.04     IG# 09/23/2024 0.00     IG% 09/23/2024 0.0           Assessment:       1. CML (chronic myelocytic leukemia)    2. History of allogeneic stem cell transplant    3. History of melanoma          Plan:       Patient presented with marked neutrophilia and painful neck and axillary adenopathy.  BCR-ABL+ c/w CML and also lymph node biopsy c/w granulocytic sarcoma, diagnosis CML with blast crisis in 3/2020.  Completed FLAG-Addis induction chemotherapy  done at [facility] in [place], [place] 3/30/20--5/1/20 with CR.  Ponatinib 30mg daily started after induction, held for transplant.  s/p Flu/Cy/TBI 7/21/20 and haploidentical allogeneic SCT(son was donor) done in [place], [place] at [facility].  Engrafted on 8/10/21 and discharged on 8/12/20.  Restarted Ponatinib on 11/11/20 after day 100 marrow showed MRD.  Ponatinib stopped at day 267 post-transplant due to GI side effects.  Had some mild GVHD problems but weaned off Tacrolimus stopped 3/2021.    Patient had hospitalization in 12/2021 with respiratory failure, pneumonitis from parainfluenza, and suspected tiny pulmonary embolism.   Suspect respiratory failure was all related to infection likely and his drop in EF, acute CHF. This resolved on its own and he was found to have non-critical CAD.  CT chest repeated 2/23/22 shows resolved infection, no PE.  CT chest PE protocol repeated on 4/14/23--No evidence of pulmonary thromboembolic disease. Dilated mid ascending aorta. Bronchial wall thickening in the left lower lobe, with large band of atelectasis at the diaphragmatic surface of the left lower lobe. Heavy coronary calcium.  He was referred to Pulmonology who repeated in August. CT chest at [facility] on 8/24/23--Suspected mild emphysema. Stable chronic bilateral focal scar and lingular subsegmental atelectasis. Stable moderate dilation of the ascending thoracic aorta. Stable mild diffuse hepatic steatosis. Plan for repeat scan in 1 year for ongoing lung cancer surveillance. ?Not sure if this was done. If not will schedule.     Returned to Dr. Hodges in August 2023 he had a positive BCR-ABL p210 (<0.003%) and she wanted to repeat in September and it was stable - <0.003%.   Dr. Hodges recommended continued observation unless the  BCR-ABL >0.1%.  Patient has been undergoing labs every 3 months.  Last BCR-ABL on 9/23/24 was undetectable.  RTC in 3 months with labs CBC CMP BCR-ABL     Patient had a follow-up with Dr. Groves in  7/2022 and he took him off the Xarelto.   Completed the MMR series x 2 doses on 4/26/23 and 5/26/23. Completed Shingles vaccine also in May 2023.   Given Pneumovax here on 9/26/22.   Completed Acyclovir prophylaxis 8/2022.  All questions answered at this time.      MARCELA Mcelroy-C  Oncology/Hematology  [facility]

## 2024-10-07 ENCOUNTER — OFFICE VISIT (OUTPATIENT)
Dept: HEMATOLOGY/ONCOLOGY | Facility: CLINIC | Age: 64
End: 2024-10-07
Payer: COMMERCIAL

## 2024-10-07 VITALS
RESPIRATION RATE: 14 BRPM | DIASTOLIC BLOOD PRESSURE: 59 MMHG | TEMPERATURE: 98 F | WEIGHT: 205.88 LBS | BODY MASS INDEX: 33.09 KG/M2 | OXYGEN SATURATION: 97 % | HEIGHT: 66 IN | HEART RATE: 83 BPM | SYSTOLIC BLOOD PRESSURE: 118 MMHG

## 2024-10-07 DIAGNOSIS — Z94.84 HISTORY OF ALLOGENEIC STEM CELL TRANSPLANT: ICD-10-CM

## 2024-10-07 DIAGNOSIS — Z85.820 HISTORY OF MELANOMA: ICD-10-CM

## 2024-10-07 DIAGNOSIS — C92.10 CML (CHRONIC MYELOCYTIC LEUKEMIA): Primary | ICD-10-CM

## 2024-10-07 PROCEDURE — 1101F PT FALLS ASSESS-DOCD LE1/YR: CPT | Mod: CPTII,S$GLB,, | Performed by: NURSE PRACTITIONER

## 2024-10-07 PROCEDURE — 3008F BODY MASS INDEX DOCD: CPT | Mod: CPTII,S$GLB,, | Performed by: NURSE PRACTITIONER

## 2024-10-07 PROCEDURE — 1160F RVW MEDS BY RX/DR IN RCRD: CPT | Mod: CPTII,S$GLB,, | Performed by: NURSE PRACTITIONER

## 2024-10-07 PROCEDURE — 3288F FALL RISK ASSESSMENT DOCD: CPT | Mod: CPTII,S$GLB,, | Performed by: NURSE PRACTITIONER

## 2024-10-07 PROCEDURE — 99214 OFFICE O/P EST MOD 30 MIN: CPT | Mod: S$GLB,,, | Performed by: NURSE PRACTITIONER

## 2024-10-07 PROCEDURE — 99999 PR PBB SHADOW E&M-EST. PATIENT-LVL III: CPT | Mod: PBBFAC,,, | Performed by: NURSE PRACTITIONER

## 2024-10-07 PROCEDURE — 1126F AMNT PAIN NOTED NONE PRSNT: CPT | Mod: CPTII,S$GLB,, | Performed by: NURSE PRACTITIONER

## 2024-10-07 PROCEDURE — 1159F MED LIST DOCD IN RCRD: CPT | Mod: CPTII,S$GLB,, | Performed by: NURSE PRACTITIONER

## 2024-10-07 PROCEDURE — 3074F SYST BP LT 130 MM HG: CPT | Mod: CPTII,S$GLB,, | Performed by: NURSE PRACTITIONER

## 2024-10-07 PROCEDURE — 3078F DIAST BP <80 MM HG: CPT | Mod: CPTII,S$GLB,, | Performed by: NURSE PRACTITIONER

## 2025-01-02 ENCOUNTER — LAB VISIT (OUTPATIENT)
Dept: LAB | Facility: HOSPITAL | Age: 65
End: 2025-01-02
Attending: NURSE PRACTITIONER
Payer: COMMERCIAL

## 2025-01-02 DIAGNOSIS — C92.10 CML (CHRONIC MYELOCYTIC LEUKEMIA): ICD-10-CM

## 2025-01-02 DIAGNOSIS — Z94.84 HISTORY OF ALLOGENEIC STEM CELL TRANSPLANT: ICD-10-CM

## 2025-01-02 DIAGNOSIS — Z85.820 HISTORY OF MELANOMA: ICD-10-CM

## 2025-01-02 LAB
ALBUMIN SERPL-MCNC: 4.5 G/DL (ref 3.4–4.8)
ALBUMIN/GLOB SERPL: 1.5 RATIO (ref 1.1–2)
ALP SERPL-CCNC: 78 UNIT/L (ref 40–150)
ALT SERPL-CCNC: 40 UNIT/L (ref 0–55)
ANION GAP SERPL CALC-SCNC: 11 MEQ/L
AST SERPL-CCNC: 30 UNIT/L (ref 5–34)
BASOPHILS # BLD AUTO: 0.03 X10(3)/MCL
BASOPHILS NFR BLD AUTO: 0.4 %
BILIRUB SERPL-MCNC: 0.7 MG/DL
BUN SERPL-MCNC: 15.2 MG/DL (ref 8.4–25.7)
CALCIUM SERPL-MCNC: 9.7 MG/DL (ref 8.8–10)
CHLORIDE SERPL-SCNC: 104 MMOL/L (ref 98–107)
CO2 SERPL-SCNC: 22 MMOL/L (ref 23–31)
CREAT SERPL-MCNC: 1.27 MG/DL (ref 0.72–1.25)
CREAT/UREA NIT SERPL: 12
EOSINOPHIL # BLD AUTO: 0.11 X10(3)/MCL (ref 0–0.9)
EOSINOPHIL NFR BLD AUTO: 1.5 %
ERYTHROCYTE [DISTWIDTH] IN BLOOD BY AUTOMATED COUNT: 12.6 % (ref 11.5–17)
GFR SERPLBLD CREATININE-BSD FMLA CKD-EPI: >60 ML/MIN/1.73/M2
GLOBULIN SER-MCNC: 3.1 GM/DL (ref 2.4–3.5)
GLUCOSE SERPL-MCNC: 99 MG/DL (ref 82–115)
HCT VFR BLD AUTO: 50.6 % (ref 42–52)
HGB BLD-MCNC: 17.4 G/DL (ref 14–18)
IMM GRANULOCYTES # BLD AUTO: 0.01 X10(3)/MCL (ref 0–0.04)
IMM GRANULOCYTES NFR BLD AUTO: 0.1 %
LYMPHOCYTES # BLD AUTO: 2.05 X10(3)/MCL (ref 0.6–4.6)
LYMPHOCYTES NFR BLD AUTO: 28.3 %
MCH RBC QN AUTO: 31.1 PG (ref 27–31)
MCHC RBC AUTO-ENTMCNC: 34.4 G/DL (ref 33–36)
MCV RBC AUTO: 90.5 FL (ref 80–94)
MONOCYTES # BLD AUTO: 0.65 X10(3)/MCL (ref 0.1–1.3)
MONOCYTES NFR BLD AUTO: 9 %
NEUTROPHILS # BLD AUTO: 4.4 X10(3)/MCL (ref 2.1–9.2)
NEUTROPHILS NFR BLD AUTO: 60.7 %
PLATELET # BLD AUTO: 216 X10(3)/MCL (ref 130–400)
PMV BLD AUTO: 8.3 FL (ref 7.4–10.4)
POTASSIUM SERPL-SCNC: 4.4 MMOL/L (ref 3.5–5.1)
PROT SERPL-MCNC: 7.6 GM/DL (ref 5.8–7.6)
RBC # BLD AUTO: 5.59 X10(6)/MCL (ref 4.7–6.1)
SODIUM SERPL-SCNC: 137 MMOL/L (ref 136–145)
WBC # BLD AUTO: 7.25 X10(3)/MCL (ref 4.5–11.5)

## 2025-01-02 PROCEDURE — 80053 COMPREHEN METABOLIC PANEL: CPT

## 2025-01-02 PROCEDURE — 36415 COLL VENOUS BLD VENIPUNCTURE: CPT

## 2025-01-02 PROCEDURE — 85025 COMPLETE CBC W/AUTO DIFF WBC: CPT

## 2025-01-02 PROCEDURE — 81206 BCR/ABL1 GENE MAJOR BP: CPT

## 2025-01-13 NOTE — PROGRESS NOTES
Subjective:       Patient ID: Kvng Jones Sr. is a 64 y.o. male.    Primary Care: Dr. Ben Doga [facility] transplant Oncologist: Dr. 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  Left axillary lymph node biopsy done 3/24/20--preliminary results c/w granulocytic sarcoma.  Bone marrow biopsy day 100 20 with MRD, positive for BCR-ABL p210 7.8%.  Bone marrow biopsy day 180 2/3/21 s/p transplant showed stringent CR (undetectable BCR-ABL), chimerism 100% donor CD33 and 90% donor CD3.  Bone marrow biopsy at 1 year 21--stringent CR (negative BCR-ABL in blood and marrow), 100% donor CD33 and CD3 chimerism.    Work-up:  Peripheral blood RT PCR BCR-ABL done 3/17/20 positive for major p210, 49.5974%.    Imagin. CT soft tissue neck/C/A/P done 3/23/20--Bilateral cervical lymphadenopathy concerning for metastatic disease or lymphoproliferative disorder, pathologic adenopathy of the bilateral axillary and inguinal  regions, several nonenlarged mediastinal lymph nodes are identified, nonenlarged retroperitoneal lymph nodes.  2. CT chest w/ contrast done at [facility] 12/10/21--no large central PE, findings suggest tiny bilateral pulmonary emboli in lower lobe territory branches R>L, patchy areas of atelectasis and effusion pneumonitis, scattered peripheral hazy nodularities one of which was seen on prior RUL 1cm, others are new, follow-up recommended, enlarged liver with hepatic steatosis, low-density lesions in left lobe 1-2cm range, correlate with US, bilateral adrenal gland hyperplasia nodular changes on left, small 2-4mm calculi right upper kidney, dilated small bowel loop LUQ, r/o obstruction, follow-up recommended.   3. CT chest w/ contrast done at [facility] 22--Near complete resolution of previously seen ground glass nodules from  12/10/2021. Favor infectious/inflammatory etiology.  4. CT chest at [facility] on 8/24/22--Suspected mild emphysema. Stable chronic bilateral focal scar and lingular subsegmental atelectasis. Stable moderate dilation of the ascending thoracic aorta. Stable mild diffuse hepatic steatosis.     Treatment history:  FLAG-Addis induction chemotherapy done at [facility] in [place], [place] 3/30/20--5/1/20.  Ponatinib 30mg daily started after induction, held for transplant.  Flu/Cy/TBI 7/21/20 and haploidentical allogeneic SCT(son was donor) done in [place], [place] at [facility].  Engrafted on 8/10/21 and discharged on 8/12/20.  Restarted Ponatinib on 11/11/20 after day 100 marrow showed MRD.  Ponatinib stopped at day 267 post-transplant due to GI side effects.    GVHD treatment history:  Diffuse erythema to legs, lower back, arms and abdomen 9/15/20, started on Prednisone, worsened despite steroids and tacrolimus cream. Biopsy c/w folliculitis and steroids stopped.  Budesonide 3mg BID started 12/10/20 for GERD; EGD negative for GVHD and c/w antral gastritis, stopped oral steroids.  Tacrolimus taper started every 2 weeks 1/6/21, slow due to ongoing rash, stopped Tacrolimus on 3/31/21.  Acyclovir prophylaxis until 8/2022 (1 year post-Tacrolimus).     Current treatment plan:   1. Observation    Chief Complaint: Leukemia (URI)    HPI   Patient presents for follow-up AML/CML.Today, he is feeling well. His most recent BCR-ABL is very faint, 0.006%. He presented to his PCP earlier this week with URI symptoms x 2 weeks. He was given a Medrol dose pack, Augmentin and cough medications. He is feeling better today but still has lingering cough. Bowels moving well. He continues with neuropathy to bilateral feet and right leg. Appetite good, weight increased. No other new problems reported.      Past Medical History:   Diagnosis Date    CML (chronic myelocytic leukemia)     Encounter for blood transfusion     Melanoma in situ of external ear, right      PONV (postoperative nausea and vomiting)     Pulmonary embolism 2022      Review of patient's allergies indicates:   Allergen Reactions    Posaconazole Hallucinations and Other (See Comments)    Unclassified drug Other (See Comments) and Rash     Pt reports that he has an allergic reaction to an Antifungal medication, but is unsure of the name of the drug. Will obtain name prior to arrival in am and notify Pre-op RN.  Pt reports that he has an allergic reaction to an Antifungal medication, but is unsure of the name of the drug. Will obtain name prior to arrival in am and notify Pre-op RN.    Vancomycin analogues Other (See Comments)     Pt reports he had rigors    Codeine Hives    Iodine Hives, Rash and Other (See Comments)    Codeine sulfate     Iodinated contrast media Hives        Current Outpatient Medications:     amLODIPine (NORVASC) 10 MG tablet, Take 10 mg by mouth once daily., Disp: , Rfl:     amoxicillin-clavulanate 875-125mg (AUGMENTIN) 875-125 mg per tablet, Take 1 tablet by mouth 2 (two) times daily. Take with food and probiotic for 10 days, Disp: 20 tablet, Rfl: 0    atorvastatin (LIPITOR) 40 MG tablet, Take 40 mg by mouth once daily., Disp: , Rfl:     budesonide-formoterol 160-4.5 mcg (SYMBICORT) 160-4.5 mcg/actuation HFAA, Inhale 2 puffs into the lungs every 12 (twelve) hours. Controller, Disp: 10.2 g, Rfl: 3    carvediloL (COREG) 3.125 MG tablet, Take 1 tablet by mouth 2 (two) times a day., Disp: , Rfl:     cetirizine (ZYRTEC) 10 MG tablet, 1 tablet Orally Once a day for 30 day(s), Disp: , Rfl:     esomeprazole (NEXIUM) 40 MG capsule, Take 40 mg by mouth before breakfast., Disp: , Rfl:     fluocinolone and shower cap 0.01 % Oil, SMARTSI Milliliter(s) In Ear(s) Daily, Disp: , Rfl:     fluticasone propionate (FLONASE ALLERGY RELIEF NASL), , Disp: , Rfl:     hydrocodone-chlorpheniramine (TUSSIONEX) 10-8 mg/5 mL suspension, Take 5 mLs by mouth every 12 (twelve) hours as needed for Cough.,  Disp: 70 mL, Rfl: 0    hydrocortisone 2.5 % cream, 1 g 2 (two) times daily as needed., Disp: , Rfl:     methylPREDNISolone (MEDROL DOSEPACK) 4 mg tablet, use as directed, Disp: 21 each, Rfl: 0    multivit-min/FA/lycopen/lutein (CENTRUM SILVER MEN ORAL), Take by mouth., Disp: , Rfl:     olopatadine-mometasone (RYALTRIS) 665-25 mcg/spray Spry, 1 spray by Nasal route 2 (two) times a day., Disp: 29 g, Rfl: 2  Review of Systems   Constitutional:  Negative for appetite change and unexpected weight change.   HENT:  Negative for mouth sores.    Eyes:  Negative for visual disturbance.   Respiratory:  Positive for cough and shortness of breath.    Cardiovascular:  Negative for chest pain.   Gastrointestinal:  Negative for abdominal pain and diarrhea.   Genitourinary:  Negative for frequency.   Musculoskeletal:  Negative for back pain.   Integumentary:  Negative for rash.   Neurological:  Negative for headaches.   Hematological:  Negative for adenopathy.   Psychiatric/Behavioral:  The patient is not nervous/anxious.          Vitals:    01/16/25 1352   BP: 131/83   Pulse: 90   Resp: 15   Temp: 98.1 °F (36.7 °C)     Physical Exam  Constitutional:       General: He is awake.      Appearance: Normal appearance. He is overweight.   HENT:      Head: Normocephalic.   Eyes:      General: Lids are normal. Vision grossly intact.      Extraocular Movements: Extraocular movements intact.      Conjunctiva/sclera: Conjunctivae normal.   Cardiovascular:      Rate and Rhythm: Normal rate and regular rhythm.      Pulses: Normal pulses.      Heart sounds: Normal heart sounds.   Pulmonary:      Effort: Pulmonary effort is normal.      Breath sounds: Normal air entry.   Abdominal:      General: Abdomen is protuberant. Bowel sounds are normal.      Palpations: Abdomen is soft.   Musculoskeletal:      Cervical back: Neck supple.   Lymphadenopathy:      Cervical: No cervical adenopathy.   Skin:     General: Skin is warm and dry.      Comments:      Neurological:      General: No focal deficit present.      Mental Status: He is alert and oriented to person, place, and time.   Psychiatric:         Attention and Perception: Attention normal.         Mood and Affect: Mood normal.         Speech: Speech normal.         Behavior: Behavior is cooperative.         Thought Content: Thought content normal.       ECOG SCORE    1 - Restricted in strenuous activity-ambulatory and able to carry out work of a light nature       No visits with results within 1 Week(s) from this visit.   Latest known visit with results is:   Lab Visit on 01/02/2025   Component Date Value    BCR/ABL1, p210 Result 01/02/2025 see interpretation     Specimen Type 01/02/2025 Blood     Final Diagnosis 01/02/2025 SEE COMMENTS     Sodium 01/02/2025 137     Potassium 01/02/2025 4.4     Chloride 01/02/2025 104     CO2 01/02/2025 22 (L)     Glucose 01/02/2025 99     Blood Urea Nitrogen 01/02/2025 15.2     Creatinine 01/02/2025 1.27 (H)     Calcium 01/02/2025 9.7     Protein Total 01/02/2025 7.6     Albumin 01/02/2025 4.5     Globulin 01/02/2025 3.1     Albumin/Globulin Ratio 01/02/2025 1.5     Bilirubin Total 01/02/2025 0.7     ALP 01/02/2025 78     ALT 01/02/2025 40     AST 01/02/2025 30     eGFR 01/02/2025 >60     Anion Gap 01/02/2025 11.0     BUN/Creatinine Ratio 01/02/2025 12     WBC 01/02/2025 7.25     RBC 01/02/2025 5.59     Hgb 01/02/2025 17.4     Hct 01/02/2025 50.6     MCV 01/02/2025 90.5     MCH 01/02/2025 31.1 (H)     MCHC 01/02/2025 34.4     RDW 01/02/2025 12.6     Platelet 01/02/2025 216     MPV 01/02/2025 8.3     Neut % 01/02/2025 60.7     Lymph % 01/02/2025 28.3     Mono % 01/02/2025 9.0     Eos % 01/02/2025 1.5     Basophil % 01/02/2025 0.4     Lymph # 01/02/2025 2.05     Neut # 01/02/2025 4.40     Mono # 01/02/2025 0.65     Eos # 01/02/2025 0.11     Baso # 01/02/2025 0.03     Imm Gran # 01/02/2025 0.01     Imm Grans % 01/02/2025 0.1           Assessment:       1. CML (chronic  myelocytic leukemia)    2. History of allogeneic stem cell transplant    3. History of melanoma    4. Acute myeloid leukemia in remission          Plan:       Patient presented with marked neutrophilia and painful neck and axillary adenopathy.  BCR-ABL+ c/w CML and also lymph node biopsy c/w granulocytic sarcoma, diagnosis CML with blast crisis in 3/2020.  Completed FLAG-Addis induction chemotherapy done at [facility] in [place], [place] 3/30/20--5/1/20 with CR.  Ponatinib 30mg daily started after induction, held for transplant.  s/p Flu/Cy/TBI 7/21/20 and haploidentical allogeneic SCT(son was donor) done in [place], [place] at [facility].  Engrafted on 8/10/21 and discharged on 8/12/20.  Restarted Ponatinib on 11/11/20 after day 100 marrow showed MRD.  Ponatinib stopped at day 267 post-transplant due to GI side effects.  Had some mild GVHD problems but weaned off Tacrolimus stopped 3/2021.    Patient had hospitalization in 12/2021 with respiratory failure, pneumonitis from parainfluenza, and suspected tiny pulmonary embolism.   Suspect respiratory failure was all related to infection likely and his drop in EF, acute CHF. This resolved on its own and he was found to have non-critical CAD.  CT chest repeated 2/23/22 shows resolved infection, no PE.  CT chest PE protocol repeated on 4/14/23--No evidence of pulmonary thromboembolic disease. Dilated mid ascending aorta. Bronchial wall thickening in the left lower lobe, with large band of atelectasis at the diaphragmatic surface of the left lower lobe. Heavy coronary calcium.  He was referred to Pulmonology who repeated in August. CT chest at [facility] on 8/24/23--Suspected mild emphysema. Stable chronic bilateral focal scar and lingular subsegmental atelectasis. Stable moderate dilation of the ascending thoracic aorta. Stable mild diffuse hepatic steatosis. Plan for repeat scan in 1 year for ongoing lung cancer surveillance. ?Not sure if this was done. If not will schedule.      Returned to Dr. Hodges in August 2023 he had a positive BCR-ABL p210 (<0.003%) and she wanted to repeat in September and it was stable - <0.003%.   Dr. Hodges recommended continued observation unless the  BCR-ABL >0.1%.  Patient has been undergoing labs every 3 months.  CBC good. CMP with mild renal insufficiency but he does have URI and has not been hydrating. Encouraged increased fluid intake.   BCR-ABL on 1/2/25 was 0.006%.  RTC in 3 months with labs CBC, CMP, BCR-ABL.     Patient had a follow-up with Dr. Groves in 7/2022 and he took him off the Xarelto.   Completed the MMR series x 2 doses on 4/26/23 and 5/26/23. Completed Shingles vaccine also in May 2023.   Given Pneumovax here on 9/26/22.   Completed Acyclovir prophylaxis 8/2022.  All questions answered at this time.      MESERET Mcelroy  Oncology/Hematology  [facility]        Visit today included increased complexity associated with the longitudinal care and management of the patient's chronic CML/AML diagnosis.

## 2025-01-16 ENCOUNTER — OFFICE VISIT (OUTPATIENT)
Dept: HEMATOLOGY/ONCOLOGY | Facility: CLINIC | Age: 65
End: 2025-01-16
Payer: COMMERCIAL

## 2025-01-16 VITALS
BODY MASS INDEX: 34.87 KG/M2 | TEMPERATURE: 98 F | HEART RATE: 90 BPM | WEIGHT: 217 LBS | RESPIRATION RATE: 15 BRPM | OXYGEN SATURATION: 95 % | SYSTOLIC BLOOD PRESSURE: 131 MMHG | HEIGHT: 66 IN | DIASTOLIC BLOOD PRESSURE: 83 MMHG

## 2025-01-16 DIAGNOSIS — Z94.84 HISTORY OF ALLOGENEIC STEM CELL TRANSPLANT: ICD-10-CM

## 2025-01-16 DIAGNOSIS — C92.10 CML (CHRONIC MYELOCYTIC LEUKEMIA): Primary | ICD-10-CM

## 2025-01-16 DIAGNOSIS — Z85.820 HISTORY OF MELANOMA: ICD-10-CM

## 2025-01-16 DIAGNOSIS — C92.01 ACUTE MYELOID LEUKEMIA IN REMISSION: ICD-10-CM

## 2025-01-16 PROCEDURE — 1126F AMNT PAIN NOTED NONE PRSNT: CPT | Mod: CPTII,S$GLB,, | Performed by: NURSE PRACTITIONER

## 2025-01-16 PROCEDURE — 3075F SYST BP GE 130 - 139MM HG: CPT | Mod: CPTII,S$GLB,, | Performed by: NURSE PRACTITIONER

## 2025-01-16 PROCEDURE — 99999 PR PBB SHADOW E&M-EST. PATIENT-LVL V: CPT | Mod: PBBFAC,,, | Performed by: NURSE PRACTITIONER

## 2025-01-16 PROCEDURE — 1160F RVW MEDS BY RX/DR IN RCRD: CPT | Mod: CPTII,S$GLB,, | Performed by: NURSE PRACTITIONER

## 2025-01-16 PROCEDURE — 3008F BODY MASS INDEX DOCD: CPT | Mod: CPTII,S$GLB,, | Performed by: NURSE PRACTITIONER

## 2025-01-16 PROCEDURE — 1101F PT FALLS ASSESS-DOCD LE1/YR: CPT | Mod: CPTII,S$GLB,, | Performed by: NURSE PRACTITIONER

## 2025-01-16 PROCEDURE — 3079F DIAST BP 80-89 MM HG: CPT | Mod: CPTII,S$GLB,, | Performed by: NURSE PRACTITIONER

## 2025-01-16 PROCEDURE — 1159F MED LIST DOCD IN RCRD: CPT | Mod: CPTII,S$GLB,, | Performed by: NURSE PRACTITIONER

## 2025-01-16 PROCEDURE — 99214 OFFICE O/P EST MOD 30 MIN: CPT | Mod: S$GLB,,, | Performed by: NURSE PRACTITIONER

## 2025-01-16 PROCEDURE — 3288F FALL RISK ASSESSMENT DOCD: CPT | Mod: CPTII,S$GLB,, | Performed by: NURSE PRACTITIONER

## 2025-01-16 PROCEDURE — G2211 COMPLEX E/M VISIT ADD ON: HCPCS | Mod: S$GLB,,, | Performed by: NURSE PRACTITIONER

## 2025-04-14 ENCOUNTER — LAB VISIT (OUTPATIENT)
Dept: LAB | Facility: HOSPITAL | Age: 65
End: 2025-04-14
Attending: NURSE PRACTITIONER
Payer: COMMERCIAL

## 2025-04-14 DIAGNOSIS — Z94.84 HISTORY OF ALLOGENEIC STEM CELL TRANSPLANT: ICD-10-CM

## 2025-04-14 DIAGNOSIS — Z85.820 HISTORY OF MELANOMA: ICD-10-CM

## 2025-04-14 DIAGNOSIS — C92.01 ACUTE MYELOID LEUKEMIA IN REMISSION: ICD-10-CM

## 2025-04-14 DIAGNOSIS — C92.10 CML (CHRONIC MYELOCYTIC LEUKEMIA): ICD-10-CM

## 2025-04-14 LAB
ALBUMIN SERPL-MCNC: 4.4 G/DL (ref 3.4–4.8)
ALBUMIN/GLOB SERPL: 1.4 RATIO (ref 1.1–2)
ALP SERPL-CCNC: 69 UNIT/L (ref 40–150)
ALT SERPL-CCNC: 45 UNIT/L (ref 0–55)
ANION GAP SERPL CALC-SCNC: 12 MEQ/L
AST SERPL-CCNC: 31 UNIT/L (ref 11–45)
BASOPHILS # BLD AUTO: 0.04 X10(3)/MCL
BASOPHILS NFR BLD AUTO: 0.6 %
BILIRUB SERPL-MCNC: 0.7 MG/DL
BUN SERPL-MCNC: 18.3 MG/DL (ref 8.4–25.7)
CALCIUM SERPL-MCNC: 9.2 MG/DL (ref 8.8–10)
CHLORIDE SERPL-SCNC: 103 MMOL/L (ref 98–107)
CO2 SERPL-SCNC: 23 MMOL/L (ref 23–31)
CREAT SERPL-MCNC: 1.22 MG/DL (ref 0.72–1.25)
CREAT/UREA NIT SERPL: 15
EOSINOPHIL # BLD AUTO: 0.19 X10(3)/MCL (ref 0–0.9)
EOSINOPHIL NFR BLD AUTO: 2.8 %
ERYTHROCYTE [DISTWIDTH] IN BLOOD BY AUTOMATED COUNT: 12.7 % (ref 11.5–17)
GFR SERPLBLD CREATININE-BSD FMLA CKD-EPI: >60 ML/MIN/1.73/M2
GLOBULIN SER-MCNC: 3.1 GM/DL (ref 2.4–3.5)
GLUCOSE SERPL-MCNC: 103 MG/DL (ref 82–115)
HCT VFR BLD AUTO: 48.6 % (ref 42–52)
HGB BLD-MCNC: 16.6 G/DL (ref 14–18)
IMM GRANULOCYTES # BLD AUTO: 0.02 X10(3)/MCL (ref 0–0.04)
IMM GRANULOCYTES NFR BLD AUTO: 0.3 %
LYMPHOCYTES # BLD AUTO: 2.31 X10(3)/MCL (ref 0.6–4.6)
LYMPHOCYTES NFR BLD AUTO: 34.6 %
MCH RBC QN AUTO: 31 PG (ref 27–31)
MCHC RBC AUTO-ENTMCNC: 34.2 G/DL (ref 33–36)
MCV RBC AUTO: 90.8 FL (ref 80–94)
MONOCYTES # BLD AUTO: 0.44 X10(3)/MCL (ref 0.1–1.3)
MONOCYTES NFR BLD AUTO: 6.6 %
NEUTROPHILS # BLD AUTO: 3.67 X10(3)/MCL (ref 2.1–9.2)
NEUTROPHILS NFR BLD AUTO: 55.1 %
PLATELET # BLD AUTO: 229 X10(3)/MCL (ref 130–400)
PMV BLD AUTO: 8.3 FL (ref 7.4–10.4)
POTASSIUM SERPL-SCNC: 4.7 MMOL/L (ref 3.5–5.1)
PROT SERPL-MCNC: 7.5 GM/DL (ref 5.8–7.6)
RBC # BLD AUTO: 5.35 X10(6)/MCL (ref 4.7–6.1)
SODIUM SERPL-SCNC: 138 MMOL/L (ref 136–145)
WBC # BLD AUTO: 6.67 X10(3)/MCL (ref 4.5–11.5)

## 2025-04-14 PROCEDURE — 81206 BCR/ABL1 GENE MAJOR BP: CPT

## 2025-04-14 PROCEDURE — 36415 COLL VENOUS BLD VENIPUNCTURE: CPT

## 2025-04-14 PROCEDURE — 80053 COMPREHEN METABOLIC PANEL: CPT

## 2025-04-14 PROCEDURE — 85025 COMPLETE CBC W/AUTO DIFF WBC: CPT

## 2025-04-23 PROBLEM — E66.01 SEVERE OBESITY (BMI 35.0-39.9) WITH COMORBIDITY: Status: ACTIVE | Noted: 2022-06-07

## 2025-04-23 NOTE — PROGRESS NOTES
Subjective:       Patient ID: Kvng Jones Sr. is a 64 y.o. male.    Primary Care: Dr. Ben Doga [facility] transplant Oncologist: Dr. 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  Left axillary lymph node biopsy done 3/24/20--preliminary results c/w granulocytic sarcoma.  Bone marrow biopsy day 100 20 with MRD, positive for BCR-ABL p210 7.8%.  Bone marrow biopsy day 180 2/3/21 s/p transplant showed stringent CR (undetectable BCR-ABL), chimerism 100% donor CD33 and 90% donor CD3.  Bone marrow biopsy at 1 year 21--stringent CR (negative BCR-ABL in blood and marrow), 100% donor CD33 and CD3 chimerism.    Work-up:  Peripheral blood RT PCR BCR-ABL done 3/17/20 positive for major p210, 49.5974%.    Imagin. CT soft tissue neck/C/A/P done 3/23/20--Bilateral cervical lymphadenopathy concerning for metastatic disease or lymphoproliferative disorder, pathologic adenopathy of the bilateral axillary and inguinal  regions, several nonenlarged mediastinal lymph nodes are identified, nonenlarged retroperitoneal lymph nodes.  2. CT chest w/ contrast done at [facility] 12/10/21--no large central PE, findings suggest tiny bilateral pulmonary emboli in lower lobe territory branches R>L, patchy areas of atelectasis and effusion pneumonitis, scattered peripheral hazy nodularities one of which was seen on prior RUL 1cm, others are new, follow-up recommended, enlarged liver with hepatic steatosis, low-density lesions in left lobe 1-2cm range, correlate with US, bilateral adrenal gland hyperplasia nodular changes on left, small 2-4mm calculi right upper kidney, dilated small bowel loop LUQ, r/o obstruction, follow-up recommended.   3. CT chest w/ contrast done at [facility] 22--Near complete resolution of previously seen ground glass nodules from  12/10/2021. Favor infectious/inflammatory etiology.  4. CT chest at [facility] on 8/24/22--Suspected mild emphysema. Stable chronic bilateral focal scar and lingular subsegmental atelectasis. Stable moderate dilation of the ascending thoracic aorta. Stable mild diffuse hepatic steatosis.     Treatment history:  FLAG-Addis induction chemotherapy done at [facility] in [place], [place] 3/30/20--5/1/20.  Ponatinib 30mg daily started after induction, held for transplant.  Flu/Cy/TBI 7/21/20 and haploidentical allogeneic SCT(son was donor) done in [place], [place] at [facility].  Engrafted on 8/10/21 and discharged on 8/12/20.  Restarted Ponatinib on 11/11/20 after day 100 marrow showed MRD.  Ponatinib stopped at day 267 post-transplant due to GI side effects.    GVHD treatment history:  Diffuse erythema to legs, lower back, arms and abdomen 9/15/20, started on Prednisone, worsened despite steroids and tacrolimus cream. Biopsy c/w folliculitis and steroids stopped.  Budesonide 3mg BID started 12/10/20 for GERD; EGD negative for GVHD and c/w antral gastritis, stopped oral steroids.  Tacrolimus taper started every 2 weeks 1/6/21, slow due to ongoing rash, stopped Tacrolimus on 3/31/21.  Acyclovir prophylaxis until 8/2022 (1 year post-Tacrolimus).     Current treatment plan:   1. Observation    Chief Complaint: Abdominal Pain, Abdominal Cramping, Back Pain, and Nausea    HPI   Patient presents for follow-up AML/CML.He is doing fairly well. States he has been having chills for 2 weeks now but denies any fevers or other s/s of infections. Mentions midline back pain/spasms that started yesterday and radiates to his abdomen. Also complains of fatigue. Denies any fevers, night sweats or weight loss. In fact, he has gained 10 lbs since his last visit. Denies any n/v/c/d. Bowels are moving well. His most recent BCR-ABL is undetectable. He continues with neuropathy to bilateral feet and right leg. No other new problems reported.      Past Medical  History:   Diagnosis Date    CML (chronic myelocytic leukemia)     Encounter for blood transfusion     Melanoma in situ of external ear, right     PONV (postoperative nausea and vomiting)     Pulmonary embolism 2022      Review of patient's allergies indicates:   Allergen Reactions    Posaconazole Hallucinations and Other (See Comments)    Unclassified drug Other (See Comments) and Rash     Pt reports that he has an allergic reaction to an Antifungal medication, but is unsure of the name of the drug. Will obtain name prior to arrival in am and notify Pre-op RN.  Pt reports that he has an allergic reaction to an Antifungal medication, but is unsure of the name of the drug. Will obtain name prior to arrival in am and notify Pre-op RN.    Vancomycin analogues Other (See Comments)     Pt reports he had rigors    Codeine Hives    Iodine Hives, Rash and Other (See Comments)    Codeine sulfate     Iodinated contrast media Hives        Current Outpatient Medications:     amLODIPine (NORVASC) 10 MG tablet, Take 10 mg by mouth once daily., Disp: , Rfl:     atorvastatin (LIPITOR) 40 MG tablet, Take 40 mg by mouth once daily., Disp: , Rfl:     budesonide-formoterol 160-4.5 mcg (SYMBICORT) 160-4.5 mcg/actuation HFAA, Inhale 2 puffs into the lungs every 12 (twelve) hours. Controller, Disp: 10.2 g, Rfl: 3    carvediloL (COREG) 3.125 MG tablet, Take 1 tablet by mouth 2 (two) times a day., Disp: , Rfl:     cetirizine (ZYRTEC) 10 MG tablet, 1 tablet Orally Once a day for 30 day(s), Disp: , Rfl:     esomeprazole (NEXIUM) 40 MG capsule, Take 40 mg by mouth before breakfast., Disp: , Rfl:     fluocinolone and shower cap 0.01 % Oil, SMARTSI Milliliter(s) In Ear(s) Daily, Disp: , Rfl:     fluticasone propionate (FLONASE ALLERGY RELIEF NASL), , Disp: , Rfl:     hydrocortisone 2.5 % cream, 1 g 2 (two) times daily as needed., Disp: , Rfl:     multivit-min/FA/lycopen/lutein (CENTRUM SILVER MEN ORAL), Take by mouth., Disp: , Rfl:      olopatadine-mometasone (RYALTRIS) 665-25 mcg/spray Spry, 1 spray by Nasal route 2 (two) times a day., Disp: 29 g, Rfl: 2  Review of Systems   Constitutional:  Positive for chills and fatigue. Negative for appetite change and unexpected weight change.   HENT:  Negative for mouth sores.    Eyes:  Negative for visual disturbance.   Cardiovascular:  Negative for chest pain.   Gastrointestinal:  Negative for abdominal pain and diarrhea.   Genitourinary:  Negative for frequency.   Musculoskeletal:  Positive for back pain.   Integumentary:  Negative for rash.   Neurological:  Negative for headaches.   Hematological:  Negative for adenopathy.   Psychiatric/Behavioral:  The patient is not nervous/anxious.          Vitals:    04/29/25 1426   BP: 124/83   Pulse: 83   Resp: 14   Temp: 98.2 °F (36.8 °C)     Physical Exam  Constitutional:       General: He is awake.      Appearance: Normal appearance. He is obese.   HENT:      Head: Normocephalic.   Eyes:      General: Lids are normal. Vision grossly intact.      Extraocular Movements: Extraocular movements intact.      Conjunctiva/sclera: Conjunctivae normal.   Cardiovascular:      Rate and Rhythm: Normal rate and regular rhythm.      Pulses: Normal pulses.      Heart sounds: Normal heart sounds.   Pulmonary:      Effort: Pulmonary effort is normal.      Breath sounds: Normal air entry.   Abdominal:      General: Abdomen is protuberant. Bowel sounds are normal.      Palpations: Abdomen is soft.   Musculoskeletal:      Cervical back: Neck supple.   Lymphadenopathy:      Cervical: No cervical adenopathy.   Skin:     General: Skin is warm and dry.      Comments:     Neurological:      General: No focal deficit present.      Mental Status: He is alert and oriented to person, place, and time.   Psychiatric:         Attention and Perception: Attention normal.         Mood and Affect: Mood normal.         Speech: Speech normal.         Behavior: Behavior is cooperative.         Thought  Content: Thought content normal.       ECOG SCORE    1 - Restricted in strenuous activity-ambulatory and able to carry out work of a light nature       No visits with results within 1 Week(s) from this visit.   Latest known visit with results is:   Lab Visit on 04/14/2025   Component Date Value    BCR/ABL1, p210 Result 04/14/2025 see interpretation     Specimen Type 04/14/2025 Blood     Final Diagnosis 04/14/2025 SEE COMMENTS     Sodium 04/14/2025 138     Potassium 04/14/2025 4.7     Chloride 04/14/2025 103     CO2 04/14/2025 23     Glucose 04/14/2025 103     Blood Urea Nitrogen 04/14/2025 18.3     Creatinine 04/14/2025 1.22     Calcium 04/14/2025 9.2     Protein Total 04/14/2025 7.5     Albumin 04/14/2025 4.4     Globulin 04/14/2025 3.1     Albumin/Globulin Ratio 04/14/2025 1.4     Bilirubin Total 04/14/2025 0.7     ALP 04/14/2025 69     ALT 04/14/2025 45     AST 04/14/2025 31     eGFR 04/14/2025 >60     Anion Gap 04/14/2025 12.0     BUN/Creatinine Ratio 04/14/2025 15     WBC 04/14/2025 6.67     RBC 04/14/2025 5.35     Hgb 04/14/2025 16.6     Hct 04/14/2025 48.6     MCV 04/14/2025 90.8     MCH 04/14/2025 31.0     MCHC 04/14/2025 34.2     RDW 04/14/2025 12.7     Platelet 04/14/2025 229     MPV 04/14/2025 8.3     Neut % 04/14/2025 55.1     Lymph % 04/14/2025 34.6     Mono % 04/14/2025 6.6     Eos % 04/14/2025 2.8     Basophil % 04/14/2025 0.6     Imm Grans % 04/14/2025 0.3     Neut # 04/14/2025 3.67     Lymph # 04/14/2025 2.31     Mono # 04/14/2025 0.44     Eos # 04/14/2025 0.19     Baso # 04/14/2025 0.04     Imm Gran # 04/14/2025 0.02           Assessment:       1. CML (chronic myelocytic leukemia)    2. History of allogeneic stem cell transplant    3. Acute myeloid leukemia in remission    4. Severe obesity (BMI 35.0-39.9) with comorbidity          Plan:       Patient presented with marked neutrophilia and painful neck and axillary adenopathy.  BCR-ABL+ c/w CML and also lymph node biopsy c/w granulocytic sarcoma,  diagnosis CML with blast crisis in 3/2020.  Completed FLAG-Addis induction chemotherapy done at [facility] in [place], [place] 3/30/20--5/1/20 with CR.  Ponatinib 30mg daily started after induction, held for transplant.  s/p Flu/Cy/TBI 7/21/20 and haploidentical allogeneic SCT(son was donor) done in [place], [place] at [facility].  Engrafted on 8/10/21 and discharged on 8/12/20.  Restarted Ponatinib on 11/11/20 after day 100 marrow showed MRD.  Ponatinib stopped at day 267 post-transplant due to GI side effects.  Had some mild GVHD problems but weaned off Tacrolimus stopped 3/2021.    Patient had hospitalization in 12/2021 with respiratory failure, pneumonitis from parainfluenza, and suspected tiny pulmonary embolism.   Suspect respiratory failure was all related to infection likely and his drop in EF, acute CHF. This resolved on its own and he was found to have non-critical CAD.  CT chest repeated 2/23/22 shows resolved infection, no PE.  CT chest PE protocol repeated on 4/14/23--No evidence of pulmonary thromboembolic disease. Dilated mid ascending aorta. Bronchial wall thickening in the left lower lobe, with large band of atelectasis at the diaphragmatic surface of the left lower lobe. Heavy coronary calcium.  He was referred to Pulmonology who repeated in August. CT chest at [facility] on 8/24/23--Suspected mild emphysema. Stable chronic bilateral focal scar and lingular subsegmental atelectasis. Stable moderate dilation of the ascending thoracic aorta. Stable mild diffuse hepatic steatosis. Plan for repeat scan in 1 year for ongoing lung cancer surveillance. ?Not sure if this was done. If not will schedule.     Returned to Dr. Hodges in August 2023 he had a positive BCR-ABL p210 (<0.003%) and she wanted to repeat in September and it was stable - <0.003%.   Dr. Hodges recommended continued observation unless the  BCR-ABL >0.1%.  Patient has been undergoing labs every 3 months.  Recent labs all good.   BCR-ABL on 4/14/25 was  undetectable.  Patient without any B symptoms.   RTC in 3 months with labs CBC, CMP, BCR-ABL.   Recommended he follow-up with his PCP regarding his fatigue, chills and new onset back pain/spasms.     Patient had a follow-up with Dr. Groves in 7/2022 and he took him off the Xarelto.   Completed the MMR series x 2 doses on 4/26/23 and 5/26/23. Completed Shingles vaccine also in May 2023.   Given Pneumovax here on 9/26/22.   Completed Acyclovir prophylaxis 8/2022.  All questions answered at this time.      MESERET Mcelroy  Oncology/Hematology  [facility]        Visit today included increased complexity associated with the longitudinal care and management of the patient's chronic CML/AML diagnosis.    Prior to the patient's arrival on the same day, I spent (5) minutes reviewing chart. Once in the exam room with the patient, I spent (20) minutes in the room with the member performing a history and exam as well as reviewing the test results and recommendations with the patient. After leaving the exam room, I spent an additional (5) minutes completing the electronic health record. The total time spent that day making medical decisions for the patient is (30) minutes, and this time - including the breakdown - is documented in the medical record.

## 2025-04-29 ENCOUNTER — OFFICE VISIT (OUTPATIENT)
Dept: HEMATOLOGY/ONCOLOGY | Facility: CLINIC | Age: 65
End: 2025-04-29
Payer: COMMERCIAL

## 2025-04-29 VITALS
WEIGHT: 218.38 LBS | OXYGEN SATURATION: 96 % | TEMPERATURE: 98 F | HEART RATE: 83 BPM | RESPIRATION RATE: 14 BRPM | BODY MASS INDEX: 35.1 KG/M2 | SYSTOLIC BLOOD PRESSURE: 124 MMHG | HEIGHT: 66 IN | DIASTOLIC BLOOD PRESSURE: 83 MMHG

## 2025-04-29 DIAGNOSIS — Z94.84 HISTORY OF ALLOGENEIC STEM CELL TRANSPLANT: ICD-10-CM

## 2025-04-29 DIAGNOSIS — C92.01 ACUTE MYELOID LEUKEMIA IN REMISSION: ICD-10-CM

## 2025-04-29 DIAGNOSIS — C92.10 CML (CHRONIC MYELOCYTIC LEUKEMIA): Primary | ICD-10-CM

## 2025-04-29 DIAGNOSIS — E66.01 SEVERE OBESITY (BMI 35.0-39.9) WITH COMORBIDITY: ICD-10-CM

## 2025-04-29 PROCEDURE — 99999 PR PBB SHADOW E&M-EST. PATIENT-LVL III: CPT | Mod: PBBFAC,,, | Performed by: NURSE PRACTITIONER

## 2025-05-02 ENCOUNTER — DOCUMENTATION ONLY (OUTPATIENT)
Dept: PULMONOLOGY | Facility: HOSPITAL | Age: 65
End: 2025-05-02
Payer: COMMERCIAL

## 2025-05-02 NOTE — PROGRESS NOTES
CT chest 04/28/2025 personally reviewed:     Largely stable appearing area of nodular scarring located within the right upper lobe (4/68), measuring approximately 8 mm in largest dimension; stable appearing approximately 6 mm calcified nodule within the right upper lobe (4/75)    Both findings stable when compared with 08/24/2023 CT chest.  Continue yearly CT scan for lung cancer screening.    Called patient and discussed results, he is amenable to plan.        Pérez Crow MD  Pulmonary Disease

## 2025-05-05 ENCOUNTER — PATIENT MESSAGE (OUTPATIENT)
Dept: HEMATOLOGY/ONCOLOGY | Facility: CLINIC | Age: 65
End: 2025-05-05
Payer: COMMERCIAL

## 2025-05-05 NOTE — TELEPHONE ENCOUNTER
I would have him get checked out by his PCP first and then if things persist, we can evaluate him or CT scan.

## 2025-06-17 PROBLEM — Z00.00 WELLNESS EXAMINATION: Status: ACTIVE | Noted: 2025-06-17

## 2025-06-17 PROBLEM — J45.50 SEVERE PERSISTENT ASTHMA WITHOUT COMPLICATION: Status: ACTIVE | Noted: 2025-06-17

## 2025-07-24 ENCOUNTER — LAB VISIT (OUTPATIENT)
Dept: LAB | Facility: HOSPITAL | Age: 65
End: 2025-07-24
Attending: NURSE PRACTITIONER
Payer: COMMERCIAL

## 2025-07-24 DIAGNOSIS — C92.01 ACUTE MYELOID LEUKEMIA IN REMISSION: ICD-10-CM

## 2025-07-24 DIAGNOSIS — E66.01 SEVERE OBESITY (BMI 35.0-39.9) WITH COMORBIDITY: ICD-10-CM

## 2025-07-24 DIAGNOSIS — Z94.84 HISTORY OF ALLOGENEIC STEM CELL TRANSPLANT: ICD-10-CM

## 2025-07-24 DIAGNOSIS — C92.10 CML (CHRONIC MYELOCYTIC LEUKEMIA): ICD-10-CM

## 2025-07-24 LAB
ALBUMIN SERPL-MCNC: 4.4 G/DL (ref 3.4–4.8)
ALBUMIN/GLOB SERPL: 1.4 RATIO (ref 1.1–2)
ALP SERPL-CCNC: 68 UNIT/L (ref 40–150)
ALT SERPL-CCNC: 50 UNIT/L (ref 0–55)
ANION GAP SERPL CALC-SCNC: 10 MEQ/L
AST SERPL-CCNC: 30 UNIT/L (ref 11–45)
BASOPHILS # BLD AUTO: 0.04 X10(3)/MCL
BASOPHILS NFR BLD AUTO: 0.6 %
BILIRUB SERPL-MCNC: 0.6 MG/DL
BUN SERPL-MCNC: 17.4 MG/DL (ref 8.4–25.7)
CALCIUM SERPL-MCNC: 9.5 MG/DL (ref 8.8–10)
CHLORIDE SERPL-SCNC: 105 MMOL/L (ref 98–107)
CO2 SERPL-SCNC: 24 MMOL/L (ref 23–31)
CREAT SERPL-MCNC: 1.05 MG/DL (ref 0.72–1.25)
CREAT/UREA NIT SERPL: 17
EOSINOPHIL # BLD AUTO: 0.1 X10(3)/MCL (ref 0–0.9)
EOSINOPHIL NFR BLD AUTO: 1.6 %
ERYTHROCYTE [DISTWIDTH] IN BLOOD BY AUTOMATED COUNT: 12.7 % (ref 11.5–17)
GFR SERPLBLD CREATININE-BSD FMLA CKD-EPI: >60 ML/MIN/1.73/M2
GLOBULIN SER-MCNC: 3.1 GM/DL (ref 2.4–3.5)
GLUCOSE SERPL-MCNC: 99 MG/DL (ref 82–115)
HCT VFR BLD AUTO: 49.7 % (ref 42–52)
HGB BLD-MCNC: 16.8 G/DL (ref 14–18)
IMM GRANULOCYTES # BLD AUTO: 0.01 X10(3)/MCL (ref 0–0.04)
IMM GRANULOCYTES NFR BLD AUTO: 0.2 %
LDH SERPL-CCNC: 274 U/L (ref 125–220)
LYMPHOCYTES # BLD AUTO: 2.45 X10(3)/MCL (ref 0.6–4.6)
LYMPHOCYTES NFR BLD AUTO: 38.7 %
MCH RBC QN AUTO: 30.9 PG (ref 27–31)
MCHC RBC AUTO-ENTMCNC: 33.8 G/DL (ref 33–36)
MCV RBC AUTO: 91.5 FL (ref 80–94)
MONOCYTES # BLD AUTO: 0.45 X10(3)/MCL (ref 0.1–1.3)
MONOCYTES NFR BLD AUTO: 7.1 %
NEUTROPHILS # BLD AUTO: 3.28 X10(3)/MCL (ref 2.1–9.2)
NEUTROPHILS NFR BLD AUTO: 51.8 %
PLATELET # BLD AUTO: 253 X10(3)/MCL (ref 130–400)
PMV BLD AUTO: 8.8 FL (ref 7.4–10.4)
POTASSIUM SERPL-SCNC: 4.5 MMOL/L (ref 3.5–5.1)
PROT SERPL-MCNC: 7.5 GM/DL (ref 5.8–7.6)
RBC # BLD AUTO: 5.43 X10(6)/MCL (ref 4.7–6.1)
SODIUM SERPL-SCNC: 139 MMOL/L (ref 136–145)
WBC # BLD AUTO: 6.33 X10(3)/MCL (ref 4.5–11.5)

## 2025-07-24 PROCEDURE — 85025 COMPLETE CBC W/AUTO DIFF WBC: CPT

## 2025-07-24 PROCEDURE — 80053 COMPREHEN METABOLIC PANEL: CPT

## 2025-07-24 PROCEDURE — 83615 LACTATE (LD) (LDH) ENZYME: CPT

## 2025-07-24 PROCEDURE — 36415 COLL VENOUS BLD VENIPUNCTURE: CPT

## 2025-07-24 PROCEDURE — 81206 BCR/ABL1 GENE MAJOR BP: CPT

## 2025-08-01 NOTE — PROGRESS NOTES
Subjective:       Patient ID: Kvng Jones Sr. is a 65 y.o. male.    Primary Care: Dr. Ben Doga [facility] transplant Oncologist: Dr. 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  Left axillary lymph node biopsy done 3/24/20--preliminary results c/w granulocytic sarcoma.  Bone marrow biopsy day 100 20 with MRD, positive for BCR-ABL p210 7.8%.  Bone marrow biopsy day 180 2/3/21 s/p transplant showed stringent CR (undetectable BCR-ABL), chimerism 100% donor CD33 and 90% donor CD3.  Bone marrow biopsy at 1 year 21--stringent CR (negative BCR-ABL in blood and marrow), 100% donor CD33 and CD3 chimerism.    Work-up:  Peripheral blood RT PCR BCR-ABL done 3/17/20 positive for major p210, 49.5974%.    Imagin. CT soft tissue neck/C/A/P done 3/23/20--Bilateral cervical lymphadenopathy concerning for metastatic disease or lymphoproliferative disorder, pathologic adenopathy of the bilateral axillary and inguinal  regions, several nonenlarged mediastinal lymph nodes are identified, nonenlarged retroperitoneal lymph nodes.  2. CT chest w/ contrast done at [facility] 12/10/21--no large central PE, findings suggest tiny bilateral pulmonary emboli in lower lobe territory branches R>L, patchy areas of atelectasis and effusion pneumonitis, scattered peripheral hazy nodularities one of which was seen on prior RUL 1cm, others are new, follow-up recommended, enlarged liver with hepatic steatosis, low-density lesions in left lobe 1-2cm range, correlate with US, bilateral adrenal gland hyperplasia nodular changes on left, small 2-4mm calculi right upper kidney, dilated small bowel loop LUQ, r/o obstruction, follow-up recommended.   3. CT chest w/ contrast done at [facility] 22--Near complete resolution of previously seen ground glass nodules from  12/10/2021. Favor infectious/inflammatory etiology.  4. CT chest at [facility] on 8/24/22--Suspected mild emphysema. Stable chronic bilateral focal scar and lingular subsegmental atelectasis. Stable moderate dilation of the ascending thoracic aorta. Stable mild diffuse hepatic steatosis.     Treatment history:  FLAG-Addis induction chemotherapy done at [facility] in [place], [place] 3/30/20--5/1/20.  Ponatinib 30mg daily started after induction, held for transplant.  Flu/Cy/TBI 7/21/20 and haploidentical allogeneic SCT(son was donor) done in [place], [place] at [facility].  Engrafted on 8/10/21 and discharged on 8/12/20.  Restarted Ponatinib on 11/11/20 after day 100 marrow showed MRD.  Ponatinib stopped at day 267 post-transplant due to GI side effects.    GVHD treatment history:  Diffuse erythema to legs, lower back, arms and abdomen 9/15/20, started on Prednisone, worsened despite steroids and tacrolimus cream. Biopsy c/w folliculitis and steroids stopped.  Budesonide 3mg BID started 12/10/20 for GERD; EGD negative for GVHD and c/w antral gastritis, stopped oral steroids.  Tacrolimus taper started every 2 weeks 1/6/21, slow due to ongoing rash, stopped Tacrolimus on 3/31/21.  Acyclovir prophylaxis until 8/2022 (1 year post-Tacrolimus).     Current treatment plan:   1. Observation    Chief Complaint: Other Misc (Pt reports no concerns today./)    HPI   Patient presents for follow-up AML/CML.He is doing very well. Still having RUQ abdominal discomfort. His PCP ordered an US and it revealed fatty liver, no acute findings. He also had a routine CT of chest in April ordered by pulmonary. Complains of fatigue. Denies any fevers, night sweats or weight loss. Denies any n/v/c/d. Bowels are moving well. His most recent BCR-ABL is 0.01%, very low. He continues with neuropathy to bilateral feet and right leg. No other new problems reported.      Past Medical History:   Diagnosis Date    CML (chronic myelocytic leukemia)     Encounter for  blood transfusion     Melanoma in situ of external ear, right     PONV (postoperative nausea and vomiting)     Pulmonary embolism 2022    Wellness examination 2025      Review of patient's allergies indicates:   Allergen Reactions    Posaconazole Hallucinations and Other (See Comments)    Unclassified drug Other (See Comments) and Rash     Pt reports that he has an allergic reaction to an Antifungal medication, but is unsure of the name of the drug. Will obtain name prior to arrival in am and notify Pre-op RN.  Pt reports that he has an allergic reaction to an Antifungal medication, but is unsure of the name of the drug. Will obtain name prior to arrival in am and notify Pre-op RN.    Vancomycin analogues Other (See Comments)     Pt reports he had rigors    Codeine Hives    Iodine Hives, Rash and Other (See Comments)    Codeine sulfate     Iodinated contrast media Hives        Current Outpatient Medications:     amLODIPine (NORVASC) 10 MG tablet, Take 10 mg by mouth once daily., Disp: , Rfl:     budesonide-formoterol 160-4.5 mcg (SYMBICORT) 160-4.5 mcg/actuation HFAA, Inhale 2 puffs into the lungs every 12 (twelve) hours. Controller, Disp: 10.2 g, Rfl: 3    carvediloL (COREG) 3.125 MG tablet, Take 1 tablet by mouth 2 (two) times a day., Disp: , Rfl:     cetirizine (ZYRTEC) 10 MG tablet, 1 tablet Orally Once a day for 30 day(s), Disp: , Rfl:     esomeprazole (NEXIUM) 40 MG capsule, Take 40 mg by mouth before breakfast., Disp: , Rfl:     fluocinolone and shower cap 0.01 % Oil, SMARTSI Milliliter(s) In Ear(s) Daily, Disp: , Rfl:     fluticasone propionate (FLONASE ALLERGY RELIEF NASL), , Disp: , Rfl:     hydrocortisone 2.5 % cream, 1 g 2 (two) times daily as needed., Disp: , Rfl:     multivit-min/FA/lycopen/lutein (CENTRUM SILVER MEN ORAL), Take by mouth., Disp: , Rfl:     olopatadine-mometasone (RYALTRIS) 665-25 mcg/spray Spry, 1 spray by Nasal route 2 (two) times a day., Disp: 29 g, Rfl: 2  Review of  Systems   Constitutional:  Positive for chills and fatigue. Negative for appetite change and unexpected weight change.   HENT:  Negative for mouth sores.    Eyes:  Negative for visual disturbance.   Respiratory:  Negative for cough and shortness of breath.    Cardiovascular:  Negative for chest pain.   Gastrointestinal:  Negative for abdominal pain and diarrhea.   Genitourinary:  Negative for frequency.   Musculoskeletal:  Positive for back pain.   Integumentary:  Negative for rash.   Neurological:  Negative for headaches.   Hematological:  Negative for adenopathy.   Psychiatric/Behavioral:  The patient is not nervous/anxious.          Vitals:    08/07/25 1331   BP: 120/73   Pulse: 79   Resp: 14   Temp: 98 °F (36.7 °C)     Physical Exam  Constitutional:       General: He is awake.      Appearance: Normal appearance. He is obese.   HENT:      Head: Normocephalic.   Eyes:      General: Lids are normal. Vision grossly intact.      Extraocular Movements: Extraocular movements intact.      Conjunctiva/sclera: Conjunctivae normal.   Cardiovascular:      Rate and Rhythm: Normal rate and regular rhythm.      Pulses: Normal pulses.      Heart sounds: Normal heart sounds.   Pulmonary:      Effort: Pulmonary effort is normal.      Breath sounds: Normal air entry.   Abdominal:      General: Abdomen is protuberant. Bowel sounds are normal.      Palpations: Abdomen is soft.   Musculoskeletal:      Cervical back: Neck supple.   Lymphadenopathy:      Cervical: No cervical adenopathy.      Comments: No palpable adenopathy today.   Skin:     General: Skin is warm and dry.      Comments:     Neurological:      General: No focal deficit present.      Mental Status: He is alert and oriented to person, place, and time.   Psychiatric:         Attention and Perception: Attention normal.         Mood and Affect: Mood normal.         Speech: Speech normal.         Behavior: Behavior is cooperative.         Thought Content: Thought content  normal.       ECOG SCORE    1 - Restricted in strenuous activity-ambulatory and able to carry out work of a light nature       No visits with results within 1 Week(s) from this visit.   Latest known visit with results is:   Lab Visit on 07/24/2025   Component Date Value    Sodium 07/24/2025 139     Potassium 07/24/2025 4.5     Chloride 07/24/2025 105     CO2 07/24/2025 24     Glucose 07/24/2025 99     Blood Urea Nitrogen 07/24/2025 17.4     Creatinine 07/24/2025 1.05     Calcium 07/24/2025 9.5     Protein Total 07/24/2025 7.5     Albumin 07/24/2025 4.4     Globulin 07/24/2025 3.1     Albumin/Globulin Ratio 07/24/2025 1.4     Bilirubin Total 07/24/2025 0.6     ALP 07/24/2025 68     ALT 07/24/2025 50     AST 07/24/2025 30     eGFR 07/24/2025 >60     Anion Gap 07/24/2025 10.0     BUN/Creatinine Ratio 07/24/2025 17     BCR/ABL1, p210 Result 07/24/2025 see interpretation     Specimen Type 07/24/2025 blood     Final Diagnosis 07/24/2025 SEE COMMENTS     Lactate Dehydrogenase 07/24/2025 274 (H)     WBC 07/24/2025 6.33     RBC 07/24/2025 5.43     Hgb 07/24/2025 16.8     Hct 07/24/2025 49.7     MCV 07/24/2025 91.5     MCH 07/24/2025 30.9     MCHC 07/24/2025 33.8     RDW 07/24/2025 12.7     Platelet 07/24/2025 253     MPV 07/24/2025 8.8     Neut % 07/24/2025 51.8     Lymph % 07/24/2025 38.7     Mono % 07/24/2025 7.1     Eos % 07/24/2025 1.6     Basophil % 07/24/2025 0.6     Imm Grans % 07/24/2025 0.2     Neut # 07/24/2025 3.28     Lymph # 07/24/2025 2.45     Mono # 07/24/2025 0.45     Eos # 07/24/2025 0.10     Baso # 07/24/2025 0.04     Imm Gran # 07/24/2025 0.01           Assessment:       1. CML (chronic myelocytic leukemia)    2. History of allogeneic stem cell transplant    3. History of melanoma    4. Elevated LDH          Plan:       Patient presented with marked neutrophilia and painful neck and axillary adenopathy.  BCR-ABL+ c/w CML and also lymph node biopsy c/w granulocytic sarcoma, diagnosis CML with blast crisis  in 3/2020.  Completed FLAG-Addis induction chemotherapy done at [facility] in [place], [place] 3/30/20--5/1/20 with CR.  Ponatinib 30mg daily started after induction, held for transplant.  s/p Flu/Cy/TBI 7/21/20 and haploidentical allogeneic SCT(son was donor) done in [place], [place] at [facility].  Engrafted on 8/10/21 and discharged on 8/12/20.  Restarted Ponatinib on 11/11/20 after day 100 marrow showed MRD.  Ponatinib stopped at day 267 post-transplant due to GI side effects.  Had some mild GVHD problems but weaned off Tacrolimus stopped 3/2021.    Patient had hospitalization in 12/2021 with respiratory failure, pneumonitis from parainfluenza, and suspected tiny pulmonary embolism.   Suspect respiratory failure was all related to infection likely and his drop in EF, acute CHF. This resolved on its own and he was found to have non-critical CAD.  CT chest repeated 2/23/22 shows resolved infection, no PE.  CT chest PE protocol repeated on 4/14/23--No evidence of pulmonary thromboembolic disease. Dilated mid ascending aorta. Bronchial wall thickening in the left lower lobe, with large band of atelectasis at the diaphragmatic surface of the left lower lobe. Heavy coronary calcium.  He was referred to Pulmonology who repeated in August. CT chest at [facility] on 8/24/23--Suspected mild emphysema. Stable chronic bilateral focal scar and lingular subsegmental atelectasis. Stable moderate dilation of the ascending thoracic aorta. Stable mild diffuse hepatic steatosis. Plan for repeat scan in 1 year for ongoing lung cancer surveillance.     Returned to Dr. Hodges in August 2023 he had a positive BCR-ABL p210 (<0.003%) and she wanted to repeat in September and it was stable - <0.003%.   Dr. Hodges recommended continued observation unless the  BCR-ABL >0.1%.  Patient has been undergoing labs every 3 months.    Recent labs all good with exception of LDH which is elevated.   BCR-ABL on 7/24/25 +, 0.01%. Will continue to monitor for now since  Dr. Hodges recommended observation unless >0.1%.   Patient without any B symptoms.   LDH is 274. He denies any recent illness or infection. Denies any recent injections. Since he is without any symptoms and his BCR-ABL is very faint, we discussed repeating LDH again in 1 month. If it does not decrease then will obtain imaging.  He did have a CT of chest ordered by pulmonary on 4/28/25 at [facility]. Report not available. Per Dr. Crow note, stable area of nodular scarring noted within the RUL and stable calcified nodule within the RUL. Continue annual screening.    US abdomen on 6/20/25 at [facility]--hepatic steatosis, cysts within the left hepatic lobe (unchanged), simple cyst within left kidney. Otherwise unremarkable.   RTC in 3 months with labs CBC, CMP, BCR-ABL.   Recommended he continue follow-up with all his physicians.     Patient had a follow-up with Dr. Groves in 7/2022 and he took him off the Xarelto.   Completed the MMR series x 2 doses on 4/26/23 and 5/26/23. Completed Shingles vaccine also in May 2023.   Given Pneumovax here on 9/26/22.   Completed Acyclovir prophylaxis 8/2022.  All questions answered at this time.      MESERET Mcelroy  Oncology/Hematology  [facility]        Visit today included increased complexity associated with the longitudinal care and management of the patient's chronic CML/AML diagnosis.    Prior to the patient's arrival on the same day, I spent (5) minutes reviewing chart. Once in the exam room with the patient, I spent (20) minutes in the room with the member performing a history and exam as well as reviewing the test results and recommendations with the patient. After leaving the exam room, I spent an additional (5) minutes completing the electronic health record. The total time spent that day making medical decisions for the patient is (30) minutes, and this time - including the breakdown - is documented in the medical record.

## 2025-08-07 ENCOUNTER — OFFICE VISIT (OUTPATIENT)
Dept: HEMATOLOGY/ONCOLOGY | Facility: CLINIC | Age: 65
End: 2025-08-07
Payer: COMMERCIAL

## 2025-08-07 VITALS
HEART RATE: 79 BPM | OXYGEN SATURATION: 95 % | TEMPERATURE: 98 F | DIASTOLIC BLOOD PRESSURE: 73 MMHG | SYSTOLIC BLOOD PRESSURE: 120 MMHG | RESPIRATION RATE: 14 BRPM | WEIGHT: 217.31 LBS | BODY MASS INDEX: 34.92 KG/M2 | HEIGHT: 66 IN

## 2025-08-07 DIAGNOSIS — C92.10 CML (CHRONIC MYELOCYTIC LEUKEMIA): Primary | ICD-10-CM

## 2025-08-07 DIAGNOSIS — Z85.820 HISTORY OF MELANOMA: ICD-10-CM

## 2025-08-07 DIAGNOSIS — Z94.84 HISTORY OF ALLOGENEIC STEM CELL TRANSPLANT: ICD-10-CM

## 2025-08-07 DIAGNOSIS — R74.02 ELEVATED LDH: ICD-10-CM

## 2025-08-07 PROCEDURE — 1126F AMNT PAIN NOTED NONE PRSNT: CPT | Mod: CPTII,S$GLB,, | Performed by: NURSE PRACTITIONER

## 2025-08-07 PROCEDURE — 3044F HG A1C LEVEL LT 7.0%: CPT | Mod: CPTII,S$GLB,, | Performed by: NURSE PRACTITIONER

## 2025-08-07 PROCEDURE — 99214 OFFICE O/P EST MOD 30 MIN: CPT | Mod: S$GLB,,, | Performed by: NURSE PRACTITIONER

## 2025-08-07 PROCEDURE — 3078F DIAST BP <80 MM HG: CPT | Mod: CPTII,S$GLB,, | Performed by: NURSE PRACTITIONER

## 2025-08-07 PROCEDURE — 3288F FALL RISK ASSESSMENT DOCD: CPT | Mod: CPTII,S$GLB,, | Performed by: NURSE PRACTITIONER

## 2025-08-07 PROCEDURE — 3008F BODY MASS INDEX DOCD: CPT | Mod: CPTII,S$GLB,, | Performed by: NURSE PRACTITIONER

## 2025-08-07 PROCEDURE — 1101F PT FALLS ASSESS-DOCD LE1/YR: CPT | Mod: CPTII,S$GLB,, | Performed by: NURSE PRACTITIONER

## 2025-08-07 PROCEDURE — 99999 PR PBB SHADOW E&M-EST. PATIENT-LVL IV: CPT | Mod: PBBFAC,,, | Performed by: NURSE PRACTITIONER

## 2025-08-07 PROCEDURE — 1160F RVW MEDS BY RX/DR IN RCRD: CPT | Mod: CPTII,S$GLB,, | Performed by: NURSE PRACTITIONER

## 2025-08-07 PROCEDURE — 1159F MED LIST DOCD IN RCRD: CPT | Mod: CPTII,S$GLB,, | Performed by: NURSE PRACTITIONER

## 2025-08-07 PROCEDURE — 3074F SYST BP LT 130 MM HG: CPT | Mod: CPTII,S$GLB,, | Performed by: NURSE PRACTITIONER

## 2025-08-07 PROCEDURE — G2211 COMPLEX E/M VISIT ADD ON: HCPCS | Mod: S$GLB,,, | Performed by: NURSE PRACTITIONER

## 2025-08-12 ENCOUNTER — PATIENT MESSAGE (OUTPATIENT)
Dept: HEMATOLOGY/ONCOLOGY | Facility: CLINIC | Age: 65
End: 2025-08-12
Payer: COMMERCIAL

## 2025-08-13 ENCOUNTER — LAB VISIT (OUTPATIENT)
Dept: LAB | Facility: HOSPITAL | Age: 65
End: 2025-08-13
Attending: NURSE PRACTITIONER
Payer: COMMERCIAL

## 2025-08-13 ENCOUNTER — PATIENT MESSAGE (OUTPATIENT)
Dept: HEMATOLOGY/ONCOLOGY | Facility: CLINIC | Age: 65
End: 2025-08-13
Payer: COMMERCIAL

## 2025-08-13 DIAGNOSIS — R74.02 ELEVATED LDH: ICD-10-CM

## 2025-08-13 DIAGNOSIS — C92.10 CML (CHRONIC MYELOCYTIC LEUKEMIA): ICD-10-CM

## 2025-08-13 DIAGNOSIS — Z94.84 HISTORY OF ALLOGENEIC STEM CELL TRANSPLANT: ICD-10-CM

## 2025-08-13 LAB — LDH SERPL-CCNC: 152 U/L (ref 125–220)

## 2025-08-13 PROCEDURE — 36415 COLL VENOUS BLD VENIPUNCTURE: CPT

## 2025-08-13 PROCEDURE — 83615 LACTATE (LD) (LDH) ENZYME: CPT

## (undated) DEVICE — DRESSING TRANS 2X2 TEGADERM

## (undated) DEVICE — ADHESIVE DERMABOND ADVANCED

## (undated) DEVICE — GAUZE SPONGE 4X4 12PLY

## (undated) DEVICE — Device

## (undated) DEVICE — CHLORAPREP 10.5 ML APPLICATOR

## (undated) DEVICE — TRAY SUTURE REMOVAL IRIS SCIS

## (undated) DEVICE — GLOVE BIOGEL SKINSENSE PI 7.5

## (undated) DEVICE — SCALPEL #11 BLADE STRL DISP

## (undated) DEVICE — SEE MEDLINE ITEM 152622